# Patient Record
Sex: FEMALE | Race: WHITE | HISPANIC OR LATINO | URBAN - METROPOLITAN AREA
[De-identification: names, ages, dates, MRNs, and addresses within clinical notes are randomized per-mention and may not be internally consistent; named-entity substitution may affect disease eponyms.]

---

## 2023-11-21 ENCOUNTER — INPATIENT (INPATIENT)
Facility: HOSPITAL | Age: 83
LOS: 23 days | Discharge: HOME HEALTH | DRG: 291 | End: 2023-12-15
Attending: STUDENT IN AN ORGANIZED HEALTH CARE EDUCATION/TRAINING PROGRAM | Admitting: STUDENT IN AN ORGANIZED HEALTH CARE EDUCATION/TRAINING PROGRAM
Payer: MEDICARE

## 2023-11-21 VITALS
TEMPERATURE: 98 F | SYSTOLIC BLOOD PRESSURE: 185 MMHG | DIASTOLIC BLOOD PRESSURE: 68 MMHG | OXYGEN SATURATION: 95 % | WEIGHT: 149.91 LBS | HEART RATE: 87 BPM | HEIGHT: 63 IN | RESPIRATION RATE: 28 BRPM

## 2023-11-21 LAB
ALBUMIN SERPL ELPH-MCNC: 3.8 G/DL — SIGNIFICANT CHANGE UP (ref 3.3–5)
ALBUMIN SERPL ELPH-MCNC: 3.8 G/DL — SIGNIFICANT CHANGE UP (ref 3.3–5)
ALP SERPL-CCNC: 69 U/L — SIGNIFICANT CHANGE UP (ref 40–120)
ALP SERPL-CCNC: 69 U/L — SIGNIFICANT CHANGE UP (ref 40–120)
ALT FLD-CCNC: 10 U/L — SIGNIFICANT CHANGE UP (ref 10–45)
ALT FLD-CCNC: 10 U/L — SIGNIFICANT CHANGE UP (ref 10–45)
ANION GAP SERPL CALC-SCNC: 14 MMOL/L — SIGNIFICANT CHANGE UP (ref 5–17)
ANION GAP SERPL CALC-SCNC: 14 MMOL/L — SIGNIFICANT CHANGE UP (ref 5–17)
APTT BLD: 38.8 SEC — HIGH (ref 24.5–35.6)
APTT BLD: 38.8 SEC — HIGH (ref 24.5–35.6)
AST SERPL-CCNC: 18 U/L — SIGNIFICANT CHANGE UP (ref 10–40)
AST SERPL-CCNC: 18 U/L — SIGNIFICANT CHANGE UP (ref 10–40)
BASE EXCESS BLDV CALC-SCNC: -7.3 MMOL/L — LOW (ref -2–3)
BASE EXCESS BLDV CALC-SCNC: -7.3 MMOL/L — LOW (ref -2–3)
BASOPHILS # BLD AUTO: 0.07 K/UL — SIGNIFICANT CHANGE UP (ref 0–0.2)
BASOPHILS # BLD AUTO: 0.07 K/UL — SIGNIFICANT CHANGE UP (ref 0–0.2)
BASOPHILS NFR BLD AUTO: 1 % — SIGNIFICANT CHANGE UP (ref 0–2)
BASOPHILS NFR BLD AUTO: 1 % — SIGNIFICANT CHANGE UP (ref 0–2)
BILIRUB SERPL-MCNC: 0.3 MG/DL — SIGNIFICANT CHANGE UP (ref 0.2–1.2)
BILIRUB SERPL-MCNC: 0.3 MG/DL — SIGNIFICANT CHANGE UP (ref 0.2–1.2)
BUN SERPL-MCNC: 55 MG/DL — HIGH (ref 7–23)
BUN SERPL-MCNC: 55 MG/DL — HIGH (ref 7–23)
CA-I SERPL-SCNC: 1.2 MMOL/L — SIGNIFICANT CHANGE UP (ref 1.15–1.33)
CA-I SERPL-SCNC: 1.2 MMOL/L — SIGNIFICANT CHANGE UP (ref 1.15–1.33)
CALCIUM SERPL-MCNC: 9 MG/DL — SIGNIFICANT CHANGE UP (ref 8.4–10.5)
CALCIUM SERPL-MCNC: 9 MG/DL — SIGNIFICANT CHANGE UP (ref 8.4–10.5)
CHLORIDE BLDV-SCNC: 109 MMOL/L — HIGH (ref 96–108)
CHLORIDE BLDV-SCNC: 109 MMOL/L — HIGH (ref 96–108)
CHLORIDE SERPL-SCNC: 108 MMOL/L — SIGNIFICANT CHANGE UP (ref 96–108)
CHLORIDE SERPL-SCNC: 108 MMOL/L — SIGNIFICANT CHANGE UP (ref 96–108)
CO2 BLDV-SCNC: 21 MMOL/L — LOW (ref 22–26)
CO2 BLDV-SCNC: 21 MMOL/L — LOW (ref 22–26)
CO2 SERPL-SCNC: 17 MMOL/L — LOW (ref 22–31)
CO2 SERPL-SCNC: 17 MMOL/L — LOW (ref 22–31)
CREAT SERPL-MCNC: 4.54 MG/DL — HIGH (ref 0.5–1.3)
CREAT SERPL-MCNC: 4.54 MG/DL — HIGH (ref 0.5–1.3)
EGFR: 9 ML/MIN/1.73M2 — LOW
EGFR: 9 ML/MIN/1.73M2 — LOW
EOSINOPHIL # BLD AUTO: 0.24 K/UL — SIGNIFICANT CHANGE UP (ref 0–0.5)
EOSINOPHIL # BLD AUTO: 0.24 K/UL — SIGNIFICANT CHANGE UP (ref 0–0.5)
EOSINOPHIL NFR BLD AUTO: 3.6 % — SIGNIFICANT CHANGE UP (ref 0–6)
EOSINOPHIL NFR BLD AUTO: 3.6 % — SIGNIFICANT CHANGE UP (ref 0–6)
GAS PNL BLDV: 137 MMOL/L — SIGNIFICANT CHANGE UP (ref 136–145)
GAS PNL BLDV: 137 MMOL/L — SIGNIFICANT CHANGE UP (ref 136–145)
GAS PNL BLDV: SIGNIFICANT CHANGE UP
GLUCOSE BLDV-MCNC: 78 MG/DL — SIGNIFICANT CHANGE UP (ref 70–99)
GLUCOSE BLDV-MCNC: 78 MG/DL — SIGNIFICANT CHANGE UP (ref 70–99)
GLUCOSE SERPL-MCNC: 87 MG/DL — SIGNIFICANT CHANGE UP (ref 70–99)
GLUCOSE SERPL-MCNC: 87 MG/DL — SIGNIFICANT CHANGE UP (ref 70–99)
HCO3 BLDV-SCNC: 19 MMOL/L — LOW (ref 22–29)
HCO3 BLDV-SCNC: 19 MMOL/L — LOW (ref 22–29)
HCT VFR BLD CALC: 27.7 % — LOW (ref 34.5–45)
HCT VFR BLD CALC: 27.7 % — LOW (ref 34.5–45)
HCT VFR BLDA CALC: 28 % — LOW (ref 34.5–46.5)
HCT VFR BLDA CALC: 28 % — LOW (ref 34.5–46.5)
HGB BLD CALC-MCNC: 9.3 G/DL — LOW (ref 11.7–16.1)
HGB BLD CALC-MCNC: 9.3 G/DL — LOW (ref 11.7–16.1)
HGB BLD-MCNC: 8.7 G/DL — LOW (ref 11.5–15.5)
HGB BLD-MCNC: 8.7 G/DL — LOW (ref 11.5–15.5)
IMM GRANULOCYTES NFR BLD AUTO: 0.4 % — SIGNIFICANT CHANGE UP (ref 0–0.9)
IMM GRANULOCYTES NFR BLD AUTO: 0.4 % — SIGNIFICANT CHANGE UP (ref 0–0.9)
INR BLD: 0.99 RATIO — SIGNIFICANT CHANGE UP (ref 0.85–1.18)
INR BLD: 0.99 RATIO — SIGNIFICANT CHANGE UP (ref 0.85–1.18)
LACTATE BLDV-MCNC: 0.8 MMOL/L — SIGNIFICANT CHANGE UP (ref 0.5–2)
LACTATE BLDV-MCNC: 0.8 MMOL/L — SIGNIFICANT CHANGE UP (ref 0.5–2)
LYMPHOCYTES # BLD AUTO: 1.3 K/UL — SIGNIFICANT CHANGE UP (ref 1–3.3)
LYMPHOCYTES # BLD AUTO: 1.3 K/UL — SIGNIFICANT CHANGE UP (ref 1–3.3)
LYMPHOCYTES # BLD AUTO: 19.5 % — SIGNIFICANT CHANGE UP (ref 13–44)
LYMPHOCYTES # BLD AUTO: 19.5 % — SIGNIFICANT CHANGE UP (ref 13–44)
MCHC RBC-ENTMCNC: 29.2 PG — SIGNIFICANT CHANGE UP (ref 27–34)
MCHC RBC-ENTMCNC: 29.2 PG — SIGNIFICANT CHANGE UP (ref 27–34)
MCHC RBC-ENTMCNC: 31.4 GM/DL — LOW (ref 32–36)
MCHC RBC-ENTMCNC: 31.4 GM/DL — LOW (ref 32–36)
MCV RBC AUTO: 93 FL — SIGNIFICANT CHANGE UP (ref 80–100)
MCV RBC AUTO: 93 FL — SIGNIFICANT CHANGE UP (ref 80–100)
MONOCYTES # BLD AUTO: 0.79 K/UL — SIGNIFICANT CHANGE UP (ref 0–0.9)
MONOCYTES # BLD AUTO: 0.79 K/UL — SIGNIFICANT CHANGE UP (ref 0–0.9)
MONOCYTES NFR BLD AUTO: 11.8 % — SIGNIFICANT CHANGE UP (ref 2–14)
MONOCYTES NFR BLD AUTO: 11.8 % — SIGNIFICANT CHANGE UP (ref 2–14)
NEUTROPHILS # BLD AUTO: 4.24 K/UL — SIGNIFICANT CHANGE UP (ref 1.8–7.4)
NEUTROPHILS # BLD AUTO: 4.24 K/UL — SIGNIFICANT CHANGE UP (ref 1.8–7.4)
NEUTROPHILS NFR BLD AUTO: 63.7 % — SIGNIFICANT CHANGE UP (ref 43–77)
NEUTROPHILS NFR BLD AUTO: 63.7 % — SIGNIFICANT CHANGE UP (ref 43–77)
NRBC # BLD: 0 /100 WBCS — SIGNIFICANT CHANGE UP (ref 0–0)
NRBC # BLD: 0 /100 WBCS — SIGNIFICANT CHANGE UP (ref 0–0)
PCO2 BLDV: 43 MMHG — HIGH (ref 39–42)
PCO2 BLDV: 43 MMHG — HIGH (ref 39–42)
PH BLDV: 7.26 — LOW (ref 7.32–7.43)
PH BLDV: 7.26 — LOW (ref 7.32–7.43)
PLATELET # BLD AUTO: 241 K/UL — SIGNIFICANT CHANGE UP (ref 150–400)
PLATELET # BLD AUTO: 241 K/UL — SIGNIFICANT CHANGE UP (ref 150–400)
PO2 BLDV: 43 MMHG — SIGNIFICANT CHANGE UP (ref 25–45)
PO2 BLDV: 43 MMHG — SIGNIFICANT CHANGE UP (ref 25–45)
POTASSIUM BLDV-SCNC: 5 MMOL/L — SIGNIFICANT CHANGE UP (ref 3.5–5.1)
POTASSIUM BLDV-SCNC: 5 MMOL/L — SIGNIFICANT CHANGE UP (ref 3.5–5.1)
POTASSIUM SERPL-MCNC: 4.9 MMOL/L — SIGNIFICANT CHANGE UP (ref 3.5–5.3)
POTASSIUM SERPL-MCNC: 4.9 MMOL/L — SIGNIFICANT CHANGE UP (ref 3.5–5.3)
POTASSIUM SERPL-SCNC: 4.9 MMOL/L — SIGNIFICANT CHANGE UP (ref 3.5–5.3)
POTASSIUM SERPL-SCNC: 4.9 MMOL/L — SIGNIFICANT CHANGE UP (ref 3.5–5.3)
PROT SERPL-MCNC: 7.7 G/DL — SIGNIFICANT CHANGE UP (ref 6–8.3)
PROT SERPL-MCNC: 7.7 G/DL — SIGNIFICANT CHANGE UP (ref 6–8.3)
PROTHROM AB SERPL-ACNC: 10.9 SEC — SIGNIFICANT CHANGE UP (ref 9.5–13)
PROTHROM AB SERPL-ACNC: 10.9 SEC — SIGNIFICANT CHANGE UP (ref 9.5–13)
RBC # BLD: 2.98 M/UL — LOW (ref 3.8–5.2)
RBC # BLD: 2.98 M/UL — LOW (ref 3.8–5.2)
RBC # FLD: 15.4 % — HIGH (ref 10.3–14.5)
RBC # FLD: 15.4 % — HIGH (ref 10.3–14.5)
SAO2 % BLDV: 61 % — LOW (ref 67–88)
SAO2 % BLDV: 61 % — LOW (ref 67–88)
SODIUM SERPL-SCNC: 139 MMOL/L — SIGNIFICANT CHANGE UP (ref 135–145)
SODIUM SERPL-SCNC: 139 MMOL/L — SIGNIFICANT CHANGE UP (ref 135–145)
TROPONIN T, HIGH SENSITIVITY RESULT: 74 NG/L — HIGH (ref 0–51)
TROPONIN T, HIGH SENSITIVITY RESULT: 74 NG/L — HIGH (ref 0–51)
WBC # BLD: 6.67 K/UL — SIGNIFICANT CHANGE UP (ref 3.8–10.5)
WBC # BLD: 6.67 K/UL — SIGNIFICANT CHANGE UP (ref 3.8–10.5)
WBC # FLD AUTO: 6.67 K/UL — SIGNIFICANT CHANGE UP (ref 3.8–10.5)
WBC # FLD AUTO: 6.67 K/UL — SIGNIFICANT CHANGE UP (ref 3.8–10.5)

## 2023-11-21 PROCEDURE — 99285 EMERGENCY DEPT VISIT HI MDM: CPT | Mod: GC

## 2023-11-21 PROCEDURE — 93970 EXTREMITY STUDY: CPT | Mod: 26

## 2023-11-21 PROCEDURE — 71275 CT ANGIOGRAPHY CHEST: CPT | Mod: 26,MA

## 2023-11-21 NOTE — ED ADULT NURSE NOTE - NSFALLUNIVINTERV_ED_ALL_ED
Bed/Stretcher in lowest position, wheels locked, appropriate side rails in place/Call bell, personal items and telephone in reach/Instruct patient to call for assistance before getting out of bed/chair/stretcher/Non-slip footwear applied when patient is off stretcher/[place] to call system/Physically safe environment - no spills, clutter or unnecessary equipment/Purposeful proactive rounding/Room/bathroom lighting operational, light cord in reach

## 2023-11-21 NOTE — ED ADULT NURSE NOTE - OBJECTIVE STATEMENT
Pt is 83Y F, pmhx HTN, HLD, DM2, COPD uses O2 2LNC for comfort, c/o SOB, cough, WALKER, increased bilateral LLE for 1 week, pt nonproductive cough, no fever or chills, pt uses 2LNC for comfort at home, pt RA sat at ED less than 90%, pt placed on 2LNC, increased O2 sat to 95%, pt endorses increaed bilateral leg pain with swelling, +2 pitting edema noted, pt ambulates at baseline, pt 5/10 pain in legs, no DVT hx, pt denies any other symptoms, updated on plan of care

## 2023-11-22 DIAGNOSIS — J96.11 CHRONIC RESPIRATORY FAILURE WITH HYPOXIA: ICD-10-CM

## 2023-11-22 DIAGNOSIS — Z29.9 ENCOUNTER FOR PROPHYLACTIC MEASURES, UNSPECIFIED: ICD-10-CM

## 2023-11-22 DIAGNOSIS — R06.02 SHORTNESS OF BREATH: ICD-10-CM

## 2023-11-22 DIAGNOSIS — I10 ESSENTIAL (PRIMARY) HYPERTENSION: ICD-10-CM

## 2023-11-22 DIAGNOSIS — E78.5 HYPERLIPIDEMIA, UNSPECIFIED: ICD-10-CM

## 2023-11-22 DIAGNOSIS — I50.9 HEART FAILURE, UNSPECIFIED: ICD-10-CM

## 2023-11-22 DIAGNOSIS — N17.9 ACUTE KIDNEY FAILURE, UNSPECIFIED: ICD-10-CM

## 2023-11-22 DIAGNOSIS — E11.9 TYPE 2 DIABETES MELLITUS WITHOUT COMPLICATIONS: ICD-10-CM

## 2023-11-22 LAB
A1C WITH ESTIMATED AVERAGE GLUCOSE RESULT: 6.1 % — HIGH (ref 4–5.6)
A1C WITH ESTIMATED AVERAGE GLUCOSE RESULT: 6.1 % — HIGH (ref 4–5.6)
ALBUMIN SERPL ELPH-MCNC: 3.4 G/DL — SIGNIFICANT CHANGE UP (ref 3.3–5)
ALBUMIN SERPL ELPH-MCNC: 3.4 G/DL — SIGNIFICANT CHANGE UP (ref 3.3–5)
ALP SERPL-CCNC: 67 U/L — SIGNIFICANT CHANGE UP (ref 40–120)
ALP SERPL-CCNC: 67 U/L — SIGNIFICANT CHANGE UP (ref 40–120)
ALT FLD-CCNC: 9 U/L — LOW (ref 10–45)
ALT FLD-CCNC: 9 U/L — LOW (ref 10–45)
ANION GAP SERPL CALC-SCNC: 16 MMOL/L — SIGNIFICANT CHANGE UP (ref 5–17)
ANION GAP SERPL CALC-SCNC: 16 MMOL/L — SIGNIFICANT CHANGE UP (ref 5–17)
APPEARANCE UR: CLEAR — SIGNIFICANT CHANGE UP
APPEARANCE UR: CLEAR — SIGNIFICANT CHANGE UP
AST SERPL-CCNC: 19 U/L — SIGNIFICANT CHANGE UP (ref 10–40)
AST SERPL-CCNC: 19 U/L — SIGNIFICANT CHANGE UP (ref 10–40)
BACTERIA # UR AUTO: NEGATIVE /HPF — SIGNIFICANT CHANGE UP
BACTERIA # UR AUTO: NEGATIVE /HPF — SIGNIFICANT CHANGE UP
BILIRUB SERPL-MCNC: 0.4 MG/DL — SIGNIFICANT CHANGE UP (ref 0.2–1.2)
BILIRUB SERPL-MCNC: 0.4 MG/DL — SIGNIFICANT CHANGE UP (ref 0.2–1.2)
BILIRUB UR-MCNC: NEGATIVE — SIGNIFICANT CHANGE UP
BILIRUB UR-MCNC: NEGATIVE — SIGNIFICANT CHANGE UP
BUN SERPL-MCNC: 55 MG/DL — HIGH (ref 7–23)
BUN SERPL-MCNC: 55 MG/DL — HIGH (ref 7–23)
CALCIUM SERPL-MCNC: 9.3 MG/DL — SIGNIFICANT CHANGE UP (ref 8.4–10.5)
CALCIUM SERPL-MCNC: 9.3 MG/DL — SIGNIFICANT CHANGE UP (ref 8.4–10.5)
CAST: 0 /LPF — SIGNIFICANT CHANGE UP (ref 0–4)
CAST: 0 /LPF — SIGNIFICANT CHANGE UP (ref 0–4)
CHLORIDE SERPL-SCNC: 109 MMOL/L — HIGH (ref 96–108)
CHLORIDE SERPL-SCNC: 109 MMOL/L — HIGH (ref 96–108)
CO2 SERPL-SCNC: 15 MMOL/L — LOW (ref 22–31)
CO2 SERPL-SCNC: 15 MMOL/L — LOW (ref 22–31)
COLOR SPEC: YELLOW — SIGNIFICANT CHANGE UP
COLOR SPEC: YELLOW — SIGNIFICANT CHANGE UP
CREAT ?TM UR-MCNC: 36 MG/DL — SIGNIFICANT CHANGE UP
CREAT ?TM UR-MCNC: 36 MG/DL — SIGNIFICANT CHANGE UP
CREAT SERPL-MCNC: 4.32 MG/DL — HIGH (ref 0.5–1.3)
CREAT SERPL-MCNC: 4.32 MG/DL — HIGH (ref 0.5–1.3)
DIFF PNL FLD: ABNORMAL
DIFF PNL FLD: ABNORMAL
EGFR: 10 ML/MIN/1.73M2 — LOW
EGFR: 10 ML/MIN/1.73M2 — LOW
ESTIMATED AVERAGE GLUCOSE: 128 MG/DL — HIGH (ref 68–114)
ESTIMATED AVERAGE GLUCOSE: 128 MG/DL — HIGH (ref 68–114)
GLUCOSE SERPL-MCNC: 98 MG/DL — SIGNIFICANT CHANGE UP (ref 70–99)
GLUCOSE SERPL-MCNC: 98 MG/DL — SIGNIFICANT CHANGE UP (ref 70–99)
GLUCOSE UR QL: 250 MG/DL
GLUCOSE UR QL: 250 MG/DL
HCT VFR BLD CALC: 28.2 % — LOW (ref 34.5–45)
HCT VFR BLD CALC: 28.2 % — LOW (ref 34.5–45)
HGB BLD-MCNC: 8.9 G/DL — LOW (ref 11.5–15.5)
HGB BLD-MCNC: 8.9 G/DL — LOW (ref 11.5–15.5)
KETONES UR-MCNC: NEGATIVE MG/DL — SIGNIFICANT CHANGE UP
KETONES UR-MCNC: NEGATIVE MG/DL — SIGNIFICANT CHANGE UP
LEUKOCYTE ESTERASE UR-ACNC: NEGATIVE — SIGNIFICANT CHANGE UP
LEUKOCYTE ESTERASE UR-ACNC: NEGATIVE — SIGNIFICANT CHANGE UP
MAGNESIUM SERPL-MCNC: 2.4 MG/DL — SIGNIFICANT CHANGE UP (ref 1.6–2.6)
MAGNESIUM SERPL-MCNC: 2.4 MG/DL — SIGNIFICANT CHANGE UP (ref 1.6–2.6)
MCHC RBC-ENTMCNC: 29.7 PG — SIGNIFICANT CHANGE UP (ref 27–34)
MCHC RBC-ENTMCNC: 29.7 PG — SIGNIFICANT CHANGE UP (ref 27–34)
MCHC RBC-ENTMCNC: 31.6 GM/DL — LOW (ref 32–36)
MCHC RBC-ENTMCNC: 31.6 GM/DL — LOW (ref 32–36)
MCV RBC AUTO: 94 FL — SIGNIFICANT CHANGE UP (ref 80–100)
MCV RBC AUTO: 94 FL — SIGNIFICANT CHANGE UP (ref 80–100)
NITRITE UR-MCNC: NEGATIVE — SIGNIFICANT CHANGE UP
NITRITE UR-MCNC: NEGATIVE — SIGNIFICANT CHANGE UP
NRBC # BLD: 0 /100 WBCS — SIGNIFICANT CHANGE UP (ref 0–0)
NRBC # BLD: 0 /100 WBCS — SIGNIFICANT CHANGE UP (ref 0–0)
OSMOLALITY UR: 364 MOS/KG — SIGNIFICANT CHANGE UP (ref 300–900)
OSMOLALITY UR: 364 MOS/KG — SIGNIFICANT CHANGE UP (ref 300–900)
PH UR: 5.5 — SIGNIFICANT CHANGE UP (ref 5–8)
PH UR: 5.5 — SIGNIFICANT CHANGE UP (ref 5–8)
PHOSPHATE SERPL-MCNC: 5.2 MG/DL — HIGH (ref 2.5–4.5)
PHOSPHATE SERPL-MCNC: 5.2 MG/DL — HIGH (ref 2.5–4.5)
PLATELET # BLD AUTO: 225 K/UL — SIGNIFICANT CHANGE UP (ref 150–400)
PLATELET # BLD AUTO: 225 K/UL — SIGNIFICANT CHANGE UP (ref 150–400)
POTASSIUM SERPL-MCNC: 5.1 MMOL/L — SIGNIFICANT CHANGE UP (ref 3.5–5.3)
POTASSIUM SERPL-MCNC: 5.1 MMOL/L — SIGNIFICANT CHANGE UP (ref 3.5–5.3)
POTASSIUM SERPL-SCNC: 5.1 MMOL/L — SIGNIFICANT CHANGE UP (ref 3.5–5.3)
POTASSIUM SERPL-SCNC: 5.1 MMOL/L — SIGNIFICANT CHANGE UP (ref 3.5–5.3)
POTASSIUM UR-SCNC: 15 MMOL/L — SIGNIFICANT CHANGE UP
POTASSIUM UR-SCNC: 15 MMOL/L — SIGNIFICANT CHANGE UP
PROT ?TM UR-MCNC: 203 MG/DL — HIGH (ref 0–12)
PROT ?TM UR-MCNC: 203 MG/DL — HIGH (ref 0–12)
PROT SERPL-MCNC: 7.3 G/DL — SIGNIFICANT CHANGE UP (ref 6–8.3)
PROT SERPL-MCNC: 7.3 G/DL — SIGNIFICANT CHANGE UP (ref 6–8.3)
PROT UR-MCNC: 300 MG/DL
PROT UR-MCNC: 300 MG/DL
PROT/CREAT UR-RTO: 5.6 RATIO — HIGH (ref 0–0.2)
PROT/CREAT UR-RTO: 5.6 RATIO — HIGH (ref 0–0.2)
RAPID RVP RESULT: SIGNIFICANT CHANGE UP
RAPID RVP RESULT: SIGNIFICANT CHANGE UP
RBC # BLD: 3 M/UL — LOW (ref 3.8–5.2)
RBC # BLD: 3 M/UL — LOW (ref 3.8–5.2)
RBC # FLD: 15.4 % — HIGH (ref 10.3–14.5)
RBC # FLD: 15.4 % — HIGH (ref 10.3–14.5)
RBC CASTS # UR COMP ASSIST: 0 /HPF — SIGNIFICANT CHANGE UP (ref 0–4)
RBC CASTS # UR COMP ASSIST: 0 /HPF — SIGNIFICANT CHANGE UP (ref 0–4)
SARS-COV-2 RNA SPEC QL NAA+PROBE: SIGNIFICANT CHANGE UP
SARS-COV-2 RNA SPEC QL NAA+PROBE: SIGNIFICANT CHANGE UP
SODIUM SERPL-SCNC: 140 MMOL/L — SIGNIFICANT CHANGE UP (ref 135–145)
SODIUM SERPL-SCNC: 140 MMOL/L — SIGNIFICANT CHANGE UP (ref 135–145)
SODIUM UR-SCNC: 113 MMOL/L — SIGNIFICANT CHANGE UP
SODIUM UR-SCNC: 113 MMOL/L — SIGNIFICANT CHANGE UP
SP GR SPEC: 1.01 — SIGNIFICANT CHANGE UP (ref 1–1.03)
SP GR SPEC: 1.01 — SIGNIFICANT CHANGE UP (ref 1–1.03)
SQUAMOUS # UR AUTO: 1 /HPF — SIGNIFICANT CHANGE UP (ref 0–5)
SQUAMOUS # UR AUTO: 1 /HPF — SIGNIFICANT CHANGE UP (ref 0–5)
TROPONIN T, HIGH SENSITIVITY RESULT: 74 NG/L — HIGH (ref 0–51)
TROPONIN T, HIGH SENSITIVITY RESULT: 74 NG/L — HIGH (ref 0–51)
UROBILINOGEN FLD QL: 0.2 MG/DL — SIGNIFICANT CHANGE UP (ref 0.2–1)
UROBILINOGEN FLD QL: 0.2 MG/DL — SIGNIFICANT CHANGE UP (ref 0.2–1)
UUN UR-MCNC: 150 MG/DL — SIGNIFICANT CHANGE UP
UUN UR-MCNC: 150 MG/DL — SIGNIFICANT CHANGE UP
WBC # BLD: 6.56 K/UL — SIGNIFICANT CHANGE UP (ref 3.8–10.5)
WBC # BLD: 6.56 K/UL — SIGNIFICANT CHANGE UP (ref 3.8–10.5)
WBC # FLD AUTO: 6.56 K/UL — SIGNIFICANT CHANGE UP (ref 3.8–10.5)
WBC # FLD AUTO: 6.56 K/UL — SIGNIFICANT CHANGE UP (ref 3.8–10.5)
WBC UR QL: 3 /HPF — SIGNIFICANT CHANGE UP (ref 0–5)
WBC UR QL: 3 /HPF — SIGNIFICANT CHANGE UP (ref 0–5)

## 2023-11-22 PROCEDURE — 99223 1ST HOSP IP/OBS HIGH 75: CPT | Mod: GC

## 2023-11-22 PROCEDURE — 93306 TTE W/DOPPLER COMPLETE: CPT | Mod: 26

## 2023-11-22 PROCEDURE — 71045 X-RAY EXAM CHEST 1 VIEW: CPT | Mod: 26

## 2023-11-22 PROCEDURE — 99232 SBSQ HOSP IP/OBS MODERATE 35: CPT | Mod: GC

## 2023-11-22 PROCEDURE — 76770 US EXAM ABDO BACK WALL COMP: CPT | Mod: 26,59

## 2023-11-22 PROCEDURE — 93975 VASCULAR STUDY: CPT | Mod: 26

## 2023-11-22 RX ORDER — ACETAMINOPHEN 500 MG
650 TABLET ORAL EVERY 6 HOURS
Refills: 0 | Status: DISCONTINUED | OUTPATIENT
Start: 2023-11-22 | End: 2023-12-15

## 2023-11-22 RX ORDER — HYDRALAZINE HCL 50 MG
10 TABLET ORAL THREE TIMES A DAY
Refills: 0 | Status: DISCONTINUED | OUTPATIENT
Start: 2023-11-22 | End: 2023-11-22

## 2023-11-22 RX ORDER — INFLUENZA VIRUS VACCINE 15; 15; 15; 15 UG/.5ML; UG/.5ML; UG/.5ML; UG/.5ML
0.7 SUSPENSION INTRAMUSCULAR ONCE
Refills: 0 | Status: DISCONTINUED | OUTPATIENT
Start: 2023-11-22 | End: 2023-12-15

## 2023-11-22 RX ORDER — HYDRALAZINE HCL 50 MG
25 TABLET ORAL THREE TIMES A DAY
Refills: 0 | Status: DISCONTINUED | OUTPATIENT
Start: 2023-11-22 | End: 2023-12-15

## 2023-11-22 RX ORDER — AMLODIPINE BESYLATE 2.5 MG/1
10 TABLET ORAL DAILY
Refills: 0 | Status: DISCONTINUED | OUTPATIENT
Start: 2023-11-22 | End: 2023-11-22

## 2023-11-22 RX ORDER — ATORVASTATIN CALCIUM 80 MG/1
80 TABLET, FILM COATED ORAL AT BEDTIME
Refills: 0 | Status: DISCONTINUED | OUTPATIENT
Start: 2023-11-22 | End: 2023-12-15

## 2023-11-22 RX ORDER — HEPARIN SODIUM 5000 [USP'U]/ML
5000 INJECTION INTRAVENOUS; SUBCUTANEOUS EVERY 8 HOURS
Refills: 0 | Status: DISCONTINUED | OUTPATIENT
Start: 2023-11-22 | End: 2023-12-04

## 2023-11-22 RX ORDER — AMLODIPINE BESYLATE 2.5 MG/1
10 TABLET ORAL DAILY
Refills: 0 | Status: DISCONTINUED | OUTPATIENT
Start: 2023-11-22 | End: 2023-12-15

## 2023-11-22 RX ORDER — MONTELUKAST 4 MG/1
10 TABLET, CHEWABLE ORAL DAILY
Refills: 0 | Status: DISCONTINUED | OUTPATIENT
Start: 2023-11-22 | End: 2023-12-15

## 2023-11-22 RX ORDER — IPRATROPIUM/ALBUTEROL SULFATE 18-103MCG
3 AEROSOL WITH ADAPTER (GRAM) INHALATION EVERY 6 HOURS
Refills: 0 | Status: DISCONTINUED | OUTPATIENT
Start: 2023-11-22 | End: 2023-12-15

## 2023-11-22 RX ORDER — FUROSEMIDE 40 MG
40 TABLET ORAL
Refills: 0 | Status: DISCONTINUED | OUTPATIENT
Start: 2023-11-22 | End: 2023-11-24

## 2023-11-22 RX ORDER — ALBUTEROL 90 UG/1
2 AEROSOL, METERED ORAL EVERY 6 HOURS
Refills: 0 | Status: DISCONTINUED | OUTPATIENT
Start: 2023-11-22 | End: 2023-12-15

## 2023-11-22 RX ORDER — BUDESONIDE AND FORMOTEROL FUMARATE DIHYDRATE 160; 4.5 UG/1; UG/1
2 AEROSOL RESPIRATORY (INHALATION) EVERY 12 HOURS
Refills: 0 | Status: DISCONTINUED | OUTPATIENT
Start: 2023-11-22 | End: 2023-11-29

## 2023-11-22 RX ADMIN — Medication 3 MILLILITER(S): at 17:49

## 2023-11-22 RX ADMIN — Medication 40 MILLIGRAM(S): at 13:33

## 2023-11-22 RX ADMIN — Medication 25 MILLIGRAM(S): at 13:26

## 2023-11-22 RX ADMIN — MONTELUKAST 10 MILLIGRAM(S): 4 TABLET, CHEWABLE ORAL at 12:49

## 2023-11-22 RX ADMIN — ATORVASTATIN CALCIUM 80 MILLIGRAM(S): 80 TABLET, FILM COATED ORAL at 20:48

## 2023-11-22 RX ADMIN — BUDESONIDE AND FORMOTEROL FUMARATE DIHYDRATE 2 PUFF(S): 160; 4.5 AEROSOL RESPIRATORY (INHALATION) at 17:49

## 2023-11-22 RX ADMIN — HEPARIN SODIUM 5000 UNIT(S): 5000 INJECTION INTRAVENOUS; SUBCUTANEOUS at 20:48

## 2023-11-22 RX ADMIN — AMLODIPINE BESYLATE 10 MILLIGRAM(S): 2.5 TABLET ORAL at 07:26

## 2023-11-22 RX ADMIN — HEPARIN SODIUM 5000 UNIT(S): 5000 INJECTION INTRAVENOUS; SUBCUTANEOUS at 13:26

## 2023-11-22 RX ADMIN — Medication 25 MILLIGRAM(S): at 20:48

## 2023-11-22 RX ADMIN — Medication 3 MILLILITER(S): at 12:49

## 2023-11-22 RX ADMIN — Medication 40 MILLIGRAM(S): at 06:27

## 2023-11-22 NOTE — PHYSICAL THERAPY INITIAL EVALUATION ADULT - ADDITIONAL COMMENTS
Pt lives with her  in a 4th floor apartment with elevator access. 3 steps to enter building +HR. PTA pt was independent with all functional mobility and ADLs without AD.

## 2023-11-22 NOTE — PHYSICAL THERAPY INITIAL EVALUATION ADULT - NSPTDMEREC_GEN_A_CORE
Patient will require a rolling walker at home due to their dx of acute decompensated heart failure  to help complete MRADL's./rolling walker

## 2023-11-22 NOTE — H&P ADULT - PROBLEM SELECTOR PLAN 3
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

## 2023-11-22 NOTE — H&P ADULT - NSHPPHYSICALEXAM_GEN_ALL_CORE
VITALS:   T(C): 36.7 (11-22-23 @ 05:14), Max: 36.8 (11-21-23 @ 23:14)  HR: 98 (11-22-23 @ 05:14) (87 - 98)  BP: 177/75 (11-22-23 @ 05:14) (177/75 - 188/72)  RR: 22 (11-22-23 @ 05:14) (22 - 28)  SpO2: 95% (11-22-23 @ 05:14) (95% - 95%)    GENERAL: dyspneic, not speaking in full sentences  HEAD: Atraumatic, normocephalic  EYES: EOMI, PERRLA, conjunctiva and sclera clear  ENT: Moist mucous membranes  NECK: Unable to accurately assess JVD given body habitus  HEART: +S3; regular rate and rhythm, no murmurs, rubs, or gallops  LUNGS: Bibasilar crackles, increased respiratory effort, worsened with movement in the stretcher  ABDOMEN: Soft, nontender, nondistended, +BS  EXTREMITIES: 2+ peripheral pulses bilaterally. No clubbing, cyanosis, or edema  NERVOUS SYSTEM:  A&Ox3, no focal deficits   SKIN: No rashes or lesions VITALS:   T(C): 36.7 (11-22-23 @ 05:14), Max: 36.8 (11-21-23 @ 23:14)  HR: 98 (11-22-23 @ 05:14) (87 - 98)  BP: 177/75 (11-22-23 @ 05:14) (177/75 - 188/72)  RR: 22 (11-22-23 @ 05:14) (22 - 28)  SpO2: 95% (11-22-23 @ 05:14) (95% - 95%)    GENERAL: dyspneic, struggling to speak in full sentences  HEAD: Atraumatic, normocephalic  EYES: EOMI, PERRLA, conjunctiva and sclera clear  ENT: Moist mucous membranes  NECK: Unable to accurately assess JVD given body habitus  HEART: +S3; regular rate and rhythm, no murmurs, rubs, or gallops  LUNGS: Bibasilar crackles, increased respiratory effort, worsened with movement in the stretcher  ABDOMEN: Soft, nontender, nondistended, +BS  EXTREMITIES: 3+ pitting edema with tenderness, ertyhematous but not warm  NERVOUS SYSTEM:  A&Ox3, no focal deficits   SKIN: chronic LE skin changes

## 2023-11-22 NOTE — H&P ADULT - NSHPSOCIALHISTORY_GEN_ALL_CORE
Lives at home with her  and one of their children who visits intermittently. Lives at home with her  and one of their children who visits intermittently. Former smoker, quit over 40 years ago. Social EtOH use.

## 2023-11-22 NOTE — ED PROVIDER NOTE - NSICDXPASTMEDICALHX_GEN_ALL_CORE_FT
PAST MEDICAL HISTORY:  Asthma     CA - Breast Cancer 1996 s/p lumpectomy, chemo, and radiation    CAD (Coronary Artery Disease)     Diabetes     Dyslipidemia     H/O: Obesity     H/O: Osteoarthritis     HTN (Hypertension)

## 2023-11-22 NOTE — H&P ADULT - ATTENDING COMMENTS
83F w/ hx of ?COPD/?ILD (on home O2 2LNC PRN), ?CHF, HTN, HLD, CAD, DM2, CKD, remote hx of breast ca s/p mastectomy, former smoker, presenting with worsening b/l LE swelling and SOB/WALKER over the past few weeks. Also noted non-productive cough; denied fevers, chills, or sick contacts.  at bedside providing collateral information. Noted that he puts the supplemental home O2 on her when pt's O2 sat drops to ~90%.; has been on it for about 2 yrs now and started after PFTs had showed some "scarring" in lungs. Of note, home meds suggestive of possible CHF hx.    In ED, VS afebrile, sating well on 3L O2NC, RR 20s, SBP to 180s. Exam with bibasilar crackles and 3+ pitting edema in b/l LEs. Labs notable for hsTrop 74->74, proBNP >1.3k, SCr 4.54 (prior in system was ~1.3-1.5 in 2021 via [employer] HIE). UA not suggestive of infection. EKG appeared sinus and nonischemic on independent read. CTA chest neg for PE, but noted cardiomegaly and b/l nonspecific GGOs. Concern for CHF exacerbation/new CHF c/b CAL on CKD.    #CHF exacerbation vs new CHF  #CAL on CKD  #Acute on chronic respiratory failure  #HTN, HLD, DM2    - c/w Lasix 40mg IV BID for now for diuresis  - hold home spironolactone, telmisartan, and farxiga given CAL  - start hydralazine 25mg TID for afterload reduction for now  - consider adding nitrate if no RV failure on TTE  - strict I/Os, daily weights  - f/u TTE  - f/u US kidney/bladder  - obtain collateral information from outpatient Pulmonologist regarding underlying diagnosis for home O2 use   - Rest of care as per plan above

## 2023-11-22 NOTE — ED PROVIDER NOTE - CLINICAL SUMMARY MEDICAL DECISION MAKING FREE TEXT BOX
Patient presents emergency department complaining of shortness of breath and bilateral lower extremity swelling.  Patient is hemodynamically stable afebrile on presentation.  Patient physical lab significant for significant swelling of the bilateral lower extremities with 3+ pitting edema and erythema on both.  Right worse than left.  Mild crackles noted on lung auscultation bilaterally.  Given patient presentation and history we will obtain CBC, CMP, troponin, proBNP, EKG, chest x-ray to evaluate for any acute pathologies.  Also obtain CTA PE to evaluate for any acute blood clots.  Bilateral lower extremity venous Dopplers were also be obtained to rule out any DVTs.  Patient related to admitted to the hospital for further management.  Pending labs and imaging for further disposition.

## 2023-11-22 NOTE — PROGRESS NOTE ADULT - PROBLEM SELECTOR PLAN 3
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

## 2023-11-22 NOTE — PHYSICAL THERAPY INITIAL EVALUATION ADULT - PLANNED THERAPY INTERVENTIONS, PT EVAL
GOAL: Pt will  negotiate 3 steps +HR independently in order to safely enter home in 2 weeks/balance training/bed mobility training/gait training/strengthening/transfer training

## 2023-11-22 NOTE — PROGRESS NOTE ADULT - ASSESSMENT
Patient is a 83 year old F with chronic hypoxemic respiratory failure, CKD, T2DM, CAD, HTN, and HLD who presents for worsening dyspnea and lower extremity edema for 2 days, admitted for suspected acute decompensated heart failure

## 2023-11-22 NOTE — ED PROVIDER NOTE - OBJECTIVE STATEMENT
Patient is an 83-year-old female with a past medical history of hypertension, hyperlipidemia who presents emergency department complaining of bilateral lower extremity swelling and shortness of breath.  Per patient's family, however, the patient has increased lower extremity swelling over the last few months and they have been trying to get her to the hospital.  However patient has been refusing.  Patient decided to the to come to the hospital for shortness of breath and lower extremity swelling.  Patient states that she is having difficulty ambulating now.  Patient also states that she is on home oxygen however she does not know how much she is usually on.  Denies any chest pain, fever, chills, recent illnesses.  Patient had PCP however PCP has retired and she has not follow-up with PCP in a few months.  Denies any history of heart failure or history of blood clots.

## 2023-11-22 NOTE — CONSULT NOTE ADULT - PROBLEM SELECTOR RECOMMENDATION 9
Pt has no hx of CKD. But she did not go to any physician in the recent past. Given her history its likely to be CAL on CKD vs CAL. She has pedal edema. US Kidney - showed renal parenchymal disease. UA negative for UTI but it has RBCs and UPCR is 4.6.     PLAN:  send work up for CKD - Send serology work up - PLA2R Ab, C3, C4, Anti GBM antibody, Anti Ds DNA, JAYLAN, ANCA, Serum free light chains, immunofixation, A1C, UA,  USG- retroperitoneum with duplex renal vessels, HIV, Hep B, Hep C  lasix 40 IV BID.   Monitor UOP.   Once CAL improves, we can place her on medications for proteinuria.   Rest of the management as per the primary team.    Note is not final till attending signs it.   If you have any questions, please feel free to contact me  Phoenix Mathew  Nephrology Fellow  [phone]; Prefer Teams.  (After 5pm or on weekends please page the on-call fellow).

## 2023-11-22 NOTE — H&P ADULT - NSHPREVIEWOFSYSTEMS_GEN_ALL_CORE
REVIEW OF SYSTEMS:    CONSTITUTIONAL: +weakness, fevers or chills  EYES/ENT: No visual changes;  No vertigo or throat pain   NECK: No pain or stiffness  RESPIRATORY: + dry chronic cough; +shortness of breath  CARDIOVASCULAR: No chest pain or palpitations  GASTROINTESTINAL: No abdominal or epigastric pain. No nausea, vomiting, or hematemesis; No diarrhea or constipation. No melena or hematochezia.  GENITOURINARY: No dysuria, frequency or hematuria  NEUROLOGICAL: No numbness or weakness  SKIN: No itching, rashes

## 2023-11-22 NOTE — H&P ADULT - PROBLEM SELECTOR PLAN 7
DVT PPx: heparin subq  Diet: DASH  Code: FULL  Dispo: medically active  Communication: discussed with  at bedside

## 2023-11-22 NOTE — H&P ADULT - PROBLEM SELECTOR PLAN 2
Patient with several year history of chronic hypoxemic respiratory failure  - requiring home O2, stable on 2L at baseline  - has not had escalating O2 requirements recently, despite feeling subjectively more dyspneic  - unclear etiology, though patient has had PFTs at her pulmologist's office last year    Plan:  > follow up TTE   > obtain records from pt's pulmonologists office: Dr. Paulino Galye ([facility], [phone])  > continue with home montelukast  > wean O2 as tolerated  > if persistently hypoxemic and despite diuresis, consider alternative etiologies and potentially high resolution CT chest

## 2023-11-22 NOTE — CONSULT NOTE ADULT - SUBJECTIVE AND OBJECTIVE BOX
[facility] DIVISION OF KIDNEY DISEASES AND HYPERTENSION -- [phone]  -- INITIAL CONSULT NOTE  --------------------------------------------------------------------------------  HPI:    83 year old female with HTN, HLD, CAD, T2DM, CKD, remote hx of breast CA s/p mastectomy, and chronic hypoxemic respiratory failure of unknown etiology on home O2 who presents for lower extremity swelling and shortness of breath. History obtained from the patient and her  at bedside who report that over the past 1-2 days the patient has had increasing breathlessness and bilateral lower extremity swelling. The patient notes that over the past 2-3 weeks, she has become progressively more fatigued and intermittently more dyspneic. She reports that her dyspnea worsened significantly over the past 2 days, though her oxygen requirements have remained consistent. The patient also endorses paroxysmal nocturnal dyspnea and orthopnea for several years. She does endorse chronic lower extremity edema, but reports acute worsening over the past few days.    Notably, the patient has had chronic hypoxemic respiratory failure for over 1 year, and had PFTs at her pulmonologist's office, but does not know if she was ever formally diagnosed with COPD or asthma. The patient has been on home oxygen via nasal canula (baseline 2L) for over a year, and has had a persistent dry cough for several years. She reports that she was in the process of seeing several specialists to figure out her diagnoses but had to stop due to COVID 19. She presently denies any chest pain, shortness of breath, fevers, chills, or urinary symptoms.       PAST HISTORY  --------------------------------------------------------------------------------  PAST MEDICAL & SURGICAL HISTORY:  CA - Breast Cancer  1996 s/p lumpectomy, chemo, and radiation      CAD (Coronary Artery Disease)      Diabetes      Dyslipidemia      HTN (Hypertension)      Asthma      H/O: Obesity      H/O: Osteoarthritis      S/P Breast Lumpectomy        FAMILY HISTORY:    PAST SOCIAL HISTORY:    ALLERGIES & MEDICATIONS  --------------------------------------------------------------------------------  Allergies    No Known Allergies    Intolerances      Standing Inpatient Medications  albuterol    90 MICROgram(s) HFA Inhaler 2 Puff(s) Inhalation every 6 hours  albuterol/ipratropium for Nebulization 3 milliLiter(s) Nebulizer every 6 hours  amLODIPine   Tablet 10 milliGRAM(s) Oral daily  atorvastatin 80 milliGRAM(s) Oral at bedtime  budesonide  80 MICROgram(s)/formoterol 4.5 MICROgram(s) Inhaler 2 Puff(s) Inhalation every 12 hours  furosemide   Injectable 40 milliGRAM(s) IV Push two times a day  heparin   Injectable 5000 Unit(s) SubCutaneous every 8 hours  hydrALAZINE 25 milliGRAM(s) Oral three times a day  montelukast 10 milliGRAM(s) Oral daily    PRN Inpatient Medications  acetaminophen     Tablet .. 650 milliGRAM(s) Oral every 6 hours PRN      REVIEW OF SYSTEMS  --------------------------------------------------------------------------------  Gen: No fevers/chills  Skin: skin on the legs with excoriations.   Head/Eyes/Ears: Normal hearing,   Respiratory: Dyspnea +  CV: No chest pain  GI: No abdominal pain, diarrhea  : No dysuria, hematuria  MSK: No edema  Heme: No easy bruising or bleeding  Psych: No significant depression    All other systems were reviewed and are negative, except as noted.    VITALS/PHYSICAL EXAM  --------------------------------------------------------------------------------  T(C): 36.8 (11-22-23 @ 13:30), Max: 36.9 (11-22-23 @ 07:15)  HR: 96 (11-22-23 @ 13:30) (81 - 98)  BP: 151/74 (11-22-23 @ 13:30) (151/74 - 188/72)  RR: 16 (11-22-23 @ 13:30) (16 - 28)  SpO2: 96% (11-22-23 @ 13:30) (95% - 99%)  Wt(kg): --  Height (cm): 160 (11-22-23 @ 09:17)  Weight (kg): 68 (11-22-23 @ 09:17)  BMI (kg/m2): 26.6 (11-22-23 @ 09:17)  BSA (m2): 1.71 (11-22-23 @ 09:17)      Physical Exam:  Gen: Dyspneic on supplemental Oxygen.   HEENT: MMM  Pulm: lower zone crackles  Rt > Lt  CV: S1S2  Abd: Soft, +BS   Ext:  LE edema B/L +   Neuro: Awake  Skin: Warm and dry  Vascular access: N/A    LABS/STUDIES  --------------------------------------------------------------------------------              8.9    6.56  >-----------<  225      [11-22-23 @ 07:44]              28.2     140  |  109  |  55  ----------------------------<  98      [11-22-23 @ 07:44]  5.1   |  15  |  4.32        Ca     9.3     [11-22-23 @ 07:44]      Mg     2.4     [11-22-23 @ 07:44]      Phos  5.2     [11-22-23 @ 07:44]    TPro  7.3  /  Alb  3.4  /  TBili  0.4  /  DBili  x   /  AST  19  /  ALT  9   /  AlkPhos  67  [11-22-23 @ 07:44]    PT/INR: PT 10.9 , INR 0.99       [11-21-23 @ 23:06]  PTT: 38.8       [11-21-23 @ 23:06]      Creatinine Trend:  SCr 4.32 [11-22 @ 07:44]  SCr 4.54 [11-21 @ 23:06]    Urinalysis - [11-22-23 @ 07:44]      Color Yellow / Appearance Clear / SG 1.014 / pH 5.5      Gluc 250 / Ketone Negative  / Bili Negative / Urobili 0.2       Blood Trace / Protein 300 / Leuk Est Negative / Nitrite Negative      RBC 0 / WBC 3 / Hyaline  / Gran  / Sq Epi  / Non Sq Epi 1 / Bacteria Negative    Urine Creatinine 36      [11-22-23 @ 07:44]  Urine Protein 203      [11-22-23 @ 07:44]  Urine Sodium 113      [11-22-23 @ 07:44]  Urine Urea Nitrogen 150      [11-22-23 @ 07:51]  Urine Potassium 15      [11-22-23 @ 07:44]  Urine Osmolality 364      [11-22-23 @ 07:44]

## 2023-11-22 NOTE — H&P ADULT - HISTORY OF PRESENT ILLNESS
Patient is an 83 year old female with HTN, HLD, CAD, T2DM, CKD, breast CA, and asthma  who presents for lower extremity swelling and shortness of breath.      ED Course:  VS: HR 80-90s, BP 180s/70s, afebrile, SpO2 95% on 3L NC. Labs remarkable for hgb 8.7, HCO3 17, BUN 55, Cr 4.54, pro-BNP 1300, pH 7.26, hsTropT 74 --> 74. CTA chest with cardiomegaly and bilateral groundglass opacities.  Patient is an 83 year old female with HTN, HLD, CAD, T2DM, CKD, remote hx of breast CA s/p mastectomy, and chronic hypoxemic respiratory failure of unknown etiology on home O2 who presents for lower extremity swelling and shortness of breath. History obtained from the patient and her  at bedside who report that over the past 1-2 days the patient has had increasing breathlessness and bilateral lower extremity swelling. The patient notes that over the past 2-3 weeks, she has become progressively more fatigued and intermittently more dyspneic. She reports that her dyspnea worsened significantly over the past 2 days, though her oxygen requirements have remained consistent. The patient also endorses paroxysmal nocturnal dyspnea and orthopnea for several years, in addition to relatively new onset bendopnea. She does endorse chronic lower extremity edema, but reports acute worsening over the past few days.    Notably, the patient has had chronic hypoxemic respiratory failure for over 1 year, and had PFTs at her pulmonologist's office, but does not know if she was ever formally diagnosed with COPD or asthma. The patient has been on home oxygen via nasal canula (baseline 2L) for over a year, and has had a persistent dry cough for several years. She reports that she was in the process of seeing several specialists to figure out her diagnoses but had to stop due to COVID 19. She presently denies any chest pain, shortness of breath, fevers, chills, or urinary symptoms.     ED Course:  VS: HR 80-90s, BP 180s/70s, afebrile, SpO2 95% on 3L NC. Labs remarkable for hgb 8.7, HCO3 17, BUN 55, Cr 4.54, pro-BNP 1300, pH 7.26, hsTropT 74 --> 74. CTA chest with cardiomegaly and bilateral groundglass opacities.

## 2023-11-22 NOTE — PHYSICAL THERAPY INITIAL EVALUATION ADULT - PERTINENT HX OF CURRENT PROBLEM, REHAB EVAL
Patient is a 83 year old F with chronic hypoxemic respiratory failure, CKD, T2DM, CAD, HTN, and HLD who presents for worsening dyspnea and lower extremity edema for 2 days, admitted for suspected acute decompensated heart failure  LE Duplex (11/21): No evidence of deep venous thrombosis in either lower extremity. CT Angio Chest (11/21): No central pulmonary embolus. Lower lobe subsegmental branches are poorly evaluated due to respiratory motion artifact. Nonspecific bilateral patchy groundglass opacities. Cardiomegaly. XRay Chest (11/21): No focal consolidation.

## 2023-11-22 NOTE — H&P ADULT - TIME BILLING
reviewing prior documentation, reviewing available recent outpatient records, independently obtaining a history and interpreting results of tests, performing a physical examination, reviewing tests/imaging, discussing the plan with the patient and  at bedside, ordering medications/tests, documenting clinical information in the electronic health record, and coordinating care with staff.

## 2023-11-22 NOTE — PROGRESS NOTE ADULT - PROBLEM SELECTOR PLAN 1
Patient presenting for worsening LE edema and breathlessness  - also with several symptoms to suggest likely ADHF  - on exam: extremities warm and well perfused, but volume overloaded, S3 on exam  - proBNP elevated to 1300, but unknown baseline and with likely CAL on CKD  - hsTropT flat at 74, ECG without ST changes, no chest pain  - pt does not carry a diagnosis of HF, however on meds that may suggest she may have HFpEF (MRA, SGLT2i)    Plan:  > diuresis with IV lasix 40 mg bid, assess response and titrate dose  > obtain TTE  > hold home spironolactone and dapagliflozin given CAL  > strict I/Os, daily standing weights

## 2023-11-22 NOTE — PROGRESS NOTE ADULT - SUBJECTIVE AND OBJECTIVE BOX
PROGRESS NOTE:   Authored by Dr. Luis Perla MD (PGY-1).     Patient is a 83y old  Female who presents with a chief complaint of LE swelling, dyspnea (2023 04:30)      SUBJECTIVE / OVERNIGHT EVENTS:  No acute events overnight. She has no acute complaints this morning.     MEDICATIONS  (STANDING):  albuterol    90 MICROgram(s) HFA Inhaler 2 Puff(s) Inhalation every 6 hours  albuterol/ipratropium for Nebulization 3 milliLiter(s) Nebulizer every 6 hours  amLODIPine   Tablet 10 milliGRAM(s) Oral daily  atorvastatin 80 milliGRAM(s) Oral at bedtime  furosemide   Injectable 40 milliGRAM(s) IV Push two times a day  heparin   Injectable 5000 Unit(s) SubCutaneous every 8 hours  hydrALAZINE 25 milliGRAM(s) Oral three times a day  montelukast 10 milliGRAM(s) Oral daily    MEDICATIONS  (PRN):  acetaminophen     Tablet .. 650 milliGRAM(s) Oral every 6 hours PRN Temp greater or equal to 38C (100.4F), Mild Pain (1 - 3)      CAPILLARY BLOOD GLUCOSE        I&O's Summary      PHYSICAL EXAM:  Vital Signs Last 24 Hrs  T(C): 36.9 (2023 07:15), Max: 36.9 (2023 07:15)  T(F): 98.4 (2023 07:15), Max: 98.4 (2023 07:15)  HR: 81 (2023 07:15) (81 - 98)  BP: 180/80 (2023 07:15) (177/75 - 188/72)  BP(mean): 113 (2023 07:15) (113 - 113)  RR: 20 (2023 07:15) (20 - 28)  SpO2: 99% (2023 07:15) (95% - 99%)    Parameters below as of 2023 07:15  Patient On (Oxygen Delivery Method): nasal cannula  O2 Flow (L/min): 3      CONSTITUTIONAL: NAD, well-developed  HEET: MMM, EOMI, PERRLA  NECK: supple  RESPIRATORY: Normal respiratory effort; lungs are clear to auscultation bilaterally  CARDIOVASCULAR: Regular rate and rhythm, normal S1 and S2, no murmur/rub/gallop Peripheral pulses are 2+ bilaterally  ABDOMEN: Nontender to palpation, normoactive bowel sounds, no rebound/guarding; No hepatosplenomegaly  MUSCULOSKELETAL: no clubbing or cyanosis of digits; no joint swelling or tenderness to palpation; 2+ pitting edema noted in lower extremities with scaling   PSYCH: A+O to person, place, and time; affect appropriate  SKIN: No rash    LABS:                        8.9    6.56  )-----------( 225      ( 2023 07:44 )             28.2     11-    140  |  109<H>  |  55<H>  ----------------------------<  98  5.1   |  15<L>  |  4.32<H>    Ca    9.3      2023 07:44  Phos  5.2       Mg     2.4         TPro  7.3  /  Alb  3.4  /  TBili  0.4  /  DBili  x   /  AST  19  /  ALT  9<L>  /  AlkPhos  67  11-22    PT/INR - ( 2023 23:06 )   PT: 10.9 sec;   INR: 0.99 ratio         PTT - ( 2023 23:06 )  PTT:38.8 sec      Urinalysis Basic - ( 2023 07:44 )    Color: Yellow / Appearance: Clear / S.014 / pH: x  Gluc: 98 mg/dL / Ketone: Negative mg/dL  / Bili: Negative / Urobili: 0.2 mg/dL   Blood: x / Protein: 300 mg/dL / Nitrite: Negative   Leuk Esterase: Negative / RBC: 0 /HPF / WBC 3 /HPF   Sq Epi: x / Non Sq Epi: 1 /HPF / Bacteria: Negative /HPF          Tele Reviewed:    RADIOLOGY & ADDITIONAL TESTS:  Results Reviewed:   Imaging Personally Reviewed:  Electrocardiogram Personally Reviewed:     PROGRESS NOTE:   Authored by Dr. Luis Perla MD (PGY-1).     Patient is a 83y old  Female who presents with a chief complaint of LE swelling, dyspnea (2023 04:30)      SUBJECTIVE / OVERNIGHT EVENTS:  No acute events overnight. She has no acute complaints this morning.     MEDICATIONS  (STANDING):  albuterol    90 MICROgram(s) HFA Inhaler 2 Puff(s) Inhalation every 6 hours  albuterol/ipratropium for Nebulization 3 milliLiter(s) Nebulizer every 6 hours  amLODIPine   Tablet 10 milliGRAM(s) Oral daily  atorvastatin 80 milliGRAM(s) Oral at bedtime  furosemide   Injectable 40 milliGRAM(s) IV Push two times a day  heparin   Injectable 5000 Unit(s) SubCutaneous every 8 hours  hydrALAZINE 25 milliGRAM(s) Oral three times a day  montelukast 10 milliGRAM(s) Oral daily    MEDICATIONS  (PRN):  acetaminophen     Tablet .. 650 milliGRAM(s) Oral every 6 hours PRN Temp greater or equal to 38C (100.4F), Mild Pain (1 - 3)      CAPILLARY BLOOD GLUCOSE        I&O's Summary      PHYSICAL EXAM:  Vital Signs Last 24 Hrs  T(C): 36.9 (2023 07:15), Max: 36.9 (2023 07:15)  T(F): 98.4 (2023 07:15), Max: 98.4 (2023 07:15)  HR: 81 (2023 07:15) (81 - 98)  BP: 180/80 (2023 07:15) (177/75 - 188/72)  BP(mean): 113 (2023 07:15) (113 - 113)  RR: 20 (2023 07:15) (20 - 28)  SpO2: 99% (2023 07:15) (95% - 99%)    Parameters below as of 2023 07:15  Patient On (Oxygen Delivery Method): nasal cannula  O2 Flow (L/min): 3      CONSTITUTIONAL: NAD, well-developed  HEET: MMM, EOMI, PERRLA  NECK: supple  RESPIRATORY: crackles bilaterally  CARDIOVASCULAR: Regular rate and rhythm, normal S1 and S2, no murmur/rub/gallop Peripheral pulses are 2+ bilaterally  ABDOMEN: Nontender to palpation, normoactive bowel sounds, no rebound/guarding; No hepatosplenomegaly  MUSCULOSKELETAL: no clubbing or cyanosis of digits; no joint swelling or tenderness to palpation; 2+ pitting edema noted in lower extremities with scaling   PSYCH: A+O to person, place, and time; affect appropriate  SKIN: No rash    LABS:                        8.9    6.56  )-----------( 225      ( 2023 07:44 )             28.2         140  |  109<H>  |  55<H>  ----------------------------<  98  5.1   |  15<L>  |  4.32<H>    Ca    9.3      2023 07:44  Phos  5.2       Mg     2.4         TPro  7.3  /  Alb  3.4  /  TBili  0.4  /  DBili  x   /  AST  19  /  ALT  9<L>  /  AlkPhos  67      PT/INR - ( 2023 23:06 )   PT: 10.9 sec;   INR: 0.99 ratio         PTT - ( 2023 23:06 )  PTT:38.8 sec      Urinalysis Basic - ( 2023 07:44 )    Color: Yellow / Appearance: Clear / S.014 / pH: x  Gluc: 98 mg/dL / Ketone: Negative mg/dL  / Bili: Negative / Urobili: 0.2 mg/dL   Blood: x / Protein: 300 mg/dL / Nitrite: Negative   Leuk Esterase: Negative / RBC: 0 /HPF / WBC 3 /HPF   Sq Epi: x / Non Sq Epi: 1 /HPF / Bacteria: Negative /HPF          Tele Reviewed:    RADIOLOGY & ADDITIONAL TESTS:  Results Reviewed:   Imaging Personally Reviewed:  Electrocardiogram Personally Reviewed:

## 2023-11-22 NOTE — ED PROVIDER NOTE - CADM POA PRESS ULCER
No
obtunded, responsive to painful stimuli by opening eyes, spont purposeful movement of RUE 2-3/5, RLE 2/5, Left sided extremities flaccid

## 2023-11-22 NOTE — PROGRESS NOTE ADULT - PROBLEM SELECTOR PLAN 4
Patient with hx of HTN, on home amlodipine and telmisertan  - likely worsened in the setting of CAL and ADHF    Plan:  > continue with amlodipine  > can add hydralazine given c/f ADHF Patient with hx of HTN, on home amlodipine and telmisertan  - likely worsened in the setting of CAL and ADHF    Plan:  > continue with amlodipine  > also start on hydralazine

## 2023-11-22 NOTE — ED PROVIDER NOTE - PHYSICAL EXAMINATION
Physical Exam:  Gen: NAD, AOx3, non-toxic appearing  Head: NCAT  HEENT: EOMI, PEERLA, normal conjunctiva, tongue midline, oral mucosa moist  Lung: mild crackles bilaterally.   CV: RRR  Abd: soft, NT, ND, no guarding, no rigidity, no rebound tenderness, no CVA tenderness   MSK: no visible deformities, ROM normal in UE/LE, no back pain  Neuro: No focal sensory or motor deficits  Skin: Warm, well perfused, bilateral LE erythema (not warm), 3+ pitting edema bilaterally.   Psych: normal affect, calm

## 2023-11-22 NOTE — CONSULT NOTE ADULT - ASSESSMENT
83 year old female with HTN, HLD, CAD, T2DM, CKD, remote hx of breast CA s/p mastectomy, and chronic hypoxemic respiratory failure of unknown etiology on home O2 who presents for lower extremity swelling and shortness of breath.

## 2023-11-22 NOTE — PATIENT PROFILE ADULT - FALL HARM RISK - HARM RISK INTERVENTIONS

## 2023-11-22 NOTE — H&P ADULT - PROBLEM SELECTOR PLAN 5
Patient on several oral medications at home including dapaglifozin, saxagliptin  - unclear baseline glycemic control    Plan:  > ISS for now, add basal/bolus as needed  > f/u A1c

## 2023-11-22 NOTE — ED ADULT NURSE REASSESSMENT NOTE - NS ED NURSE REASSESS COMMENT FT1
Received report from Marky Charles RN. Pt is awake, alert, and speaking in full coherent sentences. Vital signs stable. NAD noted. Pt is resting comfortably in stretcher, side rails up and bed in lowest position. Pt's  is at bedside. Pt is admitted and awaiting bed.

## 2023-11-22 NOTE — H&P ADULT - PROBLEM SELECTOR PLAN 4
Patient with hx of HTN, on home amlodipine and telmisertan  - likely worsened in the setting of CAL and ADHF    Plan:  > continue with amlodipine  > can add hydralazine given c/f ADHF

## 2023-11-22 NOTE — PROGRESS NOTE ADULT - ATTENDING COMMENTS
83F with chronic hypoxemic respiratory failure, CKD, T2DM, CAD, HTN, and HLD who presents for worsening dyspnea and lower extremity edema, admitted for suspected acute decompensated heart failure with CAL on likely CKD with c/f possible nephrotic syndrome. On lasix 40 IV BID for diuresis, pending TTE. Unclear baseline Cr, but significantly elevated currently with nephrotic range proteinuria c/f CAL on CKD, renal consult pending.      #CHF exacerbation vs new CHF  #CAL on CKD, #nephrotic range proteinuria   #Acute on chronic respiratory failure  #HTN, HLD, DM2    - c/w Lasix 40mg IV BID for now for diuresis  - hold home spironolactone, telmisartan, and farxiga given CAL  - start hydralazine 25mg TID for afterload reduction for now - uptitrate as needed as BP remains uncontrolled   - TTE pending, will f/u results   - Cr 4.32 currently - trying to obtain collateral for outpt physicians regarding baseline; with nephrotic range proteinuria - renal consult pending   - strict I/Os, daily weights   - renal duplex showing parenchymal disease, no hydro  - pt with chronic hypoxemic RF of unclear etiology, ?COPD given long standing smoking history - will try to obtain collateral from outpt providers, c/w home O2, add on nebs q6h and symbicort

## 2023-11-22 NOTE — H&P ADULT - NSHPLABSRESULTS_GEN_ALL_CORE
LABS:                          8.7    6.67  )-----------( 241      ( 21 Nov 2023 23:06 )             27.7     11-21    139  |  108  |  55<H>  ----------------------------<  87  4.9   |  17<L>  |  4.54<H>    Ca    9.0      21 Nov 2023 23:06    TPro  7.7  /  Alb  3.8  /  TBili  0.3  /  DBili  x   /  AST  18  /  ALT  10  /  AlkPhos  69  11-21    PT/INR - ( 21 Nov 2023 23:06 )   PT: 10.9 sec;   INR: 0.99 ratio         PTT - ( 21 Nov 2023 23:06 )  PTT:38.8 sec      ECG:      CXR: LABS:                          8.7    6.67  )-----------( 241      ( 21 Nov 2023 23:06 )             27.7     11-21    139  |  108  |  55<H>  ----------------------------<  87  4.9   |  17<L>  |  4.54<H>    Ca    9.0      21 Nov 2023 23:06    TPro  7.7  /  Alb  3.8  /  TBili  0.3  /  DBili  x   /  AST  18  /  ALT  10  /  AlkPhos  69  11-21    PT/INR - ( 21 Nov 2023 23:06 )   PT: 10.9 sec;   INR: 0.99 ratio         PTT - ( 21 Nov 2023 23:06 )  PTT:38.8 sec      ECG: sinus rhtyhm, HR 78, poor IE baseline, no ST-T changes      < from: CT Angio Chest PE Protocol w/ IV Cont (11.21.23 @ 23:53) >      IMPRESSION:  No central pulmonary embolus. Lower lobe subsegmental branches are poorly   evaluated due to respiratory motion artifact.    Nonspecific bilateral patchy groundglass opacities.    Cardiomegaly.    < end of copied text > LABS:                          8.7    6.67  )-----------( 241      ( 21 Nov 2023 23:06 )             27.7     11-21    139  |  108  |  55<H>  ----------------------------<  87  4.9   |  17<L>  |  4.54<H>    Ca    9.0      21 Nov 2023 23:06    TPro  7.7  /  Alb  3.8  /  TBili  0.3  /  DBili  x   /  AST  18  /  ALT  10  /  AlkPhos  69  11-21    PT/INR - ( 21 Nov 2023 23:06 )   PT: 10.9 sec;   INR: 0.99 ratio         PTT - ( 21 Nov 2023 23:06 )  PTT:38.8 sec      ECG (11/21/23) personally reviewed: sinus rhtyhm, HR 78, QTc 442, poor IE baseline, no ST-T changes      < from: CT Angio Chest PE Protocol w/ IV Cont (11.21.23 @ 23:53) >  IMPRESSION:  No central pulmonary embolus. Lower lobe subsegmental branches are poorly   evaluated due to respiratory motion artifact.    Nonspecific bilateral patchy groundglass opacities.    Cardiomegaly.  < end of copied text >

## 2023-11-22 NOTE — H&P ADULT - ASSESSMENT
Patient is a 83 year old F with chronic hypoxemic respiratory failure, CKD, T2DM, CAD, HTN, and HLD who presents for worsening dyspnea and lower extremity edema for 2 days, admitted for suspected acute decompensated heart failure
yes

## 2023-11-22 NOTE — H&P ADULT - PROBLEM SELECTOR PLAN 1
Patient presenting for worsening LE edema and breathlessness  - also with several symptoms to suggest likely ADHF  - on exam: extremities warm and well perfused, but volume overloaded, S3 on exam  - proBNP elevated to 1300, but unknown baseline and with likely CAL on CKD  - hsTropT flat at 74, ECG without ST changes, no chest pain  - pt does not carry a diagnosis of HF, however on meds that may suggest she may have HFpEF (MRA, SGLT2i)    Plan:  > diuresis with IV lasix 40 mg bid, assess response and titrate dose  > obtain TTE  > hold home spironolactone and dapagliflozin given CAL Patient presenting for worsening LE edema and breathlessness  - also with several symptoms to suggest likely ADHF  - on exam: extremities warm and well perfused, but volume overloaded, S3 on exam  - proBNP elevated to 1300, but unknown baseline and with likely CAL on CKD  - hsTropT flat at 74, ECG without ST changes, no chest pain  - pt does not carry a diagnosis of HF, however on meds that may suggest she may have HFpEF (MRA, SGLT2i)    Plan:  > diuresis with IV lasix 40 mg bid, assess response and titrate dose  > obtain TTE  > hold home spironolactone and dapagliflozin given CAL  > strict I/Os, daily standing weights

## 2023-11-22 NOTE — ED PROVIDER NOTE - ATTENDING CONTRIBUTION TO CARE
I was the supervising attending. I have independently seen face-to-face and examined the patient. I have reviewed the history and physical and discussed the MDM with the resident, fellow, YASMANI and/or student. I agree with the assessment and plan as presented unless otherwise documented as follows:    83F hx HTN, HLD, CAD, asthma, presenting with SOB and leg swelling. Symptoms progressive over multiple months, worsened over last few days. Has been out of care due to her PCP retiring. SOB described as exertional, leg swelling progressive to the degree of pain/difficulty with ambulation. Denies CP, cough, fevers/chills. Appears tachypneic/dyspneic, chronic venous stasis w/ significant 3+ pitting edema, B/L calf tenderness. DDx acute decompensated heart failure/fluid overload vs. DVT/PE vs. ACS. Bedside POCUS with relatively preserved systolic function, IVC with respiratory variation, no pleural effusions. Will obtain labs, CXR, DVT US, CTA PE. -Joanie Pritchard MD (Attending)

## 2023-11-22 NOTE — PROGRESS NOTE ADULT - PROBLEM SELECTOR PLAN 2
Patient with several year history of chronic hypoxemic respiratory failure  - requiring home O2, stable on 2L at baseline  - has not had escalating O2 requirements recently, despite feeling subjectively more dyspneic  - unclear etiology, though patient has had PFTs at her pulmologist's office last year    Plan:  > follow up TTE   > obtain records from pt's pulmonologists office: Dr. Paulino Gayle ([facility], [phone])  > continue with home montelukast  > wean O2 as tolerated  > if persistently hypoxemic and despite diuresis, consider alternative etiologies and potentially high resolution CT chest Patient with several year history of chronic hypoxemic respiratory failure  - requiring home O2, stable on 2L at baseline  - has not had escalating O2 requirements recently, despite feeling subjectively more dyspneic  - unclear etiology, though patient has had PFTs at her pulmologist's office last year    Plan:  > follow up TTE   > obtain records from pt's pulmonologists office: Dr. Paulino Gayle ([facility], [phone])  > continue with home montelukast and add on duonebs and symbicort   > wean O2 as tolerated  > if persistently hypoxemic and despite diuresis, consider alternative etiologies and potentially high resolution CT chest

## 2023-11-23 LAB
ANION GAP SERPL CALC-SCNC: 14 MMOL/L — SIGNIFICANT CHANGE UP (ref 5–17)
ANION GAP SERPL CALC-SCNC: 14 MMOL/L — SIGNIFICANT CHANGE UP (ref 5–17)
BUN SERPL-MCNC: 58 MG/DL — HIGH (ref 7–23)
BUN SERPL-MCNC: 58 MG/DL — HIGH (ref 7–23)
CALCIUM SERPL-MCNC: 8.7 MG/DL — SIGNIFICANT CHANGE UP (ref 8.4–10.5)
CALCIUM SERPL-MCNC: 8.7 MG/DL — SIGNIFICANT CHANGE UP (ref 8.4–10.5)
CHLORIDE SERPL-SCNC: 108 MMOL/L — SIGNIFICANT CHANGE UP (ref 96–108)
CHLORIDE SERPL-SCNC: 108 MMOL/L — SIGNIFICANT CHANGE UP (ref 96–108)
CHOLEST SERPL-MCNC: 116 MG/DL — SIGNIFICANT CHANGE UP
CHOLEST SERPL-MCNC: 116 MG/DL — SIGNIFICANT CHANGE UP
CO2 SERPL-SCNC: 17 MMOL/L — LOW (ref 22–31)
CO2 SERPL-SCNC: 17 MMOL/L — LOW (ref 22–31)
CREAT SERPL-MCNC: 4.65 MG/DL — HIGH (ref 0.5–1.3)
CREAT SERPL-MCNC: 4.65 MG/DL — HIGH (ref 0.5–1.3)
EGFR: 9 ML/MIN/1.73M2 — LOW
EGFR: 9 ML/MIN/1.73M2 — LOW
GLUCOSE BLDC GLUCOMTR-MCNC: 112 MG/DL — HIGH (ref 70–99)
GLUCOSE BLDC GLUCOMTR-MCNC: 112 MG/DL — HIGH (ref 70–99)
GLUCOSE BLDC GLUCOMTR-MCNC: 137 MG/DL — HIGH (ref 70–99)
GLUCOSE BLDC GLUCOMTR-MCNC: 137 MG/DL — HIGH (ref 70–99)
GLUCOSE BLDC GLUCOMTR-MCNC: 140 MG/DL — HIGH (ref 70–99)
GLUCOSE BLDC GLUCOMTR-MCNC: 140 MG/DL — HIGH (ref 70–99)
GLUCOSE BLDC GLUCOMTR-MCNC: 167 MG/DL — HIGH (ref 70–99)
GLUCOSE BLDC GLUCOMTR-MCNC: 167 MG/DL — HIGH (ref 70–99)
GLUCOSE BLDC GLUCOMTR-MCNC: 196 MG/DL — HIGH (ref 70–99)
GLUCOSE BLDC GLUCOMTR-MCNC: 196 MG/DL — HIGH (ref 70–99)
GLUCOSE SERPL-MCNC: 98 MG/DL — SIGNIFICANT CHANGE UP (ref 70–99)
GLUCOSE SERPL-MCNC: 98 MG/DL — SIGNIFICANT CHANGE UP (ref 70–99)
HAV IGM SER-ACNC: SIGNIFICANT CHANGE UP
HAV IGM SER-ACNC: SIGNIFICANT CHANGE UP
HBV CORE IGM SER-ACNC: SIGNIFICANT CHANGE UP
HBV CORE IGM SER-ACNC: SIGNIFICANT CHANGE UP
HBV SURFACE AB SER-ACNC: <3 MIU/ML — LOW
HBV SURFACE AB SER-ACNC: <3 MIU/ML — LOW
HBV SURFACE AG SER-ACNC: SIGNIFICANT CHANGE UP
HBV SURFACE AG SER-ACNC: SIGNIFICANT CHANGE UP
HCT VFR BLD CALC: 26.2 % — LOW (ref 34.5–45)
HCT VFR BLD CALC: 26.2 % — LOW (ref 34.5–45)
HCV AB S/CO SERPL IA: 0.08 S/CO — SIGNIFICANT CHANGE UP (ref 0–0.99)
HCV AB S/CO SERPL IA: 0.08 S/CO — SIGNIFICANT CHANGE UP (ref 0–0.99)
HCV AB SERPL-IMP: SIGNIFICANT CHANGE UP
HCV AB SERPL-IMP: SIGNIFICANT CHANGE UP
HDLC SERPL-MCNC: 39 MG/DL — LOW
HDLC SERPL-MCNC: 39 MG/DL — LOW
HGB BLD-MCNC: 7.8 G/DL — LOW (ref 11.5–15.5)
HGB BLD-MCNC: 7.8 G/DL — LOW (ref 11.5–15.5)
HIV 1+2 AB+HIV1 P24 AG SERPL QL IA: SIGNIFICANT CHANGE UP
HIV 1+2 AB+HIV1 P24 AG SERPL QL IA: SIGNIFICANT CHANGE UP
LIPID PNL WITH DIRECT LDL SERPL: 61 MG/DL — SIGNIFICANT CHANGE UP
LIPID PNL WITH DIRECT LDL SERPL: 61 MG/DL — SIGNIFICANT CHANGE UP
MAGNESIUM SERPL-MCNC: 2.3 MG/DL — SIGNIFICANT CHANGE UP (ref 1.6–2.6)
MAGNESIUM SERPL-MCNC: 2.3 MG/DL — SIGNIFICANT CHANGE UP (ref 1.6–2.6)
MCHC RBC-ENTMCNC: 28.4 PG — SIGNIFICANT CHANGE UP (ref 27–34)
MCHC RBC-ENTMCNC: 28.4 PG — SIGNIFICANT CHANGE UP (ref 27–34)
MCHC RBC-ENTMCNC: 29.8 GM/DL — LOW (ref 32–36)
MCHC RBC-ENTMCNC: 29.8 GM/DL — LOW (ref 32–36)
MCV RBC AUTO: 95.3 FL — SIGNIFICANT CHANGE UP (ref 80–100)
MCV RBC AUTO: 95.3 FL — SIGNIFICANT CHANGE UP (ref 80–100)
MRSA PCR RESULT.: SIGNIFICANT CHANGE UP
MRSA PCR RESULT.: SIGNIFICANT CHANGE UP
NON HDL CHOLESTEROL: 77 MG/DL — SIGNIFICANT CHANGE UP
NON HDL CHOLESTEROL: 77 MG/DL — SIGNIFICANT CHANGE UP
NRBC # BLD: 0 /100 WBCS — SIGNIFICANT CHANGE UP (ref 0–0)
NRBC # BLD: 0 /100 WBCS — SIGNIFICANT CHANGE UP (ref 0–0)
PHOSPHATE SERPL-MCNC: 5.8 MG/DL — HIGH (ref 2.5–4.5)
PHOSPHATE SERPL-MCNC: 5.8 MG/DL — HIGH (ref 2.5–4.5)
PLATELET # BLD AUTO: 222 K/UL — SIGNIFICANT CHANGE UP (ref 150–400)
PLATELET # BLD AUTO: 222 K/UL — SIGNIFICANT CHANGE UP (ref 150–400)
POTASSIUM SERPL-MCNC: 4.7 MMOL/L — SIGNIFICANT CHANGE UP (ref 3.5–5.3)
POTASSIUM SERPL-MCNC: 4.7 MMOL/L — SIGNIFICANT CHANGE UP (ref 3.5–5.3)
POTASSIUM SERPL-SCNC: 4.7 MMOL/L — SIGNIFICANT CHANGE UP (ref 3.5–5.3)
POTASSIUM SERPL-SCNC: 4.7 MMOL/L — SIGNIFICANT CHANGE UP (ref 3.5–5.3)
RBC # BLD: 2.75 M/UL — LOW (ref 3.8–5.2)
RBC # BLD: 2.75 M/UL — LOW (ref 3.8–5.2)
RBC # FLD: 15.3 % — HIGH (ref 10.3–14.5)
RBC # FLD: 15.3 % — HIGH (ref 10.3–14.5)
S AUREUS DNA NOSE QL NAA+PROBE: DETECTED
S AUREUS DNA NOSE QL NAA+PROBE: DETECTED
SODIUM SERPL-SCNC: 139 MMOL/L — SIGNIFICANT CHANGE UP (ref 135–145)
SODIUM SERPL-SCNC: 139 MMOL/L — SIGNIFICANT CHANGE UP (ref 135–145)
TRIGL SERPL-MCNC: 79 MG/DL — SIGNIFICANT CHANGE UP
TRIGL SERPL-MCNC: 79 MG/DL — SIGNIFICANT CHANGE UP
WBC # BLD: 5.77 K/UL — SIGNIFICANT CHANGE UP (ref 3.8–10.5)
WBC # BLD: 5.77 K/UL — SIGNIFICANT CHANGE UP (ref 3.8–10.5)
WBC # FLD AUTO: 5.77 K/UL — SIGNIFICANT CHANGE UP (ref 3.8–10.5)
WBC # FLD AUTO: 5.77 K/UL — SIGNIFICANT CHANGE UP (ref 3.8–10.5)

## 2023-11-23 PROCEDURE — 99233 SBSQ HOSP IP/OBS HIGH 50: CPT | Mod: GC

## 2023-11-23 RX ORDER — DEXTROSE 50 % IN WATER 50 %
12.5 SYRINGE (ML) INTRAVENOUS ONCE
Refills: 0 | Status: DISCONTINUED | OUTPATIENT
Start: 2023-11-23 | End: 2023-11-29

## 2023-11-23 RX ORDER — INSULIN LISPRO 100/ML
VIAL (ML) SUBCUTANEOUS AT BEDTIME
Refills: 0 | Status: DISCONTINUED | OUTPATIENT
Start: 2023-11-23 | End: 2023-11-29

## 2023-11-23 RX ORDER — SODIUM CHLORIDE 9 MG/ML
1000 INJECTION, SOLUTION INTRAVENOUS
Refills: 0 | Status: DISCONTINUED | OUTPATIENT
Start: 2023-11-23 | End: 2023-11-29

## 2023-11-23 RX ORDER — CHLORHEXIDINE GLUCONATE 213 G/1000ML
1 SOLUTION TOPICAL DAILY
Refills: 0 | Status: DISCONTINUED | OUTPATIENT
Start: 2023-11-23 | End: 2023-12-15

## 2023-11-23 RX ORDER — DEXTROSE 50 % IN WATER 50 %
25 SYRINGE (ML) INTRAVENOUS ONCE
Refills: 0 | Status: DISCONTINUED | OUTPATIENT
Start: 2023-11-23 | End: 2023-11-29

## 2023-11-23 RX ORDER — DEXTROSE 50 % IN WATER 50 %
15 SYRINGE (ML) INTRAVENOUS ONCE
Refills: 0 | Status: DISCONTINUED | OUTPATIENT
Start: 2023-11-23 | End: 2023-11-29

## 2023-11-23 RX ORDER — SEVELAMER CARBONATE 2400 MG/1
800 POWDER, FOR SUSPENSION ORAL EVERY 8 HOURS
Refills: 0 | Status: DISCONTINUED | OUTPATIENT
Start: 2023-11-23 | End: 2023-12-06

## 2023-11-23 RX ORDER — INSULIN LISPRO 100/ML
VIAL (ML) SUBCUTANEOUS
Refills: 0 | Status: DISCONTINUED | OUTPATIENT
Start: 2023-11-23 | End: 2023-11-29

## 2023-11-23 RX ORDER — GLUCAGON INJECTION, SOLUTION 0.5 MG/.1ML
1 INJECTION, SOLUTION SUBCUTANEOUS ONCE
Refills: 0 | Status: DISCONTINUED | OUTPATIENT
Start: 2023-11-23 | End: 2023-11-29

## 2023-11-23 RX ADMIN — BUDESONIDE AND FORMOTEROL FUMARATE DIHYDRATE 2 PUFF(S): 160; 4.5 AEROSOL RESPIRATORY (INHALATION) at 05:04

## 2023-11-23 RX ADMIN — Medication 3 MILLILITER(S): at 00:28

## 2023-11-23 RX ADMIN — Medication 3 MILLILITER(S): at 18:10

## 2023-11-23 RX ADMIN — AMLODIPINE BESYLATE 10 MILLIGRAM(S): 2.5 TABLET ORAL at 05:05

## 2023-11-23 RX ADMIN — Medication 25 MILLIGRAM(S): at 22:44

## 2023-11-23 RX ADMIN — BUDESONIDE AND FORMOTEROL FUMARATE DIHYDRATE 2 PUFF(S): 160; 4.5 AEROSOL RESPIRATORY (INHALATION) at 18:09

## 2023-11-23 RX ADMIN — HEPARIN SODIUM 5000 UNIT(S): 5000 INJECTION INTRAVENOUS; SUBCUTANEOUS at 22:43

## 2023-11-23 RX ADMIN — Medication 40 MILLIGRAM(S): at 05:04

## 2023-11-23 RX ADMIN — SEVELAMER CARBONATE 800 MILLIGRAM(S): 2400 POWDER, FOR SUSPENSION ORAL at 22:44

## 2023-11-23 RX ADMIN — Medication 1: at 12:24

## 2023-11-23 RX ADMIN — CHLORHEXIDINE GLUCONATE 1 APPLICATION(S): 213 SOLUTION TOPICAL at 11:29

## 2023-11-23 RX ADMIN — Medication 3 MILLILITER(S): at 05:05

## 2023-11-23 RX ADMIN — ATORVASTATIN CALCIUM 80 MILLIGRAM(S): 80 TABLET, FILM COATED ORAL at 22:43

## 2023-11-23 RX ADMIN — HEPARIN SODIUM 5000 UNIT(S): 5000 INJECTION INTRAVENOUS; SUBCUTANEOUS at 13:55

## 2023-11-23 RX ADMIN — Medication 3 MILLILITER(S): at 22:44

## 2023-11-23 RX ADMIN — Medication 25 MILLIGRAM(S): at 05:04

## 2023-11-23 RX ADMIN — Medication 40 MILLIGRAM(S): at 13:55

## 2023-11-23 RX ADMIN — MONTELUKAST 10 MILLIGRAM(S): 4 TABLET, CHEWABLE ORAL at 11:24

## 2023-11-23 RX ADMIN — HEPARIN SODIUM 5000 UNIT(S): 5000 INJECTION INTRAVENOUS; SUBCUTANEOUS at 05:07

## 2023-11-23 RX ADMIN — SEVELAMER CARBONATE 800 MILLIGRAM(S): 2400 POWDER, FOR SUSPENSION ORAL at 13:55

## 2023-11-23 RX ADMIN — Medication 25 MILLIGRAM(S): at 13:56

## 2023-11-23 RX ADMIN — Medication 3 MILLILITER(S): at 11:25

## 2023-11-23 NOTE — PROGRESS NOTE ADULT - PROBLEM SELECTOR PLAN 4
Patient with hx of HTN, on home amlodipine and telmisertan  - likely worsened in the setting of CAL and ADHF    Plan:  > continue with amlodipine  > also on hydralazine

## 2023-11-23 NOTE — PROGRESS NOTE ADULT - PROBLEM SELECTOR PLAN 1
Patient presenting for worsening LE edema and breathlessness  - also with several symptoms to suggest likely ADHF  - on exam: extremities warm and well perfused, but volume overloaded, S3 on exam  - proBNP elevated to 1300, but unknown baseline and with likely CAL on CKD  - hsTropT flat at 74, ECG without ST changes, no chest pain  - pt does not carry a diagnosis of HF, however on meds that may suggest she may have HFpEF (MRA, SGLT2i)    Plan:  > diuresis with IV lasix 40 mg bid, assess response and titrate dose  > obtain TTE--> showing EF 71%, bi-atrial enlargement, LV diastolic dysfunction, and RV enlargement  > hold home spironolactone and dapagliflozin given CAL  > strict I/Os, daily standing weights

## 2023-11-23 NOTE — PROGRESS NOTE ADULT - PROBLEM SELECTOR PLAN 3
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

## 2023-11-23 NOTE — PROGRESS NOTE ADULT - SUBJECTIVE AND OBJECTIVE BOX
PROGRESS NOTE:   Authored by Dr. Luis Perla MD (PGY-1).     Patient is a 83y old  Female who presents with a chief complaint of LE swelling, dyspnea (22 Nov 2023 15:15)      SUBJECTIVE / OVERNIGHT EVENTS:  No acute events overnight. She has no acute complaints this morning.     MEDICATIONS  (STANDING):  albuterol    90 MICROgram(s) HFA Inhaler 2 Puff(s) Inhalation every 6 hours  albuterol/ipratropium for Nebulization 3 milliLiter(s) Nebulizer every 6 hours  amLODIPine   Tablet 10 milliGRAM(s) Oral daily  atorvastatin 80 milliGRAM(s) Oral at bedtime  budesonide  80 MICROgram(s)/formoterol 4.5 MICROgram(s) Inhaler 2 Puff(s) Inhalation every 12 hours  dextrose 5%. 1000 milliLiter(s) (100 mL/Hr) IV Continuous <Continuous>  dextrose 5%. 1000 milliLiter(s) (50 mL/Hr) IV Continuous <Continuous>  dextrose 50% Injectable 12.5 Gram(s) IV Push once  dextrose 50% Injectable 25 Gram(s) IV Push once  dextrose 50% Injectable 25 Gram(s) IV Push once  furosemide   Injectable 40 milliGRAM(s) IV Push two times a day  glucagon  Injectable 1 milliGRAM(s) IntraMuscular once  heparin   Injectable 5000 Unit(s) SubCutaneous every 8 hours  hydrALAZINE 25 milliGRAM(s) Oral three times a day  influenza  Vaccine (HIGH DOSE) 0.7 milliLiter(s) IntraMuscular once  insulin lispro (ADMELOG) corrective regimen sliding scale   SubCutaneous at bedtime  insulin lispro (ADMELOG) corrective regimen sliding scale   SubCutaneous three times a day before meals  montelukast 10 milliGRAM(s) Oral daily    MEDICATIONS  (PRN):  acetaminophen     Tablet .. 650 milliGRAM(s) Oral every 6 hours PRN Temp greater or equal to 38C (100.4F), Mild Pain (1 - 3)  dextrose Oral Gel 15 Gram(s) Oral once PRN Blood Glucose LESS THAN 70 milliGRAM(s)/deciliter      CAPILLARY BLOOD GLUCOSE      POCT Blood Glucose.: 137 mg/dL (23 Nov 2023 08:23)  POCT Blood Glucose.: 112 mg/dL (23 Nov 2023 04:11)    I&O's Summary    22 Nov 2023 07:01  -  23 Nov 2023 07:00  --------------------------------------------------------  IN: 360 mL / OUT: 1000 mL / NET: -640 mL        PHYSICAL EXAM:  Vital Signs Last 24 Hrs  T(C): 37 (23 Nov 2023 05:08), Max: 37 (22 Nov 2023 16:09)  T(F): 98.6 (23 Nov 2023 05:08), Max: 98.6 (22 Nov 2023 16:09)  HR: 87 (23 Nov 2023 05:08) (83 - 96)  BP: 147/58 (23 Nov 2023 05:08) (137/71 - 179/75)  BP(mean): 100 (22 Nov 2023 13:30) (100 - 100)  RR: 18 (23 Nov 2023 05:08) (16 - 18)  SpO2: 97% (23 Nov 2023 05:08) (95% - 99%)    Parameters below as of 23 Nov 2023 05:08  Patient On (Oxygen Delivery Method): nasal cannula        CONSTITUTIONAL: NAD, well-developed  HEET: MMM, EOMI, PERRLA  NECK: supple  RESPIRATORY: slight crackles appreciated bilaterally   CARDIOVASCULAR: Regular rate and rhythm, normal S1 and S2, no murmur/rub/gallop; 1+ lower extremity edema; Peripheral pulses are 2+ bilaterally  ABDOMEN: Nontender to palpation, normoactive bowel sounds, no rebound/guarding; No hepatosplenomegaly  MUSCULOSKELETAL: no clubbing or cyanosis of digits; no joint swelling or tenderness to palpation  PSYCH: A+O to person, place, and time; affect appropriate  SKIN: No rash    LABS:                        7.8    5.77  )-----------( 222      ( 23 Nov 2023 06:24 )             26.2     11-23    139  |  108  |  58<H>  ----------------------------<  98  4.7   |  17<L>  |  4.65<H>    Ca    8.7      23 Nov 2023 06:25  Phos  5.8     11-23  Mg     2.3     11-23    TPro  7.3  /  Alb  3.4  /  TBili  0.4  /  DBili  x   /  AST  19  /  ALT  9<L>  /  AlkPhos  67  11-22    PT/INR - ( 21 Nov 2023 23:06 )   PT: 10.9 sec;   INR: 0.99 ratio         PTT - ( 21 Nov 2023 23:06 )  PTT:38.8 sec      Urinalysis Basic - ( 23 Nov 2023 06:25 )    Color: x / Appearance: x / SG: x / pH: x  Gluc: 98 mg/dL / Ketone: x  / Bili: x / Urobili: x   Blood: x / Protein: x / Nitrite: x   Leuk Esterase: x / RBC: x / WBC x   Sq Epi: x / Non Sq Epi: x / Bacteria: x          Tele Reviewed:    RADIOLOGY & ADDITIONAL TESTS:  Results Reviewed:   Imaging Personally Reviewed:  Electrocardiogram Personally Reviewed:

## 2023-11-23 NOTE — PROGRESS NOTE ADULT - PROBLEM SELECTOR PLAN 1
CAL on CKD. But she did not go to any physician in the recent past. Given her history its likely to be CAL on CKD vs CKD progression. She has pedal edema. US Kidney - showed renal parenchymal disease. UA negative for UTI but it has RBCs and UPCR is 4.6.     -SCr slightly increased to 4.65 today.   -pending - serology work up - PLA2R Ab, C3, C4, Anti GBM antibody, Anti Ds DNA, JAYLAN, ANCA, Serum free light chains, immunofixation, A1C, UA,  USG- retroperitoneum with duplex renal vessels, HIV, Hep B, Hep C  -C/w lasix 40 IV BID for today  -Monitor UOP.   -Can optimize from proteinuria standpoint with ACEi/ARB, SGLT2I once renal function stabilizes    Can start phos binder, 1 Tab with meals. Order PTH, Vitamin D levels. Monitor phos    If you have any questions, please feel free to contact me  Keaton Dye  Nephrology Fellow  [phone]; Prefer [employer] TEAMS  (After 5pm or on weekends please page the on-call fellow).

## 2023-11-23 NOTE — PROGRESS NOTE ADULT - ATTENDING COMMENTS
Patient seen and examined at 10am    # CAL on CKD vs CKD. Serum creatinine continues to rise to 4.6mg/dL. Workup pending.   No metabolic derangement, no uremic symptoms. No indication for dialysis. Will continue to monitor.     # Volume overload - continue IV lasix.    # Metabolic acidosis - secondary to CAL. Since she has volume overload - we will not add sodium bicarb for now.     # Anemia - check iron studies and ferritin.     # Medication monitoring encounter: medication dose reviewed. Please dose medications to CrCl<15    The rest of the recommendations as per fellow's note.    Kerri Leyva MD  Attending Nephrologist  [phone] or via [employer]

## 2023-11-23 NOTE — PROGRESS NOTE ADULT - PROBLEM SELECTOR PLAN 5
Patient on several oral medications at home including dapaglifozin, saxagliptin  - unclear baseline glycemic control    Plan:  > ISS for now, add basal/bolus as needed  > f/u A1c: 6.1

## 2023-11-23 NOTE — PROGRESS NOTE ADULT - SUBJECTIVE AND OBJECTIVE BOX
[facility] Division of Kidney Diseases & Hypertension  FOLLOW UP NOTE  [phone]--------------------------------------------------------------------------------    Chief Complaint: CAL vs CKD    24 hour events/subjective:  Seen this am. Reports SOB has improved. Denies any chest pain, fevers, chills, or urinary symptoms. LE swelling improved    PAST HISTORY  --------------------------------------------------------------------------------  No significant changes to PMH, PSH, FHx, SHx, unless otherwise noted    ALLERGIES & MEDICATIONS  --------------------------------------------------------------------------------  Allergies    No Known Allergies    Intolerances      Standing Inpatient Medications  albuterol    90 MICROgram(s) HFA Inhaler 2 Puff(s) Inhalation every 6 hours  albuterol/ipratropium for Nebulization 3 milliLiter(s) Nebulizer every 6 hours  amLODIPine   Tablet 10 milliGRAM(s) Oral daily  atorvastatin 80 milliGRAM(s) Oral at bedtime  budesonide  80 MICROgram(s)/formoterol 4.5 MICROgram(s) Inhaler 2 Puff(s) Inhalation every 12 hours  chlorhexidine 2% Cloths 1 Application(s) Topical daily  dextrose 5%. 1000 milliLiter(s) IV Continuous <Continuous>  dextrose 5%. 1000 milliLiter(s) IV Continuous <Continuous>  dextrose 50% Injectable 25 Gram(s) IV Push once  dextrose 50% Injectable 12.5 Gram(s) IV Push once  dextrose 50% Injectable 25 Gram(s) IV Push once  furosemide   Injectable 40 milliGRAM(s) IV Push two times a day  glucagon  Injectable 1 milliGRAM(s) IntraMuscular once  heparin   Injectable 5000 Unit(s) SubCutaneous every 8 hours  hydrALAZINE 25 milliGRAM(s) Oral three times a day  influenza  Vaccine (HIGH DOSE) 0.7 milliLiter(s) IntraMuscular once  insulin lispro (ADMELOG) corrective regimen sliding scale   SubCutaneous at bedtime  insulin lispro (ADMELOG) corrective regimen sliding scale   SubCutaneous three times a day before meals  montelukast 10 milliGRAM(s) Oral daily  sevelamer carbonate 800 milliGRAM(s) Oral every 8 hours    PRN Inpatient Medications  acetaminophen     Tablet .. 650 milliGRAM(s) Oral every 6 hours PRN  dextrose Oral Gel 15 Gram(s) Oral once PRN      REVIEW OF SYSTEMS  --------------------------------------------------------------------------------  per above    VITALS/PHYSICAL EXAM  --------------------------------------------------------------------------------  T(C): 36.5 (11-23-23 @ 08:50), Max: 37 (11-22-23 @ 16:09)  HR: 90 (11-23-23 @ 08:50) (83 - 96)  BP: 114/61 (11-23-23 @ 08:50) (114/61 - 175/80)  RR: 18 (11-23-23 @ 08:50) (16 - 18)  SpO2: 95% (11-23-23 @ 08:50) (95% - 99%)  Wt(kg): --  Height (cm): 160 (11-23-23 @ 08:45)  Weight (kg): 68 (11-23-23 @ 08:45)  BMI (kg/m2): 26.6 (11-23-23 @ 08:45)  BSA (m2): 1.71 (11-23-23 @ 08:45)      11-22-23 @ 07:01  -  11-23-23 @ 07:00  --------------------------------------------------------  IN: 360 mL / OUT: 1000 mL / NET: -640 mL      Physical Exam:  Gen: On supplemental Oxygen.   HEENT: MMM  Pulm:  difficult to auscultate expiratory phase given patient's cough  CV: S1S2  Abd: Soft, +BS   Ext:  LE edema B/L +   Neuro: Awake  Skin: Warm and dry  Vascular access:     LABS/STUDIES  --------------------------------------------------------------------------------              7.8    5.77  >-----------<  222      [11-23-23 @ 06:24]              26.2     139  |  108  |  58  ----------------------------<  98      [11-23-23 @ 06:25]  4.7   |  17  |  4.65        Ca     8.7     [11-23-23 @ 06:25]      Mg     2.3     [11-23-23 @ 06:25]      Phos  5.8     [11-23-23 @ 06:25]    TPro  7.3  /  Alb  3.4  /  TBili  0.4  /  DBili  x   /  AST  19  /  ALT  9   /  AlkPhos  67  [11-22-23 @ 07:44]    PT/INR: PT 10.9 , INR 0.99       [11-21-23 @ 23:06]  PTT: 38.8       [11-21-23 @ 23:06]      Creatinine Trend:  SCr 4.65 [11-23 @ 06:25]  SCr 4.32 [11-22 @ 07:44]  SCr 4.54 [11-21 @ 23:06]    Urinalysis - [11-23-23 @ 06:25]      Color  / Appearance  / SG  / pH       Gluc 98 / Ketone   / Bili  / Urobili        Blood  / Protein  / Leuk Est  / Nitrite       RBC  / WBC  / Hyaline  / Gran  / Sq Epi  / Non Sq Epi  / Bacteria     Urine Creatinine 36      [11-22-23 @ 07:44]  Urine Protein 203      [11-22-23 @ 07:44]  Urine Sodium 113      [11-22-23 @ 07:44]  Urine Urea Nitrogen 150      [11-22-23 @ 07:51]  Urine Potassium 15      [11-22-23 @ 07:44]  Urine Osmolality 364      [11-22-23 @ 07:44]    Lipid: chol 116, TG 79, HDL 39, LDL --      [11-23-23 @ 06:25]       [facility] Division of Kidney Diseases & Hypertension  FOLLOW UP NOTE  [phone]--------------------------------------------------------------------------------    Chief Complaint: CAL vs CKD    24 hour events/subjective:  Seen this am. Reports SOB has improved. Denies any chest pain, fevers, chills, or urinary symptoms. LE swelling improved    PAST HISTORY  --------------------------------------------------------------------------------  No significant changes to PMH, PSH, FHx, SHx, unless otherwise noted    ALLERGIES & MEDICATIONS  --------------------------------------------------------------------------------  Allergies    No Known Allergies    Intolerances      Standing Inpatient Medications  albuterol    90 MICROgram(s) HFA Inhaler 2 Puff(s) Inhalation every 6 hours  albuterol/ipratropium for Nebulization 3 milliLiter(s) Nebulizer every 6 hours  amLODIPine   Tablet 10 milliGRAM(s) Oral daily  atorvastatin 80 milliGRAM(s) Oral at bedtime  budesonide  80 MICROgram(s)/formoterol 4.5 MICROgram(s) Inhaler 2 Puff(s) Inhalation every 12 hours  chlorhexidine 2% Cloths 1 Application(s) Topical daily  dextrose 5%. 1000 milliLiter(s) IV Continuous <Continuous>  dextrose 5%. 1000 milliLiter(s) IV Continuous <Continuous>  dextrose 50% Injectable 25 Gram(s) IV Push once  dextrose 50% Injectable 12.5 Gram(s) IV Push once  dextrose 50% Injectable 25 Gram(s) IV Push once  furosemide   Injectable 40 milliGRAM(s) IV Push two times a day  glucagon  Injectable 1 milliGRAM(s) IntraMuscular once  heparin   Injectable 5000 Unit(s) SubCutaneous every 8 hours  hydrALAZINE 25 milliGRAM(s) Oral three times a day  influenza  Vaccine (HIGH DOSE) 0.7 milliLiter(s) IntraMuscular once  insulin lispro (ADMELOG) corrective regimen sliding scale   SubCutaneous at bedtime  insulin lispro (ADMELOG) corrective regimen sliding scale   SubCutaneous three times a day before meals  montelukast 10 milliGRAM(s) Oral daily  sevelamer carbonate 800 milliGRAM(s) Oral every 8 hours    PRN Inpatient Medications  acetaminophen     Tablet .. 650 milliGRAM(s) Oral every 6 hours PRN  dextrose Oral Gel 15 Gram(s) Oral once PRN      REVIEW OF SYSTEMS  --------------------------------------------------------------------------------  per above    VITALS/PHYSICAL EXAM  --------------------------------------------------------------------------------  T(C): 36.5 (11-23-23 @ 08:50), Max: 37 (11-22-23 @ 16:09)  HR: 90 (11-23-23 @ 08:50) (83 - 96)  BP: 114/61 (11-23-23 @ 08:50) (114/61 - 175/80)  RR: 18 (11-23-23 @ 08:50) (16 - 18)  SpO2: 95% (11-23-23 @ 08:50) (95% - 99%)  Wt(kg): --  Height (cm): 160 (11-23-23 @ 08:45)  Weight (kg): 68 (11-23-23 @ 08:45)  BMI (kg/m2): 26.6 (11-23-23 @ 08:45)  BSA (m2): 1.71 (11-23-23 @ 08:45)      11-22-23 @ 07:01  -  11-23-23 @ 07:00  --------------------------------------------------------  IN: 360 mL / OUT: 1000 mL / NET: -640 mL      Physical Exam:  Gen: On supplemental Oxygen.   HEENT: MMM, no neck swelling  Pulm:  difficult to auscultate expiratory phase given patient's cough  CV: S1S2, no m/g/r  Abd: Soft, +BS   Ext:  LE edema B/L +    - no suprapubic tenderness. no musa  Neuro: Awake, no focal deficit  Skin: Warm and dry  Vascular access: peripheral IV  Psych: normal mood and affect    LABS/STUDIES  --------------------------------------------------------------------------------              7.8    5.77  >-----------<  222      [11-23-23 @ 06:24]              26.2     139  |  108  |  58  ----------------------------<  98      [11-23-23 @ 06:25]  4.7   |  17  |  4.65        Ca     8.7     [11-23-23 @ 06:25]      Mg     2.3     [11-23-23 @ 06:25]      Phos  5.8     [11-23-23 @ 06:25]    TPro  7.3  /  Alb  3.4  /  TBili  0.4  /  DBili  x   /  AST  19  /  ALT  9   /  AlkPhos  67  [11-22-23 @ 07:44]    PT/INR: PT 10.9 , INR 0.99       [11-21-23 @ 23:06]  PTT: 38.8       [11-21-23 @ 23:06]      Creatinine Trend:  SCr 4.65 [11-23 @ 06:25]  SCr 4.32 [11-22 @ 07:44]  SCr 4.54 [11-21 @ 23:06]    Urinalysis - [11-23-23 @ 06:25]      Color  / Appearance  / SG  / pH       Gluc 98 / Ketone   / Bili  / Urobili        Blood  / Protein  / Leuk Est  / Nitrite       RBC  / WBC  / Hyaline  / Gran  / Sq Epi  / Non Sq Epi  / Bacteria     Urine Creatinine 36      [11-22-23 @ 07:44]  Urine Protein 203      [11-22-23 @ 07:44]  Urine Sodium 113      [11-22-23 @ 07:44]  Urine Urea Nitrogen 150      [11-22-23 @ 07:51]  Urine Potassium 15      [11-22-23 @ 07:44]  Urine Osmolality 364      [11-22-23 @ 07:44]    Lipid: chol 116, TG 79, HDL 39, LDL --      [11-23-23 @ 06:25]

## 2023-11-23 NOTE — PROGRESS NOTE ADULT - ATTENDING COMMENTS
83F with chronic hypoxemic respiratory failure, CKD, T2DM, CAD, HTN, and HLD who presents for worsening dyspnea and lower extremity edema, admitted for suspected acute decompensated heart failure with CAL on likely CKD with c/f possible nephrotic syndrome. On lasix 40 IV BID for diuresis. Unclear baseline Cr, but significantly elevated currently with nephrotic range proteinuria c/f CAL on CKD.    #CHF exacerbation vs new CHF  #CAL on CKD, #nephrotic range proteinuria   #Acute on chronic respiratory failure  #HTN, HLD, DM2    - c/w Lasix 40mg IV BID for now for diuresis  - continue to hold home spironolactone, telmisartan, and farxiga given CAL  - c/w hydralazine 25mg TID for afterload reduction ;  uptitrate as needed   - TTE w/moderate (grade 2) left ventricular diastolic dysfunction, with elevated filling pressure.  - SCr slightly increased to 4.65 today.   - Nephrology reccs appreciated ; f/up serology work up - PLA2R Ab, C3, C4, Anti GBM antibody, Anti Ds DNA, JAYLAN, ANCA, Serum free light chains, immunofixation, A1C, UA,  USG- retroperitoneum with duplex renal vessels, HIV, Hep B, Hep C  -C/w lasix 40 IV BID for today  - strict I/Os, daily weights   - renal duplex showing parenchymal disease, no hydro  - pt with chronic hypoxemic RF of unclear etiology, ?COPD given long standing smoking history -  c/w home O2, add on nebs q6h and symbicort

## 2023-11-23 NOTE — PROGRESS NOTE ADULT - PROBLEM SELECTOR PLAN 2
Patient with several year history of chronic hypoxemic respiratory failure  - requiring home O2, stable on 2L at baseline  - has not had escalating O2 requirements recently, despite feeling subjectively more dyspneic  - unclear etiology, though patient has had PFTs at her pulmologist's office last year    Plan:  > follow up TTE--> showing EF 71%, bi-atrial enlargement, LV diastolic dysfunction, and RV enlargement  > obtain records from pt's pulmonologists office: Dr. Paulino Gayle ([facility], [phone])  > continue with home montelukast and add on duonebs and symbicort   > wean O2 as tolerated  > if persistently hypoxemic and despite diuresis, consider alternative etiologies and potentially high resolution CT chest

## 2023-11-24 DIAGNOSIS — D64.9 ANEMIA, UNSPECIFIED: ICD-10-CM

## 2023-11-24 LAB
ANION GAP SERPL CALC-SCNC: 15 MMOL/L — SIGNIFICANT CHANGE UP (ref 5–17)
ANION GAP SERPL CALC-SCNC: 15 MMOL/L — SIGNIFICANT CHANGE UP (ref 5–17)
AUTO DIFF PNL BLD: NEGATIVE — SIGNIFICANT CHANGE UP
AUTO DIFF PNL BLD: NEGATIVE — SIGNIFICANT CHANGE UP
BUN SERPL-MCNC: 64 MG/DL — HIGH (ref 7–23)
BUN SERPL-MCNC: 64 MG/DL — HIGH (ref 7–23)
C-ANCA SER-ACNC: NEGATIVE — SIGNIFICANT CHANGE UP
C-ANCA SER-ACNC: NEGATIVE — SIGNIFICANT CHANGE UP
C3 SERPL-MCNC: 126 MG/DL — SIGNIFICANT CHANGE UP (ref 81–157)
C3 SERPL-MCNC: 126 MG/DL — SIGNIFICANT CHANGE UP (ref 81–157)
C4 SERPL-MCNC: 26 MG/DL — SIGNIFICANT CHANGE UP (ref 13–39)
C4 SERPL-MCNC: 26 MG/DL — SIGNIFICANT CHANGE UP (ref 13–39)
CALCIUM SERPL-MCNC: 8.7 MG/DL — SIGNIFICANT CHANGE UP (ref 8.4–10.5)
CALCIUM SERPL-MCNC: 8.7 MG/DL — SIGNIFICANT CHANGE UP (ref 8.4–10.5)
CHLORIDE SERPL-SCNC: 106 MMOL/L — SIGNIFICANT CHANGE UP (ref 96–108)
CHLORIDE SERPL-SCNC: 106 MMOL/L — SIGNIFICANT CHANGE UP (ref 96–108)
CO2 SERPL-SCNC: 18 MMOL/L — LOW (ref 22–31)
CO2 SERPL-SCNC: 18 MMOL/L — LOW (ref 22–31)
CREAT SERPL-MCNC: 5.48 MG/DL — HIGH (ref 0.5–1.3)
CREAT SERPL-MCNC: 5.48 MG/DL — HIGH (ref 0.5–1.3)
DSDNA AB SER-ACNC: <12 IU/ML — SIGNIFICANT CHANGE UP
DSDNA AB SER-ACNC: <12 IU/ML — SIGNIFICANT CHANGE UP
EGFR: 7 ML/MIN/1.73M2 — LOW
EGFR: 7 ML/MIN/1.73M2 — LOW
GLUCOSE BLDC GLUCOMTR-MCNC: 121 MG/DL — HIGH (ref 70–99)
GLUCOSE BLDC GLUCOMTR-MCNC: 121 MG/DL — HIGH (ref 70–99)
GLUCOSE BLDC GLUCOMTR-MCNC: 127 MG/DL — HIGH (ref 70–99)
GLUCOSE BLDC GLUCOMTR-MCNC: 127 MG/DL — HIGH (ref 70–99)
GLUCOSE BLDC GLUCOMTR-MCNC: 132 MG/DL — HIGH (ref 70–99)
GLUCOSE BLDC GLUCOMTR-MCNC: 132 MG/DL — HIGH (ref 70–99)
GLUCOSE BLDC GLUCOMTR-MCNC: 134 MG/DL — HIGH (ref 70–99)
GLUCOSE BLDC GLUCOMTR-MCNC: 134 MG/DL — HIGH (ref 70–99)
GLUCOSE SERPL-MCNC: 91 MG/DL — SIGNIFICANT CHANGE UP (ref 70–99)
GLUCOSE SERPL-MCNC: 91 MG/DL — SIGNIFICANT CHANGE UP (ref 70–99)
HCT VFR BLD CALC: 23.8 % — LOW (ref 34.5–45)
HCT VFR BLD CALC: 23.8 % — LOW (ref 34.5–45)
HGB BLD-MCNC: 7.6 G/DL — LOW (ref 11.5–15.5)
HGB BLD-MCNC: 7.6 G/DL — LOW (ref 11.5–15.5)
KAPPA LC SER QL IFE: 13.06 MG/DL — HIGH (ref 0.33–1.94)
KAPPA LC SER QL IFE: 13.06 MG/DL — HIGH (ref 0.33–1.94)
KAPPA/LAMBDA FREE LIGHT CHAIN RATIO, SERUM: 1.84 RATIO — HIGH (ref 0.26–1.65)
KAPPA/LAMBDA FREE LIGHT CHAIN RATIO, SERUM: 1.84 RATIO — HIGH (ref 0.26–1.65)
LAMBDA LC SER QL IFE: 7.08 MG/DL — HIGH (ref 0.57–2.63)
LAMBDA LC SER QL IFE: 7.08 MG/DL — HIGH (ref 0.57–2.63)
MAGNESIUM SERPL-MCNC: 2.3 MG/DL — SIGNIFICANT CHANGE UP (ref 1.6–2.6)
MAGNESIUM SERPL-MCNC: 2.3 MG/DL — SIGNIFICANT CHANGE UP (ref 1.6–2.6)
MCHC RBC-ENTMCNC: 29.7 PG — SIGNIFICANT CHANGE UP (ref 27–34)
MCHC RBC-ENTMCNC: 29.7 PG — SIGNIFICANT CHANGE UP (ref 27–34)
MCHC RBC-ENTMCNC: 31.9 GM/DL — LOW (ref 32–36)
MCHC RBC-ENTMCNC: 31.9 GM/DL — LOW (ref 32–36)
MCV RBC AUTO: 93 FL — SIGNIFICANT CHANGE UP (ref 80–100)
MCV RBC AUTO: 93 FL — SIGNIFICANT CHANGE UP (ref 80–100)
MPO AB + PR3 PNL SER: SIGNIFICANT CHANGE UP
MPO AB + PR3 PNL SER: SIGNIFICANT CHANGE UP
NRBC # BLD: 0 /100 WBCS — SIGNIFICANT CHANGE UP (ref 0–0)
NRBC # BLD: 0 /100 WBCS — SIGNIFICANT CHANGE UP (ref 0–0)
P-ANCA SER-ACNC: NEGATIVE — SIGNIFICANT CHANGE UP
P-ANCA SER-ACNC: NEGATIVE — SIGNIFICANT CHANGE UP
PHOSPHATE SERPL-MCNC: 6.4 MG/DL — HIGH (ref 2.5–4.5)
PHOSPHATE SERPL-MCNC: 6.4 MG/DL — HIGH (ref 2.5–4.5)
PLATELET # BLD AUTO: 216 K/UL — SIGNIFICANT CHANGE UP (ref 150–400)
PLATELET # BLD AUTO: 216 K/UL — SIGNIFICANT CHANGE UP (ref 150–400)
POTASSIUM SERPL-MCNC: 4.4 MMOL/L — SIGNIFICANT CHANGE UP (ref 3.5–5.3)
POTASSIUM SERPL-MCNC: 4.4 MMOL/L — SIGNIFICANT CHANGE UP (ref 3.5–5.3)
POTASSIUM SERPL-SCNC: 4.4 MMOL/L — SIGNIFICANT CHANGE UP (ref 3.5–5.3)
POTASSIUM SERPL-SCNC: 4.4 MMOL/L — SIGNIFICANT CHANGE UP (ref 3.5–5.3)
RBC # BLD: 2.56 M/UL — LOW (ref 3.8–5.2)
RBC # BLD: 2.56 M/UL — LOW (ref 3.8–5.2)
RBC # FLD: 15.3 % — HIGH (ref 10.3–14.5)
RBC # FLD: 15.3 % — HIGH (ref 10.3–14.5)
SODIUM SERPL-SCNC: 139 MMOL/L — SIGNIFICANT CHANGE UP (ref 135–145)
SODIUM SERPL-SCNC: 139 MMOL/L — SIGNIFICANT CHANGE UP (ref 135–145)
WBC # BLD: 5.99 K/UL — SIGNIFICANT CHANGE UP (ref 3.8–10.5)
WBC # BLD: 5.99 K/UL — SIGNIFICANT CHANGE UP (ref 3.8–10.5)
WBC # FLD AUTO: 5.99 K/UL — SIGNIFICANT CHANGE UP (ref 3.8–10.5)
WBC # FLD AUTO: 5.99 K/UL — SIGNIFICANT CHANGE UP (ref 3.8–10.5)

## 2023-11-24 PROCEDURE — 99232 SBSQ HOSP IP/OBS MODERATE 35: CPT | Mod: GC

## 2023-11-24 RX ORDER — FUROSEMIDE 40 MG
40 TABLET ORAL
Refills: 0 | Status: DISCONTINUED | OUTPATIENT
Start: 2023-11-24 | End: 2023-11-25

## 2023-11-24 RX ORDER — MUPIROCIN 20 MG/G
1 OINTMENT TOPICAL EVERY 12 HOURS
Refills: 0 | Status: DISCONTINUED | OUTPATIENT
Start: 2023-11-24 | End: 2023-12-15

## 2023-11-24 RX ADMIN — BUDESONIDE AND FORMOTEROL FUMARATE DIHYDRATE 2 PUFF(S): 160; 4.5 AEROSOL RESPIRATORY (INHALATION) at 17:35

## 2023-11-24 RX ADMIN — Medication 40 MILLIGRAM(S): at 15:29

## 2023-11-24 RX ADMIN — MONTELUKAST 10 MILLIGRAM(S): 4 TABLET, CHEWABLE ORAL at 13:37

## 2023-11-24 RX ADMIN — Medication 25 MILLIGRAM(S): at 06:46

## 2023-11-24 RX ADMIN — SEVELAMER CARBONATE 800 MILLIGRAM(S): 2400 POWDER, FOR SUSPENSION ORAL at 21:43

## 2023-11-24 RX ADMIN — Medication 3 MILLILITER(S): at 13:37

## 2023-11-24 RX ADMIN — SEVELAMER CARBONATE 800 MILLIGRAM(S): 2400 POWDER, FOR SUSPENSION ORAL at 13:37

## 2023-11-24 RX ADMIN — Medication 25 MILLIGRAM(S): at 21:43

## 2023-11-24 RX ADMIN — HEPARIN SODIUM 5000 UNIT(S): 5000 INJECTION INTRAVENOUS; SUBCUTANEOUS at 06:45

## 2023-11-24 RX ADMIN — Medication 3 MILLILITER(S): at 06:45

## 2023-11-24 RX ADMIN — SEVELAMER CARBONATE 800 MILLIGRAM(S): 2400 POWDER, FOR SUSPENSION ORAL at 06:46

## 2023-11-24 RX ADMIN — HEPARIN SODIUM 5000 UNIT(S): 5000 INJECTION INTRAVENOUS; SUBCUTANEOUS at 21:43

## 2023-11-24 RX ADMIN — Medication 3 MILLILITER(S): at 17:35

## 2023-11-24 RX ADMIN — Medication 40 MILLIGRAM(S): at 06:45

## 2023-11-24 RX ADMIN — MUPIROCIN 1 APPLICATION(S): 20 OINTMENT TOPICAL at 17:35

## 2023-11-24 RX ADMIN — CHLORHEXIDINE GLUCONATE 1 APPLICATION(S): 213 SOLUTION TOPICAL at 12:05

## 2023-11-24 RX ADMIN — BUDESONIDE AND FORMOTEROL FUMARATE DIHYDRATE 2 PUFF(S): 160; 4.5 AEROSOL RESPIRATORY (INHALATION) at 06:47

## 2023-11-24 RX ADMIN — Medication 25 MILLIGRAM(S): at 13:37

## 2023-11-24 RX ADMIN — AMLODIPINE BESYLATE 10 MILLIGRAM(S): 2.5 TABLET ORAL at 06:46

## 2023-11-24 RX ADMIN — ATORVASTATIN CALCIUM 80 MILLIGRAM(S): 80 TABLET, FILM COATED ORAL at 21:43

## 2023-11-24 RX ADMIN — HEPARIN SODIUM 5000 UNIT(S): 5000 INJECTION INTRAVENOUS; SUBCUTANEOUS at 13:37

## 2023-11-24 NOTE — CHART NOTE - NSCHARTNOTEFT_GEN_A_CORE
Patient's labs were obtained from her PMD appointment from 5/23    Hemoglobin: 10  WBC: 6.4  Platelet: 270    Potassium: 5  BUN/Cr: 54/2.48  GFR: 19  A1c: 5.9

## 2023-11-24 NOTE — DIETITIAN INITIAL EVALUATION ADULT - PROBLEM SELECTOR PLAN 2
Patient with several year history of chronic hypoxemic respiratory failure  - requiring home O2, stable on 2L at baseline  - has not had escalating O2 requirements recently, despite feeling subjectively more dyspneic  - unclear etiology, though patient has had PFTs at her pulmologist's office last year    Plan:  > follow up TTE   > obtain records from pt's pulmonologists office: Dr. Paulino Gayle ([facility], [phone])  > continue with home montelukast  > wean O2 as tolerated  > if persistently hypoxemic and despite diuresis, consider alternative etiologies and potentially high resolution CT chest

## 2023-11-24 NOTE — DIETITIAN INITIAL EVALUATION ADULT - OTHER CALCULATIONS
Dosing wt ~150 pounds used for calorie and protein needs calculations. Defer fluids for team , noted on fluid restriction

## 2023-11-24 NOTE — PROGRESS NOTE ADULT - PROBLEM SELECTOR PLAN 1
CAL on CKD. But she did not go to any physician in the recent past. Given her history its likely to be CAL on CKD vs CKD progression. She has pedal edema. US Kidney - showed renal parenchymal disease. UA negative for UTI but it has RBCs and UPCR is 4.6.     -SCr increased to 4.65--->5.48 today.   -pending - serology work up - PLA2R Ab, C3, C4, Anti GBM antibody, Anti Ds DNA, JAYLAN, ANCA, Serum free light chains, immunofixation, A1C, UA,  USG- retroperitoneum with duplex renal vessels, HIV, Hep B, Hep C  -C/w lasix 40 IV BID for today  Follow up with Iron and ferritin.  -Monitor UOP.   -Can optimize from proteinuria standpoint with ACEi/ARB, SGLT2I once renal function stabilizes  - Sevelamer 800 TID for now. Order PTH, Vitamin D levels. Monitor phos      If you have any questions, please feel free to contact me  Phoenix Mathew  Nephrology Fellow  [phone]; Prefer Teams.  (After 5pm or on weekends please page the on-call fellow).

## 2023-11-24 NOTE — DIETITIAN INITIAL EVALUATION ADULT - NS FNS DIET ORDER
Diet, DASH/TLC:   Sodium & Cholesterol Restricted  Consistent Carbohydrate {No Snacks} (CSTCHO)  1000mL Fluid Restriction ([id number]) (11-22-23)

## 2023-11-24 NOTE — PROGRESS NOTE ADULT - PROBLEM SELECTOR PLAN 2
- Hb downtrending during admission  - unclear baseline Hb  - likely due to worsening renal function  - f/u ferritin and iron studies

## 2023-11-24 NOTE — DIETITIAN INITIAL EVALUATION ADULT - OTHER INFO
Home Medications:  amLODIPine 10 mg oral tablet: 1 tab(s) orally once a day (22 Nov 2023 04:36)  Farxiga 5 mg oral tablet: 1 tab(s) orally once a day (22 Nov 2023 04:36)  montelukast 10 mg oral tablet: 1 tab(s) orally once a day (22 Nov 2023 04:36)  Onglyza 5 mg oral tablet: 1 tab(s) orally once a day (22 Nov 2023 04:36)  pioglitazone 30 mg oral tablet: 1 tab(s) orally once a day (22 Nov 2023 04:36)  rosuvastatin 20 mg oral tablet: 1 tab(s) orally once a day (at bedtime) (22 Nov 2023 04:37)  spironolactone 25 mg oral tablet: 1 tab(s) orally once a day (22 Nov 2023 04:37)  telmisartan 80 mg oral tablet: 1 tab(s) orally once a day (22 Nov 2023 04:37)

## 2023-11-24 NOTE — PROGRESS NOTE ADULT - PROBLEM SELECTOR PLAN 1
Patient presenting for worsening LE edema and breathlessness  - also with several symptoms to suggest likely ADHF  - on exam: extremities warm and well perfused, but volume overloaded, S3 on exam  - proBNP elevated to 1300, but unknown baseline and with likely CAL on CKD  - hsTropT flat at 74, ECG without ST changes, no chest pain  - pt does not carry a diagnosis of HF, however on meds that may suggest she may have HFpEF (MRA, SGLT2i)    Plan:  > diuresis with IV lasix 40 mg bid, assess response and titrate dose  > obtain TTE--> showing EF 71%, bi-atrial enlargement, LV diastolic dysfunction, and RV enlargement  > hold home spironolactone and dapagliflozin given CAL  > strict I/Os, daily standing weights  > appreciate cards recs Patient presenting for worsening LE edema and breathlessness  - also with several symptoms to suggest likely ADHF  - on exam: extremities warm and well perfused, but volume overloaded, S3 on exam  - proBNP elevated to 1300, but unknown baseline and with likely CAL on CKD  - hsTropT flat at 74, ECG without ST changes, no chest pain  - pt does not carry a diagnosis of HF, however on meds that may suggest she may have HFpEF (MRA, SGLT2i)    Plan:  > diuresis with IV lasix 40 mg bid, assess response and titrate dose   > obtain TTE--> showing EF 71%, bi-atrial enlargement, LV diastolic dysfunction, and RV enlargement  > hold home spironolactone and dapagliflozin given CAL  > strict I/Os, daily standing weights  > appreciate cards recs

## 2023-11-24 NOTE — PROGRESS NOTE ADULT - PROBLEM SELECTOR PLAN 3
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  - reportedly has continued to have good urine output  - metabolic    Plan:  > follow up urine studies--> FeNa >9%; postobstructive picture   > f/u US Kidney/bladder--> showing parenchymal disease   > hold home ARB and MRA, avoid nephrotoxic meds  - appreciate cardio-renal recs: pending workup  - also started on phos binder 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

## 2023-11-24 NOTE — DIETITIAN INITIAL EVALUATION ADULT - PROBLEM SELECTOR PLAN 3
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

## 2023-11-24 NOTE — PROGRESS NOTE ADULT - PROBLEM SELECTOR PLAN 4
Patient with several year history of chronic hypoxemic respiratory failure  - requiring home O2, stable on 2L at baseline  - has not had escalating O2 requirements recently, despite feeling subjectively more dyspneic  - unclear etiology, though patient has had PFTs at her pulmologist's office last year    Plan:  > follow up TTE--> showing EF 71%, bi-atrial enlargement, LV diastolic dysfunction, and RV enlargement  > obtain records from pt's pulmonologists office: Dr. Paulino Gayle ([facility], [phone])  > continue with home montelukast and add on duonebs and symbicort   > wean O2 as tolerated  > if persistently hypoxemic and despite diuresis, consider alternative etiologies and potentially high resolution CT chest

## 2023-11-24 NOTE — PROGRESS NOTE ADULT - ATTENDING COMMENTS
# CAL on CKD vs CKD. Serum creatinine continues to rise to 5.5mg/dL. Workup pending.   No metabolic derangement, no uremic symptoms. No indication for dialysis. Will continue to monitor. However, she may need to start dialysis during this hospital visit.     # Volume overload - continue IV lasix. If serum creatinine continues to increase, we will consider holding lasix.     # Metabolic acidosis - secondary to CAL. Since she has volume overload - we will not add sodium bicarb for now.     # Anemia - Follow iron studies and ferritin levels.    # Medication monitoring encounter: medication dose reviewed. Please dose medications to CrCl<15

## 2023-11-24 NOTE — DIETITIAN INITIAL EVALUATION ADULT - LITERATURE/VIDEOS GIVEN
Heart Healthy Consistent Carbohydrate Nutrition Therapy, Diabetes Label Reading Nutrition Tips, Heart Healthy Label Reading Nutrition Tips   Heart Failure Nutrition Therapy

## 2023-11-24 NOTE — DIETITIAN INITIAL EVALUATION ADULT - NSFNSGIIOFT_GEN_A_CORE
- Pt denies nausea, vomiting, diarrhea, or constipation at this time   - Last BM:11/23 as per flowsheets; not currently ordered for bowel regimen

## 2023-11-24 NOTE — PROGRESS NOTE ADULT - ATTENDING COMMENTS
83F with chronic hypoxemic respiratory failure, CKD, T2DM, CAD, HTN, and HLD who presents for worsening dyspnea and lower extremity edema, admitted for suspected acute decompensated heart failure with CAL on likely CKD. On lasix 40 IV BID for diuresis. Unclear baseline Cr, but significantly elevated currently with nephrotic range proteinuria. TTE showing diastolic HF, diuresis decreased today 2/2 rising Cr.     #acute on chronic diastolic HF   #CAL on CKD, #nephrotic range proteinuria   #Acute on chronic respiratory failure  #HTN, HLD, DM2    - was on lasix 40 IV BID, improved LE edema and lung crackles - further diuresis held today 2/2 rising Cr  - Cr further elevated to 5.5 (from 4.65, with unclear baseline), possibly 2/2 diuretics - hold further lasix today, re-check Cr in AM and redose lasix as needed   - continue to hold home spironolactone, telmisartan, and farxiga given CAL  - c/w hydralazine 25mg TID for afterload reduction - uptitrate as needed   - TTE w/moderate (grade 2) left ventricular diastolic dysfunction, with elevated filling pressure.  - Nephrology reccs appreciated - f/up serology work up, US renal showing parenchymal disease  - strict I/Os, daily weights   - pt with chronic hypoxemic RF of unclear etiology, ?COPD given long standing smoking history -  c/w home O2, add on nebs q6h and symbicort

## 2023-11-24 NOTE — DIETITIAN INITIAL EVALUATION ADULT - ADD RECOMMEND
- Nutrition education provided to pt with written materials; pt aware RD remains available to review education/ diet as needed.  - Will continue to monitor PO intake, weight, labs, skin, GI status, diet.   - Nutrition care plan to remain consistent with pt goals of care  - RD remains available to review diet education and adjust diet recommendations as needed.

## 2023-11-24 NOTE — DIETITIAN INITIAL EVALUATION ADULT - PERTINENT LABORATORY DATA
11-24    139  |  106  |  64<H>  ----------------------------<  91  4.4   |  18<L>  |  5.48<H>    Ca    8.7      24 Nov 2023 06:16  Phos  6.4     11-24  Mg     2.3     11-24    POCT Blood Glucose.: 132 mg/dL (11-24-23 @ 11:52)  A1C with Estimated Average Glucose Result: 6.1 % (11-22-23 @ 07:44)   11-24    139  |  106  |  64<H>  ----------------------------<  91  4.4   |  18<L>  |  5.48<H>    Ca    8.7      24 Nov 2023 06:16  Phos  6.4     11-24  Mg     2.3     11-24 11-24 @ 06:16: Na 139, BUN 64<H>, Cr 5.48<H>, BG 91, K+ 4.4, Phos 6.4<H>, Mg 2.3, Alk Phos --, ALT/SGPT --, AST/SGOT --, HbA1c --    POCT Blood Glucose.: 132 mg/dL (11-24-23 @ 11:52)  POCT Blood Glucose.: 121 mg/dL (11-24-23 @ 07:57)  POCT Blood Glucose.: 196 mg/dL (11-23-23 @ 21:49)  POCT Blood Glucose.: 140 mg/dL (11-23-23 @ 16:29)  POCT Blood Glucose.: 132 mg/dL (11-24-23 @ 11:52)    A1C with Estimated Average Glucose Result: 6.1 % (11-22-23 @ 07:44)

## 2023-11-24 NOTE — DIETITIAN INITIAL EVALUATION ADULT - ORAL INTAKE PTA/DIET HISTORY
family reports pt with good PO PTA  confirmed history DM; noted A1C with Estimated Average Glucose Result: 6.1 % (11-22-23 @ 07:44)  report pt was not checking glucose levels PTA  confirmed no known food allergies/ intolerances at this time

## 2023-11-24 NOTE — DIETITIAN INITIAL EVALUATION ADULT - REASON INDICATOR FOR ASSESSMENT
Consult received for HF  Information obtained from pt, electronic medical record  Chart reviewed, events noted  Consult received for HF  Information obtained from pt, pt's family at bedside, electronic medical record  Chart reviewed, events noted

## 2023-11-24 NOTE — DIETITIAN INITIAL EVALUATION ADULT - ENTER TO (CAL/KG)
- Hold oral hypoglycemics while inpatient  -Monitor blood glucose per routine  - Start on Insulin Sliding Scale  - Monitor carbohydrate intake  -HbA1c 6.8   -  on proper Diet and Exercise 30

## 2023-11-24 NOTE — DIETITIAN INITIAL EVALUATION ADULT - PERTINENT MEDS FT
MEDICATIONS  (STANDING):  albuterol    90 MICROgram(s) HFA Inhaler 2 Puff(s) Inhalation every 6 hours  albuterol/ipratropium for Nebulization 3 milliLiter(s) Nebulizer every 6 hours  amLODIPine   Tablet 10 milliGRAM(s) Oral daily  atorvastatin 80 milliGRAM(s) Oral at bedtime  budesonide  80 MICROgram(s)/formoterol 4.5 MICROgram(s) Inhaler 2 Puff(s) Inhalation every 12 hours  chlorhexidine 2% Cloths 1 Application(s) Topical daily  dextrose 5%. 1000 milliLiter(s) (50 mL/Hr) IV Continuous <Continuous>  dextrose 5%. 1000 milliLiter(s) (100 mL/Hr) IV Continuous <Continuous>  dextrose 50% Injectable 25 Gram(s) IV Push once  dextrose 50% Injectable 12.5 Gram(s) IV Push once  dextrose 50% Injectable 25 Gram(s) IV Push once  glucagon  Injectable 1 milliGRAM(s) IntraMuscular once  heparin   Injectable 5000 Unit(s) SubCutaneous every 8 hours  hydrALAZINE 25 milliGRAM(s) Oral three times a day  influenza  Vaccine (HIGH DOSE) 0.7 milliLiter(s) IntraMuscular once  insulin lispro (ADMELOG) corrective regimen sliding scale   SubCutaneous at bedtime  insulin lispro (ADMELOG) corrective regimen sliding scale   SubCutaneous three times a day before meals  montelukast 10 milliGRAM(s) Oral daily  mupirocin 2% Nasal 1 Application(s) Both Nostrils every 12 hours  sevelamer carbonate 800 milliGRAM(s) Oral every 8 hours    MEDICATIONS  (PRN):  acetaminophen     Tablet .. 650 milliGRAM(s) Oral every 6 hours PRN Temp greater or equal to 38C (100.4F), Mild Pain (1 - 3)  dextrose Oral Gel 15 Gram(s) Oral once PRN Blood Glucose LESS THAN 70 milliGRAM(s)/deciliter

## 2023-11-24 NOTE — DIETITIAN INITIAL EVALUATION ADULT - PERSON TAUGHT/METHOD
family/Heart failure and consistent CHO diet discussed; fluid restriction as per team; wt monitoring and salt restriction explained to pt and family  menu provided/verbal instruction/written material/teach back - (Patient repeats in own words)/patient instructed

## 2023-11-24 NOTE — CONSULT NOTE ADULT - SUBJECTIVE AND OBJECTIVE BOX
Cardiovascular Disease Initial Evaluation  Date of service: 11-24-23 @ 11:18    CHIEF COMPLAINT:  CHF    HISTORY OF PRESENT ILLNESS:    83 year old female with HTN, HLD, CAD, T2DM, CKD, remote hx of breast CA s/p mastectomy, and chronic hypoxemic respiratory failure of unknown etiology on home O2 who presents for lower extremity swelling and shortness of breath. History obtained from the patient who states that over the past 1-2 days the patient has had increasing breathlessness and bilateral lower extremity swelling. The patient notes that over the past 2-3 weeks, she has become progressively more fatigued and intermittently more dyspneic. She reports that her dyspnea worsened significantly over the past 2 days, though her oxygen requirements have remained consistent. The patient also endorses paroxysmal nocturnal dyspnea and orthopnea for several years. She does endorse chronic lower extremity edema, but reports acute worsening over the past few days.    Notably, the patient has had chronic hypoxemic respiratory failure for over 1 year, and had PFTs at her pulmonologist's office, but does not know if she was ever formally diagnosed with COPD or asthma. The patient has been on home oxygen via nasal canula (baseline 2L) for over a year, and has had a persistent dry cough for several years. She reports that she was in the process of seeing several specialists to figure out her diagnoses but had to stop due to COVID 19. She presently denies any chest pain, shortness of breath, fevers, chills, or urinary symptoms.       Allergies    No Known Allergies    Intolerances    	    MEDICATIONS:  amLODIPine   Tablet 10 milliGRAM(s) Oral daily  heparin   Injectable 5000 Unit(s) SubCutaneous every 8 hours  hydrALAZINE 25 milliGRAM(s) Oral three times a day      albuterol    90 MICROgram(s) HFA Inhaler 2 Puff(s) Inhalation every 6 hours  albuterol/ipratropium for Nebulization 3 milliLiter(s) Nebulizer every 6 hours  budesonide  80 MICROgram(s)/formoterol 4.5 MICROgram(s) Inhaler 2 Puff(s) Inhalation every 12 hours  montelukast 10 milliGRAM(s) Oral daily    acetaminophen     Tablet .. 650 milliGRAM(s) Oral every 6 hours PRN      atorvastatin 80 milliGRAM(s) Oral at bedtime  dextrose 50% Injectable 12.5 Gram(s) IV Push once  dextrose 50% Injectable 25 Gram(s) IV Push once  dextrose 50% Injectable 25 Gram(s) IV Push once  dextrose Oral Gel 15 Gram(s) Oral once PRN  glucagon  Injectable 1 milliGRAM(s) IntraMuscular once  insulin lispro (ADMELOG) corrective regimen sliding scale   SubCutaneous at bedtime  insulin lispro (ADMELOG) corrective regimen sliding scale   SubCutaneous three times a day before meals    chlorhexidine 2% Cloths 1 Application(s) Topical daily  dextrose 5%. 1000 milliLiter(s) IV Continuous <Continuous>  dextrose 5%. 1000 milliLiter(s) IV Continuous <Continuous>  influenza  Vaccine (HIGH DOSE) 0.7 milliLiter(s) IntraMuscular once  mupirocin 2% Nasal 1 Application(s) Both Nostrils every 12 hours      PAST MEDICAL & SURGICAL HISTORY:  CA - Breast Cancer  1996 s/p lumpectomy, chemo, and radiation      CAD (Coronary Artery Disease)      Diabetes      Dyslipidemia      HTN (Hypertension)      Asthma      H/O: Obesity      H/O: Osteoarthritis      S/P Breast Lumpectomy          FAMILY HISTORY:      SOCIAL HISTORY:    The patient is a nonsmoker       REVIEW OF SYSTEMS:  See HPI, otherwise complete 14 point review of systems negative    [X ] All others negative	  [ ] Unable to obtain    PHYSICAL EXAM:  T(C): 36.9 (11-24-23 @ 06:53), Max: 37.1 (11-23-23 @ 13:52)  HR: 91 (11-24-23 @ 06:53) (78 - 92)  BP: 135/52 (11-24-23 @ 06:53) (109/54 - 135/52)  RR: 18 (11-24-23 @ 06:53) (18 - 22)  SpO2: 92% (11-24-23 @ 06:53) (92% - 99%)  Wt(kg): --  I&O's Summary    23 Nov 2023 07:01  -  24 Nov 2023 07:00  --------------------------------------------------------  IN: 950 mL / OUT: 450 mL / NET: 500 mL        Appearance: No Acute Distress; resting comfortably  HEENT:  Normal oral mucosa, PERRL, EOMI	  Cardiovascular: Normal S1 S2, No JVD, No murmurs/rubs/gallops  Respiratory: Diffuse rales and crackles.   Gastrointestinal:  Soft, Non-tender, + BS	  Skin: No rashes, No ecchymoses, No cyanosis	  Neurologic: Non-focal; no weakness  Extremities: 1+ LE edema in LE bilaterally  Vascular: Peripheral pulses palpable 2+ bilaterally  Psychiatry: A & O x 3, Mood & affect appropriate    Laboratory Data:	 	    CBC Full  -  ( 24 Nov 2023 06:14 )  WBC Count : 5.99 K/uL  Hemoglobin : 7.6 g/dL  Hematocrit : 23.8 %  Platelet Count - Automated : 216 K/uL  Mean Cell Volume : 93.0 fl  Mean Cell Hemoglobin : 29.7 pg  Mean Cell Hemoglobin Concentration : 31.9 gm/dL  Auto Neutrophil # : x  Auto Lymphocyte # : x  Auto Monocyte # : x  Auto Eosinophil # : x  Auto Basophil # : x  Auto Neutrophil % : x  Auto Lymphocyte % : x  Auto Monocyte % : x  Auto Eosinophil % : x  Auto Basophil % : x    11-24    139  |  106  |  64<H>  ----------------------------<  91  4.4   |  18<L>  |  5.48<H>  11-23    139  |  108  |  58<H>  ----------------------------<  98  4.7   |  17<L>  |  4.65<H>    Ca    8.7      24 Nov 2023 06:16  Ca    8.7      23 Nov 2023 06:25  Phos  6.4     11-24  Phos  5.8     11-23  Mg     2.3     11-24  Mg     2.3     11-23        proBNP:   Lipid Profile:   HgA1c:   TSH:       CARDIAC MARKERS:            Interpretation of Telemetry: Sinus   ECG:  Sinus   RADIOLOGY:  OTHER: 	    PREVIOUS DIAGNOSTIC TESTING:    [x ] Echocardiogram:        1. Left ventricular cavity is normal. Left ventricular wall thickness is normal. Left ventricular systolic function is normal with an ejection fraction of 71 % by Gonsales's method of disks.   2. There is moderate (grade 2) left ventricular diastolic dysfunction, with elevated filling pressure.   3. Moderately enlarged right ventricular cavity size, wall thickness, and systolic function. Tricuspid annular plane systolic excursion (TAPSE) is 3.0 cm (normal >=1.7 cm).   4. The left atrium is severely dilated.   5. The right atrium is severely dilated in size.   6. No pericardial effusion seen.   7. Left ventricular global longitudinal strain is -18.3 % which is normal (< -18%). Images were acquired on a [employer] ultrasound system and processed using Stylyt strain analysis software with a heart rate of 93 bpm and a blood pressure of 177/75 mmHg.   8. There is increased LV mass and concentric hypertrophy.    [ ] Catheterization:  [ ] Stress Test:  	    Assessment: 83 year old female with HTN, HLD, CAD, T2DM, CKD, remote hx of breast CA s/p mastectomy, and chronic hypoxemic respiratory failure of unknown etiology on home O2 who presents for lower extremity swelling and shortness of breath.    Plan of Care:      #Acute diastolic heart failure  - Pt with LE edema and pulmonary congestion  - Agree with IV diuresis  - Echo shows normal LV systolic function with Bi-atrial enlargement, G2DD and LVH  - EKG shows sinus rhythm (not atrial flutter)    #CKD  - Renal following    #HLD  - Statin therapy    #HTN  - BP acceptable    #ACP (advance care planning)-  Advanced care planning was discussed with the patient.  Risks, benefits and alternatives of medical treatment and procedures were discussed in detail and all questions were answered. 30 additional minutes spent addressing advance care plans.      72 minutes spent on total encounter; more than 50% of the visit was spent counseling and/or coordinating care by the attending physician.   	  Ramiro Guerra DO [facility]  Cardiovascular Diseases  [phone]

## 2023-11-24 NOTE — PROGRESS NOTE ADULT - SUBJECTIVE AND OBJECTIVE BOX
PROGRESS NOTE:   Authored by Dr. Luis Perla MD (PGY-[id number]).     Patient is a 83y old  Female who presents with a chief complaint of LE swelling, dyspnea (23 Nov 2023 13:17)      SUBJECTIVE / OVERNIGHT EVENTS:  No acute events overnight. She has no acute complaints this morning.     MEDICATIONS  (STANDING):  albuterol    90 MICROgram(s) HFA Inhaler 2 Puff(s) Inhalation every 6 hours  albuterol/ipratropium for Nebulization 3 milliLiter(s) Nebulizer every 6 hours  amLODIPine   Tablet 10 milliGRAM(s) Oral daily  atorvastatin 80 milliGRAM(s) Oral at bedtime  budesonide  80 MICROgram(s)/formoterol 4.5 MICROgram(s) Inhaler 2 Puff(s) Inhalation every 12 hours  chlorhexidine 2% Cloths 1 Application(s) Topical daily  dextrose 5%. 1000 milliLiter(s) (50 mL/Hr) IV Continuous <Continuous>  dextrose 5%. 1000 milliLiter(s) (100 mL/Hr) IV Continuous <Continuous>  dextrose 50% Injectable 12.5 Gram(s) IV Push once  dextrose 50% Injectable 25 Gram(s) IV Push once  dextrose 50% Injectable 25 Gram(s) IV Push once  furosemide   Injectable 40 milliGRAM(s) IV Push two times a day  glucagon  Injectable 1 milliGRAM(s) IntraMuscular once  heparin   Injectable 5000 Unit(s) SubCutaneous every 8 hours  hydrALAZINE 25 milliGRAM(s) Oral three times a day  influenza  Vaccine (HIGH DOSE) 0.7 milliLiter(s) IntraMuscular once  insulin lispro (ADMELOG) corrective regimen sliding scale   SubCutaneous at bedtime  insulin lispro (ADMELOG) corrective regimen sliding scale   SubCutaneous three times a day before meals  montelukast 10 milliGRAM(s) Oral daily  mupirocin 2% Nasal 1 Application(s) Both Nostrils every 12 hours  sevelamer carbonate 800 milliGRAM(s) Oral every 8 hours    MEDICATIONS  (PRN):  acetaminophen     Tablet .. 650 milliGRAM(s) Oral every 6 hours PRN Temp greater or equal to 38C (100.4F), Mild Pain (1 - 3)  dextrose Oral Gel 15 Gram(s) Oral once PRN Blood Glucose LESS THAN 70 milliGRAM(s)/deciliter      CAPILLARY BLOOD GLUCOSE      POCT Blood Glucose.: 121 mg/dL (24 Nov 2023 07:57)  POCT Blood Glucose.: 196 mg/dL (23 Nov 2023 21:49)  POCT Blood Glucose.: 140 mg/dL (23 Nov 2023 16:29)  POCT Blood Glucose.: 167 mg/dL (23 Nov 2023 12:02)    I&O's Summary    23 Nov 2023 07:01  -  24 Nov 2023 07:00  --------------------------------------------------------  IN: 950 mL / OUT: 450 mL / NET: 500 mL        PHYSICAL EXAM:  Vital Signs Last 24 Hrs  T(C): 36.9 (24 Nov 2023 06:53), Max: 37.1 (23 Nov 2023 13:52)  T(F): 98.5 (24 Nov 2023 06:53), Max: 98.7 (23 Nov 2023 13:52)  HR: 91 (24 Nov 2023 06:53) (78 - 92)  BP: 135/52 (24 Nov 2023 06:53) (109/54 - 135/52)  BP(mean): --  RR: 18 (24 Nov 2023 06:53) (18 - 22)  SpO2: 92% (24 Nov 2023 06:53) (92% - 99%)    Parameters below as of 24 Nov 2023 06:53  Patient On (Oxygen Delivery Method): nasal cannula  O2 Flow (L/min): 3      CONSTITUTIONAL: NAD, well-developed  HEET: MMM, EOMI, PERRLA  NECK: supple  RESPIRATORY: crackles bilateally   CARDIOVASCULAR: Regular rate and rhythm, normal S1 and S2, no murmur/rub/gallop; No lower extremity edema; Peripheral pulses are 2+ bilaterally  ABDOMEN: Nontender to palpation, normoactive bowel sounds, no rebound/guarding; No hepatosplenomegaly  MUSCULOSKELETAL: no clubbing or cyanosis of digits; no joint swelling or tenderness to palpation  PSYCH: A+O to person, place, and time; affect appropriate  SKIN: No rash    LABS:                        7.6    5.99  )-----------( 216      ( 24 Nov 2023 06:14 )             23.8     11-24    139  |  106  |  64<H>  ----------------------------<  91  4.4   |  18<L>  |  5.48<H>    Ca    8.7      24 Nov 2023 06:16  Phos  6.4     11-24  Mg     2.3     11-24            Urinalysis Basic - ( 24 Nov 2023 06:16 )    Color: x / Appearance: x / SG: x / pH: x  Gluc: 91 mg/dL / Ketone: x  / Bili: x / Urobili: x   Blood: x / Protein: x / Nitrite: x   Leuk Esterase: x / RBC: x / WBC x   Sq Epi: x / Non Sq Epi: x / Bacteria: x          Tele Reviewed:    RADIOLOGY & ADDITIONAL TESTS:  Results Reviewed:   Imaging Personally Reviewed:  Electrocardiogram Personally Reviewed:

## 2023-11-24 NOTE — PROGRESS NOTE ADULT - SUBJECTIVE AND OBJECTIVE BOX
[facility] Division of Kidney Diseases & Hypertension  FOLLOW UP NOTE  [phone]--------------------------------------------------------------------------------  Chief Complaint:Shortness of breath    24 hour events/subjective:  No acute overnight events. no new complaints.   Pt was seen at the bedside with the pt's  at bedside. He showed the Pt's lab results from the May12, 2023.         PAST HISTORY  --------------------------------------------------------------------------------  No significant changes to PMH, PSH, FHx, SHx, unless otherwise noted    ALLERGIES & MEDICATIONS  --------------------------------------------------------------------------------  Allergies    No Known Allergies    Intolerances      Standing Inpatient Medications  albuterol    90 MICROgram(s) HFA Inhaler 2 Puff(s) Inhalation every 6 hours  albuterol/ipratropium for Nebulization 3 milliLiter(s) Nebulizer every 6 hours  amLODIPine   Tablet 10 milliGRAM(s) Oral daily  atorvastatin 80 milliGRAM(s) Oral at bedtime  budesonide  80 MICROgram(s)/formoterol 4.5 MICROgram(s) Inhaler 2 Puff(s) Inhalation every 12 hours  chlorhexidine 2% Cloths 1 Application(s) Topical daily  dextrose 5%. 1000 milliLiter(s) IV Continuous <Continuous>  dextrose 5%. 1000 milliLiter(s) IV Continuous <Continuous>  dextrose 50% Injectable 25 Gram(s) IV Push once  dextrose 50% Injectable 12.5 Gram(s) IV Push once  dextrose 50% Injectable 25 Gram(s) IV Push once  glucagon  Injectable 1 milliGRAM(s) IntraMuscular once  heparin   Injectable 5000 Unit(s) SubCutaneous every 8 hours  hydrALAZINE 25 milliGRAM(s) Oral three times a day  influenza  Vaccine (HIGH DOSE) 0.7 milliLiter(s) IntraMuscular once  insulin lispro (ADMELOG) corrective regimen sliding scale   SubCutaneous at bedtime  insulin lispro (ADMELOG) corrective regimen sliding scale   SubCutaneous three times a day before meals  montelukast 10 milliGRAM(s) Oral daily  mupirocin 2% Nasal 1 Application(s) Both Nostrils every 12 hours  sevelamer carbonate 800 milliGRAM(s) Oral every 8 hours    PRN Inpatient Medications  acetaminophen     Tablet .. 650 milliGRAM(s) Oral every 6 hours PRN  dextrose Oral Gel 15 Gram(s) Oral once PRN      REVIEW OF SYSTEMS  --------------------------------------------------------------------------------  Gen: No  fevers/chills  Skin: No rashes  Head/Eyes/Ears/Mouth: No headache; Normal hearing; Normal vision w/o blurriness  Respiratory: No dyspnea, cough, wheezing, hemoptysis - She is on Supplemental oxygen ( Home O2)  CV: No chest pain, PND, orthopnea  GI: No abdominal pain, diarrhea, constipation, nausea, vomiting  : No increased frequency, dysuria, hematuria, nocturia  MSK: No joint pain/swelling; no back pain; no edema  Neuro: No dizziness/lightheadedness, weakness, seizures, numbness, tingling        VITALS/PHYSICAL EXAM  --------------------------------------------------------------------------------  T(C): 36.3 (11-24-23 @ 11:55), Max: 36.9 (11-23-23 @ 20:49)  HR: 84 (11-24-23 @ 11:55) (78 - 92)  BP: 120/62 (11-24-23 @ 11:55) (119/57 - 135/52)  RR: 18 (11-24-23 @ 11:55) (18 - 22)  SpO2: 95% (11-24-23 @ 11:55) (92% - 97%)  Wt(kg): --  Height (cm): 160 (11-24-23 @ 08:33)  Weight (kg): 68 (11-24-23 @ 08:33)  BMI (kg/m2): 26.6 (11-24-23 @ 08:33)  BSA (m2): 1.71 (11-24-23 @ 08:33)      11-23-23 @ 07:01  -  11-24-23 @ 07:00  --------------------------------------------------------  IN: 950 mL / OUT: 450 mL / NET: 500 mL    11-24-23 @ 07:01  -  11-24-23 @ 14:10  --------------------------------------------------------  IN: 720 mL / OUT: 200 mL / NET: 520 mL      Physical Exam:  Gen: NAD, well-appearing  HEENT: PERRL, supple neck,   Pulm: Lower zone ronchi + Rt > Lt.   CV: S1S2;  Back: No spinal or CVA tenderness  Abd: +BS, soft, nontender/nondistended  : No suprapubic tenderness  Extremities: mild bilateral LE edema noted.   Neuro: No focal deficits, intact gait  Skin: Warm, without rashes  Vascular access: NA    LABS/STUDIES  --------------------------------------------------------------------------------              7.6    5.99  >-----------<  216      [11-24-23 @ 06:14]              23.8     139  |  106  |  64  ----------------------------<  91      [11-24-23 @ 06:16]  4.4   |  18  |  5.48        Ca     8.7     [11-24-23 @ 06:16]      Mg     2.3     [11-24-23 @ 06:16]      Phos  6.4     [11-24-23 @ 06:16]            Creatinine Trend:  SCr 5.48 [11-24 @ 06:16]  SCr 4.65 [11-23 @ 06:25]  SCr 4.32 [11-22 @ 07:44]  SCr 4.54 [11-21 @ 23:06]    Urinalysis - [11-24-23 @ 06:16]      Color  / Appearance  / SG  / pH       Gluc 91 / Ketone   / Bili  / Urobili        Blood  / Protein  / Leuk Est  / Nitrite       RBC  / WBC  / Hyaline  / Gran  / Sq Epi  / Non Sq Epi  / Bacteria     Urine Creatinine 36      [11-22-23 @ 07:44]  Urine Protein 203      [11-22-23 @ 07:44]  Urine Sodium 113      [11-22-23 @ 07:44]  Urine Urea Nitrogen 150      [11-22-23 @ 07:51]  Urine Potassium 15      [11-22-23 @ 07:44]  Urine Osmolality 364      [11-22-23 @ 07:44]    Lipid: chol 116, TG 79, HDL 39, LDL --      [11-23-23 @ 06:25]    HBsAb <3.0      [11-23-23 @ 06:25]  HBsAg Nonreact      [11-23-23 @ 06:25]  HCV 0.08, Nonreact      [11-23-23 @ 06:25]  HIV Nonreact      [11-23-23 @ 06:25]    C3 Complement 126      [11-23-23 @ 06:25]  C4 Complement 26      [11-23-23 @ 06:25]  Free Light Chains: kappa 13.06, lambda 7.08, ratio = 1.84      [11-23 @ 06:25]

## 2023-11-25 LAB
24R-OH-CALCIDIOL SERPL-MCNC: 22.5 NG/ML — LOW (ref 30–80)
24R-OH-CALCIDIOL SERPL-MCNC: 22.5 NG/ML — LOW (ref 30–80)
ANION GAP SERPL CALC-SCNC: 15 MMOL/L — SIGNIFICANT CHANGE UP (ref 5–17)
ANION GAP SERPL CALC-SCNC: 15 MMOL/L — SIGNIFICANT CHANGE UP (ref 5–17)
BUN SERPL-MCNC: 69 MG/DL — HIGH (ref 7–23)
BUN SERPL-MCNC: 69 MG/DL — HIGH (ref 7–23)
CALCIUM SERPL-MCNC: 8.6 MG/DL — SIGNIFICANT CHANGE UP (ref 8.4–10.5)
CALCIUM SERPL-MCNC: 8.6 MG/DL — SIGNIFICANT CHANGE UP (ref 8.4–10.5)
CALCIUM SERPL-MCNC: 8.8 MG/DL — SIGNIFICANT CHANGE UP (ref 8.4–10.5)
CALCIUM SERPL-MCNC: 8.8 MG/DL — SIGNIFICANT CHANGE UP (ref 8.4–10.5)
CHLORIDE SERPL-SCNC: 105 MMOL/L — SIGNIFICANT CHANGE UP (ref 96–108)
CHLORIDE SERPL-SCNC: 105 MMOL/L — SIGNIFICANT CHANGE UP (ref 96–108)
CO2 SERPL-SCNC: 18 MMOL/L — LOW (ref 22–31)
CO2 SERPL-SCNC: 18 MMOL/L — LOW (ref 22–31)
CREAT SERPL-MCNC: 5.96 MG/DL — HIGH (ref 0.5–1.3)
CREAT SERPL-MCNC: 5.96 MG/DL — HIGH (ref 0.5–1.3)
EGFR: 7 ML/MIN/1.73M2 — LOW
EGFR: 7 ML/MIN/1.73M2 — LOW
FERRITIN SERPL-MCNC: 81 NG/ML — SIGNIFICANT CHANGE UP (ref 13–330)
FERRITIN SERPL-MCNC: 81 NG/ML — SIGNIFICANT CHANGE UP (ref 13–330)
GBM IGG SER-ACNC: <0.2 — SIGNIFICANT CHANGE UP (ref 0–0.9)
GBM IGG SER-ACNC: <0.2 — SIGNIFICANT CHANGE UP (ref 0–0.9)
GLUCOSE BLDC GLUCOMTR-MCNC: 102 MG/DL — HIGH (ref 70–99)
GLUCOSE BLDC GLUCOMTR-MCNC: 102 MG/DL — HIGH (ref 70–99)
GLUCOSE BLDC GLUCOMTR-MCNC: 122 MG/DL — HIGH (ref 70–99)
GLUCOSE BLDC GLUCOMTR-MCNC: 122 MG/DL — HIGH (ref 70–99)
GLUCOSE BLDC GLUCOMTR-MCNC: 167 MG/DL — HIGH (ref 70–99)
GLUCOSE BLDC GLUCOMTR-MCNC: 167 MG/DL — HIGH (ref 70–99)
GLUCOSE BLDC GLUCOMTR-MCNC: 249 MG/DL — HIGH (ref 70–99)
GLUCOSE BLDC GLUCOMTR-MCNC: 249 MG/DL — HIGH (ref 70–99)
GLUCOSE SERPL-MCNC: 100 MG/DL — HIGH (ref 70–99)
GLUCOSE SERPL-MCNC: 100 MG/DL — HIGH (ref 70–99)
HCT VFR BLD CALC: 23.9 % — LOW (ref 34.5–45)
HCT VFR BLD CALC: 23.9 % — LOW (ref 34.5–45)
HGB BLD-MCNC: 7.7 G/DL — LOW (ref 11.5–15.5)
HGB BLD-MCNC: 7.7 G/DL — LOW (ref 11.5–15.5)
IRON SATN MFR SERPL: 26 % — SIGNIFICANT CHANGE UP (ref 14–50)
IRON SATN MFR SERPL: 26 % — SIGNIFICANT CHANGE UP (ref 14–50)
IRON SATN MFR SERPL: 68 UG/DL — SIGNIFICANT CHANGE UP (ref 30–160)
IRON SATN MFR SERPL: 68 UG/DL — SIGNIFICANT CHANGE UP (ref 30–160)
MAGNESIUM SERPL-MCNC: 2.3 MG/DL — SIGNIFICANT CHANGE UP (ref 1.6–2.6)
MAGNESIUM SERPL-MCNC: 2.3 MG/DL — SIGNIFICANT CHANGE UP (ref 1.6–2.6)
MCHC RBC-ENTMCNC: 29.7 PG — SIGNIFICANT CHANGE UP (ref 27–34)
MCHC RBC-ENTMCNC: 29.7 PG — SIGNIFICANT CHANGE UP (ref 27–34)
MCHC RBC-ENTMCNC: 32.2 GM/DL — SIGNIFICANT CHANGE UP (ref 32–36)
MCHC RBC-ENTMCNC: 32.2 GM/DL — SIGNIFICANT CHANGE UP (ref 32–36)
MCV RBC AUTO: 92.3 FL — SIGNIFICANT CHANGE UP (ref 80–100)
MCV RBC AUTO: 92.3 FL — SIGNIFICANT CHANGE UP (ref 80–100)
NRBC # BLD: 0 /100 WBCS — SIGNIFICANT CHANGE UP (ref 0–0)
NRBC # BLD: 0 /100 WBCS — SIGNIFICANT CHANGE UP (ref 0–0)
PHOSPHATE SERPL-MCNC: 6.2 MG/DL — HIGH (ref 2.5–4.5)
PHOSPHATE SERPL-MCNC: 6.2 MG/DL — HIGH (ref 2.5–4.5)
PLATELET # BLD AUTO: 216 K/UL — SIGNIFICANT CHANGE UP (ref 150–400)
PLATELET # BLD AUTO: 216 K/UL — SIGNIFICANT CHANGE UP (ref 150–400)
POTASSIUM SERPL-MCNC: 4.6 MMOL/L — SIGNIFICANT CHANGE UP (ref 3.5–5.3)
POTASSIUM SERPL-MCNC: 4.6 MMOL/L — SIGNIFICANT CHANGE UP (ref 3.5–5.3)
POTASSIUM SERPL-SCNC: 4.6 MMOL/L — SIGNIFICANT CHANGE UP (ref 3.5–5.3)
POTASSIUM SERPL-SCNC: 4.6 MMOL/L — SIGNIFICANT CHANGE UP (ref 3.5–5.3)
PTH-INTACT FLD-MCNC: 73 PG/ML — HIGH (ref 15–65)
PTH-INTACT FLD-MCNC: 73 PG/ML — HIGH (ref 15–65)
RBC # BLD: 2.59 M/UL — LOW (ref 3.8–5.2)
RBC # BLD: 2.59 M/UL — LOW (ref 3.8–5.2)
RBC # FLD: 15.4 % — HIGH (ref 10.3–14.5)
RBC # FLD: 15.4 % — HIGH (ref 10.3–14.5)
SODIUM SERPL-SCNC: 138 MMOL/L — SIGNIFICANT CHANGE UP (ref 135–145)
SODIUM SERPL-SCNC: 138 MMOL/L — SIGNIFICANT CHANGE UP (ref 135–145)
TIBC SERPL-MCNC: 261 UG/DL — SIGNIFICANT CHANGE UP (ref 220–430)
TIBC SERPL-MCNC: 261 UG/DL — SIGNIFICANT CHANGE UP (ref 220–430)
UIBC SERPL-MCNC: 193 UG/DL — SIGNIFICANT CHANGE UP (ref 110–370)
UIBC SERPL-MCNC: 193 UG/DL — SIGNIFICANT CHANGE UP (ref 110–370)
VIT D25+D1,25 OH+D1,25 PNL SERPL-MCNC: 10 PG/ML — LOW (ref 19.9–79.3)
VIT D25+D1,25 OH+D1,25 PNL SERPL-MCNC: 10 PG/ML — LOW (ref 19.9–79.3)
WBC # BLD: 6.41 K/UL — SIGNIFICANT CHANGE UP (ref 3.8–10.5)
WBC # BLD: 6.41 K/UL — SIGNIFICANT CHANGE UP (ref 3.8–10.5)
WBC # FLD AUTO: 6.41 K/UL — SIGNIFICANT CHANGE UP (ref 3.8–10.5)
WBC # FLD AUTO: 6.41 K/UL — SIGNIFICANT CHANGE UP (ref 3.8–10.5)

## 2023-11-25 PROCEDURE — 99233 SBSQ HOSP IP/OBS HIGH 50: CPT | Mod: GC

## 2023-11-25 RX ORDER — FUROSEMIDE 40 MG
40 TABLET ORAL DAILY
Refills: 0 | Status: DISCONTINUED | OUTPATIENT
Start: 2023-11-25 | End: 2023-11-26

## 2023-11-25 RX ADMIN — MUPIROCIN 1 APPLICATION(S): 20 OINTMENT TOPICAL at 17:11

## 2023-11-25 RX ADMIN — BUDESONIDE AND FORMOTEROL FUMARATE DIHYDRATE 2 PUFF(S): 160; 4.5 AEROSOL RESPIRATORY (INHALATION) at 17:11

## 2023-11-25 RX ADMIN — Medication 40 MILLIGRAM(S): at 13:12

## 2023-11-25 RX ADMIN — Medication 3 MILLILITER(S): at 11:52

## 2023-11-25 RX ADMIN — Medication 3 MILLILITER(S): at 17:10

## 2023-11-25 RX ADMIN — HEPARIN SODIUM 5000 UNIT(S): 5000 INJECTION INTRAVENOUS; SUBCUTANEOUS at 05:33

## 2023-11-25 RX ADMIN — Medication 1: at 16:55

## 2023-11-25 RX ADMIN — HEPARIN SODIUM 5000 UNIT(S): 5000 INJECTION INTRAVENOUS; SUBCUTANEOUS at 22:11

## 2023-11-25 RX ADMIN — SEVELAMER CARBONATE 800 MILLIGRAM(S): 2400 POWDER, FOR SUSPENSION ORAL at 05:34

## 2023-11-25 RX ADMIN — MUPIROCIN 1 APPLICATION(S): 20 OINTMENT TOPICAL at 05:34

## 2023-11-25 RX ADMIN — AMLODIPINE BESYLATE 10 MILLIGRAM(S): 2.5 TABLET ORAL at 05:34

## 2023-11-25 RX ADMIN — MONTELUKAST 10 MILLIGRAM(S): 4 TABLET, CHEWABLE ORAL at 11:52

## 2023-11-25 RX ADMIN — BUDESONIDE AND FORMOTEROL FUMARATE DIHYDRATE 2 PUFF(S): 160; 4.5 AEROSOL RESPIRATORY (INHALATION) at 05:34

## 2023-11-25 RX ADMIN — Medication 3 MILLILITER(S): at 06:46

## 2023-11-25 RX ADMIN — Medication 25 MILLIGRAM(S): at 22:11

## 2023-11-25 RX ADMIN — CHLORHEXIDINE GLUCONATE 1 APPLICATION(S): 213 SOLUTION TOPICAL at 11:53

## 2023-11-25 RX ADMIN — SEVELAMER CARBONATE 800 MILLIGRAM(S): 2400 POWDER, FOR SUSPENSION ORAL at 22:11

## 2023-11-25 RX ADMIN — HEPARIN SODIUM 5000 UNIT(S): 5000 INJECTION INTRAVENOUS; SUBCUTANEOUS at 13:13

## 2023-11-25 RX ADMIN — SEVELAMER CARBONATE 800 MILLIGRAM(S): 2400 POWDER, FOR SUSPENSION ORAL at 13:12

## 2023-11-25 RX ADMIN — ATORVASTATIN CALCIUM 80 MILLIGRAM(S): 80 TABLET, FILM COATED ORAL at 22:11

## 2023-11-25 RX ADMIN — Medication 40 MILLIGRAM(S): at 05:33

## 2023-11-25 RX ADMIN — Medication 25 MILLIGRAM(S): at 13:12

## 2023-11-25 RX ADMIN — Medication 25 MILLIGRAM(S): at 05:35

## 2023-11-25 NOTE — PROGRESS NOTE ADULT - SUBJECTIVE AND OBJECTIVE BOX
Patient is a 83y old  Female who presents with a chief complaint of LE swelling, dyspnea (24 Nov 2023 14:10)      SUBJECTIVE / OVERNIGHT EVENTS: Patient seen and examined at bedside. Patient without acute complaints this AM. Denies any chest pain, shortness of breath, or abdominal pain.    MEDICATIONS  (STANDING):  albuterol    90 MICROgram(s) HFA Inhaler 2 Puff(s) Inhalation every 6 hours  albuterol/ipratropium for Nebulization 3 milliLiter(s) Nebulizer every 6 hours  amLODIPine   Tablet 10 milliGRAM(s) Oral daily  atorvastatin 80 milliGRAM(s) Oral at bedtime  budesonide  80 MICROgram(s)/formoterol 4.5 MICROgram(s) Inhaler 2 Puff(s) Inhalation every 12 hours  chlorhexidine 2% Cloths 1 Application(s) Topical daily  dextrose 5%. 1000 milliLiter(s) (100 mL/Hr) IV Continuous <Continuous>  dextrose 5%. 1000 milliLiter(s) (50 mL/Hr) IV Continuous <Continuous>  dextrose 50% Injectable 25 Gram(s) IV Push once  dextrose 50% Injectable 12.5 Gram(s) IV Push once  dextrose 50% Injectable 25 Gram(s) IV Push once  furosemide   Injectable 40 milliGRAM(s) IV Push two times a day  glucagon  Injectable 1 milliGRAM(s) IntraMuscular once  heparin   Injectable 5000 Unit(s) SubCutaneous every 8 hours  hydrALAZINE 25 milliGRAM(s) Oral three times a day  influenza  Vaccine (HIGH DOSE) 0.7 milliLiter(s) IntraMuscular once  insulin lispro (ADMELOG) corrective regimen sliding scale   SubCutaneous at bedtime  insulin lispro (ADMELOG) corrective regimen sliding scale   SubCutaneous three times a day before meals  montelukast 10 milliGRAM(s) Oral daily  mupirocin 2% Nasal 1 Application(s) Both Nostrils every 12 hours  sevelamer carbonate 800 milliGRAM(s) Oral every 8 hours    MEDICATIONS  (PRN):  acetaminophen     Tablet .. 650 milliGRAM(s) Oral every 6 hours PRN Temp greater or equal to 38C (100.4F), Mild Pain (1 - 3)  dextrose Oral Gel 15 Gram(s) Oral once PRN Blood Glucose LESS THAN 70 milliGRAM(s)/deciliter      Vital Signs Last 24 Hrs  T(C): 36.8 (25 Nov 2023 04:38), Max: 36.8 (25 Nov 2023 04:38)  T(F): 98.3 (25 Nov 2023 04:38), Max: 98.3 (25 Nov 2023 04:38)  HR: 95 (25 Nov 2023 04:38) (84 - 95)  BP: 132/57 (25 Nov 2023 04:38) (119/61 - 132/63)  BP(mean): --  RR: 18 (25 Nov 2023 04:38) (18 - 18)  SpO2: 97% (25 Nov 2023 04:38) (95% - 98%)    Parameters below as of 25 Nov 2023 04:38  Patient On (Oxygen Delivery Method): nasal cannula      CAPILLARY BLOOD GLUCOSE      POCT Blood Glucose.: 127 mg/dL (24 Nov 2023 21:23)  POCT Blood Glucose.: 134 mg/dL (24 Nov 2023 16:14)  POCT Blood Glucose.: 132 mg/dL (24 Nov 2023 11:52)  POCT Blood Glucose.: 121 mg/dL (24 Nov 2023 07:57)    I&O's Summary    24 Nov 2023 07:01  -  25 Nov 2023 07:00  --------------------------------------------------------  IN: 720 mL / OUT: 550 mL / NET: 170 mL        PHYSICAL EXAM:  GENERAL APPEARANCE: Well developed, NAD  HEENT:  PERRL, EOMI. hearing grossly intact.  CARDIAC: Normal S1 and S2. no mrg. RRR  LUNGS: _____________  ABDOMEN: Soft , NTND, bowel sounds normal. No guarding or rebound.   MUSCULOSKELETAL: ROM intact.    EXTREMITIES: No edema. Peripheral pulses intact.   NEUROLOGICAL: Non focal. Strength and sensation symmetric and intact throughout.   SKIN: Warm and dry , Well perfused  PSYCHIATRIC: AOx      LABS:                        7.7    6.41  )-----------( 216      ( 25 Nov 2023 06:14 )             23.9     11-25    138  |  105  |  69<H>  ----------------------------<  100<H>  4.6   |  18<L>  |  5.96<H>    Ca    8.8      25 Nov 2023 06:14  Phos  6.2     11-25  Mg     2.3     11-25            Urinalysis Basic - ( 25 Nov 2023 06:14 )    Color: x / Appearance: x / SG: x / pH: x  Gluc: 100 mg/dL / Ketone: x  / Bili: x / Urobili: x   Blood: x / Protein: x / Nitrite: x   Leuk Esterase: x / RBC: x / WBC x   Sq Epi: x / Non Sq Epi: x / Bacteria: x        RADIOLOGY & ADDITIONAL TESTS:    Imaging Personally Reviewed:    Consultant(s) Notes Reviewed:      Care Discussed with Consultants/Other Providers:    Nafsia Arteaga, PGY-3; [facility] Pager: [phone]; [facility] Pager: [phone]   Patient is a 83y old  Female who presents with a chief complaint of LE swelling, dyspnea (24 Nov 2023 14:10)      SUBJECTIVE / OVERNIGHT EVENTS: Patient seen and examined at bedside. Patient with  at bedside. Patient endorses shortness of breath, she was able to participate with PT the day before. She denies chest pain, abdominal pain, nausea or vomitting.     MEDICATIONS  (STANDING):  albuterol    90 MICROgram(s) HFA Inhaler 2 Puff(s) Inhalation every 6 hours  albuterol/ipratropium for Nebulization 3 milliLiter(s) Nebulizer every 6 hours  amLODIPine   Tablet 10 milliGRAM(s) Oral daily  atorvastatin 80 milliGRAM(s) Oral at bedtime  budesonide  80 MICROgram(s)/formoterol 4.5 MICROgram(s) Inhaler 2 Puff(s) Inhalation every 12 hours  chlorhexidine 2% Cloths 1 Application(s) Topical daily  dextrose 5%. 1000 milliLiter(s) (100 mL/Hr) IV Continuous <Continuous>  dextrose 5%. 1000 milliLiter(s) (50 mL/Hr) IV Continuous <Continuous>  dextrose 50% Injectable 25 Gram(s) IV Push once  dextrose 50% Injectable 12.5 Gram(s) IV Push once  dextrose 50% Injectable 25 Gram(s) IV Push once  furosemide   Injectable 40 milliGRAM(s) IV Push two times a day  glucagon  Injectable 1 milliGRAM(s) IntraMuscular once  heparin   Injectable 5000 Unit(s) SubCutaneous every 8 hours  hydrALAZINE 25 milliGRAM(s) Oral three times a day  influenza  Vaccine (HIGH DOSE) 0.7 milliLiter(s) IntraMuscular once  insulin lispro (ADMELOG) corrective regimen sliding scale   SubCutaneous at bedtime  insulin lispro (ADMELOG) corrective regimen sliding scale   SubCutaneous three times a day before meals  montelukast 10 milliGRAM(s) Oral daily  mupirocin 2% Nasal 1 Application(s) Both Nostrils every 12 hours  sevelamer carbonate 800 milliGRAM(s) Oral every 8 hours    MEDICATIONS  (PRN):  acetaminophen     Tablet .. 650 milliGRAM(s) Oral every 6 hours PRN Temp greater or equal to 38C (100.4F), Mild Pain (1 - 3)  dextrose Oral Gel 15 Gram(s) Oral once PRN Blood Glucose LESS THAN 70 milliGRAM(s)/deciliter      Vital Signs Last 24 Hrs  T(C): 36.8 (25 Nov 2023 04:38), Max: 36.8 (25 Nov 2023 04:38)  T(F): 98.3 (25 Nov 2023 04:38), Max: 98.3 (25 Nov 2023 04:38)  HR: 95 (25 Nov 2023 04:38) (84 - 95)  BP: 132/57 (25 Nov 2023 04:38) (119/61 - 132/63)  BP(mean): --  RR: 18 (25 Nov 2023 04:38) (18 - 18)  SpO2: 97% (25 Nov 2023 04:38) (95% - 98%)    Parameters below as of 25 Nov 2023 04:38  Patient On (Oxygen Delivery Method): nasal cannula      CAPILLARY BLOOD GLUCOSE      POCT Blood Glucose.: 127 mg/dL (24 Nov 2023 21:23)  POCT Blood Glucose.: 134 mg/dL (24 Nov 2023 16:14)  POCT Blood Glucose.: 132 mg/dL (24 Nov 2023 11:52)  POCT Blood Glucose.: 121 mg/dL (24 Nov 2023 07:57)    I&O's Summary    24 Nov 2023 07:01  -  25 Nov 2023 07:00  --------------------------------------------------------  IN: 720 mL / OUT: 550 mL / NET: 170 mL        PHYSICAL EXAM:  GENERAL APPEARANCE: Well developed, NAD  HEENT:  PERRL, EOMI. hearing grossly intact.  CARDIAC: Normal S1 and S2. no mrg. RRR  LUNGS: Fine crackles at bases  ABDOMEN: Soft , NTND, bowel sounds normal. No guarding or rebound.   MUSCULOSKELETAL: ROM intact.    EXTREMITIES: trace edema, tree bark appearance of b/l LE   NEUROLOGICAL: Non focal. Strength and sensation symmetric and intact throughout.   SKIN: Warm and dry , Well perfused  PSYCHIATRIC: AOx3      LABS:                        7.7    6.41  )-----------( 216      ( 25 Nov 2023 06:14 )             23.9     11-25    138  |  105  |  69<H>  ----------------------------<  100<H>  4.6   |  18<L>  |  5.96<H>    Ca    8.8      25 Nov 2023 06:14  Phos  6.2     11-25  Mg     2.3     11-25            Urinalysis Basic - ( 25 Nov 2023 06:14 )    Color: x / Appearance: x / SG: x / pH: x  Gluc: 100 mg/dL / Ketone: x  / Bili: x / Urobili: x   Blood: x / Protein: x / Nitrite: x   Leuk Esterase: x / RBC: x / WBC x   Sq Epi: x / Non Sq Epi: x / Bacteria: x        RADIOLOGY & ADDITIONAL TESTS:    Imaging Personally Reviewed:    Consultant(s) Notes Reviewed:      Care Discussed with Consultants/Other Providers:    Nafisa Arteaga, PGY-3; [facility] Pager: [phone]; [facility] Pager: [phone]

## 2023-11-25 NOTE — PROGRESS NOTE ADULT - SUBJECTIVE AND OBJECTIVE BOX
Cardiovascular Disease Progress Note  Date of service: 23 @ 09:00    Overnight events: No acute events overnight.  Patient is in no distress.   Otherwise review of systems negative    Objective Findings:  T(C): 36.8 (23 @ 04:38), Max: 36.8 (23 @ 04:38)  HR: 95 (23 @ 04:38) (84 - 95)  BP: 132/57 (23 @ 04:38) (119/61 - 132/63)  RR: 18 (23 @ 04:38) (18 - 18)  SpO2: 97% (23 @ 04:38) (95% - 98%)  Wt(kg): --  Daily     Daily Weight in k.5 (2023 09:05)      Physical Exam:  Gen: NAD; Patient resting comfortably  HEENT: EOMI, Normocephalic/ atraumatic  CV: RRR, normal S1 + S2, no m/r/g  Lungs:  Normal respiratory effort; clear to auscultation bilaterally  Abd: soft, non-tender; bowel sounds present  Ext: No edema; warm and well perfused    Telemetry: Sinus     Laboratory Data:                        7.7    6.41  )-----------( 216      ( 2023 06:14 )             23.9         138  |  105  |  69<H>  ----------------------------<  100<H>  4.6   |  18<L>  |  5.96<H>    Ca    8.8      2023 06:14  Phos  6.2       Mg     2.3                     Inpatient Medications:  MEDICATIONS  (STANDING):  albuterol    90 MICROgram(s) HFA Inhaler 2 Puff(s) Inhalation every 6 hours  albuterol/ipratropium for Nebulization 3 milliLiter(s) Nebulizer every 6 hours  amLODIPine   Tablet 10 milliGRAM(s) Oral daily  atorvastatin 80 milliGRAM(s) Oral at bedtime  budesonide  80 MICROgram(s)/formoterol 4.5 MICROgram(s) Inhaler 2 Puff(s) Inhalation every 12 hours  chlorhexidine 2% Cloths 1 Application(s) Topical daily  dextrose 5%. 1000 milliLiter(s) (50 mL/Hr) IV Continuous <Continuous>  dextrose 5%. 1000 milliLiter(s) (100 mL/Hr) IV Continuous <Continuous>  dextrose 50% Injectable 12.5 Gram(s) IV Push once  dextrose 50% Injectable 25 Gram(s) IV Push once  dextrose 50% Injectable 25 Gram(s) IV Push once  furosemide   Injectable 40 milliGRAM(s) IV Push two times a day  glucagon  Injectable 1 milliGRAM(s) IntraMuscular once  heparin   Injectable 5000 Unit(s) SubCutaneous every 8 hours  hydrALAZINE 25 milliGRAM(s) Oral three times a day  influenza  Vaccine (HIGH DOSE) 0.7 milliLiter(s) IntraMuscular once  insulin lispro (ADMELOG) corrective regimen sliding scale   SubCutaneous at bedtime  insulin lispro (ADMELOG) corrective regimen sliding scale   SubCutaneous three times a day before meals  montelukast 10 milliGRAM(s) Oral daily  mupirocin 2% Nasal 1 Application(s) Both Nostrils every 12 hours  sevelamer carbonate 800 milliGRAM(s) Oral every 8 hours      Assessment:  83 year old female with HTN, HLD, CAD, T2DM, CKD, remote hx of breast CA s/p mastectomy, and chronic hypoxemic respiratory failure of unknown etiology on home O2 who presents for lower extremity swelling and shortness of breath.    Plan of Care:      #Acute diastolic heart failure  - Pt with LE edema and pulmonary congestion  - Agree with IV diuresis once no renal objection  - Monitor renal function   - Echo shows normal LV systolic function with Bi-atrial enlargement, G2DD and LVH  - EKG shows sinus rhythm (not atrial flutter)    #CKD  - Renal following    #HLD  - Statin therapy    #HTN  - BP acceptable    #ACP (advance care planning)-  Advanced care planning was discussed with the patient.  Risks, benefits and alternatives of medical treatment and procedures were discussed in detail and all questions were answered. 30 additional minutes spent addressing advance care plans.    Over 25 minutes spent on total encounter; more than 50% of the visit was spent counseling and/or coordinating care by the attending physician.      Ramiro Guerra DO [facility]  Cardiovascular Disease  [phone]

## 2023-11-25 NOTE — PROGRESS NOTE ADULT - PROBLEM SELECTOR PLAN 3
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

## 2023-11-25 NOTE — PROGRESS NOTE ADULT - ATTENDING COMMENTS
83F with chronic hypoxemic respiratory failure, CKD, T2DM, CAD, HTN, and HLD who presents for worsening dyspnea and lower extremity edema, admitted for suspected acute decompensated heart failure with CAL on likely CKD. On lasix 40 IV BID for diuresis. Unclear baseline Cr, but significantly elevated currently with nephrotic range proteinuria. TTE showing diastolic HF, diuresis decreased today 2/2 rising Cr.     #acute on chronic diastolic HF   #CAL on CKD, #nephrotic range proteinuria   #Acute on chronic respiratory failure  #HTN, HLD, DM2    - was on lasix 40 IV BID, improved LE edema and lung crackles ; given uptrending Cr ( 5.9 ) will decrease Lasix to 40 IV daily per Nephrology reccs  - continue to hold home spironolactone, telmisartan, and farxiga given CAL  - c/w hydralazine 25mg TID for afterload reduction - uptitrate as needed   - TTE w/moderate (grade 2) left ventricular diastolic dysfunction, with elevated filling pressure.  - Nephrology reccs appreciated - f/up serology work up, US renal showing parenchymal disease  - strict I/Os, daily weights   - pt with chronic hypoxemic RF of unclear etiology, ?COPD given long standing smoking history -  c/w home O2, c/w nebs q6h and symbicort; will trial a course of Prednisone 40mg po qd x 5 days

## 2023-11-25 NOTE — PROGRESS NOTE ADULT - ASSESSMENT
83 year old female with HTN, HLD, CAD, T2DM, CKD, remote hx of breast CA s/p mastectomy, and chronic hypoxemic respiratory failure of unknown etiology on home O2 who presents for lower extremity swelling and shortness of breath.     CAL on CKD.   - Has not seen a physician in the recent past. Given her history its likely to be CAL on CKD 2/2 SAUMYA vs CKD progression. She has pedal edema. US duplex Kidney - showed renal parenchymal disease. UPCR 5.6g, no RBCs, no bacteria. FeUrea borderline but more suggestive of prerenal etiology.  SCr 4.5 on admission, received IV contrast for CTA on evening of 11/21, and Cr had been stable but increased about 2 days after IV contrast exposure suggesting SAUMYA.  Serology work up ongoing- so far negative HIV, Hep B, Hep C, C3, C4, Anti GBM antibody, Anti Ds DNA, JAYLAN, ANCA, Serum free light chains, immunofixation. A1c 6.1%.   Pending PLA2R Ab.  - Now on 8L O2.. CTA chest showed b/l groundglass opacities. MRSA swab PCR pos staph aureus and pt does have a cough and wheezing but no leukocytosis or fever. Would consider treating other possible etiologies for resp failure like COPD or PNA with Duonebs +/- Prednisone +/- abx (consider checking procal)  - BNP 1300. TTE with G2DD with elevated filling pressures and severe LA dilation. Was on Lasix 40mg IV BID, but iso rising Cr, will decrease Lasix to 40mg IV daily. Please record STRICT Is+Os.   - Avoid nephrotoxic agents when possible and dose meds per eGFR    Anemia: Hgb 7.7 - 8.9 this admission. Based on iron studies, pt is not deficient. 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 Both 25-OH and 1-25-OH vitamin D levels low. Sevelamer 800 TID for now 83 year old female with HTN, HLD, CAD, T2DM, CKD, remote hx of breast CA s/p mastectomy, and chronic hypoxemic respiratory failure of unknown etiology on home O2 who presents for lower extremity swelling and shortness of breath.     CAL on CKD  - Has not seen a physician in the recent past. Given her history its likely to be CAL on CKD 2/2 SAUMYA vs CKD progression. She has pedal edema. US duplex Kidney - showed renal parenchymal disease. UPCR 5.6g, no RBCs, no bacteria. FeUrea borderline but more suggestive of prerenal etiology.  SCr 4.5 on admission, received IV contrast for CTA on evening of 11/21, and Cr had been stable but increased about 2 days after IV contrast exposure suggesting SAUMYA.  Serology work up ongoing- so far negative HIV, Hep B, Hep C, C3, C4, Anti GBM antibody, Anti Ds DNA, JAYLAN, ANCA, Serum free light chains, immunofixation. A1c 6.1%.   Pending PLA2R Ab.  - Now on 8L O2.. CTA chest showed b/l groundglass opacities. MRSA swab PCR pos staph aureus and pt does have a cough and wheezing but no leukocytosis or fever. Would consider treating other possible etiologies for resp failure like COPD or PNA with Duonebs +/- Prednisone +/- abx (consider checking procal)  - BNP 1300. TTE with G2DD with elevated filling pressures and severe LA dilation. Was on Lasix 40mg IV BID, but iso rising Cr, will decrease Lasix to 40mg IV daily. Please record STRICT Is+Os.   - Avoid nephrotoxic agents when possible and dose meds per eGFR    Anemia: Hgb 7.7 - 8.9 this admission. Based on iron studies, pt is not deficient. 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 Both 25-OH and 1-25-OH vitamin D levels low. Sevelamer 800 TID for now      Savi Deutsch, DO  Nephrology Fellow  Feel free to contact me directly on TEAMS with any questions.  (After 5pm or on weekends, please call the on-call fellow).  83 year old female with HTN, HLD, CAD, T2DM, CKD, remote hx of breast CA s/p mastectomy, and chronic hypoxemic respiratory failure of unknown etiology on home O2 who presents for lower extremity swelling and shortness of breath.     CAL on CKD  - Has not seen a physician in the recent past. Given her history its likely to be CAL on CKD 2/2 SAUMYA vs CKD progression. She has pedal edema. US duplex Kidney - showed renal parenchymal disease. UPCR 5.6g, no RBCs, no bacteria. FeUrea borderline but more suggestive of prerenal etiology.  SCr 4.5 on admission, received IV contrast for CTA on evening of 11/21, and Cr had been stable but increased about 2 days after IV contrast exposure suggesting SAUMYA.  Serology work up ongoing- so far negative HIV, Hep B, Hep C, C3, C4, Anti GBM antibody, Anti Ds DNA, JAYLAN, ANCA, Serum free light chains, immunofixation. A1c 6.1%.   Pending PLA2R Ab.  - Now on 8L O2.. CTA chest showed b/l groundglass opacities. MRSA swab PCR pos staph aureus and pt does have a cough and wheezing but no leukocytosis or fever. Would consider treating other possible etiologies for resp failure like COPD or PNA with Duonebs +/- Prednisone +/- abx (consider checking procal)  - BNP 1300. TTE with G2DD with elevated filling pressures and severe LA dilation. Was on Lasix 40mg IV BID, but iso rising Cr, will decrease Lasix to 40mg IV daily. Please record STRICT Is+Os.   - Avoid nephrotoxic agents when possible and dose meds per eGFR    Anemia: Hgb 7.7 - 8.9 this admission. Based on iron studies, would benefit from iron supplementation, can start with daily supplementation, Likely also has a component of anemia of renal disease, which can be followed up outpatient and may require ESAs.    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 Both 25-OH and 1-25-OH vitamin D levels low. Sevelamer 800 TID for now. Would also start calcitriol 25mcg daily.      Savi Deutsch, DO  Nephrology Fellow  Feel free to contact me directly on TEAMS with any questions.  (After 5pm or on weekends, please call the on-call fellow).

## 2023-11-25 NOTE — PROGRESS NOTE ADULT - SUBJECTIVE AND OBJECTIVE BOX
[facility] DIVISION OF KIDNEY DISEASES AND HYPERTENSION   FOLLOW UP NOTE    --------------------------------------------------------------------------------    SUBJECTIVE / ROS / INTERVAL EVENTS:  - Patient seen and examined at bedside  - Now on 8L O2 via NC, appears dyspneic, wheezing on exam  - UOP 550cc/24h on Lasix 40mg IV BID, although not clear whether some output had not been recorded per pt  - BUN/Cr continues to rise 69/5.96, no uremic s/s      PAST HISTORY  --------------------------------------------------------------------------------  No significant changes to PMH, PSH, FHx, SHx, unless otherwise noted    ALLERGIES & MEDICATIONS  --------------------------------------------------------------------------------  Allergies    No Known Allergies    Intolerances      Standing Inpatient Medications  albuterol    90 MICROgram(s) HFA Inhaler 2 Puff(s) Inhalation every 6 hours  albuterol/ipratropium for Nebulization 3 milliLiter(s) Nebulizer every 6 hours  amLODIPine   Tablet 10 milliGRAM(s) Oral daily  atorvastatin 80 milliGRAM(s) Oral at bedtime  budesonide  80 MICROgram(s)/formoterol 4.5 MICROgram(s) Inhaler 2 Puff(s) Inhalation every 12 hours  chlorhexidine 2% Cloths 1 Application(s) Topical daily  dextrose 5%. 1000 milliLiter(s) IV Continuous <Continuous>  dextrose 5%. 1000 milliLiter(s) IV Continuous <Continuous>  dextrose 50% Injectable 12.5 Gram(s) IV Push once  dextrose 50% Injectable 25 Gram(s) IV Push once  dextrose 50% Injectable 25 Gram(s) IV Push once  furosemide   Injectable 40 milliGRAM(s) IV Push daily  glucagon  Injectable 1 milliGRAM(s) IntraMuscular once  heparin   Injectable 5000 Unit(s) SubCutaneous every 8 hours  hydrALAZINE 25 milliGRAM(s) Oral three times a day  influenza  Vaccine (HIGH DOSE) 0.7 milliLiter(s) IntraMuscular once  insulin lispro (ADMELOG) corrective regimen sliding scale   SubCutaneous at bedtime  insulin lispro (ADMELOG) corrective regimen sliding scale   SubCutaneous three times a day before meals  montelukast 10 milliGRAM(s) Oral daily  mupirocin 2% Nasal 1 Application(s) Both Nostrils every 12 hours  predniSONE   Tablet 40 milliGRAM(s) Oral daily  sevelamer carbonate 800 milliGRAM(s) Oral every 8 hours    PRN Inpatient Medications  acetaminophen     Tablet .. 650 milliGRAM(s) Oral every 6 hours PRN  dextrose Oral Gel 15 Gram(s) Oral once PRN      VITALS  --------------------------------------------------------------------------------  T(C): 36.7 (11-25-23 @ 11:30), Max: 36.8 (11-25-23 @ 04:38)  HR: 84 (11-25-23 @ 11:30) (84 - 95)  BP: 123/58 (11-25-23 @ 11:30) (119/61 - 132/63)  RR: 18 (11-25-23 @ 11:30) (18 - 18)  SpO2: 94% (11-25-23 @ 11:30) (94% - 98%)  Wt(kg): --  Height (cm): 160 (11-24-23 @ 08:33)  Weight (kg): 68 (11-24-23 @ 08:33)  BMI (kg/m2): 26.6 (11-24-23 @ 08:33)  BSA (m2): 1.71 (11-24-23 @ 08:33)    11-24-23 @ 07:01  -  11-25-23 @ 07:00  --------------------------------------------------------  IN: 720 mL / OUT: 550 mL / NET: 170 mL    11-25-23 @ 07:01  -  11-25-23 @ 13:41  --------------------------------------------------------  IN: 240 mL / OUT: 0 mL / NET: 240 mL      PHYSICAL EXAM:  General: no acute distress  Neuro: no focal deficits, no asterixis  HEENT: NC/AT, anicteric  Pulmonary: diffuse expiratory wheezing bilaterally  Cardiovascular/Chest: +S1S2, RRR  GI/Abdomen: soft, NT/ND, +bowel sounds  Extremities: 1+ LE edema  Skin: Warm and dry    LABS/STUDIES  --------------------------------------------------------------------------------              7.7    6.41  >-----------<  216      [11-25-23 @ 06:14]              23.9     138  |  105  |  69  ----------------------------<  100      [11-25-23 @ 06:14]  4.6   |  18  |  5.96        Ca     8.8     [11-25-23 @ 06:14]      Mg     2.3     [11-25-23 @ 06:14]      Phos  6.2     [11-25-23 @ 06:14]            Creatinine Trend:  SCr 5.96 [11-25 @ 06:14]  SCr 5.48 [11-24 @ 06:16]  SCr 4.65 [11-23 @ 06:25]  SCr 4.32 [11-22 @ 07:44]  SCr 4.54 [11-21 @ 23:06]    Urinalysis - [11-25-23 @ 06:14]      Color  / Appearance  / SG  / pH       Gluc 100 / Ketone   / Bili  / Urobili        Blood  / Protein  / Leuk Est  / Nitrite       RBC  / WBC  / Hyaline  / Gran  / Sq Epi  / Non Sq Epi  / Bacteria     Urine Creatinine 36      [11-22-23 @ 07:44]  Urine Protein 203      [11-22-23 @ 07:44]  Urine Sodium 113      [11-22-23 @ 07:44]  Urine Urea Nitrogen 150      [11-22-23 @ 07:51]  Urine Potassium 15      [11-22-23 @ 07:44]  Urine Osmolality 364      [11-22-23 @ 07:44]    HBsAb <3.0      [11-23-23 @ 06:25]  HBsAg Nonreact      [11-23-23 @ 06:25]  HCV 0.08, Nonreact      [11-23-23 @ 06:25]  HIV Nonreact      [11-23-23 @ 06:25]    dsDNA <12      [11-23-23 @ 06:25]  C3 Complement 126      [11-23-23 @ 06:25]  C4 Complement 26      [11-23-23 @ 06:25]  ANCA: cANCA Negative, pANCA Negative, atypical ANCA Negative      [11-23-23 @ 06:25]  anti-GBM <0.2      [11-23-23 @ 06:25]  Free Light Chains: kappa 13.06, lambda 7.08, ratio = 1.84      [11-23 @ 06:25] [facility] DIVISION OF KIDNEY DISEASES AND HYPERTENSION   FOLLOW UP NOTE    --------------------------------------------------------------------------------    SUBJECTIVE / ROS / INTERVAL EVENTS:  - Patient seen and examined at bedside  - Now on 8L O2 via NC, appears dyspneic, wheezing on exam  - UOP 550cc/24h on Lasix 40mg IV BID, although not clear whether some output had not been recorded per pt  - BUN/Cr continues to rise 69/5.96, no uremic s/s      PAST HISTORY  --------------------------------------------------------------------------------  No significant changes to PMH, PSH, FHx, SHx, unless otherwise noted    ALLERGIES & MEDICATIONS  --------------------------------------------------------------------------------  Allergies    No Known Allergies    Intolerances      Standing Inpatient Medications  albuterol    90 MICROgram(s) HFA Inhaler 2 Puff(s) Inhalation every 6 hours  albuterol/ipratropium for Nebulization 3 milliLiter(s) Nebulizer every 6 hours  amLODIPine   Tablet 10 milliGRAM(s) Oral daily  atorvastatin 80 milliGRAM(s) Oral at bedtime  budesonide  80 MICROgram(s)/formoterol 4.5 MICROgram(s) Inhaler 2 Puff(s) Inhalation every 12 hours  chlorhexidine 2% Cloths 1 Application(s) Topical daily  dextrose 5%. 1000 milliLiter(s) IV Continuous <Continuous>  dextrose 5%. 1000 milliLiter(s) IV Continuous <Continuous>  dextrose 50% Injectable 12.5 Gram(s) IV Push once  dextrose 50% Injectable 25 Gram(s) IV Push once  dextrose 50% Injectable 25 Gram(s) IV Push once  furosemide   Injectable 40 milliGRAM(s) IV Push daily  glucagon  Injectable 1 milliGRAM(s) IntraMuscular once  heparin   Injectable 5000 Unit(s) SubCutaneous every 8 hours  hydrALAZINE 25 milliGRAM(s) Oral three times a day  influenza  Vaccine (HIGH DOSE) 0.7 milliLiter(s) IntraMuscular once  insulin lispro (ADMELOG) corrective regimen sliding scale   SubCutaneous at bedtime  insulin lispro (ADMELOG) corrective regimen sliding scale   SubCutaneous three times a day before meals  montelukast 10 milliGRAM(s) Oral daily  mupirocin 2% Nasal 1 Application(s) Both Nostrils every 12 hours  predniSONE   Tablet 40 milliGRAM(s) Oral daily  sevelamer carbonate 800 milliGRAM(s) Oral every 8 hours    PRN Inpatient Medications  acetaminophen     Tablet .. 650 milliGRAM(s) Oral every 6 hours PRN  dextrose Oral Gel 15 Gram(s) Oral once PRN      VITALS  --------------------------------------------------------------------------------  T(C): 36.7 (11-25-23 @ 11:30), Max: 36.8 (11-25-23 @ 04:38)  HR: 84 (11-25-23 @ 11:30) (84 - 95)  BP: 123/58 (11-25-23 @ 11:30) (119/61 - 132/63)  RR: 18 (11-25-23 @ 11:30) (18 - 18)  SpO2: 94% (11-25-23 @ 11:30) (94% - 98%)  Wt(kg): --  Height (cm): 160 (11-24-23 @ 08:33)  Weight (kg): 68 (11-24-23 @ 08:33)  BMI (kg/m2): 26.6 (11-24-23 @ 08:33)  BSA (m2): 1.71 (11-24-23 @ 08:33)    11-24-23 @ 07:01  -  11-25-23 @ 07:00  --------------------------------------------------------  IN: 720 mL / OUT: 550 mL / NET: 170 mL    11-25-23 @ 07:01  -  11-25-23 @ 13:41  --------------------------------------------------------  IN: 240 mL / OUT: 0 mL / NET: 240 mL      PHYSICAL EXAM:    	Gen: NAD, Atraumatic  	HEENT: Anicteric, no pallor  	Pulm: diffuse expiratory wheezing bilaterally  	CV: S1S2+, no g/r  	Abd: soft, NT/ND, +bowel sounds  	Ext: 1+ LE edema B/L (wrinkling of skin) no varicose veins  	Neuro: Awake, moving all 4 limbs. No asterexis.   	Skin: Warm and dry              Psych: normal mood and affect                  Gu: no suprapubic tenderness, no musa catheter.                  Psych: normal mood and affect                Vascular: no varicose veins, dialysis access- none.     LABS/STUDIES  --------------------------------------------------------------------------------              7.7    6.41  >-----------<  216      [11-25-23 @ 06:14]              23.9     138  |  105  |  69  ----------------------------<  100      [11-25-23 @ 06:14]  4.6   |  18  |  5.96        Ca     8.8     [11-25-23 @ 06:14]      Mg     2.3     [11-25-23 @ 06:14]      Phos  6.2     [11-25-23 @ 06:14]            Creatinine Trend:  SCr 5.96 [11-25 @ 06:14]  SCr 5.48 [11-24 @ 06:16]  SCr 4.65 [11-23 @ 06:25]  SCr 4.32 [11-22 @ 07:44]  SCr 4.54 [11-21 @ 23:06]    Urinalysis - [11-25-23 @ 06:14]      Color  / Appearance  / SG  / pH       Gluc 100 / Ketone   / Bili  / Urobili        Blood  / Protein  / Leuk Est  / Nitrite       RBC  / WBC  / Hyaline  / Gran  / Sq Epi  / Non Sq Epi  / Bacteria     Urine Creatinine 36      [11-22-23 @ 07:44]  Urine Protein 203      [11-22-23 @ 07:44]  Urine Sodium 113      [11-22-23 @ 07:44]  Urine Urea Nitrogen 150      [11-22-23 @ 07:51]  Urine Potassium 15      [11-22-23 @ 07:44]  Urine Osmolality 364      [11-22-23 @ 07:44]    HBsAb <3.0      [11-23-23 @ 06:25]  HBsAg Nonreact      [11-23-23 @ 06:25]  HCV 0.08, Nonreact      [11-23-23 @ 06:25]  HIV Nonreact      [11-23-23 @ 06:25]    dsDNA <12      [11-23-23 @ 06:25]  C3 Complement 126      [11-23-23 @ 06:25]  C4 Complement 26      [11-23-23 @ 06:25]  ANCA: cANCA Negative, pANCA Negative, atypical ANCA Negative      [11-23-23 @ 06:25]  anti-GBM <0.2      [11-23-23 @ 06:25]  Free Light Chains: kappa 13.06, lambda 7.08, ratio = 1.84      [11-23 @ 06:25]

## 2023-11-25 NOTE — PROGRESS NOTE ADULT - ATTENDING COMMENTS
# CAL on CKD vs CKD. Serum creatinine continues to rise to 5.9mg/dL. She received IV contrast (CTA chest)on 11/21/2023. 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   Given that serum creatinine has gone up to 5.9 today, we will reduce IV lasix 40mg BID to daily.   No uremic symptoms- no emergent need for dialysis. We will continue to monitor closely for dialysis needs.      # Volume overload - Leg edema improved. Since serum creatinine increased, we will reduce the dose of diuretics for a day to see if there is any improvement.     # Metabolic acidosis - secondary to CAL. We will continue to monitor for now.     # Anemia - Ferritin is < 200. Low iron stores. For po ferrous sulfate 325mg daily.     # Medication monitoring encounter: medication dose reviewed. Please dose medications to CrCl<15 .

## 2023-11-25 NOTE — PROGRESS NOTE ADULT - PROBLEM SELECTOR PLAN 1
Patient presenting for worsening LE edema and breathlessness  - also with several symptoms to suggest likely ADHF  - on exam: extremities warm and well perfused, but volume overloaded, S3 on exam  - proBNP elevated to 1300, but unknown baseline and with likely CAL on CKD  - hsTropT flat at 74, ECG without ST changes, no chest pain  - pt does not carry a diagnosis of HF, however on meds that may suggest she may have HFpEF (MRA, SGLT2i)    Plan:  > diuresis with IV lasix 40 mg bid, assess response and titrate dose   > obtain TTE--> showing EF 71%, bi-atrial enlargement, LV diastolic dysfunction, and RV enlargement  > hold home spironolactone and dapagliflozin given CAL  > strict I/Os, daily standing weights  > appreciate cards recs Patient presenting for worsening LE edema and breathlessness  - also with several symptoms to suggest likely ADHF  - on exam: extremities warm and well perfused, but volume overloaded, S3 on exam  - proBNP elevated to 1300, but unknown baseline and with likely CAL on CKD  - hsTropT flat at 74, ECG without ST changes, no chest pain  - pt does not carry a diagnosis of HF, however on meds that may suggest she may have HFpEF (MRA, SGLT2i)    Plan:  > diuresis with IV lasix 40 mg bid, assess response and titrate dose   > obtain TTE--> showing EF 71%, bi-atrial enlargement, LV diastolic dysfunction, and RV enlargement  > hold home spironolactone and dapagliflozin given CAL  > Decrease lasix to 40mg daily on 11/25 due to rising creatinine   > strict I/Os, daily standing weights  > appreciate cards recs

## 2023-11-25 NOTE — PROGRESS NOTE ADULT - PROBLEM SELECTOR PLAN 4
Patient with several year history of chronic hypoxemic respiratory failure  - requiring home O2, stable on 2L at baseline  - has not had escalating O2 requirements recently, despite feeling subjectively more dyspneic  - unclear etiology, though patient has had PFTs at her pulmologist's office last year    Plan:  > follow up TTE--> showing EF 71%, bi-atrial enlargement, LV diastolic dysfunction, and RV enlargement  > obtain records from pt's pulmonologists office: Dr. Paulino Gayle ([facility], [phone])  > continue with home montelukast and add on duonebs and symbicort   > wean O2 as tolerated  > if persistently hypoxemic and despite diuresis, consider alternative etiologies and potentially high resolution CT chest Patient with several year history of chronic hypoxemic respiratory failure  - requiring home O2, stable on 2L at baseline  - has not had escalating O2 requirements recently, despite feeling subjectively more dyspneic  - unclear etiology, though patient has had PFTs at her pulmologist's office last year    Plan:  > follow up TTE--> showing EF 71%, bi-atrial enlargement, LV diastolic dysfunction, and RV enlargement  > obtain records from pt's pulmonologists office: Dr. Paulino Gayle ([facility], [phone])  > continue with home montelukast and add on duonebs and symbicort   > wean O2 as tolerated  > 11/25 Initiated 40 mg prednisone for potential COPD exacerbation   > if persistently hypoxemic and despite diuresis, consider alternative etiologies and potentially high resolution CT chest

## 2023-11-26 ENCOUNTER — TRANSCRIPTION ENCOUNTER (OUTPATIENT)
Age: 83
End: 2023-11-26

## 2023-11-26 LAB
ANION GAP SERPL CALC-SCNC: 17 MMOL/L — SIGNIFICANT CHANGE UP (ref 5–17)
ANION GAP SERPL CALC-SCNC: 17 MMOL/L — SIGNIFICANT CHANGE UP (ref 5–17)
BUN SERPL-MCNC: 72 MG/DL — HIGH (ref 7–23)
BUN SERPL-MCNC: 72 MG/DL — HIGH (ref 7–23)
CALCIUM SERPL-MCNC: 8.8 MG/DL — SIGNIFICANT CHANGE UP (ref 8.4–10.5)
CALCIUM SERPL-MCNC: 8.8 MG/DL — SIGNIFICANT CHANGE UP (ref 8.4–10.5)
CHLORIDE SERPL-SCNC: 105 MMOL/L — SIGNIFICANT CHANGE UP (ref 96–108)
CHLORIDE SERPL-SCNC: 105 MMOL/L — SIGNIFICANT CHANGE UP (ref 96–108)
CO2 SERPL-SCNC: 17 MMOL/L — LOW (ref 22–31)
CO2 SERPL-SCNC: 17 MMOL/L — LOW (ref 22–31)
CREAT SERPL-MCNC: 6.18 MG/DL — HIGH (ref 0.5–1.3)
CREAT SERPL-MCNC: 6.18 MG/DL — HIGH (ref 0.5–1.3)
EGFR: 6 ML/MIN/1.73M2 — LOW
EGFR: 6 ML/MIN/1.73M2 — LOW
GLUCOSE BLDC GLUCOMTR-MCNC: 159 MG/DL — HIGH (ref 70–99)
GLUCOSE BLDC GLUCOMTR-MCNC: 159 MG/DL — HIGH (ref 70–99)
GLUCOSE BLDC GLUCOMTR-MCNC: 253 MG/DL — HIGH (ref 70–99)
GLUCOSE BLDC GLUCOMTR-MCNC: 253 MG/DL — HIGH (ref 70–99)
GLUCOSE BLDC GLUCOMTR-MCNC: 255 MG/DL — HIGH (ref 70–99)
GLUCOSE BLDC GLUCOMTR-MCNC: 255 MG/DL — HIGH (ref 70–99)
GLUCOSE BLDC GLUCOMTR-MCNC: 293 MG/DL — HIGH (ref 70–99)
GLUCOSE BLDC GLUCOMTR-MCNC: 293 MG/DL — HIGH (ref 70–99)
GLUCOSE SERPL-MCNC: 158 MG/DL — HIGH (ref 70–99)
GLUCOSE SERPL-MCNC: 158 MG/DL — HIGH (ref 70–99)
HCT VFR BLD CALC: 26.4 % — LOW (ref 34.5–45)
HCT VFR BLD CALC: 26.4 % — LOW (ref 34.5–45)
HGB BLD-MCNC: 8.3 G/DL — LOW (ref 11.5–15.5)
HGB BLD-MCNC: 8.3 G/DL — LOW (ref 11.5–15.5)
MAGNESIUM SERPL-MCNC: 2.4 MG/DL — SIGNIFICANT CHANGE UP (ref 1.6–2.6)
MAGNESIUM SERPL-MCNC: 2.4 MG/DL — SIGNIFICANT CHANGE UP (ref 1.6–2.6)
MCHC RBC-ENTMCNC: 29.2 PG — SIGNIFICANT CHANGE UP (ref 27–34)
MCHC RBC-ENTMCNC: 29.2 PG — SIGNIFICANT CHANGE UP (ref 27–34)
MCHC RBC-ENTMCNC: 31.4 GM/DL — LOW (ref 32–36)
MCHC RBC-ENTMCNC: 31.4 GM/DL — LOW (ref 32–36)
MCV RBC AUTO: 93 FL — SIGNIFICANT CHANGE UP (ref 80–100)
MCV RBC AUTO: 93 FL — SIGNIFICANT CHANGE UP (ref 80–100)
NRBC # BLD: 0 /100 WBCS — SIGNIFICANT CHANGE UP (ref 0–0)
NRBC # BLD: 0 /100 WBCS — SIGNIFICANT CHANGE UP (ref 0–0)
NT-PROBNP SERPL-SCNC: 3090 PG/ML — HIGH (ref 0–300)
NT-PROBNP SERPL-SCNC: 3090 PG/ML — HIGH (ref 0–300)
PHOSPHATE SERPL-MCNC: 5.3 MG/DL — HIGH (ref 2.5–4.5)
PHOSPHATE SERPL-MCNC: 5.3 MG/DL — HIGH (ref 2.5–4.5)
PLATELET # BLD AUTO: 237 K/UL — SIGNIFICANT CHANGE UP (ref 150–400)
PLATELET # BLD AUTO: 237 K/UL — SIGNIFICANT CHANGE UP (ref 150–400)
POTASSIUM SERPL-MCNC: 4.9 MMOL/L — SIGNIFICANT CHANGE UP (ref 3.5–5.3)
POTASSIUM SERPL-MCNC: 4.9 MMOL/L — SIGNIFICANT CHANGE UP (ref 3.5–5.3)
POTASSIUM SERPL-SCNC: 4.9 MMOL/L — SIGNIFICANT CHANGE UP (ref 3.5–5.3)
POTASSIUM SERPL-SCNC: 4.9 MMOL/L — SIGNIFICANT CHANGE UP (ref 3.5–5.3)
RAPID RVP RESULT: SIGNIFICANT CHANGE UP
RAPID RVP RESULT: SIGNIFICANT CHANGE UP
RBC # BLD: 2.84 M/UL — LOW (ref 3.8–5.2)
RBC # BLD: 2.84 M/UL — LOW (ref 3.8–5.2)
RBC # FLD: 15.1 % — HIGH (ref 10.3–14.5)
RBC # FLD: 15.1 % — HIGH (ref 10.3–14.5)
SARS-COV-2 RNA SPEC QL NAA+PROBE: SIGNIFICANT CHANGE UP
SARS-COV-2 RNA SPEC QL NAA+PROBE: SIGNIFICANT CHANGE UP
SODIUM SERPL-SCNC: 139 MMOL/L — SIGNIFICANT CHANGE UP (ref 135–145)
SODIUM SERPL-SCNC: 139 MMOL/L — SIGNIFICANT CHANGE UP (ref 135–145)
WBC # BLD: 6.51 K/UL — SIGNIFICANT CHANGE UP (ref 3.8–10.5)
WBC # BLD: 6.51 K/UL — SIGNIFICANT CHANGE UP (ref 3.8–10.5)
WBC # FLD AUTO: 6.51 K/UL — SIGNIFICANT CHANGE UP (ref 3.8–10.5)
WBC # FLD AUTO: 6.51 K/UL — SIGNIFICANT CHANGE UP (ref 3.8–10.5)

## 2023-11-26 PROCEDURE — 99233 SBSQ HOSP IP/OBS HIGH 50: CPT | Mod: GC

## 2023-11-26 PROCEDURE — 71045 X-RAY EXAM CHEST 1 VIEW: CPT | Mod: 26

## 2023-11-26 RX ORDER — FUROSEMIDE 40 MG
80 TABLET ORAL ONCE
Refills: 0 | Status: COMPLETED | OUTPATIENT
Start: 2023-11-26 | End: 2023-11-26

## 2023-11-26 RX ORDER — CALCITRIOL 0.5 UG/1
0.25 CAPSULE ORAL DAILY
Refills: 0 | Status: DISCONTINUED | OUTPATIENT
Start: 2023-11-26 | End: 2023-12-06

## 2023-11-26 RX ORDER — FERROUS SULFATE 325(65) MG
325 TABLET ORAL DAILY
Refills: 0 | Status: DISCONTINUED | OUTPATIENT
Start: 2023-11-26 | End: 2023-12-15

## 2023-11-26 RX ADMIN — ATORVASTATIN CALCIUM 80 MILLIGRAM(S): 80 TABLET, FILM COATED ORAL at 21:57

## 2023-11-26 RX ADMIN — Medication 1: at 21:58

## 2023-11-26 RX ADMIN — Medication 3 MILLILITER(S): at 11:34

## 2023-11-26 RX ADMIN — MUPIROCIN 1 APPLICATION(S): 20 OINTMENT TOPICAL at 06:01

## 2023-11-26 RX ADMIN — Medication 25 MILLIGRAM(S): at 13:53

## 2023-11-26 RX ADMIN — CHLORHEXIDINE GLUCONATE 1 APPLICATION(S): 213 SOLUTION TOPICAL at 11:42

## 2023-11-26 RX ADMIN — Medication 325 MILLIGRAM(S): at 11:34

## 2023-11-26 RX ADMIN — Medication 25 MILLIGRAM(S): at 06:00

## 2023-11-26 RX ADMIN — Medication 40 MILLIGRAM(S): at 06:03

## 2023-11-26 RX ADMIN — Medication 3: at 16:52

## 2023-11-26 RX ADMIN — Medication 3 MILLILITER(S): at 17:25

## 2023-11-26 RX ADMIN — Medication 80 MILLIGRAM(S): at 16:12

## 2023-11-26 RX ADMIN — Medication 1: at 08:13

## 2023-11-26 RX ADMIN — SEVELAMER CARBONATE 800 MILLIGRAM(S): 2400 POWDER, FOR SUSPENSION ORAL at 13:53

## 2023-11-26 RX ADMIN — Medication 25 MILLIGRAM(S): at 21:57

## 2023-11-26 RX ADMIN — MONTELUKAST 10 MILLIGRAM(S): 4 TABLET, CHEWABLE ORAL at 11:34

## 2023-11-26 RX ADMIN — Medication 3 MILLILITER(S): at 23:18

## 2023-11-26 RX ADMIN — Medication 40 MILLIGRAM(S): at 06:01

## 2023-11-26 RX ADMIN — HEPARIN SODIUM 5000 UNIT(S): 5000 INJECTION INTRAVENOUS; SUBCUTANEOUS at 06:00

## 2023-11-26 RX ADMIN — MUPIROCIN 1 APPLICATION(S): 20 OINTMENT TOPICAL at 17:25

## 2023-11-26 RX ADMIN — SEVELAMER CARBONATE 800 MILLIGRAM(S): 2400 POWDER, FOR SUSPENSION ORAL at 06:01

## 2023-11-26 RX ADMIN — SEVELAMER CARBONATE 800 MILLIGRAM(S): 2400 POWDER, FOR SUSPENSION ORAL at 21:57

## 2023-11-26 RX ADMIN — AMLODIPINE BESYLATE 10 MILLIGRAM(S): 2.5 TABLET ORAL at 06:01

## 2023-11-26 RX ADMIN — Medication 3: at 12:15

## 2023-11-26 RX ADMIN — HEPARIN SODIUM 5000 UNIT(S): 5000 INJECTION INTRAVENOUS; SUBCUTANEOUS at 13:53

## 2023-11-26 RX ADMIN — BUDESONIDE AND FORMOTEROL FUMARATE DIHYDRATE 2 PUFF(S): 160; 4.5 AEROSOL RESPIRATORY (INHALATION) at 17:25

## 2023-11-26 RX ADMIN — HEPARIN SODIUM 5000 UNIT(S): 5000 INJECTION INTRAVENOUS; SUBCUTANEOUS at 21:58

## 2023-11-26 RX ADMIN — Medication 3 MILLILITER(S): at 00:51

## 2023-11-26 RX ADMIN — Medication 3 MILLILITER(S): at 06:00

## 2023-11-26 RX ADMIN — CALCITRIOL 0.25 MICROGRAM(S): 0.5 CAPSULE ORAL at 13:53

## 2023-11-26 RX ADMIN — BUDESONIDE AND FORMOTEROL FUMARATE DIHYDRATE 2 PUFF(S): 160; 4.5 AEROSOL RESPIRATORY (INHALATION) at 06:00

## 2023-11-26 NOTE — DISCHARGE NOTE PROVIDER - CARE PROVIDER_API CALL
Internal, Medicine  [address], [address]  [place] [address]  Phone: [phone]  Fax: (   )    -  Follow Up Time:    Internal, Medicine  [address], [address]  [place] [address]  Phone: [phone]  Fax: (   )    -  Follow Up Time:    Internal, Medicine  [address], [address]  [place] [address]  Phone: [phone]  Fax: (   )    -  Follow Up Time:    Internal, Medicine  [address], [address]  [place] [address]  Phone: [phone]  Fax: (   )    -  Follow Up Time: 1 week   Internal, Medicine  [address] [facility] [address] [facility], [address]  [place] [address]  Phone: [phone]  Fax: (   )    -  Follow Up Time: 1 week   Internal, Medicine  [address] [facility] [address] [facility], [address]  [place] [address]  Phone: [phone]  Fax: (   )    -  Follow Up Time: 1 week

## 2023-11-26 NOTE — DISCHARGE NOTE PROVIDER - NSDCFUADDINST_GEN_ALL_CORE_FT
Prior to AV fistula placement on 12/19/23 - please ensure you go to hemodialysis on Monday 12/18. On the day of surgery, present to [facility] outpatient surgery on Tuesday 12/19. Check in at the . Prior to AV fistula placement on 12/19/23 - please ensure you go to hemodialysis on Monday 12/18. On the day of surgery, present to [facility] outpatient surgery on Tuesday 12/19. Check in at the . Prior to AV fistula placement on 12/19/23 - please ensure you go to hemodialysis on Monday 12/18. On the day of surgery, present to [facility] outpatient surgery on Tuesday 12/19. Check in at the .

## 2023-11-26 NOTE — PROGRESS NOTE ADULT - PROBLEM SELECTOR PLAN 3
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

## 2023-11-26 NOTE — DISCHARGE NOTE PROVIDER - NSDCMRMEDTOKEN_GEN_ALL_CORE_FT
amLODIPine 10 mg oral tablet: 1 tab(s) orally once a day  Farxiga 5 mg oral tablet: 1 tab(s) orally once a day  montelukast 10 mg oral tablet: 1 tab(s) orally once a day  Onglyza 5 mg oral tablet: 1 tab(s) orally once a day  pioglitazone 30 mg oral tablet: 1 tab(s) orally once a day  rosuvastatin 20 mg oral tablet: 1 tab(s) orally once a day (at bedtime)  spironolactone 25 mg oral tablet: 1 tab(s) orally once a day  telmisartan 80 mg oral tablet: 1 tab(s) orally once a day   amLODIPine 10 mg oral tablet: 1 tab(s) orally once a day  Farxiga 5 mg oral tablet: 1 tab(s) orally once a day  montelukast 10 mg oral tablet: 1 tab(s) orally once a day  Onglyza 5 mg oral tablet: 1 tab(s) orally once a day  pioglitazone 30 mg oral tablet: 1 tab(s) orally once a day  Rolling Walker: 1 Rolling walker  rosuvastatin 20 mg oral tablet: 1 tab(s) orally once a day (at bedtime)  spironolactone 25 mg oral tablet: 1 tab(s) orally once a day  telmisartan 80 mg oral tablet: 1 tab(s) orally once a day  Wheelchair: 1 Wheelchair   amLODIPine 10 mg oral tablet: 1 tab(s) orally once a day  Farxiga 5 mg oral tablet: 1 tab(s) orally once a day  ferrous sulfate 325 mg (65 mg elemental iron) oral tablet: 1 tab(s) orally once a day  montelukast 10 mg oral tablet: 1 tab(s) orally once a day  Onglyza 5 mg oral tablet: 1 tab(s) orally once a day  pioglitazone 30 mg oral tablet: 1 tab(s) orally once a day  Rolling Walker: 1 Rolling walker  rosuvastatin 20 mg oral tablet: 1 tab(s) orally once a day (at bedtime)  spironolactone 25 mg oral tablet: 1 tab(s) orally once a day  telmisartan 80 mg oral tablet: 1 tab(s) orally once a day  Wheelchair: 1 Wheelchair   amLODIPine 10 mg oral tablet: 1 tab(s) orally once a day  ferrous sulfate 325 mg (65 mg elemental iron) oral tablet: 1 tab(s) orally once a day  montelukast 10 mg oral tablet: 1 tab(s) orally once a day  Onglyza 5 mg oral tablet: 1 tab(s) orally once a day  pioglitazone 30 mg oral tablet: 1 tab(s) orally once a day  Rolling Walker: 1 Rolling walker  rosuvastatin 20 mg oral tablet: 1 tab(s) orally once a day (at bedtime)  Wheelchair: 1 Wheelchair   albuterol 90 mcg/inh inhalation aerosol: 2 puff(s) inhaled every 6 hours  amLODIPine 10 mg oral tablet: 1 tab(s) orally once a day  ferrous sulfate 325 mg (65 mg elemental iron) oral tablet: 1 tab(s) orally once a day  fluticasone 50 mcg/inh nasal spray: 1 spray(s) nasal 2 times a day  hydrALAZINE 25 mg oral tablet: 1 tab(s) orally 3 times a day  montelukast 10 mg oral tablet: 1 tab(s) orally once a day  Onglyza 5 mg oral tablet: 1 tab(s) orally once a day  pioglitazone 30 mg oral tablet: 1 tab(s) orally once a day  Rolling Walker: 1 Rolling walker  rosuvastatin 20 mg oral tablet: 1 tab(s) orally once a day (at bedtime)  Wheelchair: 1 Wheelchair   albuterol 90 mcg/inh inhalation aerosol: 2 puff(s) inhaled every 6 hours  amLODIPine 10 mg oral tablet: 1 tab(s) orally once a day  bacitracin-polymyxin B 500 units-10,000 units/g topical ointment: 1 Apply topically to affected area once a day  budesonide-formoterol 160 mcg-4.5 mcg/inh inhalation aerosol: 1 spray(s) inhaled 2 times a day  epoetin sheryl: 1 injectable 3 times a week give at dialysis, MWF  ferrous sulfate 325 mg (65 mg elemental iron) oral tablet: 1 tab(s) orally once a day  fluticasone 50 mcg/inh nasal spray: 1 spray(s) nasal 2 times a day  hydrALAZINE 25 mg oral tablet: 1 tab(s) orally 3 times a day  montelukast 10 mg oral tablet: 1 tab(s) orally once a day  Onglyza 2.5 mg oral tablet: 1 tab(s) orally once a day On HD (hemodialysis) days, give after HD (hemodialysis)  pioglitazone 30 mg oral tablet: 1 tab(s) orally once a day  Rolling Walker: 1 Rolling walker  rosuvastatin 20 mg oral tablet: 1 tab(s) orally once a day (at bedtime)  Wheelchair: 1 Wheelchair   albuterol 90 mcg/inh inhalation aerosol: 2 puff(s) inhaled every 6 hours  amLODIPine 10 mg oral tablet: 1 tab(s) orally once a day  bacitracin-polymyxin B 500 units-10,000 units/g topical ointment: 1 Apply topically to affected area once a day  budesonide-formoterol 160 mcg-4.5 mcg/inh inhalation aerosol: 1 spray(s) inhaled 2 times a day  bumetanide 2 mg oral tablet: 1 tab(s) orally Tuesday, Thursday, Saturday, Sunday Take once a day on non-dialysis days  epoetin sheryl: 1 injectable 3 times a week give at dialysis, MWF  ferrous sulfate 325 mg (65 mg elemental iron) oral tablet: 1 tab(s) orally once a day  fluticasone 50 mcg/inh nasal spray: 1 spray(s) nasal 2 times a day  hydrALAZINE 25 mg oral tablet: 1 tab(s) orally 3 times a day  montelukast 10 mg oral tablet: 1 tab(s) orally once a day  Onglyza 2.5 mg oral tablet: 1 tab(s) orally once a day On HD (hemodialysis) days, give after HD (hemodialysis)  pioglitazone 30 mg oral tablet: 1 tab(s) orally once a day  Rolling Walker: 1 Rolling walker  rosuvastatin 20 mg oral tablet: 1 tab(s) orally once a day (at bedtime)  Wheelchair: 1 Wheelchair   albuterol 90 mcg/inh inhalation aerosol: 2 puff(s) inhaled every 6 hours  amLODIPine 10 mg oral tablet: 1 tab(s) orally once a day  bacitracin-polymyxin B 500 units-10,000 units/g topical ointment: 1 Apply topically to affected area once a day  budesonide-formoterol 160 mcg-4.5 mcg/inh inhalation aerosol: 1 spray(s) inhaled 2 times a day  bumetanide 2 mg oral tablet: 1 tab(s) orally Tuesday, Thursday, Saturday, Sunday Take once a day on non-dialysis days  epoetin sheryl: 1 injectable 3 times a week give at dialysis, MWF  ferrous sulfate 325 mg (65 mg elemental iron) oral tablet: 1 tab(s) orally once a day  fluticasone 50 mcg/inh nasal spray: 1 spray(s) nasal 2 times a day  hydrALAZINE 25 mg oral tablet: 1 tab(s) orally 3 times a day  Januvia 25 mg oral tablet: 1 tab(s) orally once a day  montelukast 10 mg oral tablet: 1 tab(s) orally once a day  Onglyza 2.5 mg oral tablet: 1 tab(s) orally once a day On HD (hemodialysis) days, give after HD (hemodialysis)  pioglitazone 30 mg oral tablet: 1 tab(s) orally once a day  Rolling Walker: 1 Rolling walker  rosuvastatin 20 mg oral tablet: 1 tab(s) orally once a day (at bedtime)  Wheelchair: 1 Wheelchair   albuterol 90 mcg/inh inhalation aerosol: 2 puff(s) inhaled every 6 hours  amLODIPine 10 mg oral tablet: 1 tab(s) orally once a day  bacitracin-polymyxin B 500 units-10,000 units/g topical ointment: 1 Apply topically to affected area once a day  budesonide-formoterol 160 mcg-4.5 mcg/inh inhalation aerosol: 1 spray(s) inhaled 2 times a day  bumetanide 2 mg oral tablet: 1 tab(s) orally Tuesday, Thursday, Saturday, Sunday Take once a day on non-dialysis days  epoetin sheryl: 1 injectable 3 times a week give at dialysis, MWF  ferrous sulfate 325 mg (65 mg elemental iron) oral tablet: 1 tab(s) orally once a day  fluticasone 50 mcg/inh nasal spray: 1 spray(s) nasal 2 times a day  hydrALAZINE 25 mg oral tablet: 1 tab(s) orally 3 times a day  montelukast 10 mg oral tablet: 1 tab(s) orally once a day  Onglyza 2.5 mg oral tablet: 1 tab(s) orally once a day On HD (hemodialysis) days, give after HD (hemodialysis)  pioglitazone 30 mg oral tablet: 1 tab(s) orally once a day  Rolling Walker: 1 Rolling walker  rosuvastatin 20 mg oral tablet: 1 tab(s) orally once a day (at bedtime)  Tradjenta 5 mg oral tablet: 1 tab(s) orally once a day  Wheelchair: 1 Wheelchair

## 2023-11-26 NOTE — DISCHARGE NOTE PROVIDER - NSFOLLOWUPCLINICS_GEN_ALL_ED_FT
[facility]  Nephrology  [address]nd [place] [address]  Phone: [phone]  Fax:     Cardiology at [facility]  Cardiology  [address] [place] [address]  Phone: [phone]  Fax:      [facility]  Nephrology  [address]nd [place] [address]  Phone: [phone]  Fax:     Cardiology at [facility]  Cardiology  [address] [place] [address]  Phone: [phone]  Fax:      [facility]  Nephrology  [address]nd [place] [address]  Phone: [phone]  Fax:     Cardiology at [facility]  Cardiology  [address] [place] [address]  Phone: [phone]  Fax:      Cardiology at [facility]  Cardiology  [address] [place] [address]  Phone: [phone]  Fax:   Follow Up Time: 1 week    [facility]  Nephrology  [address] [place] [address]  Phone: [phone]  Fax:   Follow Up Time: 1 week     Cardiology at [facility]  Cardiology  [address] [place] [address]  Phone: [phone]  Fax:   Follow Up Time: 1 week    [facility]  Nephrology  [address] [place] [address]  Phone: [phone]  Fax:   Follow Up Time: 1 week     Cardiology at [facility]  Cardiology  [address] [place] [address]  Phone: [phone]  Fax:   Follow Up Time: 1 week    [facility]  Nephrology  [address], [address]nd [place] [address]  Phone: [phone]  Fax:   Follow Up Time: 1 week

## 2023-11-26 NOTE — PROGRESS NOTE ADULT - SUBJECTIVE AND OBJECTIVE BOX
[facility] DIVISION OF KIDNEY DISEASES AND HYPERTENSION   FOLLOW UP NOTE    --------------------------------------------------------------------------------    SUBJECTIVE / ROS / INTERVAL EVENTS:  - Patient seen and examined at bedside  - Pt not reliable in providing ROS as she states she forgets, but her O2 requirement decreased from 8L to 4L  - UOP not being recorded but Cr continues to rise      PAST HISTORY  --------------------------------------------------------------------------------  No significant changes to PMH, PSH, FHx, SHx, unless otherwise noted    ALLERGIES & MEDICATIONS  --------------------------------------------------------------------------------  Allergies    No Known Allergies        Standing Inpatient Medications  albuterol    90 MICROgram(s) HFA Inhaler 2 Puff(s) Inhalation every 6 hours  albuterol/ipratropium for Nebulization 3 milliLiter(s) Nebulizer every 6 hours  amLODIPine   Tablet 10 milliGRAM(s) Oral daily  atorvastatin 80 milliGRAM(s) Oral at bedtime  budesonide  80 MICROgram(s)/formoterol 4.5 MICROgram(s) Inhaler 2 Puff(s) Inhalation every 12 hours  calcitriol   Capsule 0.25 MICROGram(s) Oral daily  chlorhexidine 2% Cloths 1 Application(s) Topical daily  dextrose 5%. 1000 milliLiter(s) IV Continuous <Continuous>  dextrose 5%. 1000 milliLiter(s) IV Continuous <Continuous>  dextrose 50% Injectable 12.5 Gram(s) IV Push once  dextrose 50% Injectable 25 Gram(s) IV Push once  dextrose 50% Injectable 25 Gram(s) IV Push once  ferrous    sulfate 325 milliGRAM(s) Oral daily  furosemide   Injectable 40 milliGRAM(s) IV Push daily  glucagon  Injectable 1 milliGRAM(s) IntraMuscular once  heparin   Injectable 5000 Unit(s) SubCutaneous every 8 hours  hydrALAZINE 25 milliGRAM(s) Oral three times a day  influenza  Vaccine (HIGH DOSE) 0.7 milliLiter(s) IntraMuscular once  insulin lispro (ADMELOG) corrective regimen sliding scale   SubCutaneous at bedtime  insulin lispro (ADMELOG) corrective regimen sliding scale   SubCutaneous three times a day before meals  montelukast 10 milliGRAM(s) Oral daily  mupirocin 2% Nasal 1 Application(s) Both Nostrils every 12 hours  predniSONE   Tablet 40 milliGRAM(s) Oral daily  sevelamer carbonate 800 milliGRAM(s) Oral every 8 hours    PRN Inpatient Medications  acetaminophen     Tablet .. 650 milliGRAM(s) Oral every 6 hours PRN  dextrose Oral Gel 15 Gram(s) Oral once PRN      VITALS  --------------------------------------------------------------------------------  T(C): 36.5 (11-26-23 @ 11:22), Max: 36.8 (11-25-23 @ 20:29)  HR: 62 (11-26-23 @ 11:22) (62 - 98)  BP: 124/60 (11-26-23 @ 11:22) (118/58 - 144/56)  RR: 18 (11-26-23 @ 11:22) (18 - 20)  SpO2: 94% (11-26-23 @ 11:22) (94% - 97%)  Wt(kg): --  Height (cm): 160 (11-26-23 @ 10:12)  Weight (kg): 68 (11-26-23 @ 10:12)  BMI (kg/m2): 26.6 (11-26-23 @ 10:12)  BSA (m2): 1.71 (11-26-23 @ 10:12)    11-25-23 @ 07:01  -  11-26-23 @ 07:00  --------------------------------------------------------  IN: 300 mL / OUT: 0 mL / NET: 300 mL      PHYSICAL EXAM:  General: no acute distress  Neuro: no focal deficits  HEENT: NC/AT, anicteric, no JVD  Pulmonary: breath sounds diminished, on 4L O2  Cardiovascular/Chest: +S1S2, RRR  GI/Abdomen: soft, NT/ND, +bowel sounds  Extremities: No edema  Skin: Warm and dry    LABS/STUDIES  --------------------------------------------------------------------------------              8.3    6.51  >-----------<  237      [11-26-23 @ 06:47]              26.4     139  |  105  |  72  ----------------------------<  158      [11-26-23 @ 06:47]  4.9   |  17  |  6.18        Ca     8.8     [11-26-23 @ 06:47]      Mg     2.4     [11-26-23 @ 06:47]      Phos  5.3     [11-26-23 @ 06:47]        Creatinine Trend:  SCr 6.18 [11-26 @ 06:47]  SCr 5.96 [11-25 @ 06:14]  SCr 5.48 [11-24 @ 06:16]  SCr 4.65 [11-23 @ 06:25]  SCr 4.32 [11-22 @ 07:44]    Urinalysis - [11-26-23 @ 06:47]      Color  / Appearance  / SG  / pH       Gluc 158 / Ketone   / Bili  / Urobili        Blood  / Protein  / Leuk Est  / Nitrite       RBC  / WBC  / Hyaline  / Gran  / Sq Epi  / Non Sq Epi  / Bacteria     Urine Creatinine 36      [11-22-23 @ 07:44]  Urine Protein 203      [11-22-23 @ 07:44]  Urine Sodium 113      [11-22-23 @ 07:44]  Urine Urea Nitrogen 150      [11-22-23 @ 07:51]  Urine Potassium 15      [11-22-23 @ 07:44]  Urine Osmolality 364      [11-22-23 @ 07:44]    HBsAb <3.0      [11-23-23 @ 06:25]  HBsAg Nonreact      [11-23-23 @ 06:25]  HCV 0.08, Nonreact      [11-23-23 @ 06:25]  HIV Nonreact      [11-23-23 @ 06:25]    dsDNA <12      [11-23-23 @ 06:25]  C3 Complement 126      [11-23-23 @ 06:25]  C4 Complement 26      [11-23-23 @ 06:25]  ANCA: cANCA Negative, pANCA Negative, atypical ANCA Negative      [11-23-23 @ 06:25]  anti-GBM <0.2      [11-23-23 @ 06:25]  Free Light Chains: kappa 13.06, lambda 7.08, ratio = 1.84      [11-23 @ 06:25]

## 2023-11-26 NOTE — PROGRESS NOTE ADULT - ASSESSMENT
83 year old female with HTN, HLD, CAD, T2DM, CKD, remote hx of breast CA s/p mastectomy, and chronic hypoxemic respiratory failure of unknown etiology on home O2 who presents for lower extremity swelling and shortness of breath.     CAL on CKD  - Has not seen a physician in the recent past. Given her history its likely to be CAL on CKD 2/2 SAUMYA vs CKD progression. She has pedal edema. US duplex Kidney - showed renal parenchymal disease. UPCR 5.6g, no RBCs, no bacteria. FeUrea borderline but more suggestive of prerenal etiology.  SCr 4.5 on admission, received IV contrast for CTA on evening of 11/21, and Cr had been stable but increased about 2 days after IV contrast exposure suggesting SAUMYA.  Serology work up ongoing- so far negative HIV, Hep B, Hep C, C3, C4, Anti GBM antibody, Anti Ds DNA, JAYLAN, ANCA, Serum free light chains, immunofixation. A1c 6.1%.   Pending PLA2R Ab.  - O2 requirement: 8L->4L. CTA chest showed b/l ground-glass opacities. staph aureus swab PCR pos (not MRSA) and pt does have a cough and intermittent wheezing but no leukocytosis or fever. Would consider treating other possible etiologies for resp failure like COPD or PNA with Duonebs +/- Prednisone +/- abx (consider checking procal)  - BNP 1300 initially. TTE with G2DD with elevated filling pressures and severe LA dilation. Was on Lasix 40mg IV BID, but iso rising Cr, decreased Lasix to 40mg IV daily for today. However, repeat CXR looks unchanged with pulm vasc congestion and BNP increased to 3000. Given worsening renal function, Lasix 40mg IV was likely not a high enough dose to achieve wanted response so please discontinue and give Lasix 80mg IV x1 today. Please record STRICT Is+Os (discussed with RN). Will reassess diuretic management tomorrow.  - Avoid nephrotoxic agents when possible and dose meds per eGFR    Anemia: Hgb 7.7 - 8.9 this admission. Based on iron studies, would benefit from iron supplementation, can start with daily supplementation, Likely also has a component of anemia of renal disease, which can be followed up outpatient and may require ESAs.    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 Both 25-OH and 1-25-OH vitamin D levels low. Sevelamer 800 TID for now. Started calcitriol 25mcg daily on 11/25.      Savi Deutsch,   Nephrology Fellow  Feel free to contact me directly on TEAMS with any questions.  (After 5pm or on weekends, please call the on-call fellow).  83 year old female with HTN, HLD, CAD, T2DM, CKD, remote hx of breast CA s/p mastectomy, and chronic hypoxemic respiratory failure of unknown etiology on home O2 who presents for lower extremity swelling and shortness of breath.     CAL on CKD  - Has not seen a physician in the recent past. Given her history its likely to be CAL on CKD 2/2 SAUMYA vs CKD progression. She has pedal edema. US duplex Kidney - showed renal parenchymal disease. UPCR 5.6g, no RBCs, no bacteria. FeUrea borderline but more suggestive of prerenal etiology.  SCr 4.5 on admission, received IV contrast for CTA on evening of 11/21, and Cr had been stable but increased about 2 days after IV contrast exposure suggesting SAUMYA.  Serology work up ongoing- so far negative HIV, Hep B, Hep C, C3, C4, Anti GBM antibody, Anti Ds DNA, JAYLAN, ANCA, Serum free light chains, immunofixation. A1c 6.1%.   Pending PLA2R Ab.  - O2 requirement: 8L->4L. CTA chest showed b/l ground-glass opacities. staph aureus swab PCR pos (not MRSA) and pt does have a cough and intermittent wheezing but no leukocytosis or fever. Would consider treating other possible etiologies for resp failure like COPD or PNA with Duonebs +/- Prednisone +/- abx (consider checking procal)  - BNP 1300 initially. TTE with G2DD with elevated filling pressures and severe LA dilation. Was on Lasix 40mg IV BID, but iso rising Cr, decreased Lasix to 40mg IV daily for today. However, repeat CXR looks unchanged with pulm vasc congestion and BNP increased to 3000. Given worsening renal function (Cr 5.9-> 6.18), Lasix 40mg IV was likely not a high enough dose to achieve wanted response so please discontinue and give Lasix 80mg IV x1 today. Please record STRICT Is+Os (discussed with RN). Will reassess diuretic management tomorrow.  - Currently has no uremic s/s and no indication for RRT.  - Avoid nephrotoxic agents when possible and dose meds per eGFR    Anemia: Hgb 7.7 - 8.9 this admission. Based on iron studies, would benefit from iron supplementation, can start with daily supplementation, Likely also has a component of anemia of renal disease, which can be followed up outpatient and may require ESAs.    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 Both 25-OH and 1-25-OH vitamin D levels low. Sevelamer 800 TID for now. Started calcitriol 25mcg daily on 11/25.      Savi Deutsch DO  Nephrology Fellow  Feel free to contact me directly on TEAMS with any questions.  (After 5pm or on weekends, please call the on-call fellow).  83 year old female with HTN, HLD, CAD, T2DM, CKD, remote hx of breast CA s/p mastectomy, and chronic hypoxemic respiratory failure of unknown etiology on home O2 who presents for lower extremity swelling and shortness of breath.     CAL on CKD  - Has not seen a physician in the recent past. Given her history its likely to be CAL on CKD 2/2 SAUMYA vs CKD progression. She has pedal edema. US duplex Kidney - showed renal parenchymal disease. UPCR 5.6g, no RBCs, no bacteria. FeUrea borderline but more suggestive of prerenal etiology.  SCr 4.5 on admission, received IV contrast for CTA on evening of 11/21, and Cr had been stable but increased about 2 days after IV contrast exposure suggesting SAUMYA.  Serology work up ongoing- so far negative HIV, Hep B, Hep C, C3, C4, Anti GBM antibody, Anti Ds DNA, JAYLAN, ANCA, Serum free light chains, immunofixation. A1c 6.1%.   Pending PLA2R Ab.  - O2 requirement: 8L->4L. CTA chest showed b/l ground-glass opacities. staph aureus swab PCR pos (not MRSA) and pt does have a cough and intermittent wheezing but no leukocytosis or fever. Would consider treating other possible etiologies for resp failure like COPD or PNA with Duonebs +/- Prednisone +/- abx (consider checking procal)  - BNP 1300 initially. TTE with G2DD with elevated filling pressures and severe LA dilation. Was on Lasix 40mg IV BID, but iso rising Cr, decreased Lasix to 40mg IV daily on 11/25/23. However, repeat CXR looks unchanged with pulm vasc congestion and BNP increased to 3000. Given worsening renal function (Cr 5.9-> 6.18), Lasix 40mg IV was likely not a high enough dose to achieve wanted response so please discontinue and give Lasix 80mg IV x1 today. Please record STRICT Is+Os (discussed with RN). Will reassess diuretic management tomorrow.  - Currently has no uremic s/s and no indication for RRT.  - Avoid nephrotoxic agents when possible and dose meds per eGFR    Anemia: Hgb 7.7 - 8.9 this admission. Based on iron studies, would benefit from iron supplementation, can start with daily supplementation, Likely also has a component of anemia of renal disease, which can be followed up outpatient and may require ESAs.    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 Both 25-OH and 1-25-OH vitamin D levels low. Sevelamer 800 TID for now. Started calcitriol 25mcg daily on 11/25.      Savi Deutsch,   Nephrology Fellow  Feel free to contact me directly on TEAMS with any questions.  (After 5pm or on weekends, please call the on-call fellow).

## 2023-11-26 NOTE — DISCHARGE NOTE PROVIDER - NSDCFUADDAPPT_GEN_ALL_CORE_FT
APPTS ARE READY TO BE MADE: [ ] YES    Best Family or Patient Contact (if needed):    Additional Information about above appointments (if needed):    1: Primary Care Doctor  2: Cardiology  3: Nephrology      Other comments or requests:    APPTS ARE READY TO BE MADE: [X ] YES    Best Family or Patient Contact (if needed):    Additional Information about above appointments (if needed):    1: Primary Care Doctor  2: Cardiology  3: Nephrology      Other comments or requests:    APPTS ARE READY TO BE MADE: [X ] YES    Best Family or Patient Contact (if needed):    Additional Information about above appointments (if needed):    1: Primary Care Doctor; adjustment of diabetes medications  2: Cardiology  3: Nephrology      Other comments or requests:    APPTS ARE READY TO BE MADE: [X ] YES    Best Family or Patient Contact (if needed):    Additional Information about above appointments (if needed):    1: Primary Care Doctor; adjustment of diabetes medications  2: Cardiology  3: Nephrology      Other comments or requests:   Pt is nestor with Dr Nik Montero Heart Failure on 12/27 at 3:20pm, [address] location.  Pt is nestor with Dr. Luis Cardio on 1/16 at 10:30am, [address] location.  Pt awaiting callback from [facility] fo.r scheduling  pt is nestor with El[facility] Int Med on 1/11 at 1:30pm, [address] [facility] [address] location. APPTS ARE READY TO BE MADE: [X ] YES    Best Family or Patient Contact (if needed):    Additional Information about above appointments (if needed):    1: Primary Care Doctor; adjustment of diabetes medications  2: Cardiology  3: Nephrology      Other comments or requests:   Pt is nestor with Dr Nik Montero Heart Failure on 12/27 at 3:20pm, [address] location.  Pt is nestor with Dr. Luis Cardio on 1/16 at 10:30am, [address] location.  Pt awaiting callback from [facility] fo.r scheduling  pt is nestor with El[facility] Miller Int Med on 1/11 at 1:30pm, [address] [facility] [address] location. APPTS ARE READY TO BE MADE: [X ] YES    Best Family or Patient Contact (if needed):    Additional Information about above appointments (if needed):    1: Primary Care Doctor; adjustment of diabetes medications  2: Cardiology  3: Nephrology      Other comments or requests:   Pt is nestor with Dr Nik Montero Heart Failure on 12/27 at 3:20pm, [address] location.  Pt is nestor with Dr. Luis Cardio on 1/16 at 10:30am, [address] location.  Pt awaiting callback from [facility] fo.r scheduling  pt is nestor with El[facility] Miller Int Med on 1/11 at 1:30pm, [address] [facility] [address] location. APPTS ARE READY TO BE MADE: [X ] YES    Best Family or Patient Contact (if needed):    Additional Information about above appointments (if needed):    1: Primary Care Doctor; adjustment of diabetes medications  2: Cardiology  3: Nephrology      Other comments or requests:   Pt is nestor with Dr Nik Montero Heart Failure on 12/27 at 3:20pm, [address] location.  Pt is re-scheduled with Dr. Carol Montero Heart Failure on 12/28 at 11:40am, [phone] [place] [address] Oncology Building location.  Pt is nestor with Dr. Luis Cardio on 1/16 at 10:30am, [address] location.  Pt awaiting callback from [facility] fo.r scheduling  pt is nestor with El[facility] Miller Int Med on 1/11 at 1:30pm, [address] [facility] [place] [facility] [address] location. APPTS ARE READY TO BE MADE: [X ] YES    Best Family or Patient Contact (if needed):    Additional Information about above appointments (if needed):    1: Primary Care Doctor; adjustment of diabetes medications  2: Cardiology  3: Nephrology      Other comments or requests:   Pt is nestor with Dr Nik Montero Heart Failure on 12/27 at 3:20pm, [address] location.  Pt is re-scheduled with Dr. Carol Montero Heart Failure on 12/28 at 11:40am, [phone] [place] [address] Oncology Building location.  Pt is nestor with Dr. Luis Cardio on 1/16 at 10:30am, [address] location.  Pt awaiting callback from [facility] fo.r scheduling  pt is nestor with Elvin Miller Int Med on 1/11 at 1:30pm, [address] [facility] [address] location. APPTS ARE READY TO BE MADE: [X ] YES    Best Family or Patient Contact (if needed):    Additional Information about above appointments (if needed):    1: Primary Care Doctor; adjustment of diabetes medications  2: Cardiology  3: Nephrology      Other comments or requests:   Pt is nestor with Dr Nik Montero Heart Failure on 12/27 at 3:20pm, [address] location.  Pt is re-scheduled with Dr. Carol Montero Heart Failure on 12/28 at 11:40am, [phone] [place] [address] Oncology Building location.  Pt is nestor with Dr. Luis Cardio on 1/16 at 10:30am, [address] location.  Pt awaiting callback from [facility] fo.r scheduling  pt is nestor with El[facility] Miller Int Med on 1/11 at 1:30pm, [address] [facility] [address] location.

## 2023-11-26 NOTE — PROGRESS NOTE ADULT - SUBJECTIVE AND OBJECTIVE BOX
PROGRESS NOTE:   Authored by Dr. Luis Perla MD (PGY-1).     Patient is a 83y old  Female who presents with a chief complaint of LE swelling, dyspnea (25 Nov 2023 13:40)      SUBJECTIVE / OVERNIGHT EVENTS:  No acute events overnight. She has no acute complaints this morning.     MEDICATIONS  (STANDING):  albuterol    90 MICROgram(s) HFA Inhaler 2 Puff(s) Inhalation every 6 hours  albuterol/ipratropium for Nebulization 3 milliLiter(s) Nebulizer every 6 hours  amLODIPine   Tablet 10 milliGRAM(s) Oral daily  atorvastatin 80 milliGRAM(s) Oral at bedtime  budesonide  80 MICROgram(s)/formoterol 4.5 MICROgram(s) Inhaler 2 Puff(s) Inhalation every 12 hours  calcitriol   Capsule 0.25 MICROGram(s) Oral daily  chlorhexidine 2% Cloths 1 Application(s) Topical daily  dextrose 5%. 1000 milliLiter(s) (100 mL/Hr) IV Continuous <Continuous>  dextrose 5%. 1000 milliLiter(s) (50 mL/Hr) IV Continuous <Continuous>  dextrose 50% Injectable 12.5 Gram(s) IV Push once  dextrose 50% Injectable 25 Gram(s) IV Push once  dextrose 50% Injectable 25 Gram(s) IV Push once  ferrous    sulfate 325 milliGRAM(s) Oral daily  furosemide   Injectable 40 milliGRAM(s) IV Push daily  glucagon  Injectable 1 milliGRAM(s) IntraMuscular once  heparin   Injectable 5000 Unit(s) SubCutaneous every 8 hours  hydrALAZINE 25 milliGRAM(s) Oral three times a day  influenza  Vaccine (HIGH DOSE) 0.7 milliLiter(s) IntraMuscular once  insulin lispro (ADMELOG) corrective regimen sliding scale   SubCutaneous at bedtime  insulin lispro (ADMELOG) corrective regimen sliding scale   SubCutaneous three times a day before meals  montelukast 10 milliGRAM(s) Oral daily  mupirocin 2% Nasal 1 Application(s) Both Nostrils every 12 hours  predniSONE   Tablet 40 milliGRAM(s) Oral daily  sevelamer carbonate 800 milliGRAM(s) Oral every 8 hours    MEDICATIONS  (PRN):  acetaminophen     Tablet .. 650 milliGRAM(s) Oral every 6 hours PRN Temp greater or equal to 38C (100.4F), Mild Pain (1 - 3)  dextrose Oral Gel 15 Gram(s) Oral once PRN Blood Glucose LESS THAN 70 milliGRAM(s)/deciliter      CAPILLARY BLOOD GLUCOSE      POCT Blood Glucose.: 159 mg/dL (26 Nov 2023 08:02)  POCT Blood Glucose.: 249 mg/dL (25 Nov 2023 20:32)  POCT Blood Glucose.: 167 mg/dL (25 Nov 2023 16:32)  POCT Blood Glucose.: 102 mg/dL (25 Nov 2023 11:43)    I&O's Summary    25 Nov 2023 07:01  -  26 Nov 2023 07:00  --------------------------------------------------------  IN: 300 mL / OUT: 0 mL / NET: 300 mL        PHYSICAL EXAM:  Vital Signs Last 24 Hrs  T(C): 36.8 (26 Nov 2023 05:31), Max: 36.8 (25 Nov 2023 20:29)  T(F): 98.3 (26 Nov 2023 05:31), Max: 98.3 (26 Nov 2023 05:31)  HR: 98 (26 Nov 2023 05:31) (84 - 98)  BP: 144/56 (26 Nov 2023 05:31) (118/58 - 144/56)  BP(mean): --  RR: 20 (26 Nov 2023 05:31) (18 - 20)  SpO2: 96% (26 Nov 2023 05:31) (94% - 97%)    Parameters below as of 26 Nov 2023 05:31  Patient On (Oxygen Delivery Method): nasal cannula        CONSTITUTIONAL: NAD, well-developed  HEET: MMM, EOMI, PERRLA  NECK: supple  RESPIRATORY: crackles appreciated   CARDIOVASCULAR: Regular rate and rhythm, normal S1 and S2, no murmur/rub/gallop; No lower extremity edema; Peripheral pulses are 2+ bilaterally  ABDOMEN: Nontender to palpation, normoactive bowel sounds, no rebound/guarding; No hepatosplenomegaly  MUSCULOSKELETAL: no clubbing or cyanosis of digits; no joint swelling or tenderness to palpation  NEURO: Moving all four extremities, sensation grossly intact  PSYCH: A+O to person, place, and time; affect appropriate  SKIN: No rash    LABS:                        8.3    6.51  )-----------( 237      ( 26 Nov 2023 06:47 )             26.4     11-26    139  |  105  |  72<H>  ----------------------------<  158<H>  4.9   |  17<L>  |  6.18<H>    Ca    8.8      26 Nov 2023 06:47  Phos  5.3     11-26  Mg     2.4     11-26            Urinalysis Basic - ( 26 Nov 2023 06:47 )    Color: x / Appearance: x / SG: x / pH: x  Gluc: 158 mg/dL / Ketone: x  / Bili: x / Urobili: x   Blood: x / Protein: x / Nitrite: x   Leuk Esterase: x / RBC: x / WBC x   Sq Epi: x / Non Sq Epi: x / Bacteria: x          Tele Reviewed:    RADIOLOGY & ADDITIONAL TESTS:  Results Reviewed:   Imaging Personally Reviewed:  Electrocardiogram Personally Reviewed:

## 2023-11-26 NOTE — DISCHARGE NOTE PROVIDER - NSDCCPCAREPLAN_GEN_ALL_CORE_FT
PRINCIPAL DISCHARGE DIAGNOSIS  Diagnosis: Acute decompensated heart failure  Assessment and Plan of Treatment: You came into the hospital after experiencing worsening shortness of breath and swelling in your legs. This was likely due to an exacerbation of your heart failure. You were treated with IV water pills (lasix and bumex) to decrease the amount of fluid that had accumulated in your body. Going forward, be sure continue taking your medications and adopt a low salt diet. You should also limit the fluid you intake. Be sure to follow with your Cardiologists and Nephrologists for further management.      SECONDARY DISCHARGE DIAGNOSES  Diagnosis: Chronic kidney disease  Assessment and Plan of Treatment: You have a history of chronic kidney disease. Your creatinine was monitored daily and it is also affected by your heart failure. Be sure to follow with your Nephrologist moving forward.     PRINCIPAL DISCHARGE DIAGNOSIS  Diagnosis: Acute decompensated heart failure  Assessment and Plan of Treatment: You came into the hospital after experiencing worsening shortness of breath and swelling in your legs. This was likely due to an exacerbation of your heart failure. You were treated with IV water pills (lasix and bumex) to decrease the amount of fluid that had accumulated in your body. Going forward, be sure continue taking your medications and adopt a low salt diet. You should also limit the fluid you intake. Be sure to follow with your Cardiologists and Nephrologists for further management. You will continue to receive dialysis three times weekly.      SECONDARY DISCHARGE DIAGNOSES  Diagnosis: End stage renal disease  Assessment and Plan of Treatment: You have a new diagnosis of end stage renal disease and now will be receiving dialysis on Mondays, Wednesdays and Fridays. Please continue to follow up with all of your dialysis appointments. Additionally, please return to the hospital this Tuesday 12/19 so you can have a fistula placed for long term dialysis.     PRINCIPAL DISCHARGE DIAGNOSIS  Diagnosis: Acute decompensated heart failure  Assessment and Plan of Treatment: You came into the hospital after experiencing worsening shortness of breath and swelling in your legs. This was likely due to an exacerbation of your heart failure. You were treated with IV water pills (lasix and bumex) to decrease the amount of fluid that had accumulated in your body. Going forward, be sure continue taking your medications and adopt a low salt diet. You should also limit the fluid you intake. You had some changes to your medications: please stop taking your spironolactone and telmisartan. Please start taking Bumex 2mg only on the days that you do not have dialysis to help keep fluid off of you. Please take hydralazine 25mg three times a day. Be sure to follow with your Cardiologists and Nephrologists for further management. You will continue to receive dialysis three times weekly.      SECONDARY DISCHARGE DIAGNOSES  Diagnosis: End stage renal disease  Assessment and Plan of Treatment: You have a new diagnosis of end stage renal disease and now will be receiving dialysis on Mondays, Wednesdays and Fridays. Please continue to follow up with all of your dialysis appointments. Additionally, please return to the hospital this Tuesday 12/19 so you can have a fistula placed for long term dialysis.    Diagnosis: Type 2 diabetes mellitus  Assessment and Plan of Treatment: Some of your diabetes medications had to be changed since they cannot be used safely with your kidney function. You will no longer take Farxiga and you will now take 1/2 a tablet per day of your onglyza 5mg to make it onglyza 2.5mg per day. Please take onglyza 2.5mg every day and take it after dialysis on your dialysis days.     PRINCIPAL DISCHARGE DIAGNOSIS  Diagnosis: Acute decompensated heart failure  Assessment and Plan of Treatment: You came into the hospital after experiencing worsening shortness of breath and swelling in your legs. This was likely due to an exacerbation of your heart failure. You were treated with IV water pills (lasix and bumex) to decrease the amount of fluid that had accumulated in your body. Going forward, be sure continue taking your medications and adopt a low salt diet. You should also limit the fluid you intake. You had some changes to your medications: please stop taking your spironolactone and telmisartan. Please start taking Bumex 2mg only on the days that you do not have dialysis to help keep fluid off of you. Please take hydralazine 25mg three times a day. Be sure to follow with your Cardiologists and Nephrologists for further management. You will continue to receive dialysis three times weekly.  Please come back to the hospital if you have worsening shortness of breath, worsening swelling in your legs, chest pain, persistent fever/chills, persistent nausea/vomiting.      SECONDARY DISCHARGE DIAGNOSES  Diagnosis: End stage renal disease  Assessment and Plan of Treatment: You have a new diagnosis of end stage renal disease and now will be receiving dialysis on Mondays, Wednesdays and Fridays. Please continue to follow up with all of your dialysis appointments. Additionally, please return to the hospital this Tuesday 12/19 so you can have a fistula placed for long term dialysis.    Diagnosis: Type 2 diabetes mellitus  Assessment and Plan of Treatment: Some of your diabetes medications had to be changed since they cannot be used safely with your kidney function. You will no longer take Farxiga and you will now take 1/2 a tablet per day of your onglyza 5mg to make it onglyza 2.5mg per day. Please take onglyza 2.5mg every day and take it after dialysis on your dialysis days. Please continue to check your blood glucose. Please go to your primary care appointment on 12/20 at 1pm at [facility] [place] at [address] [place] [address].  Please call your provider if you have high or low blood glucose; you have symptoms of low blood sugar, such as: sweating, feeling nervous, shaky, and weak. extreme hunger and slight nausea, dizziness and headache, blurred vision, confusion.       PRINCIPAL DISCHARGE DIAGNOSIS  Diagnosis: Acute decompensated heart failure  Assessment and Plan of Treatment: You came into the hospital after experiencing worsening shortness of breath and swelling in your legs. This was likely due to an exacerbation of your heart failure. You were treated with IV water pills (lasix and bumex) to decrease the amount of fluid that had accumulated in your body. Going forward, be sure continue taking your medications and adopt a low salt diet. You should also limit the fluid you intake. You had some changes to your medications: please stop taking your spironolactone and telmisartan. Please start taking Bumex 2mg only on the days that you do not have dialysis to help keep fluid off of you. Please take hydralazine 25mg three times a day. Be sure to follow with your Cardiologists and Nephrologists for further management. You will continue to receive dialysis three times weekly.  Please come back to the hospital if you have worsening shortness of breath, worsening swelling in your legs, chest pain, persistent fever/chills, persistent nausea/vomiting.      SECONDARY DISCHARGE DIAGNOSES  Diagnosis: End stage renal disease  Assessment and Plan of Treatment: You have a new diagnosis of end stage renal disease and now will be receiving dialysis on Mondays, Wednesdays and Fridays. Please continue to follow up with all of your dialysis appointments. Additionally, please return to the hospital this Tuesday 12/19 so you can have a fistula placed for long term dialysis.    Diagnosis: Type 2 diabetes mellitus  Assessment and Plan of Treatment: Some of your diabetes medications had to be changed since they cannot be used safely with your kidney function. You will no longer take Farxiga and you will now take 1/2 a tablet per day of your onglyza 5mg to make it onglyza 2.5mg per day. Please take onglyza 2.5mg every day and take it after dialysis on your dialysis days. Please continue to check your blood glucose. Please go to your primary care appointment on 12/20 at 1pm at [facility] at [place] at [address] [place] [address].  Please call your provider if you have high or low blood glucose; you have symptoms of low blood sugar, such as: sweating, feeling nervous, shaky, and weak. extreme hunger and slight nausea, dizziness and headache, blurred vision, confusion.       PRINCIPAL DISCHARGE DIAGNOSIS  Diagnosis: Acute decompensated heart failure  Assessment and Plan of Treatment: You came into the hospital after experiencing worsening shortness of breath and swelling in your legs. This was likely due to an exacerbation of your heart failure. You were treated with IV water pills (lasix and bumex) to decrease the amount of fluid that had accumulated in your body. Going forward, be sure continue taking your medications and adopt a low salt diet. You should also limit the fluid you intake. You had some changes to your medications: please stop taking your spironolactone and telmisartan. Please start taking Bumex 2mg only on the days that you do not have dialysis to help keep fluid off of you. Please take hydralazine 25mg three times a day. Be sure to follow with your Cardiologists and Nephrologists for further management. You will continue to receive dialysis three times weekly.  Please come back to the hospital if you have worsening shortness of breath, worsening swelling in your legs, chest pain, persistent fever/chills, persistent nausea/vomiting.      SECONDARY DISCHARGE DIAGNOSES  Diagnosis: End stage renal disease  Assessment and Plan of Treatment: You have a new diagnosis of end stage renal disease and now will be receiving dialysis on Mondays, Wednesdays and Fridays. Please continue to follow up with all of your dialysis appointments. Additionally, please return to the hospital this Tuesday 12/19 so you can have a fistula placed for long term dialysis.    Diagnosis: Type 2 diabetes mellitus  Assessment and Plan of Treatment: Some of your diabetes medications had to be changed since they cannot be used safely with your kidney function. You will no longer take Farxiga and you will now take 1/2 a tablet per day of your onglyza 5mg to make it onglyza 2.5mg per day. Please take onglyza 2.5mg every day and take it after dialysis on your dialysis days. Please continue to check your blood glucose. Please go to your primary care appointment on 12/20 at 1pm at [facility] at [place] at [address] [place] [address].  Please call your provider if you have high or low blood glucose; you have symptoms of low blood sugar, such as: sweating, feeling nervous, shaky, and weak. extreme hunger and slight nausea, dizziness and headache, blurred vision, confusion.       PRINCIPAL DISCHARGE DIAGNOSIS  Diagnosis: Acute decompensated heart failure  Assessment and Plan of Treatment: You came into the hospital after experiencing worsening shortness of breath and swelling in your legs. This was likely due to an exacerbation of your heart failure. You were treated with IV water pills (lasix and bumex) to decrease the amount of fluid that had accumulated in your body. Going forward, be sure continue taking your medications and adopt a low salt diet. You should also limit the fluid you intake.   .  You had changes to your medications:   - please STOP taking your spironolactone  - please STOP taking your telmisartan  - Please START taking Bumex 2mg only on the days that you do not have dialysis to help keep fluid off of you.   - Please START taking hydralazine 25mg three times a day.   .  Be sure to follow with your Cardiologists and Nephrologists for further management. You will continue to receive dialysis three times weekly.  .  Please come back to the hospital if you have worsening shortness of breath, worsening swelling in your legs, chest pain, persistent fever/chills, persistent nausea/vomiting.      SECONDARY DISCHARGE DIAGNOSES  Diagnosis: End stage renal disease  Assessment and Plan of Treatment: You have a new diagnosis of end stage renal disease and now will be receiving dialysis on Mondays, Wednesdays and Fridays. Please continue to follow up with all of your dialysis appointments. Additionally, please return to the hospital this Tuesday 12/19 so you can have a fistula placed for long term dialysis.    Diagnosis: Type 2 diabetes mellitus  Assessment and Plan of Treatment: Some of your diabetes medications had to be changed since they cannot be used safely with your kidney function.   - Please STOP taking Farxiga  - Please STOP taking Onglyza  - Please START taking Tradjenta and take it after dialysis on your dialysis days.   .  Please continue to check your blood glucose. Please go to your primary care appointment on 12/20 at 1pm at [facility] at [place] at [address] [place] [address].  .  Please call your provider or come back to the hospital if you have too high or low blood glucose; you have symptoms of low blood sugar, such as: sweating, feeling nervous, shaky, and weak. extreme hunger and slight nausea, dizziness and headache, blurred vision, confusion.       PRINCIPAL DISCHARGE DIAGNOSIS  Diagnosis: Acute decompensated heart failure  Assessment and Plan of Treatment: You came into the hospital after experiencing worsening shortness of breath and swelling in your legs. This was likely due to an exacerbation of your heart failure. You were treated with IV water pills (lasix and bumex) to decrease the amount of fluid that had accumulated in your body. Going forward, be sure continue taking your medications and adopt a low salt diet. You should also limit the fluid you intake.   .  You had changes to your medications:   - please STOP taking your spironolactone  - please STOP taking your telmisartan  - Please START taking Bumex 2mg only on the days that you do not have dialysis to help keep fluid off of you.   - Please START taking hydralazine 25mg three times a day.   .  Be sure to follow with your Cardiologists and Nephrologists for further management. You will continue to receive dialysis three times weekly.  .  Please come back to the hospital if you have worsening shortness of breath, worsening swelling in your legs, chest pain, persistent fever/chills, persistent nausea/vomiting.      SECONDARY DISCHARGE DIAGNOSES  Diagnosis: End stage renal disease  Assessment and Plan of Treatment: You have a new diagnosis of end stage renal disease and now will be receiving dialysis on Mondays, Wednesdays and Fridays. Please continue to follow up with all of your dialysis appointments. Additionally, please return to the hospital this Tuesday 12/19 so you can have a fistula placed for long term dialysis.    Diagnosis: Type 2 diabetes mellitus  Assessment and Plan of Treatment: Some of your diabetes medications had to be changed since they cannot be used safely with your kidney function.   - Please STOP taking Farxiga  - Please STOP taking Onglyza  - Please START taking Tradjenta and take it after dialysis on your dialysis days.   .  Please continue to check your blood glucose. Please go to your primary care appointment on 12/20 at 1pm at [facility] at [place] at [address] [place] [address].  .  Please call your provider or come back to the hospital if you have too high or low blood glucose; you have symptoms of low blood sugar, such as: sweating, feeling nervous, shaky, and weak. extreme hunger and slight nausea, dizziness and headache, blurred vision, confusion.       PRINCIPAL DISCHARGE DIAGNOSIS  Diagnosis: Acute decompensated heart failure  Assessment and Plan of Treatment: You came into the hospital after experiencing worsening shortness of breath and swelling in your legs. This was likely due to an exacerbation of your heart failure. You were treated with IV water pills (lasix and bumex) to decrease the amount of fluid that had accumulated in your body. Going forward, be sure continue taking your medications and adopt a low salt diet. You should also limit the fluid you intake.   .  You had changes to your medications:   - please STOP taking your spironolactone  - please STOP taking your telmisartan  - Please START taking Bumex 2mg only on the days that you do not have dialysis to help keep fluid off of you.   - Please START taking hydralazine 25mg three times a day.   .  Be sure to follow with your Cardiologists and Nephrologists for further management. You will continue to receive dialysis three times weekly.  .  Please come back to the hospital if you have worsening shortness of breath, worsening swelling in your legs, chest pain, persistent fever/chills, persistent nausea/vomiting.      SECONDARY DISCHARGE DIAGNOSES  Diagnosis: End stage renal disease  Assessment and Plan of Treatment: You have a new diagnosis of end stage renal disease and now will be receiving dialysis on Mondays, Wednesdays and Fridays. Please continue to follow up with all of your dialysis appointments. Additionally, please return to the hospital this Tuesday 12/19 so you can have a fistula placed for long term dialysis.    Diagnosis: Type 2 diabetes mellitus  Assessment and Plan of Treatment: Some of your diabetes medications had to be changed since they cannot be used safely with your kidney function.   - Please STOP taking Farxiga  - Please STOP taking Onglyza  - Please START taking Tradjenta and take it after dialysis on your dialysis days.   .  Please continue to check your blood glucose. Please go to your primary care appointment on 12/20 at 1pm at [facility] at [place] at [address] [place] [address].  .  Please call your provider or come back to the hospital if you have too high or low blood glucose; you have symptoms of low blood sugar, such as: sweating, feeling nervous, shaky, and weak. extreme hunger and slight nausea, dizziness and headache, blurred vision, confusion.       PRINCIPAL DISCHARGE DIAGNOSIS  Diagnosis: Acute decompensated heart failure  Assessment and Plan of Treatment: You came into the hospital after experiencing worsening shortness of breath and swelling in your legs. This was likely due to an exacerbation of your heart failure. You were treated with IV water pills (lasix and bumex) to decrease the amount of fluid that had accumulated in your body. Going forward, be sure continue taking your medications and adopt a low salt diet. You should also limit the fluid you intake.   .  You had changes to your medications:   - please STOP taking your spironolactone  - please STOP taking your telmisartan  - Please START taking Bumex 2mg only on the days that you do not have dialysis to help keep fluid off of you.   - Please START taking hydralazine 25mg three times a day.   .  Be sure to follow with your Cardiologists and Nephrologists for further management. You will continue to receive dialysis three times weekly.  .  Please come back to the hospital if you have worsening shortness of breath, worsening swelling in your legs, chest pain, persistent fever/chills, persistent nausea/vomiting.      SECONDARY DISCHARGE DIAGNOSES  Diagnosis: End stage renal disease  Assessment and Plan of Treatment: You have a new diagnosis of end stage renal disease and now will be receiving dialysis on Mondays, Wednesdays and Fridays. Please continue to follow up with all of your dialysis appointments. Additionally, please return to the hospital this Tuesday 12/19 so you can have a fistula placed for long term dialysis.    Diagnosis: Type 2 diabetes mellitus  Assessment and Plan of Treatment: Some of your diabetes medications had to be changed since they cannot be used safely with your kidney function.   - Please STOP taking Farxiga  - Please STOP taking Onglyza 5mg and START taking Onglyza 2.5mg every day (and on your dialysis days, and take it after dialysis).  .  Please continue to check your blood glucose. Please go to your primary care appointment on 12/22.  Please call your provider or come back to the hospital if you have too high or low blood glucose; you have symptoms of low blood sugar, such as: sweating, feeling nervous, shaky, and weak. extreme hunger and slight nausea, dizziness and headache, blurred vision, confusion.

## 2023-11-26 NOTE — PROGRESS NOTE ADULT - ATTENDING COMMENTS
# CAL on CKD vs CKD. Serum creatinine continues to rise to 5.9mg/dL. She received IV contrast (CTA chest)on 11/21/2023. 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   Given that serum creatinine has gone up to 6.2 today.   No uremic symptoms, no asterixis, no pleural rub. No emergent need for dialysis. We will continue to monitor closely for dialysis needs.      # Volume overload - Leg edema improved. Having worsening shortness of breath intermittently. CXR - appears similar (not improved). BNP rising.  To increase IV furosemide to 80mg BID.       # Metabolic acidosis - secondary to CAL. We will continue to monitor for now.     # Anemia - Ferritin is < 200. Low iron stores. For po ferrous sulfate 325mg daily.     # Medication monitoring encounter: medication dose reviewed. Please dose medications to CrCl<15 .

## 2023-11-26 NOTE — DISCHARGE NOTE PROVIDER - HOSPITAL COURSE
Patient is an 83 year old female with HTN, HLD, CAD, T2DM, CKD, remote hx of breast CA s/p mastectomy, and chronic hypoxemic respiratory failure of unknown etiology on home O2 who presents for lower extremity swelling and shortness of breath. History obtained from the patient and her  at bedside who report that over the past 1-2 days the patient has had increasing breathlessness and bilateral lower extremity swelling. The patient notes that over the past 2-3 weeks, she has become progressively more fatigued and intermittently more dyspneic. She reports that her dyspnea worsened significantly over the past 2 days, though her oxygen requirements have remained consistent. The patient also endorses paroxysmal nocturnal dyspnea and orthopnea for several years, in addition to relatively new onset bendopnea. She does endorse chronic lower extremity edema, but reports acute worsening over the past few days.    Notably, the patient has had chronic hypoxemic respiratory failure for over 1 year, and had PFTs at her pulmonologist's office, but does not know if she was ever formally diagnosed with COPD or asthma. The patient has been on home oxygen via nasal canula (baseline 2L) for over a year, and has had a persistent dry cough for several years.     In the hospital, the patient was treated wit GDMT as was recommended by Cardiology and Nephrology. She experienced improvement in her lower extremity edema and returned to her baseline respiratory status. Patient is an 83 year old female with HTN, HLD, CAD, T2DM, CKD, remote hx of breast CA s/p mastectomy, and chronic hypoxemic respiratory failure of unknown etiology on home O2 who presents for lower extremity swelling and shortness of breath. History obtained from the patient and her  at bedside who report that over the past 1-2 days the patient has had increasing breathlessness and bilateral lower extremity swelling. The patient notes that over the past 2-3 weeks, she has become progressively more fatigued and intermittently more dyspneic. She reports that her dyspnea worsened significantly over the past 2 days, though her oxygen requirements have remained consistent. The patient also endorses paroxysmal nocturnal dyspnea and orthopnea for several years, in addition to relatively new onset bendopnea. She does endorse chronic lower extremity edema, but reports acute worsening over the past few days.    Notably, the patient has had chronic hypoxemic respiratory failure for over 1 year, and had PFTs at her pulmonologist's office, but does not know if she was ever formally diagnosed with COPD or asthma. The patient has been on home oxygen via nasal canula (baseline 2L) for over a year, and has had a persistent dry cough for several years.     In the hospital, the patient was treated wit GDMT as was recommended by Cardiology and Nephrology. She experienced improvement in her lower extremity edema and returned to her baseline respiratory status.     At the time of discharge, the patient was stable and deemed appropriate for discharge.      Patient is an 83 year old female with HTN, HLD, CAD, T2DM, CKD, remote hx of breast CA s/p mastectomy, and chronic hypoxemic respiratory failure of unknown etiology on home O2 who presents for lower extremity swelling and shortness of breath. Notably, the patient has had chronic hypoxemic respiratory failure for over 1 year, and had PFTs at her pulmonologist's office, but does not know if she was ever formally diagnosed with COPD or asthma. The patient has been on home oxygen via nasal canula (baseline 2L) for over a year, and has had a persistent dry cough for several years. In the hospital, the patient was treated wit GDMT as was recommended by Cardiology and Nephrology. She was started on Lasix for diuresis with minimal improvement in lower extremity edema and respiratory status. Per cardiology/nephrology recs, pt was started on dialysis after Shiley placement by IR. She experienced improvement in her lower extremity edema and returned to her baseline respiratory status. Vascular surgery was consulted for AVF placement which is scheduled for 12/19.    At the time of discharge, the patient was stable and deemed appropriate for discharge.      Patient is an 83 year old female with HTN, HLD, CAD, T2DM, CKD, remote hx of breast CA s/p mastectomy, and chronic hypoxemic respiratory failure of unknown etiology on home O2 who presents for lower extremity swelling and shortness of breath. Notably, the patient has had chronic hypoxemic respiratory failure for over 1 year, and had PFTs at her pulmonologist's office, but does not know if she was ever formally diagnosed with COPD or asthma. The patient has been on home oxygen via nasal canula (baseline 2L) for over a year, and has had a persistent dry cough for several years. In the hospital, the patient was treated wit GDMT as was recommended by Cardiology and Nephrology. She was started on Lasix for diuresis with minimal improvement in lower extremity edema and respiratory status. Per cardiology/nephrology recs, pt was started on dialysis after Shiley placement by IR. She experienced improvement in her lower extremity edema and returned to her baseline respiratory status. Vascular surgery was consulted for AVF placement which is scheduled for 12/19 as OP. Patient to receive HD prior to d/c and again as outpatient prior to OP procedure.    At the time of discharge, the patient was stable and deemed appropriate for discharge. Patient is an 83 year old female with HTN, HLD, CAD, T2DM, CKD, remote hx of breast CA s/p mastectomy, and chronic hypoxemic respiratory failure of unknown etiology on home O2 who presents for lower extremity swelling and shortness of breath. Notably, the patient has had chronic hypoxemic respiratory failure for over 1 year, and had PFTs at her pulmonologist's office, but does not know if she was ever formally diagnosed with COPD or asthma. The patient has been on home oxygen via nasal canula (baseline 2L) for over a year, and has had a persistent dry cough for several years. Pt was found to have CAL on CKD and so pt's home medications of spironolactone and telmisartan were held; pt's home amlodipine was continued and pt was started on hydralazine. In the hospital, the patient was treated wit GDMT as was recommended by Cardiology and Nephrology. She was started on Lasix for diuresis and symbicort with minimal improvement in lower extremity edema and respiratory status. Per cardiology/nephrology recs, pt was started on dialysis after Shiley placement by IR. She experienced improvement in her lower extremity edema and returned to her baseline respiratory status. During hospital course, pt received 1U pRBCs for a hemoglobin <7. Pt was found to have iron deficiency anemia and was started on iron 325mg and epogen with dialysis. Vascular surgery was consulted for AVF placement which is scheduled for 12/19 as OP. Patient to receive HD prior to d/c and again as outpatient prior to OP procedure. Pt was seen by PT and was recommended discharge with home PT.    At the time of discharge, the patient was stable and deemed appropriate for discharge.     Discharge Dx:  Acute Decompensated Heart Failure  CKD  Iron deficiency anemia    New Medications:  ferrous sulfate  Symbicort  Hydralazine Patient is an 83 year old female with HTN, HLD, CAD, T2DM, CKD, remote hx of breast CA s/p mastectomy, and chronic hypoxemic respiratory failure of unknown etiology on home O2 who presents for lower extremity swelling and shortness of breath. Notably, the patient has had chronic hypoxemic respiratory failure for over 1 year, and had PFTs at her pulmonologist's office, but does not know if she was ever formally diagnosed with COPD or asthma. The patient has been on home oxygen via nasal canula (baseline 2L) for over a year and has been prescribed montelukast, and has had a persistent dry cough for several years. In the hospital, the patient was treated wit GDMT as was recommended by Cardiology and Nephrology.  Pt was found to have CAL on CKD and so pt's home medications of spironolactone and telmisartan were held; pt's home amlodipine was continued and pt was started on hydralazine. She was started on Lasix and given bumex for diuresis with minimal improvement in lower extremity edema and respiratory status. Pt was also started on symbicort and duoneb to help with her respiratory status. Per cardiology/nephrology recs, pt was started on dialysis after Shiley placement by IR. She experienced improvement in her lower extremity edema and returned to her baseline respiratory status. During hospital course, pt received 1U pRBCs for a hemoglobin <7. Pt was found to have iron deficiency anemia and was started on iron 325mg and epogen with dialysis. Vascular surgery was consulted for AVF placement which is scheduled for 12/19 as OP. Patient to receive HD prior to d/c and again as outpatient prior to OP procedure. Pt was seen by PT and was recommended discharge with home PT.    At the time of discharge, the patient was stable and deemed appropriate for discharge.     Discharge Dx:  Acute Decompensated Heart Failure  CKD  Iron deficiency anemia    New Medications:  Ferrous sulfate  Symbicort  Hydralazine    Changes to Medications:  Hold spironolactone, telmisartan, farxiga Patient is an 83 year old female with HTN, HLD, CAD, T2DM, CKD, remote hx of breast CA s/p mastectomy, and chronic hypoxemic respiratory failure of unknown etiology on home O2 who presents for lower extremity swelling and shortness of breath. Notably, the patient has had chronic hypoxemic respiratory failure for over 1 year, and had PFTs at her pulmonologist's office, but does not know if she was ever formally diagnosed with COPD or asthma. The patient has been on home oxygen via nasal canula (baseline 2L) for over a year and has been prescribed montelukast, and has had a persistent dry cough for several years. In the hospital, the patient was treated wit GDMT as was recommended by Cardiology and Nephrology.  Pt was found to have CAL on CKD and so pt's home medications of spironolactone and telmisartan were held; pt's home amlodipine was continued and pt was started on hydralazine. She was started on Lasix and given bumex for diuresis with minimal improvement in lower extremity edema and respiratory status. Pt was also started on symbicort, duoneb, and 5-day course of prednisone to help with her respiratory status. Per cardiology/nephrology recs, pt was started on dialysis after Shiley placement by IR. She experienced improvement in her lower extremity edema and returned to her baseline respiratory status. During hospital course, pt received 1U pRBCs for a hemoglobin <7. Pt was found to have iron deficiency anemia and was started on iron 325mg and epogen with dialysis. Vascular surgery was consulted for AVF placement which is scheduled for 12/19 as OP. Patient to receive HD prior to d/c and again as outpatient prior to OP procedure. Pt was seen by PT and was recommended discharge with home PT.    At the time of discharge, the patient was stable and deemed appropriate for discharge.     Discharge Dx:  Acute Decompensated Heart Failure  CKD  Iron deficiency anemia    New Medications:  Ferrous sulfate  Symbicort  Hydralazine  Bumex 2mg daily on non-HD days    Changes to Medications:  D/C spironolactone, telmisartan, farxiga  Dose of onglyza 5mg decreased to 2.5mg daily; on HD days will give post HD Patient is an 83 year old female with HTN, HLD, CAD, T2DM, CKD, remote hx of breast CA s/p mastectomy, and chronic hypoxemic respiratory failure of unknown etiology on home O2 who presents for lower extremity swelling and shortness of breath. Notably, the patient has had chronic hypoxemic respiratory failure for over 1 year, and had PFTs at her pulmonologist's office, but does not know if she was ever formally diagnosed with COPD or asthma. The patient has been on home oxygen via nasal canula (baseline 2L) for over a year and has been prescribed montelukast, and has had a persistent dry cough for several years. In the hospital, the patient was treated wit GDMT as was recommended by Cardiology and Nephrology.  Pt was found to have CAL on CKD and so pt's home medications of spironolactone and telmisartan were held; pt's home amlodipine was continued and pt was started on hydralazine. She was started on Lasix and given bumex for diuresis with minimal improvement in lower extremity edema and respiratory status. Pt was also started on symbicort, duoneb, and 5-day course of prednisone to help with her respiratory status. Per cardiology/nephrology recs, pt was started on dialysis after Shiley placement by IR. She experienced improvement in her lower extremity edema and returned to her baseline respiratory status. During hospital course, pt received 1U pRBCs for a hemoglobin <7. Pt was found to have iron deficiency anemia and was started on iron 325mg and epogen with dialysis. Vascular surgery was consulted for AVF placement on the RUE which is scheduled for 12/19 as OP. During hospital course, pt was noted by nephrology to have swelling around dialysis catheter site. IR evaluated pt at bedside and did not appreciate redness, swelling, irritation or purulent drainage from tract. Per IR no intervention at this time. Pt was started on antibiotic ointment for the site per nephrology recs and will need to continue this for a total of seven days at her HD center with close monitoring. Patient to receive HD prior to d/c and again as outpatient prior to OP procedure. Pt was seen by PT and was recommended discharge with home PT.    At the time of discharge, the patient was stable and deemed appropriate for discharge.     Discharge Dx:  Acute Decompensated Heart Failure  CKD  Iron deficiency anemia    New Medications:  Ferrous sulfate  Symbicort  Hydralazine  Bumex 2mg daily on non-HD days    Changes to Medications:  D/C spironolactone, telmisartan, farxiga  Dose of onglyza 5mg decreased to 2.5mg daily; on HD days will give post HD Patient is an 83 year old female with HTN, HLD, CAD, T2DM, CKD, remote hx of breast CA s/p mastectomy, and chronic hypoxemic respiratory failure of unknown etiology on home O2 who presents for lower extremity swelling and shortness of breath. Notably, the patient has had chronic hypoxemic respiratory failure for over 1 year, and had PFTs at her pulmonologist's office, but does not know if she was ever formally diagnosed with COPD or asthma. The patient has been on home oxygen via nasal canula (baseline 2L) for over a year and has been prescribed montelukast, and has had a persistent dry cough for several years. In the hospital, the patient was treated wit GDMT as was recommended by Cardiology and Nephrology.  Pt was found to have CAL on CKD and so pt's home medications of spironolactone and telmisartan were held; pt's home amlodipine was continued and pt was started on hydralazine. She was started on Lasix and given bumex for diuresis with minimal improvement in lower extremity edema and respiratory status. Pt was also started on symbicort, duoneb, and 5-day course of prednisone to help with her respiratory status. Per cardiology/nephrology recs, pt was started on dialysis after Shiley placement by IR. She experienced improvement in her lower extremity edema and returned to her baseline respiratory status. During hospital course, pt received 1U pRBCs for a hemoglobin <7. Pt was found to have iron deficiency anemia and was started on iron 325mg and epogen with dialysis. Vascular surgery was consulted for AVF placement on the RUE which is scheduled for 12/19 as OP. During hospital course, pt was noted by nephrology to have swelling around dialysis catheter site. IR evaluated pt at bedside and did not appreciate redness, swelling, irritation or purulent drainage from tract. Per IR no intervention at this time. Pt was started on antibiotic ointment for the site per nephrology recs and will need to continue this for a total of seven days at her HD center with close monitoring. Patient to receive HD prior to d/c and again as outpatient prior to OP procedure. Pt was seen by PT and was recommended discharge with home PT.    At the time of discharge, the patient was stable and deemed appropriate for discharge.     Discharge Dx:  #Acute Decompensated Heart Failure  #End stage renal disease  #Iron deficiency anemia  #Hypertension  #Hyperlipidemia  #Coronary artery disease  #DM2    New Medications:  Ferrous sulfate  Symbicort  Hydralazine  Bumex 2mg daily on non-HD days    Changes to Medications:  D/C spironolactone, telmisartan, farxiga  Dose of onglyza 5mg decreased to 2.5mg daily; on HD days will give post HD Patient is an 83 year old female with HTN, HLD, CAD, T2DM, CKD, remote hx of breast CA s/p mastectomy, and chronic hypoxemic respiratory failure of unknown etiology on home O2 who presents for lower extremity swelling and shortness of breath. Notably, the patient has had chronic hypoxemic respiratory failure for over 1 year, and had PFTs at her pulmonologist's office, but does not know if she was ever formally diagnosed with COPD or asthma. The patient has been on home oxygen via nasal canula (baseline 2L) for over a year and has been prescribed montelukast, and has had a persistent dry cough for several years. In the hospital, the patient was treated with GDMT as was recommended by Cardiology and Nephrology.  Pt was found to have CAL on CKD and so pt's home medications of spironolactone and telmisartan were held; pt's home amlodipine was continued and pt was started on hydralazine. She was started on Lasix and given bumex for diuresis with minimal improvement in lower extremity edema and respiratory status. Pt was also started on symbicort, duoneb, and 5-day course of prednisone to help with her respiratory status. Per cardiology/nephrology recs, pt was started on dialysis after Shiley placement by IR. She experienced improvement in her lower extremity edema and returned to her baseline respiratory status. During hospital course, pt received 1U pRBCs for a hemoglobin <7. Pt was found to have iron deficiency anemia and was started on iron 325mg and epogen with dialysis. Vascular surgery was consulted for AVF placement on the RU which is scheduled for 12/19 as OP. During hospital course, pt was noted by nephrology to have swelling around dialysis catheter site. IR evaluated pt at bedside and did not appreciate redness, swelling, irritation or purulent drainage from tract. Per IR no intervention at this time. Pt was started on antibiotic ointment for the site per nephrology recs and will need to continue this for a total of seven days at her HD center with close monitoring. Patient to receive HD prior to d/c and again as outpatient prior to OP procedure. Pt was seen by PT and was recommended discharge with home PT.    At the time of discharge, the patient was stable and deemed appropriate for discharge. Pt will need to f/u with primary care provider for management of diabetes and hypertension as pt can no longer take Farxiga, spironolactone, and telmisartan. Pt will need to f/u with nephrology and cardiology within 1-2 weeks for in-pt f/u.    Discharge Dx:  #Acute Decompensated Heart Failure  #End stage renal disease  #Iron deficiency anemia  #Hypertension  #Hyperlipidemia  #Coronary artery disease  #DM2    New Medications:  Ferrous sulfate  Symbicort  Hydralazine  Bumex on non-HD days    Changes to Medications:  D/C spironolactone, telmisartan, farxiga  Dose of onglyza 5mg decreased to 2.5mg daily; on HD days will give post HD Patient is an 83 year old female with HTN, HLD, CAD, T2DM, CKD, remote hx of breast CA s/p mastectomy, and chronic hypoxemic respiratory failure of unknown etiology on home O2 who presents for lower extremity swelling and shortness of breath. Notably, the patient has had chronic hypoxemic respiratory failure for over 1 year, and had PFTs at her pulmonologist's office, but does not know if she was ever formally diagnosed with COPD or asthma. The patient has been on home oxygen via nasal canula (baseline 2L) for over a year and has been prescribed montelukast, and has had a persistent dry cough for several years. In the hospital, the patient was treated with GDMT as was recommended by Cardiology and Nephrology.  Pt was found to have CAL on CKD and so pt's home medications of spironolactone and telmisartan were held; pt's home amlodipine was continued and pt was started on hydralazine. She was started on Lasix and given bumex for diuresis with minimal improvement in lower extremity edema and respiratory status. Pt was also started on symbicort, duoneb, and 5-day course of prednisone to help with her respiratory status. Per cardiology/nephrology recs, pt was started on dialysis after Shiley placement by IR. She experienced improvement in her lower extremity edema and returned to her baseline respiratory status. During hospital course, pt received 1U pRBCs for a hemoglobin <7. Pt was found to have iron deficiency anemia and was started on iron 325mg and epogen with dialysis. Vascular surgery was consulted for AVF placement on the RU which is scheduled for 12/19 as OP. During hospital course, pt was noted by nephrology to have swelling around dialysis catheter site. IR evaluated pt at bedside and did not appreciate redness, swelling, irritation or purulent drainage from tract. Per IR no intervention at this time. Pt was started on antibiotic ointment for the site per nephrology recs and will need to continue this for a total of seven days at her HD center with close monitoring. Per nephrology recs, pt will be discharged with bumex 2mg on non-HD days. Patient to receive HD prior to d/c and again as outpatient prior to OP procedure. Pt was seen by PT and was recommended discharge with home PT.    At the time of discharge, the patient was stable and deemed appropriate for discharge. Pt will need to f/u with primary care provider for management of diabetes and hypertension as pt can no longer take Farxiga, spironolactone, and telmisartan. Pt will need to f/u with nephrology and cardiology within 1-2 weeks for in-pt f/u.    Discharge Dx:  #Acute Decompensated Heart Failure  #End stage renal disease  #Iron deficiency anemia  #Hypertension  #Hyperlipidemia  #Coronary artery disease  #DM2    New Medications:  Ferrous sulfate  Symbicort  Hydralazine  Bumex on non-HD days    Changes to Medications:  D/C spironolactone, telmisartan, farxiga  Dose of onglyza 5mg decreased to 2.5mg daily; on HD days will give post HD Patient is an 83 year old female with HTN, HLD, CAD, T2DM, CKD, remote hx of breast CA s/p mastectomy, and chronic hypoxemic respiratory failure of unknown etiology on home O2 who presents for lower extremity swelling and shortness of breath. Notably, the patient has had chronic hypoxemic respiratory failure for over 1 year, and had PFTs at her pulmonologist's office, but does not know if she was ever formally diagnosed with COPD or asthma. The patient has been on home oxygen via nasal canula (baseline 2L) for over a year and has been prescribed montelukast, and has had a persistent dry cough for several years.     In the hospital, the patient was treated with GDMT as was recommended by Cardiology and Nephrology.  Pt was found to have CAL on CKD and so pt's home medications of spironolactone and telmisartan were held; pt's home amlodipine was continued and pt was started on hydralazine. She was started on Lasix and given bumex for diuresis with minimal improvement in lower extremity edema and respiratory status. Per cardiology/nephrology recs, pt was started on dialysis after Shiley placement by IR. She experienced improvement in her lower extremity edema and returned to her baseline respiratory status. Vascular surgery was consulted for AVF placement on the RUE which is scheduled for 12/19 as OP. During hospital course, pt was noted by nephrology to have swelling around dialysis catheter site. IR evaluated pt at bedside and did not appreciate redness, swelling, irritation or purulent drainage from tract. Per IR no intervention at this time. Pt was started on antibiotic ointment for the site per nephrology recs and will need to continue this for a total of seven days at her HD center with close monitoring. Per nephrology recs, pt will be discharged with bumex 2mg on non-HD days. Patient to receive HD prior to d/c and again as outpatient prior to OP procedure.     For her suspected COPD, pt was also started on symbicort, duoneb, and 5-day course of prednisone to help with her respiratory status.     During hospital course, pt received 1U pRBCs for a hemoglobin <7. Pt was found to have iron deficiency anemia and was started on iron 325mg and epogen with dialysis.     At the time of discharge, the patient was stable and deemed appropriate for discharge. Pt was seen by PT and was recommended discharge with home PT. Pt will need to f/u with primary care provider for management of diabetes and hypertension as pt can no longer take Farxiga, spironolactone, and telmisartan. Pt will need to f/u with nephrology and cardiology within 1-2 weeks for in-pt f/u.    Discharge Dx:  #Acute Decompensated Heart Failure  #End stage renal disease  #Iron deficiency anemia  #Hypertension  #Hyperlipidemia  #Coronary artery disease  #DM2    New Medications:  Ferrous sulfate  Symbicort  Hydralazine  Bumex on non-HD days    Changes to Medications:  D/C spironolactone, telmisartan, farxiga  Dose of onglyza 5mg decreased to 2.5mg daily; on HD days will give post HD

## 2023-11-26 NOTE — DISCHARGE NOTE PROVIDER - PROVIDER TOKENS
FREE:[LAST:[Internal],FIRST:[Medicine],PHONE:[phone]],FAX:[(   )    -],ADDRESS:[[address] [place] [address]]] FREE:[LAST:[Internal],FIRST:[Medicine],PHONE:[phone]],FAX:[(   )    -],ADDRESS:[[address] [place] [address]]] FREE:[LAST:[Internal],FIRST:[Medicine],PHONE:[phone]],FAX:[(   )    -],ADDRESS:[[address] [place] [address]]] FREE:[LAST:[Internal],FIRST:[Medicine],PHONE:[phone]],FAX:[(   )    -],ADDRESS:[[address] [place] [address]],FOLLOWUP:[1 week]] FREE:[LAST:[Internal],FIRST:[Medicine],PHONE:[phone]],FAX:[(   )    -],ADDRESS:[[address] [place] [address]],FOLLOWUP:[1 week]] FREE:[LAST:[Internal],FIRST:[Medicine],PHONE:[phone]],FAX:[(   )    -],ADDRESS:[[address] [place] [address]],FOLLOWUP:[1 week]]

## 2023-11-26 NOTE — PROGRESS NOTE ADULT - PROBLEM SELECTOR PLAN 1
Patient presenting for worsening LE edema and breathlessness  - also with several symptoms to suggest likely ADHF  - on exam: extremities warm and well perfused, but volume overloaded, S3 on exam  - proBNP elevated to 1300, but unknown baseline and with likely CAL on CKD  - hsTropT flat at 74, ECG without ST changes, no chest pain  - pt does not carry a diagnosis of HF, however on meds that may suggest she may have HFpEF (MRA, SGLT2i)    Plan:  > diuresis with IV lasix 40 mg bid, assess response and titrate dose   > obtain TTE--> showing EF 71%, bi-atrial enlargement, LV diastolic dysfunction, and RV enlargement  > hold home spironolactone and dapagliflozin given CAL  > Decrease lasix to 40mg daily on 11/25 due to rising creatinine   > strict I/Os, daily standing weights  > appreciate cards recs

## 2023-11-26 NOTE — PROGRESS NOTE ADULT - PROBLEM SELECTOR PLAN 4
Patient with several year history of chronic hypoxemic respiratory failure  - requiring home O2, stable on 2L at baseline  - has not had escalating O2 requirements recently, despite feeling subjectively more dyspneic  - unclear etiology, though patient has had PFTs at her pulmologist's office last year    Plan:  > follow up TTE--> showing EF 71%, bi-atrial enlargement, LV diastolic dysfunction, and RV enlargement  > obtain records from pt's pulmonologists office: Dr. Paulino Gayle ([facility], [phone])  > continue with home montelukast and add on duonebs and symbicort   > wean O2 as tolerated  > 11/25 Initiated 40 mg prednisone for potential COPD exacerbation   > if persistently hypoxemic and despite diuresis, consider alternative etiologies and potentially high resolution CT chest  - f/u repeat CXR and BNP

## 2023-11-26 NOTE — PROGRESS NOTE ADULT - ATTENDING COMMENTS
83F with chronic hypoxemic respiratory failure, CKD, T2DM, CAD, HTN, and HLD who presents for worsening dyspnea and lower extremity edema, admitted for suspected acute decompensated heart failure with CAL on likely CKD. Unclear baseline Cr, but significantly elevated currently with nephrotic range proteinuria. TTE showing diastolic HF, remains volume overloaded on exam.  ProBNP elevated to 3000s.    #acute on chronic diastolic HF   #CAL on CKD, #nephrotic range proteinuria   #Acute on chronic respiratory failure  #HTN, HLD, DM2    -  given uptrending Cr ( 6.1 ) ; increase IV Lasix to 80 bid  per Nephrology reccs  - strict I/Os, daily weights   - continue to hold home spironolactone, telmisartan, and farxiga given CAL  - c/w hydralazine 25mg TID for afterload reduction - uptitrate as needed   - TTE w/moderate (grade 2) left ventricular diastolic dysfunction, with elevated filling pressure.  - pt with chronic hypoxemic RF of unclear etiology, ?COPD given long standing smoking history -  c/w home O2, c/w nebs q6h and symbicort; trial a course of Prednisone 40mg po qd x 5 days ( 11/25--> 83F with chronic hypoxemic respiratory failure, CKD, T2DM, CAD, HTN, and HLD who presents for worsening dyspnea and lower extremity edema, admitted for suspected acute decompensated heart failure with CAL on likely CKD. Unclear baseline Cr, but significantly elevated currently with nephrotic range proteinuria. TTE showing diastolic HF, remains volume overloaded on exam.  ProBNP elevated to 3000s. On Lasix 40mg IV    #acute on chronic diastolic HF   #CAL on CKD, #nephrotic range proteinuria   #Acute on chronic respiratory failure  #HTN, HLD, DM2    -  uptrending Cr ( 6.1 ) however pt remains volume overloaded ; Nephrology reccs higher dose of IV Lasix ; got additional 80mg IVP today ; reassess volume status in AM  - strict I/Os, daily weights   - continue to hold home spironolactone, telmisartan, and farxiga given CAL  - c/w hydralazine 25mg TID for afterload reduction - uptitrate as needed   - TTE w/moderate (grade 2) left ventricular diastolic dysfunction, with elevated filling pressure.  - pt with chronic hypoxemic RF of unclear etiology, ?COPD given long standing smoking history -  c/w home O2, c/w nebs q6h and symbicort; trial a course of Prednisone 40mg po qd x 5 days ( 11/25-->

## 2023-11-26 NOTE — DISCHARGE NOTE PROVIDER - NSFOLLOWUPCLINICSTOKEN_GEN_ALL_ED_FT
[id number]: || ||00\01||False;[id number]: || ||00\01||False; [id number]: || ||00\01||False;127481: || ||00\01||False; 745608: || ||00\01||False;771261: || ||00\01||False; 968778:1 week|| ||00\01||False;828008:1 week|| ||00\01||False; 371876:1 week|| ||00\01||False;175198:1 week|| ||00\01||False; 579389:1 week|| ||00\01||False;523956:1 week|| ||00\01||False;

## 2023-11-26 NOTE — DISCHARGE NOTE PROVIDER - NSDCCAREPROVSEEN_GEN_ALL_CORE_FT
[facility] Team 4 [facility] Team 4 [facility] Team 4

## 2023-11-26 NOTE — DISCHARGE NOTE PROVIDER - NSDCFUSCHEDAPPT_GEN_ALL_CORE_FT
[facility]  INTMED OP [id number]  Scheduled Appointment: 12/20/2023     [facility]  INTMED OP [id number]  Scheduled Appointment: 12/20/2023     [facility]  INTMED OP [id number]  Scheduled Appointment: 12/20/2023     Kiet Davis  [facility] Physician [facility]  INTMED OP [id number]  Scheduled Appointment: 12/20/2023    Pema Zaragoza  [facility] Physician [facility]  HEARTFAIL [address]  Scheduled Appointment: 12/27/2023    [facility]ician [facility]  INTMED  [facility]   Scheduled Appointment: 01/11/2024    Leida Luis  [facility]  CARDIOLOGY [address]  Scheduled Appointment: 01/16/2024     Kiet Davis  [facility] Physician [facility]  INTMED OP [id number]  Scheduled Appointment: 12/20/2023    Pema Zaragoza  [facility] Physician [facility]  HEARTFAIL [address]  Scheduled Appointment: 12/27/2023    [facility] Physician [facility]  INTMED  [facility]   Scheduled Appointment: 01/11/2024    Leida Luis  [facility]  CARDIOLOGY [address]  Scheduled Appointment: 01/16/2024     Kiet Davis  [facility] Physician [facility]  INTMED OP [id number]  Scheduled Appointment: 12/20/2023    Pema Zaragoza  [facility] Physician [facility]  HEARTFAIL [address]  Scheduled Appointment: 12/27/2023    [facility] Physician [facility]  INTMED  [facility]   Scheduled Appointment: 01/11/2024    Leida Luis  [facility]  CARDIOLOGY [address]  Scheduled Appointment: 01/16/2024

## 2023-11-26 NOTE — DISCHARGE NOTE PROVIDER - NSDCCPTREATMENT_GEN_ALL_CORE_FT
PRINCIPAL PROCEDURE  Procedure: Transthoracic echocardiography (TTE)  Findings and Treatment: CONCLUSIONS:      1. Left ventricular cavity is normal. Left ventricular wall thickness is normal. Left ventricular systolic function is normal with an ejection fraction of 71 % by Gonsales's method of disks.   2. There is moderate (grade 2) left ventricular diastolic dysfunction, with elevated filling pressure.   3. Moderately enlarged right ventricular cavity size, wall thickness, and systolic function. Tricuspid annular plane systolic excursion (TAPSE) is 3.0 cm (normal >=1.7 cm).   4. The left atrium is severely dilated.   5. The right atrium is severely dilated in size.   6. No pericardial effusion seen.   7. Left ventricular global longitudinal strain is -18.3 % which is normal (< -18%). Images were acquired on a [facility] ultrasound system and processed using [employer] strain analysis software with a heart rate of 93 bpm and a blood pressure of 177/75 mmHg.   8. There is increased LV mass and concentric hypertrophy.  ________________________________________________________________________________________  FINDINGS:     Left Ventricle:  The left ventricular cavity is normal. Left ventricular wall thickness is normal. Left ventricular systolic function is normal with a calculated ejection fraction of 71 % by the Gonsales's biplane method of disks. There is moderate (grade 2) left ventricular diastolic dysfunction, with elevated filling pressure. Severe left ventricular hypertrophy. There is increased LV mass and concentric hypertrophy. Left ventricular global longitudinal strain is -18.3 % which is normal (< -18%). Images were acquired on a [facility] ultrasound system and processed using [employer] strain analysis software with a heart rate of 93 bpm and a blood pressure of 177/75 mmHg.     RightVentricle:  The right ventricular cavity is moderately enlarged in size, normal wall thickness and normal systolic function. Tricuspid annular plane systolic excursion (TAPSE) is 3.0 cm (normal >=1.7 cm). Tricuspid annular tissue Doppler S' is 22.5 cm/s (normal >10 cm/s).     Left Atrium:  The left     PRINCIPAL PROCEDURE  Procedure: Transthoracic echocardiography (TTE)  Findings and Treatment: CONCLUSIONS:      1. Left ventricular cavity is normal. Left ventricular wall thickness is normal. Left ventricular systolic function is normal with an ejection fraction of 71 % by Gonsales's method of disks.   2. There is moderate (grade 2) left ventricular diastolic dysfunction, with elevated filling pressure.   3. Moderately enlarged right ventricular cavity size, wall thickness, and systolic function. Tricuspid annular plane systolic excursion (TAPSE) is 3.0 cm (normal >=1.7 cm).   4. The left atrium is severely dilated.   5. The right atrium is severely dilated in size.   6. No pericardial effusion seen.   7. Left ventricular global longitudinal strain is -18.3 % which is normal (< -18%). Images were acquired on a [facility] ultrasound system and processed using [employer] strain analysis software with a heart rate of 93 bpm and a blood pressure of 177/75 mmHg.   8. There is increased LV mass and concentric hypertrophy.  ________________________________________________________________________________________  FINDINGS:     Left Ventricle:  The left ventricular cavity is normal. Left ventricular wall thickness is normal. Left ventricular systolic function is normal with a calculated ejection fraction of 71 % by the Gonsales's biplane method of disks. There is moderate (grade 2) left ventricular diastolic dysfunction, with elevated filling pressure. Severe left ventricular hypertrophy. There is increased LV mass and concentric hypertrophy. Left ventricular global longitudinal strain is -18.3 % which is normal (< -18%). Images were acquired on a [facility] ultrasound system and processed using [employer] strain analysis software with a heart rate of 93 bpm and a blood pressure of 177/75 mmHg.     RightVentricle:  The right ventricular cavity is moderately enlarged in size, normal wall thickness and normal systolic function. Tricuspid annular plane systolic excursion (TAPSE) is 3.0 cm (normal >=1.7 cm). Tricuspid annular tissue Doppler S' is 22.5 cm/s (normal >10 cm/s).     Left Atrium:  The left     PRINCIPAL PROCEDURE  Procedure: Transthoracic echocardiography (TTE)  Findings and Treatment: CONCLUSIONS:      1. Left ventricular cavity is normal. Left ventricular wall thickness is normal. Left ventricular systolic function is normal with an ejection fraction of 71 % by Gonsales's method of disks.   2. There is moderate (grade 2) left ventricular diastolic dysfunction, with elevated filling pressure.   3. Moderately enlarged right ventricular cavity size, wall thickness, and systolic function. Tricuspid annular plane systolic excursion (TAPSE) is 3.0 cm (normal >=1.7 cm).   4. The left atrium is severely dilated.   5. The right atrium is severely dilated in size.   6. No pericardial effusion seen.   7. Left ventricular global longitudinal strain is -18.3 % which is normal (< -18%). Images were acquired on a [facility] ultrasound system and processed using [employer] strain analysis software with a heart rate of 93 bpm and a blood pressure of 177/75 mmHg.   8. There is increased LV mass and concentric hypertrophy.  ________________________________________________________________________________________  FINDINGS:     Left Ventricle:  The left ventricular cavity is normal. Left ventricular wall thickness is normal. Left ventricular systolic function is normal with a calculated ejection fraction of 71 % by the Gonsales's biplane method of disks. There is moderate (grade 2) left ventricular diastolic dysfunction, with elevated filling pressure. Severe left ventricular hypertrophy. There is increased LV mass and concentric hypertrophy. Left ventricular global longitudinal strain is -18.3 % which is normal (< -18%). Images were acquired on a [facility] ultrasound system and processed using [employer] strain analysis software with a heart rate of 93 bpm and a blood pressure of 177/75 mmHg.     RightVentricle:  The right ventricular cavity is moderately enlarged in size, normal wall thickness and normal systolic function. Tricuspid annular plane systolic excursion (TAPSE) is 3.0 cm (normal >=1.7 cm). Tricuspid annular tissue Doppler S' is 22.5 cm/s (normal >10 cm/s).     Left Atrium:  The left     PRINCIPAL PROCEDURE  Procedure: Transthoracic echocardiography (TTE)  Findings and Treatment: CONCLUSIONS:      1. Left ventricular cavity is normal. Left ventricular wall thickness is normal. Left ventricular systolic function is normal with an ejection fraction of 71 % by Gonsales's method of disks.   2. There is moderate (grade 2) left ventricular diastolic dysfunction, with elevated filling pressure.   3. Moderately enlarged right ventricular cavity size, wall thickness, and systolic function. Tricuspid annular plane systolic excursion (TAPSE) is 3.0 cm (normal >=1.7 cm).   4. The left atrium is severely dilated.   5. The right atrium is severely dilated in size.   6. No pericardial effusion seen.   7. Left ventricular global longitudinal strain is -18.3 % which is normal (< -18%). Images were acquired on a [facility] ultrasound system and processed using [employer] strain analysis software with a heart rate of 93 bpm and a blood pressure of 177/75 mmHg.   8. There is increased LV mass and concentric hypertrophy.       PRINCIPAL PROCEDURE  Procedure: Transthoracic echocardiography (TTE)  Findings and Treatment: CONCLUSIONS:      1. Left ventricular cavity is normal. Left ventricular wall thickness is normal. Left ventricular systolic function is normal with an ejection fraction of 71 % by Gonsales's method of disks.   2. There is moderate (grade 2) left ventricular diastolic dysfunction, with elevated filling pressure.   3. Moderately enlarged right ventricular cavity size, wall thickness, and systolic function. Tricuspid annular plane systolic excursion (TAPSE) is 3.0 cm (normal >=1.7 cm).   4. The left atrium is severely dilated.   5. The right atrium is severely dilated in size.   6. No pericardial effusion seen.   7. Left ventricular global longitudinal strain is -18.3 % which is normal (< -18%). Images were acquired on a [facility] ultrasound system and processed using [email or web address] strain analysis software with a heart rate of 93 bpm and a blood pressure of 177/75 mmHg.   8. There is increased LV mass and concentric hypertrophy.       PRINCIPAL PROCEDURE  Procedure: Transthoracic echocardiography (TTE)  Findings and Treatment: CONCLUSIONS:      1. Left ventricular cavity is normal. Left ventricular wall thickness is normal. Left ventricular systolic function is normal with an ejection fraction of 71 % by Gonsales's method of disks.   2. There is moderate (grade 2) left ventricular diastolic dysfunction, with elevated filling pressure.   3. Moderately enlarged right ventricular cavity size, wall thickness, and systolic function. Tricuspid annular plane systolic excursion (TAPSE) is 3.0 cm (normal >=1.7 cm).   4. The left atrium is severely dilated.   5. The right atrium is severely dilated in size.   6. No pericardial effusion seen.   7. Left ventricular global longitudinal strain is -18.3 % which is normal (< -18%). Images were acquired on a [facility] ultrasound system and processed using [employer] strain analysis software with a heart rate of 93 bpm and a blood pressure of 177/75 mmHg.   8. There is increased LV mass and concentric hypertrophy.       PRINCIPAL PROCEDURE  Procedure: Transthoracic echocardiography (TTE)  Findings and Treatment: CONCLUSIONS:      1. Left ventricular cavity is normal. Left ventricular wall thickness is normal. Left ventricular systolic function is normal with an ejection fraction of 71 % by Gonsales's method of disks.   2. There is moderate (grade 2) left ventricular diastolic dysfunction, with elevated filling pressure.   3. Moderately enlarged right ventricular cavity size, wall thickness, and systolic function. Tricuspid annular plane systolic excursion (TAPSE) is 3.0 cm (normal >=1.7 cm).   4. The left atrium is severely dilated.   5. The right atrium is severely dilated in size.   6. No pericardial effusion seen.   7. Left ventricular global longitudinal strain is -18.3 % which is normal (< -18%). Images were acquired on a [facility] ultrasound system and processed using [email or web address] strain analysis software with a heart rate of 93 bpm and a blood pressure of 177/75 mmHg.   8. There is increased LV mass and concentric hypertrophy.        SECONDARY PROCEDURE  Procedure: Venous duplex scan of both upper extremities  Findings and Treatment:   < end of copied text >  The right cephalic vein is thrombosed at the level of the antecubital   fossa and distal upper arm.  The diameters of the nonthrombosed right and left basilic and cephalic   veins are entered in the table.< from: [facility] Duplex Upper Ext Vein Scan, Bilat (12.06.23 @ 14:04) >      Procedure: Swallow study  Findings and Treatment:   < end of copied text >  IMPRESSION: No penetration was seen.  No aspiration was seen. A full   report will be issued by the department of speech and hearing.< from: Xray Cinesophagram Swallow Function w/ Contrast (12.01.23 @ 09:00) >      Procedure: XR chest, 1 view  Findings and Treatment:   < end of copied text >  No focal consolidations.< from: Xray Chest 1 View- PORTABLE-Routine (Xray Chest 1 View- PORTABLE-Routine .) (11.26.23 @ 10:55) >      Procedure: US Doppler scan kidney  Findings and Treatment:   < end of copied text >  No evidence of a renal vein thrombosis.  Chronic bilateral medical renal disease again seen without evidence for   postrenal obstruction.< from: US Duplex Kidneys (11.22.23 @ 19:48) >      Procedure: US kidney and bladder  Findings and Treatment:   < end of copied text >  Increased cortical echogenicity bilaterally consistent with renal   parenchymal disease.  No hydronephrosis.< from: US Kidney and Bladder (11.22.23 @ 12:37) >       PRINCIPAL PROCEDURE  Procedure: Transthoracic echocardiography (TTE)  Findings and Treatment: CONCLUSIONS:      1. Left ventricular cavity is normal. Left ventricular wall thickness is normal. Left ventricular systolic function is normal with an ejection fraction of 71 % by Gonsales's method of disks.   2. There is moderate (grade 2) left ventricular diastolic dysfunction, with elevated filling pressure.   3. Moderately enlarged right ventricular cavity size, wall thickness, and systolic function. Tricuspid annular plane systolic excursion (TAPSE) is 3.0 cm (normal >=1.7 cm).   4. The left atrium is severely dilated.   5. The right atrium is severely dilated in size.   6. No pericardial effusion seen.   7. Left ventricular global longitudinal strain is -18.3 % which is normal (< -18%). Images were acquired on a [facility] ultrasound system and processed using [employer] strain analysis software with a heart rate of 93 bpm and a blood pressure of 177/75 mmHg.   8. There is increased LV mass and concentric hypertrophy.        SECONDARY PROCEDURE  Procedure: Venous duplex scan of both upper extremities  Findings and Treatment:   < end of copied text >  The right cephalic vein is thrombosed at the level of the antecubital   fossa and distal upper arm.  The diameters of the nonthrombosed right and left basilic and cephalic   veins are entered in the table.< from: [facility] Duplex Upper Ext Vein Scan, Bilat (12.06.23 @ 14:04) >      Procedure: Swallow study  Findings and Treatment:   < end of copied text >  IMPRESSION: No penetration was seen.  No aspiration was seen. A full   report will be issued by the department of speech and hearing.< from: Xray Cinesophagram Swallow Function w/ Contrast (12.01.23 @ 09:00) >      Procedure: XR chest, 1 view  Findings and Treatment:   < end of copied text >  No focal consolidations.< from: Xray Chest 1 View- PORTABLE-Routine (Xray Chest 1 View- PORTABLE-Routine .) (11.26.23 @ 10:55) >      Procedure: US Doppler scan kidney  Findings and Treatment:   < end of copied text >  No evidence of a renal vein thrombosis.  Chronic bilateral medical renal disease again seen without evidence for   postrenal obstruction.< from: US Duplex Kidneys (11.22.23 @ 19:48) >      Procedure: US kidney and bladder  Findings and Treatment:   < end of copied text >  Increased cortical echogenicity bilaterally consistent with renal   parenchymal disease.  No hydronephrosis.< from: US Kidney and Bladder (11.22.23 @ 12:37) >       PRINCIPAL PROCEDURE  Procedure: Transthoracic echocardiography (TTE)  Findings and Treatment: CONCLUSIONS:      1. Left ventricular cavity is normal. Left ventricular wall thickness is normal. Left ventricular systolic function is normal with an ejection fraction of 71 % by Gonsales's method of disks.   2. There is moderate (grade 2) left ventricular diastolic dysfunction, with elevated filling pressure.   3. Moderately enlarged right ventricular cavity size, wall thickness, and systolic function. Tricuspid annular plane systolic excursion (TAPSE) is 3.0 cm (normal >=1.7 cm).   4. The left atrium is severely dilated.   5. The right atrium is severely dilated in size.   6. No pericardial effusion seen.   7. Left ventricular global longitudinal strain is -18.3 % which is normal (< -18%). Images were acquired on a [facility] ultrasound system and processed using [employer] strain analysis software with a heart rate of 93 bpm and a blood pressure of 177/75 mmHg.   8. There is increased LV mass and concentric hypertrophy.        SECONDARY PROCEDURE  Procedure: Venous duplex scan of both upper extremities  Findings and Treatment:   < end of copied text >  The right cephalic vein is thrombosed at the level of the antecubital   fossa and distal upper arm.  The diameters of the nonthrombosed right and left basilic and cephalic   veins are entered in the table.< from: [facility] Duplex Upper Ext Vein Scan, Bilat (12.06.23 @ 14:04) >      Procedure: Swallow study  Findings and Treatment:   < end of copied text >  IMPRESSION: No penetration was seen.  No aspiration was seen. A full   report will be issued by the department of speech and hearing.< from: Xray Cinesophagram Swallow Function w/ Contrast (12.01.23 @ 09:00) >      Procedure: XR chest, 1 view  Findings and Treatment:   < end of copied text >  No focal consolidations.< from: Xray Chest 1 View- PORTABLE-Routine (Xray Chest 1 View- PORTABLE-Routine .) (11.26.23 @ 10:55) >      Procedure: US Doppler scan kidney  Findings and Treatment:   < end of copied text >  No evidence of a renal vein thrombosis.  Chronic bilateral medical renal disease again seen without evidence for   postrenal obstruction.< from: US Duplex Kidneys (11.22.23 @ 19:48) >      Procedure: US kidney and bladder  Findings and Treatment:   < end of copied text >  Increased cortical echogenicity bilaterally consistent with renal   parenchymal disease.  No hydronephrosis.< from: US Kidney and Bladder (11.22.23 @ 12:37) >

## 2023-11-26 NOTE — PROGRESS NOTE ADULT - SUBJECTIVE AND OBJECTIVE BOX
DATE OF SERVICE: 11-26-23 @ 11:54    Patient is a 83y old  Female who presents with a chief complaint of LE swelling, dyspnea (26 Nov 2023 10:12)      INTERVAL HISTORY: no complaints     REVIEW OF SYSTEMS:  CONSTITUTIONAL: No weakness  EYES/ENT: No visual changes;  No throat pain   NECK: No pain or stiffness  RESPIRATORY: No cough, wheezing; No shortness of breath  CARDIOVASCULAR: No chest pain or palpitations  GASTROINTESTINAL: No abdominal  pain. No nausea, vomiting, or hematemesis  GENITOURINARY: No dysuria, frequency or hematuria  NEUROLOGICAL: No stroke like symptoms  SKIN: No rashes    	  MEDICATIONS:  amLODIPine   Tablet 10 milliGRAM(s) Oral daily  furosemide   Injectable 40 milliGRAM(s) IV Push daily  hydrALAZINE 25 milliGRAM(s) Oral three times a day        PHYSICAL EXAM:  T(C): 36.5 (11-26-23 @ 11:22), Max: 36.8 (11-25-23 @ 20:29)  HR: 62 (11-26-23 @ 11:22) (62 - 98)  BP: 124/60 (11-26-23 @ 11:22) (118/58 - 144/56)  RR: 18 (11-26-23 @ 11:22) (18 - 20)  SpO2: 94% (11-26-23 @ 11:22) (94% - 97%)  Wt(kg): --  I&O's Summary    25 Nov 2023 07:01  -  26 Nov 2023 07:00  --------------------------------------------------------  IN: 300 mL / OUT: 0 mL / NET: 300 mL      Height (cm): 160 (11-26 @ 10:12)  Weight (kg): 68 (11-26 @ 10:12)  BMI (kg/m2): 26.6 (11-26 @ 10:12)  BSA (m2): 1.71 (11-26 @ 10:12)    Appearance: In no distress	  HEENT:    PERRL, EOMI	  Cardiovascular:  S1 S2, No JVD  Respiratory: Lungs clear to auscultation	  Gastrointestinal:  Soft, Non-tender, + BS	  Vascularature:  No edema of LE  Psychiatric: Appropriate affect   Neuro: no acute focal deficits                               8.3    6.51  )-----------( 237      ( 26 Nov 2023 06:47 )             26.4     11-26    139  |  105  |  72<H>  ----------------------------<  158<H>  4.9   |  17<L>  |  6.18<H>    Ca    8.8      26 Nov 2023 06:47  Phos  5.3     11-26  Mg     2.4     11-26          Labs personally reviewed      ASSESSMENT/PLAN: 	    83 year old female with HTN, HLD, CAD, T2DM, CKD, remote hx of breast CA s/p mastectomy, and chronic hypoxemic respiratory failure of unknown etiology on home O2 who presents for lower extremity swelling and shortness of breath.      #Acute diastolic heart failure  - Pt with LE edema and pulmonary congestion  - Agree with IV diuresis once no renal objection  - Monitor renal function   - Echo shows normal LV systolic function with Bi-atrial enlargement, G2DD and LVH  - EKG shows sinus rhythm (not atrial flutter)  - c/w IV Lasix 40mg once daily    #CAL on CKD  - Renal following    #HLD  - Statin therapy    #HTN  - BP acceptable          LUTHER Michaels DO [facility]  Cardiovascular Medicine  [address] [place] [address], [address]  Office: [phone]  Available via call/text on Microsoft Teams  DATE OF SERVICE: 11-26-23 @ 11:54    Patient is a 83y old  Female who presents with a chief complaint of LE swelling, dyspnea (26 Nov 2023 10:12)      INTERVAL HISTORY: no complaints     REVIEW OF SYSTEMS:  CONSTITUTIONAL: No weakness  EYES/ENT: No visual changes;  No throat pain   NECK: No pain or stiffness  RESPIRATORY: No cough, wheezing; No shortness of breath  CARDIOVASCULAR: No chest pain or palpitations  GASTROINTESTINAL: No abdominal  pain. No nausea, vomiting, or hematemesis  GENITOURINARY: No dysuria, frequency or hematuria  NEUROLOGICAL: No stroke like symptoms  SKIN: No rashes    	  MEDICATIONS:  amLODIPine   Tablet 10 milliGRAM(s) Oral daily  furosemide   Injectable 40 milliGRAM(s) IV Push daily  hydrALAZINE 25 milliGRAM(s) Oral three times a day        PHYSICAL EXAM:  T(C): 36.5 (11-26-23 @ 11:22), Max: 36.8 (11-25-23 @ 20:29)  HR: 62 (11-26-23 @ 11:22) (62 - 98)  BP: 124/60 (11-26-23 @ 11:22) (118/58 - 144/56)  RR: 18 (11-26-23 @ 11:22) (18 - 20)  SpO2: 94% (11-26-23 @ 11:22) (94% - 97%)  Wt(kg): --  I&O's Summary    25 Nov 2023 07:01  -  26 Nov 2023 07:00  --------------------------------------------------------  IN: 300 mL / OUT: 0 mL / NET: 300 mL      Height (cm): 160 (11-26 @ 10:12)  Weight (kg): 68 (11-26 @ 10:12)  BMI (kg/m2): 26.6 (11-26 @ 10:12)  BSA (m2): 1.71 (11-26 @ 10:12)    Appearance: In no distress	  HEENT:    PERRL, EOMI	  Cardiovascular:  S1 S2, No JVD  Respiratory: Lungs clear to auscultation	  Gastrointestinal:  Soft, Non-tender, + BS	  Vascularature:  No edema of LE  Psychiatric: Appropriate affect   Neuro: no acute focal deficits                               8.3    6.51  )-----------( 237      ( 26 Nov 2023 06:47 )             26.4     11-26    139  |  105  |  72<H>  ----------------------------<  158<H>  4.9   |  17<L>  |  6.18<H>    Ca    8.8      26 Nov 2023 06:47  Phos  5.3     11-26  Mg     2.4     11-26          Labs personally reviewed      ASSESSMENT/PLAN: 	  83 year old female with HTN, HLD, CAD, T2DM, CKD, remote hx of breast CA s/p mastectomy, and chronic hypoxemic respiratory failure of unknown etiology on home O2 who presents for lower extremity swelling and shortness of breath.      1. Acute diastolic heart failure  - Pt with LE edema and pulmonary congestion  - Agree with IV diuresis once no renal objection  - Monitor renal function   - Echo shows normal LV systolic function with Bi-atrial enlargement, G2DD and LVH  - EKG shows sinus rhythm (not atrial flutter)  - c/w IV Lasix as per renal recommendation, responding well to IV diuresis thus far though with steady rise in Cr    2. CAL on CKD  - Renal following    3. HLD  - Statin therapy    4. HTN  - BP acceptable          LUTHER Michaels DO [facility]  Cardiovascular Medicine  [address] [place] [address], [address]  Office: [phone]  Available via call/text on Microsoft Teams

## 2023-11-27 LAB
ANA TITR SER: NEGATIVE — SIGNIFICANT CHANGE UP
ANA TITR SER: NEGATIVE — SIGNIFICANT CHANGE UP
ANION GAP SERPL CALC-SCNC: 15 MMOL/L — SIGNIFICANT CHANGE UP (ref 5–17)
ANION GAP SERPL CALC-SCNC: 15 MMOL/L — SIGNIFICANT CHANGE UP (ref 5–17)
BUN SERPL-MCNC: 80 MG/DL — HIGH (ref 7–23)
BUN SERPL-MCNC: 80 MG/DL — HIGH (ref 7–23)
CALCIUM SERPL-MCNC: 8.5 MG/DL — SIGNIFICANT CHANGE UP (ref 8.4–10.5)
CALCIUM SERPL-MCNC: 8.5 MG/DL — SIGNIFICANT CHANGE UP (ref 8.4–10.5)
CHLORIDE SERPL-SCNC: 103 MMOL/L — SIGNIFICANT CHANGE UP (ref 96–108)
CHLORIDE SERPL-SCNC: 103 MMOL/L — SIGNIFICANT CHANGE UP (ref 96–108)
CO2 SERPL-SCNC: 19 MMOL/L — LOW (ref 22–31)
CO2 SERPL-SCNC: 19 MMOL/L — LOW (ref 22–31)
CREAT SERPL-MCNC: 6.71 MG/DL — HIGH (ref 0.5–1.3)
CREAT SERPL-MCNC: 6.71 MG/DL — HIGH (ref 0.5–1.3)
EGFR: 6 ML/MIN/1.73M2 — LOW
EGFR: 6 ML/MIN/1.73M2 — LOW
GLUCOSE BLDC GLUCOMTR-MCNC: 147 MG/DL — HIGH (ref 70–99)
GLUCOSE BLDC GLUCOMTR-MCNC: 147 MG/DL — HIGH (ref 70–99)
GLUCOSE BLDC GLUCOMTR-MCNC: 225 MG/DL — HIGH (ref 70–99)
GLUCOSE BLDC GLUCOMTR-MCNC: 225 MG/DL — HIGH (ref 70–99)
GLUCOSE BLDC GLUCOMTR-MCNC: 261 MG/DL — HIGH (ref 70–99)
GLUCOSE BLDC GLUCOMTR-MCNC: 261 MG/DL — HIGH (ref 70–99)
GLUCOSE BLDC GLUCOMTR-MCNC: 355 MG/DL — HIGH (ref 70–99)
GLUCOSE BLDC GLUCOMTR-MCNC: 355 MG/DL — HIGH (ref 70–99)
GLUCOSE SERPL-MCNC: 145 MG/DL — HIGH (ref 70–99)
GLUCOSE SERPL-MCNC: 145 MG/DL — HIGH (ref 70–99)
HCT VFR BLD CALC: 22.9 % — LOW (ref 34.5–45)
HCT VFR BLD CALC: 22.9 % — LOW (ref 34.5–45)
HGB BLD-MCNC: 7.2 G/DL — LOW (ref 11.5–15.5)
HGB BLD-MCNC: 7.2 G/DL — LOW (ref 11.5–15.5)
MAGNESIUM SERPL-MCNC: 2.4 MG/DL — SIGNIFICANT CHANGE UP (ref 1.6–2.6)
MAGNESIUM SERPL-MCNC: 2.4 MG/DL — SIGNIFICANT CHANGE UP (ref 1.6–2.6)
MCHC RBC-ENTMCNC: 29.1 PG — SIGNIFICANT CHANGE UP (ref 27–34)
MCHC RBC-ENTMCNC: 29.1 PG — SIGNIFICANT CHANGE UP (ref 27–34)
MCHC RBC-ENTMCNC: 31.4 GM/DL — LOW (ref 32–36)
MCHC RBC-ENTMCNC: 31.4 GM/DL — LOW (ref 32–36)
MCV RBC AUTO: 92.7 FL — SIGNIFICANT CHANGE UP (ref 80–100)
MCV RBC AUTO: 92.7 FL — SIGNIFICANT CHANGE UP (ref 80–100)
NRBC # BLD: 0 /100 WBCS — SIGNIFICANT CHANGE UP (ref 0–0)
NRBC # BLD: 0 /100 WBCS — SIGNIFICANT CHANGE UP (ref 0–0)
NT-PROBNP SERPL-SCNC: 4129 PG/ML — HIGH (ref 0–300)
NT-PROBNP SERPL-SCNC: 4129 PG/ML — HIGH (ref 0–300)
PHOSPHATE SERPL-MCNC: 5.1 MG/DL — HIGH (ref 2.5–4.5)
PHOSPHATE SERPL-MCNC: 5.1 MG/DL — HIGH (ref 2.5–4.5)
PLATELET # BLD AUTO: 219 K/UL — SIGNIFICANT CHANGE UP (ref 150–400)
PLATELET # BLD AUTO: 219 K/UL — SIGNIFICANT CHANGE UP (ref 150–400)
POTASSIUM SERPL-MCNC: 5.2 MMOL/L — SIGNIFICANT CHANGE UP (ref 3.5–5.3)
POTASSIUM SERPL-MCNC: 5.2 MMOL/L — SIGNIFICANT CHANGE UP (ref 3.5–5.3)
POTASSIUM SERPL-SCNC: 5.2 MMOL/L — SIGNIFICANT CHANGE UP (ref 3.5–5.3)
POTASSIUM SERPL-SCNC: 5.2 MMOL/L — SIGNIFICANT CHANGE UP (ref 3.5–5.3)
RBC # BLD: 2.47 M/UL — LOW (ref 3.8–5.2)
RBC # BLD: 2.47 M/UL — LOW (ref 3.8–5.2)
RBC # FLD: 15.1 % — HIGH (ref 10.3–14.5)
RBC # FLD: 15.1 % — HIGH (ref 10.3–14.5)
SODIUM SERPL-SCNC: 137 MMOL/L — SIGNIFICANT CHANGE UP (ref 135–145)
SODIUM SERPL-SCNC: 137 MMOL/L — SIGNIFICANT CHANGE UP (ref 135–145)
WBC # BLD: 9.07 K/UL — SIGNIFICANT CHANGE UP (ref 3.8–10.5)
WBC # BLD: 9.07 K/UL — SIGNIFICANT CHANGE UP (ref 3.8–10.5)
WBC # FLD AUTO: 9.07 K/UL — SIGNIFICANT CHANGE UP (ref 3.8–10.5)
WBC # FLD AUTO: 9.07 K/UL — SIGNIFICANT CHANGE UP (ref 3.8–10.5)

## 2023-11-27 PROCEDURE — 99232 SBSQ HOSP IP/OBS MODERATE 35: CPT | Mod: GC

## 2023-11-27 PROCEDURE — 99233 SBSQ HOSP IP/OBS HIGH 50: CPT | Mod: GC

## 2023-11-27 RX ORDER — AZITHROMYCIN 500 MG/1
500 TABLET, FILM COATED ORAL DAILY
Refills: 0 | Status: DISCONTINUED | OUTPATIENT
Start: 2023-11-27 | End: 2023-11-27

## 2023-11-27 RX ADMIN — Medication 3 MILLILITER(S): at 23:54

## 2023-11-27 RX ADMIN — Medication 325 MILLIGRAM(S): at 11:49

## 2023-11-27 RX ADMIN — Medication 2: at 17:15

## 2023-11-27 RX ADMIN — SEVELAMER CARBONATE 800 MILLIGRAM(S): 2400 POWDER, FOR SUSPENSION ORAL at 13:28

## 2023-11-27 RX ADMIN — ATORVASTATIN CALCIUM 80 MILLIGRAM(S): 80 TABLET, FILM COATED ORAL at 21:55

## 2023-11-27 RX ADMIN — AMLODIPINE BESYLATE 10 MILLIGRAM(S): 2.5 TABLET ORAL at 05:50

## 2023-11-27 RX ADMIN — Medication 3 MILLILITER(S): at 05:49

## 2023-11-27 RX ADMIN — Medication 25 MILLIGRAM(S): at 21:55

## 2023-11-27 RX ADMIN — CHLORHEXIDINE GLUCONATE 1 APPLICATION(S): 213 SOLUTION TOPICAL at 11:49

## 2023-11-27 RX ADMIN — BUDESONIDE AND FORMOTEROL FUMARATE DIHYDRATE 2 PUFF(S): 160; 4.5 AEROSOL RESPIRATORY (INHALATION) at 17:17

## 2023-11-27 RX ADMIN — HEPARIN SODIUM 5000 UNIT(S): 5000 INJECTION INTRAVENOUS; SUBCUTANEOUS at 21:54

## 2023-11-27 RX ADMIN — MUPIROCIN 1 APPLICATION(S): 20 OINTMENT TOPICAL at 17:17

## 2023-11-27 RX ADMIN — SEVELAMER CARBONATE 800 MILLIGRAM(S): 2400 POWDER, FOR SUSPENSION ORAL at 05:49

## 2023-11-27 RX ADMIN — HEPARIN SODIUM 5000 UNIT(S): 5000 INJECTION INTRAVENOUS; SUBCUTANEOUS at 05:49

## 2023-11-27 RX ADMIN — Medication 1: at 21:54

## 2023-11-27 RX ADMIN — Medication 5: at 11:49

## 2023-11-27 RX ADMIN — MONTELUKAST 10 MILLIGRAM(S): 4 TABLET, CHEWABLE ORAL at 11:49

## 2023-11-27 RX ADMIN — HEPARIN SODIUM 5000 UNIT(S): 5000 INJECTION INTRAVENOUS; SUBCUTANEOUS at 13:28

## 2023-11-27 RX ADMIN — MUPIROCIN 1 APPLICATION(S): 20 OINTMENT TOPICAL at 05:50

## 2023-11-27 RX ADMIN — BUDESONIDE AND FORMOTEROL FUMARATE DIHYDRATE 2 PUFF(S): 160; 4.5 AEROSOL RESPIRATORY (INHALATION) at 05:49

## 2023-11-27 RX ADMIN — Medication 40 MILLIGRAM(S): at 05:50

## 2023-11-27 RX ADMIN — CALCITRIOL 0.25 MICROGRAM(S): 0.5 CAPSULE ORAL at 11:49

## 2023-11-27 RX ADMIN — Medication 3 MILLILITER(S): at 17:17

## 2023-11-27 RX ADMIN — Medication 3 MILLILITER(S): at 11:48

## 2023-11-27 RX ADMIN — Medication 25 MILLIGRAM(S): at 13:28

## 2023-11-27 RX ADMIN — SEVELAMER CARBONATE 800 MILLIGRAM(S): 2400 POWDER, FOR SUSPENSION ORAL at 21:55

## 2023-11-27 RX ADMIN — Medication 25 MILLIGRAM(S): at 05:49

## 2023-11-27 RX ADMIN — AZITHROMYCIN 500 MILLIGRAM(S): 500 TABLET, FILM COATED ORAL at 12:24

## 2023-11-27 NOTE — PROGRESS NOTE ADULT - SUBJECTIVE AND OBJECTIVE BOX
DATE OF SERVICE: 11-27-23 @ 14:44    Patient is a 83y old  Female who presents with a chief complaint of LE swelling, dyspnea (27 Nov 2023 09:41)      INTERVAL HISTORY: Reports increased SOB while ambulating with PT, no acute events    REVIEW OF SYSTEMS:  CONSTITUTIONAL: No weakness  EYES/ENT: No visual changes;  No throat pain   NECK: No pain or stiffness  RESPIRATORY: No cough, wheezing; No shortness of breath  CARDIOVASCULAR: No chest pain or palpitations  GASTROINTESTINAL: No abdominal  pain. No nausea, vomiting, or hematemesis  GENITOURINARY: No dysuria, frequency or hematuria  NEUROLOGICAL: No stroke like symptoms  SKIN: No rashes      	  MEDICATIONS:  amLODIPine   Tablet 10 milliGRAM(s) Oral daily  hydrALAZINE 25 milliGRAM(s) Oral three times a day        PHYSICAL EXAM:  T(C): 36.6 (11-27-23 @ 11:00), Max: 37.1 (11-26-23 @ 21:28)  HR: 89 (11-27-23 @ 11:00) (89 - 91)  BP: 113/58 (11-27-23 @ 11:00) (113/58 - 131/61)  RR: 22 (11-27-23 @ 11:00) (18 - 22)  SpO2: 98% (11-27-23 @ 11:00) (95% - 98%)  Wt(kg): --  I&O's Summary    26 Nov 2023 07:01  -  27 Nov 2023 07:00  --------------------------------------------------------  IN: 1120 mL / OUT: 950 mL / NET: 170 mL    27 Nov 2023 07:01  -  27 Nov 2023 14:44  --------------------------------------------------------  IN: 360 mL / OUT: 200 mL / NET: 160 mL      Height (cm): 160 (11-27 @ 09:41)  Weight (kg): 68 (11-27 @ 09:41)  BMI (kg/m2): 26.6 (11-27 @ 09:41)  BSA (m2): 1.71 (11-27 @ 09:41)    Appearance: In no distress	  HEENT:    PERRL, EOMI	  Cardiovascular:  S1 S2, No JVD  Respiratory: Lungs clear to auscultation	  Gastrointestinal:  Soft, Non-tender, + BS	  Vascularature:  No edema of LE  Psychiatric: Appropriate affect   Neuro: no acute focal deficits                               7.2    9.07  )-----------( 219      ( 27 Nov 2023 05:38 )             22.9     11-27    137  |  103  |  80<H>  ----------------------------<  145<H>  5.2   |  19<L>  |  6.71<H>    Ca    8.5      27 Nov 2023 05:37  Phos  5.1     11-27  Mg     2.4     11-27          Labs personally reviewed      ASSESSMENT/PLAN: 	  83 year old female with HTN, HLD, CAD, T2DM, CKD, remote hx of breast CA s/p mastectomy, and chronic hypoxemic respiratory failure of unknown etiology on home O2 who presents for lower extremity swelling and shortness of breath.      1. Acute diastolic heart failure  - Pt with LE edema and pulmonary congestion  - Agree with IV diuresis once no renal objection  - Monitor renal function   - Echo shows normal LV systolic function with Bi-atrial enlargement, G2DD and LVH  - EKG shows sinus rhythm (not atrial flutter)  - c/w IV Lasix as per renal recommendation, responding well to IV diuresis thus far though with steady rise in Cr  - Lasix increased to 80mg IV     2. CAL on CKD  - Renal following    3. HLD  - Statin therapy    4. HTN  - BP acceptable          RUTH ANN Martinez DO [facility]  Cardiovascular Medicine  [address], [address]  Available via call or text on [employer]  Office: [phone]

## 2023-11-27 NOTE — PROGRESS NOTE ADULT - PROBLEM SELECTOR PLAN 1
CAL on CKD  - Has not seen a physician in the recent past. Given her history its likely to be CAL on CKD 2/2 SAUMYA vs CKD progression. She has pedal edema. US duplex Kidney - showed renal parenchymal disease. UPCR 5.6g, no RBCs, no bacteria. FeUrea borderline but more suggestive of prerenal etiology.  SCr 4.5 on admission, received IV contrast for CTA on evening of 11/21, and Cr had been stable but increased about 2 days after IV contrast exposure suggesting SAUMYA.  Serology work up ongoing- so far negative HIV, Hep B, Hep C, C3, C4, Anti GBM antibody, Anti Ds DNA, JAYLAN, ANCA, Serum free light chains, immunofixation. A1c 6.1%.   Pending PLA2R Ab.  - O2 requirement: 8L->4L. CTA chest showed b/l ground-glass opacities. staph aureus swab PCR pos (not MRSA) and pt does have a cough and intermittent wheezing but no leukocytosis or fever. Would consider treating other possible etiologies for resp failure like COPD or PNA with Duonebs +/- Prednisone +/- abx (consider checking procal)  - BNP 1300 initially. TTE with G2DD with elevated filling pressures and severe LA dilation. Was on Lasix 40mg IV BID, but iso rising Cr, decreased Lasix to 40mg IV daily on 11/25/23. However, repeat CXR looks unchanged with pulm vasc congestion and BNP increased to 3000. Given worsening renal function (Cr 5.9-> 6.18), Lasix 40mg IV was likely not a high enough dose to achieve wanted response so please discontinue and give Lasix 80mg IV x1 today. Please record STRICT Is+Os (discussed with RN). Will reassess diuretic management tomorrow.  - Currently has no uremic s/s and no indication for RRT.  - Avoid nephrotoxic agents when possible and dose meds per eGFR

## 2023-11-27 NOTE — PROGRESS NOTE ADULT - SUBJECTIVE AND OBJECTIVE BOX
[facility] Division of Kidney Diseases & Hypertension  FOLLOW UP NOTE  [phone]--------------------------------------------------------------------------------  Chief Complaint:Shortness of breath    24 hour events/subjective:  No acute overnight events. Pt was seen at the bedside with her  in the same. Her respiratory status is the same on 6-8 L/min.  She denies Chest pain, abdominal pain, nausea, vomiting, diarrhea.      PAST HISTORY  --------------------------------------------------------------------------------  No significant changes to PMH, PSH, FHx, SHx, unless otherwise noted    ALLERGIES & MEDICATIONS  --------------------------------------------------------------------------------  Allergies    No Known Allergies    Intolerances      Standing Inpatient Medications  albuterol    90 MICROgram(s) HFA Inhaler 2 Puff(s) Inhalation every 6 hours  albuterol/ipratropium for Nebulization 3 milliLiter(s) Nebulizer every 6 hours  amLODIPine   Tablet 10 milliGRAM(s) Oral daily  atorvastatin 80 milliGRAM(s) Oral at bedtime  azithromycin   Tablet 500 milliGRAM(s) Oral daily  budesonide  80 MICROgram(s)/formoterol 4.5 MICROgram(s) Inhaler 2 Puff(s) Inhalation every 12 hours  calcitriol   Capsule 0.25 MICROGram(s) Oral daily  chlorhexidine 2% Cloths 1 Application(s) Topical daily  dextrose 5%. 1000 milliLiter(s) IV Continuous <Continuous>  dextrose 5%. 1000 milliLiter(s) IV Continuous <Continuous>  dextrose 50% Injectable 12.5 Gram(s) IV Push once  dextrose 50% Injectable 25 Gram(s) IV Push once  dextrose 50% Injectable 25 Gram(s) IV Push once  ferrous    sulfate 325 milliGRAM(s) Oral daily  glucagon  Injectable 1 milliGRAM(s) IntraMuscular once  heparin   Injectable 5000 Unit(s) SubCutaneous every 8 hours  hydrALAZINE 25 milliGRAM(s) Oral three times a day  influenza  Vaccine (HIGH DOSE) 0.7 milliLiter(s) IntraMuscular once  insulin lispro (ADMELOG) corrective regimen sliding scale   SubCutaneous at bedtime  insulin lispro (ADMELOG) corrective regimen sliding scale   SubCutaneous three times a day before meals  montelukast 10 milliGRAM(s) Oral daily  mupirocin 2% Nasal 1 Application(s) Both Nostrils every 12 hours  predniSONE   Tablet 40 milliGRAM(s) Oral daily  sevelamer carbonate 800 milliGRAM(s) Oral every 8 hours    PRN Inpatient Medications  acetaminophen     Tablet .. 650 milliGRAM(s) Oral every 6 hours PRN  dextrose Oral Gel 15 Gram(s) Oral once PRN      REVIEW OF SYSTEMS  --------------------------------------------------------------------------------  Per Above.     VITALS/PHYSICAL EXAM  --------------------------------------------------------------------------------  T(C): 36.6 (11-27-23 @ 11:00), Max: 37.1 (11-26-23 @ 21:28)  HR: 89 (11-27-23 @ 11:00) (89 - 91)  BP: 113/58 (11-27-23 @ 11:00) (113/58 - 131/61)  RR: 22 (11-27-23 @ 11:00) (18 - 22)  SpO2: 98% (11-27-23 @ 11:00) (95% - 98%)  Wt(kg): --  Height (cm): 160 (11-27-23 @ 09:41)  Weight (kg): 68 (11-27-23 @ 09:41)  BMI (kg/m2): 26.6 (11-27-23 @ 09:41)  BSA (m2): 1.71 (11-27-23 @ 09:41)      11-26-23 @ 07:01  -  11-27-23 @ 07:00  --------------------------------------------------------  IN: 1120 mL / OUT: 950 mL / NET: 170 mL    11-27-23 @ 07:01  -  11-27-23 @ 14:49  --------------------------------------------------------  IN: 360 mL / OUT: 200 mL / NET: 160 mL      Physical Exam:  General: no acute distress  Neuro: no focal deficits  HEENT: NC/AT, anicteric, no JVD  Pulmonary: breath sounds diminished, on 6L O2  Cardiovascular/Chest: +S1S2, RRR  GI/Abdomen: soft, NT/ND, +bowel sounds  Extremities: No edema  Skin: Warm and dry    LABS/STUDIES  --------------------------------------------------------------------------------              7.2    9.07  >-----------<  219      [11-27-23 @ 05:38]              22.9     137  |  103  |  80  ----------------------------<  145      [11-27-23 @ 05:37]  5.2   |  19  |  6.71        Ca     8.5     [11-27-23 @ 05:37]      Mg     2.4     [11-27-23 @ 05:37]      Phos  5.1     [11-27-23 @ 05:37]            Creatinine Trend:  SCr 6.71 [11-27 @ 05:37]  SCr 6.18 [11-26 @ 06:47]  SCr 5.96 [11-25 @ 06:14]  SCr 5.48 [11-24 @ 06:16]  SCr 4.65 [11-23 @ 06:25]    Urinalysis - [11-27-23 @ 05:37]      Color  / Appearance  / SG  / pH       Gluc 145 / Ketone   / Bili  / Urobili        Blood  / Protein  / Leuk Est  / Nitrite       RBC  / WBC  / Hyaline  / Gran  / Sq Epi  / Non Sq Epi  / Bacteria     Urine Creatinine 36      [11-22-23 @ 07:44]  Urine Protein 203      [11-22-23 @ 07:44]  Urine Sodium 113      [11-22-23 @ 07:44]  Urine Urea Nitrogen 150      [11-22-23 @ 07:51]  Urine Potassium 15      [11-22-23 @ 07:44]  Urine Osmolality 364      [11-22-23 @ 07:44]    Iron 68, TIBC 261, %sat 26      [11-25-23 @ 06:14]  Ferritin 81      [11-25-23 @ 06:14]  PTH -- (Ca 8.6)      [11-25-23 @ 06:14]   73  Vitamin D (25OH) 22.5      [11-25-23 @ 06:14]  Lipid: chol 116, TG 79, HDL 39, LDL --      [11-23-23 @ 06:25]    HBsAb <3.0      [11-23-23 @ 06:25]  HBsAg Nonreact      [11-23-23 @ 06:25]  HCV 0.08, Nonreact      [11-23-23 @ 06:25]  HIV Nonreact      [11-23-23 @ 06:25]    JAYLAN: titer Negative, pattern --      [11-23-23 @ 06:25]  dsDNA <12      [11-23-23 @ 06:25]  C3 Complement 126      [11-23-23 @ 06:25]  C4 Complement 26      [11-23-23 @ 06:25]  ANCA: cANCA Negative, pANCA Negative, atypical ANCA Negative      [11-23-23 @ 06:25]  anti-GBM <0.2      [11-23-23 @ 06:25]  Free Light Chains: kappa 13.06, lambda 7.08, ratio = 1.84      [11-23 @ 06:25]

## 2023-11-27 NOTE — PROGRESS NOTE ADULT - SUBJECTIVE AND OBJECTIVE BOX
PROGRESS NOTE:   Authored by Dr. Luis Perla MD (PGY-1).     Patient is a 83y old  Female who presents with a chief complaint of LE swelling, dyspnea (26 Nov 2023 16:58)      SUBJECTIVE / OVERNIGHT EVENTS:  No acute events overnight. This morning the patient is endorsing worsening dyspnea.     MEDICATIONS  (STANDING):  albuterol    90 MICROgram(s) HFA Inhaler 2 Puff(s) Inhalation every 6 hours  albuterol/ipratropium for Nebulization 3 milliLiter(s) Nebulizer every 6 hours  amLODIPine   Tablet 10 milliGRAM(s) Oral daily  atorvastatin 80 milliGRAM(s) Oral at bedtime  azithromycin   Tablet 500 milliGRAM(s) Oral daily  budesonide  80 MICROgram(s)/formoterol 4.5 MICROgram(s) Inhaler 2 Puff(s) Inhalation every 12 hours  calcitriol   Capsule 0.25 MICROGram(s) Oral daily  chlorhexidine 2% Cloths 1 Application(s) Topical daily  dextrose 5%. 1000 milliLiter(s) (50 mL/Hr) IV Continuous <Continuous>  dextrose 5%. 1000 milliLiter(s) (100 mL/Hr) IV Continuous <Continuous>  dextrose 50% Injectable 25 Gram(s) IV Push once  dextrose 50% Injectable 12.5 Gram(s) IV Push once  dextrose 50% Injectable 25 Gram(s) IV Push once  ferrous    sulfate 325 milliGRAM(s) Oral daily  glucagon  Injectable 1 milliGRAM(s) IntraMuscular once  heparin   Injectable 5000 Unit(s) SubCutaneous every 8 hours  hydrALAZINE 25 milliGRAM(s) Oral three times a day  influenza  Vaccine (HIGH DOSE) 0.7 milliLiter(s) IntraMuscular once  insulin lispro (ADMELOG) corrective regimen sliding scale   SubCutaneous at bedtime  insulin lispro (ADMELOG) corrective regimen sliding scale   SubCutaneous three times a day before meals  montelukast 10 milliGRAM(s) Oral daily  mupirocin 2% Nasal 1 Application(s) Both Nostrils every 12 hours  predniSONE   Tablet 40 milliGRAM(s) Oral daily  sevelamer carbonate 800 milliGRAM(s) Oral every 8 hours    MEDICATIONS  (PRN):  acetaminophen     Tablet .. 650 milliGRAM(s) Oral every 6 hours PRN Temp greater or equal to 38C (100.4F), Mild Pain (1 - 3)  dextrose Oral Gel 15 Gram(s) Oral once PRN Blood Glucose LESS THAN 70 milliGRAM(s)/deciliter      CAPILLARY BLOOD GLUCOSE      POCT Blood Glucose.: 147 mg/dL (27 Nov 2023 08:27)  POCT Blood Glucose.: 255 mg/dL (26 Nov 2023 21:40)  POCT Blood Glucose.: 253 mg/dL (26 Nov 2023 16:31)  POCT Blood Glucose.: 293 mg/dL (26 Nov 2023 11:33)    I&O's Summary    26 Nov 2023 07:01  -  27 Nov 2023 07:00  --------------------------------------------------------  IN: 1120 mL / OUT: 950 mL / NET: 170 mL    27 Nov 2023 07:01  -  27 Nov 2023 09:41  --------------------------------------------------------  IN: 120 mL / OUT: 200 mL / NET: -80 mL        PHYSICAL EXAM:  Vital Signs Last 24 Hrs  T(C): 36.9 (27 Nov 2023 05:08), Max: 37.1 (26 Nov 2023 21:28)  T(F): 98.4 (27 Nov 2023 05:08), Max: 98.8 (26 Nov 2023 21:28)  HR: 90 (27 Nov 2023 05:08) (62 - 90)  BP: 131/54 (27 Nov 2023 05:08) (124/60 - 131/54)  BP(mean): --  RR: 18 (27 Nov 2023 05:08) (18 - 18)  SpO2: 95% (27 Nov 2023 05:08) (94% - 96%)    Parameters below as of 27 Nov 2023 05:08  Patient On (Oxygen Delivery Method): nasal cannula        CONSTITUTIONAL: NAD, well-developed  HEET: MMM, EOMI, PERRLA  NECK: supple  RESPIRATORY: crackles bilaterally  CARDIOVASCULAR: Regular rate and rhythm, normal S1 and S2, no murmur/rub/gallop; No lower extremity edema; Peripheral pulses are 2+ bilaterally  ABDOMEN: Nontender to palpation, normoactive bowel sounds, no rebound/guarding; No hepatosplenomegaly  MUSCULOSKELETAL: no clubbing or cyanosis of digits; no joint swelling or tenderness to palpation  NEURO: Moving all four extremities, sensation grossly intact  PSYCH: A+O to person, place, and time; affect appropriate  SKIN: No rash    LABS:                        7.2    9.07  )-----------( 219      ( 27 Nov 2023 05:38 )             22.9     11-27    137  |  103  |  80<H>  ----------------------------<  145<H>  5.2   |  19<L>  |  6.71<H>    Ca    8.5      27 Nov 2023 05:37  Phos  5.1     11-27  Mg     2.4     11-27            Urinalysis Basic - ( 27 Nov 2023 05:37 )    Color: x / Appearance: x / SG: x / pH: x  Gluc: 145 mg/dL / Ketone: x  / Bili: x / Urobili: x   Blood: x / Protein: x / Nitrite: x   Leuk Esterase: x / RBC: x / WBC x   Sq Epi: x / Non Sq Epi: x / Bacteria: x          Tele Reviewed:    RADIOLOGY & ADDITIONAL TESTS:  Results Reviewed:   Imaging Personally Reviewed:  Electrocardiogram Personally Reviewed:

## 2023-11-27 NOTE — PROGRESS NOTE ADULT - ATTENDING COMMENTS
83F with chronic hypoxemic respiratory failure, CKD, T2DM, CAD, HTN, and HLD who presents for worsening dyspnea and lower extremity edema, admitted for suspected acute decompensated heart failure with CAL on likely CKD. Unclear baseline Cr, but significantly elevated currently with nephrotic range proteinuria. TTE showing diastolic HF, remains volume overloaded on exam.  ProBNP elevated to 3000s. On Lasix 40mg IV BID    #acute on chronic diastolic HF   #CAL on CKD, #nephrotic range proteinuria   #Acute on chronic respiratory failure  #HTN, HLD, DM2    - volume status better, but Cr rising significant, today 6.7 - hold further lasic and f/u renal, ?need for HD on this admission   - pt also more tachypneic on exam today, may be 2/2 ?COPD exacerbation - c/w prednisone and Azithro   - strict I/Os, daily weights   - continue to hold home spironolactone, telmisartan, and farxiga given CAL  - c/w hydralazine 25mg TID for afterload reduction - uptitrate as needed   - TTE w/moderate (grade 2) left ventricular diastolic dysfunction, with elevated filling pressure.  - pt with chronic hypoxemic RF of unclear etiology, ?COPD given long standing smoking history -  c/w home O2    pt high risk given worsening renal failure possibly requiring HD and worsening resp status

## 2023-11-27 NOTE — PROGRESS NOTE ADULT - ATTENDING COMMENTS
# CAL on CKD vs CKD. Serum creatinine continues to rise to 6.7 mg/dL. She received IV contrast (CTA chest)on 11/21/2023- probably too long ago to explain this SCr increase.     No uremic symptoms, no asterixis, no pleural rub. We will continue to monitor closely for dialysis needs.      Repeat urine studies- may need RRT is SCr cont to rise # CAL on CKD vs CKD. Serum creatinine continues to rise to 6.7 mg/dL. She received IV contrast (CTA chest)on 11/21/2023- probably too long ago to explain this SCr increase.     No uremic symptoms, no asterixis, no pleural rub. We will continue to monitor closely for dialysis needs.      UA- U Pro 5.6 grams- serum albumin close to normal    Repeat labs

## 2023-11-27 NOTE — PROGRESS NOTE ADULT - PROBLEM SELECTOR PLAN 4
Patient with several year history of chronic hypoxemic respiratory failure  - requiring home O2, stable on 2L at baseline  - has not had escalating O2 requirements recently, despite feeling subjectively more dyspneic  - unclear etiology, though patient has had PFTs at her pulmologist's office last year    Plan:  > follow up TTE--> showing EF 71%, bi-atrial enlargement, LV diastolic dysfunction, and RV enlargement  > obtain records from pt's pulmonologists office: Dr. Paulino Gayle ([facility], [phone])  > continue with home montelukast and add on duonebs and symbicort   > wean O2 as tolerated  > add on prednisone and azithromycin for possible COPD exacerbation   > 11/25 Initiated 40 mg prednisone for potential COPD exacerbation   > if persistently hypoxemic and despite diuresis, consider alternative etiologies and potentially high resolution CT chest  - f/u repeat CXR and BNP Patient with several year history of chronic hypoxemic respiratory failure  - requiring home O2, stable on 2L at baseline  - has not had escalating O2 requirements recently, despite feeling subjectively more dyspneic  - unclear etiology, though patient has had PFTs at her pulmologist's office last year    Plan:  > follow up TTE--> showing EF 71%, bi-atrial enlargement, LV diastolic dysfunction, and RV enlargement  > obtain records from pt's pulmonologists office: Dr. Paulino Gayle ([facility], [phone])  > continue with home montelukast and add on duonebs and symbicort   > wean O2 as tolerated  > add on prednisone and azithromycin for possible COPD exacerbation   > 11/25 Initiated 40 mg prednisone for potential COPD exacerbation   > if persistently hypoxemic and despite diuresis, consider alternative etiologies and potentially high resolution CT chest  - f/u repeat CXR and BNP (stable CXR and elevated BNP)

## 2023-11-27 NOTE — PROGRESS NOTE ADULT - PROBLEM SELECTOR PLAN 2
Anemia: Hgb 7.2 today.  - 8.9 this admission. Based on iron studies, would benefit from iron supplementation, can start with daily supplementation, Likely also has a component of anemia of renal disease, which can be followed up outpatient and may require ESAs.  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 Both 25-OH and 1-25-OH vitamin D levels low. Sevelamer 800 TID for now. Started calcitriol 25mcg daily on 11/25.

## 2023-11-27 NOTE — PROGRESS NOTE ADULT - PROBLEM SELECTOR PLAN 2
- Hb downtrending during admission  - unclear baseline Hb  - likely due to worsening renal function  - f/u ferritin and iron studies (wnl)

## 2023-11-28 PROBLEM — Z00.00 ENCOUNTER FOR PREVENTIVE HEALTH EXAMINATION: Status: ACTIVE | Noted: 2023-11-28

## 2023-11-28 LAB
ANION GAP SERPL CALC-SCNC: 13 MMOL/L — SIGNIFICANT CHANGE UP (ref 5–17)
APPEARANCE UR: CLEAR — SIGNIFICANT CHANGE UP
BACTERIA # UR AUTO: ABNORMAL /HPF
BACTERIA # UR AUTO: ABNORMAL /HPF
BACTERIA # UR AUTO: NEGATIVE /HPF — SIGNIFICANT CHANGE UP
BACTERIA # UR AUTO: NEGATIVE /HPF — SIGNIFICANT CHANGE UP
BILIRUB UR-MCNC: NEGATIVE — SIGNIFICANT CHANGE UP
BLD GP AB SCN SERPL QL: NEGATIVE — SIGNIFICANT CHANGE UP
BLD GP AB SCN SERPL QL: NEGATIVE — SIGNIFICANT CHANGE UP
BUN SERPL-MCNC: 88 MG/DL — HIGH (ref 7–23)
BUN SERPL-MCNC: 88 MG/DL — HIGH (ref 7–23)
BUN SERPL-MCNC: 90 MG/DL — HIGH (ref 7–23)
BUN SERPL-MCNC: 90 MG/DL — HIGH (ref 7–23)
CALCIUM SERPL-MCNC: 8.3 MG/DL — LOW (ref 8.4–10.5)
CALCIUM SERPL-MCNC: 8.3 MG/DL — LOW (ref 8.4–10.5)
CALCIUM SERPL-MCNC: 8.4 MG/DL — SIGNIFICANT CHANGE UP (ref 8.4–10.5)
CALCIUM SERPL-MCNC: 8.4 MG/DL — SIGNIFICANT CHANGE UP (ref 8.4–10.5)
CAST: 12 /LPF — HIGH (ref 0–4)
CAST: 12 /LPF — HIGH (ref 0–4)
CAST: 5 /LPF — HIGH (ref 0–4)
CAST: 5 /LPF — HIGH (ref 0–4)
CHLORIDE SERPL-SCNC: 102 MMOL/L — SIGNIFICANT CHANGE UP (ref 96–108)
CHLORIDE SERPL-SCNC: 102 MMOL/L — SIGNIFICANT CHANGE UP (ref 96–108)
CHLORIDE SERPL-SCNC: 104 MMOL/L — SIGNIFICANT CHANGE UP (ref 96–108)
CHLORIDE SERPL-SCNC: 104 MMOL/L — SIGNIFICANT CHANGE UP (ref 96–108)
CO2 SERPL-SCNC: 18 MMOL/L — LOW (ref 22–31)
CO2 SERPL-SCNC: 18 MMOL/L — LOW (ref 22–31)
CO2 SERPL-SCNC: 21 MMOL/L — LOW (ref 22–31)
CO2 SERPL-SCNC: 21 MMOL/L — LOW (ref 22–31)
COLOR SPEC: YELLOW — SIGNIFICANT CHANGE UP
CREAT ?TM UR-MCNC: 141 MG/DL — SIGNIFICANT CHANGE UP
CREAT SERPL-MCNC: 6.4 MG/DL — HIGH (ref 0.5–1.3)
CREAT SERPL-MCNC: 6.4 MG/DL — HIGH (ref 0.5–1.3)
CREAT SERPL-MCNC: 6.45 MG/DL — HIGH (ref 0.5–1.3)
CREAT SERPL-MCNC: 6.45 MG/DL — HIGH (ref 0.5–1.3)
DIFF PNL FLD: NEGATIVE — SIGNIFICANT CHANGE UP
EGFR: 6 ML/MIN/1.73M2 — LOW
GLUCOSE BLDC GLUCOMTR-MCNC: 147 MG/DL — HIGH (ref 70–99)
GLUCOSE BLDC GLUCOMTR-MCNC: 147 MG/DL — HIGH (ref 70–99)
GLUCOSE BLDC GLUCOMTR-MCNC: 152 MG/DL — HIGH (ref 70–99)
GLUCOSE BLDC GLUCOMTR-MCNC: 152 MG/DL — HIGH (ref 70–99)
GLUCOSE BLDC GLUCOMTR-MCNC: 192 MG/DL — HIGH (ref 70–99)
GLUCOSE BLDC GLUCOMTR-MCNC: 200 MG/DL — HIGH (ref 70–99)
GLUCOSE BLDC GLUCOMTR-MCNC: 200 MG/DL — HIGH (ref 70–99)
GLUCOSE BLDC GLUCOMTR-MCNC: 287 MG/DL — HIGH (ref 70–99)
GLUCOSE BLDC GLUCOMTR-MCNC: 287 MG/DL — HIGH (ref 70–99)
GLUCOSE SERPL-MCNC: 148 MG/DL — HIGH (ref 70–99)
GLUCOSE SERPL-MCNC: 148 MG/DL — HIGH (ref 70–99)
GLUCOSE SERPL-MCNC: 163 MG/DL — HIGH (ref 70–99)
GLUCOSE SERPL-MCNC: 163 MG/DL — HIGH (ref 70–99)
GLUCOSE UR QL: 100 MG/DL
HCT VFR BLD CALC: 22.3 % — LOW (ref 34.5–45)
HCT VFR BLD CALC: 22.3 % — LOW (ref 34.5–45)
HGB BLD-MCNC: 7.2 G/DL — LOW (ref 11.5–15.5)
HGB BLD-MCNC: 7.2 G/DL — LOW (ref 11.5–15.5)
KETONES UR-MCNC: NEGATIVE MG/DL — SIGNIFICANT CHANGE UP
LEUKOCYTE ESTERASE UR-ACNC: ABNORMAL
MAGNESIUM SERPL-MCNC: 2.5 MG/DL — SIGNIFICANT CHANGE UP (ref 1.6–2.6)
MAGNESIUM SERPL-MCNC: 2.5 MG/DL — SIGNIFICANT CHANGE UP (ref 1.6–2.6)
MCHC RBC-ENTMCNC: 29.8 PG — SIGNIFICANT CHANGE UP (ref 27–34)
MCHC RBC-ENTMCNC: 29.8 PG — SIGNIFICANT CHANGE UP (ref 27–34)
MCHC RBC-ENTMCNC: 32.3 GM/DL — SIGNIFICANT CHANGE UP (ref 32–36)
MCHC RBC-ENTMCNC: 32.3 GM/DL — SIGNIFICANT CHANGE UP (ref 32–36)
MCV RBC AUTO: 92.1 FL — SIGNIFICANT CHANGE UP (ref 80–100)
MCV RBC AUTO: 92.1 FL — SIGNIFICANT CHANGE UP (ref 80–100)
NITRITE UR-MCNC: NEGATIVE — SIGNIFICANT CHANGE UP
NRBC # BLD: 0 /100 WBCS — SIGNIFICANT CHANGE UP (ref 0–0)
NRBC # BLD: 0 /100 WBCS — SIGNIFICANT CHANGE UP (ref 0–0)
OSMOLALITY UR: 363 MOS/KG — SIGNIFICANT CHANGE UP (ref 300–900)
OSMOLALITY UR: 363 MOS/KG — SIGNIFICANT CHANGE UP (ref 300–900)
PH UR: 5 — SIGNIFICANT CHANGE UP (ref 5–8)
PHOSPHATE SERPL-MCNC: 5 MG/DL — HIGH (ref 2.5–4.5)
PHOSPHATE SERPL-MCNC: 5 MG/DL — HIGH (ref 2.5–4.5)
PHOSPHOLIPASE A2 RECEPTOR ELISA: <1.8 RU/ML — SIGNIFICANT CHANGE UP (ref 0–19.9)
PHOSPHOLIPASE A2 RECEPTOR ELISA: <1.8 RU/ML — SIGNIFICANT CHANGE UP (ref 0–19.9)
PLATELET # BLD AUTO: 208 K/UL — SIGNIFICANT CHANGE UP (ref 150–400)
PLATELET # BLD AUTO: 208 K/UL — SIGNIFICANT CHANGE UP (ref 150–400)
POTASSIUM SERPL-MCNC: 5.4 MMOL/L — HIGH (ref 3.5–5.3)
POTASSIUM SERPL-SCNC: 5.4 MMOL/L — HIGH (ref 3.5–5.3)
POTASSIUM UR-SCNC: 34 MMOL/L — SIGNIFICANT CHANGE UP
POTASSIUM UR-SCNC: 34 MMOL/L — SIGNIFICANT CHANGE UP
PROT ?TM UR-MCNC: 313 MG/DL — HIGH (ref 0–12)
PROT UR-MCNC: 300 MG/DL
PROT/CREAT UR-RTO: 2.2 RATIO — HIGH (ref 0–0.2)
RBC # BLD: 2.42 M/UL — LOW (ref 3.8–5.2)
RBC # BLD: 2.42 M/UL — LOW (ref 3.8–5.2)
RBC # FLD: 15 % — HIGH (ref 10.3–14.5)
RBC # FLD: 15 % — HIGH (ref 10.3–14.5)
RBC CASTS # UR COMP ASSIST: 0 /HPF — SIGNIFICANT CHANGE UP (ref 0–4)
RBC CASTS # UR COMP ASSIST: 0 /HPF — SIGNIFICANT CHANGE UP (ref 0–4)
RBC CASTS # UR COMP ASSIST: 1 /HPF — SIGNIFICANT CHANGE UP (ref 0–4)
RBC CASTS # UR COMP ASSIST: 1 /HPF — SIGNIFICANT CHANGE UP (ref 0–4)
REVIEW: SIGNIFICANT CHANGE UP
RH IG SCN BLD-IMP: POSITIVE — SIGNIFICANT CHANGE UP
RH IG SCN BLD-IMP: POSITIVE — SIGNIFICANT CHANGE UP
SODIUM SERPL-SCNC: 135 MMOL/L — SIGNIFICANT CHANGE UP (ref 135–145)
SODIUM SERPL-SCNC: 135 MMOL/L — SIGNIFICANT CHANGE UP (ref 135–145)
SODIUM SERPL-SCNC: 136 MMOL/L — SIGNIFICANT CHANGE UP (ref 135–145)
SODIUM SERPL-SCNC: 136 MMOL/L — SIGNIFICANT CHANGE UP (ref 135–145)
SODIUM UR-SCNC: 24 MMOL/L — SIGNIFICANT CHANGE UP
SODIUM UR-SCNC: 24 MMOL/L — SIGNIFICANT CHANGE UP
SP GR SPEC: 1.02 — SIGNIFICANT CHANGE UP (ref 1–1.03)
SQUAMOUS # UR AUTO: 4 /HPF — SIGNIFICANT CHANGE UP (ref 0–5)
SQUAMOUS # UR AUTO: 4 /HPF — SIGNIFICANT CHANGE UP (ref 0–5)
SQUAMOUS # UR AUTO: 5 /HPF — SIGNIFICANT CHANGE UP (ref 0–5)
SQUAMOUS # UR AUTO: 5 /HPF — SIGNIFICANT CHANGE UP (ref 0–5)
UROBILINOGEN FLD QL: 0.2 MG/DL — SIGNIFICANT CHANGE UP (ref 0.2–1)
WBC # BLD: 8.04 K/UL — SIGNIFICANT CHANGE UP (ref 3.8–10.5)
WBC # BLD: 8.04 K/UL — SIGNIFICANT CHANGE UP (ref 3.8–10.5)
WBC # FLD AUTO: 8.04 K/UL — SIGNIFICANT CHANGE UP (ref 3.8–10.5)
WBC # FLD AUTO: 8.04 K/UL — SIGNIFICANT CHANGE UP (ref 3.8–10.5)
WBC UR QL: 12 /HPF — HIGH (ref 0–5)
WBC UR QL: 12 /HPF — HIGH (ref 0–5)
WBC UR QL: 15 /HPF — HIGH (ref 0–5)
WBC UR QL: 15 /HPF — HIGH (ref 0–5)

## 2023-11-28 PROCEDURE — 99223 1ST HOSP IP/OBS HIGH 75: CPT | Mod: GC

## 2023-11-28 PROCEDURE — 99232 SBSQ HOSP IP/OBS MODERATE 35: CPT | Mod: GC

## 2023-11-28 RX ORDER — INSULIN HUMAN 100 [IU]/ML
10 INJECTION, SOLUTION SUBCUTANEOUS ONCE
Refills: 0 | Status: COMPLETED | OUTPATIENT
Start: 2023-11-28 | End: 2023-11-28

## 2023-11-28 RX ORDER — SODIUM ZIRCONIUM CYCLOSILICATE 10 G/10G
10 POWDER, FOR SUSPENSION ORAL ONCE
Refills: 0 | Status: COMPLETED | OUTPATIENT
Start: 2023-11-28 | End: 2023-11-28

## 2023-11-28 RX ORDER — AZITHROMYCIN 500 MG/1
500 TABLET, FILM COATED ORAL DAILY
Refills: 0 | Status: COMPLETED | OUTPATIENT
Start: 2023-11-28 | End: 2023-11-29

## 2023-11-28 RX ORDER — SODIUM CHLORIDE 0.65 %
1 AEROSOL, SPRAY (ML) NASAL DAILY
Refills: 0 | Status: DISCONTINUED | OUTPATIENT
Start: 2023-11-28 | End: 2023-12-15

## 2023-11-28 RX ORDER — FLUTICASONE PROPIONATE 50 MCG
1 SPRAY, SUSPENSION NASAL
Refills: 0 | Status: DISCONTINUED | OUTPATIENT
Start: 2023-11-28 | End: 2023-12-15

## 2023-11-28 RX ORDER — DEXTROSE 50 % IN WATER 50 %
50 SYRINGE (ML) INTRAVENOUS ONCE
Refills: 0 | Status: COMPLETED | OUTPATIENT
Start: 2023-11-28 | End: 2023-11-28

## 2023-11-28 RX ADMIN — Medication 25 MILLIGRAM(S): at 05:38

## 2023-11-28 RX ADMIN — Medication 3 MILLILITER(S): at 05:39

## 2023-11-28 RX ADMIN — Medication 3 MILLILITER(S): at 12:10

## 2023-11-28 RX ADMIN — Medication 1: at 12:53

## 2023-11-28 RX ADMIN — Medication 50 MILLILITER(S): at 22:30

## 2023-11-28 RX ADMIN — SEVELAMER CARBONATE 800 MILLIGRAM(S): 2400 POWDER, FOR SUSPENSION ORAL at 13:17

## 2023-11-28 RX ADMIN — Medication 25 MILLIGRAM(S): at 21:24

## 2023-11-28 RX ADMIN — Medication 1 SPRAY(S): at 17:34

## 2023-11-28 RX ADMIN — Medication 40 MILLIGRAM(S): at 17:33

## 2023-11-28 RX ADMIN — Medication 325 MILLIGRAM(S): at 12:10

## 2023-11-28 RX ADMIN — MUPIROCIN 1 APPLICATION(S): 20 OINTMENT TOPICAL at 17:21

## 2023-11-28 RX ADMIN — HEPARIN SODIUM 5000 UNIT(S): 5000 INJECTION INTRAVENOUS; SUBCUTANEOUS at 21:24

## 2023-11-28 RX ADMIN — SODIUM ZIRCONIUM CYCLOSILICATE 10 GRAM(S): 10 POWDER, FOR SUSPENSION ORAL at 10:08

## 2023-11-28 RX ADMIN — CALCITRIOL 0.25 MICROGRAM(S): 0.5 CAPSULE ORAL at 12:10

## 2023-11-28 RX ADMIN — SEVELAMER CARBONATE 800 MILLIGRAM(S): 2400 POWDER, FOR SUSPENSION ORAL at 21:24

## 2023-11-28 RX ADMIN — Medication 3 MILLILITER(S): at 21:25

## 2023-11-28 RX ADMIN — HEPARIN SODIUM 5000 UNIT(S): 5000 INJECTION INTRAVENOUS; SUBCUTANEOUS at 13:18

## 2023-11-28 RX ADMIN — ATORVASTATIN CALCIUM 80 MILLIGRAM(S): 80 TABLET, FILM COATED ORAL at 21:24

## 2023-11-28 RX ADMIN — HEPARIN SODIUM 5000 UNIT(S): 5000 INJECTION INTRAVENOUS; SUBCUTANEOUS at 05:39

## 2023-11-28 RX ADMIN — MUPIROCIN 1 APPLICATION(S): 20 OINTMENT TOPICAL at 05:42

## 2023-11-28 RX ADMIN — SODIUM ZIRCONIUM CYCLOSILICATE 10 GRAM(S): 10 POWDER, FOR SUSPENSION ORAL at 23:29

## 2023-11-28 RX ADMIN — MONTELUKAST 10 MILLIGRAM(S): 4 TABLET, CHEWABLE ORAL at 12:11

## 2023-11-28 RX ADMIN — Medication 25 MILLIGRAM(S): at 13:17

## 2023-11-28 RX ADMIN — BUDESONIDE AND FORMOTEROL FUMARATE DIHYDRATE 2 PUFF(S): 160; 4.5 AEROSOL RESPIRATORY (INHALATION) at 17:20

## 2023-11-28 RX ADMIN — INSULIN HUMAN 10 UNIT(S): 100 INJECTION, SOLUTION SUBCUTANEOUS at 22:30

## 2023-11-28 RX ADMIN — AZITHROMYCIN 500 MILLIGRAM(S): 500 TABLET, FILM COATED ORAL at 18:25

## 2023-11-28 RX ADMIN — Medication 1: at 08:31

## 2023-11-28 RX ADMIN — SEVELAMER CARBONATE 800 MILLIGRAM(S): 2400 POWDER, FOR SUSPENSION ORAL at 05:38

## 2023-11-28 RX ADMIN — Medication 3 MILLILITER(S): at 17:20

## 2023-11-28 RX ADMIN — AMLODIPINE BESYLATE 10 MILLIGRAM(S): 2.5 TABLET ORAL at 05:39

## 2023-11-28 RX ADMIN — BUDESONIDE AND FORMOTEROL FUMARATE DIHYDRATE 2 PUFF(S): 160; 4.5 AEROSOL RESPIRATORY (INHALATION) at 05:39

## 2023-11-28 RX ADMIN — CHLORHEXIDINE GLUCONATE 1 APPLICATION(S): 213 SOLUTION TOPICAL at 12:11

## 2023-11-28 NOTE — PROGRESS NOTE ADULT - ATTENDING COMMENTS
82 yo woman adm for increased SOB and edema  Tx w diuretics -    CAL on CKD vs CKD  Serum creatinine stabilized at 6.4 today  She received IV contrast (CTA chest)on 11/21/2023- probably too long ago to explain this SCr increase.     No uremic symptoms, no asterixis, no pleural rub. We will continue to monitor closely for dialysis needs.      UA- U Pro 5.6 grams- serum albumin close to normal    Repeat labs 84 yo woman adm for increased SOB and edema  Tx w diuretics -    CAL on CKD vs CKD  Serum creatinine stabilized at 6.4 today  She received IV contrast (CTA chest)on 11/21/2023- probably too long ago to explain this SCr increase.     No uremic symptoms, no asterixis, no pleural rub. We will continue to monitor closely for dialysis needs.      UA- U Pro 5.6 grams- serum albumin close to normal    Repeat labs

## 2023-11-28 NOTE — CONSULT NOTE ADULT - SUBJECTIVE AND OBJECTIVE BOX
PATIENT:  STEFANEI KEENAN  [id number]    CHIEF COMPLAINT:  Patient is a 83y old  Female who presents with a chief complaint of LE swelling, dyspnea (2023 10:58)    INTERVAL HISTORY/OVERNIGHT EVENTS:  83 year old female with HTN, HLD, CAD, T2DM, CKD, remote hx of breast CA s/p mastectomy, and chronic hypoxemic respiratory failure of unknown etiology on home O2 who presents for lower extremity swelling and shortness of breath. History obtained from the patient and her  at bedside who report that over the past 1-2 days the patient has had increasing breathlessness and bilateral lower extremity swelling. The patient notes that over the past 2-3 weeks, she has become progressively more fatigued and intermittently more dyspneic. She reports that her dyspnea worsened significantly over the past 2 days, though her oxygen requirements have remained consistent. The patient also endorses paroxysmal nocturnal dyspnea and orthopnea for several years, in addition to relatively new onset bendopnea. She does endorse chronic lower extremity edema, but reports acute worsening over the past few days.    Notably, the patient has had chronic hypoxemic respiratory failure for over 1 year, and had PFTs at her pulmonologist's office, but does not know if she was ever formally diagnosed with COPD or asthma. The patient has been on home oxygen via nasal canula (baseline 2L) for over a year, and has had a persistent dry cough for several years. She reports that she was in the process of seeing several specialists to figure out her diagnoses but had to stop due to COVID 19. She presently denies any chest pain, shortness of breath, fevers, chills, or urinary symptoms.     ED Course:  VS: HR 80-90s, BP 180s/70s, afebrile, SpO2 95% on 3L NC. Labs remarkable for hgb 8.7, HCO3 17, BUN 55, Cr 4.54, pro-BNP 1300, pH 7.26, hsTropT 74 --> 74. CTA chest with cardiomegaly and bilateral groundglass opacities.     MEDICATIONS:  MEDICATIONS  (STANDING):  albuterol    90 MICROgram(s) HFA Inhaler 2 Puff(s) Inhalation every 6 hours  albuterol/ipratropium for Nebulization 3 milliLiter(s) Nebulizer every 6 hours  amLODIPine   Tablet 10 milliGRAM(s) Oral daily  atorvastatin 80 milliGRAM(s) Oral at bedtime  budesonide  80 MICROgram(s)/formoterol 4.5 MICROgram(s) Inhaler 2 Puff(s) Inhalation every 12 hours  calcitriol   Capsule 0.25 MICROGram(s) Oral daily  chlorhexidine 2% Cloths 1 Application(s) Topical daily  dextrose 5%. 1000 milliLiter(s) (50 mL/Hr) IV Continuous <Continuous>  dextrose 5%. 1000 milliLiter(s) (100 mL/Hr) IV Continuous <Continuous>  dextrose 50% Injectable 12.5 Gram(s) IV Push once  dextrose 50% Injectable 25 Gram(s) IV Push once  dextrose 50% Injectable 25 Gram(s) IV Push once  ferrous    sulfate 325 milliGRAM(s) Oral daily  glucagon  Injectable 1 milliGRAM(s) IntraMuscular once  heparin   Injectable 5000 Unit(s) SubCutaneous every 8 hours  hydrALAZINE 25 milliGRAM(s) Oral three times a day  influenza  Vaccine (HIGH DOSE) 0.7 milliLiter(s) IntraMuscular once  insulin lispro (ADMELOG) corrective regimen sliding scale   SubCutaneous at bedtime  insulin lispro (ADMELOG) corrective regimen sliding scale   SubCutaneous three times a day before meals  montelukast 10 milliGRAM(s) Oral daily  mupirocin 2% Nasal 1 Application(s) Both Nostrils every 12 hours  sevelamer carbonate 800 milliGRAM(s) Oral every 8 hours    MEDICATIONS  (PRN):  acetaminophen     Tablet .. 650 milliGRAM(s) Oral every 6 hours PRN Temp greater or equal to 38C (100.4F), Mild Pain (1 - 3)  dextrose Oral Gel 15 Gram(s) Oral once PRN Blood Glucose LESS THAN 70 milliGRAM(s)/deciliter    ALLERGIES:  Allergies    No Known Allergies    Intolerances    OBJECTIVE:  ICU Vital Signs Last 24 Hrs  T(C): 36.7 (2023 11:11), Max: 37.1 (2023 04:54)  T(F): 98 (2023 11:11), Max: 98.8 (2023 04:54)  HR: 90 (2023 13:12) (76 - 91)  BP: 127/58 (2023 13:12) (121/58 - 133/55)  RR: 21 (2023 11:11) (20 - 21)  SpO2: 95% (2023 11:11) (95% - 98%)    O2 Parameters below as of 2023 11:11  Patient On (Oxygen Delivery Method): nasal cannula    POCT Blood Glucose.: 192 mg/dL (2023 11:40)  POCT Blood Glucose.: 152 mg/dL (2023 07:42)  POCT Blood Glucose.: 261 mg/dL (2023 21:47)  POCT Blood Glucose.: 225 mg/dL (2023 16:43)  POCT Blood Glucose.: 192 mg/dL (2023 11:40)    I&O's Summary    2023 07:01  -  2023 07:00  --------------------------------------------------------  IN: 560 mL / OUT: 400 mL / NET: 160 mL    2023 07:01  -  2023 14:42  --------------------------------------------------------  IN: 240 mL / OUT: 200 mL / NET: 40 mL      Daily Height in cm: 160.02 (2023 09:16)    Daily Weight in k.2 (2023 07:53)    PHYSICAL EXAMINATION:  General: WN/WD NAD  HEENT: EOMI, moist mucous membranes  Neurology: A&Ox2  Respiratory: wheezing, coughing  CV: s1s2  Abdominal: Soft, NT, ND +BS, Last BM  Extremities: 2+ pitting edema, wrinkled      LABS:                          7.2    8.04  )-----------( 208      ( 2023 06:22 )             22.3         135  |  104  |  88<H>  ----------------------------<  148<H>  5.4<H>   |  18<L>  |  6.40<H>    Ca    8.4      2023 06:20  Phos  5.0       Mg     2.5                     Urinalysis Basic - ( 2023 06:20 )    Color: x / Appearance: x / SG: x / pH: x  Gluc: 148 mg/dL / Ketone: x  / Bili: x / Urobili: x   Blood: x / Protein: x / Nitrite: x   Leuk Esterase: x / RBC: x / WBC x   Sq Epi: x / Non Sq Epi: x / Bacteria: x

## 2023-11-28 NOTE — PROGRESS NOTE ADULT - SUBJECTIVE AND OBJECTIVE BOX
DATE OF SERVICE: 11-28-23 @ 17:03    Patient is a 83y old  Female who presents with a chief complaint of LE swelling, dyspnea (28 Nov 2023 14:41)      INTERVAL HISTORY: Feels ok. Does report some SOB.     REVIEW OF SYSTEMS:  CONSTITUTIONAL: No weakness  EYES/ENT: No visual changes;  No throat pain   NECK: No pain or stiffness  RESPIRATORY: No cough, wheezing; + shortness of breath  CARDIOVASCULAR: No chest pain or palpitations  GASTROINTESTINAL: No abdominal  pain. No nausea, vomiting, or hematemesis  GENITOURINARY: No dysuria, frequency or hematuria  NEUROLOGICAL: No stroke like symptoms  SKIN: No rashes     TELEMETRY Personally reviewed: SR 70-90  	  MEDICATIONS:  amLODIPine   Tablet 10 milliGRAM(s) Oral daily  hydrALAZINE 25 milliGRAM(s) Oral three times a day        PHYSICAL EXAM:  T(C): 36.7 (11-28-23 @ 11:11), Max: 37.1 (11-28-23 @ 04:54)  HR: 90 (11-28-23 @ 13:12) (76 - 91)  BP: 127/58 (11-28-23 @ 13:12) (121/58 - 133/55)  RR: 21 (11-28-23 @ 11:11) (20 - 21)  SpO2: 95% (11-28-23 @ 11:11) (95% - 98%)  Wt(kg): --  I&O's Summary    27 Nov 2023 07:01  -  28 Nov 2023 07:00  --------------------------------------------------------  IN: 560 mL / OUT: 400 mL / NET: 160 mL    28 Nov 2023 07:01  -  28 Nov 2023 17:03  --------------------------------------------------------  IN: 240 mL / OUT: 200 mL / NET: 40 mL      Height (cm): 160 (11-28 @ 09:16)  Weight (kg): 68 (11-28 @ 09:16)  BMI (kg/m2): 26.6 (11-28 @ 09:16)  BSA (m2): 1.71 (11-28 @ 09:16)    Appearance: In no distress	  HEENT:    PERRL, EOMI	  Cardiovascular:  S1 S2, No JVD  Respiratory: Lungs clear to auscultation	  Gastrointestinal:  Soft, Non-tender, + BS	  Vascularature:  No edema of LE  Psychiatric: Appropriate affect   Neuro: no acute focal deficits                               7.2    8.04  )-----------( 208      ( 28 Nov 2023 06:22 )             22.3     11-28    135  |  104  |  88<H>  ----------------------------<  148<H>  5.4<H>   |  18<L>  |  6.40<H>    Ca    8.4      28 Nov 2023 06:20  Phos  5.0     11-28  Mg     2.5     11-28          Labs personally reviewed      ASSESSMENT/PLAN: 	    83 year old female with HTN, HLD, CAD, T2DM, CKD, remote hx of breast CA s/p mastectomy, and chronic hypoxemic respiratory failure of unknown etiology on home O2 who presents for lower extremity swelling and shortness of breath.      1. Acute diastolic heart failure  - Pt with LE edema and pulmonary congestion  - Agree with IV diuresis once no renal objection  - Monitor renal function   - Echo shows normal LV systolic function with Bi-atrial enlargement, G2DD and LVH  - EKG shows sinus rhythm (not atrial flutter)  - c/w IV Lasix as per renal recommendation, responding well to IV diuresis thus far though with steady rise in Cr  - Plan for poss HD tomorrow as per Renal  - Pulm consult appreciated for continued hypoxia despite diuresis.    2. CAL on CKD  - Renal following  - Plan for poss HD tomorrow     3. HLD  - Statin therapy    4. HTN  - BP acceptable          DAFNE Epperson-MEG Ash DO [facility]  Cardiovascular Medicine  [address], [address]  Available through call or text on [employer]  Office: [phone]

## 2023-11-28 NOTE — CONSULT NOTE ADULT - TIME BILLING
review of records/results/imaging, pulmonary evaluation/assessment/documentation, and discussion with patient/family/medical team review of records/results/imaging, pulmonary evaluation/assessment/documentation, and discussion with patient/family/medical team    time spent is independent of teaching services

## 2023-11-28 NOTE — PROGRESS NOTE ADULT - PROBLEM SELECTOR PROBLEM 5
[id number] Pt's dgtr requested a BSC. Spoke with sandoval who reported pt does not qualify for the BSC. Informed pt's dgtr   Hypertension

## 2023-11-28 NOTE — PROGRESS NOTE ADULT - SUBJECTIVE AND OBJECTIVE BOX
PROGRESS NOTE:   Authored by Dr. Luis Perla MD (PGY-1).     Patient is a 83y old  Female who presents with a chief complaint of LE swelling, dyspnea (27 Nov 2023 14:48)      SUBJECTIVE / OVERNIGHT EVENTS:  No acute events overnight. She has no acute complaints this morning.     MEDICATIONS  (STANDING):  albuterol    90 MICROgram(s) HFA Inhaler 2 Puff(s) Inhalation every 6 hours  albuterol/ipratropium for Nebulization 3 milliLiter(s) Nebulizer every 6 hours  amLODIPine   Tablet 10 milliGRAM(s) Oral daily  atorvastatin 80 milliGRAM(s) Oral at bedtime  budesonide  80 MICROgram(s)/formoterol 4.5 MICROgram(s) Inhaler 2 Puff(s) Inhalation every 12 hours  calcitriol   Capsule 0.25 MICROGram(s) Oral daily  chlorhexidine 2% Cloths 1 Application(s) Topical daily  dextrose 5%. 1000 milliLiter(s) (50 mL/Hr) IV Continuous <Continuous>  dextrose 5%. 1000 milliLiter(s) (100 mL/Hr) IV Continuous <Continuous>  dextrose 50% Injectable 12.5 Gram(s) IV Push once  dextrose 50% Injectable 25 Gram(s) IV Push once  dextrose 50% Injectable 25 Gram(s) IV Push once  ferrous    sulfate 325 milliGRAM(s) Oral daily  glucagon  Injectable 1 milliGRAM(s) IntraMuscular once  heparin   Injectable 5000 Unit(s) SubCutaneous every 8 hours  hydrALAZINE 25 milliGRAM(s) Oral three times a day  influenza  Vaccine (HIGH DOSE) 0.7 milliLiter(s) IntraMuscular once  insulin lispro (ADMELOG) corrective regimen sliding scale   SubCutaneous at bedtime  insulin lispro (ADMELOG) corrective regimen sliding scale   SubCutaneous three times a day before meals  montelukast 10 milliGRAM(s) Oral daily  mupirocin 2% Nasal 1 Application(s) Both Nostrils every 12 hours  sevelamer carbonate 800 milliGRAM(s) Oral every 8 hours    MEDICATIONS  (PRN):  acetaminophen     Tablet .. 650 milliGRAM(s) Oral every 6 hours PRN Temp greater or equal to 38C (100.4F), Mild Pain (1 - 3)  dextrose Oral Gel 15 Gram(s) Oral once PRN Blood Glucose LESS THAN 70 milliGRAM(s)/deciliter      CAPILLARY BLOOD GLUCOSE      POCT Blood Glucose.: 152 mg/dL (28 Nov 2023 07:42)  POCT Blood Glucose.: 261 mg/dL (27 Nov 2023 21:47)  POCT Blood Glucose.: 225 mg/dL (27 Nov 2023 16:43)  POCT Blood Glucose.: 355 mg/dL (27 Nov 2023 11:46)    I&O's Summary    27 Nov 2023 07:01  -  28 Nov 2023 07:00  --------------------------------------------------------  IN: 560 mL / OUT: 400 mL / NET: 160 mL        PHYSICAL EXAM:  Vital Signs Last 24 Hrs  T(C): 37.1 (28 Nov 2023 04:54), Max: 37.1 (28 Nov 2023 04:54)  T(F): 98.8 (28 Nov 2023 04:54), Max: 98.8 (28 Nov 2023 04:54)  HR: 91 (28 Nov 2023 04:54) (76 - 91)  BP: 121/58 (28 Nov 2023 04:54) (113/58 - 133/55)  BP(mean): --  RR: 20 (28 Nov 2023 04:54) (20 - 22)  SpO2: 98% (28 Nov 2023 04:54) (95% - 98%)    Parameters below as of 28 Nov 2023 04:54  Patient On (Oxygen Delivery Method): nasal cannula  O2 Flow (L/min): 3      CONSTITUTIONAL: NAD, well-developed  HEET: MMM, EOMI, PERRLA  NECK: supple  RESPIRATORY: crackles bilaterally  CARDIOVASCULAR: Regular rate and rhythm, normal S1 and S2, no murmur/rub/gallop; No lower extremity edema; Peripheral pulses are 2+ bilaterally  ABDOMEN: Nontender to palpation, normoactive bowel sounds, no rebound/guarding; No hepatosplenomegaly  MUSCULOSKELETAL: no clubbing or cyanosis of digits; no joint swelling or tenderness to palpation  NEURO: Moving all four extremities, sensation grossly intact  PSYCH: A+O to person, place, and time; affect appropriate  SKIN: No rash    LABS:                        7.2    8.04  )-----------( 208      ( 28 Nov 2023 06:22 )             22.3     11-28    135  |  104  |  88<H>  ----------------------------<  148<H>  5.4<H>   |  18<L>  |  6.40<H>    Ca    8.4      28 Nov 2023 06:20  Phos  5.0     11-28  Mg     2.5     11-28            Urinalysis Basic - ( 28 Nov 2023 06:20 )    Color: x / Appearance: x / SG: x / pH: x  Gluc: 148 mg/dL / Ketone: x  / Bili: x / Urobili: x   Blood: x / Protein: x / Nitrite: x   Leuk Esterase: x / RBC: x / WBC x   Sq Epi: x / Non Sq Epi: x / Bacteria: x          Tele Reviewed:    RADIOLOGY & ADDITIONAL TESTS:  Results Reviewed:   Imaging Personally Reviewed:  Electrocardiogram Personally Reviewed:

## 2023-11-28 NOTE — PROGRESS NOTE ADULT - CONVERSATION DETAILS
Discussed with patient, wishes to pursue all medical management at this time, full code. Discussed with patient's  given pt confused and deferring - wishes to pursue all medical management at this time, full code.

## 2023-11-28 NOTE — PROGRESS NOTE ADULT - ATTENDING COMMENTS
83F with chronic hypoxemic respiratory failure, CKD, T2DM, CAD, HTN, and HLD who presents for worsening dyspnea and lower extremity edema, admitted for suspected acute decompensated heart failure with CAL on likely CKD. Unclear baseline Cr, but significantly elevated currently with nephrotic range proteinuria. TTE showing diastolic HF, s/p diuresis with worsening renal failure.     #acute on chronic diastolic HF   #CAL on CKD, #nephrotic range proteinuria   #Acute on chronic respiratory failure  #HTN, HLD, DM2    - volume status stable, significant CAL on CKD  - diuretics held since 11/27 - Cr slightly better today, will c/t monitor BMP and UOP   - hyperK 5.4, will give lokelma, repeat BMP this evening - d/w  at bedside, may need HD on this admission   - strict I/Os, daily weights   - continue to hold home spironolactone, telmisartan, and farxiga given CAL  - c/w hydralazine 25mg TID for afterload reduction - uptitrate as needed   - TTE w/moderate (grade 2) left ventricular diastolic dysfunction, with elevated filling pressure.  - pt with chronic hypoxemic RF of unclear etiology, ?COPD given long standing smoking history -  c/w home O2, pulm consult to further eval    d/w  at bedside, full code, would agree to HD if needed

## 2023-11-28 NOTE — PROGRESS NOTE ADULT - PROBLEM SELECTOR PLAN 1
CAL on CKD  - Has not seen a physician in the recent past. Given her history its likely to be CAL on CKD 2/2 SAUMYA vs CKD progression. She has pedal edema. US duplex Kidney - showed renal parenchymal disease. UPCR 5.6g, no RBCs, no bacteria. FeUrea borderline but more suggestive of prerenal etiology.  SCr 4.5 on admission, received IV contrast for CTA on evening of 11/21, and Cr had been stable but increased about 2 days after IV contrast exposure suggesting SAUMYA.  Serology work up ongoing- so far negative HIV, Hep B, Hep C, C3, C4, Anti GBM antibody, Anti Ds DNA, JAYLAN, ANCA, Serum free light chains, immunofixation. A1c 6.1%.   negative for PLA2R Ab.  - O2 requirement: 4L/min. CTA chest showed b/l ground-glass opacities. staph aureus swab PCR pos (not MRSA) and pt does have a cough and intermittent wheezing but no leukocytosis or fever. Would consider treating other possible etiologies for resp failure like COPD or PNA with Duonebs +/- Prednisone +/- abx (consider checking procal)  - BNP 1300 initially. TTE with G2DD with elevated filling pressures and severe LA dilation. Was on Lasix 40mg IV BID, but iso rising Cr, decreased Lasix to 40mg IV daily on 11/25/23. However, repeat CXR looks unchanged with pulm vasc congestion and BNP increased to 3000. Renal function was worsening initially to 6.7 and then improved to 6.4 today. UOP was good. Pt was off diuretics.   PLAN:  She has asterixis on examination. No other symptoms/ signs of uremia. Pt has tremor as well. She mentioned that she takes a glass of wine everyday. Please assess for alcohol withdrawal.   - Pt's  was at the bedside and was explained about the HD. He requested that he wanted to be here in the hospital when she is getting HD. He cant be here today as he has an appointment with his PCP. No HD for today. Will assess again tomorrow for HD.   Pt has mild hyperkalemia and she was given lokelma. Repeat BMP in 3 hours.   - Avoid nephrotoxic agents when possible and dose meds per eGFR

## 2023-11-28 NOTE — PROGRESS NOTE ADULT - PROBLEM SELECTOR PLAN 3
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

## 2023-11-28 NOTE — PROGRESS NOTE ADULT - SUBJECTIVE AND OBJECTIVE BOX
[facility] DIVISION OF KIDNEY DISEASES AND HYPERTENSION -- FOLLOW UP NOTE  --------------------------------------------------------------------------------  Chief Complaint:    24 hour events/subjective:        PAST HISTORY  --------------------------------------------------------------------------------  No significant changes to PMH, PSH, FHx, SHx, unless otherwise noted    ALLERGIES & MEDICATIONS  --------------------------------------------------------------------------------  Allergies    No Known Allergies    Intolerances      Standing Inpatient Medications  albuterol    90 MICROgram(s) HFA Inhaler 2 Puff(s) Inhalation every 6 hours  albuterol/ipratropium for Nebulization 3 milliLiter(s) Nebulizer every 6 hours  amLODIPine   Tablet 10 milliGRAM(s) Oral daily  atorvastatin 80 milliGRAM(s) Oral at bedtime  budesonide  80 MICROgram(s)/formoterol 4.5 MICROgram(s) Inhaler 2 Puff(s) Inhalation every 12 hours  calcitriol   Capsule 0.25 MICROGram(s) Oral daily  chlorhexidine 2% Cloths 1 Application(s) Topical daily  dextrose 5%. 1000 milliLiter(s) IV Continuous <Continuous>  dextrose 5%. 1000 milliLiter(s) IV Continuous <Continuous>  dextrose 50% Injectable 25 Gram(s) IV Push once  dextrose 50% Injectable 12.5 Gram(s) IV Push once  dextrose 50% Injectable 25 Gram(s) IV Push once  ferrous    sulfate 325 milliGRAM(s) Oral daily  glucagon  Injectable 1 milliGRAM(s) IntraMuscular once  heparin   Injectable 5000 Unit(s) SubCutaneous every 8 hours  hydrALAZINE 25 milliGRAM(s) Oral three times a day  influenza  Vaccine (HIGH DOSE) 0.7 milliLiter(s) IntraMuscular once  insulin lispro (ADMELOG) corrective regimen sliding scale   SubCutaneous at bedtime  insulin lispro (ADMELOG) corrective regimen sliding scale   SubCutaneous three times a day before meals  montelukast 10 milliGRAM(s) Oral daily  mupirocin 2% Nasal 1 Application(s) Both Nostrils every 12 hours  sevelamer carbonate 800 milliGRAM(s) Oral every 8 hours    PRN Inpatient Medications  acetaminophen     Tablet .. 650 milliGRAM(s) Oral every 6 hours PRN  dextrose Oral Gel 15 Gram(s) Oral once PRN      REVIEW OF SYSTEMS  --------------------------------------------------------------------------------  Gen: No weight changes, fatigue, fevers/chills, weakness  Skin: No rashes  Head/Eyes/Ears/Mouth: No headache; Normal hearing; Normal vision w/o blurriness; No sinus pain/discomfort, sore throat  Respiratory: No dyspnea, cough, wheezing, hemoptysis  CV: No chest pain, PND, orthopnea  GI: No abdominal pain, diarrhea, constipation, nausea, vomiting, melena, hematochezia  : No increased frequency, dysuria, hematuria, nocturia  MSK: No joint pain/swelling; no back pain; no edema  Neuro: No dizziness/lightheadedness, weakness, seizures, numbness, tingling  Heme: No easy bruising or bleeding  Endo: No heat/cold intolerance  Psych: No significant nervousness, anxiety, stress, depression    All other systems were reviewed and are negative, except as noted.    VITALS/PHYSICAL EXAM  --------------------------------------------------------------------------------  T(C): 37.1 (11-28-23 @ 04:54), Max: 37.1 (11-28-23 @ 04:54)  HR: 91 (11-28-23 @ 04:54) (76 - 91)  BP: 121/58 (11-28-23 @ 04:54) (113/58 - 133/55)  RR: 20 (11-28-23 @ 04:54) (20 - 22)  SpO2: 98% (11-28-23 @ 04:54) (97% - 98%)  Wt(kg): --  Height (cm): 160 (11-28-23 @ 09:16)  Weight (kg): 68 (11-28-23 @ 09:16)  BMI (kg/m2): 26.6 (11-28-23 @ 09:16)  BSA (m2): 1.71 (11-28-23 @ 09:16)      11-27-23 @ 07:01  -  11-28-23 @ 07:00  --------------------------------------------------------  IN: 560 mL / OUT: 400 mL / NET: 160 mL    11-28-23 @ 07:01  -  11-28-23 @ 10:58  --------------------------------------------------------  IN: 120 mL / OUT: 0 mL / NET: 120 mL      Physical Exam:  	Gen: NAD, well-appearing  	HEENT: PERRL, supple neck, clear oropharynx  	Pulm: CTA B/L  	CV: RRR, S1S2; no rub  	Back: No spinal or CVA tenderness; no sacral edema  	Abd: +BS, soft, nontender/nondistended  	: No suprapubic tenderness  	UE: Warm, FROM, no clubbing, intact strength; no edema; no asterixis  	LE: Warm, FROM, no clubbing, intact strength; no edema  	Neuro: No focal deficits, intact gait  	Psych: Normal affect and mood  	Skin: Warm, without rashes  	Vascular access:    LABS/STUDIES  --------------------------------------------------------------------------------              7.2    8.04  >-----------<  208      [11-28-23 @ 06:22]              22.3     135  |  104  |  88  ----------------------------<  148      [11-28-23 @ 06:20]  5.4   |  18  |  6.40        Ca     8.4     [11-28-23 @ 06:20]      Mg     2.5     [11-28-23 @ 06:20]      Phos  5.0     [11-28-23 @ 06:20]            Creatinine Trend:  SCr 6.40 [11-28 @ 06:20]  SCr 6.71 [11-27 @ 05:37]  SCr 6.18 [11-26 @ 06:47]  SCr 5.96 [11-25 @ 06:14]  SCr 5.48 [11-24 @ 06:16]    Urinalysis - [11-28-23 @ 06:20]      Color  / Appearance  / SG  / pH       Gluc 148 / Ketone   / Bili  / Urobili        Blood  / Protein  / Leuk Est  / Nitrite       RBC  / WBC  / Hyaline  / Gran  / Sq Epi  / Non Sq Epi  / Bacteria     Urine Creatinine 36      [11-22-23 @ 07:44]  Urine Protein 203      [11-22-23 @ 07:44]  Urine Sodium 113      [11-22-23 @ 07:44]  Urine Urea Nitrogen 150      [11-22-23 @ 07:51]  Urine Potassium 15      [11-22-23 @ 07:44]  Urine Osmolality 364      [11-22-23 @ 07:44]    Iron 68, TIBC 261, %sat 26      [11-25-23 @ 06:14]  Ferritin 81      [11-25-23 @ 06:14]  PTH -- (Ca 8.6)      [11-25-23 @ 06:14]   73  Vitamin D (25OH) 22.5      [11-25-23 @ 06:14]  Lipid: chol 116, TG 79, HDL 39, LDL --      [11-23-23 @ 06:25]    HBsAb <3.0      [11-23-23 @ 06:25]  HBsAg Nonreact      [11-23-23 @ 06:25]  HCV 0.08, Nonreact      [11-23-23 @ 06:25]  HIV Nonreact      [11-23-23 @ 06:25]    JAYLAN: titer Negative, pattern --      [11-23-23 @ 06:25]  dsDNA <12      [11-23-23 @ 06:25]  C3 Complement 126      [11-23-23 @ 06:25]  C4 Complement 26      [11-23-23 @ 06:25]  ANCA: cANCA Negative, pANCA Negative, atypical ANCA Negative      [11-23-23 @ 06:25]  anti-GBM <0.2      [11-23-23 @ 06:25]  PLA2R: RAVI <1.8, IFA --      [11-23-23 @ 06:25]  Free Light Chains: kappa 13.06, lambda 7.08, ratio = 1.84      [11-23 @ 06:25]   [facility] DIVISION OF KIDNEY DISEASES AND HYPERTENSION -- FOLLOW UP NOTE  --------------------------------------------------------------------------------  Chief Complaint:    24 hour events/subjective:        PAST HISTORY  --------------------------------------------------------------------------------  No significant changes to PMH, PSH, FHx, SHx, unless otherwise noted    ALLERGIES & MEDICATIONS  --------------------------------------------------------------------------------  Allergies    No Known Allergies    Intolerances      Standing Inpatient Medications  albuterol    90 MICROgram(s) HFA Inhaler 2 Puff(s) Inhalation every 6 hours  albuterol/ipratropium for Nebulization 3 milliLiter(s) Nebulizer every 6 hours  amLODIPine   Tablet 10 milliGRAM(s) Oral daily  atorvastatin 80 milliGRAM(s) Oral at bedtime  budesonide  80 MICROgram(s)/formoterol 4.5 MICROgram(s) Inhaler 2 Puff(s) Inhalation every 12 hours  calcitriol   Capsule 0.25 MICROGram(s) Oral daily  chlorhexidine 2% Cloths 1 Application(s) Topical daily  dextrose 5%. 1000 milliLiter(s) IV Continuous <Continuous>  dextrose 5%. 1000 milliLiter(s) IV Continuous <Continuous>  dextrose 50% Injectable 25 Gram(s) IV Push once  dextrose 50% Injectable 12.5 Gram(s) IV Push once  dextrose 50% Injectable 25 Gram(s) IV Push once  ferrous    sulfate 325 milliGRAM(s) Oral daily  glucagon  Injectable 1 milliGRAM(s) IntraMuscular once  heparin   Injectable 5000 Unit(s) SubCutaneous every 8 hours  hydrALAZINE 25 milliGRAM(s) Oral three times a day  influenza  Vaccine (HIGH DOSE) 0.7 milliLiter(s) IntraMuscular once  insulin lispro (ADMELOG) corrective regimen sliding scale   SubCutaneous at bedtime  insulin lispro (ADMELOG) corrective regimen sliding scale   SubCutaneous three times a day before meals  montelukast 10 milliGRAM(s) Oral daily  mupirocin 2% Nasal 1 Application(s) Both Nostrils every 12 hours  sevelamer carbonate 800 milliGRAM(s) Oral every 8 hours    PRN Inpatient Medications  acetaminophen     Tablet .. 650 milliGRAM(s) Oral every 6 hours PRN  dextrose Oral Gel 15 Gram(s) Oral once PRN      REVIEW OF SYSTEMS  --------------------------------------------------------------------------------  Gen: No weight changes, fatigue, fevers/chills, weakness  Skin: No rashes  Head/Eyes/Ears/Mouth: No headache; Normal hearing; Normal vision w/o blurriness; No sinus pain/discomfort, sore throat  Respiratory: No dyspnea, cough, wheezing, hemoptysis  CV: No chest pain, PND, orthopnea  GI: No abdominal pain, diarrhea, constipation, nausea, vomiting, melena, hematochezia  : No increased frequency, dysuria, hematuria, nocturia  MSK: No joint pain/swelling; no back pain; no edema  Neuro: No dizziness/lightheadedness, weakness, seizures, numbness, tingling  Heme: No easy bruising or bleeding  Endo: No heat/cold intolerance  Psych: No significant nervousness, anxiety, stress, depression    All other systems were reviewed and are negative, except as noted.    VITALS/PHYSICAL EXAM  --------------------------------------------------------------------------------  T(C): 37.1 (11-28-23 @ 04:54), Max: 37.1 (11-28-23 @ 04:54)  HR: 91 (11-28-23 @ 04:54) (76 - 91)  BP: 121/58 (11-28-23 @ 04:54) (113/58 - 133/55)  RR: 20 (11-28-23 @ 04:54) (20 - 22)  SpO2: 98% (11-28-23 @ 04:54) (97% - 98%)  Wt(kg): --  Height (cm): 160 (11-28-23 @ 09:16)  Weight (kg): 68 (11-28-23 @ 09:16)  BMI (kg/m2): 26.6 (11-28-23 @ 09:16)  BSA (m2): 1.71 (11-28-23 @ 09:16)      11-27-23 @ 07:01  -  11-28-23 @ 07:00  --------------------------------------------------------  IN: 560 mL / OUT: 400 mL / NET: 160 mL    11-28-23 @ 07:01  -  11-28-23 @ 10:58  --------------------------------------------------------  IN: 120 mL / OUT: 0 mL / NET: 120 mL      Physical Exam:  	Gen: NAD, well-appearing  	HEENT: PERRL, supple neck, clear oropharynx  	Pulm: CTA B/L  	CV: RRR, S1S2; no rub  	Back: No spinal or CVA tenderness; no sacral edema  	Abd: +BS, soft, nontender/nondistended  	: No suprapubic tenderness  	UE: Warm, FROM, no clubbing, intact strength; no edema; no asterixis  	LE: Warm, FROM, no clubbing, intact strength; no edema  	Neuro: No focal deficits, intact gait  	Psych: Normal affect and mood  	Skin: Warm, without rashes  	Vascular access:    LABS/STUDIES  --------------------------------------------------------------------------------              7.2    8.04  >-----------<  208      [11-28-23 @ 06:22]              22.3     135  |  104  |  88  ----------------------------<  148      [11-28-23 @ 06:20]  5.4   |  18  |  6.40        Ca     8.4     [11-28-23 @ 06:20]      Mg     2.5     [11-28-23 @ 06:20]      Phos  5.0     [11-28-23 @ 06:20]            Creatinine Trend:  SCr 6.40 [11-28 @ 06:20]  SCr 6.71 [11-27 @ 05:37]  SCr 6.18 [11-26 @ 06:47]  SCr 5.96 [11-25 @ 06:14]  SCr 5.48 [11-24 @ 06:16]    Urinalysis - [11-28-23 @ 06:20]      Color  / Appearance  / SG  / pH       Gluc 148 / Ketone   / Bili  / Urobili        Blood  / Protein  / Leuk Est  / Nitrite       RBC  / WBC  / Hyaline  / Gran  / Sq Epi  / Non Sq Epi  / Bacteria     Urine Creatinine 36      [11-22-23 @ 07:44]  Urine Protein 203      [11-22-23 @ 07:44]  Urine Sodium 113      [11-22-23 @ 07:44]  Urine Urea Nitrogen 150      [11-22-23 @ 07:51]  Urine Potassium 15      [11-22-23 @ 07:44]  Urine Osmolality 364      [11-22-23 @ 07:44]    Iron 68, TIBC 261, %sat 26      [11-25-23 @ 06:14]  Ferritin 81      [11-25-23 @ 06:14]  PTH -- (Ca 8.6)      [11-25-23 @ 06:14]   73  Vitamin D (25OH) 22.5      [11-25-23 @ 06:14]  Lipid: chol 116, TG 79, HDL 39, LDL --      [11-23-23 @ 06:25]    HBsAb <3.0      [11-23-23 @ 06:25]  HBsAg Nonreact      [11-23-23 @ 06:25]  HCV 0.08, Nonreact      [11-23-23 @ 06:25]  HIV Nonreact      [11-23-23 @ 06:25]    JAYLAN: titer Negative, pattern --      [11-23-23 @ 06:25]  dsDNA <12      [11-23-23 @ 06:25]  C3 Complement 126      [11-23-23 @ 06:25]  C4 Complement 26      [11-23-23 @ 06:25]  ANCA: cANCA Negative, pANCA Negative, atypical ANCA Negative      [11-23-23 @ 06:25]  anti-GBM <0.2      [11-23-23 @ 06:25]  PLA2R: RAVI <1.8, IFA --      [11-23-23 @ 06:25]  Free Light Chains: kappa 13.06, lambda 7.08, ratio = 1.84      [11-23 @ 06:25]   [facility] DIVISION OF KIDNEY DISEASES AND HYPERTENSION -- FOLLOW UP NOTE  --------------------------------------------------------------------------------  Chief Complaint:    24 hour events/subjective:        PAST HISTORY  --------------------------------------------------------------------------------  No significant changes to PMH, PSH, FHx, SHx, unless otherwise noted    ALLERGIES & MEDICATIONS  --------------------------------------------------------------------------------  Allergies    No Known Allergies    Intolerances      Standing Inpatient Medications  albuterol    90 MICROgram(s) HFA Inhaler 2 Puff(s) Inhalation every 6 hours  albuterol/ipratropium for Nebulization 3 milliLiter(s) Nebulizer every 6 hours  amLODIPine   Tablet 10 milliGRAM(s) Oral daily  atorvastatin 80 milliGRAM(s) Oral at bedtime  budesonide  80 MICROgram(s)/formoterol 4.5 MICROgram(s) Inhaler 2 Puff(s) Inhalation every 12 hours  calcitriol   Capsule 0.25 MICROGram(s) Oral daily  chlorhexidine 2% Cloths 1 Application(s) Topical daily  dextrose 5%. 1000 milliLiter(s) IV Continuous <Continuous>  dextrose 5%. 1000 milliLiter(s) IV Continuous <Continuous>  dextrose 50% Injectable 25 Gram(s) IV Push once  dextrose 50% Injectable 12.5 Gram(s) IV Push once  dextrose 50% Injectable 25 Gram(s) IV Push once  ferrous    sulfate 325 milliGRAM(s) Oral daily  glucagon  Injectable 1 milliGRAM(s) IntraMuscular once  heparin   Injectable 5000 Unit(s) SubCutaneous every 8 hours  hydrALAZINE 25 milliGRAM(s) Oral three times a day  influenza  Vaccine (HIGH DOSE) 0.7 milliLiter(s) IntraMuscular once  insulin lispro (ADMELOG) corrective regimen sliding scale   SubCutaneous at bedtime  insulin lispro (ADMELOG) corrective regimen sliding scale   SubCutaneous three times a day before meals  montelukast 10 milliGRAM(s) Oral daily  mupirocin 2% Nasal 1 Application(s) Both Nostrils every 12 hours  sevelamer carbonate 800 milliGRAM(s) Oral every 8 hours    PRN Inpatient Medications  acetaminophen     Tablet .. 650 milliGRAM(s) Oral every 6 hours PRN  dextrose Oral Gel 15 Gram(s) Oral once PRN      REVIEW OF SYSTEMS  --------------------------------------------------------------------------------  Gen: No weight changes, fatigue, fevers/chills, weakness  Skin: No rashes  Head/Eyes/Ears/Mouth: No headache; Normal hearing; Normal vision w/o blurriness; No sinus pain/discomfort, sore throat  Respiratory: No dyspnea, cough, wheezing, hemoptysis  CV: No chest pain, PND, orthopnea  GI: No abdominal pain, diarrhea, constipation, nausea, vomiting, melena, hematochezia  : No increased frequency, dysuria, hematuria, nocturia  MSK: No joint pain/swelling; no back pain; no edema  Neuro: No dizziness/lightheadedness, weakness, seizures, numbness, tingling  Heme: No easy bruising or bleeding  Endo: No heat/cold intolerance  Psych: No significant nervousness, anxiety, stress, depression    All other systems were reviewed and are negative, except as noted.    VITALS/PHYSICAL EXAM  --------------------------------------------------------------------------------  T(C): 37.1 (11-28-23 @ 04:54), Max: 37.1 (11-28-23 @ 04:54)  HR: 91 (11-28-23 @ 04:54) (76 - 91)  BP: 121/58 (11-28-23 @ 04:54) (113/58 - 133/55)  RR: 20 (11-28-23 @ 04:54) (20 - 22)  SpO2: 98% (11-28-23 @ 04:54) (97% - 98%)  Wt(kg): --  Height (cm): 160 (11-28-23 @ 09:16)  Weight (kg): 68 (11-28-23 @ 09:16)  BMI (kg/m2): 26.6 (11-28-23 @ 09:16)  BSA (m2): 1.71 (11-28-23 @ 09:16)      11-27-23 @ 07:01  -  11-28-23 @ 07:00  --------------------------------------------------------  IN: 560 mL / OUT: 400 mL / NET: 160 mL    11-28-23 @ 07:01  -  11-28-23 @ 10:58  --------------------------------------------------------  IN: 120 mL / OUT: 0 mL / NET: 120 mL      Physical Exam:  	Gen: NAD, well-appearing  	HEENT: PERRL, supple neck, clear oropharynx  	Pulm: CTA B/L  	CV: RRR, S1S2; no rub  	Back: No spinal or CVA tenderness; no sacral edema  	Abd: +BS, soft, nontender/nondistended  	: No suprapubic tenderness  	UE: Warm, FROM, no clubbing, intact strength; no edema; no asterixis  	LE: Warm, FROM, no clubbing, intact strength; no edema  	Neuro: No focal deficits, intact gait  	Psych: Normal affect and mood  	Skin: Warm, without rashes  	Vascular access:    LABS/STUDIES  --------------------------------------------------------------------------------              7.2    8.04  >-----------<  208      [11-28-23 @ 06:22]              22.3     135  |  104  |  88  ----------------------------<  148      [11-28-23 @ 06:20]  5.4   |  18  |  6.40        Ca     8.4     [11-28-23 @ 06:20]      Mg     2.5     [11-28-23 @ 06:20]      Phos  5.0     [11-28-23 @ 06:20]            Creatinine Trend:  SCr 6.40 [11-28 @ 06:20]  SCr 6.71 [11-27 @ 05:37]  SCr 6.18 [11-26 @ 06:47]  SCr 5.96 [11-25 @ 06:14]  SCr 5.48 [11-24 @ 06:16]    Urinalysis - [11-28-23 @ 06:20]      Color  / Appearance  / SG  / pH       Gluc 148 / Ketone   / Bili  / Urobili        Blood  / Protein  / Leuk Est  / Nitrite       RBC  / WBC  / Hyaline  / Gran  / Sq Epi  / Non Sq Epi  / Bacteria     Urine Creatinine 36      [11-22-23 @ 07:44]  Urine Protein 203      [11-22-23 @ 07:44]  Urine Sodium 113      [11-22-23 @ 07:44]  Urine Urea Nitrogen 150      [11-22-23 @ 07:51]  Urine Potassium 15      [11-22-23 @ 07:44]  Urine Osmolality 364      [11-22-23 @ 07:44]    Iron 68, TIBC 261, %sat 26      [11-25-23 @ 06:14]  Ferritin 81      [11-25-23 @ 06:14]  PTH -- (Ca 8.6)      [11-25-23 @ 06:14]   73  Vitamin D (25OH) 22.5      [11-25-23 @ 06:14]  Lipid: chol 116, TG 79, HDL 39, LDL --      [11-23-23 @ 06:25]    HBsAb <3.0      [11-23-23 @ 06:25]  HBsAg Nonreact      [11-23-23 @ 06:25]  HCV 0.08, Nonreact      [11-23-23 @ 06:25]  HIV Nonreact      [11-23-23 @ 06:25]    JAYLAN: titer Negative, pattern --      [11-23-23 @ 06:25]  dsDNA <12      [11-23-23 @ 06:25]  C3 Complement 126      [11-23-23 @ 06:25]  C4 Complement 26      [11-23-23 @ 06:25]  ANCA: cANCA Negative, pANCA Negative, atypical ANCA Negative      [11-23-23 @ 06:25]  anti-GBM <0.2      [11-23-23 @ 06:25]  PLA2R: RAVI <1.8, IFA --      [11-23-23 @ 06:25]  Free Light Chains: kappa 13.06, lambda 7.08, ratio = 1.84      [11-23 @ 06:25]   [facility] DIVISION OF KIDNEY DISEASES AND HYPERTENSION -- FOLLOW UP NOTE  --------------------------------------------------------------------------------  Chief Complaint:    24 hour events/subjective:  Pt was seen and examined, while her  was at the bedside. No acute overnight events. No fresh complaints.   She said that her SOB and her appetite remained the same.   She denied any change in mental status/ metallic taste in the mouth/ poor appetite/ chest pain/ abdominal pain/ constipation/ nausea/ vomiting/ dizziness.        PAST HISTORY  --------------------------------------------------------------------------------  No significant changes to PMH, PSH, FHx, SHx, unless otherwise noted    ALLERGIES & MEDICATIONS  --------------------------------------------------------------------------------  Allergies    No Known Allergies    Intolerances      Standing Inpatient Medications  albuterol    90 MICROgram(s) HFA Inhaler 2 Puff(s) Inhalation every 6 hours  albuterol/ipratropium for Nebulization 3 milliLiter(s) Nebulizer every 6 hours  amLODIPine   Tablet 10 milliGRAM(s) Oral daily  atorvastatin 80 milliGRAM(s) Oral at bedtime  budesonide  80 MICROgram(s)/formoterol 4.5 MICROgram(s) Inhaler 2 Puff(s) Inhalation every 12 hours  calcitriol   Capsule 0.25 MICROGram(s) Oral daily  chlorhexidine 2% Cloths 1 Application(s) Topical daily  dextrose 5%. 1000 milliLiter(s) IV Continuous <Continuous>  dextrose 5%. 1000 milliLiter(s) IV Continuous <Continuous>  dextrose 50% Injectable 25 Gram(s) IV Push once  dextrose 50% Injectable 12.5 Gram(s) IV Push once  dextrose 50% Injectable 25 Gram(s) IV Push once  ferrous    sulfate 325 milliGRAM(s) Oral daily  glucagon  Injectable 1 milliGRAM(s) IntraMuscular once  heparin   Injectable 5000 Unit(s) SubCutaneous every 8 hours  hydrALAZINE 25 milliGRAM(s) Oral three times a day  influenza  Vaccine (HIGH DOSE) 0.7 milliLiter(s) IntraMuscular once  insulin lispro (ADMELOG) corrective regimen sliding scale   SubCutaneous at bedtime  insulin lispro (ADMELOG) corrective regimen sliding scale   SubCutaneous three times a day before meals  montelukast 10 milliGRAM(s) Oral daily  mupirocin 2% Nasal 1 Application(s) Both Nostrils every 12 hours  sevelamer carbonate 800 milliGRAM(s) Oral every 8 hours    PRN Inpatient Medications  acetaminophen     Tablet .. 650 milliGRAM(s) Oral every 6 hours PRN  dextrose Oral Gel 15 Gram(s) Oral once PRN      REVIEW OF SYSTEMS  --------------------------------------------------------------------------------  As per above.     VITALS/PHYSICAL EXAM  --------------------------------------------------------------------------------  T(C): 37.1 (11-28-23 @ 04:54), Max: 37.1 (11-28-23 @ 04:54)  HR: 91 (11-28-23 @ 04:54) (76 - 91)  BP: 121/58 (11-28-23 @ 04:54) (113/58 - 133/55)  RR: 20 (11-28-23 @ 04:54) (20 - 22)  SpO2: 98% (11-28-23 @ 04:54) (97% - 98%)  Wt(kg): --  Height (cm): 160 (11-28-23 @ 09:16)  Weight (kg): 68 (11-28-23 @ 09:16)  BMI (kg/m2): 26.6 (11-28-23 @ 09:16)  BSA (m2): 1.71 (11-28-23 @ 09:16)      11-27-23 @ 07:01  -  11-28-23 @ 07:00  --------------------------------------------------------  IN: 560 mL / OUT: 400 mL / NET: 160 mL    11-28-23 @ 07:01  -  11-28-23 @ 10:58  --------------------------------------------------------  IN: 120 mL / OUT: 0 mL / NET: 120 mL      Physical Exam:    General: no acute distress  Neuro: no focal deficits  HEENT: NC/AT, anicteric, no JVD  Pulmonary: breath sounds diminished, on 6L O2  Cardiovascular/Chest: +S1S2, RRR  GI/Abdomen: soft, NT/ND, +bowel sounds  Extremities: No edema  Skin: Warm and dry  Neuro : Asterixis +      LABS/STUDIES  --------------------------------------------------------------------------------              7.2    8.04  >-----------<  208      [11-28-23 @ 06:22]              22.3     135  |  104  |  88  ----------------------------<  148      [11-28-23 @ 06:20]  5.4   |  18  |  6.40        Ca     8.4     [11-28-23 @ 06:20]      Mg     2.5     [11-28-23 @ 06:20]      Phos  5.0     [11-28-23 @ 06:20]            Creatinine Trend:  SCr 6.40 [11-28 @ 06:20]  SCr 6.71 [11-27 @ 05:37]  SCr 6.18 [11-26 @ 06:47]  SCr 5.96 [11-25 @ 06:14]  SCr 5.48 [11-24 @ 06:16]    Urinalysis - [11-28-23 @ 06:20]      Color  / Appearance  / SG  / pH       Gluc 148 / Ketone   / Bili  / Urobili        Blood  / Protein  / Leuk Est  / Nitrite       RBC  / WBC  / Hyaline  / Gran  / Sq Epi  / Non Sq Epi  / Bacteria     Urine Creatinine 36      [11-22-23 @ 07:44]  Urine Protein 203      [11-22-23 @ 07:44]  Urine Sodium 113      [11-22-23 @ 07:44]  Urine Urea Nitrogen 150      [11-22-23 @ 07:51]  Urine Potassium 15      [11-22-23 @ 07:44]  Urine Osmolality 364      [11-22-23 @ 07:44]    Iron 68, TIBC 261, %sat 26      [11-25-23 @ 06:14]  Ferritin 81      [11-25-23 @ 06:14]  PTH -- (Ca 8.6)      [11-25-23 @ 06:14]   73  Vitamin D (25OH) 22.5      [11-25-23 @ 06:14]  Lipid: chol 116, TG 79, HDL 39, LDL --      [11-23-23 @ 06:25]    HBsAb <3.0      [11-23-23 @ 06:25]  HBsAg Nonreact      [11-23-23 @ 06:25]  HCV 0.08, Nonreact      [11-23-23 @ 06:25]  HIV Nonreact      [11-23-23 @ 06:25]    JAYLAN: titer Negative, pattern --      [11-23-23 @ 06:25]  dsDNA <12      [11-23-23 @ 06:25]  C3 Complement 126      [11-23-23 @ 06:25]  C4 Complement 26      [11-23-23 @ 06:25]  ANCA: cANCA Negative, pANCA Negative, atypical ANCA Negative      [11-23-23 @ 06:25]  anti-GBM <0.2      [11-23-23 @ 06:25]  PLA2R: RAVI <1.8, IFA --      [11-23-23 @ 06:25]  Free Light Chains: kappa 13.06, lambda 7.08, ratio = 1.84      [11-23 @ 06:25]   [facility] DIVISION OF KIDNEY DISEASES AND HYPERTENSION -- FOLLOW UP NOTE  --------------------------------------------------------------------------------  Chief Complaint:    24 hour events/subjective:  Pt was seen and examined, while her  was at the bedside. No acute overnight events. No fresh complaints.   She said that her SOB and her appetite remained the same.   She denied any change in mental status/ metallic taste in the mouth/ poor appetite/ chest pain/ abdominal pain/ constipation/ nausea/ vomiting/ dizziness.        PAST HISTORY  --------------------------------------------------------------------------------  No significant changes to PMH, PSH, FHx, SHx, unless otherwise noted    ALLERGIES & MEDICATIONS  --------------------------------------------------------------------------------  Allergies    No Known Allergies    Intolerances      Standing Inpatient Medications  albuterol    90 MICROgram(s) HFA Inhaler 2 Puff(s) Inhalation every 6 hours  albuterol/ipratropium for Nebulization 3 milliLiter(s) Nebulizer every 6 hours  amLODIPine   Tablet 10 milliGRAM(s) Oral daily  atorvastatin 80 milliGRAM(s) Oral at bedtime  budesonide  80 MICROgram(s)/formoterol 4.5 MICROgram(s) Inhaler 2 Puff(s) Inhalation every 12 hours  calcitriol   Capsule 0.25 MICROGram(s) Oral daily  chlorhexidine 2% Cloths 1 Application(s) Topical daily  dextrose 5%. 1000 milliLiter(s) IV Continuous <Continuous>  dextrose 5%. 1000 milliLiter(s) IV Continuous <Continuous>  dextrose 50% Injectable 25 Gram(s) IV Push once  dextrose 50% Injectable 12.5 Gram(s) IV Push once  dextrose 50% Injectable 25 Gram(s) IV Push once  ferrous    sulfate 325 milliGRAM(s) Oral daily  glucagon  Injectable 1 milliGRAM(s) IntraMuscular once  heparin   Injectable 5000 Unit(s) SubCutaneous every 8 hours  hydrALAZINE 25 milliGRAM(s) Oral three times a day  influenza  Vaccine (HIGH DOSE) 0.7 milliLiter(s) IntraMuscular once  insulin lispro (ADMELOG) corrective regimen sliding scale   SubCutaneous at bedtime  insulin lispro (ADMELOG) corrective regimen sliding scale   SubCutaneous three times a day before meals  montelukast 10 milliGRAM(s) Oral daily  mupirocin 2% Nasal 1 Application(s) Both Nostrils every 12 hours  sevelamer carbonate 800 milliGRAM(s) Oral every 8 hours    PRN Inpatient Medications  acetaminophen     Tablet .. 650 milliGRAM(s) Oral every 6 hours PRN  dextrose Oral Gel 15 Gram(s) Oral once PRN      REVIEW OF SYSTEMS  --------------------------------------------------------------------------------  As per above.     VITALS/PHYSICAL EXAM  --------------------------------------------------------------------------------  T(C): 37.1 (11-28-23 @ 04:54), Max: 37.1 (11-28-23 @ 04:54)  HR: 91 (11-28-23 @ 04:54) (76 - 91)  BP: 121/58 (11-28-23 @ 04:54) (113/58 - 133/55)  RR: 20 (11-28-23 @ 04:54) (20 - 22)  SpO2: 98% (11-28-23 @ 04:54) (97% - 98%)  Wt(kg): --  Height (cm): 160 (11-28-23 @ 09:16)  Weight (kg): 68 (11-28-23 @ 09:16)  BMI (kg/m2): 26.6 (11-28-23 @ 09:16)  BSA (m2): 1.71 (11-28-23 @ 09:16)      11-27-23 @ 07:01  -  11-28-23 @ 07:00  --------------------------------------------------------  IN: 560 mL / OUT: 400 mL / NET: 160 mL    11-28-23 @ 07:01  -  11-28-23 @ 10:58  --------------------------------------------------------  IN: 120 mL / OUT: 0 mL / NET: 120 mL      Physical Exam:    General: no acute distress  Neuro: no focal deficits  HEENT: NC/AT, anicteric, no JVD  Pulmonary: breath sounds diminished, on 6L O2  Cardiovascular/Chest: +S1S2, RRR  GI/Abdomen: soft, NT/ND, +bowel sounds  Extremities: No edema  Skin: Warm and dry  Neuro : Asterixis +      LABS/STUDIES  --------------------------------------------------------------------------------              7.2    8.04  >-----------<  208      [11-28-23 @ 06:22]              22.3     135  |  104  |  88  ----------------------------<  148      [11-28-23 @ 06:20]  5.4   |  18  |  6.40        Ca     8.4     [11-28-23 @ 06:20]      Mg     2.5     [11-28-23 @ 06:20]      Phos  5.0     [11-28-23 @ 06:20]            Creatinine Trend:  SCr 6.40 [11-28 @ 06:20]  SCr 6.71 [11-27 @ 05:37]  SCr 6.18 [11-26 @ 06:47]  SCr 5.96 [11-25 @ 06:14]  SCr 5.48 [11-24 @ 06:16]    Urinalysis - [11-28-23 @ 06:20]      Color  / Appearance  / SG  / pH       Gluc 148 / Ketone   / Bili  / Urobili        Blood  / Protein  / Leuk Est  / Nitrite       RBC  / WBC  / Hyaline  / Gran  / Sq Epi  / Non Sq Epi  / Bacteria     Urine Creatinine 36      [11-22-23 @ 07:44]  Urine Protein 203      [11-22-23 @ 07:44]  Urine Sodium 113      [11-22-23 @ 07:44]  Urine Urea Nitrogen 150      [11-22-23 @ 07:51]  Urine Potassium 15      [11-22-23 @ 07:44]  Urine Osmolality 364      [11-22-23 @ 07:44]    Iron 68, TIBC 261, %sat 26      [11-25-23 @ 06:14]  Ferritin 81      [11-25-23 @ 06:14]  PTH -- (Ca 8.6)      [11-25-23 @ 06:14]   73  Vitamin D (25OH) 22.5      [11-25-23 @ 06:14]  Lipid: chol 116, TG 79, HDL 39, LDL --      [11-23-23 @ 06:25]    HBsAb <3.0      [11-23-23 @ 06:25]  HBsAg Nonreact      [11-23-23 @ 06:25]  HCV 0.08, Nonreact      [11-23-23 @ 06:25]  HIV Nonreact      [11-23-23 @ 06:25]    JAYLAN: titer Negative, pattern --      [11-23-23 @ 06:25]  dsDNA <12      [11-23-23 @ 06:25]  C3 Complement 126      [11-23-23 @ 06:25]  C4 Complement 26      [11-23-23 @ 06:25]  ANCA: cANCA Negative, pANCA Negative, atypical ANCA Negative      [11-23-23 @ 06:25]  anti-GBM <0.2      [11-23-23 @ 06:25]  PLA2R: RAVI <1.8, IFA --      [11-23-23 @ 06:25]  Free Light Chains: kappa 13.06, lambda 7.08, ratio = 1.84      [11-23 @ 06:25]   [facility] DIVISION OF KIDNEY DISEASES AND HYPERTENSION -- FOLLOW UP NOTE  --------------------------------------------------------------------------------  Chief Complaint:    24 hour events/subjective:  Pt was seen and examined, while her  was at the bedside. No acute overnight events. No fresh complaints.   She said that her SOB and her appetite remained the same.   She denied any change in mental status/ metallic taste in the mouth/ poor appetite/ chest pain/ abdominal pain/ constipation/ nausea/ vomiting/ dizziness.        PAST HISTORY  --------------------------------------------------------------------------------  No significant changes to PMH, PSH, FHx, SHx, unless otherwise noted    ALLERGIES & MEDICATIONS  --------------------------------------------------------------------------------  Allergies    No Known Allergies    Intolerances      Standing Inpatient Medications  albuterol    90 MICROgram(s) HFA Inhaler 2 Puff(s) Inhalation every 6 hours  albuterol/ipratropium for Nebulization 3 milliLiter(s) Nebulizer every 6 hours  amLODIPine   Tablet 10 milliGRAM(s) Oral daily  atorvastatin 80 milliGRAM(s) Oral at bedtime  budesonide  80 MICROgram(s)/formoterol 4.5 MICROgram(s) Inhaler 2 Puff(s) Inhalation every 12 hours  calcitriol   Capsule 0.25 MICROGram(s) Oral daily  chlorhexidine 2% Cloths 1 Application(s) Topical daily  dextrose 5%. 1000 milliLiter(s) IV Continuous <Continuous>  dextrose 5%. 1000 milliLiter(s) IV Continuous <Continuous>  dextrose 50% Injectable 25 Gram(s) IV Push once  dextrose 50% Injectable 12.5 Gram(s) IV Push once  dextrose 50% Injectable 25 Gram(s) IV Push once  ferrous    sulfate 325 milliGRAM(s) Oral daily  glucagon  Injectable 1 milliGRAM(s) IntraMuscular once  heparin   Injectable 5000 Unit(s) SubCutaneous every 8 hours  hydrALAZINE 25 milliGRAM(s) Oral three times a day  influenza  Vaccine (HIGH DOSE) 0.7 milliLiter(s) IntraMuscular once  insulin lispro (ADMELOG) corrective regimen sliding scale   SubCutaneous at bedtime  insulin lispro (ADMELOG) corrective regimen sliding scale   SubCutaneous three times a day before meals  montelukast 10 milliGRAM(s) Oral daily  mupirocin 2% Nasal 1 Application(s) Both Nostrils every 12 hours  sevelamer carbonate 800 milliGRAM(s) Oral every 8 hours    PRN Inpatient Medications  acetaminophen     Tablet .. 650 milliGRAM(s) Oral every 6 hours PRN  dextrose Oral Gel 15 Gram(s) Oral once PRN      REVIEW OF SYSTEMS  --------------------------------------------------------------------------------  As per above.     VITALS/PHYSICAL EXAM  --------------------------------------------------------------------------------  T(C): 37.1 (11-28-23 @ 04:54), Max: 37.1 (11-28-23 @ 04:54)  HR: 91 (11-28-23 @ 04:54) (76 - 91)  BP: 121/58 (11-28-23 @ 04:54) (113/58 - 133/55)  RR: 20 (11-28-23 @ 04:54) (20 - 22)  SpO2: 98% (11-28-23 @ 04:54) (97% - 98%)  Wt(kg): --  Height (cm): 160 (11-28-23 @ 09:16)  Weight (kg): 68 (11-28-23 @ 09:16)  BMI (kg/m2): 26.6 (11-28-23 @ 09:16)  BSA (m2): 1.71 (11-28-23 @ 09:16)      11-27-23 @ 07:01  -  11-28-23 @ 07:00  --------------------------------------------------------  IN: 560 mL / OUT: 400 mL / NET: 160 mL    11-28-23 @ 07:01  -  11-28-23 @ 10:58  --------------------------------------------------------  IN: 120 mL / OUT: 0 mL / NET: 120 mL      Physical Exam:    General: no acute distress  Neuro: no focal deficits  HEENT: NC/AT, anicteric, no JVD  Pulmonary: breath sounds diminished, on 6L O2  Cardiovascular/Chest: +S1S2, RRR  GI/Abdomen: soft, NT/ND, +bowel sounds  Extremities: No edema  Skin: Warm and dry  Neuro : Asterixis +      LABS/STUDIES  --------------------------------------------------------------------------------              7.2    8.04  >-----------<  208      [11-28-23 @ 06:22]              22.3     135  |  104  |  88  ----------------------------<  148      [11-28-23 @ 06:20]  5.4   |  18  |  6.40        Ca     8.4     [11-28-23 @ 06:20]      Mg     2.5     [11-28-23 @ 06:20]      Phos  5.0     [11-28-23 @ 06:20]            Creatinine Trend:  SCr 6.40 [11-28 @ 06:20]  SCr 6.71 [11-27 @ 05:37]  SCr 6.18 [11-26 @ 06:47]  SCr 5.96 [11-25 @ 06:14]  SCr 5.48 [11-24 @ 06:16]    Urinalysis - [11-28-23 @ 06:20]      Color  / Appearance  / SG  / pH       Gluc 148 / Ketone   / Bili  / Urobili        Blood  / Protein  / Leuk Est  / Nitrite       RBC  / WBC  / Hyaline  / Gran  / Sq Epi  / Non Sq Epi  / Bacteria     Urine Creatinine 36      [11-22-23 @ 07:44]  Urine Protein 203      [11-22-23 @ 07:44]  Urine Sodium 113      [11-22-23 @ 07:44]  Urine Urea Nitrogen 150      [11-22-23 @ 07:51]  Urine Potassium 15      [11-22-23 @ 07:44]  Urine Osmolality 364      [11-22-23 @ 07:44]    Iron 68, TIBC 261, %sat 26      [11-25-23 @ 06:14]  Ferritin 81      [11-25-23 @ 06:14]  PTH -- (Ca 8.6)      [11-25-23 @ 06:14]   73  Vitamin D (25OH) 22.5      [11-25-23 @ 06:14]  Lipid: chol 116, TG 79, HDL 39, LDL --      [11-23-23 @ 06:25]    HBsAb <3.0      [11-23-23 @ 06:25]  HBsAg Nonreact      [11-23-23 @ 06:25]  HCV 0.08, Nonreact      [11-23-23 @ 06:25]  HIV Nonreact      [11-23-23 @ 06:25]    JAYLAN: titer Negative, pattern --      [11-23-23 @ 06:25]  dsDNA <12      [11-23-23 @ 06:25]  C3 Complement 126      [11-23-23 @ 06:25]  C4 Complement 26      [11-23-23 @ 06:25]  ANCA: cANCA Negative, pANCA Negative, atypical ANCA Negative      [11-23-23 @ 06:25]  anti-GBM <0.2      [11-23-23 @ 06:25]  PLA2R: RAVI <1.8, IFA --      [11-23-23 @ 06:25]  Free Light Chains: kappa 13.06, lambda 7.08, ratio = 1.84      [11-23 @ 06:25]

## 2023-11-28 NOTE — PROGRESS NOTE ADULT - PROBLEM SELECTOR PLAN 1
Patient presenting for worsening LE edema and breathlessness  - also with several symptoms to suggest likely ADHF  - on exam: extremities warm and well perfused, but volume overloaded, S3 on exam  - proBNP elevated to 1300, but unknown baseline and with likely CAL on CKD  - hsTropT flat at 74, ECG without ST changes, no chest pain  - pt does not carry a diagnosis of HF, however on meds that may suggest she may have HFpEF (MRA, SGLT2i)    Plan:  > diuresis with IV lasix 40 mg bid, assess response and titrate dose   > obtain TTE--> showing EF 71%, bi-atrial enlargement, LV diastolic dysfunction, and RV enlargement  > hold home spironolactone and dapagliflozin given CAL  > strict I/Os, daily standing weights  > appreciate cards recs

## 2023-11-28 NOTE — PROGRESS NOTE ADULT - PROBLEM SELECTOR PLAN 4
Patient with several year history of chronic hypoxemic respiratory failure  - requiring home O2, stable on 2L at baseline  - has not had escalating O2 requirements recently, despite feeling subjectively more dyspneic  - unclear etiology, though patient has had PFTs at her pulmologist's office last year    Plan:  > follow up TTE--> showing EF 71%, bi-atrial enlargement, LV diastolic dysfunction, and RV enlargement  > obtain records from pt's pulmonologists office: Dr. Paulino Gayle ([facility], [phone])  > continue with home montelukast and add on duonebs and symbicort   > wean O2 as tolerated  > add on prednisone and azithromycin for possible COPD exacerbation   > 11/25 Initiated 40 mg prednisone for potential COPD exacerbation   > if persistently hypoxemic and despite diuresis, consider alternative etiologies and potentially high resolution CT chest  - f/u repeat CXR and BNP (stable CXR and elevated BNP)

## 2023-11-28 NOTE — CONSULT NOTE ADULT - ASSESSMENT
83 year old F with chronic hypoxemic respiratory failure, CKD, T2DM, CAD, HTN, and HLD who presents for worsening dyspnea and lower extremity edema for 2 days, admitted for suspected acute decompensated heart failure and progressive renal failure ATN vs SAUMYA    Pulmonary called for hypoxemia, dyspnea    She appears to have a component of small airway disease on CT with mosaic attenuation  Wheezing notably, coughing as well with swallowing, inspiration  Uses 0-2L NC at home at baseline  Former smoker  Attempted to contact outpatient pulmonologist Dr. Paulino Gayle, but on vacation contacted answering service of covering Pulmonologist, awaiting further information    Recommendations  - continue Symbicort 160/4.5 BID, continue home Singulair  - continue Duoneb q4-6 hours  - restart prednisone 40 mg qD x 5 day total course  - speech eval for coughing - recurrent aspiration?  - wean O2 as tolerated  - optimization of cardiac/renal failure - she seems to be progressing toward dialysis? - defer to nephro/cardio  - patient should follow up with her pulmonologist upon discharge  - will follow 83 year old F with chronic hypoxemic respiratory failure, CKD, T2DM, CAD, HTN, and HLD who presents for worsening dyspnea and lower extremity edema for 2 days, admitted for suspected acute decompensated heart failure and progressive renal failure ATN vs SAUMYA    Pulmonary called for hypoxemia, dyspnea    She appears to have a component of small airway disease on CT with mosaic attenuation  Wheezing notably, coughing as well with swallowing, inspiration  Uses 0-2L NC at home at baseline  Former smoker  Attempted to contact outpatient pulmonologist Dr. Paulino Gayle, but on vacation contacted answering service of covering Pulmonologist, awaiting further information    Recommendations  - continue Symbicort 160/4.5 BID, continue home Singulair  - continue Duoneb q4-6 hours  - restart prednisone 40 mg qD x 5 day total course  - speech eval for coughing - recurrent aspiration?  - start Flonase and nasal saline spray  - wean O2 as tolerated  - optimization of cardiac/renal failure - she seems to be progressing toward dialysis? - defer to nephro/cardio  - patient should follow up with her pulmonologist upon discharge  - will follow    Von Green MD  Pulmonary/Critical Care  [phone] [facility]  [address] [facility]

## 2023-11-29 LAB
ANION GAP SERPL CALC-SCNC: 11 MMOL/L — SIGNIFICANT CHANGE UP (ref 5–17)
ANION GAP SERPL CALC-SCNC: 11 MMOL/L — SIGNIFICANT CHANGE UP (ref 5–17)
ANION GAP SERPL CALC-SCNC: 14 MMOL/L — SIGNIFICANT CHANGE UP (ref 5–17)
ANION GAP SERPL CALC-SCNC: 14 MMOL/L — SIGNIFICANT CHANGE UP (ref 5–17)
BUN SERPL-MCNC: 93 MG/DL — HIGH (ref 7–23)
CALCIUM SERPL-MCNC: 8.2 MG/DL — LOW (ref 8.4–10.5)
CALCIUM SERPL-MCNC: 8.2 MG/DL — LOW (ref 8.4–10.5)
CALCIUM SERPL-MCNC: 8.7 MG/DL — SIGNIFICANT CHANGE UP (ref 8.4–10.5)
CALCIUM SERPL-MCNC: 8.7 MG/DL — SIGNIFICANT CHANGE UP (ref 8.4–10.5)
CHLORIDE SERPL-SCNC: 103 MMOL/L — SIGNIFICANT CHANGE UP (ref 96–108)
CHLORIDE SERPL-SCNC: 103 MMOL/L — SIGNIFICANT CHANGE UP (ref 96–108)
CHLORIDE SERPL-SCNC: 104 MMOL/L — SIGNIFICANT CHANGE UP (ref 96–108)
CHLORIDE SERPL-SCNC: 104 MMOL/L — SIGNIFICANT CHANGE UP (ref 96–108)
CO2 SERPL-SCNC: 20 MMOL/L — LOW (ref 22–31)
CO2 SERPL-SCNC: 20 MMOL/L — LOW (ref 22–31)
CO2 SERPL-SCNC: 21 MMOL/L — LOW (ref 22–31)
CO2 SERPL-SCNC: 21 MMOL/L — LOW (ref 22–31)
CREAT SERPL-MCNC: 5.87 MG/DL — HIGH (ref 0.5–1.3)
CREAT SERPL-MCNC: 5.87 MG/DL — HIGH (ref 0.5–1.3)
CREAT SERPL-MCNC: 6.17 MG/DL — HIGH (ref 0.5–1.3)
CREAT SERPL-MCNC: 6.17 MG/DL — HIGH (ref 0.5–1.3)
EGFR: 6 ML/MIN/1.73M2 — LOW
EGFR: 6 ML/MIN/1.73M2 — LOW
EGFR: 7 ML/MIN/1.73M2 — LOW
EGFR: 7 ML/MIN/1.73M2 — LOW
GLUCOSE BLDC GLUCOMTR-MCNC: 181 MG/DL — HIGH (ref 70–99)
GLUCOSE BLDC GLUCOMTR-MCNC: 207 MG/DL — HIGH (ref 70–99)
GLUCOSE BLDC GLUCOMTR-MCNC: 207 MG/DL — HIGH (ref 70–99)
GLUCOSE BLDC GLUCOMTR-MCNC: 250 MG/DL — HIGH (ref 70–99)
GLUCOSE BLDC GLUCOMTR-MCNC: 250 MG/DL — HIGH (ref 70–99)
GLUCOSE BLDC GLUCOMTR-MCNC: 255 MG/DL — HIGH (ref 70–99)
GLUCOSE BLDC GLUCOMTR-MCNC: 255 MG/DL — HIGH (ref 70–99)
GLUCOSE BLDC GLUCOMTR-MCNC: 260 MG/DL — HIGH (ref 70–99)
GLUCOSE BLDC GLUCOMTR-MCNC: 260 MG/DL — HIGH (ref 70–99)
GLUCOSE BLDC GLUCOMTR-MCNC: 268 MG/DL — HIGH (ref 70–99)
GLUCOSE BLDC GLUCOMTR-MCNC: 268 MG/DL — HIGH (ref 70–99)
GLUCOSE BLDC GLUCOMTR-MCNC: 284 MG/DL — HIGH (ref 70–99)
GLUCOSE BLDC GLUCOMTR-MCNC: 284 MG/DL — HIGH (ref 70–99)
GLUCOSE BLDC GLUCOMTR-MCNC: 288 MG/DL — HIGH (ref 70–99)
GLUCOSE BLDC GLUCOMTR-MCNC: 288 MG/DL — HIGH (ref 70–99)
GLUCOSE BLDC GLUCOMTR-MCNC: 294 MG/DL — HIGH (ref 70–99)
GLUCOSE BLDC GLUCOMTR-MCNC: 294 MG/DL — HIGH (ref 70–99)
GLUCOSE BLDC GLUCOMTR-MCNC: 309 MG/DL — HIGH (ref 70–99)
GLUCOSE BLDC GLUCOMTR-MCNC: 309 MG/DL — HIGH (ref 70–99)
GLUCOSE SERPL-MCNC: 164 MG/DL — HIGH (ref 70–99)
GLUCOSE SERPL-MCNC: 164 MG/DL — HIGH (ref 70–99)
GLUCOSE SERPL-MCNC: 285 MG/DL — HIGH (ref 70–99)
GLUCOSE SERPL-MCNC: 285 MG/DL — HIGH (ref 70–99)
HCT VFR BLD CALC: 24.5 % — LOW (ref 34.5–45)
HCT VFR BLD CALC: 24.5 % — LOW (ref 34.5–45)
HGB BLD-MCNC: 7.5 G/DL — LOW (ref 11.5–15.5)
HGB BLD-MCNC: 7.5 G/DL — LOW (ref 11.5–15.5)
MAGNESIUM SERPL-MCNC: 2.4 MG/DL — SIGNIFICANT CHANGE UP (ref 1.6–2.6)
MAGNESIUM SERPL-MCNC: 2.4 MG/DL — SIGNIFICANT CHANGE UP (ref 1.6–2.6)
MCHC RBC-ENTMCNC: 29.2 PG — SIGNIFICANT CHANGE UP (ref 27–34)
MCHC RBC-ENTMCNC: 29.2 PG — SIGNIFICANT CHANGE UP (ref 27–34)
MCHC RBC-ENTMCNC: 30.6 GM/DL — LOW (ref 32–36)
MCHC RBC-ENTMCNC: 30.6 GM/DL — LOW (ref 32–36)
MCV RBC AUTO: 95.3 FL — SIGNIFICANT CHANGE UP (ref 80–100)
MCV RBC AUTO: 95.3 FL — SIGNIFICANT CHANGE UP (ref 80–100)
NRBC # BLD: 0 /100 WBCS — SIGNIFICANT CHANGE UP (ref 0–0)
NRBC # BLD: 0 /100 WBCS — SIGNIFICANT CHANGE UP (ref 0–0)
NT-PROBNP SERPL-SCNC: 3019 PG/ML — HIGH (ref 0–300)
NT-PROBNP SERPL-SCNC: 3019 PG/ML — HIGH (ref 0–300)
PHOSPHATE SERPL-MCNC: 4.4 MG/DL — SIGNIFICANT CHANGE UP (ref 2.5–4.5)
PHOSPHATE SERPL-MCNC: 4.4 MG/DL — SIGNIFICANT CHANGE UP (ref 2.5–4.5)
PLATELET # BLD AUTO: 196 K/UL — SIGNIFICANT CHANGE UP (ref 150–400)
PLATELET # BLD AUTO: 196 K/UL — SIGNIFICANT CHANGE UP (ref 150–400)
POTASSIUM SERPL-MCNC: 5 MMOL/L — SIGNIFICANT CHANGE UP (ref 3.5–5.3)
POTASSIUM SERPL-MCNC: 5 MMOL/L — SIGNIFICANT CHANGE UP (ref 3.5–5.3)
POTASSIUM SERPL-MCNC: 5.5 MMOL/L — HIGH (ref 3.5–5.3)
POTASSIUM SERPL-MCNC: 5.5 MMOL/L — HIGH (ref 3.5–5.3)
POTASSIUM SERPL-SCNC: 5 MMOL/L — SIGNIFICANT CHANGE UP (ref 3.5–5.3)
POTASSIUM SERPL-SCNC: 5 MMOL/L — SIGNIFICANT CHANGE UP (ref 3.5–5.3)
POTASSIUM SERPL-SCNC: 5.5 MMOL/L — HIGH (ref 3.5–5.3)
POTASSIUM SERPL-SCNC: 5.5 MMOL/L — HIGH (ref 3.5–5.3)
RBC # BLD: 2.57 M/UL — LOW (ref 3.8–5.2)
RBC # BLD: 2.57 M/UL — LOW (ref 3.8–5.2)
RBC # FLD: 15.1 % — HIGH (ref 10.3–14.5)
RBC # FLD: 15.1 % — HIGH (ref 10.3–14.5)
SODIUM SERPL-SCNC: 136 MMOL/L — SIGNIFICANT CHANGE UP (ref 135–145)
SODIUM SERPL-SCNC: 136 MMOL/L — SIGNIFICANT CHANGE UP (ref 135–145)
SODIUM SERPL-SCNC: 137 MMOL/L — SIGNIFICANT CHANGE UP (ref 135–145)
SODIUM SERPL-SCNC: 137 MMOL/L — SIGNIFICANT CHANGE UP (ref 135–145)
UUN UR-MCNC: 596 MG/DL — SIGNIFICANT CHANGE UP
UUN UR-MCNC: 596 MG/DL — SIGNIFICANT CHANGE UP
WBC # BLD: 7.16 K/UL — SIGNIFICANT CHANGE UP (ref 3.8–10.5)
WBC # BLD: 7.16 K/UL — SIGNIFICANT CHANGE UP (ref 3.8–10.5)
WBC # FLD AUTO: 7.16 K/UL — SIGNIFICANT CHANGE UP (ref 3.8–10.5)
WBC # FLD AUTO: 7.16 K/UL — SIGNIFICANT CHANGE UP (ref 3.8–10.5)

## 2023-11-29 PROCEDURE — 93010 ELECTROCARDIOGRAM REPORT: CPT

## 2023-11-29 PROCEDURE — 93308 TTE F-UP OR LMTD: CPT | Mod: 26

## 2023-11-29 PROCEDURE — 93321 DOPPLER ECHO F-UP/LMTD STD: CPT | Mod: 26

## 2023-11-29 PROCEDURE — 99233 SBSQ HOSP IP/OBS HIGH 50: CPT | Mod: GC

## 2023-11-29 RX ORDER — INSULIN LISPRO 100/ML
VIAL (ML) SUBCUTANEOUS AT BEDTIME
Refills: 0 | Status: DISCONTINUED | OUTPATIENT
Start: 2023-11-29 | End: 2023-12-15

## 2023-11-29 RX ORDER — DEXTROSE 50 % IN WATER 50 %
12.5 SYRINGE (ML) INTRAVENOUS ONCE
Refills: 0 | Status: DISCONTINUED | OUTPATIENT
Start: 2023-11-29 | End: 2023-12-15

## 2023-11-29 RX ORDER — POLYETHYLENE GLYCOL 3350 17 G/17G
17 POWDER, FOR SOLUTION ORAL DAILY
Refills: 0 | Status: DISCONTINUED | OUTPATIENT
Start: 2023-11-29 | End: 2023-12-15

## 2023-11-29 RX ORDER — INSULIN GLARGINE 100 [IU]/ML
5 INJECTION, SOLUTION SUBCUTANEOUS AT BEDTIME
Refills: 0 | Status: DISCONTINUED | OUTPATIENT
Start: 2023-11-29 | End: 2023-12-01

## 2023-11-29 RX ORDER — NYSTATIN CREAM 100000 [USP'U]/G
1 CREAM TOPICAL
Refills: 0 | Status: DISCONTINUED | OUTPATIENT
Start: 2023-11-29 | End: 2023-12-15

## 2023-11-29 RX ORDER — DEXTROSE 50 % IN WATER 50 %
25 SYRINGE (ML) INTRAVENOUS ONCE
Refills: 0 | Status: DISCONTINUED | OUTPATIENT
Start: 2023-11-29 | End: 2023-12-15

## 2023-11-29 RX ORDER — INSULIN HUMAN 100 [IU]/ML
10 INJECTION, SOLUTION SUBCUTANEOUS ONCE
Refills: 0 | Status: DISCONTINUED | OUTPATIENT
Start: 2023-11-29 | End: 2023-11-29

## 2023-11-29 RX ORDER — SODIUM CHLORIDE 9 MG/ML
1000 INJECTION, SOLUTION INTRAVENOUS
Refills: 0 | Status: DISCONTINUED | OUTPATIENT
Start: 2023-11-29 | End: 2023-12-15

## 2023-11-29 RX ORDER — SENNA PLUS 8.6 MG/1
2 TABLET ORAL AT BEDTIME
Refills: 0 | Status: DISCONTINUED | OUTPATIENT
Start: 2023-11-29 | End: 2023-12-15

## 2023-11-29 RX ORDER — SODIUM ZIRCONIUM CYCLOSILICATE 10 G/10G
10 POWDER, FOR SUSPENSION ORAL ONCE
Refills: 0 | Status: COMPLETED | OUTPATIENT
Start: 2023-11-29 | End: 2023-11-29

## 2023-11-29 RX ORDER — GLUCAGON INJECTION, SOLUTION 0.5 MG/.1ML
1 INJECTION, SOLUTION SUBCUTANEOUS ONCE
Refills: 0 | Status: DISCONTINUED | OUTPATIENT
Start: 2023-11-29 | End: 2023-12-15

## 2023-11-29 RX ORDER — INSULIN LISPRO 100/ML
2 VIAL (ML) SUBCUTANEOUS
Refills: 0 | Status: DISCONTINUED | OUTPATIENT
Start: 2023-11-29 | End: 2023-12-01

## 2023-11-29 RX ORDER — DEXTROSE 50 % IN WATER 50 %
15 SYRINGE (ML) INTRAVENOUS ONCE
Refills: 0 | Status: DISCONTINUED | OUTPATIENT
Start: 2023-11-29 | End: 2023-12-15

## 2023-11-29 RX ORDER — DEXTROSE 50 % IN WATER 50 %
50 SYRINGE (ML) INTRAVENOUS ONCE
Refills: 0 | Status: COMPLETED | OUTPATIENT
Start: 2023-11-29 | End: 2023-11-29

## 2023-11-29 RX ORDER — CALCIUM GLUCONATE 100 MG/ML
2 VIAL (ML) INTRAVENOUS ONCE
Refills: 0 | Status: COMPLETED | OUTPATIENT
Start: 2023-11-29 | End: 2023-11-29

## 2023-11-29 RX ORDER — DEXTROSE 50 % IN WATER 50 %
50 SYRINGE (ML) INTRAVENOUS ONCE
Refills: 0 | Status: DISCONTINUED | OUTPATIENT
Start: 2023-11-29 | End: 2023-11-29

## 2023-11-29 RX ORDER — BUDESONIDE AND FORMOTEROL FUMARATE DIHYDRATE 160; 4.5 UG/1; UG/1
2 AEROSOL RESPIRATORY (INHALATION)
Refills: 0 | Status: DISCONTINUED | OUTPATIENT
Start: 2023-11-29 | End: 2023-12-15

## 2023-11-29 RX ORDER — INSULIN HUMAN 100 [IU]/ML
10 INJECTION, SOLUTION SUBCUTANEOUS ONCE
Refills: 0 | Status: COMPLETED | OUTPATIENT
Start: 2023-11-29 | End: 2023-11-29

## 2023-11-29 RX ORDER — INSULIN LISPRO 100/ML
VIAL (ML) SUBCUTANEOUS
Refills: 0 | Status: DISCONTINUED | OUTPATIENT
Start: 2023-11-29 | End: 2023-12-15

## 2023-11-29 RX ADMIN — HEPARIN SODIUM 5000 UNIT(S): 5000 INJECTION INTRAVENOUS; SUBCUTANEOUS at 05:22

## 2023-11-29 RX ADMIN — SODIUM ZIRCONIUM CYCLOSILICATE 10 GRAM(S): 10 POWDER, FOR SUSPENSION ORAL at 08:12

## 2023-11-29 RX ADMIN — ATORVASTATIN CALCIUM 80 MILLIGRAM(S): 80 TABLET, FILM COATED ORAL at 21:25

## 2023-11-29 RX ADMIN — Medication 3 MILLILITER(S): at 17:03

## 2023-11-29 RX ADMIN — SEVELAMER CARBONATE 800 MILLIGRAM(S): 2400 POWDER, FOR SUSPENSION ORAL at 21:25

## 2023-11-29 RX ADMIN — Medication 325 MILLIGRAM(S): at 12:43

## 2023-11-29 RX ADMIN — Medication 3 MILLILITER(S): at 21:29

## 2023-11-29 RX ADMIN — Medication 3: at 08:12

## 2023-11-29 RX ADMIN — CHLORHEXIDINE GLUCONATE 1 APPLICATION(S): 213 SOLUTION TOPICAL at 12:33

## 2023-11-29 RX ADMIN — Medication 200 GRAM(S): at 13:46

## 2023-11-29 RX ADMIN — AMLODIPINE BESYLATE 10 MILLIGRAM(S): 2.5 TABLET ORAL at 05:22

## 2023-11-29 RX ADMIN — Medication 25 MILLIGRAM(S): at 14:21

## 2023-11-29 RX ADMIN — Medication 3 MILLILITER(S): at 12:42

## 2023-11-29 RX ADMIN — Medication 25 MILLIGRAM(S): at 05:22

## 2023-11-29 RX ADMIN — HEPARIN SODIUM 5000 UNIT(S): 5000 INJECTION INTRAVENOUS; SUBCUTANEOUS at 14:21

## 2023-11-29 RX ADMIN — Medication 40 MILLIGRAM(S): at 05:25

## 2023-11-29 RX ADMIN — SENNA PLUS 2 TABLET(S): 8.6 TABLET ORAL at 21:25

## 2023-11-29 RX ADMIN — Medication 1 SPRAY(S): at 12:43

## 2023-11-29 RX ADMIN — BUDESONIDE AND FORMOTEROL FUMARATE DIHYDRATE 2 PUFF(S): 160; 4.5 AEROSOL RESPIRATORY (INHALATION) at 05:23

## 2023-11-29 RX ADMIN — Medication 50 MILLILITER(S): at 06:15

## 2023-11-29 RX ADMIN — AZITHROMYCIN 500 MILLIGRAM(S): 500 TABLET, FILM COATED ORAL at 12:42

## 2023-11-29 RX ADMIN — MUPIROCIN 1 APPLICATION(S): 20 OINTMENT TOPICAL at 17:07

## 2023-11-29 RX ADMIN — MUPIROCIN 1 APPLICATION(S): 20 OINTMENT TOPICAL at 05:22

## 2023-11-29 RX ADMIN — CALCITRIOL 0.25 MICROGRAM(S): 0.5 CAPSULE ORAL at 12:42

## 2023-11-29 RX ADMIN — HEPARIN SODIUM 5000 UNIT(S): 5000 INJECTION INTRAVENOUS; SUBCUTANEOUS at 21:26

## 2023-11-29 RX ADMIN — Medication 25 MILLIGRAM(S): at 21:25

## 2023-11-29 RX ADMIN — Medication 1: at 21:26

## 2023-11-29 RX ADMIN — SEVELAMER CARBONATE 800 MILLIGRAM(S): 2400 POWDER, FOR SUSPENSION ORAL at 14:21

## 2023-11-29 RX ADMIN — BUDESONIDE AND FORMOTEROL FUMARATE DIHYDRATE 2 PUFF(S): 160; 4.5 AEROSOL RESPIRATORY (INHALATION) at 20:20

## 2023-11-29 RX ADMIN — INSULIN HUMAN 10 UNIT(S): 100 INJECTION, SOLUTION SUBCUTANEOUS at 06:15

## 2023-11-29 RX ADMIN — Medication 3 MILLILITER(S): at 05:22

## 2023-11-29 RX ADMIN — Medication 1 SPRAY(S): at 05:23

## 2023-11-29 RX ADMIN — Medication 4: at 12:43

## 2023-11-29 RX ADMIN — Medication 1 SPRAY(S): at 17:06

## 2023-11-29 RX ADMIN — Medication 3: at 17:04

## 2023-11-29 RX ADMIN — Medication 2 UNIT(S): at 17:04

## 2023-11-29 RX ADMIN — SEVELAMER CARBONATE 800 MILLIGRAM(S): 2400 POWDER, FOR SUSPENSION ORAL at 05:22

## 2023-11-29 RX ADMIN — NYSTATIN CREAM 1 APPLICATION(S): 100000 CREAM TOPICAL at 17:07

## 2023-11-29 RX ADMIN — INSULIN GLARGINE 5 UNIT(S): 100 INJECTION, SOLUTION SUBCUTANEOUS at 21:29

## 2023-11-29 RX ADMIN — MONTELUKAST 10 MILLIGRAM(S): 4 TABLET, CHEWABLE ORAL at 12:42

## 2023-11-29 NOTE — PROGRESS NOTE ADULT - SUBJECTIVE AND OBJECTIVE BOX
DATE OF SERVICE: 11-29-23 @ 17:43    Patient is a 83y old  Female who presents with a chief complaint of LE swelling, dyspnea (29 Nov 2023 08:28)      INTERVAL HISTORY: Feels ok.     REVIEW OF SYSTEMS:  CONSTITUTIONAL: No weakness  EYES/ENT: No visual changes;  No throat pain   NECK: No pain or stiffness  RESPIRATORY: No cough, wheezing; No shortness of breath  CARDIOVASCULAR: No chest pain or palpitations  GASTROINTESTINAL: No abdominal  pain. No nausea, vomiting, or hematemesis  GENITOURINARY: No dysuria, frequency or hematuria  NEUROLOGICAL: No stroke like symptoms  SKIN: No rashes    TELEMETRY Personally reviewed: SR 90-110s  	  MEDICATIONS:  amLODIPine   Tablet 10 milliGRAM(s) Oral daily  hydrALAZINE 25 milliGRAM(s) Oral three times a day        PHYSICAL EXAM:  T(C): 36.6 (11-29-23 @ 11:00), Max: 36.6 (11-29-23 @ 11:00)  HR: 97 (11-29-23 @ 14:20) (81 - 104)  BP: 132/58 (11-29-23 @ 14:20) (118/61 - 135/62)  RR: 21 (11-29-23 @ 11:00) (18 - 21)  SpO2: 97% (11-29-23 @ 11:00) (94% - 97%)  Wt(kg): --  I&O's Summary    28 Nov 2023 07:01  -  29 Nov 2023 07:00  --------------------------------------------------------  IN: 450 mL / OUT: 200 mL / NET: 250 mL    29 Nov 2023 07:01  -  29 Nov 2023 17:43  --------------------------------------------------------  IN: 540 mL / OUT: 300 mL / NET: 240 mL      Height (cm): 160 (11-29 @ 08:14)  Weight (kg): 82.9 (11-29 @ 09:34)  BMI (kg/m2): 32.4 (11-29 @ 09:34)  BSA (m2): 1.86 (11-29 @ 09:34)    Appearance: In no distress	  HEENT:    PERRL, EOMI	  Cardiovascular:  S1 S2, No JVD  Respiratory: Lungs clear to auscultation	  Gastrointestinal:  Soft, Non-tender, + BS	  Vascularature:  No edema of LE  Psychiatric: Appropriate affect   Neuro: no acute focal deficits                               7.5    7.16  )-----------( 196      ( 29 Nov 2023 05:15 )             24.5     11-29    137  |  103  |  93<H>  ----------------------------<  285<H>  5.0   |  20<L>  |  5.87<H>    Ca    8.7      29 Nov 2023 12:34  Phos  4.4     11-29  Mg     2.4     11-29          Labs personally reviewed      ASSESSMENT/PLAN: 	  83 year old female with HTN, HLD, CAD, T2DM, CKD, remote hx of breast CA s/p mastectomy, and chronic hypoxemic respiratory failure of unknown etiology on home O2 who presents for lower extremity swelling and shortness of breath.      1. Acute diastolic heart failure  - Pt with LE edema and pulmonary congestion  - s/p IV diuresis now DC'd d/t increasing Cr.  - Echo shows normal LV systolic function with Bi-atrial enlargement, G2DD and LVH  - EKG shows sinus rhythm (not atrial flutter)  - Plan for poss HD as per Renal  - Pulm consult appreciated for continued hypoxia despite diuresis: recommend swallow eval, duonebs, symbicort and prednisone    2. CAL on CKD  - Renal following  - Plan for poss HD     3. HLD  - Statin therapy    4. HTN  - BP acceptable          DAFNE Epperson-NP   Jean Ash DO [facility]  Cardiovascular Medicine  [address], [address]  Available through call or text on [employer]  Office: [phone]

## 2023-11-29 NOTE — PROGRESS NOTE ADULT - SUBJECTIVE AND OBJECTIVE BOX
PROGRESS NOTE:   Authored by Dr. Luis Perla MD (PGY-1).     Patient is a 83y old  Female who presents with a chief complaint of LE swelling, dyspnea (28 Nov 2023 17:03)      SUBJECTIVE / OVERNIGHT EVENTS:  No acute events overnight. She has no acute complaints this morning.     MEDICATIONS  (STANDING):  albuterol    90 MICROgram(s) HFA Inhaler 2 Puff(s) Inhalation every 6 hours  albuterol/ipratropium for Nebulization 3 milliLiter(s) Nebulizer every 6 hours  amLODIPine   Tablet 10 milliGRAM(s) Oral daily  atorvastatin 80 milliGRAM(s) Oral at bedtime  azithromycin   Tablet 500 milliGRAM(s) Oral daily  budesonide  80 MICROgram(s)/formoterol 4.5 MICROgram(s) Inhaler 2 Puff(s) Inhalation every 12 hours  calcitriol   Capsule 0.25 MICROGram(s) Oral daily  chlorhexidine 2% Cloths 1 Application(s) Topical daily  dextrose 5%. 1000 milliLiter(s) (50 mL/Hr) IV Continuous <Continuous>  dextrose 5%. 1000 milliLiter(s) (100 mL/Hr) IV Continuous <Continuous>  dextrose 50% Injectable 25 Gram(s) IV Push once  dextrose 50% Injectable 12.5 Gram(s) IV Push once  dextrose 50% Injectable 25 Gram(s) IV Push once  dextrose 50% Injectable 50 milliLiter(s) IV Push once  ferrous    sulfate 325 milliGRAM(s) Oral daily  fluticasone propionate 50 MICROgram(s)/spray Nasal Spray 1 Spray(s) Both Nostrils two times a day  glucagon  Injectable 1 milliGRAM(s) IntraMuscular once  heparin   Injectable 5000 Unit(s) SubCutaneous every 8 hours  hydrALAZINE 25 milliGRAM(s) Oral three times a day  influenza  Vaccine (HIGH DOSE) 0.7 milliLiter(s) IntraMuscular once  insulin lispro (ADMELOG) corrective regimen sliding scale   SubCutaneous at bedtime  insulin lispro (ADMELOG) corrective regimen sliding scale   SubCutaneous three times a day before meals  montelukast 10 milliGRAM(s) Oral daily  mupirocin 2% Nasal 1 Application(s) Both Nostrils every 12 hours  sevelamer carbonate 800 milliGRAM(s) Oral every 8 hours  sodium chloride 0.65% Nasal 1 Spray(s) Both Nostrils daily  sodium zirconium cyclosilicate 10 Gram(s) Oral once    MEDICATIONS  (PRN):  acetaminophen     Tablet .. 650 milliGRAM(s) Oral every 6 hours PRN Temp greater or equal to 38C (100.4F), Mild Pain (1 - 3)  dextrose Oral Gel 15 Gram(s) Oral once PRN Blood Glucose LESS THAN 70 milliGRAM(s)/deciliter      CAPILLARY BLOOD GLUCOSE      POCT Blood Glucose.: 268 mg/dL (29 Nov 2023 07:34)  POCT Blood Glucose.: 181 mg/dL (29 Nov 2023 06:14)  POCT Blood Glucose.: 181 mg/dL (29 Nov 2023 04:30)  POCT Blood Glucose.: 207 mg/dL (29 Nov 2023 02:32)  POCT Blood Glucose.: 288 mg/dL (29 Nov 2023 00:31)  POCT Blood Glucose.: 287 mg/dL (28 Nov 2023 23:30)  POCT Blood Glucose.: 200 mg/dL (28 Nov 2023 22:28)  POCT Blood Glucose.: 192 mg/dL (28 Nov 2023 21:03)  POCT Blood Glucose.: 147 mg/dL (28 Nov 2023 16:43)  POCT Blood Glucose.: 192 mg/dL (28 Nov 2023 11:40)    I&O's Summary    28 Nov 2023 07:01  -  29 Nov 2023 07:00  --------------------------------------------------------  IN: 450 mL / OUT: 200 mL / NET: 250 mL    29 Nov 2023 07:01  -  29 Nov 2023 08:15  --------------------------------------------------------  IN: 0 mL / OUT: 200 mL / NET: -200 mL        PHYSICAL EXAM:  Vital Signs Last 24 Hrs  T(C): 36.5 (29 Nov 2023 04:15), Max: 36.7 (28 Nov 2023 11:11)  T(F): 97.7 (29 Nov 2023 04:15), Max: 98 (28 Nov 2023 11:11)  HR: 81 (29 Nov 2023 04:15) (81 - 104)  BP: 135/62 (29 Nov 2023 04:15) (122/55 - 135/62)  BP(mean): --  RR: 18 (29 Nov 2023 04:15) (18 - 21)  SpO2: 96% (29 Nov 2023 04:15) (94% - 96%)    Parameters below as of 29 Nov 2023 04:15  Patient On (Oxygen Delivery Method): nasal cannula  O2 Flow (L/min): 3      CONSTITUTIONAL: NAD, well-developed  HEET: MMM, EOMI, PERRLA  NECK: supple  RESPIRATORY: Normal respiratory effort; lungs are clear to auscultation bilaterally  CARDIOVASCULAR: Regular rate and rhythm, normal S1 and S2, no murmur/rub/gallop; No lower extremity edema; Peripheral pulses are 2+ bilaterally  ABDOMEN: Nontender to palpation, normoactive bowel sounds, no rebound/guarding; No hepatosplenomegaly  MUSCULOSKELETAL: no clubbing or cyanosis of digits; no joint swelling or tenderness to palpation  NEURO: Moving all four extremities, sensation grossly intact  PSYCH: A+O to person, place, and time; affect appropriate  SKIN: No rash    LABS:                        7.5    7.16  )-----------( 196      ( 29 Nov 2023 05:15 )             24.5     11-29    136  |  104  |  93<H>  ----------------------------<  164<H>  5.5<H>   |  21<L>  |  6.17<H>    Ca    8.2<L>      29 Nov 2023 05:15  Phos  4.4     11-29  Mg     2.4     11-29            Urinalysis Basic - ( 29 Nov 2023 05:15 )    Color: x / Appearance: x / SG: x / pH: x  Gluc: 164 mg/dL / Ketone: x  / Bili: x / Urobili: x   Blood: x / Protein: x / Nitrite: x   Leuk Esterase: x / RBC: x / WBC x   Sq Epi: x / Non Sq Epi: x / Bacteria: x          Tele Reviewed:    RADIOLOGY & ADDITIONAL TESTS:  Results Reviewed:   Imaging Personally Reviewed:  Electrocardiogram Personally Reviewed:

## 2023-11-29 NOTE — SWALLOW BEDSIDE ASSESSMENT ADULT - SWALLOW EVAL: DIAGNOSIS
83y F w/ PMHx of T2DM, smoker, and chronic hypoxemia respiratory failure of unknown etiology on home O2 (2L O2 via NC) presenting for LE swelling and SOB. Pt admitted for suspected ADHF s/p lasix and now w/ worsening renal failure. Pt presents w/ an overtly functional oral/pharyngeal swallow for a diet of regular solids/thin liquids. No overt s/s of laryngeal penetration/aspiration observed across PO trials. Upon palpation, hyolaryngeal excursion and onset of pharyngeal swallow are WNL. Given c/f aspiration, hx of smoking, and compromised respiratory status, objective testing is recommended to assess the swallow mechanism and r/o silent aspiration.

## 2023-11-29 NOTE — SWALLOW BEDSIDE ASSESSMENT ADULT - SLP GENERAL OBSERVATIONS
Encountered Pt sitting upright in bed on 3L O2 via NC. Pt is A&Ox3, verbally responsive, and able to follow commands. 18-Nov-2018 06:02

## 2023-11-29 NOTE — PROGRESS NOTE ADULT - PROBLEM SELECTOR PLAN 1
Patient presenting for worsening LE edema and breathlessness  - also with several symptoms to suggest likely ADHF  - on exam: extremities warm and well perfused, but volume overloaded, S3 on exam  - proBNP elevated to 1300, but unknown baseline and with likely CAL on CKD  - hsTropT flat at 74, ECG without ST changes, no chest pain  - pt does not carry a diagnosis of HF, however on meds that may suggest she may have HFpEF (MRA, SGLT2i)    Plan:  > diuresis with IV lasix 40 mg bid, assess response and titrate dose   > obtain TTE--> showing EF 71%, bi-atrial enlargement, LV diastolic dysfunction, and RV enlargement  > hold home spironolactone and dapagliflozin given CAL  > strict I/Os, daily standing weights  > appreciate cards recs  > will need to determine DC med rec based on GFR

## 2023-11-29 NOTE — PROGRESS NOTE ADULT - PROBLEM SELECTOR PLAN 3
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

## 2023-11-29 NOTE — PROGRESS NOTE ADULT - ATTENDING COMMENTS
83F with chronic hypoxemic respiratory failure, CKD, T2DM, CAD, HTN, and HLD who presents for worsening dyspnea and lower extremity edema, admitted for suspected acute decompensated heart failure with CAL on likely CKD. Unclear baseline Cr, but significantly elevated currently with nephrotic range proteinuria. TTE showing diastolic HF, s/p diuresis with worsening renal failure, ongoing SOB c/f possible COPD exacerbation.     #acute on chronic diastolic HF   #CAL on CKD, #nephrotic range proteinuria   #Acute on chronic respiratory failure  #HTN, HLD, DM2    - volume status stable, but significant CAL on CKD  - diuretics held since 11/27 - Cr slightly better, but remains persistently hyperK   - hyperK 5.5, c/w lokelma and temporizing measures, repeat BMP this evening - will d/w renal regarding possible initiation of HD,  agreeable  - strict I/Os, daily weights   - continue to hold home spironolactone, telmisartan, and farxiga given CAL  - c/w hydralazine 25mg TID for afterload reduction - uptitrate as needed   - TTE w/moderate (grade 2) left ventricular diastolic dysfunction, with elevated filling pressure -> repeat TTE to eval filling pressures   - pt with chronic hypoxemic RF of unclear etiology, ?COPD given long standing smoking history - s/p 5 days of prednisone, still dyspneic on exam, unclear if combination of fluid and COPD - pulm following, recs appreciated, c/w 3LNC     pt high risk given worsening hyperkalemia, likely to require HD initiation

## 2023-11-29 NOTE — PROGRESS NOTE ADULT - ASSESSMENT
83 year old F with chronic hypoxemic respiratory failure, CKD, T2DM, CAD, HTN, and HLD who presents for worsening dyspnea and lower extremity edema for 2 days, admitted for suspected acute decompensated heart failure and progressive renal failure ATN vs SAUMYA    Pulmonary called for hypoxemia, dyspnea    She appears to have a component of small airway disease on CT with mosaic attenuation  Wheezing notably, coughing as well with swallowing, inspiration  Uses 0-2L NC at home at baseline  Former smoker  Attempted to contact outpatient pulmonologist Dr. Paulino Gayle, but on vacation contacted answering service of covering Pulmonologist, awaiting further information    Recommendations  - continue Symbicort 160/4.5 BID, continue home Singulair  - continue Duoneb q4-6 hours  - restart prednisone 40 mg qD x 5 day total course  - speech eval for coughing - recurrent aspiration?  - start Flonase and nasal saline spray  - wean O2 as tolerated  - optimization of cardiac/renal failure - she seems to be progressing toward dialysis? - defer to nephro/cardio  - patient should follow up with her pulmonologist upon discharge  - will follow    Von Green MD  Pulmonary/Critical Care  [phone] [facility]  [id number] [facility] 83 year old F with chronic hypoxemic respiratory failure, CKD, T2DM, CAD, HTN, and HLD who presents for worsening dyspnea and lower extremity edema for 2 days, admitted for suspected acute decompensated heart failure and progressive renal failure ATN vs SAUMYA    Pulmonary called for hypoxemia, dyspnea    She appears to have a component of small airway disease on CT with mosaic attenuation  Wheezing notably, coughing as well with swallowing, inspiration  Uses 0-2L NC at home at baseline  Former smoker  Attempted to contact outpatient pulmonologist Dr. Paulino Gayle, but on vacation contacted answering service of covering pulmonologist, awaiting further information    Recommendations  - continue Symbicort 160/4.5 BID, continue home Singulair  - continue Duoneb q4-6 hours  - restart prednisone 40 mg qD x 5 day total course  - speech eval for coughing - recurrent aspiration?  - start Flonase and nasal saline spray  - wean O2 as tolerated  - optimization of cardiac/renal failure - she seems to be progressing toward dialysis? - defer to nephro/cardio  - patient should follow up with her pulmonologist upon discharge  - will follow    Von Green MD  Pulmonary/Critical Care  [phone] [facility]  [id number] [facility]

## 2023-11-29 NOTE — SWALLOW BEDSIDE ASSESSMENT ADULT - SLP PERTINENT HISTORY OF CURRENT PROBLEM
83y F w/ PMHx of HTN, HLD, CAD, T2DM, CKD, smoker, and chronic hypoxemia respiratory failure of unknown etiology on home O2 (2L O2 via NC) presenting for LE swelling and SOB. Pt admitted for suspected ADHF. Nephrology following for CAL on CKD vs. CAL. Lasix decreased 2/2 uptrending Cr. On 11/27, volume status noted to be better but Pt tachypneic and CR rising significantly; ?need for HD this admission. Pulm consulted for SOB 83y F w/ PMHx of HTN, HLD, CAD, T2DM, CKD, smoker, and chronic hypoxemia respiratory failure of unknown etiology on home O2 (2L O2 via NC) presenting for LE swelling and SOB. Pt admitted for suspected ADHF. Nephrology following for CAL on CKD vs. CAL. Lasix decreased 2/2 uptrending Cr. On 11/27, volume status noted to be better but Pt tachypneic and CR rising significantly; ?need for HD this admission. Pulm consulted and reports that Pt appears to have component of small airway disease on CT w/ mosaic attenuation. +wheeze and cough w/ swallowing and inspiration. C/f component of aspiration.

## 2023-11-29 NOTE — SWALLOW BEDSIDE ASSESSMENT ADULT - COMMENTS
IMAGIN/21 CTA chest: IMPRESSION: No central pulmonary embolus. Lower lobe subsegmental branches are poorly   evaluated due to respiratory motion artifact. Nonspecific bilateral patchy groundglass opacities. Cardiomegaly.   CXR: IMPRESSION: No focal consolidations.    ***Pt is not previously known to this service and no prior swallow studies noted in PACS.

## 2023-11-29 NOTE — PROGRESS NOTE ADULT - PROBLEM SELECTOR PLAN 4
Patient with several year history of chronic hypoxemic respiratory failure  - requiring home O2, stable on 2L at baseline  - has not had escalating O2 requirements recently, despite feeling subjectively more dyspneic  - unclear etiology, though patient has had PFTs at her pulmologist's office last year    Plan:  > follow up TTE--> showing EF 71%, bi-atrial enlargement, LV diastolic dysfunction, and RV enlargement  > obtain records from pt's pulmonologists office: Dr. Paulino Gayle ([facility], [phone])  > continue with home montelukast and add on duonebs and symbicort   > wean O2 as tolerated  > add on prednisone and azithromycin for possible COPD exacerbation   > if persistently hypoxemic and despite diuresis, consider alternative etiologies and potentially high resolution CT chest  - f/u repeat CXR and BNP (stable CXR and elevated BNP)  - appreciate pulm recs: started on flonase and saline spray

## 2023-11-29 NOTE — PROGRESS NOTE ADULT - ATTENDING COMMENTS
83 year old F with chronic hypoxemic respiratory failure, CKD, T2DM, CAD, HTN, and HLD who presents for worsening dyspnea and lower extremity edema for 2 days, admitted for suspected acute decompensated heart failure and progressive renal failure ATN vs SAUMYA  Pulmonary consulted for hypoxemia, dyspnea and was noted to be wheezing bilaterally, coughing yesterday.    She appears to have a component of small airway disease on CT with mosaic attenuation  she is a former heavy smoker.  Uses 0-2L NC at home at baseline  Former heavy smoker,  Likely COPD exacerbation in the setting of CAL and additional volume overload.  Agree with plan as outlined above.

## 2023-11-30 LAB
ANION GAP SERPL CALC-SCNC: 15 MMOL/L — SIGNIFICANT CHANGE UP (ref 5–17)
ANION GAP SERPL CALC-SCNC: 15 MMOL/L — SIGNIFICANT CHANGE UP (ref 5–17)
BUN SERPL-MCNC: 100 MG/DL — HIGH (ref 7–23)
BUN SERPL-MCNC: 100 MG/DL — HIGH (ref 7–23)
CALCIUM SERPL-MCNC: 8.7 MG/DL — SIGNIFICANT CHANGE UP (ref 8.4–10.5)
CALCIUM SERPL-MCNC: 8.7 MG/DL — SIGNIFICANT CHANGE UP (ref 8.4–10.5)
CHLORIDE SERPL-SCNC: 103 MMOL/L — SIGNIFICANT CHANGE UP (ref 96–108)
CHLORIDE SERPL-SCNC: 103 MMOL/L — SIGNIFICANT CHANGE UP (ref 96–108)
CO2 SERPL-SCNC: 19 MMOL/L — LOW (ref 22–31)
CO2 SERPL-SCNC: 19 MMOL/L — LOW (ref 22–31)
CREAT SERPL-MCNC: 5.75 MG/DL — HIGH (ref 0.5–1.3)
CREAT SERPL-MCNC: 5.75 MG/DL — HIGH (ref 0.5–1.3)
EGFR: 7 ML/MIN/1.73M2 — LOW
EGFR: 7 ML/MIN/1.73M2 — LOW
GLUCOSE BLDC GLUCOMTR-MCNC: 165 MG/DL — HIGH (ref 70–99)
GLUCOSE BLDC GLUCOMTR-MCNC: 165 MG/DL — HIGH (ref 70–99)
GLUCOSE BLDC GLUCOMTR-MCNC: 237 MG/DL — HIGH (ref 70–99)
GLUCOSE BLDC GLUCOMTR-MCNC: 237 MG/DL — HIGH (ref 70–99)
GLUCOSE BLDC GLUCOMTR-MCNC: 270 MG/DL — HIGH (ref 70–99)
GLUCOSE BLDC GLUCOMTR-MCNC: 270 MG/DL — HIGH (ref 70–99)
GLUCOSE BLDC GLUCOMTR-MCNC: 281 MG/DL — HIGH (ref 70–99)
GLUCOSE BLDC GLUCOMTR-MCNC: 281 MG/DL — HIGH (ref 70–99)
GLUCOSE BLDC GLUCOMTR-MCNC: 311 MG/DL — HIGH (ref 70–99)
GLUCOSE BLDC GLUCOMTR-MCNC: 311 MG/DL — HIGH (ref 70–99)
GLUCOSE SERPL-MCNC: 157 MG/DL — HIGH (ref 70–99)
GLUCOSE SERPL-MCNC: 157 MG/DL — HIGH (ref 70–99)
HCT VFR BLD CALC: 23 % — LOW (ref 34.5–45)
HCT VFR BLD CALC: 23 % — LOW (ref 34.5–45)
HGB BLD-MCNC: 7.2 G/DL — LOW (ref 11.5–15.5)
HGB BLD-MCNC: 7.2 G/DL — LOW (ref 11.5–15.5)
MAGNESIUM SERPL-MCNC: 2.4 MG/DL — SIGNIFICANT CHANGE UP (ref 1.6–2.6)
MAGNESIUM SERPL-MCNC: 2.4 MG/DL — SIGNIFICANT CHANGE UP (ref 1.6–2.6)
MCHC RBC-ENTMCNC: 29.1 PG — SIGNIFICANT CHANGE UP (ref 27–34)
MCHC RBC-ENTMCNC: 29.1 PG — SIGNIFICANT CHANGE UP (ref 27–34)
MCHC RBC-ENTMCNC: 31.3 GM/DL — LOW (ref 32–36)
MCHC RBC-ENTMCNC: 31.3 GM/DL — LOW (ref 32–36)
MCV RBC AUTO: 93.1 FL — SIGNIFICANT CHANGE UP (ref 80–100)
MCV RBC AUTO: 93.1 FL — SIGNIFICANT CHANGE UP (ref 80–100)
NRBC # BLD: 0 /100 WBCS — SIGNIFICANT CHANGE UP (ref 0–0)
NRBC # BLD: 0 /100 WBCS — SIGNIFICANT CHANGE UP (ref 0–0)
NT-PROBNP SERPL-SCNC: 3202 PG/ML — HIGH (ref 0–300)
NT-PROBNP SERPL-SCNC: 3202 PG/ML — HIGH (ref 0–300)
PHOSPHATE SERPL-MCNC: 4.2 MG/DL — SIGNIFICANT CHANGE UP (ref 2.5–4.5)
PHOSPHATE SERPL-MCNC: 4.2 MG/DL — SIGNIFICANT CHANGE UP (ref 2.5–4.5)
PLATELET # BLD AUTO: 234 K/UL — SIGNIFICANT CHANGE UP (ref 150–400)
PLATELET # BLD AUTO: 234 K/UL — SIGNIFICANT CHANGE UP (ref 150–400)
POTASSIUM SERPL-MCNC: 4.7 MMOL/L — SIGNIFICANT CHANGE UP (ref 3.5–5.3)
POTASSIUM SERPL-MCNC: 4.7 MMOL/L — SIGNIFICANT CHANGE UP (ref 3.5–5.3)
POTASSIUM SERPL-SCNC: 4.7 MMOL/L — SIGNIFICANT CHANGE UP (ref 3.5–5.3)
POTASSIUM SERPL-SCNC: 4.7 MMOL/L — SIGNIFICANT CHANGE UP (ref 3.5–5.3)
RBC # BLD: 2.47 M/UL — LOW (ref 3.8–5.2)
RBC # BLD: 2.47 M/UL — LOW (ref 3.8–5.2)
RBC # FLD: 14.7 % — HIGH (ref 10.3–14.5)
RBC # FLD: 14.7 % — HIGH (ref 10.3–14.5)
SODIUM SERPL-SCNC: 137 MMOL/L — SIGNIFICANT CHANGE UP (ref 135–145)
SODIUM SERPL-SCNC: 137 MMOL/L — SIGNIFICANT CHANGE UP (ref 135–145)
WBC # BLD: 9.64 K/UL — SIGNIFICANT CHANGE UP (ref 3.8–10.5)
WBC # BLD: 9.64 K/UL — SIGNIFICANT CHANGE UP (ref 3.8–10.5)
WBC # FLD AUTO: 9.64 K/UL — SIGNIFICANT CHANGE UP (ref 3.8–10.5)
WBC # FLD AUTO: 9.64 K/UL — SIGNIFICANT CHANGE UP (ref 3.8–10.5)

## 2023-11-30 PROCEDURE — 99232 SBSQ HOSP IP/OBS MODERATE 35: CPT | Mod: GC

## 2023-11-30 PROCEDURE — 99233 SBSQ HOSP IP/OBS HIGH 50: CPT | Mod: GC

## 2023-11-30 RX ADMIN — Medication 3: at 16:50

## 2023-11-30 RX ADMIN — Medication 25 MILLIGRAM(S): at 21:26

## 2023-11-30 RX ADMIN — SEVELAMER CARBONATE 800 MILLIGRAM(S): 2400 POWDER, FOR SUSPENSION ORAL at 21:26

## 2023-11-30 RX ADMIN — BUDESONIDE AND FORMOTEROL FUMARATE DIHYDRATE 2 PUFF(S): 160; 4.5 AEROSOL RESPIRATORY (INHALATION) at 08:10

## 2023-11-30 RX ADMIN — AMLODIPINE BESYLATE 10 MILLIGRAM(S): 2.5 TABLET ORAL at 06:11

## 2023-11-30 RX ADMIN — Medication 1: at 08:07

## 2023-11-30 RX ADMIN — Medication 3 MILLILITER(S): at 21:25

## 2023-11-30 RX ADMIN — MUPIROCIN 1 APPLICATION(S): 20 OINTMENT TOPICAL at 06:11

## 2023-11-30 RX ADMIN — SEVELAMER CARBONATE 800 MILLIGRAM(S): 2400 POWDER, FOR SUSPENSION ORAL at 06:10

## 2023-11-30 RX ADMIN — Medication 325 MILLIGRAM(S): at 11:36

## 2023-11-30 RX ADMIN — Medication 1 SPRAY(S): at 06:11

## 2023-11-30 RX ADMIN — Medication 2 UNIT(S): at 16:49

## 2023-11-30 RX ADMIN — Medication 25 MILLIGRAM(S): at 13:34

## 2023-11-30 RX ADMIN — NYSTATIN CREAM 1 APPLICATION(S): 100000 CREAM TOPICAL at 17:28

## 2023-11-30 RX ADMIN — Medication 1 SPRAY(S): at 11:36

## 2023-11-30 RX ADMIN — CHLORHEXIDINE GLUCONATE 1 APPLICATION(S): 213 SOLUTION TOPICAL at 11:37

## 2023-11-30 RX ADMIN — CALCITRIOL 0.25 MICROGRAM(S): 0.5 CAPSULE ORAL at 11:36

## 2023-11-30 RX ADMIN — MUPIROCIN 1 APPLICATION(S): 20 OINTMENT TOPICAL at 17:27

## 2023-11-30 RX ADMIN — Medication 40 MILLIGRAM(S): at 06:10

## 2023-11-30 RX ADMIN — Medication 3 MILLILITER(S): at 11:35

## 2023-11-30 RX ADMIN — POLYETHYLENE GLYCOL 3350 17 GRAM(S): 17 POWDER, FOR SOLUTION ORAL at 11:35

## 2023-11-30 RX ADMIN — Medication 1 SPRAY(S): at 17:27

## 2023-11-30 RX ADMIN — SEVELAMER CARBONATE 800 MILLIGRAM(S): 2400 POWDER, FOR SUSPENSION ORAL at 13:34

## 2023-11-30 RX ADMIN — INSULIN GLARGINE 5 UNIT(S): 100 INJECTION, SOLUTION SUBCUTANEOUS at 21:27

## 2023-11-30 RX ADMIN — ATORVASTATIN CALCIUM 80 MILLIGRAM(S): 80 TABLET, FILM COATED ORAL at 21:26

## 2023-11-30 RX ADMIN — Medication 3 MILLILITER(S): at 06:10

## 2023-11-30 RX ADMIN — BUDESONIDE AND FORMOTEROL FUMARATE DIHYDRATE 2 PUFF(S): 160; 4.5 AEROSOL RESPIRATORY (INHALATION) at 20:08

## 2023-11-30 RX ADMIN — Medication 2 UNIT(S): at 11:42

## 2023-11-30 RX ADMIN — HEPARIN SODIUM 5000 UNIT(S): 5000 INJECTION INTRAVENOUS; SUBCUTANEOUS at 21:26

## 2023-11-30 RX ADMIN — MONTELUKAST 10 MILLIGRAM(S): 4 TABLET, CHEWABLE ORAL at 11:38

## 2023-11-30 RX ADMIN — NYSTATIN CREAM 1 APPLICATION(S): 100000 CREAM TOPICAL at 06:10

## 2023-11-30 RX ADMIN — Medication 2 UNIT(S): at 08:07

## 2023-11-30 RX ADMIN — SENNA PLUS 2 TABLET(S): 8.6 TABLET ORAL at 21:26

## 2023-11-30 RX ADMIN — Medication 3: at 11:42

## 2023-11-30 RX ADMIN — Medication 25 MILLIGRAM(S): at 06:10

## 2023-11-30 RX ADMIN — Medication 3 MILLILITER(S): at 17:27

## 2023-11-30 RX ADMIN — HEPARIN SODIUM 5000 UNIT(S): 5000 INJECTION INTRAVENOUS; SUBCUTANEOUS at 13:34

## 2023-11-30 RX ADMIN — HEPARIN SODIUM 5000 UNIT(S): 5000 INJECTION INTRAVENOUS; SUBCUTANEOUS at 06:11

## 2023-11-30 NOTE — PROGRESS NOTE ADULT - ATTENDING COMMENTS
83 year old F with chronic hypoxemic respiratory failure, CKD, T2DM, CAD, HTN, and HLD who presents for worsening dyspnea and lower extremity edema for 2 days, admitted for suspected acute decompensated heart failure and progressive renal failure ATN erwin KERNS  Pulmonary consulted for hypoxemia, dyspnea and was noted to be wheezing bilaterally, coughing yesterday.    She appears to have a component of small airway disease on CT with mosaic attenuation  she is a former heavy smoker.  Uses 0-2L NC at home at baseline  Former heavy smoker,  Likely COPD exacerbation in the setting of CAL and additional volume overload.  she seems more comfortable today- lungs are clear on exam.  Agree with plan as outlined above.

## 2023-11-30 NOTE — PROGRESS NOTE ADULT - PROBLEM SELECTOR PLAN 1
CAL on CKD  - Has not seen a physician in the recent past. Given her history its likely to be CAL on CKD 2/2 SAUMYA vs CKD progression. She has pedal edema. US duplex Kidney - showed renal parenchymal disease. UPCR 5.6g, no RBCs, no bacteria. FeUrea borderline but more suggestive of prerenal etiology.  SCr 4.5 on admission, received IV contrast for CTA on evening of 11/21, and Cr had been stable but increased about 2 days after IV contrast exposure suggesting SAUMYA.  Serology work up ongoing- so far negative HIV, Hep B, Hep C, C3, C4, Anti GBM antibody, Anti Ds DNA, JAYLAN, ANCA, Serum free light chains, immunofixation. A1c 6.1%.   negative for PLA2R Ab.  - O2 requirement: 4L/min. CTA chest showed b/l ground-glass opacities. staph aureus swab PCR pos (not MRSA) and pt does have a cough and intermittent wheezing but no leukocytosis or fever. Would consider treating other possible etiologies for resp failure like COPD or PNA with Duonebs +/- Prednisone +/- abx (consider checking procal)  - BNP 1300 initially. TTE with G2DD with elevated filling pressures and severe LA dilation. Was on Lasix 40mg IV BID, but iso rising Cr, decreased Lasix to 40mg IV daily on 11/25/23. However, repeat CXR looks unchanged with pulm vasc congestion and BNP increased to 3000. Renal function was worsening initially to 6.7 and then improved to 6.4 today. UOP was good. Pt was off diuretics.   PLAN:  She has asterixis on examination. No other symptoms/ signs of uremia. Pt has tremor as well. She mentioned that she takes a glass of wine everyday. Please assess for alcohol withdrawal.   Pt has some renal improvement. No HD today. Will reassess her for HD again tomorrow.   Hyperkalemia has improved to 5.0. Trend and monitor.   - Avoid nephrotoxic agents when possible and dose meds per eGFR

## 2023-11-30 NOTE — PROGRESS NOTE ADULT - PROBLEM SELECTOR PLAN 3
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

## 2023-11-30 NOTE — PROGRESS NOTE ADULT - ATTENDING COMMENTS
83F with chronic hypoxemic respiratory failure, CKD, T2DM, CAD, HTN, and HLD who presents for worsening dyspnea and lower extremity edema, admitted for suspected acute decompensated heart failure with CAL on likely CKD. Unclear baseline Cr, but significantly elevated currently with nephrotic range proteinuria. TTE showing diastolic HF, s/p diuresis with worsening renal failure, ongoing SOB c/f possible COPD exacerbation.     #acute on chronic diastolic HF   #CAL on CKD, #nephrotic range proteinuria   #Acute on chronic respiratory failure  #HTN, HLD, DM2    - volume status stable, but significant CAL on CKD  - diuretics held since 11/27 - Cr slowly improving, hyperK appears to have resolved - c/t trend BMP closely, renal following for possible HD   - strict I/Os, daily weights   - continue to hold home spironolactone, telmisartan, and farxiga given CAL  - c/w hydralazine 25mg TID for afterload reduction - uptitrate as needed   - TTE w/moderate (grade 2) left ventricular diastolic dysfunction, repeat TTE unchanged   - pt with chronic hypoxemic RF of unclear etiology, ?COPD given long standing smoking history - c/w prednisone for now (11/25--), pulm following, possible taper pending clinical status

## 2023-11-30 NOTE — PROGRESS NOTE ADULT - SUBJECTIVE AND OBJECTIVE BOX
[facility] Division of Kidney Diseases & Hypertension  FOLLOW UP NOTE  [phone]--------------------------------------------------------------------------------  Chief Complaint:Shortness of breath        24 hour events/subjective:  Pt was seen and examined at the bedside. No acute overnight events. No fresh complaints.   Pt denies any worsening of SOB/ Constipation/ Diarrhea/ Nausea/ Vomiting/ abdominal pain/ chest pain/ tingling/ numbness.         PAST HISTORY  --------------------------------------------------------------------------------  No significant changes to PMH, PSH, FHx, SHx, unless otherwise noted    ALLERGIES & MEDICATIONS  --------------------------------------------------------------------------------  Allergies    No Known Allergies    Intolerances      Standing Inpatient Medications  albuterol    90 MICROgram(s) HFA Inhaler 2 Puff(s) Inhalation every 6 hours  albuterol/ipratropium for Nebulization 3 milliLiter(s) Nebulizer every 6 hours  amLODIPine   Tablet 10 milliGRAM(s) Oral daily  atorvastatin 80 milliGRAM(s) Oral at bedtime  budesonide 160 MICROgram(s)/formoterol 4.5 MICROgram(s) Inhaler 2 Puff(s) Inhalation two times a day  calcitriol   Capsule 0.25 MICROGram(s) Oral daily  chlorhexidine 2% Cloths 1 Application(s) Topical daily  dextrose 5%. 1000 milliLiter(s) IV Continuous <Continuous>  dextrose 5%. 1000 milliLiter(s) IV Continuous <Continuous>  dextrose 50% Injectable 25 Gram(s) IV Push once  dextrose 50% Injectable 25 Gram(s) IV Push once  dextrose 50% Injectable 12.5 Gram(s) IV Push once  ferrous    sulfate 325 milliGRAM(s) Oral daily  fluticasone propionate 50 MICROgram(s)/spray Nasal Spray 1 Spray(s) Both Nostrils two times a day  glucagon  Injectable 1 milliGRAM(s) IntraMuscular once  heparin   Injectable 5000 Unit(s) SubCutaneous every 8 hours  hydrALAZINE 25 milliGRAM(s) Oral three times a day  influenza  Vaccine (HIGH DOSE) 0.7 milliLiter(s) IntraMuscular once  insulin glargine Injectable (LANTUS) 5 Unit(s) SubCutaneous at bedtime  insulin lispro (ADMELOG) corrective regimen sliding scale   SubCutaneous three times a day before meals  insulin lispro (ADMELOG) corrective regimen sliding scale   SubCutaneous at bedtime  insulin lispro Injectable (ADMELOG) 2 Unit(s) SubCutaneous three times a day before meals  montelukast 10 milliGRAM(s) Oral daily  mupirocin 2% Nasal 1 Application(s) Both Nostrils every 12 hours  nystatin Powder 1 Application(s) Topical two times a day  polyethylene glycol 3350 17 Gram(s) Oral daily  predniSONE   Tablet 40 milliGRAM(s) Oral daily  senna 2 Tablet(s) Oral at bedtime  sevelamer carbonate 800 milliGRAM(s) Oral every 8 hours  sodium chloride 0.65% Nasal 1 Spray(s) Both Nostrils daily    PRN Inpatient Medications  acetaminophen     Tablet .. 650 milliGRAM(s) Oral every 6 hours PRN  dextrose Oral Gel 15 Gram(s) Oral once PRN      REVIEW OF SYSTEMS  --------------------------------------------------------------------------------  As above.     VITALS/PHYSICAL EXAM  --------------------------------------------------------------------------------  T(C): 36.6 (11-30-23 @ 11:01), Max: 36.7 (11-30-23 @ 04:56)  HR: 94 (11-30-23 @ 11:01) (94 - 97)  BP: 120/53 (11-30-23 @ 13:00) (120/53 - 132/58)  RR: 18 (11-30-23 @ 13:00) (18 - 18)  SpO2: 97% (11-30-23 @ 13:00) (95% - 97%)  Wt(kg): --  Height (cm): 160 (11-30-23 @ 08:05)  Weight (kg): 82.9 (11-30-23 @ 08:05)  BMI (kg/m2): 32.4 (11-30-23 @ 08:05)  BSA (m2): 1.86 (11-30-23 @ 08:05)      11-29-23 @ 07:01  -  11-30-23 @ 07:00  --------------------------------------------------------  IN: 540 mL / OUT: 300 mL / NET: 240 mL    11-30-23 @ 07:01  -  11-30-23 @ 14:15  --------------------------------------------------------  IN: 240 mL / OUT: 0 mL / NET: 240 mL      Physical Exam:  General: no acute distress  Neuro: no focal deficits  HEENT: NC/AT, anicteric, no JVD  Pulmonary: breath sounds diminished, on 4L O2  Cardiovascular/Chest: +S1S2,  GI/Abdomen: soft, NT/ND, +bowel sounds  Extremities: No edema  Skin: Warm and dry  Neuro : Tremor + ( Essential ?)          LABS/STUDIES  --------------------------------------------------------------------------------              7.2    9.64  >-----------<  234      [11-30-23 @ 06:39]              23.0     137  |  103  |  93  ----------------------------<  285      [11-29-23 @ 12:34]  5.0   |  20  |  5.87        Ca     8.7     [11-29-23 @ 12:34]      Mg     2.4     [11-30-23 @ 06:39]      Phos  4.2     [11-30-23 @ 06:39]            Creatinine Trend:  SCr 5.87 [11-29 @ 12:34]  SCr 6.17 [11-29 @ 05:15]  SCr 6.45 [11-28 @ 20:13]  SCr 6.40 [11-28 @ 06:20]  SCr 6.71 [11-27 @ 05:37]    Urinalysis - [11-29-23 @ 12:34]      Color  / Appearance  / SG  / pH       Gluc 285 / Ketone   / Bili  / Urobili        Blood  / Protein  / Leuk Est  / Nitrite       RBC  / WBC  / Hyaline  / Gran  / Sq Epi  / Non Sq Epi  / Bacteria     Urine Creatinine 141      [11-28-23 @ 18:09]  Urine Protein 313      [11-28-23 @ 18:09]  Urine Sodium 24      [11-28-23 @ 18:08]  Urine Urea Nitrogen 596      [11-28-23 @ 18:08]  Urine Potassium 34      [11-28-23 @ 18:08]  Urine Osmolality 363      [11-28-23 @ 18:10]    Iron 68, TIBC 261, %sat 26      [11-25-23 @ 06:14]  Ferritin 81      [11-25-23 @ 06:14]  PTH -- (Ca 8.6)      [11-25-23 @ 06:14]   73  Vitamin D (25OH) 22.5      [11-25-23 @ 06:14]       [facility] Division of Kidney Diseases & Hypertension  FOLLOW UP NOTE  [phone]--------------------------------------------------------------------------------  Chief Complaint:Shortness of breath        24 hour events/subjective:  Pt was seen and examined at the bedside. No acute overnight events. No fresh complaints.   Pt denies any worsening of SOB/ Constipation/ Diarrhea/ Nausea/ Vomiting/ abdominal pain/ chest pain/ tingling/ numbness.         PAST HISTORY  --------------------------------------------------------------------------------  No significant changes to PMH, PSH, FHx, SHx, unless otherwise noted    ALLERGIES & MEDICATIONS  --------------------------------------------------------------------------------  Allergies    No Known Allergies    Intolerances      Standing Inpatient Medications  albuterol    90 MICROgram(s) HFA Inhaler 2 Puff(s) Inhalation every 6 hours  albuterol/ipratropium for Nebulization 3 milliLiter(s) Nebulizer every 6 hours  amLODIPine   Tablet 10 milliGRAM(s) Oral daily  atorvastatin 80 milliGRAM(s) Oral at bedtime  budesonide 160 MICROgram(s)/formoterol 4.5 MICROgram(s) Inhaler 2 Puff(s) Inhalation two times a day  calcitriol   Capsule 0.25 MICROGram(s) Oral daily  chlorhexidine 2% Cloths 1 Application(s) Topical daily  dextrose 5%. 1000 milliLiter(s) IV Continuous <Continuous>  dextrose 5%. 1000 milliLiter(s) IV Continuous <Continuous>  dextrose 50% Injectable 25 Gram(s) IV Push once  dextrose 50% Injectable 25 Gram(s) IV Push once  dextrose 50% Injectable 12.5 Gram(s) IV Push once  ferrous    sulfate 325 milliGRAM(s) Oral daily  fluticasone propionate 50 MICROgram(s)/spray Nasal Spray 1 Spray(s) Both Nostrils two times a day  glucagon  Injectable 1 milliGRAM(s) IntraMuscular once  heparin   Injectable 5000 Unit(s) SubCutaneous every 8 hours  hydrALAZINE 25 milliGRAM(s) Oral three times a day  influenza  Vaccine (HIGH DOSE) 0.7 milliLiter(s) IntraMuscular once  insulin glargine Injectable (LANTUS) 5 Unit(s) SubCutaneous at bedtime  insulin lispro (ADMELOG) corrective regimen sliding scale   SubCutaneous three times a day before meals  insulin lispro (ADMELOG) corrective regimen sliding scale   SubCutaneous at bedtime  insulin lispro Injectable (ADMELOG) 2 Unit(s) SubCutaneous three times a day before meals  montelukast 10 milliGRAM(s) Oral daily  mupirocin 2% Nasal 1 Application(s) Both Nostrils every 12 hours  nystatin Powder 1 Application(s) Topical two times a day  polyethylene glycol 3350 17 Gram(s) Oral daily  predniSONE   Tablet 40 milliGRAM(s) Oral daily  senna 2 Tablet(s) Oral at bedtime  sevelamer carbonate 800 milliGRAM(s) Oral every 8 hours  sodium chloride 0.65% Nasal 1 Spray(s) Both Nostrils daily    PRN Inpatient Medications  acetaminophen     Tablet .. 650 milliGRAM(s) Oral every 6 hours PRN  dextrose Oral Gel 15 Gram(s) Oral once PRN      REVIEW OF SYSTEMS  --------------------------------------------------------------------------------  As above.     VITALS/PHYSICAL EXAM  --------------------------------------------------------------------------------  T(C): 36.6 (11-30-23 @ 11:01), Max: 36.7 (11-30-23 @ 04:56)  HR: 94 (11-30-23 @ 11:01) (94 - 97)  BP: 120/53 (11-30-23 @ 13:00) (120/53 - 132/58)  RR: 18 (11-30-23 @ 13:00) (18 - 18)  SpO2: 97% (11-30-23 @ 13:00) (95% - 97%)  Wt(kg): --  Height (cm): 160 (11-30-23 @ 08:05)  Weight (kg): 82.9 (11-30-23 @ 08:05)  BMI (kg/m2): 32.4 (11-30-23 @ 08:05)  BSA (m2): 1.86 (11-30-23 @ 08:05)      11-29-23 @ 07:01  -  11-30-23 @ 07:00  --------------------------------------------------------  IN: 540 mL / OUT: 300 mL / NET: 240 mL    11-30-23 @ 07:01  -  11-30-23 @ 14:15  --------------------------------------------------------  IN: 240 mL / OUT: 0 mL / NET: 240 mL      Physical Exam:  General: no acute distress  Neuro: no focal deficits  HEENT: NC/AT, anicteric, no JVD  Pulmonary: breath sounds diminished, on 4L O2  Cardiovascular/Chest: +S1S2,  GI/Abdomen: soft, NT/ND, +bowel sounds  Extremities: No edema  Skin: Warm and dry  Neuro : Tremor + ( Essential ?)          LABS/STUDIES  --------------------------------------------------------------------------------              7.2    9.64  >-----------<  234      [11-30-23 @ 06:39]              23.0     137  |  103  |  93  ----------------------------<  285      [11-29-23 @ 12:34]  5.0   |  20  |  5.87        Ca     8.7     [11-29-23 @ 12:34]      Mg     2.4     [11-30-23 @ 06:39]      Phos  4.2     [11-30-23 @ 06:39]            Creatinine Trend:  SCr 5.87 [11-29 @ 12:34]  SCr 6.17 [11-29 @ 05:15]  SCr 6.45 [11-28 @ 20:13]  SCr 6.40 [11-28 @ 06:20]  SCr 6.71 [11-27 @ 05:37]    Urinalysis - [11-29-23 @ 12:34]      Color  / Appearance  / SG  / pH       Gluc 285 / Ketone   / Bili  / Urobili        Blood  / Protein  / Leuk Est  / Nitrite       RBC  / WBC  / Hyaline  / Gran  / Sq Epi  / Non Sq Epi  / Bacteria     Urine Creatinine 141      [11-28-23 @ 18:09]  Urine Protein 313      [11-28-23 @ 18:09]  Urine Sodium 24      [11-28-23 @ 18:08]  Urine Urea Nitrogen 596      [11-28-23 @ 18:08]  Urine Potassium 34      [11-28-23 @ 18:08]  Urine Osmolality 363      [11-28-23 @ 18:10]    Iron 68, TIBC 261, %sat 26      [11-25-23 @ 06:14]  Ferritin 81      [11-25-23 @ 06:14]  PTH -- (Ca 8.6)      [11-25-23 @ 06:14]   73  Vitamin D (25OH) 22.5      [11-25-23 @ 06:14]       [facility] Division of Kidney Diseases & Hypertension  FOLLOW UP NOTE  [phone]--------------------------------------------------------------------------------  Chief Complaint:Shortness of breath        24 hour events/subjective:  Pt was seen and examined at the bedside. No acute overnight events. No fresh complaints.   Pt denies any worsening of SOB/ Constipation/ Diarrhea/ Nausea/ Vomiting/ abdominal pain/ chest pain/ tingling/ numbness.         PAST HISTORY  --------------------------------------------------------------------------------  No significant changes to PMH, PSH, FHx, SHx, unless otherwise noted    ALLERGIES & MEDICATIONS  --------------------------------------------------------------------------------  Allergies    No Known Allergies    Intolerances      Standing Inpatient Medications  albuterol    90 MICROgram(s) HFA Inhaler 2 Puff(s) Inhalation every 6 hours  albuterol/ipratropium for Nebulization 3 milliLiter(s) Nebulizer every 6 hours  amLODIPine   Tablet 10 milliGRAM(s) Oral daily  atorvastatin 80 milliGRAM(s) Oral at bedtime  budesonide 160 MICROgram(s)/formoterol 4.5 MICROgram(s) Inhaler 2 Puff(s) Inhalation two times a day  calcitriol   Capsule 0.25 MICROGram(s) Oral daily  chlorhexidine 2% Cloths 1 Application(s) Topical daily  dextrose 5%. 1000 milliLiter(s) IV Continuous <Continuous>  dextrose 5%. 1000 milliLiter(s) IV Continuous <Continuous>  dextrose 50% Injectable 25 Gram(s) IV Push once  dextrose 50% Injectable 25 Gram(s) IV Push once  dextrose 50% Injectable 12.5 Gram(s) IV Push once  ferrous    sulfate 325 milliGRAM(s) Oral daily  fluticasone propionate 50 MICROgram(s)/spray Nasal Spray 1 Spray(s) Both Nostrils two times a day  glucagon  Injectable 1 milliGRAM(s) IntraMuscular once  heparin   Injectable 5000 Unit(s) SubCutaneous every 8 hours  hydrALAZINE 25 milliGRAM(s) Oral three times a day  influenza  Vaccine (HIGH DOSE) 0.7 milliLiter(s) IntraMuscular once  insulin glargine Injectable (LANTUS) 5 Unit(s) SubCutaneous at bedtime  insulin lispro (ADMELOG) corrective regimen sliding scale   SubCutaneous three times a day before meals  insulin lispro (ADMELOG) corrective regimen sliding scale   SubCutaneous at bedtime  insulin lispro Injectable (ADMELOG) 2 Unit(s) SubCutaneous three times a day before meals  montelukast 10 milliGRAM(s) Oral daily  mupirocin 2% Nasal 1 Application(s) Both Nostrils every 12 hours  nystatin Powder 1 Application(s) Topical two times a day  polyethylene glycol 3350 17 Gram(s) Oral daily  predniSONE   Tablet 40 milliGRAM(s) Oral daily  senna 2 Tablet(s) Oral at bedtime  sevelamer carbonate 800 milliGRAM(s) Oral every 8 hours  sodium chloride 0.65% Nasal 1 Spray(s) Both Nostrils daily    PRN Inpatient Medications  acetaminophen     Tablet .. 650 milliGRAM(s) Oral every 6 hours PRN  dextrose Oral Gel 15 Gram(s) Oral once PRN      REVIEW OF SYSTEMS  --------------------------------------------------------------------------------  As above.     VITALS/PHYSICAL EXAM  --------------------------------------------------------------------------------  T(C): 36.6 (11-30-23 @ 11:01), Max: 36.7 (11-30-23 @ 04:56)  HR: 94 (11-30-23 @ 11:01) (94 - 97)  BP: 120/53 (11-30-23 @ 13:00) (120/53 - 132/58)  RR: 18 (11-30-23 @ 13:00) (18 - 18)  SpO2: 97% (11-30-23 @ 13:00) (95% - 97%)  Wt(kg): --  Height (cm): 160 (11-30-23 @ 08:05)  Weight (kg): 82.9 (11-30-23 @ 08:05)  BMI (kg/m2): 32.4 (11-30-23 @ 08:05)  BSA (m2): 1.86 (11-30-23 @ 08:05)      11-29-23 @ 07:01  -  11-30-23 @ 07:00  --------------------------------------------------------  IN: 540 mL / OUT: 300 mL / NET: 240 mL    11-30-23 @ 07:01  -  11-30-23 @ 14:15  --------------------------------------------------------  IN: 240 mL / OUT: 0 mL / NET: 240 mL      Physical Exam:  General: no acute distress  Neuro: no focal deficits  HEENT: NC/AT, anicteric, no JVD  Pulmonary: breath sounds diminished, on 4L O2  Cardiovascular/Chest: +S1S2,  GI/Abdomen: soft, NT/ND, +bowel sounds  Extremities: No edema  Skin: Warm and dry  Neuro : Tremor + ( Essential ?)          LABS/STUDIES  --------------------------------------------------------------------------------              7.2    9.64  >-----------<  234      [11-30-23 @ 06:39]              23.0     137  |  103  |  93  ----------------------------<  285      [11-29-23 @ 12:34]  5.0   |  20  |  5.87        Ca     8.7     [11-29-23 @ 12:34]      Mg     2.4     [11-30-23 @ 06:39]      Phos  4.2     [11-30-23 @ 06:39]            Creatinine Trend:  SCr 5.87 [11-29 @ 12:34]  SCr 6.17 [11-29 @ 05:15]  SCr 6.45 [11-28 @ 20:13]  SCr 6.40 [11-28 @ 06:20]  SCr 6.71 [11-27 @ 05:37]    Urinalysis - [11-29-23 @ 12:34]      Color  / Appearance  / SG  / pH       Gluc 285 / Ketone   / Bili  / Urobili        Blood  / Protein  / Leuk Est  / Nitrite       RBC  / WBC  / Hyaline  / Gran  / Sq Epi  / Non Sq Epi  / Bacteria     Urine Creatinine 141      [11-28-23 @ 18:09]  Urine Protein 313      [11-28-23 @ 18:09]  Urine Sodium 24      [11-28-23 @ 18:08]  Urine Urea Nitrogen 596      [11-28-23 @ 18:08]  Urine Potassium 34      [11-28-23 @ 18:08]  Urine Osmolality 363      [11-28-23 @ 18:10]    Iron 68, TIBC 261, %sat 26      [11-25-23 @ 06:14]  Ferritin 81      [11-25-23 @ 06:14]  PTH -- (Ca 8.6)      [11-25-23 @ 06:14]   73  Vitamin D (25OH) 22.5      [11-25-23 @ 06:14]

## 2023-11-30 NOTE — PROGRESS NOTE ADULT - SUBJECTIVE AND OBJECTIVE BOX
PROGRESS NOTE:   Authored by Dr. Luis Perla MD (PGY-1).     Patient is a 83y old  Female who presents with a chief complaint of LE swelling, dyspnea (29 Nov 2023 17:43)      SUBJECTIVE / OVERNIGHT EVENTS:  No acute events overnight. She has no acute complaints this morning.     MEDICATIONS  (STANDING):  albuterol    90 MICROgram(s) HFA Inhaler 2 Puff(s) Inhalation every 6 hours  albuterol/ipratropium for Nebulization 3 milliLiter(s) Nebulizer every 6 hours  amLODIPine   Tablet 10 milliGRAM(s) Oral daily  atorvastatin 80 milliGRAM(s) Oral at bedtime  budesonide 160 MICROgram(s)/formoterol 4.5 MICROgram(s) Inhaler 2 Puff(s) Inhalation two times a day  calcitriol   Capsule 0.25 MICROGram(s) Oral daily  chlorhexidine 2% Cloths 1 Application(s) Topical daily  dextrose 5%. 1000 milliLiter(s) (100 mL/Hr) IV Continuous <Continuous>  dextrose 5%. 1000 milliLiter(s) (50 mL/Hr) IV Continuous <Continuous>  dextrose 50% Injectable 12.5 Gram(s) IV Push once  dextrose 50% Injectable 25 Gram(s) IV Push once  dextrose 50% Injectable 25 Gram(s) IV Push once  ferrous    sulfate 325 milliGRAM(s) Oral daily  fluticasone propionate 50 MICROgram(s)/spray Nasal Spray 1 Spray(s) Both Nostrils two times a day  glucagon  Injectable 1 milliGRAM(s) IntraMuscular once  heparin   Injectable 5000 Unit(s) SubCutaneous every 8 hours  hydrALAZINE 25 milliGRAM(s) Oral three times a day  influenza  Vaccine (HIGH DOSE) 0.7 milliLiter(s) IntraMuscular once  insulin glargine Injectable (LANTUS) 5 Unit(s) SubCutaneous at bedtime  insulin lispro (ADMELOG) corrective regimen sliding scale   SubCutaneous three times a day before meals  insulin lispro (ADMELOG) corrective regimen sliding scale   SubCutaneous at bedtime  insulin lispro Injectable (ADMELOG) 2 Unit(s) SubCutaneous three times a day before meals  montelukast 10 milliGRAM(s) Oral daily  mupirocin 2% Nasal 1 Application(s) Both Nostrils every 12 hours  nystatin Powder 1 Application(s) Topical two times a day  polyethylene glycol 3350 17 Gram(s) Oral daily  predniSONE   Tablet 40 milliGRAM(s) Oral daily  senna 2 Tablet(s) Oral at bedtime  sevelamer carbonate 800 milliGRAM(s) Oral every 8 hours  sodium chloride 0.65% Nasal 1 Spray(s) Both Nostrils daily    MEDICATIONS  (PRN):  acetaminophen     Tablet .. 650 milliGRAM(s) Oral every 6 hours PRN Temp greater or equal to 38C (100.4F), Mild Pain (1 - 3)  dextrose Oral Gel 15 Gram(s) Oral once PRN Blood Glucose LESS THAN 70 milliGRAM(s)/deciliter      CAPILLARY BLOOD GLUCOSE      POCT Blood Glucose.: 165 mg/dL (30 Nov 2023 07:34)  POCT Blood Glucose.: 255 mg/dL (29 Nov 2023 20:46)  POCT Blood Glucose.: 260 mg/dL (29 Nov 2023 16:23)  POCT Blood Glucose.: 284 mg/dL (29 Nov 2023 14:29)  POCT Blood Glucose.: 309 mg/dL (29 Nov 2023 12:33)  POCT Blood Glucose.: 294 mg/dL (29 Nov 2023 11:06)  POCT Blood Glucose.: 250 mg/dL (29 Nov 2023 08:36)    I&O's Summary    29 Nov 2023 07:01  -  30 Nov 2023 07:00  --------------------------------------------------------  IN: 540 mL / OUT: 300 mL / NET: 240 mL        PHYSICAL EXAM:  Vital Signs Last 24 Hrs  T(C): 36.7 (30 Nov 2023 04:56), Max: 36.7 (30 Nov 2023 04:56)  T(F): 98 (30 Nov 2023 04:56), Max: 98 (30 Nov 2023 04:56)  HR: 95 (30 Nov 2023 04:56) (91 - 97)  BP: 128/63 (30 Nov 2023 04:56) (118/61 - 132/58)  BP(mean): --  RR: 18 (30 Nov 2023 04:56) (18 - 21)  SpO2: 95% (30 Nov 2023 04:56) (95% - 97%)    Parameters below as of 30 Nov 2023 04:56  Patient On (Oxygen Delivery Method): room air        CONSTITUTIONAL: NAD, well-developed  HEET: MMM, EOMI, PERRLA  NECK: supple  RESPIRATORY: crackles bilaterally  CARDIOVASCULAR: Regular rate and rhythm, normal S1 and S2, no murmur/rub/gallop; No lower extremity edema; Peripheral pulses are 2+ bilaterally  ABDOMEN: Nontender to palpation, normoactive bowel sounds, no rebound/guarding; No hepatosplenomegaly  MUSCULOSKELETAL: no clubbing or cyanosis of digits; no joint swelling or tenderness to palpation  SKIN: scaly plaques noted over lower extremities     LABS:                        7.2    9.64  )-----------( 234      ( 30 Nov 2023 06:39 )             23.0     11-29    137  |  103  |  93<H>  ----------------------------<  285<H>  5.0   |  20<L>  |  5.87<H>    Ca    8.7      29 Nov 2023 12:34  Phos  4.2     11-30  Mg     2.4     11-30            Urinalysis Basic - ( 29 Nov 2023 12:34 )    Color: x / Appearance: x / SG: x / pH: x  Gluc: 285 mg/dL / Ketone: x  / Bili: x / Urobili: x   Blood: x / Protein: x / Nitrite: x   Leuk Esterase: x / RBC: x / WBC x   Sq Epi: x / Non Sq Epi: x / Bacteria: x          Tele Reviewed:    RADIOLOGY & ADDITIONAL TESTS:  Results Reviewed:   Imaging Personally Reviewed:  Electrocardiogram Personally Reviewed:

## 2023-11-30 NOTE — PROGRESS NOTE ADULT - PROBLEM SELECTOR PLAN 4
Patient with several year history of chronic hypoxemic respiratory failure  - requiring home O2, stable on 2L at baseline  - has not had escalating O2 requirements recently, despite feeling subjectively more dyspneic  - unclear etiology, though patient has had PFTs at her pulmologist's office last year    Plan:  > follow up TTE--> showing EF 71%, bi-atrial enlargement, LV diastolic dysfunction, and RV enlargement  > obtain records from pt's pulmonologists office: Dr. Paulino Gayle ([facility], [phone])  > continue with home montelukast and add on duonebs and symbicort   > wean O2 as tolerated  > add on prednisone for possible COPD exacerbation   > if persistently hypoxemic and despite diuresis, consider alternative etiologies and potentially high resolution CT chest  - f/u repeat CXR and BNP (stable CXR and elevated BNP)  - appreciate pulm recs: started on flonase and saline spray

## 2023-11-30 NOTE — PROGRESS NOTE ADULT - SUBJECTIVE AND OBJECTIVE BOX
PATIENT:  STEFANIE KEENAN  [id number]    CHIEF COMPLAINT:  Patient is a 83y old  Female who presents with a chief complaint of LE swelling, dyspnea (2023 08:05)      INTERVAL HISTORY/OVERNIGHT EVENTS:  - no acute events  - cough as per  and pt is at baseline; she takes anti-tussive meds at home    MEDICATIONS:  MEDICATIONS  (STANDING):  albuterol    90 MICROgram(s) HFA Inhaler 2 Puff(s) Inhalation every 6 hours  albuterol/ipratropium for Nebulization 3 milliLiter(s) Nebulizer every 6 hours  amLODIPine   Tablet 10 milliGRAM(s) Oral daily  atorvastatin 80 milliGRAM(s) Oral at bedtime  budesonide 160 MICROgram(s)/formoterol 4.5 MICROgram(s) Inhaler 2 Puff(s) Inhalation two times a day  calcitriol   Capsule 0.25 MICROGram(s) Oral daily  chlorhexidine 2% Cloths 1 Application(s) Topical daily  dextrose 5%. 1000 milliLiter(s) (100 mL/Hr) IV Continuous <Continuous>  dextrose 5%. 1000 milliLiter(s) (50 mL/Hr) IV Continuous <Continuous>  dextrose 50% Injectable 12.5 Gram(s) IV Push once  dextrose 50% Injectable 25 Gram(s) IV Push once  dextrose 50% Injectable 25 Gram(s) IV Push once  ferrous    sulfate 325 milliGRAM(s) Oral daily  fluticasone propionate 50 MICROgram(s)/spray Nasal Spray 1 Spray(s) Both Nostrils two times a day  glucagon  Injectable 1 milliGRAM(s) IntraMuscular once  heparin   Injectable 5000 Unit(s) SubCutaneous every 8 hours  hydrALAZINE 25 milliGRAM(s) Oral three times a day  influenza  Vaccine (HIGH DOSE) 0.7 milliLiter(s) IntraMuscular once  insulin glargine Injectable (LANTUS) 5 Unit(s) SubCutaneous at bedtime  insulin lispro (ADMELOG) corrective regimen sliding scale   SubCutaneous three times a day before meals  insulin lispro (ADMELOG) corrective regimen sliding scale   SubCutaneous at bedtime  insulin lispro Injectable (ADMELOG) 2 Unit(s) SubCutaneous three times a day before meals  montelukast 10 milliGRAM(s) Oral daily  mupirocin 2% Nasal 1 Application(s) Both Nostrils every 12 hours  nystatin Powder 1 Application(s) Topical two times a day  polyethylene glycol 3350 17 Gram(s) Oral daily  predniSONE   Tablet 40 milliGRAM(s) Oral daily  senna 2 Tablet(s) Oral at bedtime  sevelamer carbonate 800 milliGRAM(s) Oral every 8 hours  sodium chloride 0.65% Nasal 1 Spray(s) Both Nostrils daily    MEDICATIONS  (PRN):  acetaminophen     Tablet .. 650 milliGRAM(s) Oral every 6 hours PRN Temp greater or equal to 38C (100.4F), Mild Pain (1 - 3)  dextrose Oral Gel 15 Gram(s) Oral once PRN Blood Glucose LESS THAN 70 milliGRAM(s)/deciliter      ALLERGIES:  Allergies    No Known Allergies    Intolerances        OBJECTIVE:  ICU Vital Signs Last 24 Hrs  T(C): 36.7 (2023 04:56), Max: 36.7 (2023 04:56)  T(F): 98 (2023 04:56), Max: 98 (2023 04:56)  HR: 95 (2023 04:56) (91 - 97)  BP: 128/63 (2023 04:56) (118/61 - 132/58)  RR: 18 (2023 04:56) (18 - 21)  SpO2: 95% (2023 04:56) (95% - 97%)    O2 Parameters below as of 2023 04:56  Patient On (Oxygen Delivery Method): room air      POCT Blood Glucose.: 165 mg/dL (2023 07:34)  POCT Blood Glucose.: 255 mg/dL (2023 20:46)  POCT Blood Glucose.: 260 mg/dL (2023 16:23)  POCT Blood Glucose.: 284 mg/dL (2023 14:29)  POCT Blood Glucose.: 309 mg/dL (2023 12:33)  POCT Blood Glucose.: 294 mg/dL (2023 11:06)    CAPILLARY BLOOD GLUCOSE      POCT Blood Glucose.: 165 mg/dL (2023 07:34)    I&O's Summary    2023 07:01  -  2023 07:00  --------------------------------------------------------  IN: 540 mL / OUT: 300 mL / NET: 240 mL    2023 07:01  -  2023 10:36  --------------------------------------------------------  IN: 120 mL / OUT: 0 mL / NET: 120 mL      Daily Height in cm: 160.02 (2023 08:05)    Daily Weight in k (2023 07:24)    PHYSICAL EXAMINATION:  General: WN/WD NAD  HEENT: EOMI, moist mucous membranes  Neurology: A&Ox2 but logical, nonfocal, ARMIRES x 4  Respiratory: CTA B/L, normal respiratory effort, no wheezes, crackles, rales  CV: RRR, S1S2, no murmurs, rubs or gallops  Abdominal: Soft, NT, ND +BS  Extremities: 1+ pitting, wrinkled      LABS:                          7.2    9.64  )-----------( 234      ( 2023 06:39 )             23.0     11-29    137  |  103  |  93<H>  ----------------------------<  285<H>  5.0   |  20<L>  |  5.87<H>    Ca    8.7      2023 12:34  Phos  4.2     11-30  Mg     2.4     11-30                Urinalysis Basic - ( 2023 12:34 )    Color: x / Appearance: x / SG: x / pH: x  Gluc: 285 mg/dL / Ketone: x  / Bili: x / Urobili: x   Blood: x / Protein: x / Nitrite: x   Leuk Esterase: x / RBC: x / WBC x   Sq Epi: x / Non Sq Epi: x / Bacteria: x        TELEMETRY:     EKG:     IMAGING:

## 2023-11-30 NOTE — PROGRESS NOTE ADULT - PROBLEM SELECTOR PLAN 6
Patient on several oral medications at home including dapaglifozin, saxagliptin  - unclear baseline glycemic control    Plan:  > ISS for now, add basal/bolus as needed  > f/u A1c: 6.1  - also on lantus 5U and admelog 2U TID due to steroid induced hyperglycemia

## 2023-11-30 NOTE — PROGRESS NOTE ADULT - ASSESSMENT
83 year old F with chronic hypoxemic respiratory failure, CKD, T2DM, CAD, HTN, and HLD who presents for worsening dyspnea and lower extremity edema for 2 days, admitted for suspected acute decompensated heart failure and progressive renal failure ATN vs SAUMYA    Pulmonary called for hypoxemia, dyspnea    She appears to have a component of small airway disease on CT with mosaic attenuation - she almost certainly has COPD  Wheezing notably, coughing as well with swallowing, inspiration  Uses 0-2L NC at home at baseline  Former smoker  Attempted to contact outpatient pulmonologist Dr. Paulino Gayle, but on vacation contacted answering service of covering pulmonologist, awaiting further information    Recommendations  - continue Symbicort 160/4.5 BID, continue home Singulair  - continue Duoneb q6 hours  - prednisone 40 mg qD x 5 day course  - speech eval for coughing - recurrent aspiration?  - start Flonase and nasal saline spray  - restart home anti-tussive  - wean O2 as tolerated  - optimization of cardiac/renal failure - defer to nephro/cardio - Cr improving  - patient should follow up with her pulmonologist upon discharge  - will follow    Von Green MD  Pulmonary/Critical Care  [phone] [facility]  [id number] [facility] 83 year old F with chronic hypoxemic respiratory failure, CKD, T2DM, CAD, HTN, and HLD who presents for worsening dyspnea and lower extremity edema for 2 days, admitted for suspected acute decompensated heart failure and progressive renal failure ATN vs SAUMYA    Pulmonary called for hypoxemia, dyspnea    She appears to have a component of small airway disease on CT with mosaic attenuation - she almost certainly has COPD  Wheezing notably, coughing as well with swallowing, inspiration  Uses 0-2L NC at home at baseline  Former smoker  Attempted to contact outpatient pulmonologist Dr. Paulino Gayle, but on vacation contacted answering service of covering pulmonologist, awaiting further information    Recommendations  - continue Symbicort 160/4.5 BID, continue home Singulair  - continue Duoneb q6 hours  - prednisone 40 mg qD x 5 day course  - speech eval for coughing - recurrent aspiration?  - start Flonase and nasal saline spray  - restart home anti-tussive  - PPI  - wean O2 as tolerated  - optimization of cardiac/renal failure - defer to nephro/cardio - Cr improving  - patient should follow up with her pulmonologist upon discharge  - pulmonary to sign off at this time, back to baseline O2    Von Green MD  Pulmonary/Critical Care  [phone] [facility]  [address] [facility]

## 2023-11-30 NOTE — PROGRESS NOTE ADULT - SUBJECTIVE AND OBJECTIVE BOX
DATE OF SERVICE: 11-30-23 @ 16:20    Patient is a 83y old  Female who presents with a chief complaint of LE swelling, dyspnea (30 Nov 2023 14:15)      INTERVAL HISTORY: Feels ok.     REVIEW OF SYSTEMS:   CONSTITUTIONAL: No weakness  EYES/ENT: No visual changes;  No throat pain   NECK: No pain or stiffness  RESPIRATORY: No cough, wheezing; No shortness of breath  CARDIOVASCULAR: No chest pain or palpitations  GASTROINTESTINAL: No abdominal  pain. No nausea, vomiting, or hematemesis  GENITOURINARY: No dysuria, frequency or hematuria  NEUROLOGICAL: No stroke like symptoms  SKIN: No rashes    TELEMETRY Personally reviewed: SR/ST 80-110s with occ PVCs  	  MEDICATIONS:  amLODIPine   Tablet 10 milliGRAM(s) Oral daily  hydrALAZINE 25 milliGRAM(s) Oral three times a day        PHYSICAL EXAM:  T(C): 36.6 (11-30-23 @ 11:01), Max: 36.7 (11-30-23 @ 04:56)  HR: 94 (11-30-23 @ 11:01) (94 - 95)  BP: 120/53 (11-30-23 @ 13:00) (120/53 - 128/63)  RR: 18 (11-30-23 @ 13:00) (18 - 18)  SpO2: 97% (11-30-23 @ 13:00) (95% - 97%)  Wt(kg): --  I&O's Summary    29 Nov 2023 07:01  -  30 Nov 2023 07:00  --------------------------------------------------------  IN: 540 mL / OUT: 300 mL / NET: 240 mL    30 Nov 2023 07:01  -  30 Nov 2023 16:20  --------------------------------------------------------  IN: 240 mL / OUT: 0 mL / NET: 240 mL      Height (cm): 160 (11-30 @ 08:05)  Weight (kg): 82.9 (11-30 @ 08:05)  BMI (kg/m2): 32.4 (11-30 @ 08:05)  BSA (m2): 1.86 (11-30 @ 08:05)    Appearance: In no distress	  HEENT:    PERRL, EOMI	  Cardiovascular:  S1 S2, No JVD  Respiratory: Lungs clear to auscultation	  Gastrointestinal:  Soft, Non-tender, + BS	  Vascularature:  + edema of LE  Psychiatric: Appropriate affect   Neuro: no acute focal deficits                               7.2    9.64  )-----------( 234      ( 30 Nov 2023 06:39 )             23.0     11-30    137  |  103  |  100<H>  ----------------------------<  157<H>  4.7   |  19<L>  |  5.75<H>    Ca    8.7      30 Nov 2023 06:39  Phos  4.2     11-30  Mg     2.4     11-30          Labs personally reviewed      ASSESSMENT/PLAN: 	    83 year old female with HTN, HLD, CAD, T2DM, CKD, remote hx of breast CA s/p mastectomy, and chronic hypoxemic respiratory failure of unknown etiology on home O2 who presents for lower extremity swelling and shortness of breath.      1. Acute diastolic heart failure  - Pt with LE edema and pulmonary congestion  - s/p IV diuresis now DC'd d/t increasing Cr which is now improving.  - Echo shows normal LV systolic function with Bi-atrial enlargement, G2DD and LVH  - EKG shows sinus rhythm (not atrial flutter)  - Plan for poss HD as per Renal  - Pulm consult appreciated for continued hypoxia despite diuresis: recommend swallow eval, duonebs, symbicort and prednisone    2. CAL on CKD  - Renal following  - Plan for poss HD     3. HLD  - Statin therapy    4. HTN  - BP acceptable        DAFNE Eppesron-NP   Jean Ash DO [facility]  Cardiovascular Medicine  [address], [address]  Available through call or text on [employer]  Office: [phone]

## 2023-12-01 LAB
ANION GAP SERPL CALC-SCNC: 13 MMOL/L — SIGNIFICANT CHANGE UP (ref 5–17)
ANION GAP SERPL CALC-SCNC: 13 MMOL/L — SIGNIFICANT CHANGE UP (ref 5–17)
BLD GP AB SCN SERPL QL: NEGATIVE — SIGNIFICANT CHANGE UP
BLD GP AB SCN SERPL QL: NEGATIVE — SIGNIFICANT CHANGE UP
BUN SERPL-MCNC: 102 MG/DL — HIGH (ref 7–23)
BUN SERPL-MCNC: 102 MG/DL — HIGH (ref 7–23)
CALCIUM SERPL-MCNC: 8.4 MG/DL — SIGNIFICANT CHANGE UP (ref 8.4–10.5)
CALCIUM SERPL-MCNC: 8.4 MG/DL — SIGNIFICANT CHANGE UP (ref 8.4–10.5)
CHLORIDE SERPL-SCNC: 103 MMOL/L — SIGNIFICANT CHANGE UP (ref 96–108)
CHLORIDE SERPL-SCNC: 103 MMOL/L — SIGNIFICANT CHANGE UP (ref 96–108)
CO2 SERPL-SCNC: 20 MMOL/L — LOW (ref 22–31)
CO2 SERPL-SCNC: 20 MMOL/L — LOW (ref 22–31)
CREAT SERPL-MCNC: 5.55 MG/DL — HIGH (ref 0.5–1.3)
CREAT SERPL-MCNC: 5.55 MG/DL — HIGH (ref 0.5–1.3)
EGFR: 7 ML/MIN/1.73M2 — LOW
EGFR: 7 ML/MIN/1.73M2 — LOW
GLUCOSE BLDC GLUCOMTR-MCNC: 200 MG/DL — HIGH (ref 70–99)
GLUCOSE BLDC GLUCOMTR-MCNC: 200 MG/DL — HIGH (ref 70–99)
GLUCOSE BLDC GLUCOMTR-MCNC: 244 MG/DL — HIGH (ref 70–99)
GLUCOSE BLDC GLUCOMTR-MCNC: 244 MG/DL — HIGH (ref 70–99)
GLUCOSE BLDC GLUCOMTR-MCNC: 293 MG/DL — HIGH (ref 70–99)
GLUCOSE BLDC GLUCOMTR-MCNC: 293 MG/DL — HIGH (ref 70–99)
GLUCOSE BLDC GLUCOMTR-MCNC: 305 MG/DL — HIGH (ref 70–99)
GLUCOSE BLDC GLUCOMTR-MCNC: 305 MG/DL — HIGH (ref 70–99)
GLUCOSE SERPL-MCNC: 194 MG/DL — HIGH (ref 70–99)
GLUCOSE SERPL-MCNC: 194 MG/DL — HIGH (ref 70–99)
HCT VFR BLD CALC: 22.3 % — LOW (ref 34.5–45)
HCT VFR BLD CALC: 22.3 % — LOW (ref 34.5–45)
HCT VFR BLD CALC: 27.3 % — LOW (ref 34.5–45)
HCT VFR BLD CALC: 27.3 % — LOW (ref 34.5–45)
HGB BLD-MCNC: 6.9 G/DL — CRITICAL LOW (ref 11.5–15.5)
HGB BLD-MCNC: 6.9 G/DL — CRITICAL LOW (ref 11.5–15.5)
HGB BLD-MCNC: 8.8 G/DL — LOW (ref 11.5–15.5)
HGB BLD-MCNC: 8.8 G/DL — LOW (ref 11.5–15.5)
MAGNESIUM SERPL-MCNC: 2.4 MG/DL — SIGNIFICANT CHANGE UP (ref 1.6–2.6)
MAGNESIUM SERPL-MCNC: 2.4 MG/DL — SIGNIFICANT CHANGE UP (ref 1.6–2.6)
MCHC RBC-ENTMCNC: 28.8 PG — SIGNIFICANT CHANGE UP (ref 27–34)
MCHC RBC-ENTMCNC: 28.8 PG — SIGNIFICANT CHANGE UP (ref 27–34)
MCHC RBC-ENTMCNC: 29.2 PG — SIGNIFICANT CHANGE UP (ref 27–34)
MCHC RBC-ENTMCNC: 29.2 PG — SIGNIFICANT CHANGE UP (ref 27–34)
MCHC RBC-ENTMCNC: 30.9 GM/DL — LOW (ref 32–36)
MCHC RBC-ENTMCNC: 30.9 GM/DL — LOW (ref 32–36)
MCHC RBC-ENTMCNC: 32.2 GM/DL — SIGNIFICANT CHANGE UP (ref 32–36)
MCHC RBC-ENTMCNC: 32.2 GM/DL — SIGNIFICANT CHANGE UP (ref 32–36)
MCV RBC AUTO: 90.7 FL — SIGNIFICANT CHANGE UP (ref 80–100)
MCV RBC AUTO: 90.7 FL — SIGNIFICANT CHANGE UP (ref 80–100)
MCV RBC AUTO: 92.9 FL — SIGNIFICANT CHANGE UP (ref 80–100)
MCV RBC AUTO: 92.9 FL — SIGNIFICANT CHANGE UP (ref 80–100)
NRBC # BLD: 0 /100 WBCS — SIGNIFICANT CHANGE UP (ref 0–0)
PHOSPHATE SERPL-MCNC: 4.3 MG/DL — SIGNIFICANT CHANGE UP (ref 2.5–4.5)
PHOSPHATE SERPL-MCNC: 4.3 MG/DL — SIGNIFICANT CHANGE UP (ref 2.5–4.5)
PLATELET # BLD AUTO: 236 K/UL — SIGNIFICANT CHANGE UP (ref 150–400)
PLATELET # BLD AUTO: 236 K/UL — SIGNIFICANT CHANGE UP (ref 150–400)
PLATELET # BLD AUTO: 294 K/UL — SIGNIFICANT CHANGE UP (ref 150–400)
PLATELET # BLD AUTO: 294 K/UL — SIGNIFICANT CHANGE UP (ref 150–400)
POTASSIUM SERPL-MCNC: 4.8 MMOL/L — SIGNIFICANT CHANGE UP (ref 3.5–5.3)
POTASSIUM SERPL-MCNC: 4.8 MMOL/L — SIGNIFICANT CHANGE UP (ref 3.5–5.3)
POTASSIUM SERPL-SCNC: 4.8 MMOL/L — SIGNIFICANT CHANGE UP (ref 3.5–5.3)
POTASSIUM SERPL-SCNC: 4.8 MMOL/L — SIGNIFICANT CHANGE UP (ref 3.5–5.3)
RBC # BLD: 2.4 M/UL — LOW (ref 3.8–5.2)
RBC # BLD: 2.4 M/UL — LOW (ref 3.8–5.2)
RBC # BLD: 3.01 M/UL — LOW (ref 3.8–5.2)
RBC # BLD: 3.01 M/UL — LOW (ref 3.8–5.2)
RBC # FLD: 14.6 % — HIGH (ref 10.3–14.5)
RBC # FLD: 14.6 % — HIGH (ref 10.3–14.5)
RBC # FLD: 14.8 % — HIGH (ref 10.3–14.5)
RBC # FLD: 14.8 % — HIGH (ref 10.3–14.5)
RH IG SCN BLD-IMP: POSITIVE — SIGNIFICANT CHANGE UP
RH IG SCN BLD-IMP: POSITIVE — SIGNIFICANT CHANGE UP
SODIUM SERPL-SCNC: 136 MMOL/L — SIGNIFICANT CHANGE UP (ref 135–145)
SODIUM SERPL-SCNC: 136 MMOL/L — SIGNIFICANT CHANGE UP (ref 135–145)
WBC # BLD: 10.12 K/UL — SIGNIFICANT CHANGE UP (ref 3.8–10.5)
WBC # BLD: 10.12 K/UL — SIGNIFICANT CHANGE UP (ref 3.8–10.5)
WBC # BLD: 9.06 K/UL — SIGNIFICANT CHANGE UP (ref 3.8–10.5)
WBC # BLD: 9.06 K/UL — SIGNIFICANT CHANGE UP (ref 3.8–10.5)
WBC # FLD AUTO: 10.12 K/UL — SIGNIFICANT CHANGE UP (ref 3.8–10.5)
WBC # FLD AUTO: 10.12 K/UL — SIGNIFICANT CHANGE UP (ref 3.8–10.5)
WBC # FLD AUTO: 9.06 K/UL — SIGNIFICANT CHANGE UP (ref 3.8–10.5)
WBC # FLD AUTO: 9.06 K/UL — SIGNIFICANT CHANGE UP (ref 3.8–10.5)

## 2023-12-01 PROCEDURE — 99232 SBSQ HOSP IP/OBS MODERATE 35: CPT | Mod: GC

## 2023-12-01 PROCEDURE — 74230 X-RAY XM SWLNG FUNCJ C+: CPT | Mod: 26

## 2023-12-01 RX ORDER — INSULIN GLARGINE 100 [IU]/ML
7 INJECTION, SOLUTION SUBCUTANEOUS AT BEDTIME
Refills: 0 | Status: DISCONTINUED | OUTPATIENT
Start: 2023-12-01 | End: 2023-12-02

## 2023-12-01 RX ORDER — FUROSEMIDE 40 MG
20 TABLET ORAL ONCE
Refills: 0 | Status: COMPLETED | OUTPATIENT
Start: 2023-12-01 | End: 2023-12-01

## 2023-12-01 RX ORDER — INSULIN LISPRO 100/ML
3 VIAL (ML) SUBCUTANEOUS
Refills: 0 | Status: DISCONTINUED | OUTPATIENT
Start: 2023-12-01 | End: 2023-12-02

## 2023-12-01 RX ADMIN — Medication 3 MILLILITER(S): at 12:08

## 2023-12-01 RX ADMIN — ATORVASTATIN CALCIUM 80 MILLIGRAM(S): 80 TABLET, FILM COATED ORAL at 21:53

## 2023-12-01 RX ADMIN — BUDESONIDE AND FORMOTEROL FUMARATE DIHYDRATE 2 PUFF(S): 160; 4.5 AEROSOL RESPIRATORY (INHALATION) at 21:52

## 2023-12-01 RX ADMIN — Medication 3 UNIT(S): at 16:43

## 2023-12-01 RX ADMIN — NYSTATIN CREAM 1 APPLICATION(S): 100000 CREAM TOPICAL at 16:44

## 2023-12-01 RX ADMIN — INSULIN GLARGINE 7 UNIT(S): 100 INJECTION, SOLUTION SUBCUTANEOUS at 21:56

## 2023-12-01 RX ADMIN — Medication 3: at 12:09

## 2023-12-01 RX ADMIN — Medication 1: at 08:56

## 2023-12-01 RX ADMIN — Medication 325 MILLIGRAM(S): at 12:08

## 2023-12-01 RX ADMIN — Medication 25 MILLIGRAM(S): at 06:33

## 2023-12-01 RX ADMIN — HEPARIN SODIUM 5000 UNIT(S): 5000 INJECTION INTRAVENOUS; SUBCUTANEOUS at 06:32

## 2023-12-01 RX ADMIN — Medication 1 SPRAY(S): at 16:45

## 2023-12-01 RX ADMIN — Medication 20 MILLIGRAM(S): at 17:06

## 2023-12-01 RX ADMIN — BUDESONIDE AND FORMOTEROL FUMARATE DIHYDRATE 2 PUFF(S): 160; 4.5 AEROSOL RESPIRATORY (INHALATION) at 08:57

## 2023-12-01 RX ADMIN — Medication 25 MILLIGRAM(S): at 21:53

## 2023-12-01 RX ADMIN — Medication 2 UNIT(S): at 08:56

## 2023-12-01 RX ADMIN — SEVELAMER CARBONATE 800 MILLIGRAM(S): 2400 POWDER, FOR SUSPENSION ORAL at 21:54

## 2023-12-01 RX ADMIN — CALCITRIOL 0.25 MICROGRAM(S): 0.5 CAPSULE ORAL at 12:08

## 2023-12-01 RX ADMIN — MUPIROCIN 1 APPLICATION(S): 20 OINTMENT TOPICAL at 16:45

## 2023-12-01 RX ADMIN — Medication 3 UNIT(S): at 12:09

## 2023-12-01 RX ADMIN — Medication 1 SPRAY(S): at 12:09

## 2023-12-01 RX ADMIN — MUPIROCIN 1 APPLICATION(S): 20 OINTMENT TOPICAL at 06:32

## 2023-12-01 RX ADMIN — CHLORHEXIDINE GLUCONATE 1 APPLICATION(S): 213 SOLUTION TOPICAL at 12:08

## 2023-12-01 RX ADMIN — SENNA PLUS 2 TABLET(S): 8.6 TABLET ORAL at 21:54

## 2023-12-01 RX ADMIN — SEVELAMER CARBONATE 800 MILLIGRAM(S): 2400 POWDER, FOR SUSPENSION ORAL at 06:34

## 2023-12-01 RX ADMIN — Medication 1 SPRAY(S): at 06:33

## 2023-12-01 RX ADMIN — Medication 3 MILLILITER(S): at 06:32

## 2023-12-01 RX ADMIN — MONTELUKAST 10 MILLIGRAM(S): 4 TABLET, CHEWABLE ORAL at 12:08

## 2023-12-01 RX ADMIN — SEVELAMER CARBONATE 800 MILLIGRAM(S): 2400 POWDER, FOR SUSPENSION ORAL at 12:14

## 2023-12-01 RX ADMIN — Medication 4: at 16:43

## 2023-12-01 RX ADMIN — NYSTATIN CREAM 1 APPLICATION(S): 100000 CREAM TOPICAL at 06:33

## 2023-12-01 RX ADMIN — Medication 3 MILLILITER(S): at 16:45

## 2023-12-01 RX ADMIN — HEPARIN SODIUM 5000 UNIT(S): 5000 INJECTION INTRAVENOUS; SUBCUTANEOUS at 21:53

## 2023-12-01 RX ADMIN — AMLODIPINE BESYLATE 10 MILLIGRAM(S): 2.5 TABLET ORAL at 06:33

## 2023-12-01 RX ADMIN — Medication 40 MILLIGRAM(S): at 06:33

## 2023-12-01 NOTE — PROGRESS NOTE ADULT - PROBLEM SELECTOR PLAN 1
Patient presenting for worsening LE edema and breathlessness  - also with several symptoms to suggest likely ADHF  - on exam: extremities warm and well perfused, but volume overloaded, S3 on exam  - proBNP elevated to 1300, but unknown baseline and with likely CAL on CKD  - hsTropT flat at 74, ECG without ST changes, no chest pain  - pt does not carry a diagnosis of HF, however on meds that may suggest she may have HFpEF (MRA, SGLT2i)    Plan:  > diuresis with IV lasix 40 mg bid, assess response and titrate dose (diuretic break)  > obtain TTE--> showing EF 71%, bi-atrial enlargement, LV diastolic dysfunction, and RV enlargement  > hold home spironolactone and dapagliflozin given CAL  > strict I/Os, daily standing weights  > appreciate cards recs  > will need to determine DC med rec based on GFR

## 2023-12-01 NOTE — PROGRESS NOTE ADULT - SUBJECTIVE AND OBJECTIVE BOX
PROGRESS NOTE:   Authored by Dr. Luis Perla MD (PGY-1).     Patient is a 83y old  Female who presents with a chief complaint of LE swelling, dyspnea (30 Nov 2023 16:20)      SUBJECTIVE / OVERNIGHT EVENTS:  No acute events overnight. She has no acute complaints this morning.     MEDICATIONS  (STANDING):  albuterol    90 MICROgram(s) HFA Inhaler 2 Puff(s) Inhalation every 6 hours  albuterol/ipratropium for Nebulization 3 milliLiter(s) Nebulizer every 6 hours  amLODIPine   Tablet 10 milliGRAM(s) Oral daily  atorvastatin 80 milliGRAM(s) Oral at bedtime  budesonide 160 MICROgram(s)/formoterol 4.5 MICROgram(s) Inhaler 2 Puff(s) Inhalation two times a day  calcitriol   Capsule 0.25 MICROGram(s) Oral daily  chlorhexidine 2% Cloths 1 Application(s) Topical daily  dextrose 5%. 1000 milliLiter(s) (50 mL/Hr) IV Continuous <Continuous>  dextrose 5%. 1000 milliLiter(s) (100 mL/Hr) IV Continuous <Continuous>  dextrose 50% Injectable 25 Gram(s) IV Push once  dextrose 50% Injectable 25 Gram(s) IV Push once  dextrose 50% Injectable 12.5 Gram(s) IV Push once  ferrous    sulfate 325 milliGRAM(s) Oral daily  fluticasone propionate 50 MICROgram(s)/spray Nasal Spray 1 Spray(s) Both Nostrils two times a day  glucagon  Injectable 1 milliGRAM(s) IntraMuscular once  heparin   Injectable 5000 Unit(s) SubCutaneous every 8 hours  hydrALAZINE 25 milliGRAM(s) Oral three times a day  influenza  Vaccine (HIGH DOSE) 0.7 milliLiter(s) IntraMuscular once  insulin glargine Injectable (LANTUS) 7 Unit(s) SubCutaneous at bedtime  insulin lispro (ADMELOG) corrective regimen sliding scale   SubCutaneous three times a day before meals  insulin lispro (ADMELOG) corrective regimen sliding scale   SubCutaneous at bedtime  insulin lispro Injectable (ADMELOG) 3 Unit(s) SubCutaneous three times a day before meals  montelukast 10 milliGRAM(s) Oral daily  mupirocin 2% Nasal 1 Application(s) Both Nostrils every 12 hours  nystatin Powder 1 Application(s) Topical two times a day  polyethylene glycol 3350 17 Gram(s) Oral daily  predniSONE   Tablet 40 milliGRAM(s) Oral daily  senna 2 Tablet(s) Oral at bedtime  sevelamer carbonate 800 milliGRAM(s) Oral every 8 hours  sodium chloride 0.65% Nasal 1 Spray(s) Both Nostrils daily    MEDICATIONS  (PRN):  acetaminophen     Tablet .. 650 milliGRAM(s) Oral every 6 hours PRN Temp greater or equal to 38C (100.4F), Mild Pain (1 - 3)  dextrose Oral Gel 15 Gram(s) Oral once PRN Blood Glucose LESS THAN 70 milliGRAM(s)/deciliter      CAPILLARY BLOOD GLUCOSE      POCT Blood Glucose.: 200 mg/dL (01 Dec 2023 08:54)  POCT Blood Glucose.: 237 mg/dL (30 Nov 2023 21:22)  POCT Blood Glucose.: 270 mg/dL (30 Nov 2023 16:34)  POCT Blood Glucose.: 311 mg/dL (30 Nov 2023 14:03)  POCT Blood Glucose.: 281 mg/dL (30 Nov 2023 11:38)    I&O's Summary    30 Nov 2023 07:01  -  01 Dec 2023 07:00  --------------------------------------------------------  IN: 940 mL / OUT: 0 mL / NET: 940 mL        PHYSICAL EXAM:  Vital Signs Last 24 Hrs  T(C): 36.8 (01 Dec 2023 05:14), Max: 36.8 (01 Dec 2023 05:14)  T(F): 98.2 (01 Dec 2023 05:14), Max: 98.2 (01 Dec 2023 05:14)  HR: 89 (01 Dec 2023 05:14) (88 - 94)  BP: 146/62 (01 Dec 2023 05:14) (120/53 - 146/62)  BP(mean): --  RR: 18 (01 Dec 2023 05:14) (18 - 18)  SpO2: 97% (01 Dec 2023 05:14) (96% - 97%)    Parameters below as of 01 Dec 2023 05:14  Patient On (Oxygen Delivery Method): nasal cannula        CONSTITUTIONAL: NAD, well-developed  HEET: MMM, EOMI, PERRLA  NECK: supple  RESPIRATORY: crackles bilaterally  CARDIOVASCULAR: Regular rate and rhythm, normal S1 and S2, no murmur/rub/gallop; No lower extremity edema; Peripheral pulses are 2+ bilaterally  ABDOMEN: Nontender to palpation, normoactive bowel sounds, no rebound/guarding; No hepatosplenomegaly  MUSCULOSKELETAL: no clubbing or cyanosis of digits; no joint swelling or tenderness to palpation  PSYCH: A+O to person, place, and time;   SKIN: No rash    LABS:                        6.9    9.06  )-----------( 236      ( 01 Dec 2023 06:10 )             22.3     12-01    136  |  103  |  102<H>  ----------------------------<  194<H>  4.8   |  20<L>  |  5.55<H>    Ca    8.4      01 Dec 2023 06:10  Phos  4.3     12-01  Mg     2.4     12-01            Urinalysis Basic - ( 01 Dec 2023 06:10 )    Color: x / Appearance: x / SG: x / pH: x  Gluc: 194 mg/dL / Ketone: x  / Bili: x / Urobili: x   Blood: x / Protein: x / Nitrite: x   Leuk Esterase: x / RBC: x / WBC x   Sq Epi: x / Non Sq Epi: x / Bacteria: x          Tele Reviewed:    RADIOLOGY & ADDITIONAL TESTS:  Results Reviewed:   Imaging Personally Reviewed:  Electrocardiogram Personally Reviewed:

## 2023-12-01 NOTE — PROGRESS NOTE ADULT - SUBJECTIVE AND OBJECTIVE BOX
DATE OF SERVICE: 12-01-23 @ 11:04    Patient is a 83y old  Female who presents with a chief complaint of LE swelling, dyspnea (01 Dec 2023 09:12)      INTERVAL HISTORY: Feels ok.     REVIEW OF SYSTEMS:  CONSTITUTIONAL: No weakness  EYES/ENT: No visual changes;  No throat pain   NECK: No pain or stiffness  RESPIRATORY: No cough, wheezing; No shortness of breath  CARDIOVASCULAR: No chest pain or palpitations  GASTROINTESTINAL: No abdominal  pain. No nausea, vomiting, or hematemesis  GENITOURINARY: No dysuria, frequency or hematuria  NEUROLOGICAL: No stroke like symptoms  SKIN: No rashes    TELEMETRY Personally reviewed: SR   	  MEDICATIONS:  amLODIPine   Tablet 10 milliGRAM(s) Oral daily  furosemide   Injectable 20 milliGRAM(s) IV Push once  hydrALAZINE 25 milliGRAM(s) Oral three times a day        PHYSICAL EXAM:  T(C): 36.8 (12-01-23 @ 10:56), Max: 36.8 (12-01-23 @ 05:14)  HR: 91 (12-01-23 @ 10:56) (88 - 91)  BP: 145/56 (12-01-23 @ 10:56) (120/53 - 146/62)  RR: 18 (12-01-23 @ 10:56) (18 - 18)  SpO2: 96% (12-01-23 @ 10:56) (96% - 97%)  Wt(kg): --  I&O's Summary    30 Nov 2023 07:01  -  01 Dec 2023 07:00  --------------------------------------------------------  IN: 940 mL / OUT: 0 mL / NET: 940 mL      Height (cm): 160 (12-01 @ 09:12)  Weight (kg): 82.9 (12-01 @ 09:12)  BMI (kg/m2): 32.4 (12-01 @ 09:12)  BSA (m2): 1.86 (12-01 @ 09:12)    Appearance: In no distress	  HEENT:    PERRL, EOMI	  Cardiovascular:  S1 S2, No JVD  Respiratory: Lungs clear to auscultation	  Gastrointestinal:  Soft, Non-tender, + BS	  Vascularature:  No edema of LE  Psychiatric: Appropriate affect   Neuro: no acute focal deficits                               6.9    9.06  )-----------( 236      ( 01 Dec 2023 06:10 )             22.3     12-01    136  |  103  |  102<H>  ----------------------------<  194<H>  4.8   |  20<L>  |  5.55<H>    Ca    8.4      01 Dec 2023 06:10  Phos  4.3     12-01  Mg     2.4     12-01          Labs personally reviewed      ASSESSMENT/PLAN: 	    83 year old female with HTN, HLD, CAD, T2DM, CKD, remote hx of breast CA s/p mastectomy, and chronic hypoxemic respiratory failure of unknown etiology on home O2 who presents for lower extremity swelling and shortness of breath.      1. Acute diastolic heart failure  - Pt with LE edema and pulmonary congestion  - s/p IV diuresis now DC'd d/t increasing Cr which is now improving.  - Echo shows normal LV systolic function with Bi-atrial enlargement, G2DD and LVH  - EKG shows sinus rhythm (not atrial flutter)  - Plan for poss HD as per Renal  - Pulm consult appreciated for continued hypoxia despite diuresis: recommend swallow eval, duonebs, symbicort and prednisone  - 12/1 given Lasix 20mg IVP x1.     2. CAL on CKD  - Renal following  - Plan for poss HD     3. HLD  - Statin therapy    4. HTN  - BP acceptable          Elba Ortega, DAFNE-NP   Jean Ash DO [facility]  Cardiovascular Medicine  [address] [place] [address], [address]  Available through call or text on [employer]  Office: [phone]

## 2023-12-01 NOTE — PROGRESS NOTE ADULT - ATTENDING COMMENTS
# CAL on CKD vs CKD. Serum creatinine continues to rise to 6.7 mg/dL. She received IV contrast (CTA chest)on 11/21/2023- probably too long ago to explain this SCr increase.     No uremic symptoms, no asterixis, no pleural rub. We will continue to monitor closely for dialysis needs.      Proteinuria decreasing  SCr decreasing 4 straight days

## 2023-12-01 NOTE — SWALLOW VFSS/MBS ASSESSMENT ADULT - SLP PERTINENT HISTORY OF CURRENT PROBLEM
83y F w/ PMHx of HTN, HLD, CAD, T2DM, CKD, smoker, and chronic hypoxemia respiratory failure of unknown etiology on home O2 (2L O2 via NC) presenting for LE swelling and SOB. Pt admitted for suspected ADHF. Nephrology following for CAL on CKD vs. CAL. Lasix decreased 2/2 uptrending Cr. On 11/27, volume status noted to be better but Pt tachypneic and CR rising significantly; ?need for HD this admission. Pulm consulted and reports that Pt appears to have component of small airway disease on CT w/ mosaic attenuation. +wheeze and cough w/ swallowing and inspiration. C/f component of aspiration.

## 2023-12-01 NOTE — SWALLOW VFSS/MBS ASSESSMENT ADULT - DIAGNOSTIC IMPRESSIONS
Pt seen for MBS to rule out silent aspiration. Pt presents with a normal oropharyngeal swallow sequence. There is adequate mastication/ bolus transfer. Pharyngeal swallow trigger is timely. No laryngeal penetration/aspiration observed. No significant pharyngeal residue. Pt appears to be at low risk for aspiration-related complications, low risk for malnutrition/dehydration. No indication for diet modification or further dysphagia work-up at this time.

## 2023-12-01 NOTE — PROGRESS NOTE ADULT - PROBLEM SELECTOR PLAN 6
Patient on several oral medications at home including dapaglifozin, saxagliptin  - unclear baseline glycemic control    Plan:  > ISS for now, add basal/bolus as needed  > f/u A1c: 6.1  - also on lantus 7U and admelog 3U TID due to steroid induced hyperglycemia

## 2023-12-01 NOTE — PROGRESS NOTE ADULT - PROBLEM SELECTOR PLAN 4
Patient with several year history of chronic hypoxemic respiratory failure  - requiring home O2, stable on 2L at baseline  - has not had escalating O2 requirements recently, despite feeling subjectively more dyspneic  - unclear etiology, though patient has had PFTs at her pulmologist's office last year    Plan:  > follow up TTE--> showing EF 71%, bi-atrial enlargement, LV diastolic dysfunction, and RV enlargement  > obtain records from pt's pulmonologists office: Dr. Paulino Gayle ([facility], [phone])  > continue with home montelukast and add on duonebs and symbicort   > wean O2 as tolerated  > add on prednisone for possible COPD exacerbation   > if persistently hypoxemic and despite diuresis, consider alternative etiologies and potentially high resolution CT chest  - f/u repeat CXR and BNP (stable CXR and elevated BNP)  - appreciate pulm recs: started on flonase and saline spray

## 2023-12-01 NOTE — PROGRESS NOTE ADULT - ATTENDING COMMENTS
83F with chronic hypoxemic respiratory failure, CKD, T2DM, CAD, HTN, and HLD who presents for worsening dyspnea and lower extremity edema, admitted for suspected acute decompensated heart failure with CAL on likely CKD. Unclear baseline Cr, but significantly elevated currently with nephrotic range proteinuria. TTE showing diastolic HF, s/p diuresis with worsening renal failure, ongoing SOB c/f possible COPD exacerbation.     #acute on chronic diastolic HF   #CAL on CKD, #nephrotic range proteinuria   #Acute on chronic respiratory failure  #HTN, HLD, DM2    - looks slightly more edematous today compared to prior, has  been off diuretics since 11/27 2/2 CAL on CKD   - Cr slowly improving, rising BUN likely 2/2 steroids, hyperK appears to have resolved - c/t trend BMP closely, renal following for possible HD   - anemic with Hb 6.9 today, likely 2/2 ESRD - given one unit and lasix after to help precent overload   - strict I/Os, daily weights   - continue to hold home spironolactone, telmisartan, and farxiga given CAL  - c/w hydralazine 25mg TID for afterload reduction - uptitrate as needed   - TTE w/moderate (grade 2) left ventricular diastolic dysfunction, repeat TTE unchanged   - pt with chronic hypoxemic RF of unclear etiology, ?COPD given long standing smoking history - c/w prednisone for now (11/25--), pulm following, possible taper pending clinical status .

## 2023-12-01 NOTE — PROGRESS NOTE ADULT - PROBLEM SELECTOR PLAN 1
CAL on CKD  - Has not seen a physician in the recent past. Given her history its likely to be CAL on CKD 2/2 SAUMYA vs CKD progression. She has pedal edema. US duplex Kidney - showed renal parenchymal disease. UPCR 5.6g, no RBCs, no bacteria. FeUrea borderline but more suggestive of prerenal etiology.  SCr 4.5 on admission, received IV contrast for CTA on evening of 11/21, and Cr had been stable but increased about 2 days after IV contrast exposure suggesting SAUMYA.  Serology work up ongoing- so far negative HIV, Hep B, Hep C, C3, C4, Anti GBM antibody, Anti Ds DNA, JAYLAN, ANCA, Serum free light chains, immunofixation. A1c 6.1%.   negative for PLA2R Ab.  - O2 requirement: 2L/min. CTA chest showed b/l ground-glass opacities. staph aureus swab PCR pos (not MRSA) and pt does have a cough and intermittent wheezing but no leukocytosis or fever. Would consider treating other possible etiologies for resp failure like COPD or PNA with Duonebs +/- Prednisone +/- abx (consider checking procal)  - BNP 1300 initially. TTE with G2DD with elevated filling pressures and severe LA dilation. Was on Lasix 40mg IV BID, but iso rising Cr, decreased Lasix to 40mg IV daily on 11/25/23. However, repeat CXR looks unchanged with pulm vasc congestion and BNP increased to 3000. Renal function was worsening initially to 6.7 and then improved to 5.5 today. UOP was good. Pt was off diuretics.   PLAN:  She has asterixis on examination. No other symptoms/ signs of uremia. Pt has tremor as well. She mentioned that she takes a glass of wine everyday. Please assess for alcohol withdrawal.   Pt has some renal improvement. No HD today. Will reassess.  Hyperkalemia has improved. Trend and monitor.   - Avoid nephrotoxic agents when possible and dose meds per eGFR

## 2023-12-01 NOTE — PROGRESS NOTE ADULT - SUBJECTIVE AND OBJECTIVE BOX
[facility] Division of Kidney Diseases & Hypertension  FOLLOW UP NOTE  [phone]--------------------------------------------------------------------------------  Chief Complaint:Shortness of breath        24 hour events/subjective:  Pt was seen and examined at the bedside. No acute overnight events. No fresh complaints.   Pt denies worsening of SOB/ Constipation/ Diarrhea/ Nausea/ Vomiting/ abdominal pain/ chest pain/ tingling/ numbness.         PAST HISTORY  --------------------------------------------------------------------------------  No significant changes to PMH, PSH, FHx, SHx, unless otherwise noted    ALLERGIES & MEDICATIONS  --------------------------------------------------------------------------------  Allergies    No Known Allergies    Intolerances      Standing Inpatient Medications  albuterol    90 MICROgram(s) HFA Inhaler 2 Puff(s) Inhalation every 6 hours  albuterol/ipratropium for Nebulization 3 milliLiter(s) Nebulizer every 6 hours  amLODIPine   Tablet 10 milliGRAM(s) Oral daily  atorvastatin 80 milliGRAM(s) Oral at bedtime  budesonide 160 MICROgram(s)/formoterol 4.5 MICROgram(s) Inhaler 2 Puff(s) Inhalation two times a day  calcitriol   Capsule 0.25 MICROGram(s) Oral daily  chlorhexidine 2% Cloths 1 Application(s) Topical daily  dextrose 5%. 1000 milliLiter(s) IV Continuous <Continuous>  dextrose 5%. 1000 milliLiter(s) IV Continuous <Continuous>  dextrose 50% Injectable 25 Gram(s) IV Push once  dextrose 50% Injectable 12.5 Gram(s) IV Push once  dextrose 50% Injectable 25 Gram(s) IV Push once  ferrous    sulfate 325 milliGRAM(s) Oral daily  fluticasone propionate 50 MICROgram(s)/spray Nasal Spray 1 Spray(s) Both Nostrils two times a day  furosemide   Injectable 20 milliGRAM(s) IV Push once  glucagon  Injectable 1 milliGRAM(s) IntraMuscular once  heparin   Injectable 5000 Unit(s) SubCutaneous every 8 hours  hydrALAZINE 25 milliGRAM(s) Oral three times a day  influenza  Vaccine (HIGH DOSE) 0.7 milliLiter(s) IntraMuscular once  insulin glargine Injectable (LANTUS) 7 Unit(s) SubCutaneous at bedtime  insulin lispro (ADMELOG) corrective regimen sliding scale   SubCutaneous three times a day before meals  insulin lispro (ADMELOG) corrective regimen sliding scale   SubCutaneous at bedtime  insulin lispro Injectable (ADMELOG) 3 Unit(s) SubCutaneous three times a day before meals  montelukast 10 milliGRAM(s) Oral daily  mupirocin 2% Nasal 1 Application(s) Both Nostrils every 12 hours  nystatin Powder 1 Application(s) Topical two times a day  polyethylene glycol 3350 17 Gram(s) Oral daily  predniSONE   Tablet 40 milliGRAM(s) Oral daily  senna 2 Tablet(s) Oral at bedtime  sevelamer carbonate 800 milliGRAM(s) Oral every 8 hours  sodium chloride 0.65% Nasal 1 Spray(s) Both Nostrils daily    PRN Inpatient Medications  acetaminophen     Tablet .. 650 milliGRAM(s) Oral every 6 hours PRN  dextrose Oral Gel 15 Gram(s) Oral once PRN      REVIEW OF SYSTEMS  --------------------------------------------------------------------------------  As above    VITALS/PHYSICAL EXAM  --------------------------------------------------------------------------------  T(C): 36.8 (12-01-23 @ 10:56), Max: 36.8 (12-01-23 @ 05:14)  HR: 91 (12-01-23 @ 10:56) (88 - 91)  BP: 145/56 (12-01-23 @ 10:56) (128/54 - 146/62)  RR: 18 (12-01-23 @ 10:56) (18 - 18)  SpO2: 96% (12-01-23 @ 10:56) (96% - 97%)  Wt(kg): --  Height (cm): 160 (12-01-23 @ 09:12)  Weight (kg): 82.9 (12-01-23 @ 09:12)  BMI (kg/m2): 32.4 (12-01-23 @ 09:12)  BSA (m2): 1.86 (12-01-23 @ 09:12)      11-30-23 @ 07:01  -  12-01-23 @ 07:00  --------------------------------------------------------  IN: 940 mL / OUT: 0 mL / NET: 940 mL      Physical Exam:  General: no acute distress  Neuro: no focal deficits  HEENT: NC/AT, anicteric, no JVD  Pulmonary: breath sounds diminished, on 4L O2  Cardiovascular/Chest: +S1S2,  GI/Abdomen: soft, NT/ND, +bowel sounds  Extremities: No edema  Skin: Warm and dry  Neuro : Tremor + ( Essential ?)      LABS/STUDIES  --------------------------------------------------------------------------------              6.9    9.06  >-----------<  236      [12-01-23 @ 06:10]              22.3     136  |  103  |  102  ----------------------------<  194      [12-01-23 @ 06:10]  4.8   |  20  |  5.55        Ca     8.4     [12-01-23 @ 06:10]      Mg     2.4     [12-01-23 @ 06:10]      Phos  4.3     [12-01-23 @ 06:10]            Creatinine Trend:  SCr 5.55 [12-01 @ 06:10]  SCr 5.75 [11-30 @ 06:39]  SCr 5.87 [11-29 @ 12:34]  SCr 6.17 [11-29 @ 05:15]  SCr 6.45 [11-28 @ 20:13]    Urinalysis - [12-01-23 @ 06:10]      Color  / Appearance  / SG  / pH       Gluc 194 / Ketone   / Bili  / Urobili        Blood  / Protein  / Leuk Est  / Nitrite       RBC  / WBC  / Hyaline  / Gran  / Sq Epi  / Non Sq Epi  / Bacteria     Urine Creatinine 141      [11-28-23 @ 18:09]  Urine Protein 313      [11-28-23 @ 18:09]  Urine Sodium 24      [11-28-23 @ 18:08]  Urine Urea Nitrogen 596      [11-28-23 @ 18:08]  Urine Potassium 34      [11-28-23 @ 18:08]  Urine Osmolality 363      [11-28-23 @ 18:10]    Iron 68, TIBC 261, %sat 26      [11-25-23 @ 06:14]  Ferritin 81      [11-25-23 @ 06:14]  PTH -- (Ca 8.6)      [11-25-23 @ 06:14]   73  Vitamin D (25OH) 22.5      [11-25-23 @ 06:14]

## 2023-12-02 DIAGNOSIS — D72.829 ELEVATED WHITE BLOOD CELL COUNT, UNSPECIFIED: ICD-10-CM

## 2023-12-02 LAB
ANION GAP SERPL CALC-SCNC: 14 MMOL/L — SIGNIFICANT CHANGE UP (ref 5–17)
ANION GAP SERPL CALC-SCNC: 14 MMOL/L — SIGNIFICANT CHANGE UP (ref 5–17)
BUN SERPL-MCNC: 100 MG/DL — HIGH (ref 7–23)
BUN SERPL-MCNC: 100 MG/DL — HIGH (ref 7–23)
CALCIUM SERPL-MCNC: 8.4 MG/DL — SIGNIFICANT CHANGE UP (ref 8.4–10.5)
CALCIUM SERPL-MCNC: 8.4 MG/DL — SIGNIFICANT CHANGE UP (ref 8.4–10.5)
CHLORIDE SERPL-SCNC: 104 MMOL/L — SIGNIFICANT CHANGE UP (ref 96–108)
CHLORIDE SERPL-SCNC: 104 MMOL/L — SIGNIFICANT CHANGE UP (ref 96–108)
CO2 SERPL-SCNC: 19 MMOL/L — LOW (ref 22–31)
CO2 SERPL-SCNC: 19 MMOL/L — LOW (ref 22–31)
CREAT SERPL-MCNC: 4.91 MG/DL — HIGH (ref 0.5–1.3)
CREAT SERPL-MCNC: 4.91 MG/DL — HIGH (ref 0.5–1.3)
EGFR: 8 ML/MIN/1.73M2 — LOW
EGFR: 8 ML/MIN/1.73M2 — LOW
GLUCOSE BLDC GLUCOMTR-MCNC: 156 MG/DL — HIGH (ref 70–99)
GLUCOSE BLDC GLUCOMTR-MCNC: 156 MG/DL — HIGH (ref 70–99)
GLUCOSE BLDC GLUCOMTR-MCNC: 268 MG/DL — HIGH (ref 70–99)
GLUCOSE BLDC GLUCOMTR-MCNC: 268 MG/DL — HIGH (ref 70–99)
GLUCOSE BLDC GLUCOMTR-MCNC: 272 MG/DL — HIGH (ref 70–99)
GLUCOSE BLDC GLUCOMTR-MCNC: 272 MG/DL — HIGH (ref 70–99)
GLUCOSE BLDC GLUCOMTR-MCNC: 314 MG/DL — HIGH (ref 70–99)
GLUCOSE BLDC GLUCOMTR-MCNC: 314 MG/DL — HIGH (ref 70–99)
GLUCOSE SERPL-MCNC: 134 MG/DL — HIGH (ref 70–99)
GLUCOSE SERPL-MCNC: 134 MG/DL — HIGH (ref 70–99)
HCT VFR BLD CALC: 25.4 % — LOW (ref 34.5–45)
HCT VFR BLD CALC: 25.4 % — LOW (ref 34.5–45)
HGB BLD-MCNC: 8.2 G/DL — LOW (ref 11.5–15.5)
HGB BLD-MCNC: 8.2 G/DL — LOW (ref 11.5–15.5)
MAGNESIUM SERPL-MCNC: 2.3 MG/DL — SIGNIFICANT CHANGE UP (ref 1.6–2.6)
MAGNESIUM SERPL-MCNC: 2.3 MG/DL — SIGNIFICANT CHANGE UP (ref 1.6–2.6)
MCHC RBC-ENTMCNC: 29.5 PG — SIGNIFICANT CHANGE UP (ref 27–34)
MCHC RBC-ENTMCNC: 29.5 PG — SIGNIFICANT CHANGE UP (ref 27–34)
MCHC RBC-ENTMCNC: 32.3 GM/DL — SIGNIFICANT CHANGE UP (ref 32–36)
MCHC RBC-ENTMCNC: 32.3 GM/DL — SIGNIFICANT CHANGE UP (ref 32–36)
MCV RBC AUTO: 91.4 FL — SIGNIFICANT CHANGE UP (ref 80–100)
MCV RBC AUTO: 91.4 FL — SIGNIFICANT CHANGE UP (ref 80–100)
NRBC # BLD: 0 /100 WBCS — SIGNIFICANT CHANGE UP (ref 0–0)
NRBC # BLD: 0 /100 WBCS — SIGNIFICANT CHANGE UP (ref 0–0)
PHOSPHATE SERPL-MCNC: 3.5 MG/DL — SIGNIFICANT CHANGE UP (ref 2.5–4.5)
PHOSPHATE SERPL-MCNC: 3.5 MG/DL — SIGNIFICANT CHANGE UP (ref 2.5–4.5)
PLATELET # BLD AUTO: 268 K/UL — SIGNIFICANT CHANGE UP (ref 150–400)
PLATELET # BLD AUTO: 268 K/UL — SIGNIFICANT CHANGE UP (ref 150–400)
POTASSIUM SERPL-MCNC: 5.1 MMOL/L — SIGNIFICANT CHANGE UP (ref 3.5–5.3)
POTASSIUM SERPL-MCNC: 5.1 MMOL/L — SIGNIFICANT CHANGE UP (ref 3.5–5.3)
POTASSIUM SERPL-SCNC: 5.1 MMOL/L — SIGNIFICANT CHANGE UP (ref 3.5–5.3)
POTASSIUM SERPL-SCNC: 5.1 MMOL/L — SIGNIFICANT CHANGE UP (ref 3.5–5.3)
RBC # BLD: 2.78 M/UL — LOW (ref 3.8–5.2)
RBC # BLD: 2.78 M/UL — LOW (ref 3.8–5.2)
RBC # FLD: 15 % — HIGH (ref 10.3–14.5)
RBC # FLD: 15 % — HIGH (ref 10.3–14.5)
SODIUM SERPL-SCNC: 137 MMOL/L — SIGNIFICANT CHANGE UP (ref 135–145)
SODIUM SERPL-SCNC: 137 MMOL/L — SIGNIFICANT CHANGE UP (ref 135–145)
WBC # BLD: 10.92 K/UL — HIGH (ref 3.8–10.5)
WBC # BLD: 10.92 K/UL — HIGH (ref 3.8–10.5)
WBC # FLD AUTO: 10.92 K/UL — HIGH (ref 3.8–10.5)
WBC # FLD AUTO: 10.92 K/UL — HIGH (ref 3.8–10.5)

## 2023-12-02 PROCEDURE — 99232 SBSQ HOSP IP/OBS MODERATE 35: CPT | Mod: GC

## 2023-12-02 RX ORDER — INSULIN GLARGINE 100 [IU]/ML
7 INJECTION, SOLUTION SUBCUTANEOUS AT BEDTIME
Refills: 0 | Status: DISCONTINUED | OUTPATIENT
Start: 2023-12-02 | End: 2023-12-03

## 2023-12-02 RX ORDER — FUROSEMIDE 40 MG
60 TABLET ORAL ONCE
Refills: 0 | Status: COMPLETED | OUTPATIENT
Start: 2023-12-02 | End: 2023-12-02

## 2023-12-02 RX ORDER — INSULIN LISPRO 100/ML
3 VIAL (ML) SUBCUTANEOUS
Refills: 0 | Status: DISCONTINUED | OUTPATIENT
Start: 2023-12-02 | End: 2023-12-03

## 2023-12-02 RX ORDER — INSULIN LISPRO 100/ML
5 VIAL (ML) SUBCUTANEOUS
Refills: 0 | Status: DISCONTINUED | OUTPATIENT
Start: 2023-12-02 | End: 2023-12-02

## 2023-12-02 RX ORDER — INSULIN GLARGINE 100 [IU]/ML
10 INJECTION, SOLUTION SUBCUTANEOUS AT BEDTIME
Refills: 0 | Status: DISCONTINUED | OUTPATIENT
Start: 2023-12-02 | End: 2023-12-02

## 2023-12-02 RX ADMIN — BUDESONIDE AND FORMOTEROL FUMARATE DIHYDRATE 2 PUFF(S): 160; 4.5 AEROSOL RESPIRATORY (INHALATION) at 21:37

## 2023-12-02 RX ADMIN — Medication 3 MILLILITER(S): at 23:08

## 2023-12-02 RX ADMIN — Medication 1 SPRAY(S): at 17:03

## 2023-12-02 RX ADMIN — BUDESONIDE AND FORMOTEROL FUMARATE DIHYDRATE 2 PUFF(S): 160; 4.5 AEROSOL RESPIRATORY (INHALATION) at 08:37

## 2023-12-02 RX ADMIN — ATORVASTATIN CALCIUM 80 MILLIGRAM(S): 80 TABLET, FILM COATED ORAL at 21:41

## 2023-12-02 RX ADMIN — MONTELUKAST 10 MILLIGRAM(S): 4 TABLET, CHEWABLE ORAL at 11:43

## 2023-12-02 RX ADMIN — Medication 1 SPRAY(S): at 05:33

## 2023-12-02 RX ADMIN — HEPARIN SODIUM 5000 UNIT(S): 5000 INJECTION INTRAVENOUS; SUBCUTANEOUS at 13:52

## 2023-12-02 RX ADMIN — Medication 3 UNIT(S): at 17:02

## 2023-12-02 RX ADMIN — SEVELAMER CARBONATE 800 MILLIGRAM(S): 2400 POWDER, FOR SUSPENSION ORAL at 13:53

## 2023-12-02 RX ADMIN — Medication 25 MILLIGRAM(S): at 05:32

## 2023-12-02 RX ADMIN — Medication 3 MILLILITER(S): at 05:32

## 2023-12-02 RX ADMIN — Medication 325 MILLIGRAM(S): at 11:44

## 2023-12-02 RX ADMIN — SEVELAMER CARBONATE 800 MILLIGRAM(S): 2400 POWDER, FOR SUSPENSION ORAL at 21:41

## 2023-12-02 RX ADMIN — MUPIROCIN 1 APPLICATION(S): 20 OINTMENT TOPICAL at 05:37

## 2023-12-02 RX ADMIN — Medication 3 MILLILITER(S): at 11:44

## 2023-12-02 RX ADMIN — Medication 3 MILLILITER(S): at 17:02

## 2023-12-02 RX ADMIN — MUPIROCIN 1 APPLICATION(S): 20 OINTMENT TOPICAL at 17:03

## 2023-12-02 RX ADMIN — Medication 3 UNIT(S): at 11:48

## 2023-12-02 RX ADMIN — INSULIN GLARGINE 7 UNIT(S): 100 INJECTION, SOLUTION SUBCUTANEOUS at 21:43

## 2023-12-02 RX ADMIN — Medication 40 MILLIGRAM(S): at 05:33

## 2023-12-02 RX ADMIN — Medication 1 SPRAY(S): at 11:44

## 2023-12-02 RX ADMIN — SEVELAMER CARBONATE 800 MILLIGRAM(S): 2400 POWDER, FOR SUSPENSION ORAL at 05:33

## 2023-12-02 RX ADMIN — Medication 3: at 17:02

## 2023-12-02 RX ADMIN — NYSTATIN CREAM 1 APPLICATION(S): 100000 CREAM TOPICAL at 17:03

## 2023-12-02 RX ADMIN — AMLODIPINE BESYLATE 10 MILLIGRAM(S): 2.5 TABLET ORAL at 05:38

## 2023-12-02 RX ADMIN — HEPARIN SODIUM 5000 UNIT(S): 5000 INJECTION INTRAVENOUS; SUBCUTANEOUS at 21:47

## 2023-12-02 RX ADMIN — CHLORHEXIDINE GLUCONATE 1 APPLICATION(S): 213 SOLUTION TOPICAL at 11:44

## 2023-12-02 RX ADMIN — HEPARIN SODIUM 5000 UNIT(S): 5000 INJECTION INTRAVENOUS; SUBCUTANEOUS at 05:32

## 2023-12-02 RX ADMIN — Medication 25 MILLIGRAM(S): at 21:41

## 2023-12-02 RX ADMIN — Medication 3 MILLILITER(S): at 00:14

## 2023-12-02 RX ADMIN — Medication 25 MILLIGRAM(S): at 13:52

## 2023-12-02 RX ADMIN — Medication 1: at 21:46

## 2023-12-02 RX ADMIN — Medication 1: at 08:35

## 2023-12-02 RX ADMIN — Medication 60 MILLIGRAM(S): at 10:52

## 2023-12-02 RX ADMIN — Medication 4: at 11:48

## 2023-12-02 RX ADMIN — NYSTATIN CREAM 1 APPLICATION(S): 100000 CREAM TOPICAL at 05:37

## 2023-12-02 NOTE — PROGRESS NOTE ADULT - PROBLEM SELECTOR PLAN 5
- pt with rising leukocytosis  - likely steroid induced and no signs of infection  - will continue to monitor

## 2023-12-02 NOTE — PROGRESS NOTE ADULT - PROBLEM SELECTOR PLAN 4
Patient with several year history of chronic hypoxemic respiratory failure  - requiring home O2, stable on 2L at baseline  - has not had escalating O2 requirements recently, despite feeling subjectively more dyspneic  - unclear etiology, though patient has had PFTs at her pulmologist's office last year    Plan:  > follow up TTE--> showing EF 71%, bi-atrial enlargement, LV diastolic dysfunction, and RV enlargement  > obtain records from pt's pulmonologists office: Dr. Paulino Gayle ([facility], [phone])  > continue with home montelukast and add on duonebs and symbicort   > wean O2 as tolerated  > add on prednisone for possible COPD exacerbation   > if persistently hypoxemic and despite diuresis, consider alternative etiologies and potentially high resolution CT chest  - f/u repeat CXR and BNP (stable CXR and elevated BNP)  - appreciate pulm recs: started on flonase and saline spray

## 2023-12-02 NOTE — PROGRESS NOTE ADULT - SUBJECTIVE AND OBJECTIVE BOX
PROGRESS NOTE:   Authored by Dr. Luis Perla MD (PGY-1).     Patient is a 83y old  Female who presents with a chief complaint of LE swelling, dyspnea (01 Dec 2023 15:46)      SUBJECTIVE / OVERNIGHT EVENTS:  No acute events overnight. She has no acute complaints this morning.     MEDICATIONS  (STANDING):  albuterol    90 MICROgram(s) HFA Inhaler 2 Puff(s) Inhalation every 6 hours  albuterol/ipratropium for Nebulization 3 milliLiter(s) Nebulizer every 6 hours  amLODIPine   Tablet 10 milliGRAM(s) Oral daily  atorvastatin 80 milliGRAM(s) Oral at bedtime  budesonide 160 MICROgram(s)/formoterol 4.5 MICROgram(s) Inhaler 2 Puff(s) Inhalation two times a day  calcitriol   Capsule 0.25 MICROGram(s) Oral daily  chlorhexidine 2% Cloths 1 Application(s) Topical daily  dextrose 5%. 1000 milliLiter(s) (100 mL/Hr) IV Continuous <Continuous>  dextrose 5%. 1000 milliLiter(s) (50 mL/Hr) IV Continuous <Continuous>  dextrose 50% Injectable 12.5 Gram(s) IV Push once  dextrose 50% Injectable 25 Gram(s) IV Push once  dextrose 50% Injectable 25 Gram(s) IV Push once  ferrous    sulfate 325 milliGRAM(s) Oral daily  fluticasone propionate 50 MICROgram(s)/spray Nasal Spray 1 Spray(s) Both Nostrils two times a day  glucagon  Injectable 1 milliGRAM(s) IntraMuscular once  heparin   Injectable 5000 Unit(s) SubCutaneous every 8 hours  hydrALAZINE 25 milliGRAM(s) Oral three times a day  influenza  Vaccine (HIGH DOSE) 0.7 milliLiter(s) IntraMuscular once  insulin glargine Injectable (LANTUS) 7 Unit(s) SubCutaneous at bedtime  insulin lispro (ADMELOG) corrective regimen sliding scale   SubCutaneous at bedtime  insulin lispro (ADMELOG) corrective regimen sliding scale   SubCutaneous three times a day before meals  insulin lispro Injectable (ADMELOG) 3 Unit(s) SubCutaneous three times a day before meals  montelukast 10 milliGRAM(s) Oral daily  mupirocin 2% Nasal 1 Application(s) Both Nostrils every 12 hours  nystatin Powder 1 Application(s) Topical two times a day  polyethylene glycol 3350 17 Gram(s) Oral daily  senna 2 Tablet(s) Oral at bedtime  sevelamer carbonate 800 milliGRAM(s) Oral every 8 hours  sodium chloride 0.65% Nasal 1 Spray(s) Both Nostrils daily    MEDICATIONS  (PRN):  acetaminophen     Tablet .. 650 milliGRAM(s) Oral every 6 hours PRN Temp greater or equal to 38C (100.4F), Mild Pain (1 - 3)  dextrose Oral Gel 15 Gram(s) Oral once PRN Blood Glucose LESS THAN 70 milliGRAM(s)/deciliter      CAPILLARY BLOOD GLUCOSE      POCT Blood Glucose.: 156 mg/dL (02 Dec 2023 07:44)  POCT Blood Glucose.: 244 mg/dL (01 Dec 2023 21:43)  POCT Blood Glucose.: 305 mg/dL (01 Dec 2023 16:37)  POCT Blood Glucose.: 293 mg/dL (01 Dec 2023 11:58)  POCT Blood Glucose.: 200 mg/dL (01 Dec 2023 08:54)    I&O's Summary    01 Dec 2023 07:01  -  02 Dec 2023 07:00  --------------------------------------------------------  IN: 480 mL / OUT: 0 mL / NET: 480 mL        PHYSICAL EXAM:  Vital Signs Last 24 Hrs  T(C): 36.6 (02 Dec 2023 04:57), Max: 36.8 (01 Dec 2023 10:56)  T(F): 97.9 (02 Dec 2023 04:57), Max: 98.3 (01 Dec 2023 10:56)  HR: 82 (02 Dec 2023 04:57) (75 - 97)  BP: 143/54 (02 Dec 2023 04:57) (133/55 - 145/56)  BP(mean): --  RR: 18 (02 Dec 2023 04:57) (18 - 18)  SpO2: 96% (02 Dec 2023 04:57) (93% - 98%)    Parameters below as of 02 Dec 2023 04:57  Patient On (Oxygen Delivery Method): nasal cannula        CONSTITUTIONAL: NAD, well-developed  HEET: MMM, EOMI, PERRLA  NECK: supple  RESPIRATORY: crackles bilaterally  CARDIOVASCULAR: Regular rate and rhythm, normal S1 and S2, no murmur/rub/gallop; No lower extremity edema; Peripheral pulses are 2+ bilaterally  ABDOMEN: Nontender to palpation, normoactive bowel sounds, no rebound/guarding; No hepatosplenomegaly  MUSCULOSKELETAL: no clubbing or cyanosis of digits; no joint swelling or tenderness to palpation  PSYCH: A+O to person, place, and time  SKIN: No rash    LABS:                        8.2    10.92 )-----------( 268      ( 02 Dec 2023 07:40 )             25.4     12-01    136  |  103  |  102<H>  ----------------------------<  194<H>  4.8   |  20<L>  |  5.55<H>    Ca    8.4      01 Dec 2023 06:10  Phos  4.3     12-01  Mg     2.4     12-01            Urinalysis Basic - ( 01 Dec 2023 06:10 )    Color: x / Appearance: x / SG: x / pH: x  Gluc: 194 mg/dL / Ketone: x  / Bili: x / Urobili: x   Blood: x / Protein: x / Nitrite: x   Leuk Esterase: x / RBC: x / WBC x   Sq Epi: x / Non Sq Epi: x / Bacteria: x          Tele Reviewed:    RADIOLOGY & ADDITIONAL TESTS:  Results Reviewed:   Imaging Personally Reviewed:  Electrocardiogram Personally Reviewed:

## 2023-12-02 NOTE — PROGRESS NOTE ADULT - ATTENDING COMMENTS
83F with chronic hypoxemic respiratory failure, CKD, T2DM, CAD, HTN, and HLD who presents for worsening dyspnea and lower extremity edema, admitted for suspected acute decompensated heart failure with CAL on likely CKD. Unclear baseline Cr, but significantly elevated currently with nephrotic range proteinuria. TTE showing diastolic HF, s/p diuresis with worsening renal failure, ongoing SOB c/f possible COPD exacerbation.     #acute on chronic diastolic HF   #CAL on CKD, #nephrotic range proteinuria   #Acute on chronic respiratory failure  #HTN, HLD, DM2    - LEs more edematous today compared to prior, has  been off diuretics since 11/27 2/2 CAL on CKD - will trial lasix 60 IV once today, monitor output   - Cr slowly improving, rising BUN likely 2/2 steroids, hyperK appears to have resolved - c/t trend BMP closely, renal following for possible HD   - anemia improved s/p 1PRBC, likely 2/2 ESRD - c/t trend   - strict I/Os, daily weights   - continue to hold home spironolactone, telmisartan, and farxiga given CAL  - c/w hydralazine 25mg TID for afterload reduction - uptitrate as needed   - TTE w/moderate (grade 2) left ventricular diastolic dysfunction, repeat TTE unchanged   - pt with chronic hypoxemic RF of unclear etiology, ?COPD given long standing smoking history - s/p prednisone for now (11/25-12/2), pulm following, possible taper pending clinical status .

## 2023-12-02 NOTE — PROGRESS NOTE ADULT - SUBJECTIVE AND OBJECTIVE BOX
DATE OF SERVICE: 12-02-23 @ 12:57    Patient is a 83y old  Female who presents with a chief complaint of LE swelling, dyspnea (02 Dec 2023 08:30)      INTERVAL HISTORY:     REVIEW OF SYSTEMS:  CONSTITUTIONAL: No weakness  EYES/ENT: No visual changes;  No throat pain   NECK: No pain or stiffness  RESPIRATORY: No cough, wheezing; No shortness of breath  CARDIOVASCULAR: No chest pain or palpitations  GASTROINTESTINAL: No abdominal  pain. No nausea, vomiting, or hematemesis  GENITOURINARY: No dysuria, frequency or hematuria  NEUROLOGICAL: No stroke like symptoms  SKIN: No rashes    TELEMETRY Personally reviewed: SR  BPM   	  MEDICATIONS:  amLODIPine   Tablet 10 milliGRAM(s) Oral daily  hydrALAZINE 25 milliGRAM(s) Oral three times a day        PHYSICAL EXAM:  T(C): 36.6 (12-02-23 @ 10:49), Max: 36.7 (12-01-23 @ 17:05)  HR: 91 (12-02-23 @ 10:49) (75 - 97)  BP: 151/65 (12-02-23 @ 10:49) (133/55 - 151/65)  RR: 20 (12-02-23 @ 10:49) (18 - 20)  SpO2: 95% (12-02-23 @ 10:49) (93% - 98%)  Wt(kg): --  I&O's Summary    01 Dec 2023 07:01  -  02 Dec 2023 07:00  --------------------------------------------------------  IN: 480 mL / OUT: 0 mL / NET: 480 mL    02 Dec 2023 07:01  -  02 Dec 2023 12:57  --------------------------------------------------------  IN: 342 mL / OUT: 300 mL / NET: 42 mL      Height (cm): 160 (12-02 @ 08:30)  Weight (kg): 82.9 (12-02 @ 08:30)  BMI (kg/m2): 32.4 (12-02 @ 08:30)  BSA (m2): 1.86 (12-02 @ 08:30)    Appearance: In no distress	  HEENT:    PERRL, EOMI	  Cardiovascular:  S1 S2, No JVD  Respiratory: Lungs clear to auscultation	  Gastrointestinal:  Soft, Non-tender, + BS	  Vascularature:  No edema of LE  Psychiatric: Appropriate affect   Neuro: no acute focal deficits                               8.2    10.92 )-----------( 268      ( 02 Dec 2023 07:40 )             25.4     12-02    137  |  104  |  100<H>  ----------------------------<  134<H>  5.1   |  19<L>  |  4.91<H>    Ca    8.4      02 Dec 2023 07:39  Phos  3.5     12-02  Mg     2.3     12-02          Labs personally reviewed      ASSESSMENT/PLAN: 	    83 year old female with HTN, HLD, CAD, T2DM, CKD, remote hx of breast CA s/p mastectomy, and chronic hypoxemic respiratory failure of unknown etiology on home O2 who presents for lower extremity swelling and shortness of breath.      1. Acute diastolic heart failure  - Pt with LE edema and pulmonary congestion  - s/p IV diuresis now DC'd d/t increasing Cr which is now improving.  - Echo shows normal LV systolic function with Bi-atrial enlargement, G2DD and LVH  - EKG shows sinus rhythm (not atrial flutter)  - Plan for poss HD as per Renal  - Pulm consult appreciated for continued hypoxia despite diuresis: recommend swallow eval, duonebs, symbicort and prednisone  - 12/1 given Lasix 20mg IVP x1.     2. ESRD  - Renal on board  - Plan for poss HD     3. HLD  - Statin therapy    4. HTN  - BP acceptable given patient with ESRD        MEG Carrillo DO [facility]  Cardiovascular Medicine  [address], [address]  Available through call or text on [employer]  Office: [phone]

## 2023-12-03 LAB
ANION GAP SERPL CALC-SCNC: 13 MMOL/L — SIGNIFICANT CHANGE UP (ref 5–17)
ANION GAP SERPL CALC-SCNC: 13 MMOL/L — SIGNIFICANT CHANGE UP (ref 5–17)
BUN SERPL-MCNC: 103 MG/DL — HIGH (ref 7–23)
BUN SERPL-MCNC: 103 MG/DL — HIGH (ref 7–23)
CALCIUM SERPL-MCNC: 8.5 MG/DL — SIGNIFICANT CHANGE UP (ref 8.4–10.5)
CALCIUM SERPL-MCNC: 8.5 MG/DL — SIGNIFICANT CHANGE UP (ref 8.4–10.5)
CHLORIDE SERPL-SCNC: 103 MMOL/L — SIGNIFICANT CHANGE UP (ref 96–108)
CHLORIDE SERPL-SCNC: 103 MMOL/L — SIGNIFICANT CHANGE UP (ref 96–108)
CO2 SERPL-SCNC: 21 MMOL/L — LOW (ref 22–31)
CO2 SERPL-SCNC: 21 MMOL/L — LOW (ref 22–31)
CREAT SERPL-MCNC: 4.46 MG/DL — HIGH (ref 0.5–1.3)
CREAT SERPL-MCNC: 4.46 MG/DL — HIGH (ref 0.5–1.3)
EGFR: 9 ML/MIN/1.73M2 — LOW
EGFR: 9 ML/MIN/1.73M2 — LOW
GLUCOSE BLDC GLUCOMTR-MCNC: 130 MG/DL — HIGH (ref 70–99)
GLUCOSE BLDC GLUCOMTR-MCNC: 130 MG/DL — HIGH (ref 70–99)
GLUCOSE BLDC GLUCOMTR-MCNC: 155 MG/DL — HIGH (ref 70–99)
GLUCOSE BLDC GLUCOMTR-MCNC: 155 MG/DL — HIGH (ref 70–99)
GLUCOSE BLDC GLUCOMTR-MCNC: 161 MG/DL — HIGH (ref 70–99)
GLUCOSE BLDC GLUCOMTR-MCNC: 161 MG/DL — HIGH (ref 70–99)
GLUCOSE BLDC GLUCOMTR-MCNC: 171 MG/DL — HIGH (ref 70–99)
GLUCOSE BLDC GLUCOMTR-MCNC: 171 MG/DL — HIGH (ref 70–99)
GLUCOSE SERPL-MCNC: 112 MG/DL — HIGH (ref 70–99)
GLUCOSE SERPL-MCNC: 112 MG/DL — HIGH (ref 70–99)
HCT VFR BLD CALC: 26.9 % — LOW (ref 34.5–45)
HCT VFR BLD CALC: 26.9 % — LOW (ref 34.5–45)
HGB BLD-MCNC: 8.7 G/DL — LOW (ref 11.5–15.5)
HGB BLD-MCNC: 8.7 G/DL — LOW (ref 11.5–15.5)
MAGNESIUM SERPL-MCNC: 2.2 MG/DL — SIGNIFICANT CHANGE UP (ref 1.6–2.6)
MAGNESIUM SERPL-MCNC: 2.2 MG/DL — SIGNIFICANT CHANGE UP (ref 1.6–2.6)
MCHC RBC-ENTMCNC: 29.3 PG — SIGNIFICANT CHANGE UP (ref 27–34)
MCHC RBC-ENTMCNC: 29.3 PG — SIGNIFICANT CHANGE UP (ref 27–34)
MCHC RBC-ENTMCNC: 32.3 GM/DL — SIGNIFICANT CHANGE UP (ref 32–36)
MCHC RBC-ENTMCNC: 32.3 GM/DL — SIGNIFICANT CHANGE UP (ref 32–36)
MCV RBC AUTO: 90.6 FL — SIGNIFICANT CHANGE UP (ref 80–100)
MCV RBC AUTO: 90.6 FL — SIGNIFICANT CHANGE UP (ref 80–100)
NRBC # BLD: 0 /100 WBCS — SIGNIFICANT CHANGE UP (ref 0–0)
NRBC # BLD: 0 /100 WBCS — SIGNIFICANT CHANGE UP (ref 0–0)
PHOSPHATE SERPL-MCNC: 3.3 MG/DL — SIGNIFICANT CHANGE UP (ref 2.5–4.5)
PHOSPHATE SERPL-MCNC: 3.3 MG/DL — SIGNIFICANT CHANGE UP (ref 2.5–4.5)
PLATELET # BLD AUTO: 282 K/UL — SIGNIFICANT CHANGE UP (ref 150–400)
PLATELET # BLD AUTO: 282 K/UL — SIGNIFICANT CHANGE UP (ref 150–400)
POTASSIUM SERPL-MCNC: 5 MMOL/L — SIGNIFICANT CHANGE UP (ref 3.5–5.3)
POTASSIUM SERPL-MCNC: 5 MMOL/L — SIGNIFICANT CHANGE UP (ref 3.5–5.3)
POTASSIUM SERPL-SCNC: 5 MMOL/L — SIGNIFICANT CHANGE UP (ref 3.5–5.3)
POTASSIUM SERPL-SCNC: 5 MMOL/L — SIGNIFICANT CHANGE UP (ref 3.5–5.3)
RBC # BLD: 2.97 M/UL — LOW (ref 3.8–5.2)
RBC # BLD: 2.97 M/UL — LOW (ref 3.8–5.2)
RBC # FLD: 14.6 % — HIGH (ref 10.3–14.5)
RBC # FLD: 14.6 % — HIGH (ref 10.3–14.5)
SODIUM SERPL-SCNC: 137 MMOL/L — SIGNIFICANT CHANGE UP (ref 135–145)
SODIUM SERPL-SCNC: 137 MMOL/L — SIGNIFICANT CHANGE UP (ref 135–145)
WBC # BLD: 11.98 K/UL — HIGH (ref 3.8–10.5)
WBC # BLD: 11.98 K/UL — HIGH (ref 3.8–10.5)
WBC # FLD AUTO: 11.98 K/UL — HIGH (ref 3.8–10.5)
WBC # FLD AUTO: 11.98 K/UL — HIGH (ref 3.8–10.5)

## 2023-12-03 PROCEDURE — 99232 SBSQ HOSP IP/OBS MODERATE 35: CPT | Mod: GC

## 2023-12-03 RX ORDER — BUMETANIDE 0.25 MG/ML
2 INJECTION INTRAMUSCULAR; INTRAVENOUS ONCE
Refills: 0 | Status: COMPLETED | OUTPATIENT
Start: 2023-12-03 | End: 2023-12-03

## 2023-12-03 RX ADMIN — NYSTATIN CREAM 1 APPLICATION(S): 100000 CREAM TOPICAL at 17:01

## 2023-12-03 RX ADMIN — Medication 3 MILLILITER(S): at 17:01

## 2023-12-03 RX ADMIN — MUPIROCIN 1 APPLICATION(S): 20 OINTMENT TOPICAL at 05:22

## 2023-12-03 RX ADMIN — AMLODIPINE BESYLATE 10 MILLIGRAM(S): 2.5 TABLET ORAL at 05:20

## 2023-12-03 RX ADMIN — BUDESONIDE AND FORMOTEROL FUMARATE DIHYDRATE 2 PUFF(S): 160; 4.5 AEROSOL RESPIRATORY (INHALATION) at 09:54

## 2023-12-03 RX ADMIN — Medication 25 MILLIGRAM(S): at 13:41

## 2023-12-03 RX ADMIN — Medication 3 MILLILITER(S): at 11:26

## 2023-12-03 RX ADMIN — Medication 25 MILLIGRAM(S): at 22:17

## 2023-12-03 RX ADMIN — NYSTATIN CREAM 1 APPLICATION(S): 100000 CREAM TOPICAL at 05:29

## 2023-12-03 RX ADMIN — MONTELUKAST 10 MILLIGRAM(S): 4 TABLET, CHEWABLE ORAL at 11:27

## 2023-12-03 RX ADMIN — BUDESONIDE AND FORMOTEROL FUMARATE DIHYDRATE 2 PUFF(S): 160; 4.5 AEROSOL RESPIRATORY (INHALATION) at 22:17

## 2023-12-03 RX ADMIN — SEVELAMER CARBONATE 800 MILLIGRAM(S): 2400 POWDER, FOR SUSPENSION ORAL at 22:17

## 2023-12-03 RX ADMIN — Medication 3 MILLILITER(S): at 05:23

## 2023-12-03 RX ADMIN — Medication 1: at 17:00

## 2023-12-03 RX ADMIN — BUMETANIDE 2 MILLIGRAM(S): 0.25 INJECTION INTRAMUSCULAR; INTRAVENOUS at 11:32

## 2023-12-03 RX ADMIN — Medication 1 SPRAY(S): at 17:01

## 2023-12-03 RX ADMIN — HEPARIN SODIUM 5000 UNIT(S): 5000 INJECTION INTRAVENOUS; SUBCUTANEOUS at 22:20

## 2023-12-03 RX ADMIN — Medication 1 SPRAY(S): at 05:22

## 2023-12-03 RX ADMIN — SEVELAMER CARBONATE 800 MILLIGRAM(S): 2400 POWDER, FOR SUSPENSION ORAL at 13:39

## 2023-12-03 RX ADMIN — SENNA PLUS 2 TABLET(S): 8.6 TABLET ORAL at 22:18

## 2023-12-03 RX ADMIN — Medication 325 MILLIGRAM(S): at 11:28

## 2023-12-03 RX ADMIN — MUPIROCIN 1 APPLICATION(S): 20 OINTMENT TOPICAL at 17:01

## 2023-12-03 RX ADMIN — HEPARIN SODIUM 5000 UNIT(S): 5000 INJECTION INTRAVENOUS; SUBCUTANEOUS at 13:38

## 2023-12-03 RX ADMIN — Medication 25 MILLIGRAM(S): at 05:21

## 2023-12-03 RX ADMIN — CHLORHEXIDINE GLUCONATE 1 APPLICATION(S): 213 SOLUTION TOPICAL at 11:28

## 2023-12-03 RX ADMIN — ATORVASTATIN CALCIUM 80 MILLIGRAM(S): 80 TABLET, FILM COATED ORAL at 22:18

## 2023-12-03 RX ADMIN — SEVELAMER CARBONATE 800 MILLIGRAM(S): 2400 POWDER, FOR SUSPENSION ORAL at 05:20

## 2023-12-03 RX ADMIN — CALCITRIOL 0.25 MICROGRAM(S): 0.5 CAPSULE ORAL at 11:28

## 2023-12-03 RX ADMIN — HEPARIN SODIUM 5000 UNIT(S): 5000 INJECTION INTRAVENOUS; SUBCUTANEOUS at 05:23

## 2023-12-03 RX ADMIN — Medication 1: at 12:07

## 2023-12-03 RX ADMIN — Medication 1 SPRAY(S): at 11:27

## 2023-12-03 NOTE — PROGRESS NOTE ADULT - ATTENDING COMMENTS
Attempted to contact patient. No answer. No option to leave a voice message.    83F with chronic hypoxemic respiratory failure, CKD, T2DM, CAD, HTN, and HLD who presents for worsening dyspnea and lower extremity edema, admitted for suspected acute decompensated heart failure with CAL on likely CKD. Unclear baseline Cr, but significantly elevated currently with nephrotic range proteinuria. TTE showing diastolic HF, s/p diuresis with worsening renal failure, ongoing SOB c/f possible COPD exacerbation.     #acute on chronic diastolic HF   #CAL on CKD, #nephrotic range proteinuria   #Acute on chronic respiratory failure  #HTN, HLD, DM2    - LEs continue to looks more edematous compared to prior, had been off diuretics since 11/27 2/2 CAL on CKD - give another IV lasix 80 once today, monitor output.   - Cr slowly improving, rising BUN likely 2/2 steroids, hyperK appears to have resolved - c/t trend BMP closely, renal following for possible HD   - anemia improved s/p 1PRBC, likely 2/2 ESRD - c/t trend   - strict I/Os, daily weights   - continue to hold home spironolactone, telmisartan, and farxiga given CAL  - c/w hydralazine 25mg TID for afterload reduction - uptitrate as needed   - TTE w/moderate (grade 2) left ventricular diastolic dysfunction, repeat TTE unchanged   - pt with chronic hypoxemic RF of unclear etiology, ?COPD given long standing smoking history - s/p prednisone for now (11/25-12/2), pulm following, possible taper pending clinical status   - HSQ for VTE ppx 83F with chronic hypoxemic respiratory failure, CKD, T2DM, CAD, HTN, and HLD who presents for worsening dyspnea and lower extremity edema, admitted for suspected acute decompensated heart failure with CAL on likely CKD. Unclear baseline Cr, but significantly elevated currently with nephrotic range proteinuria. TTE showing diastolic HF, s/p diuresis with worsening renal failure, ongoing SOB c/f possible COPD exacerbation.     #acute on chronic diastolic HF   #CAL on CKD, #nephrotic range proteinuria   #Acute on chronic respiratory failure  #HTN, HLD, DM2    - LEs continue to looks more edematous compared to prior, had been off diuretics since 11/27 2/2 CAL on CKD - give another bumex 2IVP once today, monitor output.   - Cr slowly improving, rising BUN likely 2/2 steroids, hyperK appears to have resolved - c/t trend BMP closely, renal following for possible HD   - anemia improved s/p 1PRBC, likely 2/2 ESRD - c/t trend   - strict I/Os, daily weights   - continue to hold home spironolactone, telmisartan, and farxiga given CAL  - c/w hydralazine 25mg TID for afterload reduction - uptitrate as needed   - TTE w/moderate (grade 2) left ventricular diastolic dysfunction, repeat TTE unchanged   - pt with chronic hypoxemic RF of unclear etiology, ?COPD given long standing smoking history - s/p prednisone for now (11/25-12/2), pulm following, possible taper pending clinical status   - HSQ for VTE ppx

## 2023-12-03 NOTE — PROGRESS NOTE ADULT - SUBJECTIVE AND OBJECTIVE BOX
Subjective:    INTERVAL HPI/OVERNIGHT EVENTS:   No acute events overnight  Afebrile, hemodynamically stable   - patient feels at baseline SOB today  - she reports she is urinating less than usual  - eating comfortably,   - Patient Denies Fevers, Chills, Headache, CP, Palpitations, Abdominal Pain, Dysuria, N/V/D    Telemetry:    T(F): 97.9 (12-03-23 @ 05:09), Max: 98 (12-02-23 @ 20:40)  HR: 82 (12-03-23 @ 05:09) (77 - 91)  BP: 159/65 (12-03-23 @ 05:09) (146/58 - 159/65)  RR: 20 (12-03-23 @ 05:09) (20 - 20)  SpO2: 95% (12-03-23 @ 05:09) (95% - 96%)  I&O's Summary    02 Dec 2023 07:01  -  03 Dec 2023 07:00  --------------------------------------------------------  IN: 1012 mL / OUT: 300 mL / NET: 712 mL    03 Dec 2023 07:01  -  03 Dec 2023 10:14  --------------------------------------------------------  IN: 120 mL / OUT: 0 mL / NET: 120 mL      Weight (kg): 82.9 (12-02-23 @ 08:30)    Medications:  acetaminophen     Tablet .. 650 milliGRAM(s) Oral every 6 hours PRN  albuterol    90 MICROgram(s) HFA Inhaler 2 Puff(s) Inhalation every 6 hours  albuterol/ipratropium for Nebulization 3 milliLiter(s) Nebulizer every 6 hours  amLODIPine   Tablet 10 milliGRAM(s) Oral daily  atorvastatin 80 milliGRAM(s) Oral at bedtime  budesonide 160 MICROgram(s)/formoterol 4.5 MICROgram(s) Inhaler 2 Puff(s) Inhalation two times a day  calcitriol   Capsule 0.25 MICROGram(s) Oral daily  chlorhexidine 2% Cloths 1 Application(s) Topical daily  dextrose 5%. 1000 milliLiter(s) (50 mL/Hr) IV Continuous <Continuous>  dextrose 5%. 1000 milliLiter(s) (100 mL/Hr) IV Continuous <Continuous>  dextrose 50% Injectable 25 Gram(s) IV Push once  dextrose 50% Injectable 25 Gram(s) IV Push once  dextrose 50% Injectable 12.5 Gram(s) IV Push once  dextrose Oral Gel 15 Gram(s) Oral once PRN  ferrous    sulfate 325 milliGRAM(s) Oral daily  fluticasone propionate 50 MICROgram(s)/spray Nasal Spray 1 Spray(s) Both Nostrils two times a day  glucagon  Injectable 1 milliGRAM(s) IntraMuscular once  heparin   Injectable 5000 Unit(s) SubCutaneous every 8 hours  hydrALAZINE 25 milliGRAM(s) Oral three times a day  influenza  Vaccine (HIGH DOSE) 0.7 milliLiter(s) IntraMuscular once  insulin glargine Injectable (LANTUS) 7 Unit(s) SubCutaneous at bedtime  insulin lispro (ADMELOG) corrective regimen sliding scale   SubCutaneous at bedtime  insulin lispro (ADMELOG) corrective regimen sliding scale   SubCutaneous three times a day before meals  insulin lispro Injectable (ADMELOG) 3 Unit(s) SubCutaneous three times a day before meals  montelukast 10 milliGRAM(s) Oral daily  mupirocin 2% Nasal 1 Application(s) Both Nostrils every 12 hours  nystatin Powder 1 Application(s) Topical two times a day  polyethylene glycol 3350 17 Gram(s) Oral daily  senna 2 Tablet(s) Oral at bedtime  sevelamer carbonate 800 milliGRAM(s) Oral every 8 hours  sodium chloride 0.65% Nasal 1 Spray(s) Both Nostrils daily      PHYSICAL EXAM:  CONSTITUTIONAL: NAD, well-developed  HEET: MMM, EOMI, PERRLA  NECK: supple  RESPIRATORY: crackles bilaterally, poor respiratory effort due to coughing   CARDIOVASCULAR: Regular rate and rhythm, normal S1 and S2, no murmur/rub/gallop; No lower extremity edema; Peripheral pulses are 2+ bilaterally  ABDOMEN: Nontender to palpation, normoactive bowel sounds, no rebound/guarding; No hepatosplenomegaly  MUSCULOSKELETAL: no clubbing or cyanosis of digits; no joint swelling or tenderness to palpation  PSYCH: A+O to person, place, and time  SKIN: No rash    Notable Labs:    Labs:                          8.7    11.98 )-----------( 282      ( 03 Dec 2023 06:11 )             26.9     12-03    137  |  103  |  103<H>  ----------------------------<  112<H>  5.0   |  21<L>  |  4.46<H>    Ca    8.5      03 Dec 2023 06:12  Phos  3.3     12-03  Mg     2.2     12-03          CAPILLARY BLOOD GLUCOSE      POCT Blood Glucose.: 130 mg/dL (03 Dec 2023 07:34)  POCT Blood Glucose.: 268 mg/dL (02 Dec 2023 21:40)  POCT Blood Glucose.: 272 mg/dL (02 Dec 2023 16:25)  POCT Blood Glucose.: 314 mg/dL (02 Dec 2023 11:45)            Urinalysis Basic - ( 03 Dec 2023 06:12 )    Color: x / Appearance: x / SG: x / pH: x  Gluc: 112 mg/dL / Ketone: x  / Bili: x / Urobili: x   Blood: x / Protein: x / Nitrite: x   Leuk Esterase: x / RBC: x / WBC x   Sq Epi: x / Non Sq Epi: x / Bacteria: x        Micro:      RADIOLOGY & ADDITIONAL TESTS:    NNI

## 2023-12-03 NOTE — PROGRESS NOTE ADULT - PROBLEM SELECTOR PLAN 3
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

## 2023-12-03 NOTE — PROGRESS NOTE ADULT - SUBJECTIVE AND OBJECTIVE BOX
DATE OF SERVICE: 12-03-23 @ 11:07    Patient is a 83y old  Female who presents with a chief complaint of LE swelling, dyspnea (03 Dec 2023 10:14)      INTERVAL HISTORY:     REVIEW OF SYSTEMS:  CONSTITUTIONAL: No weakness  EYES/ENT: No visual changes;  No throat pain   NECK: No pain or stiffness  RESPIRATORY: No cough, wheezing; No shortness of breath  CARDIOVASCULAR: No chest pain or palpitations  GASTROINTESTINAL: No abdominal  pain. No nausea, vomiting, or hematemesis  GENITOURINARY: No dysuria, frequency or hematuria  NEUROLOGICAL: No stroke like symptoms  SKIN: No rashes    TELEMETRY Personally reviewed:  	  MEDICATIONS:  amLODIPine   Tablet 10 milliGRAM(s) Oral daily  buMETAnide Injectable 2 milliGRAM(s) IV Push once  hydrALAZINE 25 milliGRAM(s) Oral three times a day        PHYSICAL EXAM:  T(C): 36.5 (12-03-23 @ 11:01), Max: 36.7 (12-02-23 @ 20:40)  HR: 88 (12-03-23 @ 11:01) (77 - 89)  BP: 125/55 (12-03-23 @ 11:01) (125/55 - 159/65)  RR: 20 (12-03-23 @ 11:01) (20 - 20)  SpO2: 98% (12-03-23 @ 11:01) (95% - 98%)  Wt(kg): --  I&O's Summary    02 Dec 2023 07:01  -  03 Dec 2023 07:00  --------------------------------------------------------  IN: 1012 mL / OUT: 300 mL / NET: 712 mL    03 Dec 2023 07:01  -  03 Dec 2023 11:07  --------------------------------------------------------  IN: 120 mL / OUT: 200 mL / NET: -80 mL          Appearance: In no distress	  HEENT:    PERRL, EOMI	  Cardiovascular:  S1 S2, No JVD  Respiratory: Lungs clear to auscultation	  Gastrointestinal:  Soft, Non-tender, + BS	  Vascularature:  No edema of LE  Psychiatric: Appropriate affect   Neuro: no acute focal deficits                               8.7    11.98 )-----------( 282      ( 03 Dec 2023 06:11 )             26.9     12-03    137  |  103  |  103<H>  ----------------------------<  112<H>  5.0   |  21<L>  |  4.46<H>    Ca    8.5      03 Dec 2023 06:12  Phos  3.3     12-03  Mg     2.2     12-03          Labs personally reviewed      ASSESSMENT/PLAN: 	  83 year old female with HTN, HLD, CAD, T2DM, CKD, remote hx of breast CA s/p mastectomy, and chronic hypoxemic respiratory failure of unknown etiology on home O2 who presents for lower extremity swelling and shortness of breath.      1. Acute diastolic heart failure  - Pt with LE edema and pulmonary congestion  - s/p IV diuresis now DC'd d/t increasing Cr which is now improving.  - Echo shows normal LV systolic function with Bi-atrial enlargement, G2DD and LVH  - EKG shows sinus rhythm (not atrial flutter)  - Plan for poss HD as per Renal  - Pulm consult appreciated for continued hypoxia despite diuresis: recommend swallow eval, duonebs, symbicort and prednisone  - 12/1 given Lasix 20mg IVP x1.     2. ESRD  - Renal on board  - Plan for poss HD     3. HLD  - Statin therapy    4. HTN - stable  - Patient with ESRD            MEG Carrillo DO [facility]  Cardiovascular Medicine  [address], [address]  Available through call or text on [employer] TEAMs  Office: [phone]   DATE OF SERVICE: 12-03-23 @ 11:07    Patient is a 83y old  Female who presents with a chief complaint of LE swelling, dyspnea (03 Dec 2023 10:14)      INTERVAL HISTORY:     REVIEW OF SYSTEMS:  CONSTITUTIONAL: No weakness  EYES/ENT: No visual changes;  No throat pain   NECK: No pain or stiffness  RESPIRATORY: No cough, wheezing; No shortness of breath  CARDIOVASCULAR: No chest pain or palpitations  GASTROINTESTINAL: No abdominal  pain. No nausea, vomiting, or hematemesis  GENITOURINARY: No dysuria, frequency or hematuria  NEUROLOGICAL: No stroke like symptoms  SKIN: No rashes    TELEMETRY Personally reviewed:  	  MEDICATIONS:  amLODIPine   Tablet 10 milliGRAM(s) Oral daily  buMETAnide Injectable 2 milliGRAM(s) IV Push once  hydrALAZINE 25 milliGRAM(s) Oral three times a day        PHYSICAL EXAM:  T(C): 36.5 (12-03-23 @ 11:01), Max: 36.7 (12-02-23 @ 20:40)  HR: 88 (12-03-23 @ 11:01) (77 - 89)  BP: 125/55 (12-03-23 @ 11:01) (125/55 - 159/65)  RR: 20 (12-03-23 @ 11:01) (20 - 20)  SpO2: 98% (12-03-23 @ 11:01) (95% - 98%)  Wt(kg): --  I&O's Summary    02 Dec 2023 07:01  -  03 Dec 2023 07:00  --------------------------------------------------------  IN: 1012 mL / OUT: 300 mL / NET: 712 mL    03 Dec 2023 07:01  -  03 Dec 2023 11:07  --------------------------------------------------------  IN: 120 mL / OUT: 200 mL / NET: -80 mL          Appearance: In no distress	  HEENT:    PERRL, EOMI	  Cardiovascular:  S1 S2, No JVD  Respiratory: Lungs clear to auscultation	  Gastrointestinal:  Soft, Non-tender, + BS	  Vascularature:  No edema of LE  Psychiatric: Appropriate affect   Neuro: no acute focal deficits                               8.7    11.98 )-----------( 282      ( 03 Dec 2023 06:11 )             26.9     12-03    137  |  103  |  103<H>  ----------------------------<  112<H>  5.0   |  21<L>  |  4.46<H>    Ca    8.5      03 Dec 2023 06:12  Phos  3.3     12-03  Mg     2.2     12-03          Labs personally reviewed      ASSESSMENT/PLAN: 	  83 year old female with HTN, HLD, CAD, T2DM, CKD, remote hx of breast CA s/p mastectomy, and chronic hypoxemic respiratory failure of unknown etiology on home O2 who presents for lower extremity swelling and shortness of breath.      1. Acute diastolic heart failure  - Pt with LE edema and pulmonary congestion  - s/p IV diuresis now DC'd d/t increasing Cr which is now improving.  - Echo shows normal LV systolic function with Bi-atrial enlargement, G2DD and LVH  - EKG shows sinus rhythm (not atrial flutter)  - Plan for poss HD as per Renal  - Pulm consult appreciated for continued hypoxia despite diuresis: recommend swallow eval, duonebs, symbicort and prednisone  - 12/1 given Lasix 20mg IVP x1.     2. ESRD  - Renal on board  - Plan for poss HD     3. HLD  - Statin therapy    4. HTN - stable  - Patient with ESRD            MEG Carrillo DO [facility]  Cardiovascular Medicine  [address], [address]  Available through call or text on [employer] TEAMs  Office: [phone]   DATE OF SERVICE: 12-03-23 @ 11:07    Patient is a 83y old  Female who presents with a chief complaint of LE swelling, dyspnea (03 Dec 2023 10:14)      INTERVAL HISTORY:     REVIEW OF SYSTEMS:  CONSTITUTIONAL: No weakness  EYES/ENT: No visual changes;  No throat pain   NECK: No pain or stiffness  RESPIRATORY: No cough, wheezing; No shortness of breath  CARDIOVASCULAR: No chest pain or palpitations  GASTROINTESTINAL: No abdominal  pain. No nausea, vomiting, or hematemesis  GENITOURINARY: No dysuria, frequency or hematuria  NEUROLOGICAL: No stroke like symptoms  SKIN: No rashes    TELEMETRY Personally reviewed:  	  MEDICATIONS:  amLODIPine   Tablet 10 milliGRAM(s) Oral daily  buMETAnide Injectable 2 milliGRAM(s) IV Push once  hydrALAZINE 25 milliGRAM(s) Oral three times a day        PHYSICAL EXAM:  T(C): 36.5 (12-03-23 @ 11:01), Max: 36.7 (12-02-23 @ 20:40)  HR: 88 (12-03-23 @ 11:01) (77 - 89)  BP: 125/55 (12-03-23 @ 11:01) (125/55 - 159/65)  RR: 20 (12-03-23 @ 11:01) (20 - 20)  SpO2: 98% (12-03-23 @ 11:01) (95% - 98%)  Wt(kg): --  I&O's Summary    02 Dec 2023 07:01  -  03 Dec 2023 07:00  --------------------------------------------------------  IN: 1012 mL / OUT: 300 mL / NET: 712 mL    03 Dec 2023 07:01  -  03 Dec 2023 11:07  --------------------------------------------------------  IN: 120 mL / OUT: 200 mL / NET: -80 mL          Appearance: In no distress	  HEENT:    PERRL, EOMI	  Cardiovascular:  S1 S2, No JVD  Respiratory: Lungs clear to auscultation	  Gastrointestinal:  Soft, Non-tender, + BS	  Vascularature:  No edema of LE  Psychiatric: Appropriate affect   Neuro: no acute focal deficits                               8.7    11.98 )-----------( 282      ( 03 Dec 2023 06:11 )             26.9     12-03    137  |  103  |  103<H>  ----------------------------<  112<H>  5.0   |  21<L>  |  4.46<H>    Ca    8.5      03 Dec 2023 06:12  Phos  3.3     12-03  Mg     2.2     12-03          Labs personally reviewed      ASSESSMENT/PLAN: 	  83 year old female with HTN, HLD, CAD, T2DM, CKD, remote hx of breast CA s/p mastectomy, and chronic hypoxemic respiratory failure of unknown etiology on home O2 who presents for lower extremity swelling and shortness of breath.      1. Acute diastolic heart failure  - Pt with LE edema and pulmonary congestion  - s/p IV diuresis now DC'd d/t increasing Cr which is now improving.  - Echo shows normal LV systolic function with Bi-atrial enlargement, G2DD and LVH  - EKG shows sinus rhythm (not atrial flutter)  - Plan for poss HD as per Renal  - Pulm consult appreciated for continued hypoxia despite diuresis: recommend swallow eval, duonebs, symbicort and prednisone  - 12/1 given Lasix 20mg IVP x1.     2. ESRD  - Renal on board  - Plan for poss HD     3. HLD  - Statin therapy    4. HTN - stable  - Patient with ESRD            MEG Carrillo DO [facility]  Cardiovascular Medicine  [address], [address]  Available through call or text on [employer] TEAMs  Office: [phone]

## 2023-12-03 NOTE — PROGRESS NOTE ADULT - PROBLEM SELECTOR PLAN 1
Patient presenting for worsening LE edema and breathlessness  - also with several symptoms to suggest likely ADHF  - on exam: extremities warm and well perfused, but volume overloaded, S3 on exam  - proBNP elevated to 1300, but unknown baseline and with likely CAL on CKD  - hsTropT flat at 74, ECG without ST changes, no chest pain  - pt does not carry a diagnosis of HF, however on meds that may suggest she may have HFpEF (MRA, SGLT2i)    Plan:  >consider diuretic daily at expense of renal failure   > obtain TTE--> showing EF 71%, bi-atrial enlargement, LV diastolic dysfunction, and RV enlargement  > hold home spironolactone and dapagliflozin given CAL  > strict I/Os, daily standing weights  > appreciate cards recs  > will need to determine DC med rec based on GFR

## 2023-12-03 NOTE — PROGRESS NOTE ADULT - PROBLEM SELECTOR PLAN 4
Patient with several year history of chronic hypoxemic respiratory failure  - requiring home O2, stable on 2L at baseline  - has not had escalating O2 requirements recently, despite feeling subjectively more dyspneic  - unclear etiology, though patient has had PFTs at her pulmologist's office last year    Plan:  > follow up TTE--> showing EF 71%, bi-atrial enlargement, LV diastolic dysfunction, and RV enlargement  > obtain records from pt's pulmonologists office: Dr. Paulino Gayle ([facility], [phone])  > continue with home montelukast and add on duonebs and symbicort   > wean O2 as tolerated  > add on prednisone for possible COPD exacerbation   > if persistently hypoxemic and despite diuresis, consider alternative etiologies and potentially high resolution CT chest  - f/u repeat CXR and BNP (stable CXR and elevated BNP)  - appreciate pulm recs: started on flonase and saline spray Patient with several year history of chronic hypoxemic respiratory failure  - requiring home O2, stable on 2L at baseline  - has not had escalating O2 requirements recently, despite feeling subjectively more dyspneic  - unclear etiology, though patient has had PFTs at her pulmologist's office last year    Plan:  > follow up TTE--> showing EF 71%, bi-atrial enlargement, LV diastolic dysfunction, and RV enlargement  > obtain records from pt's pulmonologists office: Dr. Paulino Gayle ([facility], [phone])  > continue with home montelukast and add on duonebs and symbicort   > wean O2 as tolerated  > add on prednisone for possible COPD exacerbation   > if persistently hypoxemic and despite diuresis, consider alternative etiologies and potentially high resolution CT chest  - f/u repeat CXR and BNP (stable CXR and elevated BNP)  - appreciate pulm recs: started on flonase and saline spray Patient with several year history of chronic hypoxemic respiratory failure  - requiring home O2, stable on 2L at baseline  - has not had escalating O2 requirements recently, despite feeling subjectively more dyspneic  - unclear etiology, though patient has had PFTs at her pulmologist's office last year    Plan:  > follow up TTE--> showing EF 71%, bi-atrial enlargement, LV diastolic dysfunction, and RV enlargement  > obtain records from pt's pulmonologists office: Dr. Paulino Gayle ([facility], [phone])  > continue with home montelukast and add on duonebs and symbicort   > wean O2 as tolerated  > add on prednisone for possible COPD exacerbation   > if persistently hypoxemic and despite diuresis, consider alternative etiologies and potentially high resolution CT chest  - f/u repeat CXR and BNP (stable CXR and elevated BNP)  - appreciate pulm recs: started on flonase and saline spray

## 2023-12-04 LAB
ALBUMIN SERPL ELPH-MCNC: 3.3 G/DL — SIGNIFICANT CHANGE UP (ref 3.3–5)
ALBUMIN SERPL ELPH-MCNC: 3.3 G/DL — SIGNIFICANT CHANGE UP (ref 3.3–5)
ALP SERPL-CCNC: 46 U/L — SIGNIFICANT CHANGE UP (ref 40–120)
ALP SERPL-CCNC: 46 U/L — SIGNIFICANT CHANGE UP (ref 40–120)
ALT FLD-CCNC: 16 U/L — SIGNIFICANT CHANGE UP (ref 10–45)
ALT FLD-CCNC: 16 U/L — SIGNIFICANT CHANGE UP (ref 10–45)
ANION GAP SERPL CALC-SCNC: 15 MMOL/L — SIGNIFICANT CHANGE UP (ref 5–17)
ANION GAP SERPL CALC-SCNC: 15 MMOL/L — SIGNIFICANT CHANGE UP (ref 5–17)
AST SERPL-CCNC: 17 U/L — SIGNIFICANT CHANGE UP (ref 10–40)
AST SERPL-CCNC: 17 U/L — SIGNIFICANT CHANGE UP (ref 10–40)
BASOPHILS # BLD AUTO: 0.02 K/UL — SIGNIFICANT CHANGE UP (ref 0–0.2)
BASOPHILS # BLD AUTO: 0.02 K/UL — SIGNIFICANT CHANGE UP (ref 0–0.2)
BASOPHILS NFR BLD AUTO: 0.2 % — SIGNIFICANT CHANGE UP (ref 0–2)
BASOPHILS NFR BLD AUTO: 0.2 % — SIGNIFICANT CHANGE UP (ref 0–2)
BILIRUB SERPL-MCNC: 0.3 MG/DL — SIGNIFICANT CHANGE UP (ref 0.2–1.2)
BILIRUB SERPL-MCNC: 0.3 MG/DL — SIGNIFICANT CHANGE UP (ref 0.2–1.2)
BUN SERPL-MCNC: 106 MG/DL — HIGH (ref 7–23)
BUN SERPL-MCNC: 106 MG/DL — HIGH (ref 7–23)
CALCIUM SERPL-MCNC: 8.9 MG/DL — SIGNIFICANT CHANGE UP (ref 8.4–10.5)
CALCIUM SERPL-MCNC: 8.9 MG/DL — SIGNIFICANT CHANGE UP (ref 8.4–10.5)
CHLORIDE SERPL-SCNC: 103 MMOL/L — SIGNIFICANT CHANGE UP (ref 96–108)
CHLORIDE SERPL-SCNC: 103 MMOL/L — SIGNIFICANT CHANGE UP (ref 96–108)
CO2 SERPL-SCNC: 20 MMOL/L — LOW (ref 22–31)
CO2 SERPL-SCNC: 20 MMOL/L — LOW (ref 22–31)
CREAT SERPL-MCNC: 4.93 MG/DL — HIGH (ref 0.5–1.3)
CREAT SERPL-MCNC: 4.93 MG/DL — HIGH (ref 0.5–1.3)
EGFR: 8 ML/MIN/1.73M2 — LOW
EGFR: 8 ML/MIN/1.73M2 — LOW
EOSINOPHIL # BLD AUTO: 0.14 K/UL — SIGNIFICANT CHANGE UP (ref 0–0.5)
EOSINOPHIL # BLD AUTO: 0.14 K/UL — SIGNIFICANT CHANGE UP (ref 0–0.5)
EOSINOPHIL NFR BLD AUTO: 1.4 % — SIGNIFICANT CHANGE UP (ref 0–6)
EOSINOPHIL NFR BLD AUTO: 1.4 % — SIGNIFICANT CHANGE UP (ref 0–6)
GLUCOSE BLDC GLUCOMTR-MCNC: 138 MG/DL — HIGH (ref 70–99)
GLUCOSE BLDC GLUCOMTR-MCNC: 138 MG/DL — HIGH (ref 70–99)
GLUCOSE BLDC GLUCOMTR-MCNC: 163 MG/DL — HIGH (ref 70–99)
GLUCOSE BLDC GLUCOMTR-MCNC: 163 MG/DL — HIGH (ref 70–99)
GLUCOSE BLDC GLUCOMTR-MCNC: 177 MG/DL — HIGH (ref 70–99)
GLUCOSE BLDC GLUCOMTR-MCNC: 177 MG/DL — HIGH (ref 70–99)
GLUCOSE BLDC GLUCOMTR-MCNC: 197 MG/DL — HIGH (ref 70–99)
GLUCOSE BLDC GLUCOMTR-MCNC: 197 MG/DL — HIGH (ref 70–99)
GLUCOSE BLDC GLUCOMTR-MCNC: 210 MG/DL — HIGH (ref 70–99)
GLUCOSE BLDC GLUCOMTR-MCNC: 210 MG/DL — HIGH (ref 70–99)
GLUCOSE SERPL-MCNC: 120 MG/DL — HIGH (ref 70–99)
GLUCOSE SERPL-MCNC: 120 MG/DL — HIGH (ref 70–99)
HCT VFR BLD CALC: 27.2 % — LOW (ref 34.5–45)
HCT VFR BLD CALC: 27.2 % — LOW (ref 34.5–45)
HGB BLD-MCNC: 8.7 G/DL — LOW (ref 11.5–15.5)
HGB BLD-MCNC: 8.7 G/DL — LOW (ref 11.5–15.5)
IMM GRANULOCYTES NFR BLD AUTO: 1.7 % — HIGH (ref 0–0.9)
IMM GRANULOCYTES NFR BLD AUTO: 1.7 % — HIGH (ref 0–0.9)
LYMPHOCYTES # BLD AUTO: 2.12 K/UL — SIGNIFICANT CHANGE UP (ref 1–3.3)
LYMPHOCYTES # BLD AUTO: 2.12 K/UL — SIGNIFICANT CHANGE UP (ref 1–3.3)
LYMPHOCYTES # BLD AUTO: 20.8 % — SIGNIFICANT CHANGE UP (ref 13–44)
LYMPHOCYTES # BLD AUTO: 20.8 % — SIGNIFICANT CHANGE UP (ref 13–44)
MAGNESIUM SERPL-MCNC: 2.2 MG/DL — SIGNIFICANT CHANGE UP (ref 1.6–2.6)
MAGNESIUM SERPL-MCNC: 2.2 MG/DL — SIGNIFICANT CHANGE UP (ref 1.6–2.6)
MCHC RBC-ENTMCNC: 29.3 PG — SIGNIFICANT CHANGE UP (ref 27–34)
MCHC RBC-ENTMCNC: 29.3 PG — SIGNIFICANT CHANGE UP (ref 27–34)
MCHC RBC-ENTMCNC: 32 GM/DL — SIGNIFICANT CHANGE UP (ref 32–36)
MCHC RBC-ENTMCNC: 32 GM/DL — SIGNIFICANT CHANGE UP (ref 32–36)
MCV RBC AUTO: 91.6 FL — SIGNIFICANT CHANGE UP (ref 80–100)
MCV RBC AUTO: 91.6 FL — SIGNIFICANT CHANGE UP (ref 80–100)
MONOCYTES # BLD AUTO: 1.34 K/UL — HIGH (ref 0–0.9)
MONOCYTES # BLD AUTO: 1.34 K/UL — HIGH (ref 0–0.9)
MONOCYTES NFR BLD AUTO: 13.2 % — SIGNIFICANT CHANGE UP (ref 2–14)
MONOCYTES NFR BLD AUTO: 13.2 % — SIGNIFICANT CHANGE UP (ref 2–14)
NEUTROPHILS # BLD AUTO: 6.4 K/UL — SIGNIFICANT CHANGE UP (ref 1.8–7.4)
NEUTROPHILS # BLD AUTO: 6.4 K/UL — SIGNIFICANT CHANGE UP (ref 1.8–7.4)
NEUTROPHILS NFR BLD AUTO: 62.7 % — SIGNIFICANT CHANGE UP (ref 43–77)
NEUTROPHILS NFR BLD AUTO: 62.7 % — SIGNIFICANT CHANGE UP (ref 43–77)
NRBC # BLD: 0 /100 WBCS — SIGNIFICANT CHANGE UP (ref 0–0)
NRBC # BLD: 0 /100 WBCS — SIGNIFICANT CHANGE UP (ref 0–0)
PHOSPHATE SERPL-MCNC: 3 MG/DL — SIGNIFICANT CHANGE UP (ref 2.5–4.5)
PHOSPHATE SERPL-MCNC: 3 MG/DL — SIGNIFICANT CHANGE UP (ref 2.5–4.5)
PLATELET # BLD AUTO: 296 K/UL — SIGNIFICANT CHANGE UP (ref 150–400)
PLATELET # BLD AUTO: 296 K/UL — SIGNIFICANT CHANGE UP (ref 150–400)
POTASSIUM SERPL-MCNC: 5.6 MMOL/L — HIGH (ref 3.5–5.3)
POTASSIUM SERPL-MCNC: 5.6 MMOL/L — HIGH (ref 3.5–5.3)
POTASSIUM SERPL-SCNC: 5.6 MMOL/L — HIGH (ref 3.5–5.3)
POTASSIUM SERPL-SCNC: 5.6 MMOL/L — HIGH (ref 3.5–5.3)
PROT SERPL-MCNC: 6.5 G/DL — SIGNIFICANT CHANGE UP (ref 6–8.3)
PROT SERPL-MCNC: 6.5 G/DL — SIGNIFICANT CHANGE UP (ref 6–8.3)
RBC # BLD: 2.97 M/UL — LOW (ref 3.8–5.2)
RBC # BLD: 2.97 M/UL — LOW (ref 3.8–5.2)
RBC # FLD: 14.6 % — HIGH (ref 10.3–14.5)
RBC # FLD: 14.6 % — HIGH (ref 10.3–14.5)
SODIUM SERPL-SCNC: 138 MMOL/L — SIGNIFICANT CHANGE UP (ref 135–145)
SODIUM SERPL-SCNC: 138 MMOL/L — SIGNIFICANT CHANGE UP (ref 135–145)
WBC # BLD: 10.19 K/UL — SIGNIFICANT CHANGE UP (ref 3.8–10.5)
WBC # BLD: 10.19 K/UL — SIGNIFICANT CHANGE UP (ref 3.8–10.5)
WBC # FLD AUTO: 10.19 K/UL — SIGNIFICANT CHANGE UP (ref 3.8–10.5)
WBC # FLD AUTO: 10.19 K/UL — SIGNIFICANT CHANGE UP (ref 3.8–10.5)

## 2023-12-04 PROCEDURE — 99232 SBSQ HOSP IP/OBS MODERATE 35: CPT | Mod: GC

## 2023-12-04 PROCEDURE — 77001 FLUOROGUIDE FOR VEIN DEVICE: CPT | Mod: 26

## 2023-12-04 PROCEDURE — 76937 US GUIDE VASCULAR ACCESS: CPT | Mod: 26

## 2023-12-04 PROCEDURE — 36556 INSERT NON-TUNNEL CV CATH: CPT

## 2023-12-04 RX ORDER — DESMOPRESSIN ACETATE 0.1 MG/1
25 TABLET ORAL ONCE
Refills: 0 | Status: COMPLETED | OUTPATIENT
Start: 2023-12-04 | End: 2023-12-04

## 2023-12-04 RX ORDER — CHLORHEXIDINE GLUCONATE 213 G/1000ML
1 SOLUTION TOPICAL
Refills: 0 | Status: DISCONTINUED | OUTPATIENT
Start: 2023-12-04 | End: 2023-12-15

## 2023-12-04 RX ORDER — SODIUM ZIRCONIUM CYCLOSILICATE 10 G/10G
10 POWDER, FOR SUSPENSION ORAL ONCE
Refills: 0 | Status: COMPLETED | OUTPATIENT
Start: 2023-12-04 | End: 2023-12-04

## 2023-12-04 RX ORDER — SODIUM CHLORIDE 9 MG/ML
10 INJECTION INTRAMUSCULAR; INTRAVENOUS; SUBCUTANEOUS
Refills: 0 | Status: DISCONTINUED | OUTPATIENT
Start: 2023-12-04 | End: 2023-12-15

## 2023-12-04 RX ADMIN — POLYETHYLENE GLYCOL 3350 17 GRAM(S): 17 POWDER, FOR SOLUTION ORAL at 11:55

## 2023-12-04 RX ADMIN — SENNA PLUS 2 TABLET(S): 8.6 TABLET ORAL at 23:27

## 2023-12-04 RX ADMIN — Medication 25 MILLIGRAM(S): at 23:29

## 2023-12-04 RX ADMIN — DESMOPRESSIN ACETATE 225 MICROGRAM(S): 0.1 TABLET ORAL at 16:40

## 2023-12-04 RX ADMIN — MONTELUKAST 10 MILLIGRAM(S): 4 TABLET, CHEWABLE ORAL at 11:50

## 2023-12-04 RX ADMIN — SEVELAMER CARBONATE 800 MILLIGRAM(S): 2400 POWDER, FOR SUSPENSION ORAL at 23:26

## 2023-12-04 RX ADMIN — BUDESONIDE AND FORMOTEROL FUMARATE DIHYDRATE 2 PUFF(S): 160; 4.5 AEROSOL RESPIRATORY (INHALATION) at 23:29

## 2023-12-04 RX ADMIN — MUPIROCIN 1 APPLICATION(S): 20 OINTMENT TOPICAL at 05:39

## 2023-12-04 RX ADMIN — HEPARIN SODIUM 5000 UNIT(S): 5000 INJECTION INTRAVENOUS; SUBCUTANEOUS at 13:44

## 2023-12-04 RX ADMIN — SODIUM ZIRCONIUM CYCLOSILICATE 10 GRAM(S): 10 POWDER, FOR SUSPENSION ORAL at 08:51

## 2023-12-04 RX ADMIN — MUPIROCIN 1 APPLICATION(S): 20 OINTMENT TOPICAL at 18:01

## 2023-12-04 RX ADMIN — NYSTATIN CREAM 1 APPLICATION(S): 100000 CREAM TOPICAL at 18:00

## 2023-12-04 RX ADMIN — SEVELAMER CARBONATE 800 MILLIGRAM(S): 2400 POWDER, FOR SUSPENSION ORAL at 05:38

## 2023-12-04 RX ADMIN — Medication 1 SPRAY(S): at 05:39

## 2023-12-04 RX ADMIN — SEVELAMER CARBONATE 800 MILLIGRAM(S): 2400 POWDER, FOR SUSPENSION ORAL at 13:43

## 2023-12-04 RX ADMIN — Medication 3 MILLILITER(S): at 11:54

## 2023-12-04 RX ADMIN — Medication 2: at 12:09

## 2023-12-04 RX ADMIN — Medication 3 MILLILITER(S): at 00:44

## 2023-12-04 RX ADMIN — Medication 325 MILLIGRAM(S): at 11:50

## 2023-12-04 RX ADMIN — CHLORHEXIDINE GLUCONATE 1 APPLICATION(S): 213 SOLUTION TOPICAL at 11:56

## 2023-12-04 RX ADMIN — NYSTATIN CREAM 1 APPLICATION(S): 100000 CREAM TOPICAL at 05:39

## 2023-12-04 RX ADMIN — AMLODIPINE BESYLATE 10 MILLIGRAM(S): 2.5 TABLET ORAL at 05:38

## 2023-12-04 RX ADMIN — CALCITRIOL 0.25 MICROGRAM(S): 0.5 CAPSULE ORAL at 11:50

## 2023-12-04 RX ADMIN — ATORVASTATIN CALCIUM 80 MILLIGRAM(S): 80 TABLET, FILM COATED ORAL at 23:25

## 2023-12-04 RX ADMIN — Medication 25 MILLIGRAM(S): at 13:43

## 2023-12-04 RX ADMIN — Medication 1 SPRAY(S): at 11:56

## 2023-12-04 RX ADMIN — BUDESONIDE AND FORMOTEROL FUMARATE DIHYDRATE 2 PUFF(S): 160; 4.5 AEROSOL RESPIRATORY (INHALATION) at 08:52

## 2023-12-04 RX ADMIN — Medication 1: at 18:07

## 2023-12-04 RX ADMIN — HEPARIN SODIUM 5000 UNIT(S): 5000 INJECTION INTRAVENOUS; SUBCUTANEOUS at 05:38

## 2023-12-04 RX ADMIN — Medication 3 MILLILITER(S): at 18:00

## 2023-12-04 RX ADMIN — Medication 25 MILLIGRAM(S): at 05:38

## 2023-12-04 RX ADMIN — Medication 1 SPRAY(S): at 18:00

## 2023-12-04 NOTE — PROGRESS NOTE ADULT - PROBLEM SELECTOR PLAN 5
- pt with rising leukocytosis  - likely steroid induced and no signs of infection  - will continue to monitor - pt with rising leukocytosis; now downtrending  - likely steroid induced and no signs of infection  - will continue to monitor

## 2023-12-04 NOTE — PROGRESS NOTE ADULT - PROBLEM SELECTOR PLAN 3
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  - reportedly has continued to have good urine output  - metabolic  - FeUrea <30.9%--> pre renal picture     Plan:  > follow up urine studies--> FeUrea >35%; prerenal picture   > f/u US Kidney/bladder--> showing parenchymal disease   > hold home ARB and MRA, avoid nephrotoxic meds  - appreciate cardio-renal recs: pending workup --> US showing medical renal disease  - also started on phos binder  - hyperkalemia 2/2 kidney disease s/p lokelma and insulin + D50--> repeat BMP wnl  - ctm electrolytes and creatinine to determine HD initiation needs 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

## 2023-12-04 NOTE — PROGRESS NOTE ADULT - PROBLEM SELECTOR PLAN 2
Hgb below goal. 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 Both 25-OH and 1-25-OH vitamin D levels low. Sevelamer 800 TID for now. Started calcitriol 25mcg daily on 11/25.    Hyperkalemia- please give lokelma 10g now for K of 5.6 Monitor potassium    If you have any questions, please feel free to contact me  Keaton Dye  Nephrology Fellow  [phone]; Prefer [employer] TEAMS  (After 5pm or on weekends please page the on-call fellow). Hgb below goal. 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 Both 25-OH and 1-25-OH vitamin D levels low. Sevelamer 800 TID for now. Started calcitriol 25mcg daily on 11/25.    Hyperkalemia- please give lokelma 10g now for K of 5.6 Monitor potassium    If you have any questions, please feel free to contact me  Keaton Dye  Nephrology Fellow  [phone]; Prefer [employer] TEAMS  (After 5pm or on weekends please page the on-call fellow).

## 2023-12-04 NOTE — PROGRESS NOTE ADULT - CONVERSATION DETAILS
Discussed code status with patient due to poor prognosis-patient is elderly with many comorbidities with initiation of HD this week. She states she wants to do everything to keep living and remains full code at this time.

## 2023-12-04 NOTE — PROGRESS NOTE ADULT - PROBLEM SELECTOR PLAN 4
Patient with several year history of chronic hypoxemic respiratory failure  - requiring home O2, stable on 2L at baseline  - has not had escalating O2 requirements recently, despite feeling subjectively more dyspneic  - unclear etiology, though patient has had PFTs at her pulmologist's office last year    Plan:  > follow up TTE--> showing EF 71%, bi-atrial enlargement, LV diastolic dysfunction, and RV enlargement  > obtain records from pt's pulmonologists office: Dr. Paulino Gayle ([facility], [phone])  > continue with home montelukast and add on duonebs and symbicort   > wean O2 as tolerated  > add on prednisone for possible COPD exacerbation   > if persistently hypoxemic and despite diuresis, consider alternative etiologies and potentially high resolution CT chest  - f/u repeat CXR and BNP (stable CXR and elevated BNP)  - appreciate pulm recs: started on flonase and saline spray Patient with several year history of chronic hypoxemic respiratory failure  - requiring home O2, stable on 2L at baseline  - has not had escalating O2 requirements recently, despite feeling subjectively more dyspneic  - unclear etiology, though patient has had PFTs at her pulmologist's office last year    Plan:  > follow up TTE--> showing EF 71%, bi-atrial enlargement, LV diastolic dysfunction, and RV enlargement  > obtain records from pt's pulmonologists office: Dr. Paulino Gayle ([facility], [phone])  > continue with home montelukast and add on duonebs and symbicort   > wean O2 as tolerated  > add on prednisone for possible COPD exacerbation   > if persistently hypoxemic and despite diuresis, consider alternative etiologies and potentially high resolution CT chest  - f/u repeat CXR and BNP (stable CXR and elevated BNP)  - appreciate pulm recs: started on flonase and saline spray

## 2023-12-04 NOTE — PROGRESS NOTE ADULT - SUBJECTIVE AND OBJECTIVE BOX
DATE OF SERVICE: 12-04-23 @ 15:06    Patient is a 83y old  Female who presents with a chief complaint of LE swelling, dyspnea (04 Dec 2023 11:52)      INTERVAL HISTORY: Feels ok.  at bedside.     REVIEW OF SYSTEMS:  CONSTITUTIONAL: No weakness  EYES/ENT: No visual changes;  No throat pain   NECK: No pain or stiffness  RESPIRATORY: No cough, wheezing; No shortness of breath  CARDIOVASCULAR: No chest pain or palpitations  GASTROINTESTINAL: No abdominal  pain. No nausea, vomiting, or hematemesis  GENITOURINARY: No dysuria, frequency or hematuria  NEUROLOGICAL: No stroke like symptoms  SKIN: No rashes    TELEMETRY Personally reviewed: SR   	  MEDICATIONS:  amLODIPine   Tablet 10 milliGRAM(s) Oral daily  hydrALAZINE 25 milliGRAM(s) Oral three times a day        PHYSICAL EXAM:  T(C): 36.6 (12-04-23 @ 11:14), Max: 36.9 (12-03-23 @ 21:04)  HR: 82 (12-04-23 @ 11:14) (74 - 93)  BP: 122/57 (12-04-23 @ 11:14) (122/57 - 152/69)  RR: 18 (12-04-23 @ 11:14) (18 - 20)  SpO2: 95% (12-04-23 @ 11:14) (95% - 97%)  Wt(kg): --  I&O's Summary    03 Dec 2023 07:01  -  04 Dec 2023 07:00  --------------------------------------------------------  IN: 340 mL / OUT: 600 mL / NET: -260 mL          Appearance: In no distress	  HEENT:    PERRL, EOMI	  Cardiovascular:  S1 S2, No JVD  Respiratory: Lungs clear to auscultation	  Gastrointestinal:  Soft, Non-tender, + BS	  Vascularature:  + edema of LE  Psychiatric: Appropriate affect   Neuro: no acute focal deficits                               8.7    10.19 )-----------( 296      ( 04 Dec 2023 07:06 )             27.2     12-04    138  |  103  |  106<H>  ----------------------------<  120<H>  5.6<H>   |  20<L>  |  4.93<H>    Ca    8.9      04 Dec 2023 07:06  Phos  3.0     12-04  Mg     2.2     12-04    TPro  6.5  /  Alb  3.3  /  TBili  0.3  /  DBili  x   /  AST  17  /  ALT  16  /  AlkPhos  46  12-04        Labs personally reviewed      ASSESSMENT/PLAN: 	    83 year old female with HTN, HLD, CAD, T2DM, CKD, remote hx of breast CA s/p mastectomy, and chronic hypoxemic respiratory failure of unknown etiology on home O2 who presents for lower extremity swelling and shortness of breath.      1. Acute diastolic heart failure  - Pt with LE edema and pulmonary congestion  - s/p IV diuresis now DC'd d/t increasing Cr which is now improving.  - Echo shows normal LV systolic function with Bi-atrial enlargement, G2DD and LVH  - EKG shows sinus rhythm (not atrial flutter)  - Plan for HD as per Renal  - Pulm consult appreciated for continued hypoxia despite diuresis: recommend swallow eval, duonebs, symbicort and prednisone      2. ESRD  - Renal on board  - Plan for HD     3. HLD  - Statin therapy    4. HTN - stable  - Patient with ESRD          SHEELA Epperson DO [facility]  Cardiovascular Medicine  [address], [address]  Available through call or text on [employer]  Office: [phone]   DATE OF SERVICE: 12-04-23 @ 15:06    Patient is a 83y old  Female who presents with a chief complaint of LE swelling, dyspnea (04 Dec 2023 11:52)      INTERVAL HISTORY: Feels ok.  at bedside.     REVIEW OF SYSTEMS:  CONSTITUTIONAL: No weakness  EYES/ENT: No visual changes;  No throat pain   NECK: No pain or stiffness  RESPIRATORY: No cough, wheezing; No shortness of breath  CARDIOVASCULAR: No chest pain or palpitations  GASTROINTESTINAL: No abdominal  pain. No nausea, vomiting, or hematemesis  GENITOURINARY: No dysuria, frequency or hematuria  NEUROLOGICAL: No stroke like symptoms  SKIN: No rashes    TELEMETRY Personally reviewed: SR   	  MEDICATIONS:  amLODIPine   Tablet 10 milliGRAM(s) Oral daily  hydrALAZINE 25 milliGRAM(s) Oral three times a day        PHYSICAL EXAM:  T(C): 36.6 (12-04-23 @ 11:14), Max: 36.9 (12-03-23 @ 21:04)  HR: 82 (12-04-23 @ 11:14) (74 - 93)  BP: 122/57 (12-04-23 @ 11:14) (122/57 - 152/69)  RR: 18 (12-04-23 @ 11:14) (18 - 20)  SpO2: 95% (12-04-23 @ 11:14) (95% - 97%)  Wt(kg): --  I&O's Summary    03 Dec 2023 07:01  -  04 Dec 2023 07:00  --------------------------------------------------------  IN: 340 mL / OUT: 600 mL / NET: -260 mL          Appearance: In no distress	  HEENT:    PERRL, EOMI	  Cardiovascular:  S1 S2, No JVD  Respiratory: Lungs clear to auscultation	  Gastrointestinal:  Soft, Non-tender, + BS	  Vascularature:  + edema of LE  Psychiatric: Appropriate affect   Neuro: no acute focal deficits                               8.7    10.19 )-----------( 296      ( 04 Dec 2023 07:06 )             27.2     12-04    138  |  103  |  106<H>  ----------------------------<  120<H>  5.6<H>   |  20<L>  |  4.93<H>    Ca    8.9      04 Dec 2023 07:06  Phos  3.0     12-04  Mg     2.2     12-04    TPro  6.5  /  Alb  3.3  /  TBili  0.3  /  DBili  x   /  AST  17  /  ALT  16  /  AlkPhos  46  12-04        Labs personally reviewed      ASSESSMENT/PLAN: 	    83 year old female with HTN, HLD, CAD, T2DM, CKD, remote hx of breast CA s/p mastectomy, and chronic hypoxemic respiratory failure of unknown etiology on home O2 who presents for lower extremity swelling and shortness of breath.      1. Acute diastolic heart failure  - Pt with LE edema and pulmonary congestion  - s/p IV diuresis now DC'd d/t increasing Cr which is now improving.  - Echo shows normal LV systolic function with Bi-atrial enlargement, G2DD and LVH  - EKG shows sinus rhythm (not atrial flutter)  - Plan for HD as per Renal  - Pulm consult appreciated for continued hypoxia despite diuresis: recommend swallow eval, duonebs, symbicort and prednisone      2. ESRD  - Renal on board  - Plan for HD     3. HLD  - Statin therapy    4. HTN - stable  - Patient with ESRD          SHEELA Epperson DO [facility]  Cardiovascular Medicine  [address], [address]  Available through call or text on [employer]  Office: [phone]   DATE OF SERVICE: 12-04-23 @ 15:06    Patient is a 83y old  Female who presents with a chief complaint of LE swelling, dyspnea (04 Dec 2023 11:52)      INTERVAL HISTORY: Feels ok.  at bedside.     REVIEW OF SYSTEMS:  CONSTITUTIONAL: No weakness  EYES/ENT: No visual changes;  No throat pain   NECK: No pain or stiffness  RESPIRATORY: No cough, wheezing; No shortness of breath  CARDIOVASCULAR: No chest pain or palpitations  GASTROINTESTINAL: No abdominal  pain. No nausea, vomiting, or hematemesis  GENITOURINARY: No dysuria, frequency or hematuria  NEUROLOGICAL: No stroke like symptoms  SKIN: No rashes    TELEMETRY Personally reviewed: SR   	  MEDICATIONS:  amLODIPine   Tablet 10 milliGRAM(s) Oral daily  hydrALAZINE 25 milliGRAM(s) Oral three times a day        PHYSICAL EXAM:  T(C): 36.6 (12-04-23 @ 11:14), Max: 36.9 (12-03-23 @ 21:04)  HR: 82 (12-04-23 @ 11:14) (74 - 93)  BP: 122/57 (12-04-23 @ 11:14) (122/57 - 152/69)  RR: 18 (12-04-23 @ 11:14) (18 - 20)  SpO2: 95% (12-04-23 @ 11:14) (95% - 97%)  Wt(kg): --  I&O's Summary    03 Dec 2023 07:01  -  04 Dec 2023 07:00  --------------------------------------------------------  IN: 340 mL / OUT: 600 mL / NET: -260 mL          Appearance: In no distress	  HEENT:    PERRL, EOMI	  Cardiovascular:  S1 S2, No JVD  Respiratory: Lungs clear to auscultation	  Gastrointestinal:  Soft, Non-tender, + BS	  Vascularature:  + edema of LE  Psychiatric: Appropriate affect   Neuro: no acute focal deficits                               8.7    10.19 )-----------( 296      ( 04 Dec 2023 07:06 )             27.2     12-04    138  |  103  |  106<H>  ----------------------------<  120<H>  5.6<H>   |  20<L>  |  4.93<H>    Ca    8.9      04 Dec 2023 07:06  Phos  3.0     12-04  Mg     2.2     12-04    TPro  6.5  /  Alb  3.3  /  TBili  0.3  /  DBili  x   /  AST  17  /  ALT  16  /  AlkPhos  46  12-04        Labs personally reviewed      ASSESSMENT/PLAN: 	  83 year old female with HTN, HLD, CAD, T2DM, CKD, remote hx of breast CA s/p mastectomy, and chronic hypoxemic respiratory failure of unknown etiology on home O2 who presents for lower extremity swelling and shortness of breath.      1. Acute diastolic heart failure  - Pt with LE edema and pulmonary congestion  - s/p IV diuresis now DC'd d/t increasing Cr which is now improving.  - Echo shows normal LV systolic function with Bi-atrial enlargement, G2DD and LVH  - EKG shows sinus rhythm (not atrial flutter)  - Plan for HD as per Renal  - Pulm consult appreciated for continued hypoxia despite diuresis: recommend swallow eval, duonebs, symbicort and prednisone      2. ESRD  - Renal on board  - Plan for HD     3. HLD  - Statin therapy    4. HTN - stable  - Patient with ESRD          SHEELA Epperson DO [facility]  Cardiovascular Medicine  [address], [address]  Available through call or text on [employer]  Office: [phone]   DATE OF SERVICE: 12-04-23 @ 15:06    Patient is a 83y old  Female who presents with a chief complaint of LE swelling, dyspnea (04 Dec 2023 11:52)      INTERVAL HISTORY: Feels ok.  at bedside.     REVIEW OF SYSTEMS:  CONSTITUTIONAL: No weakness  EYES/ENT: No visual changes;  No throat pain   NECK: No pain or stiffness  RESPIRATORY: No cough, wheezing; No shortness of breath  CARDIOVASCULAR: No chest pain or palpitations  GASTROINTESTINAL: No abdominal  pain. No nausea, vomiting, or hematemesis  GENITOURINARY: No dysuria, frequency or hematuria  NEUROLOGICAL: No stroke like symptoms  SKIN: No rashes    TELEMETRY Personally reviewed: SR   	  MEDICATIONS:  amLODIPine   Tablet 10 milliGRAM(s) Oral daily  hydrALAZINE 25 milliGRAM(s) Oral three times a day        PHYSICAL EXAM:  T(C): 36.6 (12-04-23 @ 11:14), Max: 36.9 (12-03-23 @ 21:04)  HR: 82 (12-04-23 @ 11:14) (74 - 93)  BP: 122/57 (12-04-23 @ 11:14) (122/57 - 152/69)  RR: 18 (12-04-23 @ 11:14) (18 - 20)  SpO2: 95% (12-04-23 @ 11:14) (95% - 97%)  Wt(kg): --  I&O's Summary    03 Dec 2023 07:01  -  04 Dec 2023 07:00  --------------------------------------------------------  IN: 340 mL / OUT: 600 mL / NET: -260 mL          Appearance: In no distress	  HEENT:    PERRL, EOMI	  Cardiovascular:  S1 S2, No JVD  Respiratory: Lungs clear to auscultation	  Gastrointestinal:  Soft, Non-tender, + BS	  Vascularature:  + edema of LE  Psychiatric: Appropriate affect   Neuro: no acute focal deficits                               8.7    10.19 )-----------( 296      ( 04 Dec 2023 07:06 )             27.2     12-04    138  |  103  |  106<H>  ----------------------------<  120<H>  5.6<H>   |  20<L>  |  4.93<H>    Ca    8.9      04 Dec 2023 07:06  Phos  3.0     12-04  Mg     2.2     12-04    TPro  6.5  /  Alb  3.3  /  TBili  0.3  /  DBili  x   /  AST  17  /  ALT  16  /  AlkPhos  46  12-04        Labs personally reviewed      ASSESSMENT/PLAN: 	  83 year old female with HTN, HLD, CAD, T2DM, CKD, remote hx of breast CA s/p mastectomy, and chronic hypoxemic respiratory failure of unknown etiology on home O2 who presents for lower extremity swelling and shortness of breath.      1. Acute diastolic heart failure  - Pt with LE edema and pulmonary congestion  - s/p IV diuresis now DC'd d/t increasing Cr which is now improving.  - Echo shows normal LV systolic function with Bi-atrial enlargement, G2DD and LVH  - EKG shows sinus rhythm (not atrial flutter)  - Plan for HD as per Renal  - Pulm consult appreciated for continued hypoxia despite diuresis: recommend swallow eval, duonebs, symbicort and prednisone      2. ESRD  - Renal on board  - Plan for HD     3. HLD  - Statin therapy    4. HTN - stable  - Patient with ESRD          SHEELA Epperson DO [facility]  Cardiovascular Medicine  [address], [address]  Available through call or text on [employer]  Office: [phone]

## 2023-12-04 NOTE — PROGRESS NOTE ADULT - SUBJECTIVE AND OBJECTIVE BOX
[facility] Division of Kidney Diseases & Hypertension  FOLLOW UP NOTE  [phone]--------------------------------------------------------------------------------    Chief Complaint: Advanced CKD    24 hour events/subjective:  Pt was seen and examined at the bedside. No acute overnight events. Pt denies worsening of SOB/ Constipation/ Diarrhea/ Nausea/ Vomiting/ abdominal pain/ chest pain/ tingling/ numbness.       PAST HISTORY  --------------------------------------------------------------------------------  No significant changes to PMH, PSH, FHx, SHx, unless otherwise noted    ALLERGIES & MEDICATIONS  --------------------------------------------------------------------------------  Allergies    No Known Allergies    Intolerances      Standing Inpatient Medications  albuterol    90 MICROgram(s) HFA Inhaler 2 Puff(s) Inhalation every 6 hours  albuterol/ipratropium for Nebulization 3 milliLiter(s) Nebulizer every 6 hours  amLODIPine   Tablet 10 milliGRAM(s) Oral daily  atorvastatin 80 milliGRAM(s) Oral at bedtime  budesonide 160 MICROgram(s)/formoterol 4.5 MICROgram(s) Inhaler 2 Puff(s) Inhalation two times a day  calcitriol   Capsule 0.25 MICROGram(s) Oral daily  chlorhexidine 2% Cloths 1 Application(s) Topical daily  dextrose 5%. 1000 milliLiter(s) IV Continuous <Continuous>  dextrose 5%. 1000 milliLiter(s) IV Continuous <Continuous>  dextrose 50% Injectable 12.5 Gram(s) IV Push once  dextrose 50% Injectable 25 Gram(s) IV Push once  dextrose 50% Injectable 25 Gram(s) IV Push once  ferrous    sulfate 325 milliGRAM(s) Oral daily  fluticasone propionate 50 MICROgram(s)/spray Nasal Spray 1 Spray(s) Both Nostrils two times a day  glucagon  Injectable 1 milliGRAM(s) IntraMuscular once  heparin   Injectable 5000 Unit(s) SubCutaneous every 8 hours  hydrALAZINE 25 milliGRAM(s) Oral three times a day  influenza  Vaccine (HIGH DOSE) 0.7 milliLiter(s) IntraMuscular once  insulin lispro (ADMELOG) corrective regimen sliding scale   SubCutaneous at bedtime  insulin lispro (ADMELOG) corrective regimen sliding scale   SubCutaneous three times a day before meals  montelukast 10 milliGRAM(s) Oral daily  mupirocin 2% Nasal 1 Application(s) Both Nostrils every 12 hours  nystatin Powder 1 Application(s) Topical two times a day  polyethylene glycol 3350 17 Gram(s) Oral daily  senna 2 Tablet(s) Oral at bedtime  sevelamer carbonate 800 milliGRAM(s) Oral every 8 hours  sodium chloride 0.65% Nasal 1 Spray(s) Both Nostrils daily    PRN Inpatient Medications  acetaminophen     Tablet .. 650 milliGRAM(s) Oral every 6 hours PRN  dextrose Oral Gel 15 Gram(s) Oral once PRN      REVIEW OF SYSTEMS  --------------------------------------------------------------------------------  per above    VITALS/PHYSICAL EXAM  --------------------------------------------------------------------------------  T(C): 36.6 (12-04-23 @ 11:14), Max: 36.9 (12-03-23 @ 21:04)  HR: 82 (12-04-23 @ 11:14) (74 - 93)  BP: 122/57 (12-04-23 @ 11:14) (122/57 - 152/69)  RR: 18 (12-04-23 @ 11:14) (18 - 20)  SpO2: 95% (12-04-23 @ 11:14) (95% - 97%)  Wt(kg): --        12-03-23 @ 07:01  -  12-04-23 @ 07:00  --------------------------------------------------------  IN: 340 mL / OUT: 600 mL / NET: -260 mL      Physical Exam:  General: no acute distress  Neuro: no focal deficits  HEENT: NC/AT, anicteric, no JVD  Pulmonary: breath sounds diminished, on 4L O2  Cardiovascular/Chest: +S1S2,  GI/Abdomen: soft, NT/ND, +bowel sounds  Extremities: No edema  Skin: Warm and dry  Neuro : Tremor + ( Essential ?)- unlikely to be uremic related      LABS/STUDIES  --------------------------------------------------------------------------------              8.7    10.19 >-----------<  296      [12-04-23 @ 07:06]              27.2     138  |  103  |  106  ----------------------------<  120      [12-04-23 @ 07:06]  5.6   |  20  |  4.93        Ca     8.9     [12-04-23 @ 07:06]      Mg     2.2     [12-04-23 @ 07:06]      Phos  3.0     [12-04-23 @ 07:06]    TPro  6.5  /  Alb  3.3  /  TBili  0.3  /  DBili  x   /  AST  17  /  ALT  16  /  AlkPhos  46  [12-04-23 @ 07:06]          Creatinine Trend:  SCr 4.93 [12-04 @ 07:06]  SCr 4.46 [12-03 @ 06:12]  SCr 4.91 [12-02 @ 07:39]  SCr 5.55 [12-01 @ 06:10]  SCr 5.75 [11-30 @ 06:39]    Urinalysis - [12-04-23 @ 07:06]      Color  / Appearance  / SG  / pH       Gluc 120 / Ketone   / Bili  / Urobili        Blood  / Protein  / Leuk Est  / Nitrite       RBC  / WBC  / Hyaline  / Gran  / Sq Epi  / Non Sq Epi  / Bacteria     Urine Creatinine 141      [11-28-23 @ 18:09]  Urine Protein 313      [11-28-23 @ 18:09]  Urine Sodium 24      [11-28-23 @ 18:08]  Urine Urea Nitrogen 596      [11-28-23 @ 18:08]  Urine Potassium 34      [11-28-23 @ 18:08]  Urine Osmolality 363      [11-28-23 @ 18:10]         [facility] Division of Kidney Diseases & Hypertension  FOLLOW UP NOTE  [phone]--------------------------------------------------------------------------------    Chief Complaint: Advanced CKD    24 hour events/subjective:  Pt was seen and examined at the bedside. No acute overnight events. Pt denies worsening of SOB/ Constipation/ Diarrhea/ Nausea/ Vomiting/ abdominal pain/ chest pain/ tingling/ numbness.       PAST HISTORY  --------------------------------------------------------------------------------  No significant changes to PMH, PSH, FHx, SHx, unless otherwise noted    ALLERGIES & MEDICATIONS  --------------------------------------------------------------------------------  Allergies    No Known Allergies    Intolerances      Standing Inpatient Medications  albuterol    90 MICROgram(s) HFA Inhaler 2 Puff(s) Inhalation every 6 hours  albuterol/ipratropium for Nebulization 3 milliLiter(s) Nebulizer every 6 hours  amLODIPine   Tablet 10 milliGRAM(s) Oral daily  atorvastatin 80 milliGRAM(s) Oral at bedtime  budesonide 160 MICROgram(s)/formoterol 4.5 MICROgram(s) Inhaler 2 Puff(s) Inhalation two times a day  calcitriol   Capsule 0.25 MICROGram(s) Oral daily  chlorhexidine 2% Cloths 1 Application(s) Topical daily  dextrose 5%. 1000 milliLiter(s) IV Continuous <Continuous>  dextrose 5%. 1000 milliLiter(s) IV Continuous <Continuous>  dextrose 50% Injectable 12.5 Gram(s) IV Push once  dextrose 50% Injectable 25 Gram(s) IV Push once  dextrose 50% Injectable 25 Gram(s) IV Push once  ferrous    sulfate 325 milliGRAM(s) Oral daily  fluticasone propionate 50 MICROgram(s)/spray Nasal Spray 1 Spray(s) Both Nostrils two times a day  glucagon  Injectable 1 milliGRAM(s) IntraMuscular once  heparin   Injectable 5000 Unit(s) SubCutaneous every 8 hours  hydrALAZINE 25 milliGRAM(s) Oral three times a day  influenza  Vaccine (HIGH DOSE) 0.7 milliLiter(s) IntraMuscular once  insulin lispro (ADMELOG) corrective regimen sliding scale   SubCutaneous at bedtime  insulin lispro (ADMELOG) corrective regimen sliding scale   SubCutaneous three times a day before meals  montelukast 10 milliGRAM(s) Oral daily  mupirocin 2% Nasal 1 Application(s) Both Nostrils every 12 hours  nystatin Powder 1 Application(s) Topical two times a day  polyethylene glycol 3350 17 Gram(s) Oral daily  senna 2 Tablet(s) Oral at bedtime  sevelamer carbonate 800 milliGRAM(s) Oral every 8 hours  sodium chloride 0.65% Nasal 1 Spray(s) Both Nostrils daily    PRN Inpatient Medications  acetaminophen     Tablet .. 650 milliGRAM(s) Oral every 6 hours PRN  dextrose Oral Gel 15 Gram(s) Oral once PRN      REVIEW OF SYSTEMS  --------------------------------------------------------------------------------  per above    VITALS/PHYSICAL EXAM  --------------------------------------------------------------------------------  T(C): 36.6 (12-04-23 @ 11:14), Max: 36.9 (12-03-23 @ 21:04)  HR: 82 (12-04-23 @ 11:14) (74 - 93)  BP: 122/57 (12-04-23 @ 11:14) (122/57 - 152/69)  RR: 18 (12-04-23 @ 11:14) (18 - 20)  SpO2: 95% (12-04-23 @ 11:14) (95% - 97%)  Wt(kg): --        12-03-23 @ 07:01  -  12-04-23 @ 07:00  --------------------------------------------------------  IN: 340 mL / OUT: 600 mL / NET: -260 mL      Physical Exam:  General: no acute distress  Neuro: no focal deficits  HEENT: NC/AT, anicteric, no JVD  Pulmonary: breath sounds diminished, on 4L O2  Cardiovascular/Chest: +S1S2,  GI/Abdomen: soft, NT/ND, +bowel sounds  Extremities: No edema  Skin: Warm and dry  Neuro : Tremor + ( Essential ?)- unlikely to be uremic related      LABS/STUDIES  --------------------------------------------------------------------------------              8.7    10.19 >-----------<  296      [12-04-23 @ 07:06]              27.2     138  |  103  |  106  ----------------------------<  120      [12-04-23 @ 07:06]  5.6   |  20  |  4.93        Ca     8.9     [12-04-23 @ 07:06]      Mg     2.2     [12-04-23 @ 07:06]      Phos  3.0     [12-04-23 @ 07:06]    TPro  6.5  /  Alb  3.3  /  TBili  0.3  /  DBili  x   /  AST  17  /  ALT  16  /  AlkPhos  46  [12-04-23 @ 07:06]          Creatinine Trend:  SCr 4.93 [12-04 @ 07:06]  SCr 4.46 [12-03 @ 06:12]  SCr 4.91 [12-02 @ 07:39]  SCr 5.55 [12-01 @ 06:10]  SCr 5.75 [11-30 @ 06:39]    Urinalysis - [12-04-23 @ 07:06]      Color  / Appearance  / SG  / pH       Gluc 120 / Ketone   / Bili  / Urobili        Blood  / Protein  / Leuk Est  / Nitrite       RBC  / WBC  / Hyaline  / Gran  / Sq Epi  / Non Sq Epi  / Bacteria     Urine Creatinine 141      [11-28-23 @ 18:09]  Urine Protein 313      [11-28-23 @ 18:09]  Urine Sodium 24      [11-28-23 @ 18:08]  Urine Urea Nitrogen 596      [11-28-23 @ 18:08]  Urine Potassium 34      [11-28-23 @ 18:08]  Urine Osmolality 363      [11-28-23 @ 18:10]

## 2023-12-04 NOTE — PROGRESS NOTE ADULT - ATTENDING COMMENTS
# CAL on CKD. Developed CAL due to contrast-induced nephropathy. Unclear baseline serum creatinine, but likely in the 4s. Given that eGFR is 7-9, we will start dialysis --> having asterexis.     # Hyperkalemia - secondary to kidney dysfunction. For lokelma.     # Medication monitoring encounter: medication dose reviewed. Please dose medications to CrCl<15    The rest of the recommendations as per fellow's note.    Kerri Leyva MD  Attending Nephrologist  [phone] or via [employer] # CAL on CKD. Developed CAL due to contrast-induced nephropathy. Unclear baseline serum creatinine, but likely in the 4s. Given that eGFR is 7-9, we will start dialysis --> having asterexis.     # Hyperkalemia - secondary to kidney dysfunction. For lokelma.     # Medication monitoring encounter: medication dose reviewed. Please dose medications to CrCl<15    The rest of the recommendations as per fellow's note.    Kerri Leyva MD  Attending Nephrologist  [phone] or via [employer]

## 2023-12-04 NOTE — PROGRESS NOTE ADULT - ATTENDING COMMENTS
83 year old female with PMH chronic hypoxic respiratory failure, DM2, CAD, HTN and HLD who presented with dyspnea and lower extremity edema admitted for acute decompensated congestive heart failure. TTE shows LV diastolic dysfunction but normal EF. She was started on IV diuretics however, her creatinine increased to nearly 7 but has trended back down to 4.93. Her GFR is less than 10 and she is having asterixis so nephrology is planning initiation of HD. IR has been consulted for HD catheter placement which will take place today. Her K today is 5.6 s/p Elana, will repeat BMP. Rest of plan as above.

## 2023-12-04 NOTE — PROGRESS NOTE ADULT - SUBJECTIVE AND OBJECTIVE BOX
Patient is a 83y old  Female who presents with a chief complaint of LE swelling, dyspnea (03 Dec 2023 11:07)      SUBJECTIVE / OVERNIGHT EVENTS: NAEO. Pt denies chest pain, SOB, N/V, fever/chills, or changes in bowel movements.    MEDICATIONS  (STANDING):  albuterol    90 MICROgram(s) HFA Inhaler 2 Puff(s) Inhalation every 6 hours  albuterol/ipratropium for Nebulization 3 milliLiter(s) Nebulizer every 6 hours  amLODIPine   Tablet 10 milliGRAM(s) Oral daily  atorvastatin 80 milliGRAM(s) Oral at bedtime  budesonide 160 MICROgram(s)/formoterol 4.5 MICROgram(s) Inhaler 2 Puff(s) Inhalation two times a day  calcitriol   Capsule 0.25 MICROGram(s) Oral daily  chlorhexidine 2% Cloths 1 Application(s) Topical daily  dextrose 5%. 1000 milliLiter(s) (50 mL/Hr) IV Continuous <Continuous>  dextrose 5%. 1000 milliLiter(s) (100 mL/Hr) IV Continuous <Continuous>  dextrose 50% Injectable 25 Gram(s) IV Push once  dextrose 50% Injectable 12.5 Gram(s) IV Push once  dextrose 50% Injectable 25 Gram(s) IV Push once  ferrous    sulfate 325 milliGRAM(s) Oral daily  fluticasone propionate 50 MICROgram(s)/spray Nasal Spray 1 Spray(s) Both Nostrils two times a day  glucagon  Injectable 1 milliGRAM(s) IntraMuscular once  heparin   Injectable 5000 Unit(s) SubCutaneous every 8 hours  hydrALAZINE 25 milliGRAM(s) Oral three times a day  influenza  Vaccine (HIGH DOSE) 0.7 milliLiter(s) IntraMuscular once  insulin lispro (ADMELOG) corrective regimen sliding scale   SubCutaneous three times a day before meals  insulin lispro (ADMELOG) corrective regimen sliding scale   SubCutaneous at bedtime  montelukast 10 milliGRAM(s) Oral daily  mupirocin 2% Nasal 1 Application(s) Both Nostrils every 12 hours  nystatin Powder 1 Application(s) Topical two times a day  polyethylene glycol 3350 17 Gram(s) Oral daily  senna 2 Tablet(s) Oral at bedtime  sevelamer carbonate 800 milliGRAM(s) Oral every 8 hours  sodium chloride 0.65% Nasal 1 Spray(s) Both Nostrils daily    MEDICATIONS  (PRN):  acetaminophen     Tablet .. 650 milliGRAM(s) Oral every 6 hours PRN Temp greater or equal to 38C (100.4F), Mild Pain (1 - 3)  dextrose Oral Gel 15 Gram(s) Oral once PRN Blood Glucose LESS THAN 70 milliGRAM(s)/deciliter      CAPILLARY BLOOD GLUCOSE      POCT Blood Glucose.: 161 mg/dL (03 Dec 2023 21:37)  POCT Blood Glucose.: 155 mg/dL (03 Dec 2023 16:22)  POCT Blood Glucose.: 171 mg/dL (03 Dec 2023 11:38)  POCT Blood Glucose.: 130 mg/dL (03 Dec 2023 07:34)    I&O's Summary    03 Dec 2023 07:01  -  04 Dec 2023 07:00  --------------------------------------------------------  IN: 340 mL / OUT: 600 mL / NET: -260 mL        Vital Signs Last 24 Hrs  T(C): 36.4 (04 Dec 2023 04:40), Max: 36.9 (03 Dec 2023 21:04)  T(F): 97.6 (04 Dec 2023 04:40), Max: 98.4 (03 Dec 2023 21:04)  HR: 93 (04 Dec 2023 04:40) (74 - 93)  BP: 124/60 (04 Dec 2023 04:40) (124/60 - 152/69)  BP(mean): --  RR: 18 (04 Dec 2023 04:40) (18 - 20)  SpO2: 97% (04 Dec 2023 04:40) (94% - 98%)    Parameters below as of 04 Dec 2023 04:40  Patient On (Oxygen Delivery Method): nasal cannula  O2 Flow (L/min): 2      PHYSICAL EXAM:  GENERAL: NAD, well-developed, well-nourished  HEAD: Atraumatic, Normocephalic  EYES: Conjunctiva and sclera clear  CHEST/LUNG: Clear to auscultation bilaterally; No wheezes or crackles  HEART: Normal S1/S2; Regular rate and rhythm; No murmurs, rubs, or gallops  ABDOMEN: Soft, Nontender, Nondistended; Bowel sounds present  EXTREMITIES: No clubbing, cyanosis, or edema  PSYCH: A&Ox3      LABS:                        8.7    11.98 )-----------( 282      ( 03 Dec 2023 06:11 )             26.9      12-03    137  |  103  |  103<H>  ----------------------------<  112<H>  5.0   |  21<L>  |  4.46<H>    Ca    8.5      03 Dec 2023 06:12  Phos  3.3     12-03  Mg     2.2     12-03            Urinalysis Basic - ( 03 Dec 2023 06:12 )    Color: x / Appearance: x / SG: x / pH: x  Gluc: 112 mg/dL / Ketone: x  / Bili: x / Urobili: x   Blood: x / Protein: x / Nitrite: x   Leuk Esterase: x / RBC: x / WBC x   Sq Epi: x / Non Sq Epi: x / Bacteria: x        RADIOLOGY & ADDITIONAL TESTS:     Patient is a 83y old  Female who presents with a chief complaint of LE swelling, dyspnea (03 Dec 2023 11:07)      SUBJECTIVE / OVERNIGHT EVENTS: NAEO. Pt denies chest pain, SOB, N/V, fever/chills, or changes in bowel movements. Per pt's  at bedside, nephrology came to assess and recommended dialysis. Pt and family in agreement.    MEDICATIONS  (STANDING):  albuterol    90 MICROgram(s) HFA Inhaler 2 Puff(s) Inhalation every 6 hours  albuterol/ipratropium for Nebulization 3 milliLiter(s) Nebulizer every 6 hours  amLODIPine   Tablet 10 milliGRAM(s) Oral daily  atorvastatin 80 milliGRAM(s) Oral at bedtime  budesonide 160 MICROgram(s)/formoterol 4.5 MICROgram(s) Inhaler 2 Puff(s) Inhalation two times a day  calcitriol   Capsule 0.25 MICROGram(s) Oral daily  chlorhexidine 2% Cloths 1 Application(s) Topical daily  dextrose 5%. 1000 milliLiter(s) (50 mL/Hr) IV Continuous <Continuous>  dextrose 5%. 1000 milliLiter(s) (100 mL/Hr) IV Continuous <Continuous>  dextrose 50% Injectable 25 Gram(s) IV Push once  dextrose 50% Injectable 12.5 Gram(s) IV Push once  dextrose 50% Injectable 25 Gram(s) IV Push once  ferrous    sulfate 325 milliGRAM(s) Oral daily  fluticasone propionate 50 MICROgram(s)/spray Nasal Spray 1 Spray(s) Both Nostrils two times a day  glucagon  Injectable 1 milliGRAM(s) IntraMuscular once  heparin   Injectable 5000 Unit(s) SubCutaneous every 8 hours  hydrALAZINE 25 milliGRAM(s) Oral three times a day  influenza  Vaccine (HIGH DOSE) 0.7 milliLiter(s) IntraMuscular once  insulin lispro (ADMELOG) corrective regimen sliding scale   SubCutaneous three times a day before meals  insulin lispro (ADMELOG) corrective regimen sliding scale   SubCutaneous at bedtime  montelukast 10 milliGRAM(s) Oral daily  mupirocin 2% Nasal 1 Application(s) Both Nostrils every 12 hours  nystatin Powder 1 Application(s) Topical two times a day  polyethylene glycol 3350 17 Gram(s) Oral daily  senna 2 Tablet(s) Oral at bedtime  sevelamer carbonate 800 milliGRAM(s) Oral every 8 hours  sodium chloride 0.65% Nasal 1 Spray(s) Both Nostrils daily    MEDICATIONS  (PRN):  acetaminophen     Tablet .. 650 milliGRAM(s) Oral every 6 hours PRN Temp greater or equal to 38C (100.4F), Mild Pain (1 - 3)  dextrose Oral Gel 15 Gram(s) Oral once PRN Blood Glucose LESS THAN 70 milliGRAM(s)/deciliter      CAPILLARY BLOOD GLUCOSE      POCT Blood Glucose.: 161 mg/dL (03 Dec 2023 21:37)  POCT Blood Glucose.: 155 mg/dL (03 Dec 2023 16:22)  POCT Blood Glucose.: 171 mg/dL (03 Dec 2023 11:38)  POCT Blood Glucose.: 130 mg/dL (03 Dec 2023 07:34)    I&O's Summary    03 Dec 2023 07:01  -  04 Dec 2023 07:00  --------------------------------------------------------  IN: 340 mL / OUT: 600 mL / NET: -260 mL        Vital Signs Last 24 Hrs  T(C): 36.4 (04 Dec 2023 04:40), Max: 36.9 (03 Dec 2023 21:04)  T(F): 97.6 (04 Dec 2023 04:40), Max: 98.4 (03 Dec 2023 21:04)  HR: 93 (04 Dec 2023 04:40) (74 - 93)  BP: 124/60 (04 Dec 2023 04:40) (124/60 - 152/69)  BP(mean): --  RR: 18 (04 Dec 2023 04:40) (18 - 20)  SpO2: 97% (04 Dec 2023 04:40) (94% - 98%)    Parameters below as of 04 Dec 2023 04:40  Patient On (Oxygen Delivery Method): nasal cannula  O2 Flow (L/min): 2      PHYSICAL EXAM:  GENERAL: NAD, well-developed, well-nourished  HEAD: Atraumatic, Normocephalic  EYES: Conjunctiva and sclera clear  CHEST/LUNG: Wheezes and crackles heard b/l  HEART: Normal S1/S2; Regular rate and rhythm; No murmurs, rubs, or gallops  ABDOMEN: Soft, Nontender, Nondistended; Bowel sounds present  EXTREMITIES: No clubbing, cyanosis, noted 3+ pitting edema  PSYCH: A&Ox3      LABS:                        8.7    11.98 )-----------( 282      ( 03 Dec 2023 06:11 )             26.9      12-03    137  |  103  |  103<H>  ----------------------------<  112<H>  5.0   |  21<L>  |  4.46<H>    Ca    8.5      03 Dec 2023 06:12  Phos  3.3     12-03  Mg     2.2     12-03            Urinalysis Basic - ( 03 Dec 2023 06:12 )    Color: x / Appearance: x / SG: x / pH: x  Gluc: 112 mg/dL / Ketone: x  / Bili: x / Urobili: x   Blood: x / Protein: x / Nitrite: x   Leuk Esterase: x / RBC: x / WBC x   Sq Epi: x / Non Sq Epi: x / Bacteria: x        RADIOLOGY & ADDITIONAL TESTS:

## 2023-12-04 NOTE — CONSULT NOTE ADULT - SUBJECTIVE AND OBJECTIVE BOX
Interventional Radiology    Evaluate for Procedure:     HPI: 83 year old female with HTN, HLD, CAD, T2DM, CKD, remote hx of breast CA s/p mastectomy, and chronic hypoxemic respiratory failure of unknown etiology on home O2 who presents for lower extremity swelling and shortness of breath. Patient for initiation of dialysis. IR being consulted for non tunneled HD catheter placement.    Allergies: No Known Allergies    Medications (Abx/Cardiac/Anticoagulation/Blood Products)    amLODIPine   Tablet: 10 milliGRAM(s) Oral (12-04 @ 05:38)  buMETAnide Injectable: 2 milliGRAM(s) IV Push (12-03 @ 11:32)  heparin   Injectable: 5000 Unit(s) SubCutaneous (12-04 @ 05:38)  hydrALAZINE: 25 milliGRAM(s) Oral (12-04 @ 05:38)    Data:    T(C): 36.6  HR: 82  BP: 122/57  RR: 18  SpO2: 95%    -WBC 10.19 / HgB 8.7 / Hct 27.2 / Plt 296  -Na 138 / Cl 103 /  / Glucose 120  -K 5.6 / CO2 20 / Cr 4.93  -ALT 16 / Alk Phos 46 / T.Bili 0.3  -INR 0.99 / PTT 38.8    Radiology:     Assessment/Plan:   83 year old female with HTN, HLD, CAD, T2DM, CKD, remote hx of breast CA s/p mastectomy, and chronic hypoxemic respiratory failure of unknown etiology on home O2 who presents for lower extremity swelling and shortness of breath. Patient for initiation of dialysis. IR being consulted for non tunneled HD catheter placement.      - case reviewed and approved for today 12/4/23  - please place IR procedure order under [facility]  - sada AC  - Patient does not need to be NPO  - d/w primary team Interventional Radiology    Evaluate for Procedure:     HPI: 83 year old female with HTN, HLD, CAD, T2DM, CKD, remote hx of breast CA s/p mastectomy, and chronic hypoxemic respiratory failure of unknown etiology on home O2 who presents for lower extremity swelling and shortness of breath. Patient for initiation of dialysis. IR being consulted for non tunneled HD catheter placement.    Allergies: No Known Allergies    Medications (Abx/Cardiac/Anticoagulation/Blood Products)    amLODIPine   Tablet: 10 milliGRAM(s) Oral (12-04 @ 05:38)  buMETAnide Injectable: 2 milliGRAM(s) IV Push (12-03 @ 11:32)  heparin   Injectable: 5000 Unit(s) SubCutaneous (12-04 @ 05:38)  hydrALAZINE: 25 milliGRAM(s) Oral (12-04 @ 05:38)    Data:    T(C): 36.6  HR: 82  BP: 122/57  RR: 18  SpO2: 95%    -WBC 10.19 / HgB 8.7 / Hct 27.2 / Plt 296  -Na 138 / Cl 103 /  / Glucose 120  -K 5.6 / CO2 20 / Cr 4.93  -ALT 16 / Alk Phos 46 / T.Bili 0.3  -INR 0.99 / PTT 38.8    Radiology:     Assessment/Plan:   83 year old female with HTN, HLD, CAD, T2DM, CKD, remote hx of breast CA s/p mastectomy, and chronic hypoxemic respiratory failure of unknown etiology on home O2 who presents for lower extremity swelling and shortness of breath. Patient for initiation of dialysis. IR being consulted for non tunneled HD catheter placement.      - case reviewed and approved for today 12/4/23  - please place IR procedure order under [facility]  -  - patient will require DDAVP prior to catheter placement to minimize bleeding risk  - hold AC  - Patient does not need to be NPO  - d/w primary team

## 2023-12-04 NOTE — PROGRESS NOTE ADULT - PROBLEM SELECTOR PLAN 1
CAL on CKD. Has not seen a physician in the recent past. Given her history its likely to be CAL on CKD 2/2 SAUMYA vs CKD progression. She has pedal edema. US duplex Kidney - showed renal parenchymal disease. UPCR 5.6g, no RBCs, no bacteria. FeUrea borderline but more suggestive of prerenal etiology.  SCr 4.5 on admission, received IV contrast for CTA on evening of 11/21, and Cr had been stable but increased about 2 days after IV contrast exposure suggesting SAUMYA. Serology work up ongoing- so far negative HIV, Hep B, Hep C, C3, C4, Anti GBM antibody, Anti Ds DNA, JAYLAN, ANCA, Serum free light chains, immunofixation. A1c 6.1%. Negative for PLA2R Ab. BNP 1300 initially. TTE with G2DD with elevated filling pressures and severe LA dilation. Patient was diuresed throughout hospitalization. UOP was good.     Currently, eGFR of 9 with Scr of 4.93. She has asterixis on examination- but per family also with history of baseline tremors. Not confused, endorses good appetite and N/V.     Will plan for dialysis initiation. Discussed with  different modalities of dialysis- he said home dialysis would not be a good option for them. Please consult IR for catheter placement. Give 20mcg prior to placement

## 2023-12-05 LAB
ALBUMIN SERPL ELPH-MCNC: 3.6 G/DL — SIGNIFICANT CHANGE UP (ref 3.3–5)
ALBUMIN SERPL ELPH-MCNC: 3.6 G/DL — SIGNIFICANT CHANGE UP (ref 3.3–5)
ALP SERPL-CCNC: 42 U/L — SIGNIFICANT CHANGE UP (ref 40–120)
ALP SERPL-CCNC: 42 U/L — SIGNIFICANT CHANGE UP (ref 40–120)
ALT FLD-CCNC: 15 U/L — SIGNIFICANT CHANGE UP (ref 10–45)
ALT FLD-CCNC: 15 U/L — SIGNIFICANT CHANGE UP (ref 10–45)
ANION GAP SERPL CALC-SCNC: 12 MMOL/L — SIGNIFICANT CHANGE UP (ref 5–17)
ANION GAP SERPL CALC-SCNC: 12 MMOL/L — SIGNIFICANT CHANGE UP (ref 5–17)
ANION GAP SERPL CALC-SCNC: 13 MMOL/L — SIGNIFICANT CHANGE UP (ref 5–17)
ANION GAP SERPL CALC-SCNC: 13 MMOL/L — SIGNIFICANT CHANGE UP (ref 5–17)
AST SERPL-CCNC: 15 U/L — SIGNIFICANT CHANGE UP (ref 10–40)
AST SERPL-CCNC: 15 U/L — SIGNIFICANT CHANGE UP (ref 10–40)
BASOPHILS # BLD AUTO: 0.01 K/UL — SIGNIFICANT CHANGE UP (ref 0–0.2)
BASOPHILS # BLD AUTO: 0.01 K/UL — SIGNIFICANT CHANGE UP (ref 0–0.2)
BASOPHILS NFR BLD AUTO: 0.1 % — SIGNIFICANT CHANGE UP (ref 0–2)
BASOPHILS NFR BLD AUTO: 0.1 % — SIGNIFICANT CHANGE UP (ref 0–2)
BILIRUB SERPL-MCNC: 0.3 MG/DL — SIGNIFICANT CHANGE UP (ref 0.2–1.2)
BILIRUB SERPL-MCNC: 0.3 MG/DL — SIGNIFICANT CHANGE UP (ref 0.2–1.2)
BUN SERPL-MCNC: 105 MG/DL — HIGH (ref 7–23)
BUN SERPL-MCNC: 105 MG/DL — HIGH (ref 7–23)
BUN SERPL-MCNC: 98 MG/DL — HIGH (ref 7–23)
BUN SERPL-MCNC: 98 MG/DL — HIGH (ref 7–23)
CALCIUM SERPL-MCNC: 8.7 MG/DL — SIGNIFICANT CHANGE UP (ref 8.4–10.5)
CALCIUM SERPL-MCNC: 8.7 MG/DL — SIGNIFICANT CHANGE UP (ref 8.4–10.5)
CALCIUM SERPL-MCNC: 9.1 MG/DL — SIGNIFICANT CHANGE UP (ref 8.4–10.5)
CALCIUM SERPL-MCNC: 9.1 MG/DL — SIGNIFICANT CHANGE UP (ref 8.4–10.5)
CHLORIDE SERPL-SCNC: 102 MMOL/L — SIGNIFICANT CHANGE UP (ref 96–108)
CO2 SERPL-SCNC: 22 MMOL/L — SIGNIFICANT CHANGE UP (ref 22–31)
CO2 SERPL-SCNC: 22 MMOL/L — SIGNIFICANT CHANGE UP (ref 22–31)
CO2 SERPL-SCNC: 23 MMOL/L — SIGNIFICANT CHANGE UP (ref 22–31)
CO2 SERPL-SCNC: 23 MMOL/L — SIGNIFICANT CHANGE UP (ref 22–31)
CREAT SERPL-MCNC: 4.51 MG/DL — HIGH (ref 0.5–1.3)
CREAT SERPL-MCNC: 4.51 MG/DL — HIGH (ref 0.5–1.3)
CREAT SERPL-MCNC: 4.56 MG/DL — HIGH (ref 0.5–1.3)
CREAT SERPL-MCNC: 4.56 MG/DL — HIGH (ref 0.5–1.3)
EGFR: 9 ML/MIN/1.73M2 — LOW
EOSINOPHIL # BLD AUTO: 0.22 K/UL — SIGNIFICANT CHANGE UP (ref 0–0.5)
EOSINOPHIL # BLD AUTO: 0.22 K/UL — SIGNIFICANT CHANGE UP (ref 0–0.5)
EOSINOPHIL NFR BLD AUTO: 2.4 % — SIGNIFICANT CHANGE UP (ref 0–6)
EOSINOPHIL NFR BLD AUTO: 2.4 % — SIGNIFICANT CHANGE UP (ref 0–6)
GLUCOSE BLDC GLUCOMTR-MCNC: 148 MG/DL — HIGH (ref 70–99)
GLUCOSE BLDC GLUCOMTR-MCNC: 148 MG/DL — HIGH (ref 70–99)
GLUCOSE BLDC GLUCOMTR-MCNC: 156 MG/DL — HIGH (ref 70–99)
GLUCOSE BLDC GLUCOMTR-MCNC: 156 MG/DL — HIGH (ref 70–99)
GLUCOSE BLDC GLUCOMTR-MCNC: 158 MG/DL — HIGH (ref 70–99)
GLUCOSE BLDC GLUCOMTR-MCNC: 158 MG/DL — HIGH (ref 70–99)
GLUCOSE BLDC GLUCOMTR-MCNC: 192 MG/DL — HIGH (ref 70–99)
GLUCOSE BLDC GLUCOMTR-MCNC: 192 MG/DL — HIGH (ref 70–99)
GLUCOSE SERPL-MCNC: 148 MG/DL — HIGH (ref 70–99)
GLUCOSE SERPL-MCNC: 148 MG/DL — HIGH (ref 70–99)
GLUCOSE SERPL-MCNC: 159 MG/DL — HIGH (ref 70–99)
GLUCOSE SERPL-MCNC: 159 MG/DL — HIGH (ref 70–99)
HCT VFR BLD CALC: 25 % — LOW (ref 34.5–45)
HCT VFR BLD CALC: 25 % — LOW (ref 34.5–45)
HGB BLD-MCNC: 8.1 G/DL — LOW (ref 11.5–15.5)
HGB BLD-MCNC: 8.1 G/DL — LOW (ref 11.5–15.5)
IMM GRANULOCYTES NFR BLD AUTO: 1.2 % — HIGH (ref 0–0.9)
IMM GRANULOCYTES NFR BLD AUTO: 1.2 % — HIGH (ref 0–0.9)
LYMPHOCYTES # BLD AUTO: 1.65 K/UL — SIGNIFICANT CHANGE UP (ref 1–3.3)
LYMPHOCYTES # BLD AUTO: 1.65 K/UL — SIGNIFICANT CHANGE UP (ref 1–3.3)
LYMPHOCYTES # BLD AUTO: 18.1 % — SIGNIFICANT CHANGE UP (ref 13–44)
LYMPHOCYTES # BLD AUTO: 18.1 % — SIGNIFICANT CHANGE UP (ref 13–44)
MAGNESIUM SERPL-MCNC: 2.2 MG/DL — SIGNIFICANT CHANGE UP (ref 1.6–2.6)
MAGNESIUM SERPL-MCNC: 2.2 MG/DL — SIGNIFICANT CHANGE UP (ref 1.6–2.6)
MCHC RBC-ENTMCNC: 29.6 PG — SIGNIFICANT CHANGE UP (ref 27–34)
MCHC RBC-ENTMCNC: 29.6 PG — SIGNIFICANT CHANGE UP (ref 27–34)
MCHC RBC-ENTMCNC: 32.4 GM/DL — SIGNIFICANT CHANGE UP (ref 32–36)
MCHC RBC-ENTMCNC: 32.4 GM/DL — SIGNIFICANT CHANGE UP (ref 32–36)
MCV RBC AUTO: 91.2 FL — SIGNIFICANT CHANGE UP (ref 80–100)
MCV RBC AUTO: 91.2 FL — SIGNIFICANT CHANGE UP (ref 80–100)
MONOCYTES # BLD AUTO: 1.15 K/UL — HIGH (ref 0–0.9)
MONOCYTES # BLD AUTO: 1.15 K/UL — HIGH (ref 0–0.9)
MONOCYTES NFR BLD AUTO: 12.6 % — SIGNIFICANT CHANGE UP (ref 2–14)
MONOCYTES NFR BLD AUTO: 12.6 % — SIGNIFICANT CHANGE UP (ref 2–14)
NEUTROPHILS # BLD AUTO: 6 K/UL — SIGNIFICANT CHANGE UP (ref 1.8–7.4)
NEUTROPHILS # BLD AUTO: 6 K/UL — SIGNIFICANT CHANGE UP (ref 1.8–7.4)
NEUTROPHILS NFR BLD AUTO: 65.6 % — SIGNIFICANT CHANGE UP (ref 43–77)
NEUTROPHILS NFR BLD AUTO: 65.6 % — SIGNIFICANT CHANGE UP (ref 43–77)
NRBC # BLD: 0 /100 WBCS — SIGNIFICANT CHANGE UP (ref 0–0)
NRBC # BLD: 0 /100 WBCS — SIGNIFICANT CHANGE UP (ref 0–0)
PHOSPHATE SERPL-MCNC: 3 MG/DL — SIGNIFICANT CHANGE UP (ref 2.5–4.5)
PHOSPHATE SERPL-MCNC: 3 MG/DL — SIGNIFICANT CHANGE UP (ref 2.5–4.5)
PLATELET # BLD AUTO: 263 K/UL — SIGNIFICANT CHANGE UP (ref 150–400)
PLATELET # BLD AUTO: 263 K/UL — SIGNIFICANT CHANGE UP (ref 150–400)
POTASSIUM SERPL-MCNC: 5.2 MMOL/L — SIGNIFICANT CHANGE UP (ref 3.5–5.3)
POTASSIUM SERPL-SCNC: 5.2 MMOL/L — SIGNIFICANT CHANGE UP (ref 3.5–5.3)
PROT SERPL-MCNC: 6.4 G/DL — SIGNIFICANT CHANGE UP (ref 6–8.3)
PROT SERPL-MCNC: 6.4 G/DL — SIGNIFICANT CHANGE UP (ref 6–8.3)
RBC # BLD: 2.74 M/UL — LOW (ref 3.8–5.2)
RBC # BLD: 2.74 M/UL — LOW (ref 3.8–5.2)
RBC # FLD: 14.6 % — HIGH (ref 10.3–14.5)
RBC # FLD: 14.6 % — HIGH (ref 10.3–14.5)
SODIUM SERPL-SCNC: 137 MMOL/L — SIGNIFICANT CHANGE UP (ref 135–145)
WBC # BLD: 9.14 K/UL — SIGNIFICANT CHANGE UP (ref 3.8–10.5)
WBC # BLD: 9.14 K/UL — SIGNIFICANT CHANGE UP (ref 3.8–10.5)
WBC # FLD AUTO: 9.14 K/UL — SIGNIFICANT CHANGE UP (ref 3.8–10.5)
WBC # FLD AUTO: 9.14 K/UL — SIGNIFICANT CHANGE UP (ref 3.8–10.5)

## 2023-12-05 PROCEDURE — 90935 HEMODIALYSIS ONE EVALUATION: CPT | Mod: GC

## 2023-12-05 PROCEDURE — 99223 1ST HOSP IP/OBS HIGH 75: CPT

## 2023-12-05 PROCEDURE — 99233 SBSQ HOSP IP/OBS HIGH 50: CPT | Mod: GC

## 2023-12-05 RX ORDER — ERYTHROPOIETIN 10000 [IU]/ML
5000 INJECTION, SOLUTION INTRAVENOUS; SUBCUTANEOUS ONCE
Refills: 0 | Status: COMPLETED | OUTPATIENT
Start: 2023-12-05 | End: 2023-12-05

## 2023-12-05 RX ADMIN — CALCITRIOL 0.25 MICROGRAM(S): 0.5 CAPSULE ORAL at 12:28

## 2023-12-05 RX ADMIN — Medication 1: at 17:15

## 2023-12-05 RX ADMIN — Medication 3 MILLILITER(S): at 17:17

## 2023-12-05 RX ADMIN — Medication 1 SPRAY(S): at 06:46

## 2023-12-05 RX ADMIN — Medication 3 MILLILITER(S): at 12:28

## 2023-12-05 RX ADMIN — MONTELUKAST 10 MILLIGRAM(S): 4 TABLET, CHEWABLE ORAL at 12:28

## 2023-12-05 RX ADMIN — BUDESONIDE AND FORMOTEROL FUMARATE DIHYDRATE 2 PUFF(S): 160; 4.5 AEROSOL RESPIRATORY (INHALATION) at 21:57

## 2023-12-05 RX ADMIN — Medication 1 SPRAY(S): at 17:18

## 2023-12-05 RX ADMIN — Medication 325 MILLIGRAM(S): at 12:29

## 2023-12-05 RX ADMIN — CHLORHEXIDINE GLUCONATE 1 APPLICATION(S): 213 SOLUTION TOPICAL at 12:52

## 2023-12-05 RX ADMIN — SENNA PLUS 2 TABLET(S): 8.6 TABLET ORAL at 21:53

## 2023-12-05 RX ADMIN — BUDESONIDE AND FORMOTEROL FUMARATE DIHYDRATE 2 PUFF(S): 160; 4.5 AEROSOL RESPIRATORY (INHALATION) at 08:38

## 2023-12-05 RX ADMIN — Medication 25 MILLIGRAM(S): at 13:45

## 2023-12-05 RX ADMIN — SEVELAMER CARBONATE 800 MILLIGRAM(S): 2400 POWDER, FOR SUSPENSION ORAL at 13:45

## 2023-12-05 RX ADMIN — NYSTATIN CREAM 1 APPLICATION(S): 100000 CREAM TOPICAL at 06:47

## 2023-12-05 RX ADMIN — NYSTATIN CREAM 1 APPLICATION(S): 100000 CREAM TOPICAL at 17:18

## 2023-12-05 RX ADMIN — MUPIROCIN 1 APPLICATION(S): 20 OINTMENT TOPICAL at 06:46

## 2023-12-05 RX ADMIN — ATORVASTATIN CALCIUM 80 MILLIGRAM(S): 80 TABLET, FILM COATED ORAL at 21:53

## 2023-12-05 RX ADMIN — SEVELAMER CARBONATE 800 MILLIGRAM(S): 2400 POWDER, FOR SUSPENSION ORAL at 21:54

## 2023-12-05 RX ADMIN — ERYTHROPOIETIN 5000 UNIT(S): 10000 INJECTION, SOLUTION INTRAVENOUS; SUBCUTANEOUS at 11:29

## 2023-12-05 RX ADMIN — Medication 3 MILLILITER(S): at 01:54

## 2023-12-05 RX ADMIN — Medication 25 MILLIGRAM(S): at 23:08

## 2023-12-05 RX ADMIN — MUPIROCIN 1 APPLICATION(S): 20 OINTMENT TOPICAL at 17:17

## 2023-12-05 RX ADMIN — SEVELAMER CARBONATE 800 MILLIGRAM(S): 2400 POWDER, FOR SUSPENSION ORAL at 06:46

## 2023-12-05 RX ADMIN — Medication 1: at 12:35

## 2023-12-05 RX ADMIN — CHLORHEXIDINE GLUCONATE 1 APPLICATION(S): 213 SOLUTION TOPICAL at 06:46

## 2023-12-05 RX ADMIN — POLYETHYLENE GLYCOL 3350 17 GRAM(S): 17 POWDER, FOR SOLUTION ORAL at 12:27

## 2023-12-05 NOTE — PROGRESS NOTE ADULT - PROBLEM SELECTOR PLAN 4
Patient with several year history of chronic hypoxemic respiratory failure  - requiring home O2, stable on 2L at baseline  - has not had escalating O2 requirements recently, despite feeling subjectively more dyspneic  - unclear etiology, though patient has had PFTs at her pulmologist's office last year    Plan:  > follow up TTE--> showing EF 71%, bi-atrial enlargement, LV diastolic dysfunction, and RV enlargement  > obtain records from pt's pulmonologists office: Dr. Paulino Gayle ([facility], [phone])  > continue with home montelukast and add on duonebs and symbicort   > wean O2 as tolerated  > add on prednisone for possible COPD exacerbation   > if persistently hypoxemic and despite diuresis, consider alternative etiologies and potentially high resolution CT chest  - f/u repeat CXR and BNP (stable CXR and elevated BNP)  - appreciate pulm recs: started on flonase and saline spray Patient with several year history of chronic hypoxemic respiratory failure  - requiring home O2, stable on 2L at baseline  - has not had escalating O2 requirements recently, despite feeling subjectively more dyspneic  - unclear etiology, though patient has had PFTs at her pulmologist's office last year    Plan:  > follow up TTE--> showing EF 71%, bi-atrial enlargement, LV diastolic dysfunction, and RV enlargement  > obtain records from pt's pulmonologists office: Dr. Paulino Gayle ([facility], [phone])  > continue with home montelukast and add on duonebs and symbicort   > wean O2 as tolerated  > add on prednisone for possible COPD exacerbation   > if persistently hypoxemic and despite diuresis, consider alternative etiologies and potentially high resolution CT chest  - f/u repeat CXR and BNP (stable CXR and elevated BNP)  - appreciate pulm recs: started on flonase and saline spray

## 2023-12-05 NOTE — CONSULT NOTE ADULT - ASSESSMENT
83 year old female with HTN, HLD, CAD, T2DM, CKD, remote hx of breast CA s/p mastectomy, and chronic hypoxemic respiratory failure of unknown etiology on home O2 who presents for lower extremity swelling and shortness of breath with subsequent CAL superimposed on CKD. Vascular consulted for AVF planning. Pt is RHD.   - protect LUE- no sticks, blood draws, cuffs  - obtain bilateral upper extremity vein mapping  - vascular will follow      Discussed with fellow on behalf of attending

## 2023-12-05 NOTE — PROGRESS NOTE ADULT - ATTENDING COMMENTS
83 year old female with PMH chronic hypoxic respiratory failure, DM2, CAD, HTN and HLD who presented with dyspnea and lower extremity edema admitted for acute decompensated congestive heart failure. TTE shows LV diastolic dysfunction but normal EF. She was started on IV diuretics however, her creatinine increased to nearly 7 but has trended back down to the 4-5. Her GFR is less than 10 so HD has been initiated. Will continue to follow up renal recs. Rest of plan as above.

## 2023-12-05 NOTE — PROGRESS NOTE ADULT - SUBJECTIVE AND OBJECTIVE BOX
DATE OF SERVICE: 12-05-23 @ 17:42    Patient is a 83y old  Female who presents with a chief complaint of LE swelling, dyspnea (05 Dec 2023 13:49)      INTERVAL HISTORY: Feels ok.     REVIEW OF SYSTEMS:  CONSTITUTIONAL: No weakness  EYES/ENT: No visual changes;  No throat pain   NECK: No pain or stiffness  RESPIRATORY: No cough, wheezing; No shortness of breath  CARDIOVASCULAR: No chest pain or palpitations  GASTROINTESTINAL: No abdominal  pain. No nausea, vomiting, or hematemesis  GENITOURINARY: No dysuria, frequency or hematuria  NEUROLOGICAL: No stroke like symptoms  SKIN: No rashes    TELEMETRY Personally reviewed:  70-90  	  MEDICATIONS:  amLODIPine   Tablet 10 milliGRAM(s) Oral daily  hydrALAZINE 25 milliGRAM(s) Oral three times a day        PHYSICAL EXAM:  T(C): 36.2 (12-05-23 @ 11:50), Max: 36.9 (12-04-23 @ 21:30)  HR: 97 (12-05-23 @ 13:44) (62 - 97)  BP: 145/64 (12-05-23 @ 13:44) (122/61 - 153/68)  RR: 18 (12-05-23 @ 11:50) (18 - 20)  SpO2: 99% (12-05-23 @ 11:50) (95% - 99%)  Wt(kg): --  I&O's Summary    04 Dec 2023 07:01  -  05 Dec 2023 07:00  --------------------------------------------------------  IN: 920 mL / OUT: 200 mL / NET: 720 mL    05 Dec 2023 07:01  -  05 Dec 2023 17:42  --------------------------------------------------------  IN: 0 mL / OUT: 500 mL / NET: -500 mL          Appearance: In no distress	  HEENT:    PERRL, EOMI	  Cardiovascular:  S1 S2, No JVD  Respiratory: Lungs clear to auscultation	  Gastrointestinal:  Soft, Non-tender, + BS	  Vascularature:  + edema of LE  Psychiatric: Appropriate affect   Neuro: no acute focal deficits                               8.1    9.14  )-----------( 263      ( 05 Dec 2023 07:21 )             25.0     12-05    137  |  102  |  98<H>  ----------------------------<  148<H>  5.2   |  23  |  4.56<H>    Ca    8.7      05 Dec 2023 07:19  Phos  3.0     12-05  Mg     2.2     12-05    TPro  6.4  /  Alb  3.6  /  TBili  0.3  /  DBili  x   /  AST  15  /  ALT  15  /  AlkPhos  42  12-05        Labs personally reviewed      ASSESSMENT/PLAN: 	    83 year old female with HTN, HLD, CAD, T2DM, CKD, remote hx of breast CA s/p mastectomy, and chronic hypoxemic respiratory failure of unknown etiology on home O2 who presents for lower extremity swelling and shortness of breath.      1. Acute diastolic heart failure  - Pt with LE edema and pulmonary congestion  - s/p IV diuresis now DC'd d/t increasing Cr which is now improving.  - Echo shows normal LV systolic function with Bi-atrial enlargement, G2DD and LVH  - EKG shows sinus rhythm (not atrial flutter)  - s/p HD as per Renal  - Pulm consult appreciated for continued hypoxia despite diuresis: recommend swallow eval, duonebs, symbicort and prednisone    2. ESRD  - Renal on board  - HD as per renal    3. HLD  - Statin therapy    4. HTN - stable  - Patient with ESRD        Elba Ortega, DAFNE-NP   Jean Ash DO [facility]  Cardiovascular Medicine  [address], [address]  Available through call or text on [employer] TE[employer]  Office: [phone]   DATE OF SERVICE: 12-05-23 @ 17:42    Patient is a 83y old  Female who presents with a chief complaint of LE swelling, dyspnea (05 Dec 2023 13:49)      INTERVAL HISTORY: Feels ok.     REVIEW OF SYSTEMS:  CONSTITUTIONAL: No weakness  EYES/ENT: No visual changes;  No throat pain   NECK: No pain or stiffness  RESPIRATORY: No cough, wheezing; No shortness of breath  CARDIOVASCULAR: No chest pain or palpitations  GASTROINTESTINAL: No abdominal  pain. No nausea, vomiting, or hematemesis  GENITOURINARY: No dysuria, frequency or hematuria  NEUROLOGICAL: No stroke like symptoms  SKIN: No rashes    TELEMETRY Personally reviewed:  70-90  	  MEDICATIONS:  amLODIPine   Tablet 10 milliGRAM(s) Oral daily  hydrALAZINE 25 milliGRAM(s) Oral three times a day        PHYSICAL EXAM:  T(C): 36.2 (12-05-23 @ 11:50), Max: 36.9 (12-04-23 @ 21:30)  HR: 97 (12-05-23 @ 13:44) (62 - 97)  BP: 145/64 (12-05-23 @ 13:44) (122/61 - 153/68)  RR: 18 (12-05-23 @ 11:50) (18 - 20)  SpO2: 99% (12-05-23 @ 11:50) (95% - 99%)  Wt(kg): --  I&O's Summary    04 Dec 2023 07:01  -  05 Dec 2023 07:00  --------------------------------------------------------  IN: 920 mL / OUT: 200 mL / NET: 720 mL    05 Dec 2023 07:01  -  05 Dec 2023 17:42  --------------------------------------------------------  IN: 0 mL / OUT: 500 mL / NET: -500 mL          Appearance: In no distress	  HEENT:    PERRL, EOMI	  Cardiovascular:  S1 S2, No JVD  Respiratory: Lungs clear to auscultation	  Gastrointestinal:  Soft, Non-tender, + BS	  Vascularature:  + edema of LE  Psychiatric: Appropriate affect   Neuro: no acute focal deficits                               8.1    9.14  )-----------( 263      ( 05 Dec 2023 07:21 )             25.0     12-05    137  |  102  |  98<H>  ----------------------------<  148<H>  5.2   |  23  |  4.56<H>    Ca    8.7      05 Dec 2023 07:19  Phos  3.0     12-05  Mg     2.2     12-05    TPro  6.4  /  Alb  3.6  /  TBili  0.3  /  DBili  x   /  AST  15  /  ALT  15  /  AlkPhos  42  12-05        Labs personally reviewed      ASSESSMENT/PLAN: 	    83 year old female with HTN, HLD, CAD, T2DM, CKD, remote hx of breast CA s/p mastectomy, and chronic hypoxemic respiratory failure of unknown etiology on home O2 who presents for lower extremity swelling and shortness of breath.      1. Acute diastolic heart failure  - Pt with LE edema and pulmonary congestion  - s/p IV diuresis now DC'd d/t increasing Cr which is now improving.  - Echo shows normal LV systolic function with Bi-atrial enlargement, G2DD and LVH  - EKG shows sinus rhythm (not atrial flutter)  - s/p HD as per Renal  - Pulm consult appreciated for continued hypoxia despite diuresis: recommend swallow eval, duonebs, symbicort and prednisone    2. ESRD  - Renal on board  - HD as per renal    3. HLD  - Statin therapy    4. HTN - stable  - Patient with ESRD        Elba Ortega, DAFNE-NP   Jean Ash DO [facility]  Cardiovascular Medicine  [address], [address]  Available through call or text on [employer] TE[employer]  Office: [phone]

## 2023-12-05 NOTE — PROGRESS NOTE ADULT - PROBLEM SELECTOR PLAN 3
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

## 2023-12-05 NOTE — PROGRESS NOTE ADULT - PROBLEM SELECTOR PLAN 1
Patient presenting for worsening LE edema and breathlessness  - also with several symptoms to suggest likely ADHF  - on exam: extremities warm and well perfused, but volume overloaded, S3 on exam  - proBNP elevated to 1300, but unknown baseline and with likely CAL on CKD  - hsTropT flat at 74, ECG without ST changes, no chest pain  - pt does not carry a diagnosis of HF, however on meds that may suggest she may have HFpEF (MRA, SGLT2i)    Plan:  >consider diuretic daily at expense of renal failure   > obtain TTE--> showing EF 71%, bi-atrial enlargement, LV diastolic dysfunction, and RV enlargement  > hold home spironolactone and dapagliflozin given CAL  > strict I/Os, daily standing weights  > appreciate cards recs  > will need to determine DC med rec based on GFR Patient presenting for worsening LE edema and breathlessness  - also with several symptoms to suggest likely ADHF  - on exam: extremities warm and well perfused, but volume overloaded, S3 on exam  - proBNP elevated to 1300, but unknown baseline and with likely CAL on CKD  - hsTropT flat at 74, ECG without ST changes, no chest pain  - pt does not carry a diagnosis of HF, however on meds that may suggest she may have HFpEF (MRA, SGLT2i)    Plan:  > consider diuretic daily at expense of renal failure   > obtain TTE--> showing EF 71%, bi-atrial enlargement, LV diastolic dysfunction, and RV enlargement  > hold home spironolactone and dapagliflozin given CAL  > strict I/Os, daily standing weights  > appreciate cards recs  > will need to determine DC med rec based on GFR

## 2023-12-05 NOTE — CONSULT NOTE ADULT - SUBJECTIVE AND OBJECTIVE BOX
HPI:  83 year old female with HTN, HLD, CAD, T2DM, CKD, remote hx of breast CA s/p mastectomy, and chronic hypoxemic respiratory failure of unknown etiology on home O2 who presents for lower extremity swelling and shortness of breath. History obtained from the patient and her  at bedside who report that over the past 1-2 days the patient has had increasing breathlessness and bilateral lower extremity swelling. The patient notes that over the past 2-3 weeks, she has become progressively more fatigued and intermittently more dyspneic. She reports that her dyspnea worsened significantly over the past 2 days, though her oxygen requirements have remained consistent. The patient also endorses paroxysmal nocturnal dyspnea and orthopnea for several years, in addition to relatively new onset bendopnea. She does endorse chronic lower extremity edema, but reports acute worsening over the past few days.    Notably, the patient has had chronic hypoxemic respiratory failure for over 1 year, and had PFTs at her pulmonologist's office, but does not know if she was ever formally diagnosed with COPD or asthma. The patient has been on home oxygen via nasal canula (baseline 2L) for over a year, and has had a persistent dry cough for several years. She reports that she was in the process of seeing several specialists to figure out her diagnoses but had to stop due to COVID 19. She presently denies any chest pain, shortness of breath, fevers, chills, or urinary symptoms. Vascular consulted for AVF planning        PAST MEDICAL & SURGICAL HISTORY:  CA - Breast Cancer  1996 s/p lumpectomy, chemo, and radiation      CAD (Coronary Artery Disease)      Diabetes      Dyslipidemia      HTN (Hypertension)      Asthma      H/O: Obesity      H/O: Osteoarthritis      S/P Breast Lumpectomy          MEDICATIONS  (STANDING):  albuterol    90 MICROgram(s) HFA Inhaler 2 Puff(s) Inhalation every 6 hours  albuterol/ipratropium for Nebulization 3 milliLiter(s) Nebulizer every 6 hours  amLODIPine   Tablet 10 milliGRAM(s) Oral daily  atorvastatin 80 milliGRAM(s) Oral at bedtime  budesonide 160 MICROgram(s)/formoterol 4.5 MICROgram(s) Inhaler 2 Puff(s) Inhalation two times a day  calcitriol   Capsule 0.25 MICROGram(s) Oral daily  chlorhexidine 2% Cloths 1 Application(s) Topical daily  chlorhexidine 4% Liquid 1 Application(s) Topical <User Schedule>  dextrose 5%. 1000 milliLiter(s) (50 mL/Hr) IV Continuous <Continuous>  dextrose 5%. 1000 milliLiter(s) (100 mL/Hr) IV Continuous <Continuous>  dextrose 50% Injectable 25 Gram(s) IV Push once  dextrose 50% Injectable 12.5 Gram(s) IV Push once  dextrose 50% Injectable 25 Gram(s) IV Push once  ferrous    sulfate 325 milliGRAM(s) Oral daily  fluticasone propionate 50 MICROgram(s)/spray Nasal Spray 1 Spray(s) Both Nostrils two times a day  glucagon  Injectable 1 milliGRAM(s) IntraMuscular once  hydrALAZINE 25 milliGRAM(s) Oral three times a day  influenza  Vaccine (HIGH DOSE) 0.7 milliLiter(s) IntraMuscular once  insulin lispro (ADMELOG) corrective regimen sliding scale   SubCutaneous three times a day before meals  insulin lispro (ADMELOG) corrective regimen sliding scale   SubCutaneous at bedtime  montelukast 10 milliGRAM(s) Oral daily  mupirocin 2% Nasal 1 Application(s) Both Nostrils every 12 hours  nystatin Powder 1 Application(s) Topical two times a day  polyethylene glycol 3350 17 Gram(s) Oral daily  senna 2 Tablet(s) Oral at bedtime  sevelamer carbonate 800 milliGRAM(s) Oral every 8 hours  sodium chloride 0.65% Nasal 1 Spray(s) Both Nostrils daily    MEDICATIONS  (PRN):  acetaminophen     Tablet .. 650 milliGRAM(s) Oral every 6 hours PRN Temp greater or equal to 38C (100.4F), Mild Pain (1 - 3)  dextrose Oral Gel 15 Gram(s) Oral once PRN Blood Glucose LESS THAN 70 milliGRAM(s)/deciliter  sodium chloride 0.9% lock flush 10 milliLiter(s) IV Push every 1 hour PRN Pre/post blood products, medications, blood draw, and to maintain line patency      Allergies    No Known Allergies    Intolerances        SOCIAL HISTORY:    FAMILY HISTORY:          Physical Exam:  General: NAD, resting comfortably  HEENT: NC/AT, EOMI, normal hearing, no oral lesions, no LAD, neck supple  Pulmonary: normal resp effort, CTA-B  Cardiovascular: NSR, no murmurs  Abdominal: soft, ND/NT, no organomegaly  Extremities: WWP, normal strength, no clubbing/cyanosis/edema  Neuro: A/O x 3, CNs II-XII grossly intact, normal sensation, no focal deficits  Pulses: palpable distal pulses  LUE protected    Vital Signs Last 24 Hrs  T(C): 36.2 (05 Dec 2023 11:50), Max: 37 (04 Dec 2023 15:48)  T(F): 97.2 (05 Dec 2023 11:50), Max: 98.6 (04 Dec 2023 15:48)  HR: 81 (05 Dec 2023 11:50) (62 - 91)  BP: 153/68 (05 Dec 2023 11:50) (122/61 - 153/68)  BP(mean): --  RR: 18 (05 Dec 2023 11:50) (18 - 20)  SpO2: 99% (05 Dec 2023 11:50) (95% - 99%)    Parameters below as of 05 Dec 2023 11:50  Patient On (Oxygen Delivery Method): nasal cannula  O2 Flow (L/min): 2      I&O's Summary    04 Dec 2023 07:01  -  05 Dec 2023 07:00  --------------------------------------------------------  IN: 920 mL / OUT: 200 mL / NET: 720 mL    05 Dec 2023 07:01  -  05 Dec 2023 13:50  --------------------------------------------------------  IN: 0 mL / OUT: 500 mL / NET: -500 mL            LABS:                        8.1    9.14  )-----------( 263      ( 05 Dec 2023 07:21 )             25.0     12-05    137  |  102  |  98<H>  ----------------------------<  148<H>  5.2   |  23  |  4.56<H>    Ca    8.7      05 Dec 2023 07:19  Phos  3.0     12-05  Mg     2.2     12-05    TPro  6.4  /  Alb  3.6  /  TBili  0.3  /  DBili  x   /  AST  15  /  ALT  15  /  AlkPhos  42  12-05      Urinalysis Basic - ( 05 Dec 2023 07:19 )    Color: x / Appearance: x / SG: x / pH: x  Gluc: 148 mg/dL / Ketone: x  / Bili: x / Urobili: x   Blood: x / Protein: x / Nitrite: x   Leuk Esterase: x / RBC: x / WBC x   Sq Epi: x / Non Sq Epi: x / Bacteria: x      CAPILLARY BLOOD GLUCOSE      POCT Blood Glucose.: 158 mg/dL (05 Dec 2023 12:30)  POCT Blood Glucose.: 148 mg/dL (05 Dec 2023 07:56)  POCT Blood Glucose.: 177 mg/dL (04 Dec 2023 23:24)  POCT Blood Glucose.: 197 mg/dL (04 Dec 2023 21:44)  POCT Blood Glucose.: 163 mg/dL (04 Dec 2023 18:04)    LIVER FUNCTIONS - ( 05 Dec 2023 07:19 )  Alb: 3.6 g/dL / Pro: 6.4 g/dL / ALK PHOS: 42 U/L / ALT: 15 U/L / AST: 15 U/L / GGT: x             Cultures:      RADIOLOGY & ADDITIONAL STUDIES:      Plan:

## 2023-12-05 NOTE — PROGRESS NOTE ADULT - ATTENDING COMMENTS
Patient seen and examined on dialysis. Tolerating HD.     # CAL on CKD. Developed CAL due to contrast-induced nephropathy. Unclear baseline serum creatinine, but likely in the 4s. Given that eGFR is 7-9, we will start dialysis --> having asterexis.  First session of dialysis today.     # Hyperkalemia - secondary to kidney dysfunction. 3 K dialysis bath.     # Medication monitoring encounter: medication dose reviewed. Please dose medications to CrCl<15    The rest of the recommendations as per fellow's note.    Kerri Leyva MD  Attending Nephrologist  [phone] or via [email or web address] . Patient seen and examined on dialysis. Tolerating HD.     # CAL on CKD. Developed CAL due to contrast-induced nephropathy. Unclear baseline serum creatinine, but likely in the 4s. Given that eGFR is 7-9, we will start dialysis --> having asterexis.  First session of dialysis today.     # Hyperkalemia - secondary to kidney dysfunction. 3 K dialysis bath.     # Medication monitoring encounter: medication dose reviewed. Please dose medications to CrCl<15    The rest of the recommendations as per fellow's note.    Kerri Leyva MD  Attending Nephrologist  [phone] or via [email or web address] .

## 2023-12-05 NOTE — PROGRESS NOTE ADULT - SUBJECTIVE AND OBJECTIVE BOX
Patient is a 83y old  Female who presents with a chief complaint of LE swelling, dyspnea (04 Dec 2023 15:06)      SUBJECTIVE / OVERNIGHT EVENTS: NAEO. Pt denies chest pain, SOB, N/V, fever/chills, or changes in bowel movements.    MEDICATIONS  (STANDING):  albuterol    90 MICROgram(s) HFA Inhaler 2 Puff(s) Inhalation every 6 hours  albuterol/ipratropium for Nebulization 3 milliLiter(s) Nebulizer every 6 hours  amLODIPine   Tablet 10 milliGRAM(s) Oral daily  atorvastatin 80 milliGRAM(s) Oral at bedtime  budesonide 160 MICROgram(s)/formoterol 4.5 MICROgram(s) Inhaler 2 Puff(s) Inhalation two times a day  calcitriol   Capsule 0.25 MICROGram(s) Oral daily  chlorhexidine 2% Cloths 1 Application(s) Topical daily  chlorhexidine 4% Liquid 1 Application(s) Topical <User Schedule>  dextrose 5%. 1000 milliLiter(s) (100 mL/Hr) IV Continuous <Continuous>  dextrose 5%. 1000 milliLiter(s) (50 mL/Hr) IV Continuous <Continuous>  dextrose 50% Injectable 12.5 Gram(s) IV Push once  dextrose 50% Injectable 25 Gram(s) IV Push once  dextrose 50% Injectable 25 Gram(s) IV Push once  ferrous    sulfate 325 milliGRAM(s) Oral daily  fluticasone propionate 50 MICROgram(s)/spray Nasal Spray 1 Spray(s) Both Nostrils two times a day  glucagon  Injectable 1 milliGRAM(s) IntraMuscular once  hydrALAZINE 25 milliGRAM(s) Oral three times a day  influenza  Vaccine (HIGH DOSE) 0.7 milliLiter(s) IntraMuscular once  insulin lispro (ADMELOG) corrective regimen sliding scale   SubCutaneous at bedtime  insulin lispro (ADMELOG) corrective regimen sliding scale   SubCutaneous three times a day before meals  montelukast 10 milliGRAM(s) Oral daily  mupirocin 2% Nasal 1 Application(s) Both Nostrils every 12 hours  nystatin Powder 1 Application(s) Topical two times a day  polyethylene glycol 3350 17 Gram(s) Oral daily  senna 2 Tablet(s) Oral at bedtime  sevelamer carbonate 800 milliGRAM(s) Oral every 8 hours  sodium chloride 0.65% Nasal 1 Spray(s) Both Nostrils daily    MEDICATIONS  (PRN):  acetaminophen     Tablet .. 650 milliGRAM(s) Oral every 6 hours PRN Temp greater or equal to 38C (100.4F), Mild Pain (1 - 3)  dextrose Oral Gel 15 Gram(s) Oral once PRN Blood Glucose LESS THAN 70 milliGRAM(s)/deciliter  sodium chloride 0.9% lock flush 10 milliLiter(s) IV Push every 1 hour PRN Pre/post blood products, medications, blood draw, and to maintain line patency      CAPILLARY BLOOD GLUCOSE      POCT Blood Glucose.: 177 mg/dL (04 Dec 2023 23:24)  POCT Blood Glucose.: 197 mg/dL (04 Dec 2023 21:44)  POCT Blood Glucose.: 163 mg/dL (04 Dec 2023 18:04)  POCT Blood Glucose.: 210 mg/dL (04 Dec 2023 11:59)  POCT Blood Glucose.: 138 mg/dL (04 Dec 2023 07:52)    I&O's Summary    04 Dec 2023 07:01  -  05 Dec 2023 07:00  --------------------------------------------------------  IN: 920 mL / OUT: 200 mL / NET: 720 mL        Vital Signs Last 24 Hrs  T(C): 36.7 (05 Dec 2023 04:47), Max: 37 (04 Dec 2023 15:48)  T(F): 98.1 (05 Dec 2023 04:47), Max: 98.6 (04 Dec 2023 15:48)  HR: 91 (05 Dec 2023 04:47) (62 - 91)  BP: 122/61 (05 Dec 2023 04:47) (122/57 - 148/63)  BP(mean): --  RR: 20 (05 Dec 2023 04:47) (18 - 20)  SpO2: 98% (05 Dec 2023 04:47) (95% - 98%)    Parameters below as of 05 Dec 2023 04:47  Patient On (Oxygen Delivery Method): nasal cannula  O2 Flow (L/min): 2      PHYSICAL EXAM:  GENERAL: NAD, well-developed, well-nourished  HEAD: Atraumatic, Normocephalic  EYES: Conjunctiva and sclera clear  CHEST/LUNG: Clear to auscultation bilaterally; No wheezes or crackles  HEART: Normal S1/S2; Regular rate and rhythm; No murmurs, rubs, or gallops  ABDOMEN: Soft, Nontender, Nondistended; Bowel sounds present  EXTREMITIES: No clubbing, cyanosis, or edema  PSYCH: A&Ox3      LABS:                        8.7    10.19 )-----------( 296      ( 04 Dec 2023 07:06 )             27.2      12-05    137  |  102  |  105<H>  ----------------------------<  159<H>  5.2   |  22  |  4.51<H>    Ca    9.1      05 Dec 2023 01:03  Phos  3.0     12-04  Mg     2.2     12-04    TPro  6.5  /  Alb  3.3  /  TBili  0.3  /  DBili  x   /  AST  17  /  ALT  16  /  AlkPhos  46  12-04          Urinalysis Basic - ( 05 Dec 2023 01:03 )    Color: x / Appearance: x / SG: x / pH: x  Gluc: 159 mg/dL / Ketone: x  / Bili: x / Urobili: x   Blood: x / Protein: x / Nitrite: x   Leuk Esterase: x / RBC: x / WBC x   Sq Epi: x / Non Sq Epi: x / Bacteria: x        RADIOLOGY & ADDITIONAL TESTS:     Patient is a 83y old  Female who presents with a chief complaint of LE swelling, dyspnea (04 Dec 2023 15:06)      SUBJECTIVE / OVERNIGHT EVENTS: NAEO. Pt denies chest pain, SOB, N/V, fever/chills, or changes in bowel movements.    MEDICATIONS  (STANDING):  albuterol    90 MICROgram(s) HFA Inhaler 2 Puff(s) Inhalation every 6 hours  albuterol/ipratropium for Nebulization 3 milliLiter(s) Nebulizer every 6 hours  amLODIPine   Tablet 10 milliGRAM(s) Oral daily  atorvastatin 80 milliGRAM(s) Oral at bedtime  budesonide 160 MICROgram(s)/formoterol 4.5 MICROgram(s) Inhaler 2 Puff(s) Inhalation two times a day  calcitriol   Capsule 0.25 MICROGram(s) Oral daily  chlorhexidine 2% Cloths 1 Application(s) Topical daily  chlorhexidine 4% Liquid 1 Application(s) Topical <User Schedule>  dextrose 5%. 1000 milliLiter(s) (100 mL/Hr) IV Continuous <Continuous>  dextrose 5%. 1000 milliLiter(s) (50 mL/Hr) IV Continuous <Continuous>  dextrose 50% Injectable 12.5 Gram(s) IV Push once  dextrose 50% Injectable 25 Gram(s) IV Push once  dextrose 50% Injectable 25 Gram(s) IV Push once  ferrous    sulfate 325 milliGRAM(s) Oral daily  fluticasone propionate 50 MICROgram(s)/spray Nasal Spray 1 Spray(s) Both Nostrils two times a day  glucagon  Injectable 1 milliGRAM(s) IntraMuscular once  hydrALAZINE 25 milliGRAM(s) Oral three times a day  influenza  Vaccine (HIGH DOSE) 0.7 milliLiter(s) IntraMuscular once  insulin lispro (ADMELOG) corrective regimen sliding scale   SubCutaneous at bedtime  insulin lispro (ADMELOG) corrective regimen sliding scale   SubCutaneous three times a day before meals  montelukast 10 milliGRAM(s) Oral daily  mupirocin 2% Nasal 1 Application(s) Both Nostrils every 12 hours  nystatin Powder 1 Application(s) Topical two times a day  polyethylene glycol 3350 17 Gram(s) Oral daily  senna 2 Tablet(s) Oral at bedtime  sevelamer carbonate 800 milliGRAM(s) Oral every 8 hours  sodium chloride 0.65% Nasal 1 Spray(s) Both Nostrils daily    MEDICATIONS  (PRN):  acetaminophen     Tablet .. 650 milliGRAM(s) Oral every 6 hours PRN Temp greater or equal to 38C (100.4F), Mild Pain (1 - 3)  dextrose Oral Gel 15 Gram(s) Oral once PRN Blood Glucose LESS THAN 70 milliGRAM(s)/deciliter  sodium chloride 0.9% lock flush 10 milliLiter(s) IV Push every 1 hour PRN Pre/post blood products, medications, blood draw, and to maintain line patency      CAPILLARY BLOOD GLUCOSE      POCT Blood Glucose.: 177 mg/dL (04 Dec 2023 23:24)  POCT Blood Glucose.: 197 mg/dL (04 Dec 2023 21:44)  POCT Blood Glucose.: 163 mg/dL (04 Dec 2023 18:04)  POCT Blood Glucose.: 210 mg/dL (04 Dec 2023 11:59)  POCT Blood Glucose.: 138 mg/dL (04 Dec 2023 07:52)    I&O's Summary    04 Dec 2023 07:01  -  05 Dec 2023 07:00  --------------------------------------------------------  IN: 920 mL / OUT: 200 mL / NET: 720 mL        Vital Signs Last 24 Hrs  T(C): 36.7 (05 Dec 2023 04:47), Max: 37 (04 Dec 2023 15:48)  T(F): 98.1 (05 Dec 2023 04:47), Max: 98.6 (04 Dec 2023 15:48)  HR: 91 (05 Dec 2023 04:47) (62 - 91)  BP: 122/61 (05 Dec 2023 04:47) (122/57 - 148/63)  BP(mean): --  RR: 20 (05 Dec 2023 04:47) (18 - 20)  SpO2: 98% (05 Dec 2023 04:47) (95% - 98%)    Parameters below as of 05 Dec 2023 04:47  Patient On (Oxygen Delivery Method): nasal cannula  O2 Flow (L/min): 2      PHYSICAL EXAM:  GENERAL: NAD, well-developed, well-nourished  HEAD: Atraumatic, Normocephalic  EYES: Conjunctiva and sclera clear  CHEST/LUNG: Crackles auscultated b/l  HEART: Normal S1/S2; Regular rate and rhythm; No murmurs, rubs, or gallops  ABDOMEN: Soft, Nontender, Nondistended; Bowel sounds present  EXTREMITIES: 3+ pitting edema  PSYCH: A&Ox3      LABS:                        8.7    10.19 )-----------( 296      ( 04 Dec 2023 07:06 )             27.2      12-05    137  |  102  |  105<H>  ----------------------------<  159<H>  5.2   |  22  |  4.51<H>    Ca    9.1      05 Dec 2023 01:03  Phos  3.0     12-04  Mg     2.2     12-04    TPro  6.5  /  Alb  3.3  /  TBili  0.3  /  DBili  x   /  AST  17  /  ALT  16  /  AlkPhos  46  12-04          Urinalysis Basic - ( 05 Dec 2023 01:03 )    Color: x / Appearance: x / SG: x / pH: x  Gluc: 159 mg/dL / Ketone: x  / Bili: x / Urobili: x   Blood: x / Protein: x / Nitrite: x   Leuk Esterase: x / RBC: x / WBC x   Sq Epi: x / Non Sq Epi: x / Bacteria: x        RADIOLOGY & ADDITIONAL TESTS:

## 2023-12-05 NOTE — PROGRESS NOTE ADULT - PROBLEM SELECTOR PLAN 5
- pt with rising leukocytosis; now downtrending  - likely steroid induced and no signs of infection  - will continue to monitor

## 2023-12-05 NOTE — PROGRESS NOTE ADULT - PROBLEM SELECTOR PLAN 1
CAL on CKD. Has not seen a physician in the recent past. Given her history its likely to be CAL on CKD 2/2 SAUMYA vs CKD progression. She has pedal edema. US duplex Kidney - showed renal parenchymal disease. UPCR 5.6g, no RBCs, no bacteria. FeUrea borderline but more suggestive of prerenal etiology.  SCr 4.5 on admission, received IV contrast for CTA on evening of 11/21, and Cr had been stable but increased about 2 days after IV contrast exposure suggesting SAUMYA. Serology work up ongoing- so far negative HIV, Hep B, Hep C, C3, C4, Anti GBM antibody, Anti Ds DNA, JAYLAN, ANCA, Serum free light chains, immunofixation. A1c 6.1%. Negative for PLA2R Ab. BNP 1300 initially. TTE with G2DD with elevated filling pressures and severe LA dilation. Patient was diuresed throughout hospitalization. UOP was good.     Currently, eGFR of ~9. She has asterixis on examination- but per family also with history of baseline tremors. Not confused, endorses good appetite and N/V. S/p nontunneled iHD catheter placement, will plan for first iHD today. HD consent given to HD unit. Consult IR for permacath placement.    Discussed with  different modalities of dialysis- he said home dialysis would not be a good option for them.

## 2023-12-05 NOTE — CONSULT NOTE ADULT - ATTENDING COMMENTS
82 yo woman with CAD, CKD, past hx of breast CA   Admitted with  lower extremity swelling and shortness of breath.  Probably CAL on CKD  Nephrotic range proteinuria- but nl serum albumin  Sono w smallish kidneys- suggest CKD  IV diuretic 40 mg given
Patient seen and examined earlier today on [facility]. Patient's  called by Dr. Green to obtain additional history. Despite this limited history obtained. Outpatient pulmonologist, Dr. Gayle's office called, but he is presently on vacation until 12/18 and message left for covering physician.    The patient has a history of chronic hypoxemia on home O2 (2L PRN), CKD with a baseline Cr 2's, and a distant smoking history. At some point she was started on supplemental O2 but she is unsure why (whether pulmonary or cardiac reason or otherwise). She is now presenting with dyspnea and LE edema. She has been diuresed but continues to require supplemental O2. She was on 3L O2 today and pulmonary evaluation called in this setting.    - Will need to try and obtain outpatient records though this has been difficult today  - Continue supplemental O2 with goal O2 sat > 90% - taper as able  - Incentive spirometer and acapella to facilitate breathing  - Aspiration precautions and check swallow evaluation  - CT noted with mosaicism vs patchy ground glass opacities - and this in conjunction with wheezing on exam - may suggest an underlying obstructive lung disease  - Continue Symbicort and Duoneb  - Would restart Prednisone 40mg PO daily and monitor for improvement  - Patient previously received Azithromycin x 3 days. RVP negative. Would check urine legionella, urine Strep Ag, and sputum culture if able. Monitor off antibiotics for now pending further testing unless patient has clinical signs of infection.   - Interestingly the patient does not have pleural effusions on imaging but she is noted to have diastolic dysfunction and LE edema. She is also noted to have worsening renal failure, which appears to correlate to timing of CT scan, and hyperkalemia. Would repeat BMP. Nephrology follow-up. Patient may ultimately require renal replacement therapy if this continues to   - Would repeat echo to evaluate pulmonary pressures/assess for pulmonary hypertension  - Grade 2 diastolic dysfunction  - DVT prophylaxis    Do not hesitate to call pulmonary team with any questions or concerns
Patient with CKD. Vascular surgery consulted for HD access creation. Please obtain vein mapping BUE. Medical optimization/clearance.

## 2023-12-05 NOTE — PROGRESS NOTE ADULT - PROBLEM SELECTOR PLAN 2
Hgb below goal. Iron studies reviewed, will start epo with iHD today    CKD-MBD: low PTH (73) with phos 5.2-6.2 and Ca 8.6. Both 25-OH and 1-25-OH vitamin D levels low. Sevelamer 800 TID for now. Started calcitriol 25mcg daily on 11/25.    If you have any questions, please feel free to contact me  Keaton Dye  Nephrology Fellow  [phone]; Prefer [employer] TEAMS  (After 5pm or on weekends please page the on-call fellow). Hgb below goal. Iron studies reviewed, will start epo with iHD today    CKD-MBD: low PTH (73) with phos 5.2-6.2 and Ca 8.6. Both 25-OH and 1-25-OH vitamin D levels low. Sevelamer 800 TID for now. Started calcitriol 25mcg daily on 11/25.    If you have any questions, please feel free to contact me  Keaton Dye  Nephrology Fellow  [phone]; Prefer [employer] TEAMS  (After 5pm or on weekends please page the on-call fellow).

## 2023-12-05 NOTE — PROGRESS NOTE ADULT - SUBJECTIVE AND OBJECTIVE BOX
[facility] Division of Kidney Diseases & Hypertension  FOLLOW UP NOTE  [phone]--------------------------------------------------------------------------------    Chief Complaint: Advanced CKD    24 hour events/subjective:  Pt was seen and examined at the bedside. No acute overnight events. Pt denies worsening of SOB/ Constipation/ Diarrhea/ Nausea/ Vomiting/ abdominal pain/ chest pain/ tingling/ numbness.  Obtained consent for iHD by     PAST HISTORY  --------------------------------------------------------------------------------  No significant changes to PMH, PSH, FHx, SHx, unless otherwise noted    ALLERGIES & MEDICATIONS  --------------------------------------------------------------------------------  Allergies    No Known Allergies    Intolerances      Standing Inpatient Medications  albuterol    90 MICROgram(s) HFA Inhaler 2 Puff(s) Inhalation every 6 hours  albuterol/ipratropium for Nebulization 3 milliLiter(s) Nebulizer every 6 hours  amLODIPine   Tablet 10 milliGRAM(s) Oral daily  atorvastatin 80 milliGRAM(s) Oral at bedtime  budesonide 160 MICROgram(s)/formoterol 4.5 MICROgram(s) Inhaler 2 Puff(s) Inhalation two times a day  calcitriol   Capsule 0.25 MICROGram(s) Oral daily  chlorhexidine 2% Cloths 1 Application(s) Topical daily  chlorhexidine 4% Liquid 1 Application(s) Topical <User Schedule>  dextrose 5%. 1000 milliLiter(s) IV Continuous <Continuous>  dextrose 5%. 1000 milliLiter(s) IV Continuous <Continuous>  dextrose 50% Injectable 25 Gram(s) IV Push once  dextrose 50% Injectable 12.5 Gram(s) IV Push once  dextrose 50% Injectable 25 Gram(s) IV Push once  ferrous    sulfate 325 milliGRAM(s) Oral daily  fluticasone propionate 50 MICROgram(s)/spray Nasal Spray 1 Spray(s) Both Nostrils two times a day  glucagon  Injectable 1 milliGRAM(s) IntraMuscular once  hydrALAZINE 25 milliGRAM(s) Oral three times a day  influenza  Vaccine (HIGH DOSE) 0.7 milliLiter(s) IntraMuscular once  insulin lispro (ADMELOG) corrective regimen sliding scale   SubCutaneous three times a day before meals  insulin lispro (ADMELOG) corrective regimen sliding scale   SubCutaneous at bedtime  montelukast 10 milliGRAM(s) Oral daily  mupirocin 2% Nasal 1 Application(s) Both Nostrils every 12 hours  nystatin Powder 1 Application(s) Topical two times a day  polyethylene glycol 3350 17 Gram(s) Oral daily  senna 2 Tablet(s) Oral at bedtime  sevelamer carbonate 800 milliGRAM(s) Oral every 8 hours  sodium chloride 0.65% Nasal 1 Spray(s) Both Nostrils daily    PRN Inpatient Medications  acetaminophen     Tablet .. 650 milliGRAM(s) Oral every 6 hours PRN  dextrose Oral Gel 15 Gram(s) Oral once PRN  sodium chloride 0.9% lock flush 10 milliLiter(s) IV Push every 1 hour PRN      REVIEW OF SYSTEMS  --------------------------------------------------------------------------------  per above    VITALS/PHYSICAL EXAM  --------------------------------------------------------------------------------  T(C): 36.7 (12-05-23 @ 04:47), Max: 37 (12-04-23 @ 15:48)  HR: 91 (12-05-23 @ 04:47) (62 - 91)  BP: 122/61 (12-05-23 @ 04:47) (122/57 - 148/63)  RR: 20 (12-05-23 @ 04:47) (18 - 20)  SpO2: 98% (12-05-23 @ 04:47) (95% - 98%)  Wt(kg): --        12-04-23 @ 07:01  -  12-05-23 @ 07:00  --------------------------------------------------------  IN: 920 mL / OUT: 200 mL / NET: 720 mL      Physical Exam:  General: no acute distress  Neuro: no focal deficits  HEENT: NC/AT, anicteric, no JVD  Pulmonary: breath sounds diminished, on 2L O2  Cardiovascular/Chest: +S1S2,  GI/Abdomen: soft, NT/ND, +bowel sounds  Extremities: No edema  Skin: Warm and dry  Neuro : Tremor + ( Essential ?)- unlikely to be uremic related      LABS/STUDIES  --------------------------------------------------------------------------------              8.1    9.14  >-----------<  263      [12-05-23 @ 07:21]              25.0     137  |  102  |  98  ----------------------------<  148      [12-05-23 @ 07:19]  5.2   |  23  |  4.56        Ca     8.7     [12-05-23 @ 07:19]      Mg     2.2     [12-05-23 @ 07:19]      Phos  3.0     [12-05-23 @ 07:19]    TPro  6.4  /  Alb  3.6  /  TBili  0.3  /  DBili  x   /  AST  15  /  ALT  15  /  AlkPhos  42  [12-05-23 @ 07:19]          Creatinine Trend:  SCr 4.56 [12-05 @ 07:19]  SCr 4.51 [12-05 @ 01:03]  SCr 4.93 [12-04 @ 07:06]  SCr 4.46 [12-03 @ 06:12]  SCr 4.91 [12-02 @ 07:39]    Urinalysis - [12-05-23 @ 07:19]      Color  / Appearance  / SG  / pH       Gluc 148 / Ketone   / Bili  / Urobili        Blood  / Protein  / Leuk Est  / Nitrite       RBC  / WBC  / Hyaline  / Gran  / Sq Epi  / Non Sq Epi  / Bacteria     Urine Creatinine 141      [11-28-23 @ 18:09]  Urine Protein 313      [11-28-23 @ 18:09]  Urine Sodium 24      [11-28-23 @ 18:08]  Urine Urea Nitrogen 596      [11-28-23 @ 18:08]  Urine Potassium 34      [11-28-23 @ 18:08]  Urine Osmolality 363      [11-28-23 @ 18:10]         [facility] Division of Kidney Diseases & Hypertension  FOLLOW UP NOTE  [phone]--------------------------------------------------------------------------------    Chief Complaint: Advanced CKD    24 hour events/subjective:  Pt was seen and examined at the bedside. No acute overnight events. Pt denies worsening of SOB/ Constipation/ Diarrhea/ Nausea/ Vomiting/ abdominal pain/ chest pain/ tingling/ numbness.  Obtained consent for iHD by     PAST HISTORY  --------------------------------------------------------------------------------  No significant changes to PMH, PSH, FHx, SHx, unless otherwise noted    ALLERGIES & MEDICATIONS  --------------------------------------------------------------------------------  Allergies    No Known Allergies    Intolerances      Standing Inpatient Medications  albuterol    90 MICROgram(s) HFA Inhaler 2 Puff(s) Inhalation every 6 hours  albuterol/ipratropium for Nebulization 3 milliLiter(s) Nebulizer every 6 hours  amLODIPine   Tablet 10 milliGRAM(s) Oral daily  atorvastatin 80 milliGRAM(s) Oral at bedtime  budesonide 160 MICROgram(s)/formoterol 4.5 MICROgram(s) Inhaler 2 Puff(s) Inhalation two times a day  calcitriol   Capsule 0.25 MICROGram(s) Oral daily  chlorhexidine 2% Cloths 1 Application(s) Topical daily  chlorhexidine 4% Liquid 1 Application(s) Topical <User Schedule>  dextrose 5%. 1000 milliLiter(s) IV Continuous <Continuous>  dextrose 5%. 1000 milliLiter(s) IV Continuous <Continuous>  dextrose 50% Injectable 25 Gram(s) IV Push once  dextrose 50% Injectable 12.5 Gram(s) IV Push once  dextrose 50% Injectable 25 Gram(s) IV Push once  ferrous    sulfate 325 milliGRAM(s) Oral daily  fluticasone propionate 50 MICROgram(s)/spray Nasal Spray 1 Spray(s) Both Nostrils two times a day  glucagon  Injectable 1 milliGRAM(s) IntraMuscular once  hydrALAZINE 25 milliGRAM(s) Oral three times a day  influenza  Vaccine (HIGH DOSE) 0.7 milliLiter(s) IntraMuscular once  insulin lispro (ADMELOG) corrective regimen sliding scale   SubCutaneous three times a day before meals  insulin lispro (ADMELOG) corrective regimen sliding scale   SubCutaneous at bedtime  montelukast 10 milliGRAM(s) Oral daily  mupirocin 2% Nasal 1 Application(s) Both Nostrils every 12 hours  nystatin Powder 1 Application(s) Topical two times a day  polyethylene glycol 3350 17 Gram(s) Oral daily  senna 2 Tablet(s) Oral at bedtime  sevelamer carbonate 800 milliGRAM(s) Oral every 8 hours  sodium chloride 0.65% Nasal 1 Spray(s) Both Nostrils daily    PRN Inpatient Medications  acetaminophen     Tablet .. 650 milliGRAM(s) Oral every 6 hours PRN  dextrose Oral Gel 15 Gram(s) Oral once PRN  sodium chloride 0.9% lock flush 10 milliLiter(s) IV Push every 1 hour PRN      REVIEW OF SYSTEMS  --------------------------------------------------------------------------------  per above    VITALS/PHYSICAL EXAM  --------------------------------------------------------------------------------  T(C): 36.7 (12-05-23 @ 04:47), Max: 37 (12-04-23 @ 15:48)  HR: 91 (12-05-23 @ 04:47) (62 - 91)  BP: 122/61 (12-05-23 @ 04:47) (122/57 - 148/63)  RR: 20 (12-05-23 @ 04:47) (18 - 20)  SpO2: 98% (12-05-23 @ 04:47) (95% - 98%)  Wt(kg): --        12-04-23 @ 07:01  -  12-05-23 @ 07:00  --------------------------------------------------------  IN: 920 mL / OUT: 200 mL / NET: 720 mL      Physical Exam:  General: no acute distress  Neuro: no focal deficits  HEENT: NC/AT, anicteric, no JVD  Pulmonary: breath sounds diminished, on 2L O2  Cardiovascular/Chest: +S1S2,  GI/Abdomen: soft, NT/ND, +bowel sounds  Extremities: No edema  Skin: Warm and dry  Neuro : Tremor + ( Essential ?)- unlikely to be uremic related      LABS/STUDIES  --------------------------------------------------------------------------------              8.1    9.14  >-----------<  263      [12-05-23 @ 07:21]              25.0     137  |  102  |  98  ----------------------------<  148      [12-05-23 @ 07:19]  5.2   |  23  |  4.56        Ca     8.7     [12-05-23 @ 07:19]      Mg     2.2     [12-05-23 @ 07:19]      Phos  3.0     [12-05-23 @ 07:19]    TPro  6.4  /  Alb  3.6  /  TBili  0.3  /  DBili  x   /  AST  15  /  ALT  15  /  AlkPhos  42  [12-05-23 @ 07:19]          Creatinine Trend:  SCr 4.56 [12-05 @ 07:19]  SCr 4.51 [12-05 @ 01:03]  SCr 4.93 [12-04 @ 07:06]  SCr 4.46 [12-03 @ 06:12]  SCr 4.91 [12-02 @ 07:39]    Urinalysis - [12-05-23 @ 07:19]      Color  / Appearance  / SG  / pH       Gluc 148 / Ketone   / Bili  / Urobili        Blood  / Protein  / Leuk Est  / Nitrite       RBC  / WBC  / Hyaline  / Gran  / Sq Epi  / Non Sq Epi  / Bacteria     Urine Creatinine 141      [11-28-23 @ 18:09]  Urine Protein 313      [11-28-23 @ 18:09]  Urine Sodium 24      [11-28-23 @ 18:08]  Urine Urea Nitrogen 596      [11-28-23 @ 18:08]  Urine Potassium 34      [11-28-23 @ 18:08]  Urine Osmolality 363      [11-28-23 @ 18:10]

## 2023-12-06 DIAGNOSIS — N25.0 RENAL OSTEODYSTROPHY: ICD-10-CM

## 2023-12-06 LAB
ALBUMIN SERPL ELPH-MCNC: 3.5 G/DL — SIGNIFICANT CHANGE UP (ref 3.3–5)
ALBUMIN SERPL ELPH-MCNC: 3.5 G/DL — SIGNIFICANT CHANGE UP (ref 3.3–5)
ALP SERPL-CCNC: 46 U/L — SIGNIFICANT CHANGE UP (ref 40–120)
ALP SERPL-CCNC: 46 U/L — SIGNIFICANT CHANGE UP (ref 40–120)
ALT FLD-CCNC: 18 U/L — SIGNIFICANT CHANGE UP (ref 10–45)
ALT FLD-CCNC: 18 U/L — SIGNIFICANT CHANGE UP (ref 10–45)
ANION GAP SERPL CALC-SCNC: 11 MMOL/L — SIGNIFICANT CHANGE UP (ref 5–17)
ANION GAP SERPL CALC-SCNC: 11 MMOL/L — SIGNIFICANT CHANGE UP (ref 5–17)
AST SERPL-CCNC: 19 U/L — SIGNIFICANT CHANGE UP (ref 10–40)
AST SERPL-CCNC: 19 U/L — SIGNIFICANT CHANGE UP (ref 10–40)
BASOPHILS # BLD AUTO: 0.01 K/UL — SIGNIFICANT CHANGE UP (ref 0–0.2)
BASOPHILS # BLD AUTO: 0.01 K/UL — SIGNIFICANT CHANGE UP (ref 0–0.2)
BASOPHILS NFR BLD AUTO: 0.1 % — SIGNIFICANT CHANGE UP (ref 0–2)
BASOPHILS NFR BLD AUTO: 0.1 % — SIGNIFICANT CHANGE UP (ref 0–2)
BILIRUB SERPL-MCNC: 0.3 MG/DL — SIGNIFICANT CHANGE UP (ref 0.2–1.2)
BILIRUB SERPL-MCNC: 0.3 MG/DL — SIGNIFICANT CHANGE UP (ref 0.2–1.2)
BUN SERPL-MCNC: 66 MG/DL — HIGH (ref 7–23)
BUN SERPL-MCNC: 66 MG/DL — HIGH (ref 7–23)
CALCIUM SERPL-MCNC: 8.8 MG/DL — SIGNIFICANT CHANGE UP (ref 8.4–10.5)
CALCIUM SERPL-MCNC: 8.8 MG/DL — SIGNIFICANT CHANGE UP (ref 8.4–10.5)
CHLORIDE SERPL-SCNC: 102 MMOL/L — SIGNIFICANT CHANGE UP (ref 96–108)
CHLORIDE SERPL-SCNC: 102 MMOL/L — SIGNIFICANT CHANGE UP (ref 96–108)
CO2 SERPL-SCNC: 25 MMOL/L — SIGNIFICANT CHANGE UP (ref 22–31)
CO2 SERPL-SCNC: 25 MMOL/L — SIGNIFICANT CHANGE UP (ref 22–31)
CREAT SERPL-MCNC: 3.15 MG/DL — HIGH (ref 0.5–1.3)
CREAT SERPL-MCNC: 3.15 MG/DL — HIGH (ref 0.5–1.3)
EGFR: 14 ML/MIN/1.73M2 — LOW
EGFR: 14 ML/MIN/1.73M2 — LOW
EOSINOPHIL # BLD AUTO: 0.3 K/UL — SIGNIFICANT CHANGE UP (ref 0–0.5)
EOSINOPHIL # BLD AUTO: 0.3 K/UL — SIGNIFICANT CHANGE UP (ref 0–0.5)
EOSINOPHIL NFR BLD AUTO: 3.1 % — SIGNIFICANT CHANGE UP (ref 0–6)
EOSINOPHIL NFR BLD AUTO: 3.1 % — SIGNIFICANT CHANGE UP (ref 0–6)
GLUCOSE BLDC GLUCOMTR-MCNC: 134 MG/DL — HIGH (ref 70–99)
GLUCOSE BLDC GLUCOMTR-MCNC: 134 MG/DL — HIGH (ref 70–99)
GLUCOSE BLDC GLUCOMTR-MCNC: 143 MG/DL — HIGH (ref 70–99)
GLUCOSE BLDC GLUCOMTR-MCNC: 143 MG/DL — HIGH (ref 70–99)
GLUCOSE BLDC GLUCOMTR-MCNC: 147 MG/DL — HIGH (ref 70–99)
GLUCOSE BLDC GLUCOMTR-MCNC: 147 MG/DL — HIGH (ref 70–99)
GLUCOSE BLDC GLUCOMTR-MCNC: 162 MG/DL — HIGH (ref 70–99)
GLUCOSE BLDC GLUCOMTR-MCNC: 162 MG/DL — HIGH (ref 70–99)
GLUCOSE SERPL-MCNC: 114 MG/DL — HIGH (ref 70–99)
GLUCOSE SERPL-MCNC: 114 MG/DL — HIGH (ref 70–99)
HCT VFR BLD CALC: 24.9 % — LOW (ref 34.5–45)
HCT VFR BLD CALC: 24.9 % — LOW (ref 34.5–45)
HGB BLD-MCNC: 7.8 G/DL — LOW (ref 11.5–15.5)
HGB BLD-MCNC: 7.8 G/DL — LOW (ref 11.5–15.5)
IMM GRANULOCYTES NFR BLD AUTO: 1.6 % — HIGH (ref 0–0.9)
IMM GRANULOCYTES NFR BLD AUTO: 1.6 % — HIGH (ref 0–0.9)
LYMPHOCYTES # BLD AUTO: 1.69 K/UL — SIGNIFICANT CHANGE UP (ref 1–3.3)
LYMPHOCYTES # BLD AUTO: 1.69 K/UL — SIGNIFICANT CHANGE UP (ref 1–3.3)
LYMPHOCYTES # BLD AUTO: 17.3 % — SIGNIFICANT CHANGE UP (ref 13–44)
LYMPHOCYTES # BLD AUTO: 17.3 % — SIGNIFICANT CHANGE UP (ref 13–44)
MAGNESIUM SERPL-MCNC: 2.1 MG/DL — SIGNIFICANT CHANGE UP (ref 1.6–2.6)
MAGNESIUM SERPL-MCNC: 2.1 MG/DL — SIGNIFICANT CHANGE UP (ref 1.6–2.6)
MCHC RBC-ENTMCNC: 28.8 PG — SIGNIFICANT CHANGE UP (ref 27–34)
MCHC RBC-ENTMCNC: 28.8 PG — SIGNIFICANT CHANGE UP (ref 27–34)
MCHC RBC-ENTMCNC: 31.3 GM/DL — LOW (ref 32–36)
MCHC RBC-ENTMCNC: 31.3 GM/DL — LOW (ref 32–36)
MCV RBC AUTO: 91.9 FL — SIGNIFICANT CHANGE UP (ref 80–100)
MCV RBC AUTO: 91.9 FL — SIGNIFICANT CHANGE UP (ref 80–100)
MONOCYTES # BLD AUTO: 1.49 K/UL — HIGH (ref 0–0.9)
MONOCYTES # BLD AUTO: 1.49 K/UL — HIGH (ref 0–0.9)
MONOCYTES NFR BLD AUTO: 15.3 % — HIGH (ref 2–14)
MONOCYTES NFR BLD AUTO: 15.3 % — HIGH (ref 2–14)
NEUTROPHILS # BLD AUTO: 6.11 K/UL — SIGNIFICANT CHANGE UP (ref 1.8–7.4)
NEUTROPHILS # BLD AUTO: 6.11 K/UL — SIGNIFICANT CHANGE UP (ref 1.8–7.4)
NEUTROPHILS NFR BLD AUTO: 62.6 % — SIGNIFICANT CHANGE UP (ref 43–77)
NEUTROPHILS NFR BLD AUTO: 62.6 % — SIGNIFICANT CHANGE UP (ref 43–77)
NRBC # BLD: 0 /100 WBCS — SIGNIFICANT CHANGE UP (ref 0–0)
NRBC # BLD: 0 /100 WBCS — SIGNIFICANT CHANGE UP (ref 0–0)
PHOSPHATE SERPL-MCNC: 2.2 MG/DL — LOW (ref 2.5–4.5)
PHOSPHATE SERPL-MCNC: 2.2 MG/DL — LOW (ref 2.5–4.5)
PLATELET # BLD AUTO: 243 K/UL — SIGNIFICANT CHANGE UP (ref 150–400)
PLATELET # BLD AUTO: 243 K/UL — SIGNIFICANT CHANGE UP (ref 150–400)
POTASSIUM SERPL-MCNC: 4.9 MMOL/L — SIGNIFICANT CHANGE UP (ref 3.5–5.3)
POTASSIUM SERPL-MCNC: 4.9 MMOL/L — SIGNIFICANT CHANGE UP (ref 3.5–5.3)
POTASSIUM SERPL-SCNC: 4.9 MMOL/L — SIGNIFICANT CHANGE UP (ref 3.5–5.3)
POTASSIUM SERPL-SCNC: 4.9 MMOL/L — SIGNIFICANT CHANGE UP (ref 3.5–5.3)
PROT SERPL-MCNC: 6.2 G/DL — SIGNIFICANT CHANGE UP (ref 6–8.3)
PROT SERPL-MCNC: 6.2 G/DL — SIGNIFICANT CHANGE UP (ref 6–8.3)
RBC # BLD: 2.71 M/UL — LOW (ref 3.8–5.2)
RBC # BLD: 2.71 M/UL — LOW (ref 3.8–5.2)
RBC # FLD: 14.6 % — HIGH (ref 10.3–14.5)
RBC # FLD: 14.6 % — HIGH (ref 10.3–14.5)
SODIUM SERPL-SCNC: 138 MMOL/L — SIGNIFICANT CHANGE UP (ref 135–145)
SODIUM SERPL-SCNC: 138 MMOL/L — SIGNIFICANT CHANGE UP (ref 135–145)
WBC # BLD: 9.76 K/UL — SIGNIFICANT CHANGE UP (ref 3.8–10.5)
WBC # BLD: 9.76 K/UL — SIGNIFICANT CHANGE UP (ref 3.8–10.5)
WBC # FLD AUTO: 9.76 K/UL — SIGNIFICANT CHANGE UP (ref 3.8–10.5)
WBC # FLD AUTO: 9.76 K/UL — SIGNIFICANT CHANGE UP (ref 3.8–10.5)

## 2023-12-06 PROCEDURE — 99232 SBSQ HOSP IP/OBS MODERATE 35: CPT

## 2023-12-06 PROCEDURE — 90935 HEMODIALYSIS ONE EVALUATION: CPT | Mod: GC

## 2023-12-06 PROCEDURE — 99233 SBSQ HOSP IP/OBS HIGH 50: CPT | Mod: GC

## 2023-12-06 PROCEDURE — 93970 EXTREMITY STUDY: CPT | Mod: 26

## 2023-12-06 RX ORDER — ERYTHROPOIETIN 10000 [IU]/ML
5000 INJECTION, SOLUTION INTRAVENOUS; SUBCUTANEOUS ONCE
Refills: 0 | Status: DISCONTINUED | OUTPATIENT
Start: 2023-12-06 | End: 2023-12-06

## 2023-12-06 RX ORDER — ERYTHROPOIETIN 10000 [IU]/ML
5000 INJECTION, SOLUTION INTRAVENOUS; SUBCUTANEOUS ONCE
Refills: 0 | Status: COMPLETED | OUTPATIENT
Start: 2023-12-07 | End: 2023-12-07

## 2023-12-06 RX ADMIN — CHLORHEXIDINE GLUCONATE 1 APPLICATION(S): 213 SOLUTION TOPICAL at 05:57

## 2023-12-06 RX ADMIN — CHLORHEXIDINE GLUCONATE 1 APPLICATION(S): 213 SOLUTION TOPICAL at 12:29

## 2023-12-06 RX ADMIN — Medication 1 SPRAY(S): at 17:24

## 2023-12-06 RX ADMIN — MUPIROCIN 1 APPLICATION(S): 20 OINTMENT TOPICAL at 07:58

## 2023-12-06 RX ADMIN — Medication 25 MILLIGRAM(S): at 15:14

## 2023-12-06 RX ADMIN — MUPIROCIN 1 APPLICATION(S): 20 OINTMENT TOPICAL at 17:25

## 2023-12-06 RX ADMIN — Medication 25 MILLIGRAM(S): at 22:04

## 2023-12-06 RX ADMIN — Medication 3 MILLILITER(S): at 12:30

## 2023-12-06 RX ADMIN — BUDESONIDE AND FORMOTEROL FUMARATE DIHYDRATE 2 PUFF(S): 160; 4.5 AEROSOL RESPIRATORY (INHALATION) at 22:04

## 2023-12-06 RX ADMIN — Medication 1 SPRAY(S): at 12:31

## 2023-12-06 RX ADMIN — MONTELUKAST 10 MILLIGRAM(S): 4 TABLET, CHEWABLE ORAL at 12:31

## 2023-12-06 RX ADMIN — ATORVASTATIN CALCIUM 80 MILLIGRAM(S): 80 TABLET, FILM COATED ORAL at 22:03

## 2023-12-06 RX ADMIN — Medication 325 MILLIGRAM(S): at 12:31

## 2023-12-06 RX ADMIN — Medication 3 MILLILITER(S): at 22:04

## 2023-12-06 RX ADMIN — SENNA PLUS 2 TABLET(S): 8.6 TABLET ORAL at 22:03

## 2023-12-06 RX ADMIN — NYSTATIN CREAM 1 APPLICATION(S): 100000 CREAM TOPICAL at 17:25

## 2023-12-06 RX ADMIN — Medication 1 SPRAY(S): at 05:59

## 2023-12-06 RX ADMIN — SEVELAMER CARBONATE 800 MILLIGRAM(S): 2400 POWDER, FOR SUSPENSION ORAL at 05:58

## 2023-12-06 RX ADMIN — Medication 3 MILLILITER(S): at 17:22

## 2023-12-06 RX ADMIN — NYSTATIN CREAM 1 APPLICATION(S): 100000 CREAM TOPICAL at 05:59

## 2023-12-06 RX ADMIN — Medication 3 MILLILITER(S): at 05:59

## 2023-12-06 NOTE — PROGRESS NOTE ADULT - PROBLEM SELECTOR PLAN 3
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

## 2023-12-06 NOTE — PROGRESS NOTE ADULT - ATTENDING COMMENTS
Patient with CKD. Vascular surgery consulted for HD access creation. Please obtain vein mapping BUE. Medical optimization/clearance.

## 2023-12-06 NOTE — PROGRESS NOTE ADULT - ASSESSMENT
83 year old female with HTN, HLD, CAD, T2DM, CKD, remote hx of breast CA s/p mastectomy, and chronic hypoxemic respiratory failure of unknown etiology on home O2 who presents for lower extremity swelling and shortness of breath. Nephrology consulted for diuresis management with advanced CKD.  eGFR persistently ~9, with possible uremic symptoms (asterxis, SOB, fatigue) so iHD started 12/5.

## 2023-12-06 NOTE — PROGRESS NOTE ADULT - SUBJECTIVE AND OBJECTIVE BOX
DATE OF SERVICE: 12-06-23 @ 16:22    Patient is a 83y old  Female who presents with a chief complaint of LE swelling, dyspnea (06 Dec 2023 10:29)      INTERVAL HISTORY: Feels ok.     REVIEW OF SYSTEMS:  CONSTITUTIONAL: No weakness  EYES/ENT: No visual changes;  No throat pain   NECK: No pain or stiffness  RESPIRATORY: No cough, wheezing; No shortness of breath  CARDIOVASCULAR: No chest pain or palpitations  GASTROINTESTINAL: No abdominal  pain. No nausea, vomiting, or hematemesis  GENITOURINARY: No dysuria, frequency or hematuria  NEUROLOGICAL: No stroke like symptoms  SKIN: No rashes    TELEMETRY Personally reviewed: SR   	  MEDICATIONS:  amLODIPine   Tablet 10 milliGRAM(s) Oral daily  hydrALAZINE 25 milliGRAM(s) Oral three times a day        PHYSICAL EXAM:  T(C): 36.6 (12-06-23 @ 12:14), Max: 37 (12-05-23 @ 17:49)  HR: 77 (12-06-23 @ 12:14) (73 - 97)  BP: 148/62 (12-06-23 @ 12:14) (138/65 - 165/72)  RR: 18 (12-06-23 @ 12:14) (16 - 18)  SpO2: 100% (12-06-23 @ 12:14) (96% - 100%)  Wt(kg): --  I&O's Summary    05 Dec 2023 07:01  -  06 Dec 2023 07:00  --------------------------------------------------------  IN: 270 mL / OUT: 500 mL / NET: -230 mL    06 Dec 2023 07:01  -  06 Dec 2023 16:22  --------------------------------------------------------  IN: 800 mL / OUT: 1800 mL / NET: -1000 mL          Appearance: In no distress	  HEENT:    PERRL, EOMI	  Cardiovascular:  S1 S2, No JVD  Respiratory: Lungs clear to auscultation	  Gastrointestinal:  Soft, Non-tender, + BS	  Vascularature:  + edema of LE  Psychiatric: Appropriate affect   Neuro: no acute focal deficits                               7.8    9.76  )-----------( 243      ( 06 Dec 2023 06:21 )             24.9     12-06    138  |  102  |  66<H>  ----------------------------<  114<H>  4.9   |  25  |  3.15<H>    Ca    8.8      06 Dec 2023 06:20  Phos  2.2     12-06  Mg     2.1     12-06    TPro  6.2  /  Alb  3.5  /  TBili  0.3  /  DBili  x   /  AST  19  /  ALT  18  /  AlkPhos  46  12-06        Labs personally reviewed      ASSESSMENT/PLAN: 	    83 year old female with HTN, HLD, CAD, T2DM, CKD, remote hx of breast CA s/p mastectomy, and chronic hypoxemic respiratory failure of unknown etiology on home O2 who presents for lower extremity swelling and shortness of breath.      1. Acute diastolic heart failure  - Pt with LE edema and pulmonary congestion  - s/p IV diuresis now DC'd d/t increasing Cr which is now improving.  - Echo shows normal LV systolic function with Bi-atrial enlargement, G2DD and LVH  - EKG shows sinus rhythm (not atrial flutter)  - s/p HD as per Renal  - Pulm consult appreciated for continued hypoxia despite diuresis: recommend swallow eval, duonebs, symbicort and prednisone    2. ESRD  - Renal on board  - HD as per renal    3. HLD  - Statin therapy    4. HTN - stable  - Patient with ESRD          Elba Ortega, DAFNE-NP   Jean Ash DO [facility]  Cardiovascular Medicine  [address], [address]  Available through call or text on [employer] TE[employer]  Office: [phone]   DATE OF SERVICE: 12-06-23 @ 16:22    Patient is a 83y old  Female who presents with a chief complaint of LE swelling, dyspnea (06 Dec 2023 10:29)      INTERVAL HISTORY: Feels ok.     REVIEW OF SYSTEMS:  CONSTITUTIONAL: No weakness  EYES/ENT: No visual changes;  No throat pain   NECK: No pain or stiffness  RESPIRATORY: No cough, wheezing; No shortness of breath  CARDIOVASCULAR: No chest pain or palpitations  GASTROINTESTINAL: No abdominal  pain. No nausea, vomiting, or hematemesis  GENITOURINARY: No dysuria, frequency or hematuria  NEUROLOGICAL: No stroke like symptoms  SKIN: No rashes    TELEMETRY Personally reviewed: SR   	  MEDICATIONS:  amLODIPine   Tablet 10 milliGRAM(s) Oral daily  hydrALAZINE 25 milliGRAM(s) Oral three times a day        PHYSICAL EXAM:  T(C): 36.6 (12-06-23 @ 12:14), Max: 37 (12-05-23 @ 17:49)  HR: 77 (12-06-23 @ 12:14) (73 - 97)  BP: 148/62 (12-06-23 @ 12:14) (138/65 - 165/72)  RR: 18 (12-06-23 @ 12:14) (16 - 18)  SpO2: 100% (12-06-23 @ 12:14) (96% - 100%)  Wt(kg): --  I&O's Summary    05 Dec 2023 07:01  -  06 Dec 2023 07:00  --------------------------------------------------------  IN: 270 mL / OUT: 500 mL / NET: -230 mL    06 Dec 2023 07:01  -  06 Dec 2023 16:22  --------------------------------------------------------  IN: 800 mL / OUT: 1800 mL / NET: -1000 mL          Appearance: In no distress	  HEENT:    PERRL, EOMI	  Cardiovascular:  S1 S2, No JVD  Respiratory: Lungs clear to auscultation	  Gastrointestinal:  Soft, Non-tender, + BS	  Vascularature:  + edema of LE  Psychiatric: Appropriate affect   Neuro: no acute focal deficits                               7.8    9.76  )-----------( 243      ( 06 Dec 2023 06:21 )             24.9     12-06    138  |  102  |  66<H>  ----------------------------<  114<H>  4.9   |  25  |  3.15<H>    Ca    8.8      06 Dec 2023 06:20  Phos  2.2     12-06  Mg     2.1     12-06    TPro  6.2  /  Alb  3.5  /  TBili  0.3  /  DBili  x   /  AST  19  /  ALT  18  /  AlkPhos  46  12-06        Labs personally reviewed      ASSESSMENT/PLAN: 	    83 year old female with HTN, HLD, CAD, T2DM, CKD, remote hx of breast CA s/p mastectomy, and chronic hypoxemic respiratory failure of unknown etiology on home O2 who presents for lower extremity swelling and shortness of breath.      1. Acute diastolic heart failure  - Pt with LE edema and pulmonary congestion  - s/p IV diuresis now DC'd d/t increasing Cr which is now improving.  - Echo shows normal LV systolic function with Bi-atrial enlargement, G2DD and LVH  - EKG shows sinus rhythm (not atrial flutter)  - s/p HD as per Renal  - Pulm consult appreciated for continued hypoxia despite diuresis: recommend swallow eval, duonebs, symbicort and prednisone    2. ESRD  - Renal on board  - HD as per renal    3. HLD  - Statin therapy    4. HTN - stable  - Patient with ESRD          Elba Ortega, DAFNE-NP   Jean Ash DO [facility]  Cardiovascular Medicine  [address], [address]  Available through call or text on [employer] TE[employer]  Office: [phone]   DATE OF SERVICE: 12-06-23 @ 16:22    Patient is a 83y old  Female who presents with a chief complaint of LE swelling, dyspnea (06 Dec 2023 10:29)      INTERVAL HISTORY: Feels ok.     REVIEW OF SYSTEMS:  CONSTITUTIONAL: No weakness  EYES/ENT: No visual changes;  No throat pain   NECK: No pain or stiffness  RESPIRATORY: No cough, wheezing; No shortness of breath  CARDIOVASCULAR: No chest pain or palpitations  GASTROINTESTINAL: No abdominal  pain. No nausea, vomiting, or hematemesis  GENITOURINARY: No dysuria, frequency or hematuria  NEUROLOGICAL: No stroke like symptoms  SKIN: No rashes    TELEMETRY Personally reviewed: SR   	  MEDICATIONS:  amLODIPine   Tablet 10 milliGRAM(s) Oral daily  hydrALAZINE 25 milliGRAM(s) Oral three times a day        PHYSICAL EXAM:  T(C): 36.6 (12-06-23 @ 12:14), Max: 37 (12-05-23 @ 17:49)  HR: 77 (12-06-23 @ 12:14) (73 - 97)  BP: 148/62 (12-06-23 @ 12:14) (138/65 - 165/72)  RR: 18 (12-06-23 @ 12:14) (16 - 18)  SpO2: 100% (12-06-23 @ 12:14) (96% - 100%)  Wt(kg): --  I&O's Summary    05 Dec 2023 07:01  -  06 Dec 2023 07:00  --------------------------------------------------------  IN: 270 mL / OUT: 500 mL / NET: -230 mL    06 Dec 2023 07:01  -  06 Dec 2023 16:22  --------------------------------------------------------  IN: 800 mL / OUT: 1800 mL / NET: -1000 mL          Appearance: In no distress	  HEENT:    PERRL, EOMI	  Cardiovascular:  S1 S2, No JVD  Respiratory: Lungs clear to auscultation	  Gastrointestinal:  Soft, Non-tender, + BS	  Vascularature:  + edema of LE  Psychiatric: Appropriate affect   Neuro: no acute focal deficits                               7.8    9.76  )-----------( 243      ( 06 Dec 2023 06:21 )             24.9     12-06    138  |  102  |  66<H>  ----------------------------<  114<H>  4.9   |  25  |  3.15<H>    Ca    8.8      06 Dec 2023 06:20  Phos  2.2     12-06  Mg     2.1     12-06    TPro  6.2  /  Alb  3.5  /  TBili  0.3  /  DBili  x   /  AST  19  /  ALT  18  /  AlkPhos  46  12-06        Labs personally reviewed      ASSESSMENT/PLAN: 	    83 year old female with HTN, HLD, CAD, T2DM, CKD, remote hx of breast CA s/p mastectomy, and chronic hypoxemic respiratory failure of unknown etiology on home O2 who presents for lower extremity swelling and shortness of breath.      1. Acute diastolic heart failure  - Pt with LE edema and pulmonary congestion  - s/p IV diuresis now DC'd d/t increasing Cr which is now improving.  - Echo shows normal LV systolic function with Bi-atrial enlargement, G2DD and LVH  - EKG shows sinus rhythm (not atrial flutter)  - s/p HD as per Renal  - Pulm consult appreciated for continued hypoxia despite diuresis: recommend swallow eval, duonebs, symbicort and prednisone    2. ESRD  - Renal on board  - HD as per renal    3. HLD  - Statin therapy    4. HTN - stable  - Patient with ESRD    5. Preop Risk Stratification - planned for AVF  - Would ideally defer until hypoxia corrected, more volume removal with HD sessions and euvolemic           Elba Ortega, DAFNE-NP   Jean Ash DO [facility]  Cardiovascular Medicine  [address], [address]  Available through call or text on [employer] TE[employer]  Office: [phone]   DATE OF SERVICE: 12-06-23 @ 16:22    Patient is a 83y old  Female who presents with a chief complaint of LE swelling, dyspnea (06 Dec 2023 10:29)      INTERVAL HISTORY: Feels ok.     REVIEW OF SYSTEMS:  CONSTITUTIONAL: No weakness  EYES/ENT: No visual changes;  No throat pain   NECK: No pain or stiffness  RESPIRATORY: No cough, wheezing; No shortness of breath  CARDIOVASCULAR: No chest pain or palpitations  GASTROINTESTINAL: No abdominal  pain. No nausea, vomiting, or hematemesis  GENITOURINARY: No dysuria, frequency or hematuria  NEUROLOGICAL: No stroke like symptoms  SKIN: No rashes    TELEMETRY Personally reviewed: SR   	  MEDICATIONS:  amLODIPine   Tablet 10 milliGRAM(s) Oral daily  hydrALAZINE 25 milliGRAM(s) Oral three times a day        PHYSICAL EXAM:  T(C): 36.6 (12-06-23 @ 12:14), Max: 37 (12-05-23 @ 17:49)  HR: 77 (12-06-23 @ 12:14) (73 - 97)  BP: 148/62 (12-06-23 @ 12:14) (138/65 - 165/72)  RR: 18 (12-06-23 @ 12:14) (16 - 18)  SpO2: 100% (12-06-23 @ 12:14) (96% - 100%)  Wt(kg): --  I&O's Summary    05 Dec 2023 07:01  -  06 Dec 2023 07:00  --------------------------------------------------------  IN: 270 mL / OUT: 500 mL / NET: -230 mL    06 Dec 2023 07:01  -  06 Dec 2023 16:22  --------------------------------------------------------  IN: 800 mL / OUT: 1800 mL / NET: -1000 mL          Appearance: In no distress	  HEENT:    PERRL, EOMI	  Cardiovascular:  S1 S2, No JVD  Respiratory: Lungs clear to auscultation	  Gastrointestinal:  Soft, Non-tender, + BS	  Vascularature:  + edema of LE  Psychiatric: Appropriate affect   Neuro: no acute focal deficits                               7.8    9.76  )-----------( 243      ( 06 Dec 2023 06:21 )             24.9     12-06    138  |  102  |  66<H>  ----------------------------<  114<H>  4.9   |  25  |  3.15<H>    Ca    8.8      06 Dec 2023 06:20  Phos  2.2     12-06  Mg     2.1     12-06    TPro  6.2  /  Alb  3.5  /  TBili  0.3  /  DBili  x   /  AST  19  /  ALT  18  /  AlkPhos  46  12-06        Labs personally reviewed      ASSESSMENT/PLAN: 	    83 year old female with HTN, HLD, CAD, T2DM, CKD, remote hx of breast CA s/p mastectomy, and chronic hypoxemic respiratory failure of unknown etiology on home O2 who presents for lower extremity swelling and shortness of breath.      1. Acute diastolic heart failure  - Pt with LE edema and pulmonary congestion  - s/p IV diuresis now DC'd d/t increasing Cr which is now improving.  - Echo shows normal LV systolic function with Bi-atrial enlargement, G2DD and LVH  - EKG shows sinus rhythm (not atrial flutter)  - s/p HD as per Renal  - Pulm consult appreciated for continued hypoxia despite diuresis: recommend swallow eval, duonebs, symbicort and prednisone    2. ESRD  - Renal on board  - HD as per renal    3. HLD  - Statin therapy    4. HTN - stable  - Patient with ESRD    5. Preop Risk Stratification - planned for AVF  - Would ideally defer until hypoxia corrected, more volume removal with HD sessions and euvolemic           Elba Ortega, DAFNE-NP   Jean Ash DO [facility]  Cardiovascular Medicine  [address], [address]  Available through call or text on [employer] TE[employer]  Office: [phone]

## 2023-12-06 NOTE — PROGRESS NOTE ADULT - SUBJECTIVE AND OBJECTIVE BOX
Patient is a 83y old  Female who presents with a chief complaint of LE swelling, dyspnea (05 Dec 2023 17:41)      SUBJECTIVE / OVERNIGHT EVENTS: NAEO. Pt denies chest pain, SOB, N/V, fever/chills, or changes in bowel movements.    MEDICATIONS  (STANDING):  albuterol    90 MICROgram(s) HFA Inhaler 2 Puff(s) Inhalation every 6 hours  albuterol/ipratropium for Nebulization 3 milliLiter(s) Nebulizer every 6 hours  amLODIPine   Tablet 10 milliGRAM(s) Oral daily  atorvastatin 80 milliGRAM(s) Oral at bedtime  budesonide 160 MICROgram(s)/formoterol 4.5 MICROgram(s) Inhaler 2 Puff(s) Inhalation two times a day  calcitriol   Capsule 0.25 MICROGram(s) Oral daily  chlorhexidine 2% Cloths 1 Application(s) Topical daily  chlorhexidine 4% Liquid 1 Application(s) Topical <User Schedule>  dextrose 5%. 1000 milliLiter(s) (50 mL/Hr) IV Continuous <Continuous>  dextrose 5%. 1000 milliLiter(s) (100 mL/Hr) IV Continuous <Continuous>  dextrose 50% Injectable 25 Gram(s) IV Push once  dextrose 50% Injectable 12.5 Gram(s) IV Push once  dextrose 50% Injectable 25 Gram(s) IV Push once  ferrous    sulfate 325 milliGRAM(s) Oral daily  fluticasone propionate 50 MICROgram(s)/spray Nasal Spray 1 Spray(s) Both Nostrils two times a day  glucagon  Injectable 1 milliGRAM(s) IntraMuscular once  hydrALAZINE 25 milliGRAM(s) Oral three times a day  influenza  Vaccine (HIGH DOSE) 0.7 milliLiter(s) IntraMuscular once  insulin lispro (ADMELOG) corrective regimen sliding scale   SubCutaneous three times a day before meals  insulin lispro (ADMELOG) corrective regimen sliding scale   SubCutaneous at bedtime  montelukast 10 milliGRAM(s) Oral daily  mupirocin 2% Nasal 1 Application(s) Both Nostrils every 12 hours  nystatin Powder 1 Application(s) Topical two times a day  polyethylene glycol 3350 17 Gram(s) Oral daily  senna 2 Tablet(s) Oral at bedtime  sevelamer carbonate 800 milliGRAM(s) Oral every 8 hours  sodium chloride 0.65% Nasal 1 Spray(s) Both Nostrils daily    MEDICATIONS  (PRN):  acetaminophen     Tablet .. 650 milliGRAM(s) Oral every 6 hours PRN Temp greater or equal to 38C (100.4F), Mild Pain (1 - 3)  dextrose Oral Gel 15 Gram(s) Oral once PRN Blood Glucose LESS THAN 70 milliGRAM(s)/deciliter  sodium chloride 0.9% lock flush 10 milliLiter(s) IV Push every 1 hour PRN Pre/post blood products, medications, blood draw, and to maintain line patency      CAPILLARY BLOOD GLUCOSE      POCT Blood Glucose.: 156 mg/dL (05 Dec 2023 21:32)  POCT Blood Glucose.: 192 mg/dL (05 Dec 2023 16:29)  POCT Blood Glucose.: 158 mg/dL (05 Dec 2023 12:30)  POCT Blood Glucose.: 148 mg/dL (05 Dec 2023 07:56)    I&O's Summary    04 Dec 2023 07:01  -  05 Dec 2023 07:00  --------------------------------------------------------  IN: 920 mL / OUT: 200 mL / NET: 720 mL    05 Dec 2023 07:01  -  06 Dec 2023 06:08  --------------------------------------------------------  IN: 270 mL / OUT: 500 mL / NET: -230 mL        Vital Signs Last 24 Hrs  T(C): 36.6 (06 Dec 2023 04:20), Max: 37 (05 Dec 2023 17:49)  T(F): 97.8 (06 Dec 2023 04:20), Max: 98.6 (05 Dec 2023 17:49)  HR: 87 (06 Dec 2023 04:20) (74 - 97)  BP: 165/72 (06 Dec 2023 04:20) (129/60 - 165/72)  BP(mean): --  RR: 16 (06 Dec 2023 04:20) (16 - 19)  SpO2: 96% (06 Dec 2023 04:20) (96% - 100%)    Parameters below as of 06 Dec 2023 04:20  Patient On (Oxygen Delivery Method): nasal cannula  O2 Flow (L/min): 2      PHYSICAL EXAM:  GENERAL: NAD, well-developed, well-nourished  HEAD: Atraumatic, Normocephalic  EYES: Conjunctiva and sclera clear  CHEST/LUNG: Clear to auscultation bilaterally; No wheezes or crackles  HEART: Normal S1/S2; Regular rate and rhythm; No murmurs, rubs, or gallops  ABDOMEN: Soft, Nontender, Nondistended; Bowel sounds present  EXTREMITIES: No clubbing, cyanosis, or edema  PSYCH: A&Ox3      LABS:                        8.1    9.14  )-----------( 263      ( 05 Dec 2023 07:21 )             25.0      12-05    137  |  102  |  98<H>  ----------------------------<  148<H>  5.2   |  23  |  4.56<H>    Ca    8.7      05 Dec 2023 07:19  Phos  3.0     12-05  Mg     2.2     12-05    TPro  6.4  /  Alb  3.6  /  TBili  0.3  /  DBili  x   /  AST  15  /  ALT  15  /  AlkPhos  42  12-05          Urinalysis Basic - ( 05 Dec 2023 07:19 )    Color: x / Appearance: x / SG: x / pH: x  Gluc: 148 mg/dL / Ketone: x  / Bili: x / Urobili: x   Blood: x / Protein: x / Nitrite: x   Leuk Esterase: x / RBC: x / WBC x   Sq Epi: x / Non Sq Epi: x / Bacteria: x        RADIOLOGY & ADDITIONAL TESTS:     Patient is a 83y old  Female who presents with a chief complaint of LE swelling, dyspnea (05 Dec 2023 17:41)      SUBJECTIVE / OVERNIGHT EVENTS: NAEO. Pt denies chest pain, SOB, N/V, fever/chills, or changes in bowel movements.    MEDICATIONS  (STANDING):  albuterol    90 MICROgram(s) HFA Inhaler 2 Puff(s) Inhalation every 6 hours  albuterol/ipratropium for Nebulization 3 milliLiter(s) Nebulizer every 6 hours  amLODIPine   Tablet 10 milliGRAM(s) Oral daily  atorvastatin 80 milliGRAM(s) Oral at bedtime  budesonide 160 MICROgram(s)/formoterol 4.5 MICROgram(s) Inhaler 2 Puff(s) Inhalation two times a day  calcitriol   Capsule 0.25 MICROGram(s) Oral daily  chlorhexidine 2% Cloths 1 Application(s) Topical daily  chlorhexidine 4% Liquid 1 Application(s) Topical <User Schedule>  dextrose 5%. 1000 milliLiter(s) (50 mL/Hr) IV Continuous <Continuous>  dextrose 5%. 1000 milliLiter(s) (100 mL/Hr) IV Continuous <Continuous>  dextrose 50% Injectable 25 Gram(s) IV Push once  dextrose 50% Injectable 12.5 Gram(s) IV Push once  dextrose 50% Injectable 25 Gram(s) IV Push once  ferrous    sulfate 325 milliGRAM(s) Oral daily  fluticasone propionate 50 MICROgram(s)/spray Nasal Spray 1 Spray(s) Both Nostrils two times a day  glucagon  Injectable 1 milliGRAM(s) IntraMuscular once  hydrALAZINE 25 milliGRAM(s) Oral three times a day  influenza  Vaccine (HIGH DOSE) 0.7 milliLiter(s) IntraMuscular once  insulin lispro (ADMELOG) corrective regimen sliding scale   SubCutaneous three times a day before meals  insulin lispro (ADMELOG) corrective regimen sliding scale   SubCutaneous at bedtime  montelukast 10 milliGRAM(s) Oral daily  mupirocin 2% Nasal 1 Application(s) Both Nostrils every 12 hours  nystatin Powder 1 Application(s) Topical two times a day  polyethylene glycol 3350 17 Gram(s) Oral daily  senna 2 Tablet(s) Oral at bedtime  sevelamer carbonate 800 milliGRAM(s) Oral every 8 hours  sodium chloride 0.65% Nasal 1 Spray(s) Both Nostrils daily    MEDICATIONS  (PRN):  acetaminophen     Tablet .. 650 milliGRAM(s) Oral every 6 hours PRN Temp greater or equal to 38C (100.4F), Mild Pain (1 - 3)  dextrose Oral Gel 15 Gram(s) Oral once PRN Blood Glucose LESS THAN 70 milliGRAM(s)/deciliter  sodium chloride 0.9% lock flush 10 milliLiter(s) IV Push every 1 hour PRN Pre/post blood products, medications, blood draw, and to maintain line patency      CAPILLARY BLOOD GLUCOSE      POCT Blood Glucose.: 156 mg/dL (05 Dec 2023 21:32)  POCT Blood Glucose.: 192 mg/dL (05 Dec 2023 16:29)  POCT Blood Glucose.: 158 mg/dL (05 Dec 2023 12:30)  POCT Blood Glucose.: 148 mg/dL (05 Dec 2023 07:56)    I&O's Summary    04 Dec 2023 07:01  -  05 Dec 2023 07:00  --------------------------------------------------------  IN: 920 mL / OUT: 200 mL / NET: 720 mL    05 Dec 2023 07:01  -  06 Dec 2023 06:08  --------------------------------------------------------  IN: 270 mL / OUT: 500 mL / NET: -230 mL        Vital Signs Last 24 Hrs  T(C): 36.6 (06 Dec 2023 04:20), Max: 37 (05 Dec 2023 17:49)  T(F): 97.8 (06 Dec 2023 04:20), Max: 98.6 (05 Dec 2023 17:49)  HR: 87 (06 Dec 2023 04:20) (74 - 97)  BP: 165/72 (06 Dec 2023 04:20) (129/60 - 165/72)  BP(mean): --  RR: 16 (06 Dec 2023 04:20) (16 - 19)  SpO2: 96% (06 Dec 2023 04:20) (96% - 100%)    Parameters below as of 06 Dec 2023 04:20  Patient On (Oxygen Delivery Method): nasal cannula  O2 Flow (L/min): 2    PHYSICAL EXAM:  GENERAL: NAD, well-developed, well-nourished  HEAD: Atraumatic, Normocephalic  EYES: Conjunctiva and sclera clear  CHEST/LUNG: Crackles auscultated b/l  HEART: Normal S1/S2; Regular rate and rhythm; No murmurs, rubs, or gallops  ABDOMEN: Soft, Nontender, Nondistended; Bowel sounds present  EXTREMITIES: 3+ pitting edema  PSYCH: A&Ox3    LABS:                        8.1    9.14  )-----------( 263      ( 05 Dec 2023 07:21 )             25.0      12-05    137  |  102  |  98<H>  ----------------------------<  148<H>  5.2   |  23  |  4.56<H>    Ca    8.7      05 Dec 2023 07:19  Phos  3.0     12-05  Mg     2.2     12-05    TPro  6.4  /  Alb  3.6  /  TBili  0.3  /  DBili  x   /  AST  15  /  ALT  15  /  AlkPhos  42  12-05          Urinalysis Basic - ( 05 Dec 2023 07:19 )    Color: x / Appearance: x / SG: x / pH: x  Gluc: 148 mg/dL / Ketone: x  / Bili: x / Urobili: x   Blood: x / Protein: x / Nitrite: x   Leuk Esterase: x / RBC: x / WBC x   Sq Epi: x / Non Sq Epi: x / Bacteria: x        RADIOLOGY & ADDITIONAL TESTS:

## 2023-12-06 NOTE — PROGRESS NOTE ADULT - PROBLEM SELECTOR PLAN 9
DVT PPx: heparin subq  Diet: DASH  Code: FULL  Dispo: medically active  Communication: discussed with  at bedside DVT PPx: heparin subq  Diet: DASH  Code: FULL  Dispo: medically active  Communication: discussed with  at bedside  Risk stratification for patient's procedure: RCRI of 2 points, Class III Risk -- 10.1% 30-day risk of death, MI, or cardiac arrest DVT PPx: heparin subq  Diet: DASH  Code: FULL  Dispo: medically active  Communication: discussed with  at bedside  Risk stratification for fistula placement  - RCRI of 2 points, Class III Risk -- 10.1% 30-day risk of death, MI, or cardiac arrest  - Pt reports being able to do 4 METS activities indicating moderate functional capacity; pt states she is able to walk up stairs, walks, dances, does chores at home

## 2023-12-06 NOTE — CONSULT NOTE ADULT - SUBJECTIVE AND OBJECTIVE BOX
Interventional Radiology    Evaluate for Procedure: Tunneled HD catheter    HPI: 83 year old female with HTN, HLD, CAD, T2DM, CKD, remote hx of breast CA s/p mastectomy, and chronic hypoxemic respiratory failure of unknown etiology on home O2 who presents for lower extremity swelling and shortness of breath with subsequent CAL superimposed on CKD. Vascular consulted for AVF planning. IR placed non tunneled HD catheter on 12/4. IR now consulted for non tunneled to Tunneled HD catheter conversion.    Allergies: No Known Allergies    Medications (Abx/Cardiac/Anticoagulation/Blood Products)    heparin   Injectable: 5000 Unit(s) SubCutaneous (12-04 @ 13:44)  hydrALAZINE: 25 milliGRAM(s) Oral (12-05 @ 23:08)    Data: T(C): 36.6  HR: 87  BP: 165/72  RR: 16  SpO2: 96%    -WBC 9.76 / HgB 7.8 / Hct 24.9 / Plt 243  -Na 138 / Cl 102 / BUN 66 / Glucose 114  -K 4.9 / CO2 25 / Cr 3.15  -ALT 18 / Alk Phos 46 / T.Bili 0.3  -INR 0.99 / PTT 38.8        Assessment/Plan: 83 year old female with HTN, HLD, CAD, T2DM, CKD, remote hx of breast CA s/p mastectomy, and chronic hypoxemic respiratory failure of unknown etiology on home O2 who presents for lower extremity swelling and shortness of breath with subsequent CAL superimposed on CKD. Vascular consulted for AVF planning. IR placed non tunneled HD catheter on 12/4. IR now consulted for non tunneled to Tunneled HD catheter conversion.    - Will plan for Non tunneled to tunneled HD catheter conversion Thursday 12/7/23  - Place IR procedure order   - NPO after midnight tonight  - Send AM labs including coags on the day of the procedure

## 2023-12-06 NOTE — PROGRESS NOTE ADULT - SUBJECTIVE AND OBJECTIVE BOX
Vascular Surgery Progress Note  Patient is a 83y old  Female who presents with a chief complaint of LE swelling, dyspnea (06 Dec 2023 07:40)      INTERVAL EVENTS: No acute events overnight.  SUBJECTIVE: Patient seen and examined at bedside with surgical team, patient without complaints. Denies fever, chills, CP, SOB nausea, vomiting, abdominal pain.      OBJECTIVE:    Vital Signs Last 24 Hrs  T(C): 36.6 (06 Dec 2023 04:20), Max: 37 (05 Dec 2023 17:49)  T(F): 97.8 (06 Dec 2023 04:20), Max: 98.6 (05 Dec 2023 17:49)  HR: 87 (06 Dec 2023 04:20) (74 - 97)  BP: 165/72 (06 Dec 2023 04:20) (129/60 - 165/72)  BP(mean): --  RR: 16 (06 Dec 2023 04:20) (16 - 19)  SpO2: 96% (06 Dec 2023 04:20) (96% - 100%)    Parameters below as of 06 Dec 2023 04:20  Patient On (Oxygen Delivery Method): nasal cannula  O2 Flow (L/min): 2  I&O's Detail    05 Dec 2023 07:01  -  06 Dec 2023 07:00  --------------------------------------------------------  IN:    Oral Fluid: 270 mL  Total IN: 270 mL    OUT:    Other (mL): 500 mL  Total OUT: 500 mL    Total NET: -230 mL      MEDICATIONS  (STANDING):  albuterol    90 MICROgram(s) HFA Inhaler 2 Puff(s) Inhalation every 6 hours  albuterol/ipratropium for Nebulization 3 milliLiter(s) Nebulizer every 6 hours  amLODIPine   Tablet 10 milliGRAM(s) Oral daily  atorvastatin 80 milliGRAM(s) Oral at bedtime  budesonide 160 MICROgram(s)/formoterol 4.5 MICROgram(s) Inhaler 2 Puff(s) Inhalation two times a day  calcitriol   Capsule 0.25 MICROGram(s) Oral daily  chlorhexidine 2% Cloths 1 Application(s) Topical daily  chlorhexidine 4% Liquid 1 Application(s) Topical <User Schedule>  dextrose 5%. 1000 milliLiter(s) (100 mL/Hr) IV Continuous <Continuous>  dextrose 5%. 1000 milliLiter(s) (50 mL/Hr) IV Continuous <Continuous>  dextrose 50% Injectable 12.5 Gram(s) IV Push once  dextrose 50% Injectable 25 Gram(s) IV Push once  dextrose 50% Injectable 25 Gram(s) IV Push once  ferrous    sulfate 325 milliGRAM(s) Oral daily  fluticasone propionate 50 MICROgram(s)/spray Nasal Spray 1 Spray(s) Both Nostrils two times a day  glucagon  Injectable 1 milliGRAM(s) IntraMuscular once  hydrALAZINE 25 milliGRAM(s) Oral three times a day  influenza  Vaccine (HIGH DOSE) 0.7 milliLiter(s) IntraMuscular once  insulin lispro (ADMELOG) corrective regimen sliding scale   SubCutaneous at bedtime  insulin lispro (ADMELOG) corrective regimen sliding scale   SubCutaneous three times a day before meals  montelukast 10 milliGRAM(s) Oral daily  mupirocin 2% Nasal 1 Application(s) Both Nostrils every 12 hours  nystatin Powder 1 Application(s) Topical two times a day  polyethylene glycol 3350 17 Gram(s) Oral daily  senna 2 Tablet(s) Oral at bedtime  sevelamer carbonate 800 milliGRAM(s) Oral every 8 hours  sodium chloride 0.65% Nasal 1 Spray(s) Both Nostrils daily    MEDICATIONS  (PRN):  acetaminophen     Tablet .. 650 milliGRAM(s) Oral every 6 hours PRN Temp greater or equal to 38C (100.4F), Mild Pain (1 - 3)  dextrose Oral Gel 15 Gram(s) Oral once PRN Blood Glucose LESS THAN 70 milliGRAM(s)/deciliter  sodium chloride 0.9% lock flush 10 milliLiter(s) IV Push every 1 hour PRN Pre/post blood products, medications, blood draw, and to maintain line patency      PHYSICAL EXAM:  General: NAD, resting comfortably  HEENT: NC/AT, EOMI, normal hearing, no oral lesions, no LAD, neck supple  Pulmonary: normal resp effort, CTA-B  Cardiovascular: NSR, no murmurs  Abdominal: soft, ND/NT, no organomegaly  Extremities: WWP, normal strength, no clubbing/cyanosis/edema  Neuro: A/O x 3, CNs II-XII grossly intact, normal sensation, no focal deficits  Pulses: palpable distal pulses  LUE protected    LABS:                        7.8    9.76  )-----------( 243      ( 06 Dec 2023 06:21 )             24.9     12-06    138  |  102  |  66<H>  ----------------------------<  114<H>  4.9   |  25  |  3.15<H>    Ca    8.8      06 Dec 2023 06:20  Phos  2.2     12-06  Mg     2.1     12-06    TPro  6.2  /  Alb  3.5  /  TBili  0.3  /  DBili  x   /  AST  19  /  ALT  18  /  AlkPhos  46  12-06      LIVER FUNCTIONS - ( 06 Dec 2023 06:20 )  Alb: 3.5 g/dL / Pro: 6.2 g/dL / ALK PHOS: 46 U/L / ALT: 18 U/L / AST: 19 U/L / GGT: x           Urinalysis Basic - ( 06 Dec 2023 06:20 )    Color: x / Appearance: x / SG: x / pH: x  Gluc: 114 mg/dL / Ketone: x  / Bili: x / Urobili: x   Blood: x / Protein: x / Nitrite: x   Leuk Esterase: x / RBC: x / WBC x   Sq Epi: x / Non Sq Epi: x / Bacteria: x          IMAGING:

## 2023-12-06 NOTE — CHART NOTE - NSCHARTNOTEFT_GEN_A_CORE
Patient will require a rolling walker at home due to their Dx of chronic hypoxemic respiratory failure and consequent debility to help complete MRADLs.

## 2023-12-06 NOTE — PROGRESS NOTE ADULT - PROBLEM SELECTOR PLAN 1
CAL on CKD. Has not seen a physician in the recent past. Given her history its likely to be CAL on CKD 2/2 SAUMYA vs CKD progression. She has pedal edema. US duplex Kidney - showed renal parenchymal disease. UPCR 5.6g, no RBCs, no bacteria. FeUrea borderline but more suggestive of prerenal etiology.  SCr 4.5 on admission, received IV contrast for CTA on evening of 11/21, and Cr had been stable but increased about 2 days after IV contrast exposure suggesting SAUMYA. Serology work up ongoing- so far negative HIV, Hep B, Hep C, C3, C4, Anti GBM antibody, Anti Ds DNA, JAYLAN, ANCA, Serum free light chains, immunofixation. A1c 6.1%. Negative for PLA2R Ab. BNP 1300 initially. TTE with G2DD with elevated filling pressures and severe LA dilation. Patient was diuresed throughout hospitalization. eGFR persistently ~9, with possible uremic symptoms (asterxis, SOB, fatigue) so iHD started 12/5.     S/p non-tunneled iHD catheter placement 12/4, iHD done 12/5, tolerated well. Will plan for 2nd session today. Reviewed IR note for permacath placement tomorrow 12/7

## 2023-12-06 NOTE — PROGRESS NOTE ADULT - ASSESSMENT
83 year old female with HTN, HLD, CAD, T2DM, CKD, remote hx of breast CA s/p mastectomy, and chronic hypoxemic respiratory failure of unknown etiology on home O2 who presents for lower extremity swelling and shortness of breath with subsequent CAL superimposed on CKD. Vascular consulted for AVF planning. Pt is RHD.   - protect LUE- no sticks, blood draws, cuffs  - obtain bilateral upper extremity vein mapping  - please document medical/cardiac clearances for AVF  - vascular will follow      Discussed with fellow on behalf of attending

## 2023-12-06 NOTE — PROGRESS NOTE ADULT - PROBLEM SELECTOR PLAN 4
Patient with several year history of chronic hypoxemic respiratory failure  - requiring home O2, stable on 2L at baseline  - has not had escalating O2 requirements recently, despite feeling subjectively more dyspneic  - unclear etiology, though patient has had PFTs at her pulmologist's office last year    Plan:  > follow up TTE--> showing EF 71%, bi-atrial enlargement, LV diastolic dysfunction, and RV enlargement  > obtain records from pt's pulmonologists office: Dr. Paulino Gayle ([facility], [phone])  > continue with home montelukast and add on duonebs and symbicort   > wean O2 as tolerated  > add on prednisone for possible COPD exacerbation   > if persistently hypoxemic and despite diuresis, consider alternative etiologies and potentially high resolution CT chest  - f/u repeat CXR and BNP (stable CXR and elevated BNP)  - appreciate pulm recs: started on flonase and saline spray Patient with several year history of chronic hypoxemic respiratory failure  - requiring home O2, stable on 2L at baseline  - has not had escalating O2 requirements recently, despite feeling subjectively more dyspneic  - unclear etiology, though patient has had PFTs at her pulmologist's office last year    Plan:  > follow up TTE--> showing EF 71%, bi-atrial enlargement, LV diastolic dysfunction, and RV enlargement  > obtain records from pt's pulmonologists office: Dr. Paulino Gayle ([facility], [phone])  > continue with home montelukast and add on duonebs and symbicort   > wean O2 as tolerated  > add on prednisone for possible COPD exacerbation   > if persistently hypoxemic and despite diuresis, consider alternative etiologies and potentially high resolution CT chest  - f/u repeat CXR and BNP (stable CXR and elevated BNP)  - appreciate pulm recs: started on flonase and saline spray

## 2023-12-06 NOTE — PROGRESS NOTE ADULT - PROBLEM SELECTOR PLAN 1
Patient presenting for worsening LE edema and breathlessness  - also with several symptoms to suggest likely ADHF  - on exam: extremities warm and well perfused, but volume overloaded, S3 on exam  - proBNP elevated to 1300, but unknown baseline and with likely CAL on CKD  - hsTropT flat at 74, ECG without ST changes, no chest pain  - pt does not carry a diagnosis of HF, however on meds that may suggest she may have HFpEF (MRA, SGLT2i)    Plan:  > consider diuretic daily at expense of renal failure   > obtain TTE--> showing EF 71%, bi-atrial enlargement, LV diastolic dysfunction, and RV enlargement  > hold home spironolactone and dapagliflozin given CAL  > strict I/Os, daily standing weights  > appreciate cards recs  > will need to determine DC med rec based on GFR

## 2023-12-06 NOTE — PROGRESS NOTE ADULT - SUBJECTIVE AND OBJECTIVE BOX
[facility] Division of Kidney Diseases & Hypertension  FOLLOW UP NOTE  [phone]--------------------------------------------------------------------------------    Chief Complaint: Advanced CKD    24 hour events/subjective:  Pt was seen and examined at the bedside. First HD tolerated well yesterday. Per , patient appears to have more energy this morning and improved SOB. Pt denies worsening of SOB/ Constipation/ Diarrhea/ Nausea/ Vomiting/ abdominal pain/ chest pain/ tingling/ numbness.      PAST HISTORY  --------------------------------------------------------------------------------  No significant changes to PMH, PSH, FHx, SHx, unless otherwise noted    ALLERGIES & MEDICATIONS  --------------------------------------------------------------------------------  Allergies    No Known Allergies    Intolerances      Standing Inpatient Medications  albuterol    90 MICROgram(s) HFA Inhaler 2 Puff(s) Inhalation every 6 hours  albuterol/ipratropium for Nebulization 3 milliLiter(s) Nebulizer every 6 hours  amLODIPine   Tablet 10 milliGRAM(s) Oral daily  atorvastatin 80 milliGRAM(s) Oral at bedtime  budesonide 160 MICROgram(s)/formoterol 4.5 MICROgram(s) Inhaler 2 Puff(s) Inhalation two times a day  calcitriol   Capsule 0.25 MICROGram(s) Oral daily  chlorhexidine 2% Cloths 1 Application(s) Topical daily  chlorhexidine 4% Liquid 1 Application(s) Topical <User Schedule>  dextrose 5%. 1000 milliLiter(s) IV Continuous <Continuous>  dextrose 5%. 1000 milliLiter(s) IV Continuous <Continuous>  dextrose 50% Injectable 25 Gram(s) IV Push once  dextrose 50% Injectable 25 Gram(s) IV Push once  dextrose 50% Injectable 12.5 Gram(s) IV Push once  ferrous    sulfate 325 milliGRAM(s) Oral daily  fluticasone propionate 50 MICROgram(s)/spray Nasal Spray 1 Spray(s) Both Nostrils two times a day  glucagon  Injectable 1 milliGRAM(s) IntraMuscular once  hydrALAZINE 25 milliGRAM(s) Oral three times a day  influenza  Vaccine (HIGH DOSE) 0.7 milliLiter(s) IntraMuscular once  insulin lispro (ADMELOG) corrective regimen sliding scale   SubCutaneous at bedtime  insulin lispro (ADMELOG) corrective regimen sliding scale   SubCutaneous three times a day before meals  montelukast 10 milliGRAM(s) Oral daily  mupirocin 2% Nasal 1 Application(s) Both Nostrils every 12 hours  nystatin Powder 1 Application(s) Topical two times a day  polyethylene glycol 3350 17 Gram(s) Oral daily  senna 2 Tablet(s) Oral at bedtime  sevelamer carbonate 800 milliGRAM(s) Oral every 8 hours  sodium chloride 0.65% Nasal 1 Spray(s) Both Nostrils daily    PRN Inpatient Medications  acetaminophen     Tablet .. 650 milliGRAM(s) Oral every 6 hours PRN  dextrose Oral Gel 15 Gram(s) Oral once PRN  sodium chloride 0.9% lock flush 10 milliLiter(s) IV Push every 1 hour PRN      REVIEW OF SYSTEMS  --------------------------------------------------------------------------------  per above    VITALS/PHYSICAL EXAM  --------------------------------------------------------------------------------  T(C): 36.4 (12-06-23 @ 08:50), Max: 37 (12-05-23 @ 17:49)  HR: 73 (12-06-23 @ 08:50) (73 - 97)  BP: 155/67 (12-06-23 @ 08:50) (138/65 - 165/72)  RR: 18 (12-06-23 @ 08:50) (16 - 18)  SpO2: 100% (12-06-23 @ 08:50) (96% - 100%)  Wt(kg): --        12-05-23 @ 07:01  -  12-06-23 @ 07:00  --------------------------------------------------------  IN: 270 mL / OUT: 500 mL / NET: -230 mL      Physical Exam:  General: no acute distress  Neuro: no focal deficits +forgetful  HEENT: NC/AT, anicteric, no JVD  Pulmonary: breath sounds diminished, on 2L O2  Cardiovascular/Chest: +S1S2,  GI/Abdomen: soft, NT/ND, +bowel sounds  Extremities: No edema  Skin: Warm and dry  Pysch: appropriate mood and affect      LABS/STUDIES  --------------------------------------------------------------------------------              7.8    9.76  >-----------<  243      [12-06-23 @ 06:21]              24.9     138  |  102  |  66  ----------------------------<  114      [12-06-23 @ 06:20]  4.9   |  25  |  3.15        Ca     8.8     [12-06-23 @ 06:20]      Mg     2.1     [12-06-23 @ 06:20]      Phos  2.2     [12-06-23 @ 06:20]    TPro  6.2  /  Alb  3.5  /  TBili  0.3  /  DBili  x   /  AST  19  /  ALT  18  /  AlkPhos  46  [12-06-23 @ 06:20]          Creatinine Trend:  SCr 3.15 [12-06 @ 06:20]  SCr 4.56 [12-05 @ 07:19]  SCr 4.51 [12-05 @ 01:03]  SCr 4.93 [12-04 @ 07:06]  SCr 4.46 [12-03 @ 06:12]    Urinalysis - [12-06-23 @ 06:20]      Color  / Appearance  / SG  / pH       Gluc 114 / Ketone   / Bili  / Urobili        Blood  / Protein  / Leuk Est  / Nitrite       RBC  / WBC  / Hyaline  / Gran  / Sq Epi  / Non Sq Epi  / Bacteria            [facility] Division of Kidney Diseases & Hypertension  FOLLOW UP NOTE  [phone]--------------------------------------------------------------------------------    Chief Complaint: Advanced CKD    24 hour events/subjective:  Pt was seen and examined at the bedside. First HD tolerated well yesterday. Per , patient appears to have more energy this morning and improved SOB. Pt denies worsening of SOB/ Constipation/ Diarrhea/ Nausea/ Vomiting/ abdominal pain/ chest pain/ tingling/ numbness.      PAST HISTORY  --------------------------------------------------------------------------------  No significant changes to PMH, PSH, FHx, SHx, unless otherwise noted    ALLERGIES & MEDICATIONS  --------------------------------------------------------------------------------  Allergies    No Known Allergies    Intolerances      Standing Inpatient Medications  albuterol    90 MICROgram(s) HFA Inhaler 2 Puff(s) Inhalation every 6 hours  albuterol/ipratropium for Nebulization 3 milliLiter(s) Nebulizer every 6 hours  amLODIPine   Tablet 10 milliGRAM(s) Oral daily  atorvastatin 80 milliGRAM(s) Oral at bedtime  budesonide 160 MICROgram(s)/formoterol 4.5 MICROgram(s) Inhaler 2 Puff(s) Inhalation two times a day  calcitriol   Capsule 0.25 MICROGram(s) Oral daily  chlorhexidine 2% Cloths 1 Application(s) Topical daily  chlorhexidine 4% Liquid 1 Application(s) Topical <User Schedule>  dextrose 5%. 1000 milliLiter(s) IV Continuous <Continuous>  dextrose 5%. 1000 milliLiter(s) IV Continuous <Continuous>  dextrose 50% Injectable 25 Gram(s) IV Push once  dextrose 50% Injectable 25 Gram(s) IV Push once  dextrose 50% Injectable 12.5 Gram(s) IV Push once  ferrous    sulfate 325 milliGRAM(s) Oral daily  fluticasone propionate 50 MICROgram(s)/spray Nasal Spray 1 Spray(s) Both Nostrils two times a day  glucagon  Injectable 1 milliGRAM(s) IntraMuscular once  hydrALAZINE 25 milliGRAM(s) Oral three times a day  influenza  Vaccine (HIGH DOSE) 0.7 milliLiter(s) IntraMuscular once  insulin lispro (ADMELOG) corrective regimen sliding scale   SubCutaneous at bedtime  insulin lispro (ADMELOG) corrective regimen sliding scale   SubCutaneous three times a day before meals  montelukast 10 milliGRAM(s) Oral daily  mupirocin 2% Nasal 1 Application(s) Both Nostrils every 12 hours  nystatin Powder 1 Application(s) Topical two times a day  polyethylene glycol 3350 17 Gram(s) Oral daily  senna 2 Tablet(s) Oral at bedtime  sevelamer carbonate 800 milliGRAM(s) Oral every 8 hours  sodium chloride 0.65% Nasal 1 Spray(s) Both Nostrils daily    PRN Inpatient Medications  acetaminophen     Tablet .. 650 milliGRAM(s) Oral every 6 hours PRN  dextrose Oral Gel 15 Gram(s) Oral once PRN  sodium chloride 0.9% lock flush 10 milliLiter(s) IV Push every 1 hour PRN      REVIEW OF SYSTEMS  --------------------------------------------------------------------------------  per above    VITALS/PHYSICAL EXAM  --------------------------------------------------------------------------------  T(C): 36.4 (12-06-23 @ 08:50), Max: 37 (12-05-23 @ 17:49)  HR: 73 (12-06-23 @ 08:50) (73 - 97)  BP: 155/67 (12-06-23 @ 08:50) (138/65 - 165/72)  RR: 18 (12-06-23 @ 08:50) (16 - 18)  SpO2: 100% (12-06-23 @ 08:50) (96% - 100%)  Wt(kg): --        12-05-23 @ 07:01  -  12-06-23 @ 07:00  --------------------------------------------------------  IN: 270 mL / OUT: 500 mL / NET: -230 mL      Physical Exam:  General: no acute distress  Neuro: no focal deficits +forgetful  HEENT: NC/AT, anicteric, no JVD  Pulmonary: breath sounds diminished, on 2L O2  Cardiovascular/Chest: +S1S2,  GI/Abdomen: soft, NT/ND, +bowel sounds  Extremities: No edema  Skin: Warm and dry  Pysch: appropriate mood and affect      LABS/STUDIES  --------------------------------------------------------------------------------              7.8    9.76  >-----------<  243      [12-06-23 @ 06:21]              24.9     138  |  102  |  66  ----------------------------<  114      [12-06-23 @ 06:20]  4.9   |  25  |  3.15        Ca     8.8     [12-06-23 @ 06:20]      Mg     2.1     [12-06-23 @ 06:20]      Phos  2.2     [12-06-23 @ 06:20]    TPro  6.2  /  Alb  3.5  /  TBili  0.3  /  DBili  x   /  AST  19  /  ALT  18  /  AlkPhos  46  [12-06-23 @ 06:20]          Creatinine Trend:  SCr 3.15 [12-06 @ 06:20]  SCr 4.56 [12-05 @ 07:19]  SCr 4.51 [12-05 @ 01:03]  SCr 4.93 [12-04 @ 07:06]  SCr 4.46 [12-03 @ 06:12]    Urinalysis - [12-06-23 @ 06:20]      Color  / Appearance  / SG  / pH       Gluc 114 / Ketone   / Bili  / Urobili        Blood  / Protein  / Leuk Est  / Nitrite       RBC  / WBC  / Hyaline  / Gran  / Sq Epi  / Non Sq Epi  / Bacteria

## 2023-12-06 NOTE — PROGRESS NOTE ADULT - ATTENDING COMMENTS
Patient seen and examined on dialysis. Tolerating HD.     # CAL on CKD. Developed CAL due to contrast-induced nephropathy. Unclear baseline serum creatinine, but likely in the 4s. Given that eGFR is 7-9, Started dialysis on 12/5/2023.  Day 2 HD today.     # Hyperkalemia - secondary to kidney dysfunction. 3 K dialysis bath.     # Medication monitoring encounter: medication dose reviewed. Please dose medications to CrCl<15    The rest of the recommendations as per fellow's note.    Kerri Leyva MD  Attending Nephrologist  [phone] or via [employer] . Patient seen and examined on dialysis. Tolerating HD.     # CAL on CKD. Developed CAL due to contrast-induced nephropathy. Unclear baseline serum creatinine, but likely in the 4s. Given that eGFR is 7-9, Started dialysis on 12/5/2023.  Day 2 HD today.     # Hyperkalemia - secondary to kidney dysfunction. 3 K dialysis bath.     # Medication monitoring encounter: medication dose reviewed. Please dose medications to CrCl<15    The rest of the recommendations as per fellow's note.    Kerri Leyva MD  Attending Nephrologist  [phone] or via [employer] .

## 2023-12-06 NOTE — PROGRESS NOTE ADULT - PROBLEM SELECTOR PLAN 2
Hgb below goal. Iron studies reviewed. 5k Epo given with iHD on 12/5. Will give another 5 today and then order TThSat with iHD starting on Sat.

## 2023-12-06 NOTE — PROGRESS NOTE ADULT - PROBLEM SELECTOR PLAN 3
Reviewed PTH, phos calcium and vitamin D levels. Patient now with low phos while on phos binder.   Stop phos binders and calcium as PTH is suppressed (72). Monitor calcium, phos with every BMP.     Discussed with primary team.     If you have any questions, please feel free to contact me  Keaton Dye  Nephrology Fellow  [phone]; Prefer [employer] TEAMS  (After 5pm or on weekends please page the on-call fellow). Reviewed PTH, phos calcium and vitamin D levels. Patient now with low phos while on phos binder.   Stop phos binders and calcium as PTH is suppressed (72). Monitor calcium, phos with every BMP.     Discussed with primary team.     If you have any questions, please feel free to contact me  Keaton Dye  Nephrology Fellow  [phone]; Prefer [employer] TEAMS  (After 5pm or on weekends please page the on-call fellow).

## 2023-12-06 NOTE — PROGRESS NOTE ADULT - ATTENDING COMMENTS
83 year old female with PMH chronic hypoxic respiratory failure, DM2, CAD, HTN and HLD who presented with dyspnea and lower extremity edema admitted for acute decompensated congestive heart failure. TTE shows LV diastolic dysfunction but normal EF. She was started on IV diuretics however, her creatinine increased to nearly 7 and her GFR is less than 10 so HD has been initiated. She is for non tunneled HD catheter tomorrow with IR and AVF fistula after that for more permanent access. Rest of plan as above. 83 year old female with PMH chronic hypoxic respiratory failure, DM2, CAD, HTN and HLD who presented with dyspnea and lower extremity edema admitted for acute decompensated congestive heart failure. TTE shows LV diastolic dysfunction but normal EF. She was started on IV diuretics however, her creatinine increased to nearly 7 and her GFR is less than 10 so HD has been initiated. She is for tunneled HD catheter tomorrow with IR and AVF fistula after that for more permanent access. Rest of plan as above.

## 2023-12-07 LAB
ANION GAP SERPL CALC-SCNC: 9 MMOL/L — SIGNIFICANT CHANGE UP (ref 5–17)
ANION GAP SERPL CALC-SCNC: 9 MMOL/L — SIGNIFICANT CHANGE UP (ref 5–17)
APTT BLD: 28.8 SEC — SIGNIFICANT CHANGE UP (ref 24.5–35.6)
APTT BLD: 28.8 SEC — SIGNIFICANT CHANGE UP (ref 24.5–35.6)
BASOPHILS # BLD AUTO: 0.02 K/UL — SIGNIFICANT CHANGE UP (ref 0–0.2)
BASOPHILS # BLD AUTO: 0.02 K/UL — SIGNIFICANT CHANGE UP (ref 0–0.2)
BASOPHILS NFR BLD AUTO: 0.2 % — SIGNIFICANT CHANGE UP (ref 0–2)
BASOPHILS NFR BLD AUTO: 0.2 % — SIGNIFICANT CHANGE UP (ref 0–2)
BLD GP AB SCN SERPL QL: NEGATIVE — SIGNIFICANT CHANGE UP
BLD GP AB SCN SERPL QL: NEGATIVE — SIGNIFICANT CHANGE UP
BUN SERPL-MCNC: 37 MG/DL — HIGH (ref 7–23)
BUN SERPL-MCNC: 37 MG/DL — HIGH (ref 7–23)
CALCIUM SERPL-MCNC: 8.5 MG/DL — SIGNIFICANT CHANGE UP (ref 8.4–10.5)
CALCIUM SERPL-MCNC: 8.5 MG/DL — SIGNIFICANT CHANGE UP (ref 8.4–10.5)
CHLORIDE SERPL-SCNC: 101 MMOL/L — SIGNIFICANT CHANGE UP (ref 96–108)
CHLORIDE SERPL-SCNC: 101 MMOL/L — SIGNIFICANT CHANGE UP (ref 96–108)
CO2 SERPL-SCNC: 29 MMOL/L — SIGNIFICANT CHANGE UP (ref 22–31)
CO2 SERPL-SCNC: 29 MMOL/L — SIGNIFICANT CHANGE UP (ref 22–31)
CREAT SERPL-MCNC: 2.43 MG/DL — HIGH (ref 0.5–1.3)
CREAT SERPL-MCNC: 2.43 MG/DL — HIGH (ref 0.5–1.3)
EGFR: 20 ML/MIN/1.73M2 — LOW
EGFR: 20 ML/MIN/1.73M2 — LOW
EOSINOPHIL # BLD AUTO: 0.48 K/UL — SIGNIFICANT CHANGE UP (ref 0–0.5)
EOSINOPHIL # BLD AUTO: 0.48 K/UL — SIGNIFICANT CHANGE UP (ref 0–0.5)
EOSINOPHIL NFR BLD AUTO: 4.6 % — SIGNIFICANT CHANGE UP (ref 0–6)
EOSINOPHIL NFR BLD AUTO: 4.6 % — SIGNIFICANT CHANGE UP (ref 0–6)
GLUCOSE BLDC GLUCOMTR-MCNC: 113 MG/DL — HIGH (ref 70–99)
GLUCOSE BLDC GLUCOMTR-MCNC: 113 MG/DL — HIGH (ref 70–99)
GLUCOSE BLDC GLUCOMTR-MCNC: 115 MG/DL — HIGH (ref 70–99)
GLUCOSE BLDC GLUCOMTR-MCNC: 115 MG/DL — HIGH (ref 70–99)
GLUCOSE BLDC GLUCOMTR-MCNC: 143 MG/DL — HIGH (ref 70–99)
GLUCOSE BLDC GLUCOMTR-MCNC: 143 MG/DL — HIGH (ref 70–99)
GLUCOSE SERPL-MCNC: 112 MG/DL — HIGH (ref 70–99)
GLUCOSE SERPL-MCNC: 112 MG/DL — HIGH (ref 70–99)
HCT VFR BLD CALC: 25.4 % — LOW (ref 34.5–45)
HCT VFR BLD CALC: 25.4 % — LOW (ref 34.5–45)
HGB BLD-MCNC: 7.9 G/DL — LOW (ref 11.5–15.5)
HGB BLD-MCNC: 7.9 G/DL — LOW (ref 11.5–15.5)
IMM GRANULOCYTES NFR BLD AUTO: 1.3 % — HIGH (ref 0–0.9)
IMM GRANULOCYTES NFR BLD AUTO: 1.3 % — HIGH (ref 0–0.9)
INR BLD: 1 RATIO — SIGNIFICANT CHANGE UP (ref 0.85–1.18)
INR BLD: 1 RATIO — SIGNIFICANT CHANGE UP (ref 0.85–1.18)
LYMPHOCYTES # BLD AUTO: 1.41 K/UL — SIGNIFICANT CHANGE UP (ref 1–3.3)
LYMPHOCYTES # BLD AUTO: 1.41 K/UL — SIGNIFICANT CHANGE UP (ref 1–3.3)
LYMPHOCYTES # BLD AUTO: 13.5 % — SIGNIFICANT CHANGE UP (ref 13–44)
LYMPHOCYTES # BLD AUTO: 13.5 % — SIGNIFICANT CHANGE UP (ref 13–44)
MAGNESIUM SERPL-MCNC: 1.8 MG/DL — SIGNIFICANT CHANGE UP (ref 1.6–2.6)
MAGNESIUM SERPL-MCNC: 1.8 MG/DL — SIGNIFICANT CHANGE UP (ref 1.6–2.6)
MCHC RBC-ENTMCNC: 28.6 PG — SIGNIFICANT CHANGE UP (ref 27–34)
MCHC RBC-ENTMCNC: 28.6 PG — SIGNIFICANT CHANGE UP (ref 27–34)
MCHC RBC-ENTMCNC: 31.1 GM/DL — LOW (ref 32–36)
MCHC RBC-ENTMCNC: 31.1 GM/DL — LOW (ref 32–36)
MCV RBC AUTO: 92 FL — SIGNIFICANT CHANGE UP (ref 80–100)
MCV RBC AUTO: 92 FL — SIGNIFICANT CHANGE UP (ref 80–100)
MONOCYTES # BLD AUTO: 1.59 K/UL — HIGH (ref 0–0.9)
MONOCYTES # BLD AUTO: 1.59 K/UL — HIGH (ref 0–0.9)
MONOCYTES NFR BLD AUTO: 15.2 % — HIGH (ref 2–14)
MONOCYTES NFR BLD AUTO: 15.2 % — HIGH (ref 2–14)
NEUTROPHILS # BLD AUTO: 6.81 K/UL — SIGNIFICANT CHANGE UP (ref 1.8–7.4)
NEUTROPHILS # BLD AUTO: 6.81 K/UL — SIGNIFICANT CHANGE UP (ref 1.8–7.4)
NEUTROPHILS NFR BLD AUTO: 65.2 % — SIGNIFICANT CHANGE UP (ref 43–77)
NEUTROPHILS NFR BLD AUTO: 65.2 % — SIGNIFICANT CHANGE UP (ref 43–77)
NRBC # BLD: 0 /100 WBCS — SIGNIFICANT CHANGE UP (ref 0–0)
NRBC # BLD: 0 /100 WBCS — SIGNIFICANT CHANGE UP (ref 0–0)
PHOSPHATE SERPL-MCNC: 1.9 MG/DL — LOW (ref 2.5–4.5)
PHOSPHATE SERPL-MCNC: 1.9 MG/DL — LOW (ref 2.5–4.5)
PLATELET # BLD AUTO: 240 K/UL — SIGNIFICANT CHANGE UP (ref 150–400)
PLATELET # BLD AUTO: 240 K/UL — SIGNIFICANT CHANGE UP (ref 150–400)
POTASSIUM SERPL-MCNC: 4.3 MMOL/L — SIGNIFICANT CHANGE UP (ref 3.5–5.3)
POTASSIUM SERPL-MCNC: 4.3 MMOL/L — SIGNIFICANT CHANGE UP (ref 3.5–5.3)
POTASSIUM SERPL-SCNC: 4.3 MMOL/L — SIGNIFICANT CHANGE UP (ref 3.5–5.3)
POTASSIUM SERPL-SCNC: 4.3 MMOL/L — SIGNIFICANT CHANGE UP (ref 3.5–5.3)
PROTHROM AB SERPL-ACNC: 10.5 SEC — SIGNIFICANT CHANGE UP (ref 9.5–13)
PROTHROM AB SERPL-ACNC: 10.5 SEC — SIGNIFICANT CHANGE UP (ref 9.5–13)
RBC # BLD: 2.76 M/UL — LOW (ref 3.8–5.2)
RBC # BLD: 2.76 M/UL — LOW (ref 3.8–5.2)
RBC # FLD: 14.3 % — SIGNIFICANT CHANGE UP (ref 10.3–14.5)
RBC # FLD: 14.3 % — SIGNIFICANT CHANGE UP (ref 10.3–14.5)
RH IG SCN BLD-IMP: POSITIVE — SIGNIFICANT CHANGE UP
RH IG SCN BLD-IMP: POSITIVE — SIGNIFICANT CHANGE UP
SARS-COV-2 RNA SPEC QL NAA+PROBE: SIGNIFICANT CHANGE UP
SARS-COV-2 RNA SPEC QL NAA+PROBE: SIGNIFICANT CHANGE UP
SODIUM SERPL-SCNC: 139 MMOL/L — SIGNIFICANT CHANGE UP (ref 135–145)
SODIUM SERPL-SCNC: 139 MMOL/L — SIGNIFICANT CHANGE UP (ref 135–145)
WBC # BLD: 10.45 K/UL — SIGNIFICANT CHANGE UP (ref 3.8–10.5)
WBC # BLD: 10.45 K/UL — SIGNIFICANT CHANGE UP (ref 3.8–10.5)
WBC # FLD AUTO: 10.45 K/UL — SIGNIFICANT CHANGE UP (ref 3.8–10.5)
WBC # FLD AUTO: 10.45 K/UL — SIGNIFICANT CHANGE UP (ref 3.8–10.5)

## 2023-12-07 PROCEDURE — 36558 INSERT TUNNELED CV CATH: CPT | Mod: RT

## 2023-12-07 PROCEDURE — 99232 SBSQ HOSP IP/OBS MODERATE 35: CPT

## 2023-12-07 PROCEDURE — 77001 FLUOROGUIDE FOR VEIN DEVICE: CPT | Mod: 26

## 2023-12-07 PROCEDURE — 99232 SBSQ HOSP IP/OBS MODERATE 35: CPT | Mod: GC

## 2023-12-07 PROCEDURE — 99233 SBSQ HOSP IP/OBS HIGH 50: CPT | Mod: GC

## 2023-12-07 RX ORDER — SODIUM,POTASSIUM PHOSPHATES 278-250MG
1 POWDER IN PACKET (EA) ORAL ONCE
Refills: 0 | Status: COMPLETED | OUTPATIENT
Start: 2023-12-07 | End: 2023-12-07

## 2023-12-07 RX ORDER — ERYTHROPOIETIN 10000 [IU]/ML
5000 INJECTION, SOLUTION INTRAVENOUS; SUBCUTANEOUS
Refills: 0 | Status: DISCONTINUED | OUTPATIENT
Start: 2023-12-07 | End: 2023-12-15

## 2023-12-07 RX ORDER — ONDANSETRON 8 MG/1
4 TABLET, FILM COATED ORAL ONCE
Refills: 0 | Status: DISCONTINUED | OUTPATIENT
Start: 2023-12-07 | End: 2023-12-15

## 2023-12-07 RX ADMIN — MUPIROCIN 1 APPLICATION(S): 20 OINTMENT TOPICAL at 17:57

## 2023-12-07 RX ADMIN — Medication 1 PACKET(S): at 05:58

## 2023-12-07 RX ADMIN — BUDESONIDE AND FORMOTEROL FUMARATE DIHYDRATE 2 PUFF(S): 160; 4.5 AEROSOL RESPIRATORY (INHALATION) at 22:09

## 2023-12-07 RX ADMIN — ATORVASTATIN CALCIUM 80 MILLIGRAM(S): 80 TABLET, FILM COATED ORAL at 22:09

## 2023-12-07 RX ADMIN — AMLODIPINE BESYLATE 10 MILLIGRAM(S): 2.5 TABLET ORAL at 05:21

## 2023-12-07 RX ADMIN — Medication 3 MILLILITER(S): at 05:10

## 2023-12-07 RX ADMIN — ERYTHROPOIETIN 5000 UNIT(S): 10000 INJECTION, SOLUTION INTRAVENOUS; SUBCUTANEOUS at 13:53

## 2023-12-07 RX ADMIN — MONTELUKAST 10 MILLIGRAM(S): 4 TABLET, CHEWABLE ORAL at 11:36

## 2023-12-07 RX ADMIN — CHLORHEXIDINE GLUCONATE 1 APPLICATION(S): 213 SOLUTION TOPICAL at 05:11

## 2023-12-07 RX ADMIN — Medication 1 SPRAY(S): at 05:11

## 2023-12-07 RX ADMIN — NYSTATIN CREAM 1 APPLICATION(S): 100000 CREAM TOPICAL at 17:51

## 2023-12-07 RX ADMIN — NYSTATIN CREAM 1 APPLICATION(S): 100000 CREAM TOPICAL at 05:11

## 2023-12-07 RX ADMIN — Medication 25 MILLIGRAM(S): at 22:08

## 2023-12-07 RX ADMIN — Medication 3 MILLILITER(S): at 22:09

## 2023-12-07 RX ADMIN — CHLORHEXIDINE GLUCONATE 1 APPLICATION(S): 213 SOLUTION TOPICAL at 11:36

## 2023-12-07 RX ADMIN — Medication 325 MILLIGRAM(S): at 11:36

## 2023-12-07 RX ADMIN — POLYETHYLENE GLYCOL 3350 17 GRAM(S): 17 POWDER, FOR SOLUTION ORAL at 11:36

## 2023-12-07 RX ADMIN — Medication 3 MILLILITER(S): at 17:50

## 2023-12-07 RX ADMIN — Medication 3 MILLILITER(S): at 11:35

## 2023-12-07 RX ADMIN — MUPIROCIN 1 APPLICATION(S): 20 OINTMENT TOPICAL at 05:11

## 2023-12-07 RX ADMIN — SENNA PLUS 2 TABLET(S): 8.6 TABLET ORAL at 22:08

## 2023-12-07 RX ADMIN — Medication 25 MILLIGRAM(S): at 05:21

## 2023-12-07 RX ADMIN — Medication 1 SPRAY(S): at 17:51

## 2023-12-07 RX ADMIN — Medication 1 SPRAY(S): at 11:36

## 2023-12-07 NOTE — ASU PREOP CHECKLIST - HEART RATE (BEATS/MIN)
3 mos S/P L Tplasty/ underlay.    Has max CHL AD with total OF/ short malleus.    Pt doing well post op.  No unusual pain or drainage. No significant vertigo or nausea.    Exam: TM's well healed.          Consider R BAHA attract.      I have explained the risks , benefits and alternatives of the procedure in detail. This includes infection, bleeding, further loss of hearing,tinnitus, failure to improve, chorda symptoms, dizziness and facial nerve problems. All questions have been answered.  The patient desires to proceed.       83

## 2023-12-07 NOTE — PROGRESS NOTE ADULT - PROBLEM SELECTOR PLAN 9
DVT PPx: heparin subq  Diet: DASH  Code: FULL  Dispo: medically active  Communication: discussed with  at bedside  Risk stratification for fistula placement  - RCRI of 2 points, Class III Risk -- 10.1% 30-day risk of death, MI, or cardiac arrest  - Pt reports being able to do 4 METS activities indicating moderate functional capacity; pt states she is able to walk up stairs, walks, dances, does chores at home DVT PPx: heparin subq  Diet: DASH  Code: FULL  Dispo: medically active  Communication: discussed with  at bedside  Risk stratification for fistula placement  - RCRI of 2 points, Class III Risk -- 10.1% 30-day risk of death, MI, or cardiac arrest  - Pt reports being able to do 3-4 METS activities indicating moderate functional capacity; pt states she is able to walk up stairs, walks, dances, does chores at home

## 2023-12-07 NOTE — PROGRESS NOTE ADULT - PROBLEM SELECTOR PLAN 2
Hgb below goal. Iron studies reviewed. 5k Epo given with iHD on 12/5. Will order TTHS epo with HD. Monitor Hgb

## 2023-12-07 NOTE — PRE PROCEDURE NOTE - PRE PROCEDURE EVALUATION
Interventional Radiology    HPI: 83 year old Female with a PMH of HTN, HLD, CAD, T2DM, CKD, remote hx of breast CA s/p mastectomy, and chronic hypoxemic respiratory failure of unknown etiology on home O2 who presents for lower extremity swelling and shortness of breath. Patient for initiation of dialysis. IR being consulted for non tunneled HD catheter placement.    Allergies: No Known Allergies    Medications (Abx/Cardiac/Anticoagulation/Blood Products)  amLODIPine   Tablet: 10 milliGRAM(s) Oral (12-04 @ 05:38)  buMETAnide Injectable: 2 milliGRAM(s) IV Push (12-03 @ 11:32)  heparin   Injectable: 5000 Unit(s) SubCutaneous (12-04 @ 13:44)  hydrALAZINE: 25 milliGRAM(s) Oral (12-04 @ 13:43)    Data:    T(C): 37  HR: 79  BP: 147/63  RR: 19  SpO2: 95%    Exam  General: No acute distress  Chest: Non labored breathing  Abdomen: Non-distended  Extremities: No swelling, warm    -WBC 10.19 / HgB 8.7 / Hct 27.2 / Plt 296  -Na 138 / Cl 103 /  / Glucose 120  -K 5.6 / CO2 20 / Cr 4.93  -ALT 16 / Alk Phos 46 / T.Bili 0.3    Plan: 83 year old Female presents for Non Tunneled HD Catheter  -Risks/Benefits/alternatives explained with the patient and/or healthcare proxy and witnessed informed consent obtained.   
Interventional Radiology    HPI: 83y Female with ESRD s/p non tunneled 12/4 presents for conversion to tunneled HD catheter.     Allergies: No Known Allergies    Medications (Abx/Cardiac/Anticoagulation/Blood Products)  amLODIPine   Tablet: 10 milliGRAM(s) Oral (12-07 @ 05:21)  hydrALAZINE: 25 milliGRAM(s) Oral (12-07 @ 05:21)    Data:    T(C): 36.7  HR: 85  BP: 152/62  RR: 18  SpO2: 96%    Exam  General: No acute distress  Chest: Non labored breathing  Abdomen: Non-distended  Extremities: No swelling, warm    -WBC 10.45 / HgB 7.9 / Hct 25.4 / Plt 240  -Na 139 / Cl 101 / BUN 37 / Glucose 112  -K 4.3 / CO2 29 / Cr 2.43  -ALT -- / Alk Phos -- / T.Bili --  -INR1.00    Imaging: reviewed    Plan: 83y Female presents for tunneled dialysis catheter placement  -Risks/Benefits/alternatives explained with the patient and/or healthcare proxy and witnessed informed consent obtained.

## 2023-12-07 NOTE — PROGRESS NOTE ADULT - PROBLEM SELECTOR PLAN 3
Reviewed PTH, phos calcium and vitamin D levels. Patient now with low phos while on phos binder.   Stop phos binders and calcium as PTH is suppressed (72). Monitor calcium, phos with every BMP.     Discussed with primary team.     If you have any questions, please feel free to contact me  Keaton Dye  Nephrology Fellow  [phone]; Prefer [employer] TEAMS  (After 5pm or on weekends please page the on-call fellow). Reviewed PTH, phos calcium and vitamin D levels. Patient now with low phos while on phos binder.   Stop phos binders and calcium as PTH is suppressed (72). Monitor calcium, phos with every BMP.     Discussed with primary team.     If you have any questions, please feel free to contact me  Keaton Dye  Nephrology Fellow  [phone]; Prefer [employer] TEAMS  (After 5pm or on weekends please page the on-call fellow).

## 2023-12-07 NOTE — PROGRESS NOTE ADULT - SUBJECTIVE AND OBJECTIVE BOX
[facility] Division of Kidney Diseases & Hypertension  FOLLOW UP NOTE  [phone]--------------------------------------------------------------------------------    Chief Complaint: advanced CKD    24 hour events/subjective:  Pt was seen and examined at the bedside. Pt denies worsening of SOB/ Constipation/ Diarrhea/ Nausea/ Vomiting/ abdominal pain/ chest pain/ tingling/ numbness.        PAST HISTORY  --------------------------------------------------------------------------------  No significant changes to PMH, PSH, FHx, SHx, unless otherwise noted    ALLERGIES & MEDICATIONS  --------------------------------------------------------------------------------  Allergies    No Known Allergies    Intolerances      Standing Inpatient Medications  albuterol    90 MICROgram(s) HFA Inhaler 2 Puff(s) Inhalation every 6 hours  albuterol/ipratropium for Nebulization 3 milliLiter(s) Nebulizer every 6 hours  amLODIPine   Tablet 10 milliGRAM(s) Oral daily  atorvastatin 80 milliGRAM(s) Oral at bedtime  budesonide 160 MICROgram(s)/formoterol 4.5 MICROgram(s) Inhaler 2 Puff(s) Inhalation two times a day  chlorhexidine 2% Cloths 1 Application(s) Topical daily  chlorhexidine 4% Liquid 1 Application(s) Topical <User Schedule>  dextrose 5%. 1000 milliLiter(s) IV Continuous <Continuous>  dextrose 5%. 1000 milliLiter(s) IV Continuous <Continuous>  dextrose 50% Injectable 25 Gram(s) IV Push once  dextrose 50% Injectable 12.5 Gram(s) IV Push once  dextrose 50% Injectable 25 Gram(s) IV Push once  epoetin sheryl (PROCRIT) Injectable 5000 Unit(s) IV Push once  ferrous    sulfate 325 milliGRAM(s) Oral daily  fluticasone propionate 50 MICROgram(s)/spray Nasal Spray 1 Spray(s) Both Nostrils two times a day  glucagon  Injectable 1 milliGRAM(s) IntraMuscular once  hydrALAZINE 25 milliGRAM(s) Oral three times a day  influenza  Vaccine (HIGH DOSE) 0.7 milliLiter(s) IntraMuscular once  insulin lispro (ADMELOG) corrective regimen sliding scale   SubCutaneous at bedtime  insulin lispro (ADMELOG) corrective regimen sliding scale   SubCutaneous three times a day before meals  montelukast 10 milliGRAM(s) Oral daily  mupirocin 2% Nasal 1 Application(s) Both Nostrils every 12 hours  nystatin Powder 1 Application(s) Topical two times a day  polyethylene glycol 3350 17 Gram(s) Oral daily  senna 2 Tablet(s) Oral at bedtime  sodium chloride 0.65% Nasal 1 Spray(s) Both Nostrils daily    PRN Inpatient Medications  acetaminophen     Tablet .. 650 milliGRAM(s) Oral every 6 hours PRN  dextrose Oral Gel 15 Gram(s) Oral once PRN  ondansetron Injectable 4 milliGRAM(s) IV Push once PRN  sodium chloride 0.9% lock flush 10 milliLiter(s) IV Push every 1 hour PRN      REVIEW OF SYSTEMS  --------------------------------------------------------------------------------  per above    VITALS/PHYSICAL EXAM  --------------------------------------------------------------------------------  T(C): 36.8 (12-07-23 @ 11:34), Max: 37.3 (12-07-23 @ 08:37)  HR: 81 (12-07-23 @ 11:34) (70 - 87)  BP: 138/58 (12-07-23 @ 11:34) (120/55 - 154/70)  RR: 18 (12-07-23 @ 11:34) (16 - 18)  SpO2: 93% (12-07-23 @ 11:34) (93% - 100%)  Wt(kg): --  Height (cm): 160 (12-07-23 @ 08:41)  Weight (kg): 82.9 (12-07-23 @ 08:41)  BMI (kg/m2): 32.4 (12-07-23 @ 08:41)  BSA (m2): 1.86 (12-07-23 @ 08:41)      12-06-23 @ 07:01  -  12-07-23 @ 07:00  --------------------------------------------------------  IN: 900 mL / OUT: 1800 mL / NET: -900 mL      Physical Exam:  General: no acute distress  Neuro: no focal deficits +forgetful  HEENT: NC/AT, anicteric, no JVD  Pulmonary: breath sounds diminished, on 2L O2  Cardiovascular/Chest: +S1S2,  GI/Abdomen: soft, NT/ND, +bowel sounds  Extremities: No edema  Skin: Warm and dry  Pysch: appropriate mood and affect    LABS/STUDIES  --------------------------------------------------------------------------------              7.9    10.45 >-----------<  240      [12-07-23 @ 05:03]              25.4     139  |  101  |  37  ----------------------------<  112      [12-07-23 @ 05:03]  4.3   |  29  |  2.43        Ca     8.5     [12-07-23 @ 05:03]      Mg     1.8     [12-07-23 @ 05:03]      Phos  1.9     [12-07-23 @ 05:03]    TPro  6.2  /  Alb  3.5  /  TBili  0.3  /  DBili  x   /  AST  19  /  ALT  18  /  AlkPhos  46  [12-06-23 @ 06:20]    PT/INR: PT 10.5 , INR 1.00       [12-07-23 @ 05:03]  PTT: 28.8       [12-07-23 @ 05:03]      Creatinine Trend:  SCr 2.43 [12-07 @ 05:03]  SCr 3.15 [12-06 @ 06:20]  SCr 4.56 [12-05 @ 07:19]  SCr 4.51 [12-05 @ 01:03]  SCr 4.93 [12-04 @ 07:06]    Urinalysis - [12-07-23 @ 05:03]      Color  / Appearance  / SG  / pH       Gluc 112 / Ketone   / Bili  / Urobili        Blood  / Protein  / Leuk Est  / Nitrite       RBC  / WBC  / Hyaline  / Gran  / Sq Epi  / Non Sq Epi  / Bacteria            [facility] Division of Kidney Diseases & Hypertension  FOLLOW UP NOTE  [phone]--------------------------------------------------------------------------------    Chief Complaint: advanced CKD    24 hour events/subjective:  Pt was seen and examined at the bedside. Pt denies worsening of SOB/ Constipation/ Diarrhea/ Nausea/ Vomiting/ abdominal pain/ chest pain/ tingling/ numbness.        PAST HISTORY  --------------------------------------------------------------------------------  No significant changes to PMH, PSH, FHx, SHx, unless otherwise noted    ALLERGIES & MEDICATIONS  --------------------------------------------------------------------------------  Allergies    No Known Allergies    Intolerances      Standing Inpatient Medications  albuterol    90 MICROgram(s) HFA Inhaler 2 Puff(s) Inhalation every 6 hours  albuterol/ipratropium for Nebulization 3 milliLiter(s) Nebulizer every 6 hours  amLODIPine   Tablet 10 milliGRAM(s) Oral daily  atorvastatin 80 milliGRAM(s) Oral at bedtime  budesonide 160 MICROgram(s)/formoterol 4.5 MICROgram(s) Inhaler 2 Puff(s) Inhalation two times a day  chlorhexidine 2% Cloths 1 Application(s) Topical daily  chlorhexidine 4% Liquid 1 Application(s) Topical <User Schedule>  dextrose 5%. 1000 milliLiter(s) IV Continuous <Continuous>  dextrose 5%. 1000 milliLiter(s) IV Continuous <Continuous>  dextrose 50% Injectable 25 Gram(s) IV Push once  dextrose 50% Injectable 12.5 Gram(s) IV Push once  dextrose 50% Injectable 25 Gram(s) IV Push once  epoetin sheryl (PROCRIT) Injectable 5000 Unit(s) IV Push once  ferrous    sulfate 325 milliGRAM(s) Oral daily  fluticasone propionate 50 MICROgram(s)/spray Nasal Spray 1 Spray(s) Both Nostrils two times a day  glucagon  Injectable 1 milliGRAM(s) IntraMuscular once  hydrALAZINE 25 milliGRAM(s) Oral three times a day  influenza  Vaccine (HIGH DOSE) 0.7 milliLiter(s) IntraMuscular once  insulin lispro (ADMELOG) corrective regimen sliding scale   SubCutaneous at bedtime  insulin lispro (ADMELOG) corrective regimen sliding scale   SubCutaneous three times a day before meals  montelukast 10 milliGRAM(s) Oral daily  mupirocin 2% Nasal 1 Application(s) Both Nostrils every 12 hours  nystatin Powder 1 Application(s) Topical two times a day  polyethylene glycol 3350 17 Gram(s) Oral daily  senna 2 Tablet(s) Oral at bedtime  sodium chloride 0.65% Nasal 1 Spray(s) Both Nostrils daily    PRN Inpatient Medications  acetaminophen     Tablet .. 650 milliGRAM(s) Oral every 6 hours PRN  dextrose Oral Gel 15 Gram(s) Oral once PRN  ondansetron Injectable 4 milliGRAM(s) IV Push once PRN  sodium chloride 0.9% lock flush 10 milliLiter(s) IV Push every 1 hour PRN      REVIEW OF SYSTEMS  --------------------------------------------------------------------------------  per above    VITALS/PHYSICAL EXAM  --------------------------------------------------------------------------------  T(C): 36.8 (12-07-23 @ 11:34), Max: 37.3 (12-07-23 @ 08:37)  HR: 81 (12-07-23 @ 11:34) (70 - 87)  BP: 138/58 (12-07-23 @ 11:34) (120/55 - 154/70)  RR: 18 (12-07-23 @ 11:34) (16 - 18)  SpO2: 93% (12-07-23 @ 11:34) (93% - 100%)  Wt(kg): --  Height (cm): 160 (12-07-23 @ 08:41)  Weight (kg): 82.9 (12-07-23 @ 08:41)  BMI (kg/m2): 32.4 (12-07-23 @ 08:41)  BSA (m2): 1.86 (12-07-23 @ 08:41)      12-06-23 @ 07:01  -  12-07-23 @ 07:00  --------------------------------------------------------  IN: 900 mL / OUT: 1800 mL / NET: -900 mL      Physical Exam:  General: no acute distress  Neuro: no focal deficits +forgetful  HEENT: NC/AT, anicteric, no JVD  Pulmonary: breath sounds diminished, on 2L O2  Cardiovascular/Chest: +S1S2,  GI/Abdomen: soft, NT/ND, +bowel sounds  Extremities: No edema  Skin: Warm and dry  Pysch: appropriate mood and affect    LABS/STUDIES  --------------------------------------------------------------------------------              7.9    10.45 >-----------<  240      [12-07-23 @ 05:03]              25.4     139  |  101  |  37  ----------------------------<  112      [12-07-23 @ 05:03]  4.3   |  29  |  2.43        Ca     8.5     [12-07-23 @ 05:03]      Mg     1.8     [12-07-23 @ 05:03]      Phos  1.9     [12-07-23 @ 05:03]    TPro  6.2  /  Alb  3.5  /  TBili  0.3  /  DBili  x   /  AST  19  /  ALT  18  /  AlkPhos  46  [12-06-23 @ 06:20]    PT/INR: PT 10.5 , INR 1.00       [12-07-23 @ 05:03]  PTT: 28.8       [12-07-23 @ 05:03]      Creatinine Trend:  SCr 2.43 [12-07 @ 05:03]  SCr 3.15 [12-06 @ 06:20]  SCr 4.56 [12-05 @ 07:19]  SCr 4.51 [12-05 @ 01:03]  SCr 4.93 [12-04 @ 07:06]    Urinalysis - [12-07-23 @ 05:03]      Color  / Appearance  / SG  / pH       Gluc 112 / Ketone   / Bili  / Urobili        Blood  / Protein  / Leuk Est  / Nitrite       RBC  / WBC  / Hyaline  / Gran  / Sq Epi  / Non Sq Epi  / Bacteria

## 2023-12-07 NOTE — PROGRESS NOTE ADULT - ATTENDING COMMENTS
Patient with CKD. Vascular surgery consulted for HD access creation. Medical optimization/clearance. Tentatively scheduled for surgery on 12/14/23.

## 2023-12-07 NOTE — PROGRESS NOTE ADULT - PROBLEM SELECTOR PLAN 5
- pt with rising leukocytosis; now downtrending  - likely steroid induced and no signs of infection  - will continue to monitor - pt with rising leukocytosis; now resolved  - likely steroid induced and no signs of infection  - will continue to monitor

## 2023-12-07 NOTE — PROGRESS NOTE ADULT - ATTENDING COMMENTS
83 year old female with PMH chronic hypoxic respiratory failure, DM2, CAD, HTN and HLD who presented with dyspnea and lower extremity edema admitted for acute decompensated congestive heart failure. TTE shows LV diastolic dysfunction but normal EF. She was started on IV diuretics however, her creatinine increased to nearly 7 and her GFR is less than 10 so HD has been initiated. She is for tunneled HD catheter today with IR and AVF fistula after that for more permanent access. Rest of plan as above.

## 2023-12-07 NOTE — PROGRESS NOTE ADULT - SUBJECTIVE AND OBJECTIVE BOX
Vascular Surgery Progress Note  Patient is a 83y old  Female who presents with a chief complaint of LE swelling, dyspnea (07 Dec 2023 08:56)      INTERVAL EVENTS: No acute events overnight.  SUBJECTIVE: Patient seen and examined at bedside with surgical team, patient without complaints. Denies fever, chills, CP, SOB nausea, vomiting, abdominal pain.    OBJECTIVE:    Vital Signs Last 24 Hrs  T(C): 37.3 (07 Dec 2023 08:41), Max: 37.3 (07 Dec 2023 08:37)  T(F): 99.1 (07 Dec 2023 08:37), Max: 99.1 (07 Dec 2023 08:37)  HR: 81 (07 Dec 2023 08:41) (77 - 85)  BP: 137/63 (07 Dec 2023 08:41) (137/63 - 162/70)  BP(mean): 87 (07 Dec 2023 08:41) (87 - 87)  RR: 17 (07 Dec 2023 08:41) (17 - 18)  SpO2: 98% (07 Dec 2023 08:41) (96% - 100%)    Parameters below as of 07 Dec 2023 08:41    O2 Flow (L/min): 3  I&O's Detail    06 Dec 2023 07:01  -  07 Dec 2023 07:00  --------------------------------------------------------  IN:    Oral Fluid: 100 mL    Other (mL): 800 mL  Total IN: 900 mL    OUT:    Other (mL): 1800 mL  Total OUT: 1800 mL    Total NET: -900 mL      MEDICATIONS  (STANDING):  albuterol    90 MICROgram(s) HFA Inhaler 2 Puff(s) Inhalation every 6 hours  albuterol/ipratropium for Nebulization 3 milliLiter(s) Nebulizer every 6 hours  amLODIPine   Tablet 10 milliGRAM(s) Oral daily  atorvastatin 80 milliGRAM(s) Oral at bedtime  budesonide 160 MICROgram(s)/formoterol 4.5 MICROgram(s) Inhaler 2 Puff(s) Inhalation two times a day  chlorhexidine 2% Cloths 1 Application(s) Topical daily  chlorhexidine 4% Liquid 1 Application(s) Topical <User Schedule>  dextrose 5%. 1000 milliLiter(s) (100 mL/Hr) IV Continuous <Continuous>  dextrose 5%. 1000 milliLiter(s) (50 mL/Hr) IV Continuous <Continuous>  dextrose 50% Injectable 12.5 Gram(s) IV Push once  dextrose 50% Injectable 25 Gram(s) IV Push once  dextrose 50% Injectable 25 Gram(s) IV Push once  epoetin sheryl (PROCRIT) Injectable 5000 Unit(s) IV Push once  ferrous    sulfate 325 milliGRAM(s) Oral daily  fluticasone propionate 50 MICROgram(s)/spray Nasal Spray 1 Spray(s) Both Nostrils two times a day  glucagon  Injectable 1 milliGRAM(s) IntraMuscular once  hydrALAZINE 25 milliGRAM(s) Oral three times a day  influenza  Vaccine (HIGH DOSE) 0.7 milliLiter(s) IntraMuscular once  insulin lispro (ADMELOG) corrective regimen sliding scale   SubCutaneous at bedtime  insulin lispro (ADMELOG) corrective regimen sliding scale   SubCutaneous three times a day before meals  montelukast 10 milliGRAM(s) Oral daily  mupirocin 2% Nasal 1 Application(s) Both Nostrils every 12 hours  nystatin Powder 1 Application(s) Topical two times a day  polyethylene glycol 3350 17 Gram(s) Oral daily  senna 2 Tablet(s) Oral at bedtime  sodium chloride 0.65% Nasal 1 Spray(s) Both Nostrils daily    MEDICATIONS  (PRN):  acetaminophen     Tablet .. 650 milliGRAM(s) Oral every 6 hours PRN Temp greater or equal to 38C (100.4F), Mild Pain (1 - 3)  dextrose Oral Gel 15 Gram(s) Oral once PRN Blood Glucose LESS THAN 70 milliGRAM(s)/deciliter  ondansetron Injectable 4 milliGRAM(s) IV Push once PRN Nausea and/or Vomiting  sodium chloride 0.9% lock flush 10 milliLiter(s) IV Push every 1 hour PRN Pre/post blood products, medications, blood draw, and to maintain line patency      PHYSICAL EXAM:    General: NAD, resting comfortably  Pulmonary: normal resp effort, CTA-B  Cardiovascular: NSR, no murmurs  Abdominal: soft, ND/NT, no organomegaly  Extremities: WWP +radial/ulnar pulses, arms soft  LUE protected    LABS:                        7.9    10.45 )-----------( 240      ( 07 Dec 2023 05:03 )             25.4     12-07    139  |  101  |  37<H>  ----------------------------<  112<H>  4.3   |  29  |  2.43<H>    Ca    8.5      07 Dec 2023 05:03  Phos  1.9     12-07  Mg     1.8     12-07    TPro  6.2  /  Alb  3.5  /  TBili  0.3  /  DBili  x   /  AST  19  /  ALT  18  /  AlkPhos  46  12-06    PT/INR - ( 07 Dec 2023 05:03 )   PT: 10.5 sec;   INR: 1.00 ratio         PTT - ( 07 Dec 2023 05:03 )  PTT:28.8 sec  LIVER FUNCTIONS - ( 06 Dec 2023 06:20 )  Alb: 3.5 g/dL / Pro: 6.2 g/dL / ALK PHOS: 46 U/L / ALT: 18 U/L / AST: 19 U/L / GGT: x           Urinalysis Basic - ( 07 Dec 2023 05:03 )    Color: x / Appearance: x / SG: x / pH: x  Gluc: 112 mg/dL / Ketone: x  / Bili: x / Urobili: x   Blood: x / Protein: x / Nitrite: x   Leuk Esterase: x / RBC: x / WBC x   Sq Epi: x / Non Sq Epi: x / Bacteria: x      ABO Interpretation: O (12-07-23 @ 05:17)      IMAGING:

## 2023-12-07 NOTE — PROGRESS NOTE ADULT - ATTENDING COMMENTS
# CAL on CKD. Developed CAL due to contrast-induced nephropathy. Unclear baseline serum creatinine, but likely in the 4s. Given that eGFR is 7-9, Started dialysis on 12/5/2023.  Day 3 HD today.   TUnneled dialysis catheter placed today.    to work on finding outpatient dialysis unit.     # Hyperkalemia - secondary to kidney dysfunction. 3 K dialysis bath.     # Anemia - EPO during dialysis.     # Medication monitoring encounter: medication dose reviewed. Please dose medications to CrCl<15    The rest of the recommendations as per fellow's note.    Kerri Leyva MD  Attending Nephrologist  [phone] or via [employer] . # CAL on CKD. Developed CAL due to contrast-induced nephropathy. Unclear baseline serum creatinine, but likely in the 4s. Given that eGFR is 7-9, Started dialysis on 12/5/2023.  Day 3 HD today.   TUnneled dialysis catheter placed today.    to work on finding outpatient dialysis unit.     # Hyperkalemia - secondary to kidney dysfunction. 3 K dialysis bath.     # Anemia - EPO during dialysis.     # Medication monitoring encounter: medication dose reviewed. Please dose medications to CrCl<15    The rest of the recommendations as per fellow's note.    Kerri Leyva MD  Attending Nephrologist  [phone] or via [employer] .

## 2023-12-07 NOTE — PROGRESS NOTE ADULT - PROBLEM SELECTOR PLAN 4
Patient with several year history of chronic hypoxemic respiratory failure  - requiring home O2, stable on 2L at baseline  - has not had escalating O2 requirements recently, despite feeling subjectively more dyspneic  - unclear etiology, though patient has had PFTs at her pulmologist's office last year    Plan:  > follow up TTE--> showing EF 71%, bi-atrial enlargement, LV diastolic dysfunction, and RV enlargement  > obtain records from pt's pulmonologists office: Dr. Paulino Gayle ([facility], [phone])  > continue with home montelukast and add on duonebs and symbicort   > wean O2 as tolerated  > add on prednisone for possible COPD exacerbation   > if persistently hypoxemic and despite diuresis, consider alternative etiologies and potentially high resolution CT chest  - f/u repeat CXR and BNP (stable CXR and elevated BNP)  - appreciate pulm recs: started on flonase and saline spray Patient with several year history of chronic hypoxemic respiratory failure  - requiring home O2, stable on 2L at baseline  - has not had escalating O2 requirements recently, despite feeling subjectively more dyspneic  - unclear etiology, though patient has had PFTs at her pulmologist's office last year    Plan:  > follow up TTE--> showing EF 71%, bi-atrial enlargement, LV diastolic dysfunction, and RV enlargement  > obtain records from pt's pulmonologists office: Dr. Paulino Gayle ([facility], [phone])  > continue with home montelukast and add on duonebs and symbicort   > wean O2 as tolerated  > add on prednisone for possible COPD exacerbation   > if persistently hypoxemic and despite diuresis, consider alternative etiologies and potentially high resolution CT chest  - f/u repeat CXR and BNP (stable CXR and elevated BNP)  - appreciate pulm recs: started on flonase and saline spray Patient with several year history of chronic hypoxemic respiratory failure  - requiring home O2, stable on 2L at baseline  - has not had escalating O2 requirements recently, despite feeling subjectively more dyspneic  - unclear etiology, though patient has had PFTs at her pulmologist's office last year    Plan:  > follow up TTE--> showing EF 71%, bi-atrial enlargement, LV diastolic dysfunction, and RV enlargement  > obtain records from pt's pulmonologists office: Dr. Paulino Gyale ([facility], [phone])  > continue with home montelukast and add on duonebs and symbicort   > now on baseline of 2L O2  > s/p prednisone for possible COPD exacerbation   - f/u repeat CXR and BNP (stable CXR and elevated BNP)  - appreciate pulm recs: started on flonase and saline spray Patient with several year history of chronic hypoxemic respiratory failure  - requiring home O2, stable on 2L at baseline  - has not had escalating O2 requirements recently, despite feeling subjectively more dyspneic  - unclear etiology, though patient has had PFTs at her pulmologist's office last year    Plan:  > follow up TTE--> showing EF 71%, bi-atrial enlargement, LV diastolic dysfunction, and RV enlargement  > obtain records from pt's pulmonologists office: Dr. Paulino Gayle ([facility], [phone])  > continue with home montelukast and add on duonebs and symbicort   > now on baseline of 2L O2  > s/p prednisone for possible COPD exacerbation   - f/u repeat CXR and BNP (stable CXR and elevated BNP)  - appreciate pulm recs: started on flonase and saline spray

## 2023-12-07 NOTE — PROGRESS NOTE ADULT - PROBLEM SELECTOR PLAN 7
Patient on several oral medications at home including dapaglifozin, saxagliptin  - unclear baseline glycemic control    Plan:  > ISS for now, add basal/bolus as needed  > f/u A1c: 6.1  - also on lantus 7U and admelog 3U TID due to steroid induced hyperglycemia Patient on several oral medications at home including dapaglifozin, saxagliptin  - unclear baseline glycemic control    Plan:  > ISS for now, add basal/bolus as needed  > f/u A1c: 6.1

## 2023-12-07 NOTE — PROGRESS NOTE ADULT - ASSESSMENT
83 year old female with HTN, HLD, CAD, T2DM, CKD, remote hx of breast CA s/p mastectomy, and chronic hypoxemic respiratory failure of unknown etiology on home O2 who presents for lower extremity swelling and shortness of breath with subsequent CAL superimposed on CKD. Vascular consulted for AVF planning. Pt is RHD.   - protect LUE- no sticks, blood draws, cuffs  - obtain bilateral upper extremity vein mapping (ntoed)  - please document medical/cardiac clearances for AVF 12/14  - vascular will follow    #[id number]  Discussed with fellow on behalf of attending 83 year old female with HTN, HLD, CAD, T2DM, CKD, remote hx of breast CA s/p mastectomy, and chronic hypoxemic respiratory failure of unknown etiology on home O2 who presents for lower extremity swelling and shortness of breath with subsequent CAL superimposed on CKD. Vascular consulted for AVF planning. Pt is RHD.   - protect LUE- no sticks, blood draws, cuffs  - obtain bilateral upper extremity vein mapping (ntoed)  - please document medical/cardiac clearances for AVF 12/14  - vascular will follow    #[id number]  Discussed with fellow on behalf of attending

## 2023-12-07 NOTE — PROCEDURE NOTE - PROCEDURE FINDINGS AND DETAILS
Successful exchange of nontunneled to tunneled dialysis catheter with tip in RA. Dialysis catheter okay to use.
RIJ nontunneled dialysis catheter placed

## 2023-12-07 NOTE — PROGRESS NOTE ADULT - SUBJECTIVE AND OBJECTIVE BOX
DATE OF SERVICE: 12-07-23 @ 14:26    Patient is a 83y old  Female who presents with a chief complaint of LE swelling, dyspnea (07 Dec 2023 11:53)      INTERVAL HISTORY: Feels ok. s/p conversion of HF catheter via IR today.     REVIEW OF SYSTEMS:  CONSTITUTIONAL: No weakness  EYES/ENT: No visual changes;  No throat pain   NECK: No pain or stiffness  RESPIRATORY: No cough, wheezing; No shortness of breath  CARDIOVASCULAR: No chest pain or palpitations  GASTROINTESTINAL: No abdominal  pain. No nausea, vomiting, or hematemesis  GENITOURINARY: No dysuria, frequency or hematuria  NEUROLOGICAL: No stroke like symptoms  SKIN: No rashes    -ELEMETRY Personally reviewed: SR 70-90s  	  MEDICATIONS:  amLODIPine   Tablet 10 milliGRAM(s) Oral daily  hydrALAZINE 25 milliGRAM(s) Oral three times a day        PHYSICAL EXAM:  T(C): 36.9 (12-07-23 @ 13:40), Max: 37.3 (12-07-23 @ 08:37)  HR: 89 (12-07-23 @ 13:40) (70 - 89)  BP: 129/53 (12-07-23 @ 13:40) (120/55 - 154/70)  RR: 18 (12-07-23 @ 13:40) (16 - 18)  SpO2: 94% (12-07-23 @ 13:40) (93% - 100%)  Wt(kg): --  I&O's Summary    06 Dec 2023 07:01  -  07 Dec 2023 07:00  --------------------------------------------------------  IN: 900 mL / OUT: 1800 mL / NET: -900 mL      Height (cm): 160 (12-07 @ 08:41)  Weight (kg): 82.9 (12-07 @ 08:41)  BMI (kg/m2): 32.4 (12-07 @ 08:41)  BSA (m2): 1.86 (12-07 @ 08:41)    Appearance: In no distress	  HEENT:    PERRL, EOMI	  Cardiovascular:  S1 S2, No JVD  Respiratory: Lungs clear to auscultation	  Gastrointestinal:  Soft, Non-tender, + BS	  Vascularature:  + edema of LE  Psychiatric: Appropriate affect   Neuro: no acute focal deficits                               7.9    10.45 )-----------( 240      ( 07 Dec 2023 05:03 )             25.4     12-07    139  |  101  |  37<H>  ----------------------------<  112<H>  4.3   |  29  |  2.43<H>    Ca    8.5      07 Dec 2023 05:03  Phos  1.9     12-07  Mg     1.8     12-07    TPro  6.2  /  Alb  3.5  /  TBili  0.3  /  DBili  x   /  AST  19  /  ALT  18  /  AlkPhos  46  12-06        Labs personally reviewed      ASSESSMENT/PLAN: 	    83 year old female with HTN, HLD, CAD, T2DM, CKD, remote hx of breast CA s/p mastectomy, and chronic hypoxemic respiratory failure of unknown etiology on home O2 who presents for lower extremity swelling and shortness of breath.      1. Acute diastolic heart failure  - Pt with LE edema and pulmonary congestion  - s/p IV diuresis now DC'd d/t increasing Cr which is now improving. HD intiated.   - Echo shows normal LV systolic function with Bi-atrial enlargement, G2DD and LVH  - EKG shows sinus rhythm (not atrial flutter)  - Pulm consult appreciated for continued hypoxia despite diuresis: recommend swallow eval, duonebs, symbicort and prednisone    2. ESRD  - Renal on board  - HD as per renal    3. HLD  - Statin therapy    4. HTN - stable  - BP well controlled on Hydral 25mg PO TID and Amlodipine 10mg PO daily    5. Preop Risk Stratification - planned for AVF  - Would ideally defer until hypoxia corrected, more volume removal with HD sessions and euvolemic         DAFNE Epperson-MEG Ash DO [facility]  Cardiovascular Medicine  [address], [address]  Available through call or text on [employer] TE[employer]  Office: [phone]   DATE OF SERVICE: 12-07-23 @ 14:26    Patient is a 83y old  Female who presents with a chief complaint of LE swelling, dyspnea (07 Dec 2023 11:53)      INTERVAL HISTORY: Feels ok. s/p conversion of HF catheter via IR today.     REVIEW OF SYSTEMS:  CONSTITUTIONAL: No weakness  EYES/ENT: No visual changes;  No throat pain   NECK: No pain or stiffness  RESPIRATORY: No cough, wheezing; No shortness of breath  CARDIOVASCULAR: No chest pain or palpitations  GASTROINTESTINAL: No abdominal  pain. No nausea, vomiting, or hematemesis  GENITOURINARY: No dysuria, frequency or hematuria  NEUROLOGICAL: No stroke like symptoms  SKIN: No rashes    -ELEMETRY Personally reviewed: SR 70-90s  	  MEDICATIONS:  amLODIPine   Tablet 10 milliGRAM(s) Oral daily  hydrALAZINE 25 milliGRAM(s) Oral three times a day        PHYSICAL EXAM:  T(C): 36.9 (12-07-23 @ 13:40), Max: 37.3 (12-07-23 @ 08:37)  HR: 89 (12-07-23 @ 13:40) (70 - 89)  BP: 129/53 (12-07-23 @ 13:40) (120/55 - 154/70)  RR: 18 (12-07-23 @ 13:40) (16 - 18)  SpO2: 94% (12-07-23 @ 13:40) (93% - 100%)  Wt(kg): --  I&O's Summary    06 Dec 2023 07:01  -  07 Dec 2023 07:00  --------------------------------------------------------  IN: 900 mL / OUT: 1800 mL / NET: -900 mL      Height (cm): 160 (12-07 @ 08:41)  Weight (kg): 82.9 (12-07 @ 08:41)  BMI (kg/m2): 32.4 (12-07 @ 08:41)  BSA (m2): 1.86 (12-07 @ 08:41)    Appearance: In no distress	  HEENT:    PERRL, EOMI	  Cardiovascular:  S1 S2, No JVD  Respiratory: Lungs clear to auscultation	  Gastrointestinal:  Soft, Non-tender, + BS	  Vascularature:  + edema of LE  Psychiatric: Appropriate affect   Neuro: no acute focal deficits                               7.9    10.45 )-----------( 240      ( 07 Dec 2023 05:03 )             25.4     12-07    139  |  101  |  37<H>  ----------------------------<  112<H>  4.3   |  29  |  2.43<H>    Ca    8.5      07 Dec 2023 05:03  Phos  1.9     12-07  Mg     1.8     12-07    TPro  6.2  /  Alb  3.5  /  TBili  0.3  /  DBili  x   /  AST  19  /  ALT  18  /  AlkPhos  46  12-06        Labs personally reviewed      ASSESSMENT/PLAN: 	    83 year old female with HTN, HLD, CAD, T2DM, CKD, remote hx of breast CA s/p mastectomy, and chronic hypoxemic respiratory failure of unknown etiology on home O2 who presents for lower extremity swelling and shortness of breath.      1. Acute diastolic heart failure  - Pt with LE edema and pulmonary congestion  - s/p IV diuresis now DC'd d/t increasing Cr which is now improving. HD intiated.   - Echo shows normal LV systolic function with Bi-atrial enlargement, G2DD and LVH  - EKG shows sinus rhythm (not atrial flutter)  - Pulm consult appreciated for continued hypoxia despite diuresis: recommend swallow eval, duonebs, symbicort and prednisone    2. ESRD  - Renal on board  - HD as per renal    3. HLD  - Statin therapy    4. HTN - stable  - BP well controlled on Hydral 25mg PO TID and Amlodipine 10mg PO daily    5. Preop Risk Stratification - planned for AVF  - Would ideally defer until hypoxia corrected, more volume removal with HD sessions and euvolemic         DAFNE Epperson-MEG Ash DO [facility]  Cardiovascular Medicine  [address], [address]  Available through call or text on [employer] TE[employer]  Office: [phone]   DATE OF SERVICE: 12-07-23 @ 14:26    Patient is a 83y old  Female who presents with a chief complaint of LE swelling, dyspnea (07 Dec 2023 11:53)      INTERVAL HISTORY: Feels ok. s/p conversion of HF catheter via IR today.     REVIEW OF SYSTEMS:  CONSTITUTIONAL: No weakness  EYES/ENT: No visual changes;  No throat pain   NECK: No pain or stiffness  RESPIRATORY: No cough, wheezing; No shortness of breath  CARDIOVASCULAR: No chest pain or palpitations  GASTROINTESTINAL: No abdominal  pain. No nausea, vomiting, or hematemesis  GENITOURINARY: No dysuria, frequency or hematuria  NEUROLOGICAL: No stroke like symptoms  SKIN: No rashes    -ELEMETRY Personally reviewed: SR 70-90s  	  MEDICATIONS:  amLODIPine   Tablet 10 milliGRAM(s) Oral daily  hydrALAZINE 25 milliGRAM(s) Oral three times a day        PHYSICAL EXAM:  T(C): 36.9 (12-07-23 @ 13:40), Max: 37.3 (12-07-23 @ 08:37)  HR: 89 (12-07-23 @ 13:40) (70 - 89)  BP: 129/53 (12-07-23 @ 13:40) (120/55 - 154/70)  RR: 18 (12-07-23 @ 13:40) (16 - 18)  SpO2: 94% (12-07-23 @ 13:40) (93% - 100%)  Wt(kg): --  I&O's Summary    06 Dec 2023 07:01  -  07 Dec 2023 07:00  --------------------------------------------------------  IN: 900 mL / OUT: 1800 mL / NET: -900 mL      Height (cm): 160 (12-07 @ 08:41)  Weight (kg): 82.9 (12-07 @ 08:41)  BMI (kg/m2): 32.4 (12-07 @ 08:41)  BSA (m2): 1.86 (12-07 @ 08:41)    Appearance: In no distress	  HEENT:    PERRL, EOMI	  Cardiovascular:  S1 S2, No JVD  Respiratory: Lungs clear to auscultation	  Gastrointestinal:  Soft, Non-tender, + BS	  Vascularature:  + edema of LE  Psychiatric: Appropriate affect   Neuro: no acute focal deficits                               7.9    10.45 )-----------( 240      ( 07 Dec 2023 05:03 )             25.4     12-07    139  |  101  |  37<H>  ----------------------------<  112<H>  4.3   |  29  |  2.43<H>    Ca    8.5      07 Dec 2023 05:03  Phos  1.9     12-07  Mg     1.8     12-07    TPro  6.2  /  Alb  3.5  /  TBili  0.3  /  DBili  x   /  AST  19  /  ALT  18  /  AlkPhos  46  12-06        Labs personally reviewed      ASSESSMENT/PLAN: 	    83 year old female with HTN, HLD, CAD, T2DM, CKD, remote hx of breast CA s/p mastectomy, and chronic hypoxemic respiratory failure of unknown etiology on home O2 who presents for lower extremity swelling and shortness of breath.      1. Acute diastolic heart failure  - Pt with LE edema and pulmonary congestion  - s/p IV diuresis now DC'd d/t increasing Cr which is now improving. HD intiated.   - Echo shows normal LV systolic function with Bi-atrial enlargement, G2DD and LVH  - EKG shows sinus rhythm    - volume removal with HD    2. ESRD  - Renal on board  - HD as per renal    3. HLD  - Statin therapy    4. HTN - stable  - BP well controlled on Hydral 25mg PO TID and Amlodipine 10mg PO daily    5. Preop Risk Stratification - planned for AVF  - Would ideally defer until post several HD sessions and euvolemic   - Should be optimized by Monday        DAFNE Epperson-NP   Jean Ash DO [facility]  Cardiovascular Medicine  [address], [address]  Available through call or text on [employer] TE[employer]  Office: [phone]   DATE OF SERVICE: 12-07-23 @ 14:26    Patient is a 83y old  Female who presents with a chief complaint of LE swelling, dyspnea (07 Dec 2023 11:53)      INTERVAL HISTORY: Feels ok. s/p conversion of HF catheter via IR today.     REVIEW OF SYSTEMS:  CONSTITUTIONAL: No weakness  EYES/ENT: No visual changes;  No throat pain   NECK: No pain or stiffness  RESPIRATORY: No cough, wheezing; No shortness of breath  CARDIOVASCULAR: No chest pain or palpitations  GASTROINTESTINAL: No abdominal  pain. No nausea, vomiting, or hematemesis  GENITOURINARY: No dysuria, frequency or hematuria  NEUROLOGICAL: No stroke like symptoms  SKIN: No rashes    -ELEMETRY Personally reviewed: SR 70-90s  	  MEDICATIONS:  amLODIPine   Tablet 10 milliGRAM(s) Oral daily  hydrALAZINE 25 milliGRAM(s) Oral three times a day        PHYSICAL EXAM:  T(C): 36.9 (12-07-23 @ 13:40), Max: 37.3 (12-07-23 @ 08:37)  HR: 89 (12-07-23 @ 13:40) (70 - 89)  BP: 129/53 (12-07-23 @ 13:40) (120/55 - 154/70)  RR: 18 (12-07-23 @ 13:40) (16 - 18)  SpO2: 94% (12-07-23 @ 13:40) (93% - 100%)  Wt(kg): --  I&O's Summary    06 Dec 2023 07:01  -  07 Dec 2023 07:00  --------------------------------------------------------  IN: 900 mL / OUT: 1800 mL / NET: -900 mL      Height (cm): 160 (12-07 @ 08:41)  Weight (kg): 82.9 (12-07 @ 08:41)  BMI (kg/m2): 32.4 (12-07 @ 08:41)  BSA (m2): 1.86 (12-07 @ 08:41)    Appearance: In no distress	  HEENT:    PERRL, EOMI	  Cardiovascular:  S1 S2, No JVD  Respiratory: Lungs clear to auscultation	  Gastrointestinal:  Soft, Non-tender, + BS	  Vascularature:  + edema of LE  Psychiatric: Appropriate affect   Neuro: no acute focal deficits                               7.9    10.45 )-----------( 240      ( 07 Dec 2023 05:03 )             25.4     12-07    139  |  101  |  37<H>  ----------------------------<  112<H>  4.3   |  29  |  2.43<H>    Ca    8.5      07 Dec 2023 05:03  Phos  1.9     12-07  Mg     1.8     12-07    TPro  6.2  /  Alb  3.5  /  TBili  0.3  /  DBili  x   /  AST  19  /  ALT  18  /  AlkPhos  46  12-06        Labs personally reviewed      ASSESSMENT/PLAN: 	    83 year old female with HTN, HLD, CAD, T2DM, CKD, remote hx of breast CA s/p mastectomy, and chronic hypoxemic respiratory failure of unknown etiology on home O2 who presents for lower extremity swelling and shortness of breath.      1. Acute diastolic heart failure  - Pt with LE edema and pulmonary congestion  - s/p IV diuresis now DC'd d/t increasing Cr which is now improving. HD intiated.   - Echo shows normal LV systolic function with Bi-atrial enlargement, G2DD and LVH  - EKG shows sinus rhythm    - volume removal with HD    2. ESRD  - Renal on board  - HD as per renal    3. HLD  - Statin therapy    4. HTN - stable  - BP well controlled on Hydral 25mg PO TID and Amlodipine 10mg PO daily    5. Preop Risk Stratification - planned for AVF  - Would ideally defer until post several HD sessions and euvolemic   - Should be optimized by Monday        DAFNE Epperson-NP   Jean Ash DO [facility]  Cardiovascular Medicine  [address], [address]  Available through call or text on [employer] TE[employer]  Office: [phone]

## 2023-12-07 NOTE — PROGRESS NOTE ADULT - PROBLEM SELECTOR PLAN 1
CAL on CKD. Has not seen a physician in the recent past. Given her history its likely to be CAL on CKD 2/2 SAUMYA vs CKD progression. She has pedal edema. US duplex Kidney - showed renal parenchymal disease. UPCR 5.6g, no RBCs, no bacteria. FeUrea borderline but more suggestive of prerenal etiology.  SCr 4.5 on admission, received IV contrast for CTA on evening of 11/21, and Cr had been stable but increased about 2 days after IV contrast exposure suggesting SAUMYA. Serology work up ongoing- so far negative HIV, Hep B, Hep C, C3, C4, Anti GBM antibody, Anti Ds DNA, JAYLAN, ANCA, Serum free light chains, immunofixation. A1c 6.1%. Negative for PLA2R Ab. BNP 1300 initially. TTE with G2DD with elevated filling pressures and severe LA dilation. Patient was diuresed throughout hospitalization. eGFR persistently ~9, with possible uremic symptoms (asterixes, SOB, fatigue) so iHD started 12/5.    Tolerated second iHD yesterday. Will plan for third iHD today. Tunneled catheter placed today. Monitor BMP and I/O. Please have case management help set up outpatient HD

## 2023-12-07 NOTE — PROGRESS NOTE ADULT - SUBJECTIVE AND OBJECTIVE BOX
Patient is a 83y old  Female who presents with a chief complaint of LE swelling, dyspnea (06 Dec 2023 16:21)      SUBJECTIVE / OVERNIGHT EVENTS: NAEO.    MEDICATIONS  (STANDING):  albuterol    90 MICROgram(s) HFA Inhaler 2 Puff(s) Inhalation every 6 hours  albuterol/ipratropium for Nebulization 3 milliLiter(s) Nebulizer every 6 hours  amLODIPine   Tablet 10 milliGRAM(s) Oral daily  atorvastatin 80 milliGRAM(s) Oral at bedtime  budesonide 160 MICROgram(s)/formoterol 4.5 MICROgram(s) Inhaler 2 Puff(s) Inhalation two times a day  chlorhexidine 2% Cloths 1 Application(s) Topical daily  chlorhexidine 4% Liquid 1 Application(s) Topical <User Schedule>  dextrose 5%. 1000 milliLiter(s) (50 mL/Hr) IV Continuous <Continuous>  dextrose 5%. 1000 milliLiter(s) (100 mL/Hr) IV Continuous <Continuous>  dextrose 50% Injectable 25 Gram(s) IV Push once  dextrose 50% Injectable 12.5 Gram(s) IV Push once  dextrose 50% Injectable 25 Gram(s) IV Push once  epoetin sheryl (PROCRIT) Injectable 5000 Unit(s) IV Push once  ferrous    sulfate 325 milliGRAM(s) Oral daily  fluticasone propionate 50 MICROgram(s)/spray Nasal Spray 1 Spray(s) Both Nostrils two times a day  glucagon  Injectable 1 milliGRAM(s) IntraMuscular once  hydrALAZINE 25 milliGRAM(s) Oral three times a day  influenza  Vaccine (HIGH DOSE) 0.7 milliLiter(s) IntraMuscular once  insulin lispro (ADMELOG) corrective regimen sliding scale   SubCutaneous three times a day before meals  insulin lispro (ADMELOG) corrective regimen sliding scale   SubCutaneous at bedtime  montelukast 10 milliGRAM(s) Oral daily  mupirocin 2% Nasal 1 Application(s) Both Nostrils every 12 hours  nystatin Powder 1 Application(s) Topical two times a day  polyethylene glycol 3350 17 Gram(s) Oral daily  senna 2 Tablet(s) Oral at bedtime  sodium chloride 0.65% Nasal 1 Spray(s) Both Nostrils daily    MEDICATIONS  (PRN):  acetaminophen     Tablet .. 650 milliGRAM(s) Oral every 6 hours PRN Temp greater or equal to 38C (100.4F), Mild Pain (1 - 3)  dextrose Oral Gel 15 Gram(s) Oral once PRN Blood Glucose LESS THAN 70 milliGRAM(s)/deciliter  ondansetron Injectable 4 milliGRAM(s) IV Push once PRN Nausea and/or Vomiting  sodium chloride 0.9% lock flush 10 milliLiter(s) IV Push every 1 hour PRN Pre/post blood products, medications, blood draw, and to maintain line patency      CAPILLARY BLOOD GLUCOSE      POCT Blood Glucose.: 113 mg/dL (07 Dec 2023 07:30)  POCT Blood Glucose.: 162 mg/dL (06 Dec 2023 21:48)  POCT Blood Glucose.: 134 mg/dL (06 Dec 2023 16:49)  POCT Blood Glucose.: 147 mg/dL (06 Dec 2023 12:04)    I&O's Summary    06 Dec 2023 07:01  -  07 Dec 2023 07:00  --------------------------------------------------------  IN: 900 mL / OUT: 1800 mL / NET: -900 mL        Vital Signs Last 24 Hrs  T(C): 37.3 (07 Dec 2023 08:41), Max: 37.3 (07 Dec 2023 08:37)  T(F): 99.1 (07 Dec 2023 08:37), Max: 99.1 (07 Dec 2023 08:37)  HR: 81 (07 Dec 2023 08:41) (77 - 85)  BP: 137/63 (07 Dec 2023 08:41) (137/63 - 162/70)  BP(mean): 87 (07 Dec 2023 08:41) (87 - 87)  RR: 17 (07 Dec 2023 08:41) (17 - 18)  SpO2: 98% (07 Dec 2023 08:41) (96% - 100%)    Parameters below as of 07 Dec 2023 08:41    O2 Flow (L/min): 3      PHYSICAL EXAM:  GENERAL: NAD, well-developed, well-nourished  HEAD: Atraumatic, Normocephalic  EYES: Conjunctiva and sclera clear  CHEST/LUNG: Clear to auscultation bilaterally; No wheezes or crackles  HEART: Normal S1/S2; Regular rate and rhythm; No murmurs, rubs, or gallops  ABDOMEN: Soft, Nontender, Nondistended; Bowel sounds present  EXTREMITIES: No clubbing, cyanosis, or edema  PSYCH: A&Ox3      LABS:                        7.9    10.45 )-----------( 240      ( 07 Dec 2023 05:03 )             25.4      12-07    139  |  101  |  37<H>  ----------------------------<  112<H>  4.3   |  29  |  2.43<H>    Ca    8.5      07 Dec 2023 05:03  Phos  1.9     12-07  Mg     1.8     12-07    TPro  6.2  /  Alb  3.5  /  TBili  0.3  /  DBili  x   /  AST  19  /  ALT  18  /  AlkPhos  46  12-06    PT/INR - ( 07 Dec 2023 05:03 )   PT: 10.5 sec;   INR: 1.00 ratio         PTT - ( 07 Dec 2023 05:03 )  PTT:28.8 sec      Urinalysis Basic - ( 07 Dec 2023 05:03 )    Color: x / Appearance: x / SG: x / pH: x  Gluc: 112 mg/dL / Ketone: x  / Bili: x / Urobili: x   Blood: x / Protein: x / Nitrite: x   Leuk Esterase: x / RBC: x / WBC x   Sq Epi: x / Non Sq Epi: x / Bacteria: x        RADIOLOGY & ADDITIONAL TESTS:     Patient is a 83y old  Female who presents with a chief complaint of LE swelling, dyspnea (06 Dec 2023 16:21)      SUBJECTIVE / OVERNIGHT EVENTS: NAEO. Denies fevers, chills, SOB, chest pain, abdominal pain, nausea, vomiting, changes in BM.    MEDICATIONS  (STANDING):  albuterol    90 MICROgram(s) HFA Inhaler 2 Puff(s) Inhalation every 6 hours  albuterol/ipratropium for Nebulization 3 milliLiter(s) Nebulizer every 6 hours  amLODIPine   Tablet 10 milliGRAM(s) Oral daily  atorvastatin 80 milliGRAM(s) Oral at bedtime  budesonide 160 MICROgram(s)/formoterol 4.5 MICROgram(s) Inhaler 2 Puff(s) Inhalation two times a day  chlorhexidine 2% Cloths 1 Application(s) Topical daily  chlorhexidine 4% Liquid 1 Application(s) Topical <User Schedule>  dextrose 5%. 1000 milliLiter(s) (50 mL/Hr) IV Continuous <Continuous>  dextrose 5%. 1000 milliLiter(s) (100 mL/Hr) IV Continuous <Continuous>  dextrose 50% Injectable 25 Gram(s) IV Push once  dextrose 50% Injectable 12.5 Gram(s) IV Push once  dextrose 50% Injectable 25 Gram(s) IV Push once  epoetin sheryl (PROCRIT) Injectable 5000 Unit(s) IV Push once  ferrous    sulfate 325 milliGRAM(s) Oral daily  fluticasone propionate 50 MICROgram(s)/spray Nasal Spray 1 Spray(s) Both Nostrils two times a day  glucagon  Injectable 1 milliGRAM(s) IntraMuscular once  hydrALAZINE 25 milliGRAM(s) Oral three times a day  influenza  Vaccine (HIGH DOSE) 0.7 milliLiter(s) IntraMuscular once  insulin lispro (ADMELOG) corrective regimen sliding scale   SubCutaneous three times a day before meals  insulin lispro (ADMELOG) corrective regimen sliding scale   SubCutaneous at bedtime  montelukast 10 milliGRAM(s) Oral daily  mupirocin 2% Nasal 1 Application(s) Both Nostrils every 12 hours  nystatin Powder 1 Application(s) Topical two times a day  polyethylene glycol 3350 17 Gram(s) Oral daily  senna 2 Tablet(s) Oral at bedtime  sodium chloride 0.65% Nasal 1 Spray(s) Both Nostrils daily    MEDICATIONS  (PRN):  acetaminophen     Tablet .. 650 milliGRAM(s) Oral every 6 hours PRN Temp greater or equal to 38C (100.4F), Mild Pain (1 - 3)  dextrose Oral Gel 15 Gram(s) Oral once PRN Blood Glucose LESS THAN 70 milliGRAM(s)/deciliter  ondansetron Injectable 4 milliGRAM(s) IV Push once PRN Nausea and/or Vomiting  sodium chloride 0.9% lock flush 10 milliLiter(s) IV Push every 1 hour PRN Pre/post blood products, medications, blood draw, and to maintain line patency      CAPILLARY BLOOD GLUCOSE      POCT Blood Glucose.: 113 mg/dL (07 Dec 2023 07:30)  POCT Blood Glucose.: 162 mg/dL (06 Dec 2023 21:48)  POCT Blood Glucose.: 134 mg/dL (06 Dec 2023 16:49)  POCT Blood Glucose.: 147 mg/dL (06 Dec 2023 12:04)    I&O's Summary    06 Dec 2023 07:01  -  07 Dec 2023 07:00  --------------------------------------------------------  IN: 900 mL / OUT: 1800 mL / NET: -900 mL        Vital Signs Last 24 Hrs  T(C): 37.3 (07 Dec 2023 08:41), Max: 37.3 (07 Dec 2023 08:37)  T(F): 99.1 (07 Dec 2023 08:37), Max: 99.1 (07 Dec 2023 08:37)  HR: 81 (07 Dec 2023 08:41) (77 - 85)  BP: 137/63 (07 Dec 2023 08:41) (137/63 - 162/70)  BP(mean): 87 (07 Dec 2023 08:41) (87 - 87)  RR: 17 (07 Dec 2023 08:41) (17 - 18)  SpO2: 98% (07 Dec 2023 08:41) (96% - 100%)    Parameters below as of 07 Dec 2023 08:41    O2 Flow (L/min): 3      PHYSICAL EXAM:  GENERAL: NAD, well-developed, well-nourished  HEAD: Atraumatic, Normocephalic  EYES: Conjunctiva and sclera clear  CHEST/LUNG: Crackles heard b/l  HEART: Normal S1/S2; Regular rate and rhythm; No murmurs, rubs, or gallops  ABDOMEN: Soft, Nontender, Nondistended; Bowel sounds present  EXTREMITIES: No clubbing, cyanosis, or edema  PSYCH: A&Ox3      LABS:                        7.9    10.45 )-----------( 240      ( 07 Dec 2023 05:03 )             25.4      12-07    139  |  101  |  37<H>  ----------------------------<  112<H>  4.3   |  29  |  2.43<H>    Ca    8.5      07 Dec 2023 05:03  Phos  1.9     12-07  Mg     1.8     12-07    TPro  6.2  /  Alb  3.5  /  TBili  0.3  /  DBili  x   /  AST  19  /  ALT  18  /  AlkPhos  46  12-06    PT/INR - ( 07 Dec 2023 05:03 )   PT: 10.5 sec;   INR: 1.00 ratio         PTT - ( 07 Dec 2023 05:03 )  PTT:28.8 sec      Urinalysis Basic - ( 07 Dec 2023 05:03 )    Color: x / Appearance: x / SG: x / pH: x  Gluc: 112 mg/dL / Ketone: x  / Bili: x / Urobili: x   Blood: x / Protein: x / Nitrite: x   Leuk Esterase: x / RBC: x / WBC x   Sq Epi: x / Non Sq Epi: x / Bacteria: x        RADIOLOGY & ADDITIONAL TESTS:

## 2023-12-07 NOTE — PROGRESS NOTE ADULT - PROBLEM SELECTOR PLAN 3
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  - reportedly has continued to have good urine output  - metabolic  - FeUrea <30.9%--> pre renal picture     Plan:  > follow up urine studies--> FeUrea >35%; prerenal picture   > f/u US Kidney/bladder--> showing parenchymal disease   > hold home ARB and MRA, avoid nephrotoxic meds  - appreciate cardio-renal recs: pending workup --> US showing medical renal disease  - phos binder d/c-ed due to low phosphorus  - hyperkalemia 2/2 kidney disease s/p lokelma and insulin + D50--> repeat BMP wnl  - s/p dialysis 12/5, 12/6, 12/7; consulted vascular surgery for fistula planning  - permacath placed by IR 12/7

## 2023-12-08 LAB
ALBUMIN SERPL ELPH-MCNC: 3.7 G/DL — SIGNIFICANT CHANGE UP (ref 3.3–5)
ALBUMIN SERPL ELPH-MCNC: 3.7 G/DL — SIGNIFICANT CHANGE UP (ref 3.3–5)
ALP SERPL-CCNC: 51 U/L — SIGNIFICANT CHANGE UP (ref 40–120)
ALP SERPL-CCNC: 51 U/L — SIGNIFICANT CHANGE UP (ref 40–120)
ALT FLD-CCNC: 25 U/L — SIGNIFICANT CHANGE UP (ref 10–45)
ALT FLD-CCNC: 25 U/L — SIGNIFICANT CHANGE UP (ref 10–45)
ANION GAP SERPL CALC-SCNC: 10 MMOL/L — SIGNIFICANT CHANGE UP (ref 5–17)
ANION GAP SERPL CALC-SCNC: 10 MMOL/L — SIGNIFICANT CHANGE UP (ref 5–17)
AST SERPL-CCNC: 24 U/L — SIGNIFICANT CHANGE UP (ref 10–40)
AST SERPL-CCNC: 24 U/L — SIGNIFICANT CHANGE UP (ref 10–40)
BASOPHILS # BLD AUTO: 0.04 K/UL — SIGNIFICANT CHANGE UP (ref 0–0.2)
BASOPHILS # BLD AUTO: 0.04 K/UL — SIGNIFICANT CHANGE UP (ref 0–0.2)
BASOPHILS NFR BLD AUTO: 0.4 % — SIGNIFICANT CHANGE UP (ref 0–2)
BASOPHILS NFR BLD AUTO: 0.4 % — SIGNIFICANT CHANGE UP (ref 0–2)
BILIRUB SERPL-MCNC: 0.2 MG/DL — SIGNIFICANT CHANGE UP (ref 0.2–1.2)
BILIRUB SERPL-MCNC: 0.2 MG/DL — SIGNIFICANT CHANGE UP (ref 0.2–1.2)
BUN SERPL-MCNC: 21 MG/DL — SIGNIFICANT CHANGE UP (ref 7–23)
BUN SERPL-MCNC: 21 MG/DL — SIGNIFICANT CHANGE UP (ref 7–23)
CALCIUM SERPL-MCNC: 8.8 MG/DL — SIGNIFICANT CHANGE UP (ref 8.4–10.5)
CALCIUM SERPL-MCNC: 8.8 MG/DL — SIGNIFICANT CHANGE UP (ref 8.4–10.5)
CHLORIDE SERPL-SCNC: 100 MMOL/L — SIGNIFICANT CHANGE UP (ref 96–108)
CHLORIDE SERPL-SCNC: 100 MMOL/L — SIGNIFICANT CHANGE UP (ref 96–108)
CO2 SERPL-SCNC: 29 MMOL/L — SIGNIFICANT CHANGE UP (ref 22–31)
CO2 SERPL-SCNC: 29 MMOL/L — SIGNIFICANT CHANGE UP (ref 22–31)
CREAT SERPL-MCNC: 2.4 MG/DL — HIGH (ref 0.5–1.3)
CREAT SERPL-MCNC: 2.4 MG/DL — HIGH (ref 0.5–1.3)
EGFR: 20 ML/MIN/1.73M2 — LOW
EGFR: 20 ML/MIN/1.73M2 — LOW
EOSINOPHIL # BLD AUTO: 0.41 K/UL — SIGNIFICANT CHANGE UP (ref 0–0.5)
EOSINOPHIL # BLD AUTO: 0.41 K/UL — SIGNIFICANT CHANGE UP (ref 0–0.5)
EOSINOPHIL NFR BLD AUTO: 3.6 % — SIGNIFICANT CHANGE UP (ref 0–6)
EOSINOPHIL NFR BLD AUTO: 3.6 % — SIGNIFICANT CHANGE UP (ref 0–6)
GLUCOSE BLDC GLUCOMTR-MCNC: 115 MG/DL — HIGH (ref 70–99)
GLUCOSE BLDC GLUCOMTR-MCNC: 115 MG/DL — HIGH (ref 70–99)
GLUCOSE BLDC GLUCOMTR-MCNC: 122 MG/DL — HIGH (ref 70–99)
GLUCOSE BLDC GLUCOMTR-MCNC: 122 MG/DL — HIGH (ref 70–99)
GLUCOSE BLDC GLUCOMTR-MCNC: 155 MG/DL — HIGH (ref 70–99)
GLUCOSE BLDC GLUCOMTR-MCNC: 155 MG/DL — HIGH (ref 70–99)
GLUCOSE BLDC GLUCOMTR-MCNC: 237 MG/DL — HIGH (ref 70–99)
GLUCOSE BLDC GLUCOMTR-MCNC: 237 MG/DL — HIGH (ref 70–99)
GLUCOSE SERPL-MCNC: 114 MG/DL — HIGH (ref 70–99)
GLUCOSE SERPL-MCNC: 114 MG/DL — HIGH (ref 70–99)
HCT VFR BLD CALC: 27 % — LOW (ref 34.5–45)
HCT VFR BLD CALC: 27 % — LOW (ref 34.5–45)
HGB BLD-MCNC: 8.4 G/DL — LOW (ref 11.5–15.5)
HGB BLD-MCNC: 8.4 G/DL — LOW (ref 11.5–15.5)
IMM GRANULOCYTES NFR BLD AUTO: 1.9 % — HIGH (ref 0–0.9)
IMM GRANULOCYTES NFR BLD AUTO: 1.9 % — HIGH (ref 0–0.9)
LYMPHOCYTES # BLD AUTO: 1.55 K/UL — SIGNIFICANT CHANGE UP (ref 1–3.3)
LYMPHOCYTES # BLD AUTO: 1.55 K/UL — SIGNIFICANT CHANGE UP (ref 1–3.3)
LYMPHOCYTES # BLD AUTO: 13.7 % — SIGNIFICANT CHANGE UP (ref 13–44)
LYMPHOCYTES # BLD AUTO: 13.7 % — SIGNIFICANT CHANGE UP (ref 13–44)
MAGNESIUM SERPL-MCNC: 1.9 MG/DL — SIGNIFICANT CHANGE UP (ref 1.6–2.6)
MAGNESIUM SERPL-MCNC: 1.9 MG/DL — SIGNIFICANT CHANGE UP (ref 1.6–2.6)
MCHC RBC-ENTMCNC: 28.9 PG — SIGNIFICANT CHANGE UP (ref 27–34)
MCHC RBC-ENTMCNC: 28.9 PG — SIGNIFICANT CHANGE UP (ref 27–34)
MCHC RBC-ENTMCNC: 31.1 GM/DL — LOW (ref 32–36)
MCHC RBC-ENTMCNC: 31.1 GM/DL — LOW (ref 32–36)
MCV RBC AUTO: 92.8 FL — SIGNIFICANT CHANGE UP (ref 80–100)
MCV RBC AUTO: 92.8 FL — SIGNIFICANT CHANGE UP (ref 80–100)
MONOCYTES # BLD AUTO: 1.55 K/UL — HIGH (ref 0–0.9)
MONOCYTES # BLD AUTO: 1.55 K/UL — HIGH (ref 0–0.9)
MONOCYTES NFR BLD AUTO: 13.7 % — SIGNIFICANT CHANGE UP (ref 2–14)
MONOCYTES NFR BLD AUTO: 13.7 % — SIGNIFICANT CHANGE UP (ref 2–14)
NEUTROPHILS # BLD AUTO: 7.55 K/UL — HIGH (ref 1.8–7.4)
NEUTROPHILS # BLD AUTO: 7.55 K/UL — HIGH (ref 1.8–7.4)
NEUTROPHILS NFR BLD AUTO: 66.7 % — SIGNIFICANT CHANGE UP (ref 43–77)
NEUTROPHILS NFR BLD AUTO: 66.7 % — SIGNIFICANT CHANGE UP (ref 43–77)
NRBC # BLD: 0 /100 WBCS — SIGNIFICANT CHANGE UP (ref 0–0)
NRBC # BLD: 0 /100 WBCS — SIGNIFICANT CHANGE UP (ref 0–0)
PHOSPHATE SERPL-MCNC: 2.3 MG/DL — LOW (ref 2.5–4.5)
PHOSPHATE SERPL-MCNC: 2.3 MG/DL — LOW (ref 2.5–4.5)
PLATELET # BLD AUTO: 238 K/UL — SIGNIFICANT CHANGE UP (ref 150–400)
PLATELET # BLD AUTO: 238 K/UL — SIGNIFICANT CHANGE UP (ref 150–400)
POTASSIUM SERPL-MCNC: 4 MMOL/L — SIGNIFICANT CHANGE UP (ref 3.5–5.3)
POTASSIUM SERPL-MCNC: 4 MMOL/L — SIGNIFICANT CHANGE UP (ref 3.5–5.3)
POTASSIUM SERPL-SCNC: 4 MMOL/L — SIGNIFICANT CHANGE UP (ref 3.5–5.3)
POTASSIUM SERPL-SCNC: 4 MMOL/L — SIGNIFICANT CHANGE UP (ref 3.5–5.3)
PROT SERPL-MCNC: 6.4 G/DL — SIGNIFICANT CHANGE UP (ref 6–8.3)
PROT SERPL-MCNC: 6.4 G/DL — SIGNIFICANT CHANGE UP (ref 6–8.3)
RBC # BLD: 2.91 M/UL — LOW (ref 3.8–5.2)
RBC # BLD: 2.91 M/UL — LOW (ref 3.8–5.2)
RBC # FLD: 14.4 % — SIGNIFICANT CHANGE UP (ref 10.3–14.5)
RBC # FLD: 14.4 % — SIGNIFICANT CHANGE UP (ref 10.3–14.5)
SODIUM SERPL-SCNC: 139 MMOL/L — SIGNIFICANT CHANGE UP (ref 135–145)
SODIUM SERPL-SCNC: 139 MMOL/L — SIGNIFICANT CHANGE UP (ref 135–145)
WBC # BLD: 11.32 K/UL — HIGH (ref 3.8–10.5)
WBC # BLD: 11.32 K/UL — HIGH (ref 3.8–10.5)
WBC # FLD AUTO: 11.32 K/UL — HIGH (ref 3.8–10.5)
WBC # FLD AUTO: 11.32 K/UL — HIGH (ref 3.8–10.5)

## 2023-12-08 PROCEDURE — 99233 SBSQ HOSP IP/OBS HIGH 50: CPT | Mod: GC

## 2023-12-08 RX ORDER — SODIUM,POTASSIUM PHOSPHATES 278-250MG
1 POWDER IN PACKET (EA) ORAL ONCE
Refills: 0 | Status: DISCONTINUED | OUTPATIENT
Start: 2023-12-08 | End: 2023-12-08

## 2023-12-08 RX ADMIN — Medication 25 MILLIGRAM(S): at 05:02

## 2023-12-08 RX ADMIN — NYSTATIN CREAM 1 APPLICATION(S): 100000 CREAM TOPICAL at 17:19

## 2023-12-08 RX ADMIN — Medication 25 MILLIGRAM(S): at 22:14

## 2023-12-08 RX ADMIN — NYSTATIN CREAM 1 APPLICATION(S): 100000 CREAM TOPICAL at 05:04

## 2023-12-08 RX ADMIN — MUPIROCIN 1 APPLICATION(S): 20 OINTMENT TOPICAL at 05:04

## 2023-12-08 RX ADMIN — Medication 3 MILLILITER(S): at 11:11

## 2023-12-08 RX ADMIN — MUPIROCIN 1 APPLICATION(S): 20 OINTMENT TOPICAL at 17:19

## 2023-12-08 RX ADMIN — Medication 25 MILLIGRAM(S): at 13:08

## 2023-12-08 RX ADMIN — ATORVASTATIN CALCIUM 80 MILLIGRAM(S): 80 TABLET, FILM COATED ORAL at 22:14

## 2023-12-08 RX ADMIN — AMLODIPINE BESYLATE 10 MILLIGRAM(S): 2.5 TABLET ORAL at 05:02

## 2023-12-08 RX ADMIN — Medication 3 MILLILITER(S): at 17:18

## 2023-12-08 RX ADMIN — Medication 3 MILLILITER(S): at 05:02

## 2023-12-08 RX ADMIN — Medication 1 SPRAY(S): at 05:03

## 2023-12-08 RX ADMIN — BUDESONIDE AND FORMOTEROL FUMARATE DIHYDRATE 2 PUFF(S): 160; 4.5 AEROSOL RESPIRATORY (INHALATION) at 22:13

## 2023-12-08 RX ADMIN — Medication 3 MILLILITER(S): at 22:13

## 2023-12-08 RX ADMIN — Medication 2: at 13:09

## 2023-12-08 RX ADMIN — BUDESONIDE AND FORMOTEROL FUMARATE DIHYDRATE 2 PUFF(S): 160; 4.5 AEROSOL RESPIRATORY (INHALATION) at 11:12

## 2023-12-08 RX ADMIN — SENNA PLUS 2 TABLET(S): 8.6 TABLET ORAL at 22:14

## 2023-12-08 RX ADMIN — Medication 325 MILLIGRAM(S): at 11:10

## 2023-12-08 RX ADMIN — CHLORHEXIDINE GLUCONATE 1 APPLICATION(S): 213 SOLUTION TOPICAL at 05:03

## 2023-12-08 RX ADMIN — MONTELUKAST 10 MILLIGRAM(S): 4 TABLET, CHEWABLE ORAL at 11:10

## 2023-12-08 RX ADMIN — CHLORHEXIDINE GLUCONATE 1 APPLICATION(S): 213 SOLUTION TOPICAL at 11:16

## 2023-12-08 RX ADMIN — Medication 1 SPRAY(S): at 11:11

## 2023-12-08 NOTE — PROGRESS NOTE ADULT - PROBLEM SELECTOR PLAN 4
Patient with several year history of chronic hypoxemic respiratory failure  - requiring home O2, stable on 2L at baseline  - has not had escalating O2 requirements recently, despite feeling subjectively more dyspneic  - unclear etiology, though patient has had PFTs at her pulmologist's office last year    Plan:  > follow up TTE--> showing EF 71%, bi-atrial enlargement, LV diastolic dysfunction, and RV enlargement  > obtain records from pt's pulmonologists office: Dr. Paulino Gayle ([facility], [phone])  > continue with home montelukast and add on duonebs and symbicort   > now on baseline of 2L O2  > s/p prednisone for possible COPD exacerbation   - f/u repeat CXR and BNP (stable CXR and elevated BNP)  - appreciate pulm recs: started on flonase and saline spray Patient with several year history of chronic hypoxemic respiratory failure  - requiring home O2, stable on 2L at baseline  - has not had escalating O2 requirements recently, despite feeling subjectively more dyspneic  - unclear etiology, though patient has had PFTs at her pulmologist's office last year    Plan:  > follow up TTE--> showing EF 71%, bi-atrial enlargement, LV diastolic dysfunction, and RV enlargement  > obtain records from pt's pulmonologists office: Dr. Paulino Gayle ([facility], [phone])  > continue with home montelukast and add on duonebs and symbicort   > now on baseline of 2L O2  > s/p prednisone for possible COPD exacerbation   - f/u repeat CXR and BNP (stable CXR and elevated BNP)  - appreciate pulm recs: started on flonase and saline spray

## 2023-12-08 NOTE — PROGRESS NOTE ADULT - PROBLEM SELECTOR PLAN 5
- pt with rising leukocytosis; now resolved  - likely steroid induced and no signs of infection  - will continue to monitor

## 2023-12-08 NOTE — PROGRESS NOTE ADULT - SUBJECTIVE AND OBJECTIVE BOX
DATE OF SERVICE: 12-08-23 @ 23:18    Patient is a 83y old  Female who presents with a chief complaint of LE swelling, dyspnea (08 Dec 2023 07:01)      INTERVAL HISTORY: feels ok       MEDICATIONS:  amLODIPine   Tablet 10 milliGRAM(s) Oral daily  hydrALAZINE 25 milliGRAM(s) Oral three times a day        PHYSICAL EXAM:  T(C): 37 (12-08-23 @ 20:51), Max: 37 (12-08-23 @ 20:51)  HR: 91 (12-08-23 @ 20:51) (81 - 96)  BP: 113/54 (12-08-23 @ 20:51) (113/54 - 143/64)  RR: 19 (12-08-23 @ 20:51) (18 - 20)  SpO2: 96% (12-08-23 @ 20:51) (95% - 99%)  Wt(kg): --  I&O's Summary    07 Dec 2023 07:01  -  08 Dec 2023 07:00  --------------------------------------------------------  IN: 480 mL / OUT: 3350 mL / NET: -2870 mL    08 Dec 2023 07:01  -  08 Dec 2023 23:18  --------------------------------------------------------  IN: 360 mL / OUT: 0 mL / NET: 360 mL          Appearance: In no distress	  HEENT:    PERRL, EOMI	  Cardiovascular:  S1 S2, No JVD  Respiratory: Lungs clear to auscultation	  Gastrointestinal:  Soft, Non-tender, + BS	  Vascularature:  No edema of LE  Psychiatric: Appropriate affect   Neuro: no acute focal deficits                               8.4    11.32 )-----------( 238      ( 08 Dec 2023 06:13 )             27.0     12-08    139  |  100  |  21  ----------------------------<  114<H>  4.0   |  29  |  2.40<H>    Ca    8.8      08 Dec 2023 06:14  Phos  2.3     12-08  Mg     1.9     12-08    TPro  6.4  /  Alb  3.7  /  TBili  0.2  /  DBili  x   /  AST  24  /  ALT  25  /  AlkPhos  51  12-08        Labs personally reviewed      ASSESSMENT/PLAN: 	    83 year old female with HTN, HLD, CAD, T2DM, CKD, remote hx of breast CA s/p mastectomy, and chronic hypoxemic respiratory failure of unknown etiology on home O2 who presents for lower extremity swelling and shortness of breath.      1. Acute diastolic heart failure  - Pt with LE edema and pulmonary congestion  - s/p IV diuresis now DC'd d/t increasing Cr which is now improving. HD intiated.   - Echo shows normal LV systolic function with Bi-atrial enlargement, G2DD and LVH  - EKG shows sinus rhythm    - volume removal with HD    2. ESRD  - Renal on board  - HD as per renal    3. HLD  - Statin therapy    4. HTN - stable  - BP well controlled on Hydral 25mg PO TID and Amlodipine 10mg PO daily    5. Preop Risk Stratification - planned for AVF  - Would ideally defer until post several HD sessions and euvolemic   - Mod risk for low risk AVF, no contraindication to proceed           Jean Ash DO [facility]  Cardiovascular Medicine  [address] [place] [address], [address]  Office: [phone]  Available via Text/call on [employer] Teams DATE OF SERVICE: 12-08-23 @ 23:18    Patient is a 83y old  Female who presents with a chief complaint of LE swelling, dyspnea (08 Dec 2023 07:01)      INTERVAL HISTORY: feels ok       MEDICATIONS:  amLODIPine   Tablet 10 milliGRAM(s) Oral daily  hydrALAZINE 25 milliGRAM(s) Oral three times a day        PHYSICAL EXAM:  T(C): 37 (12-08-23 @ 20:51), Max: 37 (12-08-23 @ 20:51)  HR: 91 (12-08-23 @ 20:51) (81 - 96)  BP: 113/54 (12-08-23 @ 20:51) (113/54 - 143/64)  RR: 19 (12-08-23 @ 20:51) (18 - 20)  SpO2: 96% (12-08-23 @ 20:51) (95% - 99%)  Wt(kg): --  I&O's Summary    07 Dec 2023 07:01  -  08 Dec 2023 07:00  --------------------------------------------------------  IN: 480 mL / OUT: 3350 mL / NET: -2870 mL    08 Dec 2023 07:01  -  08 Dec 2023 23:18  --------------------------------------------------------  IN: 360 mL / OUT: 0 mL / NET: 360 mL          Appearance: In no distress	  HEENT:    PERRL, EOMI	  Cardiovascular:  S1 S2, No JVD  Respiratory: Lungs clear to auscultation	  Gastrointestinal:  Soft, Non-tender, + BS	  Vascularature:  No edema of LE  Psychiatric: Appropriate affect   Neuro: no acute focal deficits                               8.4    11.32 )-----------( 238      ( 08 Dec 2023 06:13 )             27.0     12-08    139  |  100  |  21  ----------------------------<  114<H>  4.0   |  29  |  2.40<H>    Ca    8.8      08 Dec 2023 06:14  Phos  2.3     12-08  Mg     1.9     12-08    TPro  6.4  /  Alb  3.7  /  TBili  0.2  /  DBili  x   /  AST  24  /  ALT  25  /  AlkPhos  51  12-08        Labs personally reviewed      ASSESSMENT/PLAN: 	    83 year old female with HTN, HLD, CAD, T2DM, CKD, remote hx of breast CA s/p mastectomy, and chronic hypoxemic respiratory failure of unknown etiology on home O2 who presents for lower extremity swelling and shortness of breath.      1. Acute diastolic heart failure  - Pt with LE edema and pulmonary congestion  - s/p IV diuresis now DC'd d/t increasing Cr which is now improving. HD intiated.   - Echo shows normal LV systolic function with Bi-atrial enlargement, G2DD and LVH  - EKG shows sinus rhythm    - volume removal with HD    2. ESRD  - Renal on board  - HD as per renal    3. HLD  - Statin therapy    4. HTN - stable  - BP well controlled on Hydral 25mg PO TID and Amlodipine 10mg PO daily    5. Preop Risk Stratification - planned for AVF  - Would ideally defer until post several HD sessions and euvolemic   - Mod risk for low risk AVF, no contraindication to proceed           Jean Ash DO [facility]  Cardiovascular Medicine  [address] [place] [address], [address]  Office: [phone]  Available via Text/call on Microsoft Teams

## 2023-12-08 NOTE — PROGRESS NOTE ADULT - SUBJECTIVE AND OBJECTIVE BOX
Patient is a 83y old  Female who presents with a chief complaint of LE swelling, dyspnea (07 Dec 2023 14:26)      SUBJECTIVE / OVERNIGHT EVENTS: NAEO. Pt denies chest pain, SOB, N/V, fever/chills, or changes in bowel movements.    MEDICATIONS  (STANDING):  albuterol    90 MICROgram(s) HFA Inhaler 2 Puff(s) Inhalation every 6 hours  albuterol/ipratropium for Nebulization 3 milliLiter(s) Nebulizer every 6 hours  amLODIPine   Tablet 10 milliGRAM(s) Oral daily  atorvastatin 80 milliGRAM(s) Oral at bedtime  budesonide 160 MICROgram(s)/formoterol 4.5 MICROgram(s) Inhaler 2 Puff(s) Inhalation two times a day  chlorhexidine 2% Cloths 1 Application(s) Topical daily  chlorhexidine 4% Liquid 1 Application(s) Topical <User Schedule>  dextrose 5%. 1000 milliLiter(s) (50 mL/Hr) IV Continuous <Continuous>  dextrose 5%. 1000 milliLiter(s) (100 mL/Hr) IV Continuous <Continuous>  dextrose 50% Injectable 25 Gram(s) IV Push once  dextrose 50% Injectable 12.5 Gram(s) IV Push once  dextrose 50% Injectable 25 Gram(s) IV Push once  epoetin sheryl (EPOGEN) Injectable 5000 Unit(s) IV Push <User Schedule>  ferrous    sulfate 325 milliGRAM(s) Oral daily  fluticasone propionate 50 MICROgram(s)/spray Nasal Spray 1 Spray(s) Both Nostrils two times a day  glucagon  Injectable 1 milliGRAM(s) IntraMuscular once  hydrALAZINE 25 milliGRAM(s) Oral three times a day  influenza  Vaccine (HIGH DOSE) 0.7 milliLiter(s) IntraMuscular once  insulin lispro (ADMELOG) corrective regimen sliding scale   SubCutaneous at bedtime  insulin lispro (ADMELOG) corrective regimen sliding scale   SubCutaneous three times a day before meals  montelukast 10 milliGRAM(s) Oral daily  mupirocin 2% Nasal 1 Application(s) Both Nostrils every 12 hours  nystatin Powder 1 Application(s) Topical two times a day  polyethylene glycol 3350 17 Gram(s) Oral daily  senna 2 Tablet(s) Oral at bedtime  sodium chloride 0.65% Nasal 1 Spray(s) Both Nostrils daily    MEDICATIONS  (PRN):  acetaminophen     Tablet .. 650 milliGRAM(s) Oral every 6 hours PRN Temp greater or equal to 38C (100.4F), Mild Pain (1 - 3)  dextrose Oral Gel 15 Gram(s) Oral once PRN Blood Glucose LESS THAN 70 milliGRAM(s)/deciliter  ondansetron Injectable 4 milliGRAM(s) IV Push once PRN Nausea and/or Vomiting  sodium chloride 0.9% lock flush 10 milliLiter(s) IV Push every 1 hour PRN Pre/post blood products, medications, blood draw, and to maintain line patency      CAPILLARY BLOOD GLUCOSE      POCT Blood Glucose.: 143 mg/dL (07 Dec 2023 22:06)  POCT Blood Glucose.: 115 mg/dL (07 Dec 2023 11:28)  POCT Blood Glucose.: 113 mg/dL (07 Dec 2023 07:30)    I&O's Summary    07 Dec 2023 07:01  -  08 Dec 2023 07:00  --------------------------------------------------------  IN: 480 mL / OUT: 3350 mL / NET: -2870 mL        Vital Signs Last 24 Hrs  T(C): 36.8 (08 Dec 2023 04:22), Max: 37.3 (07 Dec 2023 08:37)  T(F): 98.2 (08 Dec 2023 04:22), Max: 99.1 (07 Dec 2023 08:37)  HR: 85 (08 Dec 2023 04:22) (70 - 94)  BP: 143/64 (08 Dec 2023 04:22) (120/55 - 143/64)  BP(mean): 87 (07 Dec 2023 08:41) (87 - 87)  RR: 18 (08 Dec 2023 04:22) (16 - 18)  SpO2: 95% (08 Dec 2023 04:22) (93% - 100%)    Parameters below as of 08 Dec 2023 04:22  Patient On (Oxygen Delivery Method): nasal cannula  O2 Flow (L/min): 2      PHYSICAL EXAM:  GENERAL: NAD, well-developed, well-nourished  HEAD: Atraumatic, Normocephalic  EYES: Conjunctiva and sclera clear  CHEST/LUNG: Clear to auscultation bilaterally; No wheezes or crackles  HEART: Normal S1/S2; Regular rate and rhythm; No murmurs, rubs, or gallops  ABDOMEN: Soft, Nontender, Nondistended; Bowel sounds present  EXTREMITIES: No clubbing, cyanosis, or edema  PSYCH: A&Ox3      LABS:                        8.4    11.32 )-----------( 238      ( 08 Dec 2023 06:13 )             27.0      12-08    139  |  100  |  21  ----------------------------<  114<H>  4.0   |  29  |  2.40<H>    Ca    8.8      08 Dec 2023 06:14  Phos  2.3     12-08  Mg     1.9     12-08    TPro  6.4  /  Alb  3.7  /  TBili  0.2  /  DBili  x   /  AST  24  /  ALT  25  /  AlkPhos  51  12-08    PT/INR - ( 07 Dec 2023 05:03 )   PT: 10.5 sec;   INR: 1.00 ratio         PTT - ( 07 Dec 2023 05:03 )  PTT:28.8 sec      Urinalysis Basic - ( 08 Dec 2023 06:14 )    Color: x / Appearance: x / SG: x / pH: x  Gluc: 114 mg/dL / Ketone: x  / Bili: x / Urobili: x   Blood: x / Protein: x / Nitrite: x   Leuk Esterase: x / RBC: x / WBC x   Sq Epi: x / Non Sq Epi: x / Bacteria: x        RADIOLOGY & ADDITIONAL TESTS:     Patient is a 83y old  Female who presents with a chief complaint of LE swelling, dyspnea (07 Dec 2023 14:26)      SUBJECTIVE / OVERNIGHT EVENTS: NAEO. Pt denies chest pain, SOB, N/V, fever/chills, or changes in bowel movements.    MEDICATIONS  (STANDING):  albuterol    90 MICROgram(s) HFA Inhaler 2 Puff(s) Inhalation every 6 hours  albuterol/ipratropium for Nebulization 3 milliLiter(s) Nebulizer every 6 hours  amLODIPine   Tablet 10 milliGRAM(s) Oral daily  atorvastatin 80 milliGRAM(s) Oral at bedtime  budesonide 160 MICROgram(s)/formoterol 4.5 MICROgram(s) Inhaler 2 Puff(s) Inhalation two times a day  chlorhexidine 2% Cloths 1 Application(s) Topical daily  chlorhexidine 4% Liquid 1 Application(s) Topical <User Schedule>  dextrose 5%. 1000 milliLiter(s) (50 mL/Hr) IV Continuous <Continuous>  dextrose 5%. 1000 milliLiter(s) (100 mL/Hr) IV Continuous <Continuous>  dextrose 50% Injectable 25 Gram(s) IV Push once  dextrose 50% Injectable 12.5 Gram(s) IV Push once  dextrose 50% Injectable 25 Gram(s) IV Push once  epoetin sheryl (EPOGEN) Injectable 5000 Unit(s) IV Push <User Schedule>  ferrous    sulfate 325 milliGRAM(s) Oral daily  fluticasone propionate 50 MICROgram(s)/spray Nasal Spray 1 Spray(s) Both Nostrils two times a day  glucagon  Injectable 1 milliGRAM(s) IntraMuscular once  hydrALAZINE 25 milliGRAM(s) Oral three times a day  influenza  Vaccine (HIGH DOSE) 0.7 milliLiter(s) IntraMuscular once  insulin lispro (ADMELOG) corrective regimen sliding scale   SubCutaneous at bedtime  insulin lispro (ADMELOG) corrective regimen sliding scale   SubCutaneous three times a day before meals  montelukast 10 milliGRAM(s) Oral daily  mupirocin 2% Nasal 1 Application(s) Both Nostrils every 12 hours  nystatin Powder 1 Application(s) Topical two times a day  polyethylene glycol 3350 17 Gram(s) Oral daily  senna 2 Tablet(s) Oral at bedtime  sodium chloride 0.65% Nasal 1 Spray(s) Both Nostrils daily    MEDICATIONS  (PRN):  acetaminophen     Tablet .. 650 milliGRAM(s) Oral every 6 hours PRN Temp greater or equal to 38C (100.4F), Mild Pain (1 - 3)  dextrose Oral Gel 15 Gram(s) Oral once PRN Blood Glucose LESS THAN 70 milliGRAM(s)/deciliter  ondansetron Injectable 4 milliGRAM(s) IV Push once PRN Nausea and/or Vomiting  sodium chloride 0.9% lock flush 10 milliLiter(s) IV Push every 1 hour PRN Pre/post blood products, medications, blood draw, and to maintain line patency      CAPILLARY BLOOD GLUCOSE      POCT Blood Glucose.: 143 mg/dL (07 Dec 2023 22:06)  POCT Blood Glucose.: 115 mg/dL (07 Dec 2023 11:28)  POCT Blood Glucose.: 113 mg/dL (07 Dec 2023 07:30)    I&O's Summary    07 Dec 2023 07:01  -  08 Dec 2023 07:00  --------------------------------------------------------  IN: 480 mL / OUT: 3350 mL / NET: -2870 mL        Vital Signs Last 24 Hrs  T(C): 36.8 (08 Dec 2023 04:22), Max: 37.3 (07 Dec 2023 08:37)  T(F): 98.2 (08 Dec 2023 04:22), Max: 99.1 (07 Dec 2023 08:37)  HR: 85 (08 Dec 2023 04:22) (70 - 94)  BP: 143/64 (08 Dec 2023 04:22) (120/55 - 143/64)  BP(mean): 87 (07 Dec 2023 08:41) (87 - 87)  RR: 18 (08 Dec 2023 04:22) (16 - 18)  SpO2: 95% (08 Dec 2023 04:22) (93% - 100%)    Parameters below as of 08 Dec 2023 04:22  Patient On (Oxygen Delivery Method): nasal cannula  O2 Flow (L/min): 2      PHYSICAL EXAM:  GENERAL: NAD, well-developed, well-nourished  HEAD: Atraumatic, Normocephalic  EYES: Conjunctiva and sclera clear  CHEST/LUNG: Crackles b/l, improved from prior exam  HEART: Normal S1/S2; Regular rate and rhythm; No murmurs, rubs, or gallops  ABDOMEN: Soft, Nontender, Nondistended; Bowel sounds present  EXTREMITIES: 1+ pitting edema  PSYCH: A&Ox3      LABS:                        8.4    11.32 )-----------( 238      ( 08 Dec 2023 06:13 )             27.0      12-08    139  |  100  |  21  ----------------------------<  114<H>  4.0   |  29  |  2.40<H>    Ca    8.8      08 Dec 2023 06:14  Phos  2.3     12-08  Mg     1.9     12-08    TPro  6.4  /  Alb  3.7  /  TBili  0.2  /  DBili  x   /  AST  24  /  ALT  25  /  AlkPhos  51  12-08    PT/INR - ( 07 Dec 2023 05:03 )   PT: 10.5 sec;   INR: 1.00 ratio         PTT - ( 07 Dec 2023 05:03 )  PTT:28.8 sec      Urinalysis Basic - ( 08 Dec 2023 06:14 )    Color: x / Appearance: x / SG: x / pH: x  Gluc: 114 mg/dL / Ketone: x  / Bili: x / Urobili: x   Blood: x / Protein: x / Nitrite: x   Leuk Esterase: x / RBC: x / WBC x   Sq Epi: x / Non Sq Epi: x / Bacteria: x        RADIOLOGY & ADDITIONAL TESTS:

## 2023-12-08 NOTE — PROGRESS NOTE ADULT - PROBLEM SELECTOR PLAN 9
DVT PPx: heparin subq  Diet: DASH  Code: FULL  Dispo: medically active  Communication: discussed with  at bedside  Risk stratification for fistula placement  - RCRI of 2 points, Class III Risk -- 10.1% 30-day risk of death, MI, or cardiac arrest  - Pt reports being able to do 3-4 METS activities indicating moderate functional capacity; pt states she is able to walk up stairs, walks, dances, does chores at home

## 2023-12-08 NOTE — PROGRESS NOTE ADULT - PROBLEM SELECTOR PLAN 3
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  - reportedly has continued to have good urine output  - metabolic  - FeUrea <30.9%--> pre renal picture     Plan:  > follow up urine studies--> FeUrea >35%; prerenal picture   > f/u US Kidney/bladder--> showing parenchymal disease   > hold home ARB and MRA, avoid nephrotoxic meds  - appreciate cardio-renal recs: pending workup --> US showing medical renal disease  - phos binder d/c-ed due to low phosphorus  - hyperkalemia 2/2 kidney disease s/p lokelma and insulin + D50--> repeat BMP wnl  - s/p dialysis 12/5, 12/6, 12/7; consulted vascular surgery for fistula planning  - permacath placed by IR 12/7

## 2023-12-08 NOTE — PROGRESS NOTE ADULT - ATTENDING COMMENTS
83 year old female with PMH chronic hypoxic respiratory failure, DM2, CAD, HTN and HLD who presented with dyspnea and lower extremity edema admitted for acute decompensated congestive heart failure. TTE shows LV diastolic dysfunction but normal EF. She was started on IV diuretics however, her creatinine increased to nearly 7 and her GFR is less than 10 so HD was initiated. She is s/p tunneled HD catheter and AVF fistula next week for more permanent access. She has had 3 HD sessions with improvement in her respiratory status, continue HE per renal recs. Rest of plan as above. 83 year old female with PMH chronic hypoxic respiratory failure, DM2, CAD, HTN and HLD who presented with dyspnea and lower extremity edema admitted for acute decompensated congestive heart failure. TTE shows LV diastolic dysfunction but normal EF. She was started on IV diuretics however, her creatinine increased to nearly 7 and her GFR is less than 10 so HD was initiated. She is s/p tunneled HD catheter and AVF fistula next week for more permanent access. She has had 3 HD sessions with improvement in her respiratory status, continue HD per renal recs. Rest of plan as above.

## 2023-12-09 LAB
ALBUMIN SERPL ELPH-MCNC: 3.3 G/DL — SIGNIFICANT CHANGE UP (ref 3.3–5)
ALBUMIN SERPL ELPH-MCNC: 3.3 G/DL — SIGNIFICANT CHANGE UP (ref 3.3–5)
ALP SERPL-CCNC: 47 U/L — SIGNIFICANT CHANGE UP (ref 40–120)
ALP SERPL-CCNC: 47 U/L — SIGNIFICANT CHANGE UP (ref 40–120)
ALT FLD-CCNC: 22 U/L — SIGNIFICANT CHANGE UP (ref 10–45)
ALT FLD-CCNC: 22 U/L — SIGNIFICANT CHANGE UP (ref 10–45)
ANION GAP SERPL CALC-SCNC: 12 MMOL/L — SIGNIFICANT CHANGE UP (ref 5–17)
ANION GAP SERPL CALC-SCNC: 12 MMOL/L — SIGNIFICANT CHANGE UP (ref 5–17)
AST SERPL-CCNC: 17 U/L — SIGNIFICANT CHANGE UP (ref 10–40)
AST SERPL-CCNC: 17 U/L — SIGNIFICANT CHANGE UP (ref 10–40)
BILIRUB SERPL-MCNC: 0.3 MG/DL — SIGNIFICANT CHANGE UP (ref 0.2–1.2)
BILIRUB SERPL-MCNC: 0.3 MG/DL — SIGNIFICANT CHANGE UP (ref 0.2–1.2)
BUN SERPL-MCNC: 31 MG/DL — HIGH (ref 7–23)
BUN SERPL-MCNC: 31 MG/DL — HIGH (ref 7–23)
CALCIUM SERPL-MCNC: 8.6 MG/DL — SIGNIFICANT CHANGE UP (ref 8.4–10.5)
CALCIUM SERPL-MCNC: 8.6 MG/DL — SIGNIFICANT CHANGE UP (ref 8.4–10.5)
CHLORIDE SERPL-SCNC: 100 MMOL/L — SIGNIFICANT CHANGE UP (ref 96–108)
CHLORIDE SERPL-SCNC: 100 MMOL/L — SIGNIFICANT CHANGE UP (ref 96–108)
CO2 SERPL-SCNC: 26 MMOL/L — SIGNIFICANT CHANGE UP (ref 22–31)
CO2 SERPL-SCNC: 26 MMOL/L — SIGNIFICANT CHANGE UP (ref 22–31)
CREAT SERPL-MCNC: 3.62 MG/DL — HIGH (ref 0.5–1.3)
CREAT SERPL-MCNC: 3.62 MG/DL — HIGH (ref 0.5–1.3)
EGFR: 12 ML/MIN/1.73M2 — LOW
EGFR: 12 ML/MIN/1.73M2 — LOW
GLUCOSE BLDC GLUCOMTR-MCNC: 130 MG/DL — HIGH (ref 70–99)
GLUCOSE BLDC GLUCOMTR-MCNC: 130 MG/DL — HIGH (ref 70–99)
GLUCOSE BLDC GLUCOMTR-MCNC: 143 MG/DL — HIGH (ref 70–99)
GLUCOSE BLDC GLUCOMTR-MCNC: 143 MG/DL — HIGH (ref 70–99)
GLUCOSE BLDC GLUCOMTR-MCNC: 188 MG/DL — HIGH (ref 70–99)
GLUCOSE BLDC GLUCOMTR-MCNC: 188 MG/DL — HIGH (ref 70–99)
GLUCOSE BLDC GLUCOMTR-MCNC: 196 MG/DL — HIGH (ref 70–99)
GLUCOSE BLDC GLUCOMTR-MCNC: 196 MG/DL — HIGH (ref 70–99)
GLUCOSE SERPL-MCNC: 104 MG/DL — HIGH (ref 70–99)
GLUCOSE SERPL-MCNC: 104 MG/DL — HIGH (ref 70–99)
HCT VFR BLD CALC: 26.1 % — LOW (ref 34.5–45)
HCT VFR BLD CALC: 26.1 % — LOW (ref 34.5–45)
HGB BLD-MCNC: 8 G/DL — LOW (ref 11.5–15.5)
HGB BLD-MCNC: 8 G/DL — LOW (ref 11.5–15.5)
MAGNESIUM SERPL-MCNC: 1.9 MG/DL — SIGNIFICANT CHANGE UP (ref 1.6–2.6)
MAGNESIUM SERPL-MCNC: 1.9 MG/DL — SIGNIFICANT CHANGE UP (ref 1.6–2.6)
MCHC RBC-ENTMCNC: 28.7 PG — SIGNIFICANT CHANGE UP (ref 27–34)
MCHC RBC-ENTMCNC: 28.7 PG — SIGNIFICANT CHANGE UP (ref 27–34)
MCHC RBC-ENTMCNC: 30.7 GM/DL — LOW (ref 32–36)
MCHC RBC-ENTMCNC: 30.7 GM/DL — LOW (ref 32–36)
MCV RBC AUTO: 93.5 FL — SIGNIFICANT CHANGE UP (ref 80–100)
MCV RBC AUTO: 93.5 FL — SIGNIFICANT CHANGE UP (ref 80–100)
NRBC # BLD: 0 /100 WBCS — SIGNIFICANT CHANGE UP (ref 0–0)
NRBC # BLD: 0 /100 WBCS — SIGNIFICANT CHANGE UP (ref 0–0)
PHOSPHATE SERPL-MCNC: 3 MG/DL — SIGNIFICANT CHANGE UP (ref 2.5–4.5)
PHOSPHATE SERPL-MCNC: 3 MG/DL — SIGNIFICANT CHANGE UP (ref 2.5–4.5)
PLATELET # BLD AUTO: 269 K/UL — SIGNIFICANT CHANGE UP (ref 150–400)
PLATELET # BLD AUTO: 269 K/UL — SIGNIFICANT CHANGE UP (ref 150–400)
POTASSIUM SERPL-MCNC: 3.7 MMOL/L — SIGNIFICANT CHANGE UP (ref 3.5–5.3)
POTASSIUM SERPL-MCNC: 3.7 MMOL/L — SIGNIFICANT CHANGE UP (ref 3.5–5.3)
POTASSIUM SERPL-SCNC: 3.7 MMOL/L — SIGNIFICANT CHANGE UP (ref 3.5–5.3)
POTASSIUM SERPL-SCNC: 3.7 MMOL/L — SIGNIFICANT CHANGE UP (ref 3.5–5.3)
PROT SERPL-MCNC: 6 G/DL — SIGNIFICANT CHANGE UP (ref 6–8.3)
PROT SERPL-MCNC: 6 G/DL — SIGNIFICANT CHANGE UP (ref 6–8.3)
RBC # BLD: 2.79 M/UL — LOW (ref 3.8–5.2)
RBC # BLD: 2.79 M/UL — LOW (ref 3.8–5.2)
RBC # FLD: 14.6 % — HIGH (ref 10.3–14.5)
RBC # FLD: 14.6 % — HIGH (ref 10.3–14.5)
SODIUM SERPL-SCNC: 138 MMOL/L — SIGNIFICANT CHANGE UP (ref 135–145)
SODIUM SERPL-SCNC: 138 MMOL/L — SIGNIFICANT CHANGE UP (ref 135–145)
WBC # BLD: 11.65 K/UL — HIGH (ref 3.8–10.5)
WBC # BLD: 11.65 K/UL — HIGH (ref 3.8–10.5)
WBC # FLD AUTO: 11.65 K/UL — HIGH (ref 3.8–10.5)
WBC # FLD AUTO: 11.65 K/UL — HIGH (ref 3.8–10.5)

## 2023-12-09 PROCEDURE — 99233 SBSQ HOSP IP/OBS HIGH 50: CPT | Mod: GC

## 2023-12-09 PROCEDURE — 99232 SBSQ HOSP IP/OBS MODERATE 35: CPT | Mod: GC

## 2023-12-09 RX ADMIN — BUDESONIDE AND FORMOTEROL FUMARATE DIHYDRATE 2 PUFF(S): 160; 4.5 AEROSOL RESPIRATORY (INHALATION) at 08:34

## 2023-12-09 RX ADMIN — MUPIROCIN 1 APPLICATION(S): 20 OINTMENT TOPICAL at 17:24

## 2023-12-09 RX ADMIN — Medication 3 MILLILITER(S): at 11:11

## 2023-12-09 RX ADMIN — SENNA PLUS 2 TABLET(S): 8.6 TABLET ORAL at 22:21

## 2023-12-09 RX ADMIN — MUPIROCIN 1 APPLICATION(S): 20 OINTMENT TOPICAL at 05:25

## 2023-12-09 RX ADMIN — Medication 3 MILLILITER(S): at 22:23

## 2023-12-09 RX ADMIN — CHLORHEXIDINE GLUCONATE 1 APPLICATION(S): 213 SOLUTION TOPICAL at 11:15

## 2023-12-09 RX ADMIN — Medication 1: at 11:48

## 2023-12-09 RX ADMIN — Medication 3 MILLILITER(S): at 05:25

## 2023-12-09 RX ADMIN — Medication 25 MILLIGRAM(S): at 22:21

## 2023-12-09 RX ADMIN — NYSTATIN CREAM 1 APPLICATION(S): 100000 CREAM TOPICAL at 05:26

## 2023-12-09 RX ADMIN — BUDESONIDE AND FORMOTEROL FUMARATE DIHYDRATE 2 PUFF(S): 160; 4.5 AEROSOL RESPIRATORY (INHALATION) at 22:22

## 2023-12-09 RX ADMIN — NYSTATIN CREAM 1 APPLICATION(S): 100000 CREAM TOPICAL at 17:24

## 2023-12-09 RX ADMIN — Medication 1 SPRAY(S): at 05:26

## 2023-12-09 RX ADMIN — Medication 1 SPRAY(S): at 17:23

## 2023-12-09 RX ADMIN — Medication 25 MILLIGRAM(S): at 05:25

## 2023-12-09 RX ADMIN — Medication 3 MILLILITER(S): at 17:24

## 2023-12-09 RX ADMIN — Medication 1: at 17:23

## 2023-12-09 RX ADMIN — Medication 1 SPRAY(S): at 11:11

## 2023-12-09 RX ADMIN — Medication 325 MILLIGRAM(S): at 11:11

## 2023-12-09 RX ADMIN — AMLODIPINE BESYLATE 10 MILLIGRAM(S): 2.5 TABLET ORAL at 05:25

## 2023-12-09 RX ADMIN — MONTELUKAST 10 MILLIGRAM(S): 4 TABLET, CHEWABLE ORAL at 11:11

## 2023-12-09 RX ADMIN — CHLORHEXIDINE GLUCONATE 1 APPLICATION(S): 213 SOLUTION TOPICAL at 05:26

## 2023-12-09 RX ADMIN — ERYTHROPOIETIN 5000 UNIT(S): 10000 INJECTION, SOLUTION INTRAVENOUS; SUBCUTANEOUS at 12:45

## 2023-12-09 RX ADMIN — ATORVASTATIN CALCIUM 80 MILLIGRAM(S): 80 TABLET, FILM COATED ORAL at 22:21

## 2023-12-09 NOTE — PROGRESS NOTE ADULT - SUBJECTIVE AND OBJECTIVE BOX
Patient is a 83y old  Female who presents with a chief complaint of LE swelling, dyspnea (09 Dec 2023 13:16)      INTERVAL HISTORY: feels ok    TELEMETRY Personally reviewed: [facility]     REVIEW OF SYSTEMS:   CONSTITUTIONAL: No weakness  EYES/ENT: No visual changes; No throat pain  Neck: No pain or stiffness  Respiratory: No cough, wheezing, No shortness of breath  CARDIOVASCULAR: no chest pain or palpitations  GASTROINTESTINAL: No abdominal pain, no nausea, vomiting or hematemesis  GENITOURINARY: No dysuria, frequency or hematuria  NEUROLOGICAL: No stroke like symptoms  SKIN: No rashes    	  MEDICATIONS:  amLODIPine   Tablet 10 milliGRAM(s) Oral daily  hydrALAZINE 25 milliGRAM(s) Oral three times a day        PHYSICAL EXAM:  T(C): 36.8 (12-09-23 @ 15:52), Max: 37 (12-08-23 @ 20:51)  HR: 98 (12-09-23 @ 15:52) (76 - 98)  BP: 128/61 (12-09-23 @ 15:52) (113/54 - 139/66)  RR: 18 (12-09-23 @ 15:52) (18 - 19)  SpO2: 96% (12-09-23 @ 15:52) (92% - 98%)  Wt(kg): --  I&O's Summary    08 Dec 2023 07:01  -  09 Dec 2023 07:00  --------------------------------------------------------  IN: 660 mL / OUT: 700 mL / NET: -40 mL    09 Dec 2023 07:01  -  09 Dec 2023 17:46  --------------------------------------------------------  IN: 0 mL / OUT: 2000 mL / NET: -2000 mL          Appearance: In no distress	  HEENT:    PERRL, EOMI	  Cardiovascular:  S1 S2, No JVD  Respiratory: Lungs clear to auscultation	  Gastrointestinal:  Soft, Non-tender, + BS	  Vascularature:  No edema of LE  Psychiatric: Appropriate affect   Neuro: no acute focal deficits                               8.0    11.65 )-----------( 269      ( 09 Dec 2023 06:10 )             26.1     12-09    138  |  100  |  31<H>  ----------------------------<  104<H>  3.7   |  26  |  3.62<H>    Ca    8.6      09 Dec 2023 06:10  Phos  3.0     12-09  Mg     1.9     12-09    TPro  6.0  /  Alb  3.3  /  TBili  0.3  /  DBili  x   /  AST  17  /  ALT  22  /  AlkPhos  47  12-09    Labs personally reviewed    ASSESSMENT/PLAN: 	  83 year old female with HTN, HLD, CAD, T2DM, CKD, remote hx of breast CA s/p mastectomy, and chronic hypoxemic respiratory failure of unknown etiology on home O2 who presents for lower extremity swelling and shortness of breath.      1. Acute diastolic heart failure  - Pt with LE edema and pulmonary congestion  - s/p IV diuresis now DC'd d/t increasing Cr which is now improving. HD intiated.   - Echo shows normal LV systolic function with Bi-atrial enlargement, G2DD and LVH  - EKG shows sinus rhythm    - volume removal with HD    2. ESRD  - Renal on board  - HD as per renal    3. HLD  - Statin therapy    4. HTN - stable  - BP well controlled on Hydral 25mg PO TID and Amlodipine 10mg PO daily    5. Preop Risk Stratification - planned for AVF  - Would ideally defer until post several HD sessions and euvolemic   - Mod risk for low risk AVF, no contraindication to proceed               12/10- 12/13 Will be covered by Dr. Ramiro Jordan, he can be reached at [phone]     Dr. Ash can be reached via teams today, either text or call.         Radha Ash DO [facility]  Cardiovascular Medicine  [address], [address]  Office: [phone]   Patient is a 83y old  Female who presents with a chief complaint of LE swelling, dyspnea (09 Dec 2023 13:16)      INTERVAL HISTORY: feels ok    TELEMETRY Personally reviewed: [facility]     REVIEW OF SYSTEMS:   CONSTITUTIONAL: No weakness  EYES/ENT: No visual changes; No throat pain  Neck: No pain or stiffness  Respiratory: No cough, wheezing, No shortness of breath  CARDIOVASCULAR: no chest pain or palpitations  GASTROINTESTINAL: No abdominal pain, no nausea, vomiting or hematemesis  GENITOURINARY: No dysuria, frequency or hematuria  NEUROLOGICAL: No stroke like symptoms  SKIN: No rashes    	  MEDICATIONS:  amLODIPine   Tablet 10 milliGRAM(s) Oral daily  hydrALAZINE 25 milliGRAM(s) Oral three times a day        PHYSICAL EXAM:  T(C): 36.8 (12-09-23 @ 15:52), Max: 37 (12-08-23 @ 20:51)  HR: 98 (12-09-23 @ 15:52) (76 - 98)  BP: 128/61 (12-09-23 @ 15:52) (113/54 - 139/66)  RR: 18 (12-09-23 @ 15:52) (18 - 19)  SpO2: 96% (12-09-23 @ 15:52) (92% - 98%)  Wt(kg): --  I&O's Summary    08 Dec 2023 07:01  -  09 Dec 2023 07:00  --------------------------------------------------------  IN: 660 mL / OUT: 700 mL / NET: -40 mL    09 Dec 2023 07:01  -  09 Dec 2023 17:46  --------------------------------------------------------  IN: 0 mL / OUT: 2000 mL / NET: -2000 mL          Appearance: In no distress	  HEENT:    PERRL, EOMI	  Cardiovascular:  S1 S2, No JVD  Respiratory: Lungs clear to auscultation	  Gastrointestinal:  Soft, Non-tender, + BS	  Vascularature:  No edema of LE  Psychiatric: Appropriate affect   Neuro: no acute focal deficits                               8.0    11.65 )-----------( 269      ( 09 Dec 2023 06:10 )             26.1     12-09    138  |  100  |  31<H>  ----------------------------<  104<H>  3.7   |  26  |  3.62<H>    Ca    8.6      09 Dec 2023 06:10  Phos  3.0     12-09  Mg     1.9     12-09    TPro  6.0  /  Alb  3.3  /  TBili  0.3  /  DBili  x   /  AST  17  /  ALT  22  /  AlkPhos  47  12-09    Labs personally reviewed    ASSESSMENT/PLAN: 	  83 year old female with HTN, HLD, CAD, T2DM, CKD, remote hx of breast CA s/p mastectomy, and chronic hypoxemic respiratory failure of unknown etiology on home O2 who presents for lower extremity swelling and shortness of breath.      1. Acute diastolic heart failure  - Pt with LE edema and pulmonary congestion  - s/p IV diuresis now DC'd d/t increasing Cr which is now improving. HD intiated.   - Echo shows normal LV systolic function with Bi-atrial enlargement, G2DD and LVH  - EKG shows sinus rhythm    - volume removal with HD    2. ESRD  - Renal on board  - HD as per renal    3. HLD  - Statin therapy    4. HTN - stable  - BP well controlled on Hydral 25mg PO TID and Amlodipine 10mg PO daily    5. Preop Risk Stratification - planned for AVF  - Would ideally defer until post several HD sessions and euvolemic   - Mod risk for low risk AVF, no contraindication to proceed               12/10- 12/13 Will be covered by Dr. Ramiro Jordan, he can be reached at [phone]     Dr. Ash can be reached via teams today, either text or call.         Radha Ash DO [facility]  Cardiovascular Medicine  [address], [address]  Office: [phone]   Patient is a 83y old  Female who presents with a chief complaint of LE swelling, dyspnea (09 Dec 2023 13:16)      INTERVAL HISTORY: feels ok    TELEMETRY Personally reviewed: [facility]     REVIEW OF SYSTEMS:   CONSTITUTIONAL: No weakness  EYES/ENT: No visual changes; No throat pain  Neck: No pain or stiffness  Respiratory: No cough, wheezing, No shortness of breath  CARDIOVASCULAR: no chest pain or palpitations  GASTROINTESTINAL: No abdominal pain, no nausea, vomiting or hematemesis  GENITOURINARY: No dysuria, frequency or hematuria  NEUROLOGICAL: No stroke like symptoms  SKIN: No rashes    	  MEDICATIONS:  amLODIPine   Tablet 10 milliGRAM(s) Oral daily  hydrALAZINE 25 milliGRAM(s) Oral three times a day        PHYSICAL EXAM:  T(C): 36.8 (12-09-23 @ 15:52), Max: 37 (12-08-23 @ 20:51)  HR: 98 (12-09-23 @ 15:52) (76 - 98)  BP: 128/61 (12-09-23 @ 15:52) (113/54 - 139/66)  RR: 18 (12-09-23 @ 15:52) (18 - 19)  SpO2: 96% (12-09-23 @ 15:52) (92% - 98%)  Wt(kg): --  I&O's Summary    08 Dec 2023 07:01  -  09 Dec 2023 07:00  --------------------------------------------------------  IN: 660 mL / OUT: 700 mL / NET: -40 mL    09 Dec 2023 07:01  -  09 Dec 2023 17:46  --------------------------------------------------------  IN: 0 mL / OUT: 2000 mL / NET: -2000 mL          Appearance: In no distress	  HEENT:    PERRL, EOMI	  Cardiovascular:  S1 S2, No JVD  Respiratory: Lungs clear to auscultation	  Gastrointestinal:  Soft, Non-tender, + BS	  Vascularature:  No edema of LE  Psychiatric: Appropriate affect   Neuro: no acute focal deficits                               8.0    11.65 )-----------( 269      ( 09 Dec 2023 06:10 )             26.1     12-09    138  |  100  |  31<H>  ----------------------------<  104<H>  3.7   |  26  |  3.62<H>    Ca    8.6      09 Dec 2023 06:10  Phos  3.0     12-09  Mg     1.9     12-09    TPro  6.0  /  Alb  3.3  /  TBili  0.3  /  DBili  x   /  AST  17  /  ALT  22  /  AlkPhos  47  12-09    Labs personally reviewed    ASSESSMENT/PLAN: 	  83 year old female with HTN, HLD, CAD, T2DM, CKD, remote hx of breast CA s/p mastectomy, and chronic hypoxemic respiratory failure of unknown etiology on home O2 who presents for lower extremity swelling and shortness of breath.      1. Acute diastolic heart failure  - Pt with LE edema and pulmonary congestion  - s/p IV diuresis now DC'd d/t increasing Cr which is now improving. HD intiated.   - Echo shows normal LV systolic function with Bi-atrial enlargement, G2DD and LVH  - EKG shows sinus rhythm    - volume removal with HD    2. ESRD  - Renal on board  - HD as per renal    3. HLD  - Statin therapy    4. HTN - stable  - BP well controlled on Hydral 25mg PO TID and Amlodipine 10mg PO daily    5. Preop Risk Stratification - planned for AVF  - Would ideally defer until post several HD sessions and euvolemic   - Mod risk for low risk AVF, no contraindication to proceed               12/10- 12/13 Will be covered by Dr. Ramiro Guerra, he can be reached at [phone]            Radha Ash DO [facility]  Cardiovascular Medicine  [address], [address]  Office: [phone]   Patient is a 83y old  Female who presents with a chief complaint of LE swelling, dyspnea (09 Dec 2023 13:16)      INTERVAL HISTORY: feels ok    TELEMETRY Personally reviewed: [facility]     REVIEW OF SYSTEMS:   CONSTITUTIONAL: No weakness  EYES/ENT: No visual changes; No throat pain  Neck: No pain or stiffness  Respiratory: No cough, wheezing, No shortness of breath  CARDIOVASCULAR: no chest pain or palpitations  GASTROINTESTINAL: No abdominal pain, no nausea, vomiting or hematemesis  GENITOURINARY: No dysuria, frequency or hematuria  NEUROLOGICAL: No stroke like symptoms  SKIN: No rashes    	  MEDICATIONS:  amLODIPine   Tablet 10 milliGRAM(s) Oral daily  hydrALAZINE 25 milliGRAM(s) Oral three times a day        PHYSICAL EXAM:  T(C): 36.8 (12-09-23 @ 15:52), Max: 37 (12-08-23 @ 20:51)  HR: 98 (12-09-23 @ 15:52) (76 - 98)  BP: 128/61 (12-09-23 @ 15:52) (113/54 - 139/66)  RR: 18 (12-09-23 @ 15:52) (18 - 19)  SpO2: 96% (12-09-23 @ 15:52) (92% - 98%)  Wt(kg): --  I&O's Summary    08 Dec 2023 07:01  -  09 Dec 2023 07:00  --------------------------------------------------------  IN: 660 mL / OUT: 700 mL / NET: -40 mL    09 Dec 2023 07:01  -  09 Dec 2023 17:46  --------------------------------------------------------  IN: 0 mL / OUT: 2000 mL / NET: -2000 mL          Appearance: In no distress	  HEENT:    PERRL, EOMI	  Cardiovascular:  S1 S2, No JVD  Respiratory: Lungs clear to auscultation	  Gastrointestinal:  Soft, Non-tender, + BS	  Vascularature:  No edema of LE  Psychiatric: Appropriate affect   Neuro: no acute focal deficits                               8.0    11.65 )-----------( 269      ( 09 Dec 2023 06:10 )             26.1     12-09    138  |  100  |  31<H>  ----------------------------<  104<H>  3.7   |  26  |  3.62<H>    Ca    8.6      09 Dec 2023 06:10  Phos  3.0     12-09  Mg     1.9     12-09    TPro  6.0  /  Alb  3.3  /  TBili  0.3  /  DBili  x   /  AST  17  /  ALT  22  /  AlkPhos  47  12-09    Labs personally reviewed    ASSESSMENT/PLAN: 	  83 year old female with HTN, HLD, CAD, T2DM, CKD, remote hx of breast CA s/p mastectomy, and chronic hypoxemic respiratory failure of unknown etiology on home O2 who presents for lower extremity swelling and shortness of breath.      1. Acute diastolic heart failure  - Pt with LE edema and pulmonary congestion  - s/p IV diuresis now DC'd d/t increasing Cr which is now improving. HD intiated.   - Echo shows normal LV systolic function with Bi-atrial enlargement, G2DD and LVH  - EKG shows sinus rhythm    - volume removal with HD    2. ESRD  - Renal on board  - HD as per renal    3. HLD  - Statin therapy    4. HTN - stable  - BP well controlled on Hydral 25mg PO TID and Amlodipine 10mg PO daily    5. Preop Risk Stratification - planned for AVF  - Would ideally defer until post several HD sessions and euvolemic   - Mod risk for low risk AVF, no contraindication to proceed               12/10- 12/13 Will be covered by Dr. Ramiro Guerra, he can be reached at [phone]            Radha Ash DO [facility]  Cardiovascular Medicine  [address], [address]  Office: [phone]

## 2023-12-09 NOTE — PROGRESS NOTE ADULT - PROBLEM SELECTOR PLAN 1
Patient presenting for worsening LE edema and breathlessness  - also with several symptoms to suggest likely ADHF  - on exam: extremities warm and well perfused, but volume overloaded, S3 on exam  - proBNP elevated to 1300, but unknown baseline and with likely CAL on CKD  - hsTropT flat at 74, ECG without ST changes, no chest pain  - pt does not carry a diagnosis of HF, however on meds that may suggest she may have HFpEF (MRA, SGLT2i)    Plan:  > consider diuretic daily at expense of renal failure   > obtain TTE--> showing EF 71%, bi-atrial enlargement, LV diastolic dysfunction, and RV enlargement  > hold home spironolactone and dapagliflozin given CAL  > strict I/Os, daily standing weights  > appreciate cards recs  > will need to determine DC med rec based on GFR Patient presenting for worsening LE edema and breathlessness  - also with several symptoms to suggest likely ADHF  - on exam: extremities warm and well perfused, but volume overloaded, S3 on exam  - proBNP elevated to 1300, but unknown baseline and with likely CAL on CKD  - hsTropT flat at 74, ECG without ST changes, no chest pain  - pt does not carry a diagnosis of HF, however on meds that may suggest she may have HFpEF (MRA, SGLT2i)    Plan:  > TTE--> showing EF 71%, bi-atrial enlargement, LV diastolic dysfunction, and RV enlargement  > hold home spironolactone and dapagliflozin given CAL  > strict I/Os, daily standing weights  > appreciate cards recs  > will need to determine DC med rec based on GFR

## 2023-12-09 NOTE — PROGRESS NOTE ADULT - PROBLEM SELECTOR PLAN 3
Reviewed PTH, phos calcium and vitamin D levels.  Phos in range         Aissatou Villar MD  O: [phone]  Contact me on teams Reviewed PTH, phos calcium and vitamin D levels.  Phos in range         Aissatou Villar MD  O: [phone]  Contact me on teams

## 2023-12-09 NOTE — PROGRESS NOTE ADULT - PROBLEM SELECTOR PLAN 4
Patient with several year history of chronic hypoxemic respiratory failure  - requiring home O2, stable on 2L at baseline  - has not had escalating O2 requirements recently, despite feeling subjectively more dyspneic  - unclear etiology, though patient has had PFTs at her pulmologist's office last year    Plan:  > follow up TTE--> showing EF 71%, bi-atrial enlargement, LV diastolic dysfunction, and RV enlargement  > obtain records from pt's pulmonologists office: Dr. Paulino Gayle ([facility], [phone])  > continue with home montelukast and add on duonebs and symbicort   > now on baseline of 2L O2  > s/p prednisone for possible COPD exacerbation   - f/u repeat CXR and BNP (stable CXR and elevated BNP)  - appreciate pulm recs: started on flonase and saline spray Patient with several year history of chronic hypoxemic respiratory failure  - requiring home O2, stable on 2L at baseline  - has not had escalating O2 requirements recently, despite feeling subjectively more dyspneic  - unclear etiology, though patient has had PFTs at her pulmologist's office last year    Plan:  > follow up TTE--> showing EF 71%, bi-atrial enlargement, LV diastolic dysfunction, and RV enlargement  > obtain records from pt's pulmonologists office: Dr. Paulino Gayle ([facility], [phone])  > continue with home montelukast and add on duonebs and symbicort   > now on baseline of 2L O2  > s/p prednisone for possible COPD exacerbation   - f/u repeat CXR and BNP (stable CXR and elevated BNP)  - appreciate pulm recs: started on flonase and saline spray

## 2023-12-09 NOTE — PROGRESS NOTE ADULT - PROBLEM SELECTOR PLAN 9
DVT PPx: heparin subq  Diet: DASH  Code: FULL  Dispo: medically active  Communication: discussed with  at bedside  Risk stratification for fistula placement  - RCRI of 2 points, Class III Risk -- 10.1% 30-day risk of death, MI, or cardiac arrest  - Pt reports being able to do 3-4 METS activities indicating moderate functional capacity; pt states she is able to walk up stairs, walks, dances, does chores at home DVT PPx: heparin subq  Diet: DASH  Code: FULL  Dispo: medically active  Communication: discussed with  at bedside  Risk stratification for fistula placement  - RCRI of 2 points, Class III Risk -- 10.1% 30-day risk of death, MI, or cardiac arrest  - Pt reports being able to do 3-4 METS activities indicating moderate functional capacity; pt states she is able to walk up stairs, walks, dances, does chores at home  - no contraindication from medical standpoint for AVF placement

## 2023-12-09 NOTE — PROGRESS NOTE ADULT - PROBLEM SELECTOR PLAN 1
CAL on CKD versus  2/2 SAUMYA vs CKD progression. started dialysis on 12/5/2023.    HD today  UF with HD

## 2023-12-09 NOTE — PROGRESS NOTE ADULT - PROBLEM SELECTOR PLAN 2
- Hb downtrending during admission  - unclear baseline Hb  - likely due to worsening renal function  - f/u ferritin and iron studies (wnl) - Hb downtrending during admission  - unclear baseline Hb  - likely due to worsening renal function  - ferritin and iron studies (wnl)

## 2023-12-09 NOTE — PROGRESS NOTE ADULT - ATTENDING COMMENTS
83 year old female with PMH chronic hypoxic respiratory failure, DM2, CAD, HTN and HLD who presented with dyspnea and lower extremity edema admitted for acute decompensated congestive heart failure. TTE shows LV diastolic dysfunction but normal EF. She was started on IV diuretics however, her creatinine increased to nearly 7 and her GFR is less than 10 so HD was initiated. She is s/p tunneled HD catheter and AVF fistula next week for more permanent access. Her respiratory status has continued to improve after her HD sessions. Rest of plan as above.

## 2023-12-09 NOTE — PROGRESS NOTE ADULT - SUBJECTIVE AND OBJECTIVE BOX
Patient is a 83y old  Female who presents with a chief complaint of LE swelling, dyspnea (08 Dec 2023 14:18)      SUBJECTIVE / OVERNIGHT EVENTS: NAEO. Pt denies chest pain, SOB, N/V, fever/chills, or changes in bowel movements.    MEDICATIONS  (STANDING):  albuterol    90 MICROgram(s) HFA Inhaler 2 Puff(s) Inhalation every 6 hours  albuterol/ipratropium for Nebulization 3 milliLiter(s) Nebulizer every 6 hours  amLODIPine   Tablet 10 milliGRAM(s) Oral daily  atorvastatin 80 milliGRAM(s) Oral at bedtime  budesonide 160 MICROgram(s)/formoterol 4.5 MICROgram(s) Inhaler 2 Puff(s) Inhalation two times a day  chlorhexidine 2% Cloths 1 Application(s) Topical daily  chlorhexidine 4% Liquid 1 Application(s) Topical <User Schedule>  dextrose 5%. 1000 milliLiter(s) (50 mL/Hr) IV Continuous <Continuous>  dextrose 5%. 1000 milliLiter(s) (100 mL/Hr) IV Continuous <Continuous>  dextrose 50% Injectable 25 Gram(s) IV Push once  dextrose 50% Injectable 25 Gram(s) IV Push once  dextrose 50% Injectable 12.5 Gram(s) IV Push once  epoetin sheryl (EPOGEN) Injectable 5000 Unit(s) IV Push <User Schedule>  ferrous    sulfate 325 milliGRAM(s) Oral daily  fluticasone propionate 50 MICROgram(s)/spray Nasal Spray 1 Spray(s) Both Nostrils two times a day  glucagon  Injectable 1 milliGRAM(s) IntraMuscular once  hydrALAZINE 25 milliGRAM(s) Oral three times a day  influenza  Vaccine (HIGH DOSE) 0.7 milliLiter(s) IntraMuscular once  insulin lispro (ADMELOG) corrective regimen sliding scale   SubCutaneous at bedtime  insulin lispro (ADMELOG) corrective regimen sliding scale   SubCutaneous three times a day before meals  montelukast 10 milliGRAM(s) Oral daily  mupirocin 2% Nasal 1 Application(s) Both Nostrils every 12 hours  nystatin Powder 1 Application(s) Topical two times a day  polyethylene glycol 3350 17 Gram(s) Oral daily  senna 2 Tablet(s) Oral at bedtime  sodium chloride 0.65% Nasal 1 Spray(s) Both Nostrils daily    MEDICATIONS  (PRN):  acetaminophen     Tablet .. 650 milliGRAM(s) Oral every 6 hours PRN Temp greater or equal to 38C (100.4F), Mild Pain (1 - 3)  dextrose Oral Gel 15 Gram(s) Oral once PRN Blood Glucose LESS THAN 70 milliGRAM(s)/deciliter  ondansetron Injectable 4 milliGRAM(s) IV Push once PRN Nausea and/or Vomiting  sodium chloride 0.9% lock flush 10 milliLiter(s) IV Push every 1 hour PRN Pre/post blood products, medications, blood draw, and to maintain line patency      CAPILLARY BLOOD GLUCOSE      POCT Blood Glucose.: 130 mg/dL (09 Dec 2023 07:59)  POCT Blood Glucose.: 155 mg/dL (08 Dec 2023 21:45)  POCT Blood Glucose.: 115 mg/dL (08 Dec 2023 17:24)  POCT Blood Glucose.: 237 mg/dL (08 Dec 2023 12:36)  POCT Blood Glucose.: 122 mg/dL (08 Dec 2023 08:37)    I&O's Summary    08 Dec 2023 07:01  -  09 Dec 2023 07:00  --------------------------------------------------------  IN: 660 mL / OUT: 700 mL / NET: -40 mL        Vital Signs Last 24 Hrs  T(C): 36.7 (09 Dec 2023 04:52), Max: 37 (08 Dec 2023 20:51)  T(F): 98 (09 Dec 2023 04:52), Max: 98.6 (08 Dec 2023 20:51)  HR: 76 (09 Dec 2023 04:52) (76 - 96)  BP: 121/56 (09 Dec 2023 04:52) (113/54 - 121/56)  BP(mean): --  RR: 18 (09 Dec 2023 04:52) (18 - 20)  SpO2: 97% (09 Dec 2023 04:52) (95% - 99%)    Parameters below as of 09 Dec 2023 04:52  Patient On (Oxygen Delivery Method): nasal cannula        PHYSICAL EXAM:  GENERAL: NAD, well-developed, well-nourished  HEAD: Atraumatic, Normocephalic  EYES: Conjunctiva and sclera clear  CHEST/LUNG: Clear to auscultation bilaterally; No wheezes or crackles  HEART: Normal S1/S2; Regular rate and rhythm; No murmurs, rubs, or gallops  ABDOMEN: Soft, Nontender, Nondistended; Bowel sounds present  EXTREMITIES: No clubbing, cyanosis, or edema  PSYCH: A&Ox3      LABS:                        8.0    11.65 )-----------( 269      ( 09 Dec 2023 06:10 )             26.1      12-09    138  |  100  |  31<H>  ----------------------------<  104<H>  3.7   |  26  |  3.62<H>    Ca    8.6      09 Dec 2023 06:10  Phos  3.0     12-09  Mg     1.9     12-09    TPro  6.0  /  Alb  3.3  /  TBili  0.3  /  DBili  x   /  AST  17  /  ALT  22  /  AlkPhos  47  12-09          Urinalysis Basic - ( 09 Dec 2023 06:10 )    Color: x / Appearance: x / SG: x / pH: x  Gluc: 104 mg/dL / Ketone: x  / Bili: x / Urobili: x   Blood: x / Protein: x / Nitrite: x   Leuk Esterase: x / RBC: x / WBC x   Sq Epi: x / Non Sq Epi: x / Bacteria: x        RADIOLOGY & ADDITIONAL TESTS:     Patient is a 83y old  Female who presents with a chief complaint of LE swelling, dyspnea (08 Dec 2023 14:18)      SUBJECTIVE / OVERNIGHT EVENTS: NAEO. Pt denies chest pain, SOB, N/V, fever/chills, or changes in bowel movements.    MEDICATIONS  (STANDING):  albuterol    90 MICROgram(s) HFA Inhaler 2 Puff(s) Inhalation every 6 hours  albuterol/ipratropium for Nebulization 3 milliLiter(s) Nebulizer every 6 hours  amLODIPine   Tablet 10 milliGRAM(s) Oral daily  atorvastatin 80 milliGRAM(s) Oral at bedtime  budesonide 160 MICROgram(s)/formoterol 4.5 MICROgram(s) Inhaler 2 Puff(s) Inhalation two times a day  chlorhexidine 2% Cloths 1 Application(s) Topical daily  chlorhexidine 4% Liquid 1 Application(s) Topical <User Schedule>  dextrose 5%. 1000 milliLiter(s) (50 mL/Hr) IV Continuous <Continuous>  dextrose 5%. 1000 milliLiter(s) (100 mL/Hr) IV Continuous <Continuous>  dextrose 50% Injectable 25 Gram(s) IV Push once  dextrose 50% Injectable 25 Gram(s) IV Push once  dextrose 50% Injectable 12.5 Gram(s) IV Push once  epoetin sheryl (EPOGEN) Injectable 5000 Unit(s) IV Push <User Schedule>  ferrous    sulfate 325 milliGRAM(s) Oral daily  fluticasone propionate 50 MICROgram(s)/spray Nasal Spray 1 Spray(s) Both Nostrils two times a day  glucagon  Injectable 1 milliGRAM(s) IntraMuscular once  hydrALAZINE 25 milliGRAM(s) Oral three times a day  influenza  Vaccine (HIGH DOSE) 0.7 milliLiter(s) IntraMuscular once  insulin lispro (ADMELOG) corrective regimen sliding scale   SubCutaneous at bedtime  insulin lispro (ADMELOG) corrective regimen sliding scale   SubCutaneous three times a day before meals  montelukast 10 milliGRAM(s) Oral daily  mupirocin 2% Nasal 1 Application(s) Both Nostrils every 12 hours  nystatin Powder 1 Application(s) Topical two times a day  polyethylene glycol 3350 17 Gram(s) Oral daily  senna 2 Tablet(s) Oral at bedtime  sodium chloride 0.65% Nasal 1 Spray(s) Both Nostrils daily    MEDICATIONS  (PRN):  acetaminophen     Tablet .. 650 milliGRAM(s) Oral every 6 hours PRN Temp greater or equal to 38C (100.4F), Mild Pain (1 - 3)  dextrose Oral Gel 15 Gram(s) Oral once PRN Blood Glucose LESS THAN 70 milliGRAM(s)/deciliter  ondansetron Injectable 4 milliGRAM(s) IV Push once PRN Nausea and/or Vomiting  sodium chloride 0.9% lock flush 10 milliLiter(s) IV Push every 1 hour PRN Pre/post blood products, medications, blood draw, and to maintain line patency      CAPILLARY BLOOD GLUCOSE      POCT Blood Glucose.: 130 mg/dL (09 Dec 2023 07:59)  POCT Blood Glucose.: 155 mg/dL (08 Dec 2023 21:45)  POCT Blood Glucose.: 115 mg/dL (08 Dec 2023 17:24)  POCT Blood Glucose.: 237 mg/dL (08 Dec 2023 12:36)  POCT Blood Glucose.: 122 mg/dL (08 Dec 2023 08:37)    I&O's Summary    08 Dec 2023 07:01  -  09 Dec 2023 07:00  --------------------------------------------------------  IN: 660 mL / OUT: 700 mL / NET: -40 mL        Vital Signs Last 24 Hrs  T(C): 36.7 (09 Dec 2023 04:52), Max: 37 (08 Dec 2023 20:51)  T(F): 98 (09 Dec 2023 04:52), Max: 98.6 (08 Dec 2023 20:51)  HR: 76 (09 Dec 2023 04:52) (76 - 96)  BP: 121/56 (09 Dec 2023 04:52) (113/54 - 121/56)  BP(mean): --  RR: 18 (09 Dec 2023 04:52) (18 - 20)  SpO2: 97% (09 Dec 2023 04:52) (95% - 99%)    Parameters below as of 09 Dec 2023 04:52  Patient On (Oxygen Delivery Method): nasal cannula        PHYSICAL EXAM:  GENERAL: NAD, well-developed, well-nourished  HEAD: Atraumatic, Normocephalic  EYES: Conjunctiva and sclera clear  CHEST/LUNG: Crackles on exam, decreased from previous  HEART: Normal S1/S2; Regular rate and rhythm; No murmurs, rubs, or gallops  ABDOMEN: Soft, Nontender, Nondistended; Bowel sounds present  EXTREMITIES: No clubbing, cyanosis, or edema  PSYCH: A&Ox3      LABS:                        8.0    11.65 )-----------( 269      ( 09 Dec 2023 06:10 )             26.1      12-09    138  |  100  |  31<H>  ----------------------------<  104<H>  3.7   |  26  |  3.62<H>    Ca    8.6      09 Dec 2023 06:10  Phos  3.0     12-09  Mg     1.9     12-09    TPro  6.0  /  Alb  3.3  /  TBili  0.3  /  DBili  x   /  AST  17  /  ALT  22  /  AlkPhos  47  12-09          Urinalysis Basic - ( 09 Dec 2023 06:10 )    Color: x / Appearance: x / SG: x / pH: x  Gluc: 104 mg/dL / Ketone: x  / Bili: x / Urobili: x   Blood: x / Protein: x / Nitrite: x   Leuk Esterase: x / RBC: x / WBC x   Sq Epi: x / Non Sq Epi: x / Bacteria: x        RADIOLOGY & ADDITIONAL TESTS:

## 2023-12-09 NOTE — PROGRESS NOTE ADULT - SUBJECTIVE AND OBJECTIVE BOX
[facility] DIVISION OF KIDNEY DISEASES AND HYPERTENSION -- HEMODIALYSIS NOTE  --------------------------------------------------------------------------------  Chief Complaint: ESRD/Ongoing hemodialysis requirement    24 hour events/subjective:  none      PAST HISTORY  --------------------------------------------------------------------------------  No significant changes to PMH, PSH, FHx, SHx, unless otherwise noted    ALLERGIES & MEDICATIONS  --------------------------------------------------------------------------------  Allergies    No Known Allergies    Intolerances      Standing Inpatient Medications  albuterol    90 MICROgram(s) HFA Inhaler 2 Puff(s) Inhalation every 6 hours  albuterol/ipratropium for Nebulization 3 milliLiter(s) Nebulizer every 6 hours  amLODIPine   Tablet 10 milliGRAM(s) Oral daily  atorvastatin 80 milliGRAM(s) Oral at bedtime  budesonide 160 MICROgram(s)/formoterol 4.5 MICROgram(s) Inhaler 2 Puff(s) Inhalation two times a day  chlorhexidine 2% Cloths 1 Application(s) Topical daily  chlorhexidine 4% Liquid 1 Application(s) Topical <User Schedule>  dextrose 5%. 1000 milliLiter(s) IV Continuous <Continuous>  dextrose 5%. 1000 milliLiter(s) IV Continuous <Continuous>  dextrose 50% Injectable 25 Gram(s) IV Push once  dextrose 50% Injectable 12.5 Gram(s) IV Push once  dextrose 50% Injectable 25 Gram(s) IV Push once  epoetin sheryl (EPOGEN) Injectable 5000 Unit(s) IV Push <User Schedule>  ferrous    sulfate 325 milliGRAM(s) Oral daily  fluticasone propionate 50 MICROgram(s)/spray Nasal Spray 1 Spray(s) Both Nostrils two times a day  glucagon  Injectable 1 milliGRAM(s) IntraMuscular once  hydrALAZINE 25 milliGRAM(s) Oral three times a day  influenza  Vaccine (HIGH DOSE) 0.7 milliLiter(s) IntraMuscular once  insulin lispro (ADMELOG) corrective regimen sliding scale   SubCutaneous three times a day before meals  insulin lispro (ADMELOG) corrective regimen sliding scale   SubCutaneous at bedtime  montelukast 10 milliGRAM(s) Oral daily  mupirocin 2% Nasal 1 Application(s) Both Nostrils every 12 hours  nystatin Powder 1 Application(s) Topical two times a day  polyethylene glycol 3350 17 Gram(s) Oral daily  senna 2 Tablet(s) Oral at bedtime  sodium chloride 0.65% Nasal 1 Spray(s) Both Nostrils daily    PRN Inpatient Medications  acetaminophen     Tablet .. 650 milliGRAM(s) Oral every 6 hours PRN  dextrose Oral Gel 15 Gram(s) Oral once PRN  ondansetron Injectable 4 milliGRAM(s) IV Push once PRN  sodium chloride 0.9% lock flush 10 milliLiter(s) IV Push every 1 hour PRN      REVIEW OF SYSTEMS  --------------------------------------------------------------------------------  Gen: No weight changes, fatigue, fevers/chills,+ weakness  Head/Eyes/Ears/Mouth: No headache; Normal hearing; Normal vision    Respiratory: No dyspnea, cough, wheezing, hemoptysis  CV: No chest pain, PND, orthopnea  GI: No abdominal pain, diarrhea, constipation, nausea, vomiting, melena, hematochezia  : No increased frequency, dysuria, hematuria, nocturia  MSK: No joint pain/swelling; no back pain; no edema   Heme: No easy bruising or bleeding  All other systems were reviewed and are negative, except as noted.      VITALS/PHYSICAL EXAM  --------------------------------------------------------------------------------  T(C): 37 (12-09-23 @ 12:30), Max: 37 (12-08-23 @ 20:51)  HR: 88 (12-09-23 @ 12:30) (76 - 96)  BP: 129/51 (12-09-23 @ 12:30) (113/54 - 129/51)  RR: 18 (12-09-23 @ 12:30) (18 - 20)  SpO2: 92% (12-09-23 @ 12:30) (92% - 98%)  Wt(kg): --        12-08-23 @ 07:01  -  12-09-23 @ 07:00  --------------------------------------------------------  IN: 660 mL / OUT: 700 mL / NET: -40 mL      Physical Exam:  	PHYSICAL EXAM: vital signs as above  in no apparent distress  Neck: Supple, no JVD,    Lungs: no rhonchi, no wheeze, no crackles  CVS: S1 S2 no M/R/G  Abdomen: no tenderness, no organomegaly, BS present  Neuro: Grossly intact  Skin: warm, dry  Ext: no cyanosis or clubbing, no edema  Access:    LABS/STUDIES  --------------------------------------------------------------------------------              8.0    11.65 >-----------<  269      [12-09-23 @ 06:10]              26.1     138  |  100  |  31  ----------------------------<  104      [12-09-23 @ 06:10]  3.7   |  26  |  3.62        Ca     8.6     [12-09-23 @ 06:10]      Mg     1.9     [12-09-23 @ 06:10]      Phos  3.0     [12-09-23 @ 06:10]    TPro  6.0  /  Alb  3.3  /  TBili  0.3  /  DBili  x   /  AST  17  /  ALT  22  /  AlkPhos  47  [12-09-23 @ 06:10]          Iron 68, TIBC 261, %sat 26      [11-25-23 @ 06:14]  Ferritin 81      [11-25-23 @ 06:14]  PTH -- (Ca 8.6)      [11-25-23 @ 06:14]   73  Vitamin D (25OH) 22.5      [11-25-23 @ 06:14]  Lipid: chol 116, TG 79, HDL 39, LDL --      [11-23-23 @ 06:25]    HBsAb <3.0      [11-23-23 @ 06:25]  HBsAg Nonreact      [11-23-23 @ 06:25]  HCV 0.08, Nonreact      [11-23-23 @ 06:25]  HIV Nonreact      [11-23-23 @ 06:25]   [facility] DIVISION OF KIDNEY DISEASES AND HYPERTENSION -- HEMODIALYSIS NOTE  --------------------------------------------------------------------------------  Chief Complaint: ESRD/Ongoing hemodialysis requirement    24 hour events/subjective:  none      PAST HISTORY  --------------------------------------------------------------------------------  No significant changes to PMH, PSH, FHx, SHx, unless otherwise noted    ALLERGIES & MEDICATIONS  --------------------------------------------------------------------------------  Allergies    No Known Allergies    Intolerances      Standing Inpatient Medications  albuterol    90 MICROgram(s) HFA Inhaler 2 Puff(s) Inhalation every 6 hours  albuterol/ipratropium for Nebulization 3 milliLiter(s) Nebulizer every 6 hours  amLODIPine   Tablet 10 milliGRAM(s) Oral daily  atorvastatin 80 milliGRAM(s) Oral at bedtime  budesonide 160 MICROgram(s)/formoterol 4.5 MICROgram(s) Inhaler 2 Puff(s) Inhalation two times a day  chlorhexidine 2% Cloths 1 Application(s) Topical daily  chlorhexidine 4% Liquid 1 Application(s) Topical <User Schedule>  dextrose 5%. 1000 milliLiter(s) IV Continuous <Continuous>  dextrose 5%. 1000 milliLiter(s) IV Continuous <Continuous>  dextrose 50% Injectable 25 Gram(s) IV Push once  dextrose 50% Injectable 12.5 Gram(s) IV Push once  dextrose 50% Injectable 25 Gram(s) IV Push once  epoetin sheryl (EPOGEN) Injectable 5000 Unit(s) IV Push <User Schedule>  ferrous    sulfate 325 milliGRAM(s) Oral daily  fluticasone propionate 50 MICROgram(s)/spray Nasal Spray 1 Spray(s) Both Nostrils two times a day  glucagon  Injectable 1 milliGRAM(s) IntraMuscular once  hydrALAZINE 25 milliGRAM(s) Oral three times a day  influenza  Vaccine (HIGH DOSE) 0.7 milliLiter(s) IntraMuscular once  insulin lispro (ADMELOG) corrective regimen sliding scale   SubCutaneous three times a day before meals  insulin lispro (ADMELOG) corrective regimen sliding scale   SubCutaneous at bedtime  montelukast 10 milliGRAM(s) Oral daily  mupirocin 2% Nasal 1 Application(s) Both Nostrils every 12 hours  nystatin Powder 1 Application(s) Topical two times a day  polyethylene glycol 3350 17 Gram(s) Oral daily  senna 2 Tablet(s) Oral at bedtime  sodium chloride 0.65% Nasal 1 Spray(s) Both Nostrils daily    PRN Inpatient Medications  acetaminophen     Tablet .. 650 milliGRAM(s) Oral every 6 hours PRN  dextrose Oral Gel 15 Gram(s) Oral once PRN  ondansetron Injectable 4 milliGRAM(s) IV Push once PRN  sodium chloride 0.9% lock flush 10 milliLiter(s) IV Push every 1 hour PRN      REVIEW OF SYSTEMS  --------------------------------------------------------------------------------  Gen: No weight changes, fatigue, fevers/chills,+ weakness  Head/Eyes/Ears/Mouth: No headache; Normal hearing; Normal vision    Respiratory: No dyspnea, cough, wheezing, hemoptysis  CV: No chest pain, PND, orthopnea  GI: No abdominal pain, diarrhea, constipation, nausea, vomiting, melena, hematochezia  : No increased frequency, dysuria, hematuria, nocturia  MSK: No joint pain/swelling; no back pain; no edema   Heme: No easy bruising or bleeding  All other systems were reviewed and are negative, except as noted.      VITALS/PHYSICAL EXAM  --------------------------------------------------------------------------------  T(C): 37 (12-09-23 @ 12:30), Max: 37 (12-08-23 @ 20:51)  HR: 88 (12-09-23 @ 12:30) (76 - 96)  BP: 129/51 (12-09-23 @ 12:30) (113/54 - 129/51)  RR: 18 (12-09-23 @ 12:30) (18 - 20)  SpO2: 92% (12-09-23 @ 12:30) (92% - 98%)  Wt(kg): --        12-08-23 @ 07:01  -  12-09-23 @ 07:00  --------------------------------------------------------  IN: 660 mL / OUT: 700 mL / NET: -40 mL      Physical Exam:  	PHYSICAL EXAM: vital signs as above  in no apparent distress  Neck: Supple, no JVD,    Lungs: no rhonchi, no wheeze, no crackles  CVS: S1 S2 no M/R/G  Abdomen: no tenderness, no organomegaly, BS present  Neuro: Grossly intact  Skin: warm, dry  Ext: no cyanosis or clubbing, no edema  Access:    LABS/STUDIES  --------------------------------------------------------------------------------              8.0    11.65 >-----------<  269      [12-09-23 @ 06:10]              26.1     138  |  100  |  31  ----------------------------<  104      [12-09-23 @ 06:10]  3.7   |  26  |  3.62        Ca     8.6     [12-09-23 @ 06:10]      Mg     1.9     [12-09-23 @ 06:10]      Phos  3.0     [12-09-23 @ 06:10]    TPro  6.0  /  Alb  3.3  /  TBili  0.3  /  DBili  x   /  AST  17  /  ALT  22  /  AlkPhos  47  [12-09-23 @ 06:10]          Iron 68, TIBC 261, %sat 26      [11-25-23 @ 06:14]  Ferritin 81      [11-25-23 @ 06:14]  PTH -- (Ca 8.6)      [11-25-23 @ 06:14]   73  Vitamin D (25OH) 22.5      [11-25-23 @ 06:14]  Lipid: chol 116, TG 79, HDL 39, LDL --      [11-23-23 @ 06:25]    HBsAb <3.0      [11-23-23 @ 06:25]  HBsAg Nonreact      [11-23-23 @ 06:25]  HCV 0.08, Nonreact      [11-23-23 @ 06:25]  HIV Nonreact      [11-23-23 @ 06:25]

## 2023-12-10 LAB
ALBUMIN SERPL ELPH-MCNC: 3.5 G/DL — SIGNIFICANT CHANGE UP (ref 3.3–5)
ALBUMIN SERPL ELPH-MCNC: 3.5 G/DL — SIGNIFICANT CHANGE UP (ref 3.3–5)
ALP SERPL-CCNC: 56 U/L — SIGNIFICANT CHANGE UP (ref 40–120)
ALP SERPL-CCNC: 56 U/L — SIGNIFICANT CHANGE UP (ref 40–120)
ALT FLD-CCNC: 25 U/L — SIGNIFICANT CHANGE UP (ref 10–45)
ALT FLD-CCNC: 25 U/L — SIGNIFICANT CHANGE UP (ref 10–45)
ANION GAP SERPL CALC-SCNC: 9 MMOL/L — SIGNIFICANT CHANGE UP (ref 5–17)
ANION GAP SERPL CALC-SCNC: 9 MMOL/L — SIGNIFICANT CHANGE UP (ref 5–17)
AST SERPL-CCNC: 22 U/L — SIGNIFICANT CHANGE UP (ref 10–40)
AST SERPL-CCNC: 22 U/L — SIGNIFICANT CHANGE UP (ref 10–40)
BILIRUB SERPL-MCNC: 0.3 MG/DL — SIGNIFICANT CHANGE UP (ref 0.2–1.2)
BILIRUB SERPL-MCNC: 0.3 MG/DL — SIGNIFICANT CHANGE UP (ref 0.2–1.2)
BUN SERPL-MCNC: 18 MG/DL — SIGNIFICANT CHANGE UP (ref 7–23)
BUN SERPL-MCNC: 18 MG/DL — SIGNIFICANT CHANGE UP (ref 7–23)
CALCIUM SERPL-MCNC: 8.5 MG/DL — SIGNIFICANT CHANGE UP (ref 8.4–10.5)
CALCIUM SERPL-MCNC: 8.5 MG/DL — SIGNIFICANT CHANGE UP (ref 8.4–10.5)
CHLORIDE SERPL-SCNC: 99 MMOL/L — SIGNIFICANT CHANGE UP (ref 96–108)
CHLORIDE SERPL-SCNC: 99 MMOL/L — SIGNIFICANT CHANGE UP (ref 96–108)
CO2 SERPL-SCNC: 29 MMOL/L — SIGNIFICANT CHANGE UP (ref 22–31)
CO2 SERPL-SCNC: 29 MMOL/L — SIGNIFICANT CHANGE UP (ref 22–31)
CREAT SERPL-MCNC: 3.09 MG/DL — HIGH (ref 0.5–1.3)
CREAT SERPL-MCNC: 3.09 MG/DL — HIGH (ref 0.5–1.3)
EGFR: 15 ML/MIN/1.73M2 — LOW
EGFR: 15 ML/MIN/1.73M2 — LOW
GLUCOSE BLDC GLUCOMTR-MCNC: 138 MG/DL — HIGH (ref 70–99)
GLUCOSE BLDC GLUCOMTR-MCNC: 138 MG/DL — HIGH (ref 70–99)
GLUCOSE BLDC GLUCOMTR-MCNC: 140 MG/DL — HIGH (ref 70–99)
GLUCOSE BLDC GLUCOMTR-MCNC: 140 MG/DL — HIGH (ref 70–99)
GLUCOSE BLDC GLUCOMTR-MCNC: 142 MG/DL — HIGH (ref 70–99)
GLUCOSE BLDC GLUCOMTR-MCNC: 142 MG/DL — HIGH (ref 70–99)
GLUCOSE BLDC GLUCOMTR-MCNC: 163 MG/DL — HIGH (ref 70–99)
GLUCOSE BLDC GLUCOMTR-MCNC: 163 MG/DL — HIGH (ref 70–99)
GLUCOSE SERPL-MCNC: 122 MG/DL — HIGH (ref 70–99)
GLUCOSE SERPL-MCNC: 122 MG/DL — HIGH (ref 70–99)
HCT VFR BLD CALC: 28.1 % — LOW (ref 34.5–45)
HCT VFR BLD CALC: 28.1 % — LOW (ref 34.5–45)
HGB BLD-MCNC: 8.6 G/DL — LOW (ref 11.5–15.5)
HGB BLD-MCNC: 8.6 G/DL — LOW (ref 11.5–15.5)
MAGNESIUM SERPL-MCNC: 1.9 MG/DL — SIGNIFICANT CHANGE UP (ref 1.6–2.6)
MAGNESIUM SERPL-MCNC: 1.9 MG/DL — SIGNIFICANT CHANGE UP (ref 1.6–2.6)
MCHC RBC-ENTMCNC: 29 PG — SIGNIFICANT CHANGE UP (ref 27–34)
MCHC RBC-ENTMCNC: 29 PG — SIGNIFICANT CHANGE UP (ref 27–34)
MCHC RBC-ENTMCNC: 30.6 GM/DL — LOW (ref 32–36)
MCHC RBC-ENTMCNC: 30.6 GM/DL — LOW (ref 32–36)
MCV RBC AUTO: 94.6 FL — SIGNIFICANT CHANGE UP (ref 80–100)
MCV RBC AUTO: 94.6 FL — SIGNIFICANT CHANGE UP (ref 80–100)
NRBC # BLD: 0 /100 WBCS — SIGNIFICANT CHANGE UP (ref 0–0)
NRBC # BLD: 0 /100 WBCS — SIGNIFICANT CHANGE UP (ref 0–0)
PHOSPHATE SERPL-MCNC: 2 MG/DL — LOW (ref 2.5–4.5)
PHOSPHATE SERPL-MCNC: 2 MG/DL — LOW (ref 2.5–4.5)
PLATELET # BLD AUTO: 276 K/UL — SIGNIFICANT CHANGE UP (ref 150–400)
PLATELET # BLD AUTO: 276 K/UL — SIGNIFICANT CHANGE UP (ref 150–400)
POTASSIUM SERPL-MCNC: 3.7 MMOL/L — SIGNIFICANT CHANGE UP (ref 3.5–5.3)
POTASSIUM SERPL-MCNC: 3.7 MMOL/L — SIGNIFICANT CHANGE UP (ref 3.5–5.3)
POTASSIUM SERPL-SCNC: 3.7 MMOL/L — SIGNIFICANT CHANGE UP (ref 3.5–5.3)
POTASSIUM SERPL-SCNC: 3.7 MMOL/L — SIGNIFICANT CHANGE UP (ref 3.5–5.3)
PROT SERPL-MCNC: 6.5 G/DL — SIGNIFICANT CHANGE UP (ref 6–8.3)
PROT SERPL-MCNC: 6.5 G/DL — SIGNIFICANT CHANGE UP (ref 6–8.3)
RBC # BLD: 2.97 M/UL — LOW (ref 3.8–5.2)
RBC # BLD: 2.97 M/UL — LOW (ref 3.8–5.2)
RBC # FLD: 14.8 % — HIGH (ref 10.3–14.5)
RBC # FLD: 14.8 % — HIGH (ref 10.3–14.5)
SODIUM SERPL-SCNC: 137 MMOL/L — SIGNIFICANT CHANGE UP (ref 135–145)
SODIUM SERPL-SCNC: 137 MMOL/L — SIGNIFICANT CHANGE UP (ref 135–145)
WBC # BLD: 11.72 K/UL — HIGH (ref 3.8–10.5)
WBC # BLD: 11.72 K/UL — HIGH (ref 3.8–10.5)
WBC # FLD AUTO: 11.72 K/UL — HIGH (ref 3.8–10.5)
WBC # FLD AUTO: 11.72 K/UL — HIGH (ref 3.8–10.5)

## 2023-12-10 PROCEDURE — 99232 SBSQ HOSP IP/OBS MODERATE 35: CPT

## 2023-12-10 RX ADMIN — Medication 1 SPRAY(S): at 12:42

## 2023-12-10 RX ADMIN — Medication 3 MILLILITER(S): at 12:41

## 2023-12-10 RX ADMIN — Medication 1 SPRAY(S): at 05:41

## 2023-12-10 RX ADMIN — MUPIROCIN 1 APPLICATION(S): 20 OINTMENT TOPICAL at 05:40

## 2023-12-10 RX ADMIN — Medication 1: at 17:11

## 2023-12-10 RX ADMIN — NYSTATIN CREAM 1 APPLICATION(S): 100000 CREAM TOPICAL at 05:41

## 2023-12-10 RX ADMIN — SENNA PLUS 2 TABLET(S): 8.6 TABLET ORAL at 21:21

## 2023-12-10 RX ADMIN — Medication 3 MILLILITER(S): at 23:16

## 2023-12-10 RX ADMIN — CHLORHEXIDINE GLUCONATE 1 APPLICATION(S): 213 SOLUTION TOPICAL at 12:42

## 2023-12-10 RX ADMIN — MUPIROCIN 1 APPLICATION(S): 20 OINTMENT TOPICAL at 17:13

## 2023-12-10 RX ADMIN — NYSTATIN CREAM 1 APPLICATION(S): 100000 CREAM TOPICAL at 17:13

## 2023-12-10 RX ADMIN — Medication 3 MILLILITER(S): at 17:13

## 2023-12-10 RX ADMIN — ATORVASTATIN CALCIUM 80 MILLIGRAM(S): 80 TABLET, FILM COATED ORAL at 21:20

## 2023-12-10 RX ADMIN — Medication 1 SPRAY(S): at 17:10

## 2023-12-10 RX ADMIN — Medication 3 MILLILITER(S): at 05:40

## 2023-12-10 RX ADMIN — AMLODIPINE BESYLATE 10 MILLIGRAM(S): 2.5 TABLET ORAL at 05:41

## 2023-12-10 RX ADMIN — MONTELUKAST 10 MILLIGRAM(S): 4 TABLET, CHEWABLE ORAL at 12:41

## 2023-12-10 RX ADMIN — BUDESONIDE AND FORMOTEROL FUMARATE DIHYDRATE 2 PUFF(S): 160; 4.5 AEROSOL RESPIRATORY (INHALATION) at 09:03

## 2023-12-10 RX ADMIN — CHLORHEXIDINE GLUCONATE 1 APPLICATION(S): 213 SOLUTION TOPICAL at 05:41

## 2023-12-10 RX ADMIN — Medication 25 MILLIGRAM(S): at 21:21

## 2023-12-10 RX ADMIN — Medication 25 MILLIGRAM(S): at 13:03

## 2023-12-10 RX ADMIN — Medication 85 MILLIMOLE(S): at 12:48

## 2023-12-10 RX ADMIN — Medication 325 MILLIGRAM(S): at 12:41

## 2023-12-10 RX ADMIN — BUDESONIDE AND FORMOTEROL FUMARATE DIHYDRATE 2 PUFF(S): 160; 4.5 AEROSOL RESPIRATORY (INHALATION) at 21:23

## 2023-12-10 RX ADMIN — Medication 25 MILLIGRAM(S): at 05:41

## 2023-12-10 NOTE — PROGRESS NOTE ADULT - SUBJECTIVE AND OBJECTIVE BOX
Gume Villatoro  PGY-2 Resident Physician     Patient is a 83y old  Female who presents with a chief complaint of LE swelling, dyspnea (09 Dec 2023 17:45)    SUBJECTIVE / OVERNIGHT EVENTS:  NAEON. ROS (-).     MEDICATIONS  (STANDING):  albuterol    90 MICROgram(s) HFA Inhaler 2 Puff(s) Inhalation every 6 hours  albuterol/ipratropium for Nebulization 3 milliLiter(s) Nebulizer every 6 hours  amLODIPine   Tablet 10 milliGRAM(s) Oral daily  atorvastatin 80 milliGRAM(s) Oral at bedtime  budesonide 160 MICROgram(s)/formoterol 4.5 MICROgram(s) Inhaler 2 Puff(s) Inhalation two times a day  chlorhexidine 2% Cloths 1 Application(s) Topical daily  chlorhexidine 4% Liquid 1 Application(s) Topical <User Schedule>  dextrose 5%. 1000 milliLiter(s) (50 mL/Hr) IV Continuous <Continuous>  dextrose 5%. 1000 milliLiter(s) (100 mL/Hr) IV Continuous <Continuous>  dextrose 50% Injectable 25 Gram(s) IV Push once  dextrose 50% Injectable 25 Gram(s) IV Push once  dextrose 50% Injectable 12.5 Gram(s) IV Push once  epoetin sheryl (EPOGEN) Injectable 5000 Unit(s) IV Push <User Schedule>  ferrous    sulfate 325 milliGRAM(s) Oral daily  fluticasone propionate 50 MICROgram(s)/spray Nasal Spray 1 Spray(s) Both Nostrils two times a day  glucagon  Injectable 1 milliGRAM(s) IntraMuscular once  hydrALAZINE 25 milliGRAM(s) Oral three times a day  influenza  Vaccine (HIGH DOSE) 0.7 milliLiter(s) IntraMuscular once  insulin lispro (ADMELOG) corrective regimen sliding scale   SubCutaneous three times a day before meals  insulin lispro (ADMELOG) corrective regimen sliding scale   SubCutaneous at bedtime  montelukast 10 milliGRAM(s) Oral daily  mupirocin 2% Nasal 1 Application(s) Both Nostrils every 12 hours  nystatin Powder 1 Application(s) Topical two times a day  polyethylene glycol 3350 17 Gram(s) Oral daily  senna 2 Tablet(s) Oral at bedtime  sodium chloride 0.65% Nasal 1 Spray(s) Both Nostrils daily  sodium phosphate 30 milliMole(s)/500 mL IVPB 30 milliMole(s) IV Intermittent once    MEDICATIONS  (PRN):  acetaminophen     Tablet .. 650 milliGRAM(s) Oral every 6 hours PRN Temp greater or equal to 38C (100.4F), Mild Pain (1 - 3)  dextrose Oral Gel 15 Gram(s) Oral once PRN Blood Glucose LESS THAN 70 milliGRAM(s)/deciliter  ondansetron Injectable 4 milliGRAM(s) IV Push once PRN Nausea and/or Vomiting  sodium chloride 0.9% lock flush 10 milliLiter(s) IV Push every 1 hour PRN Pre/post blood products, medications, blood draw, and to maintain line patency    Allergies    No Known Allergies    Intolerances        Vital Signs Last 24 Hrs  T(C): 37.1 (10 Dec 2023 05:06), Max: 37.1 (10 Dec 2023 05:06)  T(F): 98.8 (10 Dec 2023 05:06), Max: 98.8 (10 Dec 2023 05:06)  HR: 86 (10 Dec 2023 05:06) (86 - 98)  BP: 119/62 (10 Dec 2023 05:06) (119/62 - 139/66)  BP(mean): --  RR: 18 (10 Dec 2023 05:06) (18 - 18)  SpO2: 94% (10 Dec 2023 05:06) (92% - 98%)    Parameters below as of 10 Dec 2023 05:06  Patient On (Oxygen Delivery Method): nasal cannula  O2 Flow (L/min): 2    Daily     Daily Weight in k.3 (09 Dec 2023 15:30)  I&O's Summary    09 Dec 2023 07:01  -  10 Dec 2023 07:00  --------------------------------------------------------  IN: 690 mL / OUT: 2000 mL / NET: -1310 mL        Physical Exam  GENERAL: NAD, well-developed, well-nourished  HEAD: Atraumatic, Normocephalic  EYES: Conjunctiva and sclera clear  CHEST/LUNG: Crackles on exam, decreased from previous  HEART: Normal S1/S2; Regular rate and rhythm; No murmurs, rubs, or gallops  ABDOMEN: Soft, Nontender, Nondistended; Bowel sounds present  EXTREMITIES: No clubbing, cyanosis, or edema  PSYCH: A&Ox3      DIAGNOSTICS:                         8.6    11.72 )-----------( 276      ( 10 Dec 2023 06:18 )             28.1     Hgb Trend: 8.6<--, 8.0<--, 8.4<--, 7.9<--, 7.8<--  12-10    137  |  99  |  18  ----------------------------<  122<H>  3.7   |  29  |  3.09<H>    Ca    8.5      10 Dec 2023 06:18  Phos  2.0     12-10  Mg     1.9     12-10    TPro  6.5  /  Alb  3.5  /  TBili  0.3  /  DBili  x   /  AST  22  /  ALT  25  /  AlkPhos  56  12-10    CAPILLARY BLOOD GLUCOSE      POCT Blood Glucose.: 138 mg/dL (10 Dec 2023 07:26)  POCT Blood Glucose.: 143 mg/dL (09 Dec 2023 21:54)  POCT Blood Glucose.: 196 mg/dL (09 Dec 2023 16:35)  POCT Blood Glucose.: 188 mg/dL (09 Dec 2023 11:42)    Creatinine Trend: 3.09<--, 3.62<--, 2.40<--, 2.43<--, 3.15<--, 4.56<--  LIVER FUNCTIONS - ( 10 Dec 2023 06:18 )  Alb: 3.5 g/dL / Pro: 6.5 g/dL / ALK PHOS: 56 U/L / ALT: 25 U/L / AST: 22 U/L / GGT: x                 Urinalysis Basic - ( 10 Dec 2023 06:18 )    Color: x / Appearance: x / SG: x / pH: x  Gluc: 122 mg/dL / Ketone: x  / Bili: x / Urobili: x   Blood: x / Protein: x / Nitrite: x   Leuk Esterase: x / RBC: x / WBC x   Sq Epi: x / Non Sq Epi: x / Bacteria: x

## 2023-12-10 NOTE — PROGRESS NOTE ADULT - PROBLEM SELECTOR PLAN 9
DVT PPx: heparin subq  Diet: DASH  Code: FULL  Dispo: medically active  Communication: discussed with  at bedside  Risk stratification for fistula placement  - RCRI of 2 points, Class III Risk -- 10.1% 30-day risk of death, MI, or cardiac arrest  - Pt reports being able to do 3-4 METS activities indicating moderate functional capacity; pt states she is able to walk up stairs, walks, dances, does chores at home  - no contraindication from medical standpoint for AVF placement

## 2023-12-10 NOTE — PROGRESS NOTE ADULT - ATTENDING COMMENTS
83 year old female with PMH chronic hypoxic respiratory failure, DM2, CAD, HTN and HLD who presented with dyspnea and lower extremity edema admitted for acute decompensated congestive heart failure. TTE shows LV diastolic dysfunction but normal EF. She was started on IV diuretics however, her creatinine increased to nearly 7 and her GFR is less than 10 so HD was initiated. She is s/p tunneled HD catheter and AVF fistula next week for more permanent access. stable respiratory status at this time    Discussed With HS4    Rin Mckeon MD  [facility] Division of Hospital Medicine 83 year old female with PMH chronic hypoxic respiratory failure, DM2, CAD, HTN and HLD who presented with dyspnea and lower extremity edema admitted for acute decompensated congestive heart failure. TTE shows LV diastolic dysfunction but normal EF. She was started on IV diuretics however, her creatinine increased to nearly 7 and her GFR is less than 10 so HD was initiated. She is s/p tunneled HD catheter and AVF fistula next week for more permanent access. stable respiratory status at this time    Discussed With HS4    Rin Mckeon MD  [facility] Division of Hospital Medicine

## 2023-12-10 NOTE — PROGRESS NOTE ADULT - PROBLEM SELECTOR PLAN 1
Patient presenting for worsening LE edema and breathlessness  - also with several symptoms to suggest likely ADHF  - on exam: extremities warm and well perfused, but volume overloaded, S3 on exam  - proBNP elevated to 1300, but unknown baseline and with likely CAL on CKD  - hsTropT flat at 74, ECG without ST changes, no chest pain  - pt does not carry a diagnosis of HF, however on meds that may suggest she may have HFpEF (MRA, SGLT2i)    Plan:  > TTE--> showing EF 71%, bi-atrial enlargement, LV diastolic dysfunction, and RV enlargement  > hold home spironolactone and dapagliflozin given CAL  > strict I/Os, daily standing weights  > appreciate cards recs  > will need to determine DC med rec based on GFR

## 2023-12-10 NOTE — PROGRESS NOTE ADULT - PROBLEM SELECTOR PLAN 4
Patient with several year history of chronic hypoxemic respiratory failure  - requiring home O2, stable on 2L at baseline  - has not had escalating O2 requirements recently, despite feeling subjectively more dyspneic  - unclear etiology, though patient has had PFTs at her pulmologist's office last year    Plan:  > follow up TTE--> showing EF 71%, bi-atrial enlargement, LV diastolic dysfunction, and RV enlargement  > obtain records from pt's pulmonologists office: Dr. Paulino Gayle ([facility], [phone])  > continue with home montelukast and add on duonebs and symbicort   > now on baseline of 2L O2  > s/p prednisone for possible COPD exacerbation   - f/u repeat CXR and BNP (stable CXR and elevated BNP)  - appreciate pulm recs: started on flonase and saline spray Patient with several year history of chronic hypoxemic respiratory failure  - requiring home O2, stable on 2L at baseline  - has not had escalating O2 requirements recently, despite feeling subjectively more dyspneic  - unclear etiology, though patient has had PFTs at her pulmologist's office last year    Plan:  > follow up TTE--> showing EF 71%, bi-atrial enlargement, LV diastolic dysfunction, and RV enlargement  > obtain records from pt's pulmonologists office: Dr. Paulino Gayle ([facility], [phone])  > continue with home montelukast and add on duonebs and symbicort   > now on baseline of 2L O2  > s/p prednisone for possible COPD exacerbation   - f/u repeat CXR and BNP (stable CXR and elevated BNP)  - appreciate pulm recs: started on flonase and saline spray

## 2023-12-10 NOTE — PROGRESS NOTE ADULT - PROBLEM SELECTOR PLAN 2
- Hb downtrending during admission  - unclear baseline Hb  - likely due to worsening renal function  - ferritin and iron studies (wnl)

## 2023-12-11 LAB
ALBUMIN SERPL ELPH-MCNC: 3.5 G/DL — SIGNIFICANT CHANGE UP (ref 3.3–5)
ALBUMIN SERPL ELPH-MCNC: 3.5 G/DL — SIGNIFICANT CHANGE UP (ref 3.3–5)
ALP SERPL-CCNC: 54 U/L — SIGNIFICANT CHANGE UP (ref 40–120)
ALP SERPL-CCNC: 54 U/L — SIGNIFICANT CHANGE UP (ref 40–120)
ALT FLD-CCNC: 20 U/L — SIGNIFICANT CHANGE UP (ref 10–45)
ALT FLD-CCNC: 20 U/L — SIGNIFICANT CHANGE UP (ref 10–45)
ANION GAP SERPL CALC-SCNC: 17 MMOL/L — SIGNIFICANT CHANGE UP (ref 5–17)
ANION GAP SERPL CALC-SCNC: 17 MMOL/L — SIGNIFICANT CHANGE UP (ref 5–17)
AST SERPL-CCNC: 17 U/L — SIGNIFICANT CHANGE UP (ref 10–40)
AST SERPL-CCNC: 17 U/L — SIGNIFICANT CHANGE UP (ref 10–40)
BILIRUB SERPL-MCNC: 0.4 MG/DL — SIGNIFICANT CHANGE UP (ref 0.2–1.2)
BILIRUB SERPL-MCNC: 0.4 MG/DL — SIGNIFICANT CHANGE UP (ref 0.2–1.2)
BUN SERPL-MCNC: 27 MG/DL — HIGH (ref 7–23)
BUN SERPL-MCNC: 27 MG/DL — HIGH (ref 7–23)
CALCIUM SERPL-MCNC: 8.7 MG/DL — SIGNIFICANT CHANGE UP (ref 8.4–10.5)
CALCIUM SERPL-MCNC: 8.7 MG/DL — SIGNIFICANT CHANGE UP (ref 8.4–10.5)
CHLORIDE SERPL-SCNC: 97 MMOL/L — SIGNIFICANT CHANGE UP (ref 96–108)
CHLORIDE SERPL-SCNC: 97 MMOL/L — SIGNIFICANT CHANGE UP (ref 96–108)
CO2 SERPL-SCNC: 24 MMOL/L — SIGNIFICANT CHANGE UP (ref 22–31)
CO2 SERPL-SCNC: 24 MMOL/L — SIGNIFICANT CHANGE UP (ref 22–31)
CREAT SERPL-MCNC: 4.93 MG/DL — HIGH (ref 0.5–1.3)
CREAT SERPL-MCNC: 4.93 MG/DL — HIGH (ref 0.5–1.3)
EGFR: 8 ML/MIN/1.73M2 — LOW
EGFR: 8 ML/MIN/1.73M2 — LOW
GLUCOSE BLDC GLUCOMTR-MCNC: 125 MG/DL — HIGH (ref 70–99)
GLUCOSE BLDC GLUCOMTR-MCNC: 125 MG/DL — HIGH (ref 70–99)
GLUCOSE BLDC GLUCOMTR-MCNC: 152 MG/DL — HIGH (ref 70–99)
GLUCOSE BLDC GLUCOMTR-MCNC: 152 MG/DL — HIGH (ref 70–99)
GLUCOSE BLDC GLUCOMTR-MCNC: 158 MG/DL — HIGH (ref 70–99)
GLUCOSE BLDC GLUCOMTR-MCNC: 158 MG/DL — HIGH (ref 70–99)
GLUCOSE BLDC GLUCOMTR-MCNC: 162 MG/DL — HIGH (ref 70–99)
GLUCOSE BLDC GLUCOMTR-MCNC: 162 MG/DL — HIGH (ref 70–99)
GLUCOSE BLDC GLUCOMTR-MCNC: 167 MG/DL — HIGH (ref 70–99)
GLUCOSE BLDC GLUCOMTR-MCNC: 167 MG/DL — HIGH (ref 70–99)
GLUCOSE BLDC GLUCOMTR-MCNC: 193 MG/DL — HIGH (ref 70–99)
GLUCOSE BLDC GLUCOMTR-MCNC: 193 MG/DL — HIGH (ref 70–99)
GLUCOSE SERPL-MCNC: 107 MG/DL — HIGH (ref 70–99)
GLUCOSE SERPL-MCNC: 107 MG/DL — HIGH (ref 70–99)
HCT VFR BLD CALC: 27 % — LOW (ref 34.5–45)
HCT VFR BLD CALC: 27 % — LOW (ref 34.5–45)
HGB BLD-MCNC: 8.3 G/DL — LOW (ref 11.5–15.5)
HGB BLD-MCNC: 8.3 G/DL — LOW (ref 11.5–15.5)
MAGNESIUM SERPL-MCNC: 1.8 MG/DL — SIGNIFICANT CHANGE UP (ref 1.6–2.6)
MAGNESIUM SERPL-MCNC: 1.8 MG/DL — SIGNIFICANT CHANGE UP (ref 1.6–2.6)
MCHC RBC-ENTMCNC: 28.6 PG — SIGNIFICANT CHANGE UP (ref 27–34)
MCHC RBC-ENTMCNC: 28.6 PG — SIGNIFICANT CHANGE UP (ref 27–34)
MCHC RBC-ENTMCNC: 30.7 GM/DL — LOW (ref 32–36)
MCHC RBC-ENTMCNC: 30.7 GM/DL — LOW (ref 32–36)
MCV RBC AUTO: 93.1 FL — SIGNIFICANT CHANGE UP (ref 80–100)
MCV RBC AUTO: 93.1 FL — SIGNIFICANT CHANGE UP (ref 80–100)
NRBC # BLD: 0 /100 WBCS — SIGNIFICANT CHANGE UP (ref 0–0)
NRBC # BLD: 0 /100 WBCS — SIGNIFICANT CHANGE UP (ref 0–0)
PHOSPHATE SERPL-MCNC: 5.2 MG/DL — HIGH (ref 2.5–4.5)
PHOSPHATE SERPL-MCNC: 5.2 MG/DL — HIGH (ref 2.5–4.5)
PLATELET # BLD AUTO: 281 K/UL — SIGNIFICANT CHANGE UP (ref 150–400)
PLATELET # BLD AUTO: 281 K/UL — SIGNIFICANT CHANGE UP (ref 150–400)
POTASSIUM SERPL-MCNC: 3.4 MMOL/L — LOW (ref 3.5–5.3)
POTASSIUM SERPL-MCNC: 3.4 MMOL/L — LOW (ref 3.5–5.3)
POTASSIUM SERPL-SCNC: 3.4 MMOL/L — LOW (ref 3.5–5.3)
POTASSIUM SERPL-SCNC: 3.4 MMOL/L — LOW (ref 3.5–5.3)
PROT SERPL-MCNC: 6.2 G/DL — SIGNIFICANT CHANGE UP (ref 6–8.3)
PROT SERPL-MCNC: 6.2 G/DL — SIGNIFICANT CHANGE UP (ref 6–8.3)
RBC # BLD: 2.9 M/UL — LOW (ref 3.8–5.2)
RBC # BLD: 2.9 M/UL — LOW (ref 3.8–5.2)
RBC # FLD: 14.6 % — HIGH (ref 10.3–14.5)
RBC # FLD: 14.6 % — HIGH (ref 10.3–14.5)
SODIUM SERPL-SCNC: 138 MMOL/L — SIGNIFICANT CHANGE UP (ref 135–145)
SODIUM SERPL-SCNC: 138 MMOL/L — SIGNIFICANT CHANGE UP (ref 135–145)
WBC # BLD: 9.84 K/UL — SIGNIFICANT CHANGE UP (ref 3.8–10.5)
WBC # BLD: 9.84 K/UL — SIGNIFICANT CHANGE UP (ref 3.8–10.5)
WBC # FLD AUTO: 9.84 K/UL — SIGNIFICANT CHANGE UP (ref 3.8–10.5)
WBC # FLD AUTO: 9.84 K/UL — SIGNIFICANT CHANGE UP (ref 3.8–10.5)

## 2023-12-11 PROCEDURE — 99232 SBSQ HOSP IP/OBS MODERATE 35: CPT | Mod: GC

## 2023-12-11 RX ORDER — POTASSIUM CHLORIDE 20 MEQ
40 PACKET (EA) ORAL ONCE
Refills: 0 | Status: COMPLETED | OUTPATIENT
Start: 2023-12-11 | End: 2023-12-11

## 2023-12-11 RX ADMIN — Medication 25 MILLIGRAM(S): at 21:29

## 2023-12-11 RX ADMIN — NYSTATIN CREAM 1 APPLICATION(S): 100000 CREAM TOPICAL at 05:20

## 2023-12-11 RX ADMIN — Medication 3 MILLILITER(S): at 17:34

## 2023-12-11 RX ADMIN — MUPIROCIN 1 APPLICATION(S): 20 OINTMENT TOPICAL at 05:19

## 2023-12-11 RX ADMIN — Medication 3 MILLILITER(S): at 05:22

## 2023-12-11 RX ADMIN — Medication 25 MILLIGRAM(S): at 05:17

## 2023-12-11 RX ADMIN — Medication 25 MILLIGRAM(S): at 13:38

## 2023-12-11 RX ADMIN — Medication 40 MILLIEQUIVALENT(S): at 08:26

## 2023-12-11 RX ADMIN — BUDESONIDE AND FORMOTEROL FUMARATE DIHYDRATE 2 PUFF(S): 160; 4.5 AEROSOL RESPIRATORY (INHALATION) at 08:26

## 2023-12-11 RX ADMIN — Medication 1 SPRAY(S): at 05:18

## 2023-12-11 RX ADMIN — Medication 3 MILLILITER(S): at 23:55

## 2023-12-11 RX ADMIN — Medication 1: at 13:39

## 2023-12-11 RX ADMIN — AMLODIPINE BESYLATE 10 MILLIGRAM(S): 2.5 TABLET ORAL at 05:17

## 2023-12-11 RX ADMIN — Medication 1: at 17:38

## 2023-12-11 RX ADMIN — BUDESONIDE AND FORMOTEROL FUMARATE DIHYDRATE 2 PUFF(S): 160; 4.5 AEROSOL RESPIRATORY (INHALATION) at 21:29

## 2023-12-11 RX ADMIN — Medication 1 SPRAY(S): at 17:33

## 2023-12-11 RX ADMIN — MUPIROCIN 1 APPLICATION(S): 20 OINTMENT TOPICAL at 17:34

## 2023-12-11 RX ADMIN — Medication 3 MILLILITER(S): at 12:03

## 2023-12-11 RX ADMIN — CHLORHEXIDINE GLUCONATE 1 APPLICATION(S): 213 SOLUTION TOPICAL at 12:07

## 2023-12-11 RX ADMIN — CHLORHEXIDINE GLUCONATE 1 APPLICATION(S): 213 SOLUTION TOPICAL at 05:24

## 2023-12-11 RX ADMIN — Medication 1 SPRAY(S): at 12:03

## 2023-12-11 RX ADMIN — NYSTATIN CREAM 1 APPLICATION(S): 100000 CREAM TOPICAL at 17:34

## 2023-12-11 RX ADMIN — ATORVASTATIN CALCIUM 80 MILLIGRAM(S): 80 TABLET, FILM COATED ORAL at 21:29

## 2023-12-11 RX ADMIN — MONTELUKAST 10 MILLIGRAM(S): 4 TABLET, CHEWABLE ORAL at 12:03

## 2023-12-11 RX ADMIN — Medication 325 MILLIGRAM(S): at 12:03

## 2023-12-11 RX ADMIN — SENNA PLUS 2 TABLET(S): 8.6 TABLET ORAL at 21:29

## 2023-12-11 NOTE — PROGRESS NOTE ADULT - ASSESSMENT
Patient is a 83y old  Female who presents with a chief complaint of LE swelling, dyspnea (11 Dec 2023 08:19)      SUBJECTIVE / OVERNIGHT EVENTS: NAEO. Pt denies chest pain, SOB, N/V, fever/chills, or changes in bowel movements.    MEDICATIONS  (STANDING):  albuterol    90 MICROgram(s) HFA Inhaler 2 Puff(s) Inhalation every 6 hours  albuterol/ipratropium for Nebulization 3 milliLiter(s) Nebulizer every 6 hours  amLODIPine   Tablet 10 milliGRAM(s) Oral daily  atorvastatin 80 milliGRAM(s) Oral at bedtime  budesonide 160 MICROgram(s)/formoterol 4.5 MICROgram(s) Inhaler 2 Puff(s) Inhalation two times a day  chlorhexidine 2% Cloths 1 Application(s) Topical daily  chlorhexidine 4% Liquid 1 Application(s) Topical <User Schedule>  dextrose 5%. 1000 milliLiter(s) (50 mL/Hr) IV Continuous <Continuous>  dextrose 5%. 1000 milliLiter(s) (100 mL/Hr) IV Continuous <Continuous>  dextrose 50% Injectable 25 Gram(s) IV Push once  dextrose 50% Injectable 25 Gram(s) IV Push once  dextrose 50% Injectable 12.5 Gram(s) IV Push once  epoetin sheryl (EPOGEN) Injectable 5000 Unit(s) IV Push <User Schedule>  ferrous    sulfate 325 milliGRAM(s) Oral daily  fluticasone propionate 50 MICROgram(s)/spray Nasal Spray 1 Spray(s) Both Nostrils two times a day  glucagon  Injectable 1 milliGRAM(s) IntraMuscular once  hydrALAZINE 25 milliGRAM(s) Oral three times a day  influenza  Vaccine (HIGH DOSE) 0.7 milliLiter(s) IntraMuscular once  insulin lispro (ADMELOG) corrective regimen sliding scale   SubCutaneous three times a day before meals  insulin lispro (ADMELOG) corrective regimen sliding scale   SubCutaneous at bedtime  montelukast 10 milliGRAM(s) Oral daily  mupirocin 2% Nasal 1 Application(s) Both Nostrils every 12 hours  nystatin Powder 1 Application(s) Topical two times a day  polyethylene glycol 3350 17 Gram(s) Oral daily  senna 2 Tablet(s) Oral at bedtime  sodium chloride 0.65% Nasal 1 Spray(s) Both Nostrils daily    MEDICATIONS  (PRN):  acetaminophen     Tablet .. 650 milliGRAM(s) Oral every 6 hours PRN Temp greater or equal to 38C (100.4F), Mild Pain (1 - 3)  dextrose Oral Gel 15 Gram(s) Oral once PRN Blood Glucose LESS THAN 70 milliGRAM(s)/deciliter  ondansetron Injectable 4 milliGRAM(s) IV Push once PRN Nausea and/or Vomiting  sodium chloride 0.9% lock flush 10 milliLiter(s) IV Push every 1 hour PRN Pre/post blood products, medications, blood draw, and to maintain line patency      CAPILLARY BLOOD GLUCOSE      POCT Blood Glucose.: 125 mg/dL (11 Dec 2023 07:30)  POCT Blood Glucose.: 142 mg/dL (10 Dec 2023 21:25)  POCT Blood Glucose.: 163 mg/dL (10 Dec 2023 16:31)  POCT Blood Glucose.: 140 mg/dL (10 Dec 2023 12:02)    I&O's Summary    10 Dec 2023 07:01  -  11 Dec 2023 07:00  --------------------------------------------------------  IN: 480 mL / OUT: 0 mL / NET: 480 mL    11 Dec 2023 07:01  -  11 Dec 2023 08:51  --------------------------------------------------------  IN: 120 mL / OUT: 0 mL / NET: 120 mL        Vital Signs Last 24 Hrs  T(C): 37 (11 Dec 2023 04:26), Max: 37.4 (10 Dec 2023 21:33)  T(F): 98.6 (11 Dec 2023 04:26), Max: 99.3 (10 Dec 2023 21:33)  HR: 83 (11 Dec 2023 04:26) (83 - 91)  BP: 125/64 (11 Dec 2023 04:26) (110/67 - 125/64)  BP(mean): --  RR: 18 (11 Dec 2023 04:26) (18 - 18)  SpO2: 99% (11 Dec 2023 04:26) (98% - 99%)    Parameters below as of 11 Dec 2023 04:26  Patient On (Oxygen Delivery Method): nasal cannula  O2 Flow (L/min): 2      PHYSICAL EXAM:  GENERAL: NAD, well-developed, well-nourished  HEAD: Atraumatic, Normocephalic  EYES: Conjunctiva and sclera clear  CHEST/LUNG: Clear to auscultation bilaterally; No wheezes or crackles  HEART: Normal S1/S2; Regular rate and rhythm; No murmurs, rubs, or gallops  ABDOMEN: Soft, Nontender, Nondistended; Bowel sounds present  EXTREMITIES: No clubbing, cyanosis, or edema  PSYCH: A&Ox3      LABS:                        8.3    9.84  )-----------( 281      ( 11 Dec 2023 06:22 )             27.0      12-11    138  |  97  |  27<H>  ----------------------------<  107<H>  3.4<L>   |  24  |  4.93<H>    Ca    8.7      11 Dec 2023 06:22  Phos  5.2     12-11  Mg     1.8     12-11    TPro  6.2  /  Alb  3.5  /  TBili  0.4  /  DBili  x   /  AST  17  /  ALT  20  /  AlkPhos  54  12-11          Urinalysis Basic - ( 11 Dec 2023 06:22 )    Color: x / Appearance: x / SG: x / pH: x  Gluc: 107 mg/dL / Ketone: x  / Bili: x / Urobili: x   Blood: x / Protein: x / Nitrite: x   Leuk Esterase: x / RBC: x / WBC x   Sq Epi: x / Non Sq Epi: x / Bacteria: x        RADIOLOGY & ADDITIONAL TESTS:     Patient is a 83 year old F with chronic hypoxemic respiratory failure, CKD, T2DM, CAD, HTN, and HLD who presents for worsening dyspnea and lower extremity edema for 2 days, admitted for suspected acute decompensated heart failure

## 2023-12-11 NOTE — PROGRESS NOTE ADULT - SUBJECTIVE AND OBJECTIVE BOX
Vascular Surgery Progress Note  Patient is a 83y old  Female who presents with a chief complaint of LE swelling, dyspnea (10 Dec 2023 09:47)      INTERVAL EVENTS: No acute events overnight.  SUBJECTIVE: Patient seen and examined at bedside with surgical team, patient without complaints. Denies fever, chills, CP, SOB nausea, vomiting, abdominal pain.      OBJECTIVE:    Vital Signs Last 24 Hrs  T(C): 37 (11 Dec 2023 04:26), Max: 37.4 (10 Dec 2023 21:33)  T(F): 98.6 (11 Dec 2023 04:26), Max: 99.3 (10 Dec 2023 21:33)  HR: 83 (11 Dec 2023 04:26) (83 - 91)  BP: 125/64 (11 Dec 2023 04:26) (110/67 - 125/64)  BP(mean): --  RR: 18 (11 Dec 2023 04:26) (18 - 18)  SpO2: 99% (11 Dec 2023 04:26) (98% - 99%)    Parameters below as of 11 Dec 2023 04:26  Patient On (Oxygen Delivery Method): nasal cannula  O2 Flow (L/min): 2  I&O's Detail    10 Dec 2023 07:01  -  11 Dec 2023 07:00  --------------------------------------------------------  IN:    Oral Fluid: 480 mL  Total IN: 480 mL    OUT:  Total OUT: 0 mL    Total NET: 480 mL      MEDICATIONS  (STANDING):  albuterol    90 MICROgram(s) HFA Inhaler 2 Puff(s) Inhalation every 6 hours  albuterol/ipratropium for Nebulization 3 milliLiter(s) Nebulizer every 6 hours  amLODIPine   Tablet 10 milliGRAM(s) Oral daily  atorvastatin 80 milliGRAM(s) Oral at bedtime  budesonide 160 MICROgram(s)/formoterol 4.5 MICROgram(s) Inhaler 2 Puff(s) Inhalation two times a day  chlorhexidine 2% Cloths 1 Application(s) Topical daily  chlorhexidine 4% Liquid 1 Application(s) Topical <User Schedule>  dextrose 5%. 1000 milliLiter(s) (50 mL/Hr) IV Continuous <Continuous>  dextrose 5%. 1000 milliLiter(s) (100 mL/Hr) IV Continuous <Continuous>  dextrose 50% Injectable 25 Gram(s) IV Push once  dextrose 50% Injectable 12.5 Gram(s) IV Push once  dextrose 50% Injectable 25 Gram(s) IV Push once  epoetin sheryl (EPOGEN) Injectable 5000 Unit(s) IV Push <User Schedule>  ferrous    sulfate 325 milliGRAM(s) Oral daily  fluticasone propionate 50 MICROgram(s)/spray Nasal Spray 1 Spray(s) Both Nostrils two times a day  glucagon  Injectable 1 milliGRAM(s) IntraMuscular once  hydrALAZINE 25 milliGRAM(s) Oral three times a day  influenza  Vaccine (HIGH DOSE) 0.7 milliLiter(s) IntraMuscular once  insulin lispro (ADMELOG) corrective regimen sliding scale   SubCutaneous at bedtime  insulin lispro (ADMELOG) corrective regimen sliding scale   SubCutaneous three times a day before meals  montelukast 10 milliGRAM(s) Oral daily  mupirocin 2% Nasal 1 Application(s) Both Nostrils every 12 hours  nystatin Powder 1 Application(s) Topical two times a day  polyethylene glycol 3350 17 Gram(s) Oral daily  potassium chloride    Tablet ER 40 milliEquivalent(s) Oral once  senna 2 Tablet(s) Oral at bedtime  sodium chloride 0.65% Nasal 1 Spray(s) Both Nostrils daily    MEDICATIONS  (PRN):  acetaminophen     Tablet .. 650 milliGRAM(s) Oral every 6 hours PRN Temp greater or equal to 38C (100.4F), Mild Pain (1 - 3)  dextrose Oral Gel 15 Gram(s) Oral once PRN Blood Glucose LESS THAN 70 milliGRAM(s)/deciliter  ondansetron Injectable 4 milliGRAM(s) IV Push once PRN Nausea and/or Vomiting  sodium chloride 0.9% lock flush 10 milliLiter(s) IV Push every 1 hour PRN Pre/post blood products, medications, blood draw, and to maintain line patency      PHYSICAL EXAM:    General: NAD, resting comfortably  Pulmonary: normal resp effort, CTA-B  Cardiovascular: NSR, no murmurs  Abdominal: soft, ND/NT, no organomegaly  Extremities: WWP +radial/ulnar pulses, arms soft  LUE protected    LABS:                        8.3    9.84  )-----------( 281      ( 11 Dec 2023 06:22 )             27.0     12-11    138  |  97  |  27<H>  ----------------------------<  107<H>  3.4<L>   |  24  |  4.93<H>    Ca    8.7      11 Dec 2023 06:22  Phos  5.2     12-11  Mg     1.8     12-11    TPro  6.2  /  Alb  3.5  /  TBili  0.4  /  DBili  x   /  AST  17  /  ALT  20  /  AlkPhos  54  12-11      LIVER FUNCTIONS - ( 11 Dec 2023 06:22 )  Alb: 3.5 g/dL / Pro: 6.2 g/dL / ALK PHOS: 54 U/L / ALT: 20 U/L / AST: 17 U/L / GGT: x           Urinalysis Basic - ( 11 Dec 2023 06:22 )    Color: x / Appearance: x / SG: x / pH: x  Gluc: 107 mg/dL / Ketone: x  / Bili: x / Urobili: x   Blood: x / Protein: x / Nitrite: x   Leuk Esterase: x / RBC: x / WBC x   Sq Epi: x / Non Sq Epi: x / Bacteria: x          IMAGING:

## 2023-12-11 NOTE — PROGRESS NOTE ADULT - PROBLEM SELECTOR PLAN 3
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  - reportedly has continued to have good urine output  - metabolic  - FeUrea <30.9%--> pre renal picture     Plan:  > follow up urine studies--> FeUrea >35%; prerenal picture   > f/u US Kidney/bladder--> showing parenchymal disease   > hold home ARB and MRA, avoid nephrotoxic meds  - appreciate cardio-renal recs: pending workup --> US showing medical renal disease  - phos binder d/c-ed due to low phosphorus  - hyperkalemia 2/2 kidney disease s/p lokelma and insulin + D50--> repeat BMP wnl  - s/p dialysis 12/5, 12/6, 12/7; consulted vascular surgery for fistula planning  - permacath placed by IR 12/7 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  - reportedly has continued to have good urine output  - metabolic  - FeUrea <30.9%--> pre renal picture     Plan:  > follow up urine studies--> FeUrea >35%; prerenal picture   > f/u US Kidney/bladder--> showing parenchymal disease   > hold home ARB and MRA, avoid nephrotoxic meds  - appreciate cardio-renal recs: pending workup --> US showing medical renal disease  - phos binder d/c-ed due to low phosphorus  - hyperkalemia 2/2 kidney disease s/p lokelma and insulin + D50--> repeat BMP wnl  - s/p dialysis 12/5, 12/6, 12/7; now of TT schedule for dialysis  - permacath placed by IR 12/7  - plan for AVF on 12/14 per vascular surgery

## 2023-12-11 NOTE — PROGRESS NOTE ADULT - ASSESSMENT
83 year old female with HTN, HLD, CAD, T2DM, CKD, remote hx of breast CA s/p mastectomy, and chronic hypoxemic respiratory failure of unknown etiology on home O2 who presents for lower extremity swelling and shortness of breath with subsequent CAL superimposed on CKD. Vascular consulted for AVF planning. Pt is RHD.   - protect LUE- no sticks, blood draws, cuffs  - obtain bilateral upper extremity vein mapping (noted)  - please document medical/cardiac clearances for AVF 12/14  - vascular will follow    #[id number]  Discussed with fellow on behalf of attending 83 year old female with HTN, HLD, CAD, T2DM, CKD, remote hx of breast CA s/p mastectomy, and chronic hypoxemic respiratory failure of unknown etiology on home O2 who presents for lower extremity swelling and shortness of breath with subsequent CAL superimposed on CKD. Vascular consulted for AVF planning. Pt is RHD.   - protect LUE- no sticks, blood draws, cuffs  - obtain bilateral upper extremity vein mapping (noted)  - please document medical/cardiac clearances for AVF 12/14  - vascular will follow    #[id number]  Discussed with fellow on behalf of attending

## 2023-12-11 NOTE — PROGRESS NOTE ADULT - PROBLEM SELECTOR PLAN 4
Patient with several year history of chronic hypoxemic respiratory failure  - requiring home O2, stable on 2L at baseline  - has not had escalating O2 requirements recently, despite feeling subjectively more dyspneic  - unclear etiology, though patient has had PFTs at her pulmologist's office last year    Plan:  > follow up TTE--> showing EF 71%, bi-atrial enlargement, LV diastolic dysfunction, and RV enlargement  > obtain records from pt's pulmonologists office: Dr. Paulino Gayle ([facility], [phone])  > continue with home montelukast and add on duonebs and symbicort   > now on baseline of 2L O2  > s/p prednisone for possible COPD exacerbation   - f/u repeat CXR and BNP (stable CXR and elevated BNP)  - appreciate pulm recs: started on flonase and saline spray Patient with several year history of chronic hypoxemic respiratory failure  - requiring home O2, stable on 2L at baseline  - has not had escalating O2 requirements recently, despite feeling subjectively more dyspneic  - unclear etiology, though patient has had PFTs at her pulmologist's office last year    Plan:  > follow up TTE--> showing EF 71%, bi-atrial enlargement, LV diastolic dysfunction, and RV enlargement  > obtain records from pt's pulmonologists office: Dr. Paulino Gayle ([facility], [phone])  > continue with home montelukast and add on duonebs and symbicort   > now on baseline of 2L O2  > s/p prednisone for possible COPD exacerbation   - f/u repeat CXR and BNP (stable CXR and elevated BNP)  - appreciate pulm recs: started on flonase and saline spray

## 2023-12-11 NOTE — PROGRESS NOTE ADULT - ATTENDING COMMENTS
83 year old female with PMH chronic hypoxic respiratory failure, DM2, CAD, HTN and HLD who presented with dyspnea and lower extremity edema admitted for acute decompensated congestive heart failure. TTE shows LV diastolic dysfunction but normal EF. She was started on IV diuretics however, her creatinine increased to nearly 7 and her GFR is less than 10 so HD was initiated. She is s/p tunneled HD catheter and AVF fistula this week for more permanent access. Her respiratory status has continued to improve after her HD sessions. Rest of plan as above.

## 2023-12-11 NOTE — PROGRESS NOTE ADULT - SUBJECTIVE AND OBJECTIVE BOX
Patient is a 83y old  Female who presents with a chief complaint of LE swelling, dyspnea (11 Dec 2023 08:19)      SUBJECTIVE / OVERNIGHT EVENTS: NAEO. Pt denies chest pain, SOB, N/V, fever/chills, or changes in bowel movements.    MEDICATIONS  (STANDING):  albuterol    90 MICROgram(s) HFA Inhaler 2 Puff(s) Inhalation every 6 hours  albuterol/ipratropium for Nebulization 3 milliLiter(s) Nebulizer every 6 hours  amLODIPine   Tablet 10 milliGRAM(s) Oral daily  atorvastatin 80 milliGRAM(s) Oral at bedtime  budesonide 160 MICROgram(s)/formoterol 4.5 MICROgram(s) Inhaler 2 Puff(s) Inhalation two times a day  chlorhexidine 2% Cloths 1 Application(s) Topical daily  chlorhexidine 4% Liquid 1 Application(s) Topical <User Schedule>  dextrose 5%. 1000 milliLiter(s) (50 mL/Hr) IV Continuous <Continuous>  dextrose 5%. 1000 milliLiter(s) (100 mL/Hr) IV Continuous <Continuous>  dextrose 50% Injectable 25 Gram(s) IV Push once  dextrose 50% Injectable 25 Gram(s) IV Push once  dextrose 50% Injectable 12.5 Gram(s) IV Push once  epoetin sheryl (EPOGEN) Injectable 5000 Unit(s) IV Push <User Schedule>  ferrous    sulfate 325 milliGRAM(s) Oral daily  fluticasone propionate 50 MICROgram(s)/spray Nasal Spray 1 Spray(s) Both Nostrils two times a day  glucagon  Injectable 1 milliGRAM(s) IntraMuscular once  hydrALAZINE 25 milliGRAM(s) Oral three times a day  influenza  Vaccine (HIGH DOSE) 0.7 milliLiter(s) IntraMuscular once  insulin lispro (ADMELOG) corrective regimen sliding scale   SubCutaneous three times a day before meals  insulin lispro (ADMELOG) corrective regimen sliding scale   SubCutaneous at bedtime  montelukast 10 milliGRAM(s) Oral daily  mupirocin 2% Nasal 1 Application(s) Both Nostrils every 12 hours  nystatin Powder 1 Application(s) Topical two times a day  polyethylene glycol 3350 17 Gram(s) Oral daily  senna 2 Tablet(s) Oral at bedtime  sodium chloride 0.65% Nasal 1 Spray(s) Both Nostrils daily    MEDICATIONS  (PRN):  acetaminophen     Tablet .. 650 milliGRAM(s) Oral every 6 hours PRN Temp greater or equal to 38C (100.4F), Mild Pain (1 - 3)  dextrose Oral Gel 15 Gram(s) Oral once PRN Blood Glucose LESS THAN 70 milliGRAM(s)/deciliter  ondansetron Injectable 4 milliGRAM(s) IV Push once PRN Nausea and/or Vomiting  sodium chloride 0.9% lock flush 10 milliLiter(s) IV Push every 1 hour PRN Pre/post blood products, medications, blood draw, and to maintain line patency      CAPILLARY BLOOD GLUCOSE      POCT Blood Glucose.: 125 mg/dL (11 Dec 2023 07:30)  POCT Blood Glucose.: 142 mg/dL (10 Dec 2023 21:25)  POCT Blood Glucose.: 163 mg/dL (10 Dec 2023 16:31)  POCT Blood Glucose.: 140 mg/dL (10 Dec 2023 12:02)    I&O's Summary    10 Dec 2023 07:01  -  11 Dec 2023 07:00  --------------------------------------------------------  IN: 480 mL / OUT: 0 mL / NET: 480 mL    11 Dec 2023 07:01  -  11 Dec 2023 08:51  --------------------------------------------------------  IN: 120 mL / OUT: 0 mL / NET: 120 mL        Vital Signs Last 24 Hrs  T(C): 37 (11 Dec 2023 04:26), Max: 37.4 (10 Dec 2023 21:33)  T(F): 98.6 (11 Dec 2023 04:26), Max: 99.3 (10 Dec 2023 21:33)  HR: 83 (11 Dec 2023 04:26) (83 - 91)  BP: 125/64 (11 Dec 2023 04:26) (110/67 - 125/64)  BP(mean): --  RR: 18 (11 Dec 2023 04:26) (18 - 18)  SpO2: 99% (11 Dec 2023 04:26) (98% - 99%)    Parameters below as of 11 Dec 2023 04:26  Patient On (Oxygen Delivery Method): nasal cannula  O2 Flow (L/min): 2      PHYSICAL EXAM:  GENERAL: NAD, well-developed, well-nourished  HEAD: Atraumatic, Normocephalic  EYES: Conjunctiva and sclera clear  CHEST/LUNG: Clear to auscultation bilaterally; No wheezes or crackles  HEART: Normal S1/S2; Regular rate and rhythm; No murmurs, rubs, or gallops  ABDOMEN: Soft, Nontender, Nondistended; Bowel sounds present  EXTREMITIES: No clubbing, cyanosis, or edema  PSYCH: A&Ox3      LABS:                        8.3    9.84  )-----------( 281      ( 11 Dec 2023 06:22 )             27.0      12-11    138  |  97  |  27<H>  ----------------------------<  107<H>  3.4<L>   |  24  |  4.93<H>    Ca    8.7      11 Dec 2023 06:22  Phos  5.2     12-11  Mg     1.8     12-11    TPro  6.2  /  Alb  3.5  /  TBili  0.4  /  DBili  x   /  AST  17  /  ALT  20  /  AlkPhos  54  12-11          Urinalysis Basic - ( 11 Dec 2023 06:22 )    Color: x / Appearance: x / SG: x / pH: x  Gluc: 107 mg/dL / Ketone: x  / Bili: x / Urobili: x   Blood: x / Protein: x / Nitrite: x   Leuk Esterase: x / RBC: x / WBC x   Sq Epi: x / Non Sq Epi: x / Bacteria: x        RADIOLOGY & ADDITIONAL TESTS:

## 2023-12-12 LAB
ALBUMIN SERPL ELPH-MCNC: 3.5 G/DL — SIGNIFICANT CHANGE UP (ref 3.3–5)
ALBUMIN SERPL ELPH-MCNC: 3.5 G/DL — SIGNIFICANT CHANGE UP (ref 3.3–5)
ALP SERPL-CCNC: 59 U/L — SIGNIFICANT CHANGE UP (ref 40–120)
ALP SERPL-CCNC: 59 U/L — SIGNIFICANT CHANGE UP (ref 40–120)
ALT FLD-CCNC: 19 U/L — SIGNIFICANT CHANGE UP (ref 10–45)
ALT FLD-CCNC: 19 U/L — SIGNIFICANT CHANGE UP (ref 10–45)
ANION GAP SERPL CALC-SCNC: 16 MMOL/L — SIGNIFICANT CHANGE UP (ref 5–17)
ANION GAP SERPL CALC-SCNC: 16 MMOL/L — SIGNIFICANT CHANGE UP (ref 5–17)
AST SERPL-CCNC: 17 U/L — SIGNIFICANT CHANGE UP (ref 10–40)
AST SERPL-CCNC: 17 U/L — SIGNIFICANT CHANGE UP (ref 10–40)
BILIRUB SERPL-MCNC: 0.3 MG/DL — SIGNIFICANT CHANGE UP (ref 0.2–1.2)
BILIRUB SERPL-MCNC: 0.3 MG/DL — SIGNIFICANT CHANGE UP (ref 0.2–1.2)
BUN SERPL-MCNC: 39 MG/DL — HIGH (ref 7–23)
BUN SERPL-MCNC: 39 MG/DL — HIGH (ref 7–23)
CALCIUM SERPL-MCNC: 8.7 MG/DL — SIGNIFICANT CHANGE UP (ref 8.4–10.5)
CALCIUM SERPL-MCNC: 8.7 MG/DL — SIGNIFICANT CHANGE UP (ref 8.4–10.5)
CHLORIDE SERPL-SCNC: 98 MMOL/L — SIGNIFICANT CHANGE UP (ref 96–108)
CHLORIDE SERPL-SCNC: 98 MMOL/L — SIGNIFICANT CHANGE UP (ref 96–108)
CO2 SERPL-SCNC: 25 MMOL/L — SIGNIFICANT CHANGE UP (ref 22–31)
CO2 SERPL-SCNC: 25 MMOL/L — SIGNIFICANT CHANGE UP (ref 22–31)
CREAT SERPL-MCNC: 6.46 MG/DL — HIGH (ref 0.5–1.3)
CREAT SERPL-MCNC: 6.46 MG/DL — HIGH (ref 0.5–1.3)
EGFR: 6 ML/MIN/1.73M2 — LOW
EGFR: 6 ML/MIN/1.73M2 — LOW
GLUCOSE BLDC GLUCOMTR-MCNC: 119 MG/DL — HIGH (ref 70–99)
GLUCOSE BLDC GLUCOMTR-MCNC: 136 MG/DL — HIGH (ref 70–99)
GLUCOSE BLDC GLUCOMTR-MCNC: 136 MG/DL — HIGH (ref 70–99)
GLUCOSE BLDC GLUCOMTR-MCNC: 160 MG/DL — HIGH (ref 70–99)
GLUCOSE BLDC GLUCOMTR-MCNC: 160 MG/DL — HIGH (ref 70–99)
GLUCOSE SERPL-MCNC: 122 MG/DL — HIGH (ref 70–99)
GLUCOSE SERPL-MCNC: 122 MG/DL — HIGH (ref 70–99)
HCT VFR BLD CALC: 25.5 % — LOW (ref 34.5–45)
HCT VFR BLD CALC: 25.5 % — LOW (ref 34.5–45)
HGB BLD-MCNC: 8 G/DL — LOW (ref 11.5–15.5)
HGB BLD-MCNC: 8 G/DL — LOW (ref 11.5–15.5)
MAGNESIUM SERPL-MCNC: 2 MG/DL — SIGNIFICANT CHANGE UP (ref 1.6–2.6)
MAGNESIUM SERPL-MCNC: 2 MG/DL — SIGNIFICANT CHANGE UP (ref 1.6–2.6)
MCHC RBC-ENTMCNC: 28.9 PG — SIGNIFICANT CHANGE UP (ref 27–34)
MCHC RBC-ENTMCNC: 28.9 PG — SIGNIFICANT CHANGE UP (ref 27–34)
MCHC RBC-ENTMCNC: 31.4 GM/DL — LOW (ref 32–36)
MCHC RBC-ENTMCNC: 31.4 GM/DL — LOW (ref 32–36)
MCV RBC AUTO: 92.1 FL — SIGNIFICANT CHANGE UP (ref 80–100)
MCV RBC AUTO: 92.1 FL — SIGNIFICANT CHANGE UP (ref 80–100)
NRBC # BLD: 0 /100 WBCS — SIGNIFICANT CHANGE UP (ref 0–0)
NRBC # BLD: 0 /100 WBCS — SIGNIFICANT CHANGE UP (ref 0–0)
PHOSPHATE SERPL-MCNC: 5.8 MG/DL — HIGH (ref 2.5–4.5)
PHOSPHATE SERPL-MCNC: 5.8 MG/DL — HIGH (ref 2.5–4.5)
PLATELET # BLD AUTO: 279 K/UL — SIGNIFICANT CHANGE UP (ref 150–400)
PLATELET # BLD AUTO: 279 K/UL — SIGNIFICANT CHANGE UP (ref 150–400)
POTASSIUM SERPL-MCNC: 4.1 MMOL/L — SIGNIFICANT CHANGE UP (ref 3.5–5.3)
POTASSIUM SERPL-MCNC: 4.1 MMOL/L — SIGNIFICANT CHANGE UP (ref 3.5–5.3)
POTASSIUM SERPL-SCNC: 4.1 MMOL/L — SIGNIFICANT CHANGE UP (ref 3.5–5.3)
POTASSIUM SERPL-SCNC: 4.1 MMOL/L — SIGNIFICANT CHANGE UP (ref 3.5–5.3)
PROT SERPL-MCNC: 6.3 G/DL — SIGNIFICANT CHANGE UP (ref 6–8.3)
PROT SERPL-MCNC: 6.3 G/DL — SIGNIFICANT CHANGE UP (ref 6–8.3)
RBC # BLD: 2.77 M/UL — LOW (ref 3.8–5.2)
RBC # BLD: 2.77 M/UL — LOW (ref 3.8–5.2)
RBC # FLD: 14.7 % — HIGH (ref 10.3–14.5)
RBC # FLD: 14.7 % — HIGH (ref 10.3–14.5)
SODIUM SERPL-SCNC: 139 MMOL/L — SIGNIFICANT CHANGE UP (ref 135–145)
SODIUM SERPL-SCNC: 139 MMOL/L — SIGNIFICANT CHANGE UP (ref 135–145)
WBC # BLD: 8.43 K/UL — SIGNIFICANT CHANGE UP (ref 3.8–10.5)
WBC # BLD: 8.43 K/UL — SIGNIFICANT CHANGE UP (ref 3.8–10.5)
WBC # FLD AUTO: 8.43 K/UL — SIGNIFICANT CHANGE UP (ref 3.8–10.5)
WBC # FLD AUTO: 8.43 K/UL — SIGNIFICANT CHANGE UP (ref 3.8–10.5)

## 2023-12-12 PROCEDURE — 99232 SBSQ HOSP IP/OBS MODERATE 35: CPT | Mod: GC

## 2023-12-12 RX ADMIN — Medication 3 MILLILITER(S): at 05:31

## 2023-12-12 RX ADMIN — Medication 1 SPRAY(S): at 17:11

## 2023-12-12 RX ADMIN — Medication 3 MILLILITER(S): at 23:40

## 2023-12-12 RX ADMIN — Medication 1 SPRAY(S): at 05:30

## 2023-12-12 RX ADMIN — BUDESONIDE AND FORMOTEROL FUMARATE DIHYDRATE 2 PUFF(S): 160; 4.5 AEROSOL RESPIRATORY (INHALATION) at 21:28

## 2023-12-12 RX ADMIN — POLYETHYLENE GLYCOL 3350 17 GRAM(S): 17 POWDER, FOR SOLUTION ORAL at 13:05

## 2023-12-12 RX ADMIN — MUPIROCIN 1 APPLICATION(S): 20 OINTMENT TOPICAL at 17:09

## 2023-12-12 RX ADMIN — BUDESONIDE AND FORMOTEROL FUMARATE DIHYDRATE 2 PUFF(S): 160; 4.5 AEROSOL RESPIRATORY (INHALATION) at 08:33

## 2023-12-12 RX ADMIN — MUPIROCIN 1 APPLICATION(S): 20 OINTMENT TOPICAL at 05:29

## 2023-12-12 RX ADMIN — CHLORHEXIDINE GLUCONATE 1 APPLICATION(S): 213 SOLUTION TOPICAL at 13:03

## 2023-12-12 RX ADMIN — NYSTATIN CREAM 1 APPLICATION(S): 100000 CREAM TOPICAL at 05:34

## 2023-12-12 RX ADMIN — ERYTHROPOIETIN 5000 UNIT(S): 10000 INJECTION, SOLUTION INTRAVENOUS; SUBCUTANEOUS at 09:46

## 2023-12-12 RX ADMIN — Medication 3 MILLILITER(S): at 13:08

## 2023-12-12 RX ADMIN — Medication 325 MILLIGRAM(S): at 13:04

## 2023-12-12 RX ADMIN — Medication 1: at 17:10

## 2023-12-12 RX ADMIN — CHLORHEXIDINE GLUCONATE 1 APPLICATION(S): 213 SOLUTION TOPICAL at 05:29

## 2023-12-12 RX ADMIN — Medication 25 MILLIGRAM(S): at 13:04

## 2023-12-12 RX ADMIN — ATORVASTATIN CALCIUM 80 MILLIGRAM(S): 80 TABLET, FILM COATED ORAL at 21:27

## 2023-12-12 RX ADMIN — Medication 1 SPRAY(S): at 13:05

## 2023-12-12 RX ADMIN — Medication 3 MILLILITER(S): at 17:09

## 2023-12-12 RX ADMIN — MONTELUKAST 10 MILLIGRAM(S): 4 TABLET, CHEWABLE ORAL at 13:04

## 2023-12-12 RX ADMIN — Medication 25 MILLIGRAM(S): at 21:27

## 2023-12-12 RX ADMIN — NYSTATIN CREAM 1 APPLICATION(S): 100000 CREAM TOPICAL at 17:09

## 2023-12-12 NOTE — PROGRESS NOTE ADULT - ATTENDING COMMENTS
# CAL on CKD - baseline serum creatinine was around 4. Started dialysis in the hospital. HD today.  Planned for AVF.    # Discharge at the dialysis catheter site - no fever chills. We will do a swab at the exit site.     # Medication monitoring encounter: medication dose reviewed. Please dose medications to CrCl<15    The rest of the recommendations as per fellow's note.    Kerri Leyva MD  Attending Nephrologist  [phone] or via [employer] # CAL on CKD - baseline serum creatinine was around 4. Started dialysis in the hospital. HD today.  Planned for AVF.    # Discharge at the dialysis catheter site - no fever chills. We will do a swab at the exit site.     # Medication monitoring encounter: medication dose reviewed. Please dose medications to CrCl<15    The rest of the recommendations as per fellow's note.    Kerri Leyva MD  Attending Nephrologist  [phone] or via [employer]

## 2023-12-12 NOTE — PROGRESS NOTE ADULT - PROBLEM SELECTOR PLAN 3
Reviewed PTH, phos calcium and vitamin D levels. Patient now with low phos while on phos binder.   On no phos binders. Monitor calcium, phos with every BMP.     If you have any questions, please feel free to contact me  Keaton Dye  Nephrology Fellow  [phone]; Prefer [employer] TEAMS  (After 5pm or on weekends please page the on-call fellow). Reviewed PTH, phos calcium and vitamin D levels. Patient now with low phos while on phos binder.   On no phos binders. Monitor calcium, phos with every BMP.     If you have any questions, please feel free to contact me  Keaton Dye  Nephrology Fellow  [phone]; Prefer [employer] TEAMS  (After 5pm or on weekends please page the on-call fellow).

## 2023-12-12 NOTE — PROGRESS NOTE ADULT - ASSESSMENT
83 year old female with HTN, HLD, CAD, T2DM, CKD, remote hx of breast CA s/p mastectomy, and chronic hypoxemic respiratory failure of unknown etiology on home O2 who presents for lower extremity swelling and shortness of breath with subsequent CAL superimposed on CKD. Vascular consulted for AVF planning. Pt is RHD.   - protect LUE- no sticks, blood draws, cuffs  - obtain bilateral upper extremity vein mapping (noted)  - document medical/cardiac clearances for AVF 12/14 (noted)  - OR planning for Thursday  - HD on Wednesday   - Wean off O2 as tolerated prior to OR  - vascular will follow    #[id number]  Discussed with fellow on behalf of attending 83 year old female with HTN, HLD, CAD, T2DM, CKD, remote hx of breast CA s/p mastectomy, and chronic hypoxemic respiratory failure of unknown etiology on home O2 who presents for lower extremity swelling and shortness of breath with subsequent CAL superimposed on CKD. Vascular consulted for AVF planning. Pt is RHD.   - protect LUE- no sticks, blood draws, cuffs  - obtain bilateral upper extremity vein mapping (noted)  - document medical/cardiac clearances for AVF 12/14 (noted)  - OR planning for Thursday  - HD on Wednesday   - Wean off O2 as tolerated prior to OR  - vascular will follow    #[id number]  Discussed with fellow on behalf of attending

## 2023-12-12 NOTE — PROGRESS NOTE ADULT - PROBLEM SELECTOR PLAN 3
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  - reportedly has continued to have good urine output  - metabolic  - FeUrea <30.9%--> pre renal picture     Plan:  > follow up urine studies--> FeUrea >35%; prerenal picture   > f/u US Kidney/bladder--> showing parenchymal disease   > hold home ARB and MRA, avoid nephrotoxic meds  - appreciate cardio-renal recs: pending workup --> US showing medical renal disease  - phos binder d/c-ed due to low phosphorus  - hyperkalemia 2/2 kidney disease s/p lokelma and insulin + D50--> repeat BMP wnl  - s/p dialysis 12/5, 12/6, 12/7; now of TT schedule for dialysis  - permacath placed by IR 12/7  - plan for AVF on 12/14 per vascular surgery

## 2023-12-12 NOTE — PROGRESS NOTE ADULT - PROBLEM SELECTOR PLAN 1
CAL on CKD. Has not seen a physician in the recent past. Given her history its likely to be CAL on CKD 2/2 SAUMYA vs CKD progression. She has pedal edema. US duplex Kidney - showed renal parenchymal disease. UPCR 5.6g, no RBCs, no bacteria. FeUrea borderline but more suggestive of prerenal etiology.  SCr 4.5 on admission, received IV contrast for CTA on evening of 11/21, and Cr had been stable but increased about 2 days after IV contrast exposure suggesting SAUMYA. Serology work up ongoing- so far negative HIV, Hep B, Hep C, C3, C4, Anti GBM antibody, Anti Ds DNA, JAYLAN, ANCA, Serum free light chains, immunofixation. A1c 6.1%. Negative for PLA2R Ab. BNP 1300 initially. TTE with G2DD with elevated filling pressures and severe LA dilation. Patient was diuresed throughout hospitalization. eGFR persistently ~9, with possible uremic symptoms (asterixes, SOB, fatigue) so iHD started 12/5.    Will plan for maintenance iHD today. Catheter with some drainage noted, swabbed. No other signs of infection, will monitor for now. AVF planned for Thursday, 12/14. Monitor BMP and I/O. Please have case management help set up outpatient HD

## 2023-12-12 NOTE — PROGRESS NOTE ADULT - SUBJECTIVE AND OBJECTIVE BOX
Patient is a 83y old  Female who presents with a chief complaint of LE swelling, dyspnea (11 Dec 2023 08:51)      SUBJECTIVE / OVERNIGHT EVENTS: NAEO. Pt denies chest pain, SOB, N/V, fever/chills, or changes in bowel movements.    MEDICATIONS  (STANDING):  albuterol    90 MICROgram(s) HFA Inhaler 2 Puff(s) Inhalation every 6 hours  albuterol/ipratropium for Nebulization 3 milliLiter(s) Nebulizer every 6 hours  amLODIPine   Tablet 10 milliGRAM(s) Oral daily  atorvastatin 80 milliGRAM(s) Oral at bedtime  budesonide 160 MICROgram(s)/formoterol 4.5 MICROgram(s) Inhaler 2 Puff(s) Inhalation two times a day  chlorhexidine 2% Cloths 1 Application(s) Topical daily  chlorhexidine 4% Liquid 1 Application(s) Topical <User Schedule>  dextrose 5%. 1000 milliLiter(s) (100 mL/Hr) IV Continuous <Continuous>  dextrose 5%. 1000 milliLiter(s) (50 mL/Hr) IV Continuous <Continuous>  dextrose 50% Injectable 12.5 Gram(s) IV Push once  dextrose 50% Injectable 25 Gram(s) IV Push once  dextrose 50% Injectable 25 Gram(s) IV Push once  epoetin sheryl (EPOGEN) Injectable 5000 Unit(s) IV Push <User Schedule>  ferrous    sulfate 325 milliGRAM(s) Oral daily  fluticasone propionate 50 MICROgram(s)/spray Nasal Spray 1 Spray(s) Both Nostrils two times a day  glucagon  Injectable 1 milliGRAM(s) IntraMuscular once  hydrALAZINE 25 milliGRAM(s) Oral three times a day  influenza  Vaccine (HIGH DOSE) 0.7 milliLiter(s) IntraMuscular once  insulin lispro (ADMELOG) corrective regimen sliding scale   SubCutaneous three times a day before meals  insulin lispro (ADMELOG) corrective regimen sliding scale   SubCutaneous at bedtime  montelukast 10 milliGRAM(s) Oral daily  mupirocin 2% Nasal 1 Application(s) Both Nostrils every 12 hours  nystatin Powder 1 Application(s) Topical two times a day  polyethylene glycol 3350 17 Gram(s) Oral daily  senna 2 Tablet(s) Oral at bedtime  sodium chloride 0.65% Nasal 1 Spray(s) Both Nostrils daily    MEDICATIONS  (PRN):  acetaminophen     Tablet .. 650 milliGRAM(s) Oral every 6 hours PRN Temp greater or equal to 38C (100.4F), Mild Pain (1 - 3)  dextrose Oral Gel 15 Gram(s) Oral once PRN Blood Glucose LESS THAN 70 milliGRAM(s)/deciliter  ondansetron Injectable 4 milliGRAM(s) IV Push once PRN Nausea and/or Vomiting  sodium chloride 0.9% lock flush 10 milliLiter(s) IV Push every 1 hour PRN Pre/post blood products, medications, blood draw, and to maintain line patency      CAPILLARY BLOOD GLUCOSE      POCT Blood Glucose.: 193 mg/dL (11 Dec 2023 21:44)  POCT Blood Glucose.: 158 mg/dL (11 Dec 2023 17:36)  POCT Blood Glucose.: 167 mg/dL (11 Dec 2023 16:26)  POCT Blood Glucose.: 162 mg/dL (11 Dec 2023 13:34)  POCT Blood Glucose.: 152 mg/dL (11 Dec 2023 11:46)    I&O's Summary    11 Dec 2023 07:01  -  12 Dec 2023 07:00  --------------------------------------------------------  IN: 870 mL / OUT: 200 mL / NET: 670 mL        Vital Signs Last 24 Hrs  T(C): 36.8 (12 Dec 2023 04:54), Max: 37 (11 Dec 2023 11:05)  T(F): 98.3 (12 Dec 2023 04:54), Max: 98.6 (11 Dec 2023 11:05)  HR: 80 (12 Dec 2023 04:54) (80 - 89)  BP: 117/56 (12 Dec 2023 04:54) (103/56 - 127/61)  BP(mean): --  RR: 18 (12 Dec 2023 04:54) (18 - 19)  SpO2: 97% (12 Dec 2023 04:54) (97% - 98%)    Parameters below as of 12 Dec 2023 04:54  Patient On (Oxygen Delivery Method): nasal cannula  O2 Flow (L/min): 2      PHYSICAL EXAM:  GENERAL: NAD, well-developed, well-nourished  HEAD: Atraumatic, Normocephalic  EYES: Conjunctiva and sclera clear  CHEST/LUNG: Clear to auscultation bilaterally; No wheezes or crackles  HEART: Normal S1/S2; Regular rate and rhythm; No murmurs, rubs, or gallops  ABDOMEN: Soft, Nontender, Nondistended; Bowel sounds present  EXTREMITIES: No clubbing, cyanosis, or edema  PSYCH: A&Ox3      LABS:                        8.0    8.43  )-----------( 279      ( 12 Dec 2023 06:03 )             25.5      12-12    139  |  98  |  39<H>  ----------------------------<  122<H>  4.1   |  25  |  6.46<H>    Ca    8.7      12 Dec 2023 06:02  Phos  5.8     12-12  Mg     2.0     12-12    TPro  6.3  /  Alb  3.5  /  TBili  0.3  /  DBili  x   /  AST  17  /  ALT  19  /  AlkPhos  59  12-12          Urinalysis Basic - ( 12 Dec 2023 06:02 )    Color: x / Appearance: x / SG: x / pH: x  Gluc: 122 mg/dL / Ketone: x  / Bili: x / Urobili: x   Blood: x / Protein: x / Nitrite: x   Leuk Esterase: x / RBC: x / WBC x   Sq Epi: x / Non Sq Epi: x / Bacteria: x        RADIOLOGY & ADDITIONAL TESTS:

## 2023-12-12 NOTE — PROGRESS NOTE ADULT - PROBLEM SELECTOR PLAN 4
Patient with several year history of chronic hypoxemic respiratory failure  - requiring home O2, stable on 2L at baseline  - has not had escalating O2 requirements recently, despite feeling subjectively more dyspneic  - unclear etiology, though patient has had PFTs at her pulmologist's office last year    Plan:  > follow up TTE--> showing EF 71%, bi-atrial enlargement, LV diastolic dysfunction, and RV enlargement  > obtain records from pt's pulmonologists office: Dr. Paulino Gayle ([facility], [phone])  > continue with home montelukast and add on duonebs and symbicort   > now on baseline of 2L O2  > s/p prednisone for possible COPD exacerbation   - f/u repeat CXR and BNP (stable CXR and elevated BNP)  - appreciate pulm recs: started on flonase and saline spray Patient with several year history of chronic hypoxemic respiratory failure  - requiring home O2, stable on 2L at baseline  - has not had escalating O2 requirements recently, despite feeling subjectively more dyspneic  - unclear etiology, though patient has had PFTs at her pulmologist's office last year    Plan:  > follow up TTE--> showing EF 71%, bi-atrial enlargement, LV diastolic dysfunction, and RV enlargement  > obtain records from pt's pulmonologists office: Dr. Paulino Gayle ([facility], [phone])  > continue with home montelukast and add on duonebs and symbicort   > now on baseline of 2L O2  > s/p prednisone for possible COPD exacerbation   - f/u repeat CXR and BNP (stable CXR and elevated BNP)  - appreciate pulm recs: started on flonase and saline spray

## 2023-12-12 NOTE — PROGRESS NOTE ADULT - PROBLEM SELECTOR PROBLEM 6
10/15/18 2000   [facility] Group Therapy   Group Name Community Reintegration   Specific Interventions Resocialization   Participation Level Active   Participation Quality Cooperative   Insight/Motivation Good   Affect/Mood Display Appropriate   Cognition Alert      Hypertension

## 2023-12-12 NOTE — PROGRESS NOTE ADULT - ATTENDING COMMENTS
83 year old female with PMH chronic hypoxic respiratory failure, DM2, CAD, HTN and HLD who presented with dyspnea and lower extremity edema admitted for acute decompensated congestive heart failure. TTE shows LV diastolic dysfunction but normal EF. She was started on IV diuretics however, her creatinine increased to nearly 7 and her GFR is less than 10 so HD was initiated. She is s/p tunneled HD catheter and AVF fistula planned for this week for more permanent access. Her respiratory status has continued to improve after her HD sessions. Rest of plan as above.

## 2023-12-12 NOTE — PROGRESS NOTE ADULT - SUBJECTIVE AND OBJECTIVE BOX
[facility] Division of Kidney Diseases & Hypertension  FOLLOW UP NOTE  [phone]--------------------------------------------------------------------------------    Chief Complaint: On HD     24 hour events/subjective:  Pt was seen and examined at the bedside. Pt denies worsening of SOB/ Constipation/ Diarrhea/ Nausea/ Vomiting/ abdominal pain/ chest pain/ tingling/ numbness.        PAST HISTORY  --------------------------------------------------------------------------------  No significant changes to PMH, PSH, FHx, SHx, unless otherwise noted    ALLERGIES & MEDICATIONS  --------------------------------------------------------------------------------  Allergies    No Known Allergies    Intolerances      Standing Inpatient Medications  albuterol    90 MICROgram(s) HFA Inhaler 2 Puff(s) Inhalation every 6 hours  albuterol/ipratropium for Nebulization 3 milliLiter(s) Nebulizer every 6 hours  amLODIPine   Tablet 10 milliGRAM(s) Oral daily  atorvastatin 80 milliGRAM(s) Oral at bedtime  budesonide 160 MICROgram(s)/formoterol 4.5 MICROgram(s) Inhaler 2 Puff(s) Inhalation two times a day  chlorhexidine 2% Cloths 1 Application(s) Topical daily  chlorhexidine 4% Liquid 1 Application(s) Topical <User Schedule>  dextrose 5%. 1000 milliLiter(s) IV Continuous <Continuous>  dextrose 5%. 1000 milliLiter(s) IV Continuous <Continuous>  dextrose 50% Injectable 12.5 Gram(s) IV Push once  dextrose 50% Injectable 25 Gram(s) IV Push once  dextrose 50% Injectable 25 Gram(s) IV Push once  epoetin sheryl (EPOGEN) Injectable 5000 Unit(s) IV Push <User Schedule>  ferrous    sulfate 325 milliGRAM(s) Oral daily  fluticasone propionate 50 MICROgram(s)/spray Nasal Spray 1 Spray(s) Both Nostrils two times a day  glucagon  Injectable 1 milliGRAM(s) IntraMuscular once  hydrALAZINE 25 milliGRAM(s) Oral three times a day  influenza  Vaccine (HIGH DOSE) 0.7 milliLiter(s) IntraMuscular once  insulin lispro (ADMELOG) corrective regimen sliding scale   SubCutaneous at bedtime  insulin lispro (ADMELOG) corrective regimen sliding scale   SubCutaneous three times a day before meals  montelukast 10 milliGRAM(s) Oral daily  mupirocin 2% Nasal 1 Application(s) Both Nostrils every 12 hours  nystatin Powder 1 Application(s) Topical two times a day  polyethylene glycol 3350 17 Gram(s) Oral daily  senna 2 Tablet(s) Oral at bedtime  sodium chloride 0.65% Nasal 1 Spray(s) Both Nostrils daily    PRN Inpatient Medications  acetaminophen     Tablet .. 650 milliGRAM(s) Oral every 6 hours PRN  dextrose Oral Gel 15 Gram(s) Oral once PRN  ondansetron Injectable 4 milliGRAM(s) IV Push once PRN  sodium chloride 0.9% lock flush 10 milliLiter(s) IV Push every 1 hour PRN      REVIEW OF SYSTEMS  --------------------------------------------------------------------------------  per above   All other systems were reviewed and are negative, except as noted.    VITALS/PHYSICAL EXAM  --------------------------------------------------------------------------------  T(C): 36.5 (12-12-23 @ 09:19), Max: 36.9 (12-11-23 @ 21:21)  HR: 90 (12-12-23 @ 09:19) (80 - 90)  BP: 112/61 (12-12-23 @ 09:19) (103/56 - 117/56)  RR: 18 (12-12-23 @ 09:19) (18 - 18)  SpO2: 95% (12-12-23 @ 09:19) (95% - 98%)  Wt(kg): --        12-11-23 @ 07:01  -  12-12-23 @ 07:00  --------------------------------------------------------  IN: 870 mL / OUT: 200 mL / NET: 670 mL    12-12-23 @ 07:01  -  12-12-23 @ 11:31  --------------------------------------------------------  IN: 100 mL / OUT: 0 mL / NET: 100 mL      Physical Exam:  General: no acute distress  Neuro: no focal deficits +forgetful  HEENT: NC/AT, anicteric, no JVD  Pulmonary: breath sounds diminished, on 2L O2  Cardiovascular/Chest: +S1S2,  GI/Abdomen: soft, NT/ND, +bowel sounds  Extremities: No edema  Skin: Warm and dry  Pysch: appropriate mood and affect  Access: right permacath; circumferential erythema around catheter and scabbing, serosanguineous drainage noted      LABS/STUDIES  --------------------------------------------------------------------------------              8.0    8.43  >-----------<  279      [12-12-23 @ 06:03]              25.5     139  |  98  |  39  ----------------------------<  122      [12-12-23 @ 06:02]  4.1   |  25  |  6.46        Ca     8.7     [12-12-23 @ 06:02]      Mg     2.0     [12-12-23 @ 06:02]      Phos  5.8     [12-12-23 @ 06:02]    TPro  6.3  /  Alb  3.5  /  TBili  0.3  /  DBili  x   /  AST  17  /  ALT  19  /  AlkPhos  59  [12-12-23 @ 06:02]          Creatinine Trend:  SCr 6.46 [12-12 @ 06:02]  SCr 4.93 [12-11 @ 06:22]  SCr 3.09 [12-10 @ 06:18]  SCr 3.62 [12-09 @ 06:10]  SCr 2.40 [12-08 @ 06:14]    Urinalysis - [12-12-23 @ 06:02]      Color  / Appearance  / SG  / pH       Gluc 122 / Ketone   / Bili  / Urobili        Blood  / Protein  / Leuk Est  / Nitrite       RBC  / WBC  / Hyaline  / Gran  / Sq Epi  / Non Sq Epi  / Bacteria            [facility] Division of Kidney Diseases & Hypertension  FOLLOW UP NOTE  [phone]--------------------------------------------------------------------------------    Chief Complaint: On HD     24 hour events/subjective:  Pt was seen and examined at the bedside. Pt denies worsening of SOB/ Constipation/ Diarrhea/ Nausea/ Vomiting/ abdominal pain/ chest pain/ tingling/ numbness.        PAST HISTORY  --------------------------------------------------------------------------------  No significant changes to PMH, PSH, FHx, SHx, unless otherwise noted    ALLERGIES & MEDICATIONS  --------------------------------------------------------------------------------  Allergies    No Known Allergies    Intolerances      Standing Inpatient Medications  albuterol    90 MICROgram(s) HFA Inhaler 2 Puff(s) Inhalation every 6 hours  albuterol/ipratropium for Nebulization 3 milliLiter(s) Nebulizer every 6 hours  amLODIPine   Tablet 10 milliGRAM(s) Oral daily  atorvastatin 80 milliGRAM(s) Oral at bedtime  budesonide 160 MICROgram(s)/formoterol 4.5 MICROgram(s) Inhaler 2 Puff(s) Inhalation two times a day  chlorhexidine 2% Cloths 1 Application(s) Topical daily  chlorhexidine 4% Liquid 1 Application(s) Topical <User Schedule>  dextrose 5%. 1000 milliLiter(s) IV Continuous <Continuous>  dextrose 5%. 1000 milliLiter(s) IV Continuous <Continuous>  dextrose 50% Injectable 12.5 Gram(s) IV Push once  dextrose 50% Injectable 25 Gram(s) IV Push once  dextrose 50% Injectable 25 Gram(s) IV Push once  epoetin sheryl (EPOGEN) Injectable 5000 Unit(s) IV Push <User Schedule>  ferrous    sulfate 325 milliGRAM(s) Oral daily  fluticasone propionate 50 MICROgram(s)/spray Nasal Spray 1 Spray(s) Both Nostrils two times a day  glucagon  Injectable 1 milliGRAM(s) IntraMuscular once  hydrALAZINE 25 milliGRAM(s) Oral three times a day  influenza  Vaccine (HIGH DOSE) 0.7 milliLiter(s) IntraMuscular once  insulin lispro (ADMELOG) corrective regimen sliding scale   SubCutaneous at bedtime  insulin lispro (ADMELOG) corrective regimen sliding scale   SubCutaneous three times a day before meals  montelukast 10 milliGRAM(s) Oral daily  mupirocin 2% Nasal 1 Application(s) Both Nostrils every 12 hours  nystatin Powder 1 Application(s) Topical two times a day  polyethylene glycol 3350 17 Gram(s) Oral daily  senna 2 Tablet(s) Oral at bedtime  sodium chloride 0.65% Nasal 1 Spray(s) Both Nostrils daily    PRN Inpatient Medications  acetaminophen     Tablet .. 650 milliGRAM(s) Oral every 6 hours PRN  dextrose Oral Gel 15 Gram(s) Oral once PRN  ondansetron Injectable 4 milliGRAM(s) IV Push once PRN  sodium chloride 0.9% lock flush 10 milliLiter(s) IV Push every 1 hour PRN      REVIEW OF SYSTEMS  --------------------------------------------------------------------------------  per above   All other systems were reviewed and are negative, except as noted.    VITALS/PHYSICAL EXAM  --------------------------------------------------------------------------------  T(C): 36.5 (12-12-23 @ 09:19), Max: 36.9 (12-11-23 @ 21:21)  HR: 90 (12-12-23 @ 09:19) (80 - 90)  BP: 112/61 (12-12-23 @ 09:19) (103/56 - 117/56)  RR: 18 (12-12-23 @ 09:19) (18 - 18)  SpO2: 95% (12-12-23 @ 09:19) (95% - 98%)  Wt(kg): --        12-11-23 @ 07:01  -  12-12-23 @ 07:00  --------------------------------------------------------  IN: 870 mL / OUT: 200 mL / NET: 670 mL    12-12-23 @ 07:01  -  12-12-23 @ 11:31  --------------------------------------------------------  IN: 100 mL / OUT: 0 mL / NET: 100 mL      Physical Exam:  General: no acute distress  Neuro: no focal deficits +forgetful  HEENT: NC/AT, anicteric, no JVD  Pulmonary: breath sounds diminished, on 2L O2  Cardiovascular/Chest: +S1S2,  GI/Abdomen: soft, NT/ND, +bowel sounds  Extremities: No edema  Skin: Warm and dry  Pysch: appropriate mood and affect  Access: right permacath; circumferential erythema around catheter and scabbing, serosanguineous drainage noted      LABS/STUDIES  --------------------------------------------------------------------------------              8.0    8.43  >-----------<  279      [12-12-23 @ 06:03]              25.5     139  |  98  |  39  ----------------------------<  122      [12-12-23 @ 06:02]  4.1   |  25  |  6.46        Ca     8.7     [12-12-23 @ 06:02]      Mg     2.0     [12-12-23 @ 06:02]      Phos  5.8     [12-12-23 @ 06:02]    TPro  6.3  /  Alb  3.5  /  TBili  0.3  /  DBili  x   /  AST  17  /  ALT  19  /  AlkPhos  59  [12-12-23 @ 06:02]          Creatinine Trend:  SCr 6.46 [12-12 @ 06:02]  SCr 4.93 [12-11 @ 06:22]  SCr 3.09 [12-10 @ 06:18]  SCr 3.62 [12-09 @ 06:10]  SCr 2.40 [12-08 @ 06:14]    Urinalysis - [12-12-23 @ 06:02]      Color  / Appearance  / SG  / pH       Gluc 122 / Ketone   / Bili  / Urobili        Blood  / Protein  / Leuk Est  / Nitrite       RBC  / WBC  / Hyaline  / Gran  / Sq Epi  / Non Sq Epi  / Bacteria

## 2023-12-13 LAB
ALBUMIN SERPL ELPH-MCNC: 3.9 G/DL — SIGNIFICANT CHANGE UP (ref 3.3–5)
ALBUMIN SERPL ELPH-MCNC: 3.9 G/DL — SIGNIFICANT CHANGE UP (ref 3.3–5)
ALP SERPL-CCNC: 66 U/L — SIGNIFICANT CHANGE UP (ref 40–120)
ALP SERPL-CCNC: 66 U/L — SIGNIFICANT CHANGE UP (ref 40–120)
ALT FLD-CCNC: 19 U/L — SIGNIFICANT CHANGE UP (ref 10–45)
ALT FLD-CCNC: 19 U/L — SIGNIFICANT CHANGE UP (ref 10–45)
ANION GAP SERPL CALC-SCNC: 14 MMOL/L — SIGNIFICANT CHANGE UP (ref 5–17)
ANION GAP SERPL CALC-SCNC: 14 MMOL/L — SIGNIFICANT CHANGE UP (ref 5–17)
AST SERPL-CCNC: 19 U/L — SIGNIFICANT CHANGE UP (ref 10–40)
AST SERPL-CCNC: 19 U/L — SIGNIFICANT CHANGE UP (ref 10–40)
BILIRUB SERPL-MCNC: 0.4 MG/DL — SIGNIFICANT CHANGE UP (ref 0.2–1.2)
BILIRUB SERPL-MCNC: 0.4 MG/DL — SIGNIFICANT CHANGE UP (ref 0.2–1.2)
BUN SERPL-MCNC: 28 MG/DL — HIGH (ref 7–23)
BUN SERPL-MCNC: 28 MG/DL — HIGH (ref 7–23)
CALCIUM SERPL-MCNC: 8.8 MG/DL — SIGNIFICANT CHANGE UP (ref 8.4–10.5)
CALCIUM SERPL-MCNC: 8.8 MG/DL — SIGNIFICANT CHANGE UP (ref 8.4–10.5)
CHLORIDE SERPL-SCNC: 95 MMOL/L — LOW (ref 96–108)
CHLORIDE SERPL-SCNC: 95 MMOL/L — LOW (ref 96–108)
CO2 SERPL-SCNC: 28 MMOL/L — SIGNIFICANT CHANGE UP (ref 22–31)
CO2 SERPL-SCNC: 28 MMOL/L — SIGNIFICANT CHANGE UP (ref 22–31)
CREAT SERPL-MCNC: 4.64 MG/DL — HIGH (ref 0.5–1.3)
CREAT SERPL-MCNC: 4.64 MG/DL — HIGH (ref 0.5–1.3)
EGFR: 9 ML/MIN/1.73M2 — LOW
EGFR: 9 ML/MIN/1.73M2 — LOW
GLUCOSE BLDC GLUCOMTR-MCNC: 109 MG/DL — HIGH (ref 70–99)
GLUCOSE BLDC GLUCOMTR-MCNC: 109 MG/DL — HIGH (ref 70–99)
GLUCOSE BLDC GLUCOMTR-MCNC: 127 MG/DL — HIGH (ref 70–99)
GLUCOSE BLDC GLUCOMTR-MCNC: 127 MG/DL — HIGH (ref 70–99)
GLUCOSE BLDC GLUCOMTR-MCNC: 130 MG/DL — HIGH (ref 70–99)
GLUCOSE BLDC GLUCOMTR-MCNC: 130 MG/DL — HIGH (ref 70–99)
GLUCOSE BLDC GLUCOMTR-MCNC: 194 MG/DL — HIGH (ref 70–99)
GLUCOSE BLDC GLUCOMTR-MCNC: 194 MG/DL — HIGH (ref 70–99)
GLUCOSE SERPL-MCNC: 110 MG/DL — HIGH (ref 70–99)
GLUCOSE SERPL-MCNC: 110 MG/DL — HIGH (ref 70–99)
HCT VFR BLD CALC: 29.6 % — LOW (ref 34.5–45)
HCT VFR BLD CALC: 29.6 % — LOW (ref 34.5–45)
HGB BLD-MCNC: 9.3 G/DL — LOW (ref 11.5–15.5)
HGB BLD-MCNC: 9.3 G/DL — LOW (ref 11.5–15.5)
MAGNESIUM SERPL-MCNC: 1.9 MG/DL — SIGNIFICANT CHANGE UP (ref 1.6–2.6)
MAGNESIUM SERPL-MCNC: 1.9 MG/DL — SIGNIFICANT CHANGE UP (ref 1.6–2.6)
MCHC RBC-ENTMCNC: 28.8 PG — SIGNIFICANT CHANGE UP (ref 27–34)
MCHC RBC-ENTMCNC: 28.8 PG — SIGNIFICANT CHANGE UP (ref 27–34)
MCHC RBC-ENTMCNC: 31.4 GM/DL — LOW (ref 32–36)
MCHC RBC-ENTMCNC: 31.4 GM/DL — LOW (ref 32–36)
MCV RBC AUTO: 91.6 FL — SIGNIFICANT CHANGE UP (ref 80–100)
MCV RBC AUTO: 91.6 FL — SIGNIFICANT CHANGE UP (ref 80–100)
NRBC # BLD: 0 /100 WBCS — SIGNIFICANT CHANGE UP (ref 0–0)
NRBC # BLD: 0 /100 WBCS — SIGNIFICANT CHANGE UP (ref 0–0)
PHOSPHATE SERPL-MCNC: 4.4 MG/DL — SIGNIFICANT CHANGE UP (ref 2.5–4.5)
PHOSPHATE SERPL-MCNC: 4.4 MG/DL — SIGNIFICANT CHANGE UP (ref 2.5–4.5)
PLATELET # BLD AUTO: 272 K/UL — SIGNIFICANT CHANGE UP (ref 150–400)
PLATELET # BLD AUTO: 272 K/UL — SIGNIFICANT CHANGE UP (ref 150–400)
POTASSIUM SERPL-MCNC: 3.8 MMOL/L — SIGNIFICANT CHANGE UP (ref 3.5–5.3)
POTASSIUM SERPL-MCNC: 3.8 MMOL/L — SIGNIFICANT CHANGE UP (ref 3.5–5.3)
POTASSIUM SERPL-SCNC: 3.8 MMOL/L — SIGNIFICANT CHANGE UP (ref 3.5–5.3)
POTASSIUM SERPL-SCNC: 3.8 MMOL/L — SIGNIFICANT CHANGE UP (ref 3.5–5.3)
PROT SERPL-MCNC: 6.8 G/DL — SIGNIFICANT CHANGE UP (ref 6–8.3)
PROT SERPL-MCNC: 6.8 G/DL — SIGNIFICANT CHANGE UP (ref 6–8.3)
RBC # BLD: 3.23 M/UL — LOW (ref 3.8–5.2)
RBC # BLD: 3.23 M/UL — LOW (ref 3.8–5.2)
RBC # FLD: 14.8 % — HIGH (ref 10.3–14.5)
RBC # FLD: 14.8 % — HIGH (ref 10.3–14.5)
SODIUM SERPL-SCNC: 137 MMOL/L — SIGNIFICANT CHANGE UP (ref 135–145)
SODIUM SERPL-SCNC: 137 MMOL/L — SIGNIFICANT CHANGE UP (ref 135–145)
WBC # BLD: 7.53 K/UL — SIGNIFICANT CHANGE UP (ref 3.8–10.5)
WBC # BLD: 7.53 K/UL — SIGNIFICANT CHANGE UP (ref 3.8–10.5)
WBC # FLD AUTO: 7.53 K/UL — SIGNIFICANT CHANGE UP (ref 3.8–10.5)
WBC # FLD AUTO: 7.53 K/UL — SIGNIFICANT CHANGE UP (ref 3.8–10.5)

## 2023-12-13 PROCEDURE — 99232 SBSQ HOSP IP/OBS MODERATE 35: CPT | Mod: GC

## 2023-12-13 PROCEDURE — 99232 SBSQ HOSP IP/OBS MODERATE 35: CPT

## 2023-12-13 RX ORDER — BACITRACIN AND POLYMYXIN B SULFATE 500; 10000 [USP'U]/G; [USP'U]/G
1 OINTMENT TOPICAL ONCE
Refills: 0 | Status: COMPLETED | OUTPATIENT
Start: 2023-12-13 | End: 2023-12-14

## 2023-12-13 RX ADMIN — Medication 25 MILLIGRAM(S): at 21:27

## 2023-12-13 RX ADMIN — NYSTATIN CREAM 1 APPLICATION(S): 100000 CREAM TOPICAL at 05:12

## 2023-12-13 RX ADMIN — Medication 3 MILLILITER(S): at 23:00

## 2023-12-13 RX ADMIN — Medication 3 MILLILITER(S): at 17:40

## 2023-12-13 RX ADMIN — Medication 3 MILLILITER(S): at 11:14

## 2023-12-13 RX ADMIN — AMLODIPINE BESYLATE 10 MILLIGRAM(S): 2.5 TABLET ORAL at 05:11

## 2023-12-13 RX ADMIN — CHLORHEXIDINE GLUCONATE 1 APPLICATION(S): 213 SOLUTION TOPICAL at 06:01

## 2023-12-13 RX ADMIN — Medication 1 SPRAY(S): at 17:39

## 2023-12-13 RX ADMIN — MONTELUKAST 10 MILLIGRAM(S): 4 TABLET, CHEWABLE ORAL at 11:14

## 2023-12-13 RX ADMIN — BUDESONIDE AND FORMOTEROL FUMARATE DIHYDRATE 2 PUFF(S): 160; 4.5 AEROSOL RESPIRATORY (INHALATION) at 21:27

## 2023-12-13 RX ADMIN — ATORVASTATIN CALCIUM 80 MILLIGRAM(S): 80 TABLET, FILM COATED ORAL at 21:26

## 2023-12-13 RX ADMIN — Medication 325 MILLIGRAM(S): at 11:14

## 2023-12-13 RX ADMIN — Medication 1: at 12:07

## 2023-12-13 RX ADMIN — MUPIROCIN 1 APPLICATION(S): 20 OINTMENT TOPICAL at 05:12

## 2023-12-13 RX ADMIN — Medication 1 SPRAY(S): at 05:12

## 2023-12-13 RX ADMIN — NYSTATIN CREAM 1 APPLICATION(S): 100000 CREAM TOPICAL at 17:40

## 2023-12-13 RX ADMIN — Medication 25 MILLIGRAM(S): at 15:50

## 2023-12-13 RX ADMIN — CHLORHEXIDINE GLUCONATE 1 APPLICATION(S): 213 SOLUTION TOPICAL at 11:15

## 2023-12-13 RX ADMIN — Medication 3 MILLILITER(S): at 05:12

## 2023-12-13 RX ADMIN — MUPIROCIN 1 APPLICATION(S): 20 OINTMENT TOPICAL at 17:40

## 2023-12-13 RX ADMIN — Medication 25 MILLIGRAM(S): at 05:12

## 2023-12-13 RX ADMIN — BUDESONIDE AND FORMOTEROL FUMARATE DIHYDRATE 2 PUFF(S): 160; 4.5 AEROSOL RESPIRATORY (INHALATION) at 08:44

## 2023-12-13 RX ADMIN — POLYETHYLENE GLYCOL 3350 17 GRAM(S): 17 POWDER, FOR SOLUTION ORAL at 11:14

## 2023-12-13 NOTE — PROGRESS NOTE ADULT - PROBLEM SELECTOR PLAN 8
Patient on home rosuvastatin    Plan:  > continue with therapeutic interchange with atorvastatin DVT PPx: heparin subq  Diet: DASH  Code: FULL  Dispo: medically active  Communication: discussed with  at bedside  Risk stratification for fistula placement  - RCRI of 2 points, Class III Risk -- 10.1% 30-day risk of death, MI, or cardiac arrest  - Pt reports being able to do 3-4 METS activities indicating moderate functional capacity; pt states she is able to walk up stairs, walks, dances, does chores at home  - no contraindication from medical standpoint for AVF placement

## 2023-12-13 NOTE — PROGRESS NOTE ADULT - SUBJECTIVE AND OBJECTIVE BOX
VASCULAR SURGERY DAILY PROGRESS NOTE:     SUBJECTIVE/ROS: Patient feels well, seen in HD with no complaints.    MEDICATIONS  (STANDING):  albuterol    90 MICROgram(s) HFA Inhaler 2 Puff(s) Inhalation every 6 hours  albuterol/ipratropium for Nebulization 3 milliLiter(s) Nebulizer every 6 hours  amLODIPine   Tablet 10 milliGRAM(s) Oral daily  atorvastatin 80 milliGRAM(s) Oral at bedtime  bacitracin/polymyxin B Ointment 1 Application(s) Topical once  budesonide 160 MICROgram(s)/formoterol 4.5 MICROgram(s) Inhaler 2 Puff(s) Inhalation two times a day  chlorhexidine 2% Cloths 1 Application(s) Topical daily  chlorhexidine 4% Liquid 1 Application(s) Topical <User Schedule>  dextrose 5%. 1000 milliLiter(s) (50 mL/Hr) IV Continuous <Continuous>  dextrose 5%. 1000 milliLiter(s) (100 mL/Hr) IV Continuous <Continuous>  dextrose 50% Injectable 25 Gram(s) IV Push once  dextrose 50% Injectable 12.5 Gram(s) IV Push once  dextrose 50% Injectable 25 Gram(s) IV Push once  epoetin sheryl (EPOGEN) Injectable 5000 Unit(s) IV Push <User Schedule>  ferrous    sulfate 325 milliGRAM(s) Oral daily  fluticasone propionate 50 MICROgram(s)/spray Nasal Spray 1 Spray(s) Both Nostrils two times a day  glucagon  Injectable 1 milliGRAM(s) IntraMuscular once  hydrALAZINE 25 milliGRAM(s) Oral three times a day  influenza  Vaccine (HIGH DOSE) 0.7 milliLiter(s) IntraMuscular once  insulin lispro (ADMELOG) corrective regimen sliding scale   SubCutaneous three times a day before meals  insulin lispro (ADMELOG) corrective regimen sliding scale   SubCutaneous at bedtime  montelukast 10 milliGRAM(s) Oral daily  mupirocin 2% Nasal 1 Application(s) Both Nostrils every 12 hours  nystatin Powder 1 Application(s) Topical two times a day  polyethylene glycol 3350 17 Gram(s) Oral daily  senna 2 Tablet(s) Oral at bedtime  sodium chloride 0.65% Nasal 1 Spray(s) Both Nostrils daily    MEDICATIONS  (PRN):  acetaminophen     Tablet .. 650 milliGRAM(s) Oral every 6 hours PRN Temp greater or equal to 38C (100.4F), Mild Pain (1 - 3)  dextrose Oral Gel 15 Gram(s) Oral once PRN Blood Glucose LESS THAN 70 milliGRAM(s)/deciliter  ondansetron Injectable 4 milliGRAM(s) IV Push once PRN Nausea and/or Vomiting  sodium chloride 0.9% lock flush 10 milliLiter(s) IV Push every 1 hour PRN Pre/post blood products, medications, blood draw, and to maintain line patency      OBJECTIVE:    Vital Signs Last 24 Hrs  T(C): 36.6 (13 Dec 2023 14:25), Max: 37.2 (12 Dec 2023 20:54)  T(F): 97.9 (13 Dec 2023 14:), Max: 98.9 (12 Dec 2023 20:54)  HR: 82 (13 Dec 2023 14:25) (77 - 91)  BP: 135/62 (13 Dec 2023 14:25) (116/55 - 135/62)  BP(mean): --  RR: 17 (13 Dec 2023 14:25) (17 - 18)  SpO2: 98% (13 Dec 2023 14:25) (95% - 98%)    Parameters below as of 13 Dec 2023 14:25  Patient On (Oxygen Delivery Method): nasal cannula  O2 Flow (L/min): 2          I&O's Detail    12 Dec 2023 07:01  -  13 Dec 2023 07:00  --------------------------------------------------------  IN:    Oral Fluid: 920 mL  Total IN: 920 mL    OUT:    Other (mL): 2500 mL  Total OUT: 2500 mL    Total NET: -1580 mL      13 Dec 2023 07:01  -  13 Dec 2023 15:24  --------------------------------------------------------  IN:    Oral Fluid: 200 mL  Total IN: 200 mL    OUT:    Other (mL): 500 mL    Voided (mL): 100 mL  Total OUT: 600 mL    Total NET: -400 mL          Daily     Daily Weight in k (13 Dec 2023 14:25)    LABS:                        9.3    7.53  )-----------( 272      ( 13 Dec 2023 07:16 )             29.6     12    137  |  95<L>  |  28<H>  ----------------------------<  110<H>  3.8   |  28  |  4.64<H>    Ca    8.8      13 Dec 2023 07:18  Phos  4.4     12  Mg     1.9         TPro  6.8  /  Alb  3.9  /  TBili  0.4  /  DBili  x   /  AST  19  /  ALT  19  /  AlkPhos  66  12-13      Urinalysis Basic - ( 13 Dec 2023 07:18 )    Color: x / Appearance: x / SG: x / pH: x  Gluc: 110 mg/dL / Ketone: x  / Bili: x / Urobili: x   Blood: x / Protein: x / Nitrite: x   Leuk Esterase: x / RBC: x / WBC x   Sq Epi: x / Non Sq Epi: x / Bacteria: x                PHYSICAL EXAM:  General: NAD, resting comfortably  Pulmonary: normal resp effort, CTA-B  Cardiovascular: NSR, no murmurs  Abdominal: soft, ND/NT, no organomegaly  Extremities: WWP +radial/ulnar pulses, arms soft  LUE protected

## 2023-12-13 NOTE — CONSULT NOTE ADULT - PROVIDER SPECIALTY LIST ADULT
Intervent Radiology
Cardiology
Nephrology-Cardiorenal
Pulmonology
Vascular Surgery

## 2023-12-13 NOTE — PROGRESS NOTE ADULT - PROBLEM SELECTOR PLAN 4
Patient with several year history of chronic hypoxemic respiratory failure  - requiring home O2, stable on 2L at baseline  - has not had escalating O2 requirements recently, despite feeling subjectively more dyspneic  - unclear etiology, though patient has had PFTs at her pulmologist's office last year    Plan:  > follow up TTE--> showing EF 71%, bi-atrial enlargement, LV diastolic dysfunction, and RV enlargement  > obtain records from pt's pulmonologists office: Dr. Paulino Gayle ([facility], [phone])  > continue with home montelukast and add on duonebs and symbicort   > now on baseline of 2L O2  > s/p prednisone for possible COPD exacerbation   - f/u repeat CXR and BNP (stable CXR and elevated BNP)  - appreciate pulm recs: started on flonase and saline spray Patient with several year history of chronic hypoxemic respiratory failure  - requiring home O2, stable on 2L at baseline  - has not had escalating O2 requirements recently, despite feeling subjectively more dyspneic  - unclear etiology, though patient has had PFTs at her pulmologist's office last year    Plan:  > follow up TTE--> showing EF 71%, bi-atrial enlargement, LV diastolic dysfunction, and RV enlargement  > obtain records from pt's pulmonologists office: Dr. Paulino Gayle ([facility], [phone])  > continue with home montelukast and add on duonebs and symbicort   > now on baseline of 2L O2  > s/p prednisone for possible COPD exacerbation   - f/u repeat CXR and BNP (stable CXR and elevated BNP)  - appreciate pulm recs: started on flonase and saline spray

## 2023-12-13 NOTE — PROGRESS NOTE ADULT - SUBJECTIVE AND OBJECTIVE BOX
Patient is a 83y old  Female who presents with a chief complaint of LE swelling, dyspnea (12 Dec 2023 12:11)      SUBJECTIVE / OVERNIGHT EVENTS: NAEO. Pt denies chest pain, SOB, N/V, fever/chills, or changes in bowel movements.    MEDICATIONS  (STANDING):  albuterol    90 MICROgram(s) HFA Inhaler 2 Puff(s) Inhalation every 6 hours  albuterol/ipratropium for Nebulization 3 milliLiter(s) Nebulizer every 6 hours  amLODIPine   Tablet 10 milliGRAM(s) Oral daily  atorvastatin 80 milliGRAM(s) Oral at bedtime  budesonide 160 MICROgram(s)/formoterol 4.5 MICROgram(s) Inhaler 2 Puff(s) Inhalation two times a day  chlorhexidine 2% Cloths 1 Application(s) Topical daily  chlorhexidine 4% Liquid 1 Application(s) Topical <User Schedule>  dextrose 5%. 1000 milliLiter(s) (100 mL/Hr) IV Continuous <Continuous>  dextrose 5%. 1000 milliLiter(s) (50 mL/Hr) IV Continuous <Continuous>  dextrose 50% Injectable 12.5 Gram(s) IV Push once  dextrose 50% Injectable 25 Gram(s) IV Push once  dextrose 50% Injectable 25 Gram(s) IV Push once  epoetin sheryl (EPOGEN) Injectable 5000 Unit(s) IV Push <User Schedule>  ferrous    sulfate 325 milliGRAM(s) Oral daily  fluticasone propionate 50 MICROgram(s)/spray Nasal Spray 1 Spray(s) Both Nostrils two times a day  glucagon  Injectable 1 milliGRAM(s) IntraMuscular once  hydrALAZINE 25 milliGRAM(s) Oral three times a day  influenza  Vaccine (HIGH DOSE) 0.7 milliLiter(s) IntraMuscular once  insulin lispro (ADMELOG) corrective regimen sliding scale   SubCutaneous three times a day before meals  insulin lispro (ADMELOG) corrective regimen sliding scale   SubCutaneous at bedtime  montelukast 10 milliGRAM(s) Oral daily  mupirocin 2% Nasal 1 Application(s) Both Nostrils every 12 hours  nystatin Powder 1 Application(s) Topical two times a day  polyethylene glycol 3350 17 Gram(s) Oral daily  senna 2 Tablet(s) Oral at bedtime  sodium chloride 0.65% Nasal 1 Spray(s) Both Nostrils daily    MEDICATIONS  (PRN):  acetaminophen     Tablet .. 650 milliGRAM(s) Oral every 6 hours PRN Temp greater or equal to 38C (100.4F), Mild Pain (1 - 3)  dextrose Oral Gel 15 Gram(s) Oral once PRN Blood Glucose LESS THAN 70 milliGRAM(s)/deciliter  ondansetron Injectable 4 milliGRAM(s) IV Push once PRN Nausea and/or Vomiting  sodium chloride 0.9% lock flush 10 milliLiter(s) IV Push every 1 hour PRN Pre/post blood products, medications, blood draw, and to maintain line patency      CAPILLARY BLOOD GLUCOSE      POCT Blood Glucose.: 127 mg/dL (13 Dec 2023 07:33)  POCT Blood Glucose.: 136 mg/dL (12 Dec 2023 21:49)  POCT Blood Glucose.: 160 mg/dL (12 Dec 2023 16:39)  POCT Blood Glucose.: 119 mg/dL (12 Dec 2023 12:43)    I&O's Summary    12 Dec 2023 07:01  -  13 Dec 2023 07:00  --------------------------------------------------------  IN: 920 mL / OUT: 2500 mL / NET: -1580 mL    13 Dec 2023 07:01  -  13 Dec 2023 10:32  --------------------------------------------------------  IN: 200 mL / OUT: 0 mL / NET: 200 mL        Vital Signs Last 24 Hrs  T(C): 36.4 (13 Dec 2023 05:02), Max: 37.2 (12 Dec 2023 20:54)  T(F): 97.6 (13 Dec 2023 05:02), Max: 98.9 (12 Dec 2023 20:54)  HR: 85 (13 Dec 2023 05:02) (67 - 97)  BP: 127/69 (13 Dec 2023 05:02) (123/68 - 142/71)  BP(mean): --  RR: 18 (13 Dec 2023 05:02) (18 - 18)  SpO2: 95% (13 Dec 2023 05:02) (95% - 97%)    Parameters below as of 13 Dec 2023 05:02  Patient On (Oxygen Delivery Method): nasal cannula  O2 Flow (L/min): 2      PHYSICAL EXAM:  GENERAL: NAD, well-developed, well-nourished  HEAD: Atraumatic, Normocephalic  EYES: Conjunctiva and sclera clear  CHEST/LUNG: Clear to auscultation bilaterally; No wheezes or crackles  HEART: Normal S1/S2; Regular rate and rhythm; No murmurs, rubs, or gallops  ABDOMEN: Soft, Nontender, Nondistended; Bowel sounds present  EXTREMITIES: No clubbing, cyanosis, or edema  PSYCH: A&Ox3      LABS:                        9.3    7.53  )-----------( 272      ( 13 Dec 2023 07:16 )             29.6      12-13    137  |  95<L>  |  28<H>  ----------------------------<  110<H>  3.8   |  28  |  4.64<H>    Ca    8.8      13 Dec 2023 07:18  Phos  4.4     12-13  Mg     1.9     12-13    TPro  6.8  /  Alb  3.9  /  TBili  0.4  /  DBili  x   /  AST  19  /  ALT  19  /  AlkPhos  66  12-13          Urinalysis Basic - ( 13 Dec 2023 07:18 )    Color: x / Appearance: x / SG: x / pH: x  Gluc: 110 mg/dL / Ketone: x  / Bili: x / Urobili: x   Blood: x / Protein: x / Nitrite: x   Leuk Esterase: x / RBC: x / WBC x   Sq Epi: x / Non Sq Epi: x / Bacteria: x        RADIOLOGY & ADDITIONAL TESTS:     Patient is a 83y old  Female who presents with a chief complaint of LE swelling, dyspnea (12 Dec 2023 12:11)      SUBJECTIVE / OVERNIGHT EVENTS: NAEO. Pt denies chest pain, SOB, N/V, fever/chills, or changes in bowel movements.    MEDICATIONS  (STANDING):  albuterol    90 MICROgram(s) HFA Inhaler 2 Puff(s) Inhalation every 6 hours  albuterol/ipratropium for Nebulization 3 milliLiter(s) Nebulizer every 6 hours  amLODIPine   Tablet 10 milliGRAM(s) Oral daily  atorvastatin 80 milliGRAM(s) Oral at bedtime  budesonide 160 MICROgram(s)/formoterol 4.5 MICROgram(s) Inhaler 2 Puff(s) Inhalation two times a day  chlorhexidine 2% Cloths 1 Application(s) Topical daily  chlorhexidine 4% Liquid 1 Application(s) Topical <User Schedule>  dextrose 5%. 1000 milliLiter(s) (100 mL/Hr) IV Continuous <Continuous>  dextrose 5%. 1000 milliLiter(s) (50 mL/Hr) IV Continuous <Continuous>  dextrose 50% Injectable 12.5 Gram(s) IV Push once  dextrose 50% Injectable 25 Gram(s) IV Push once  dextrose 50% Injectable 25 Gram(s) IV Push once  epoetin sheryl (EPOGEN) Injectable 5000 Unit(s) IV Push <User Schedule>  ferrous    sulfate 325 milliGRAM(s) Oral daily  fluticasone propionate 50 MICROgram(s)/spray Nasal Spray 1 Spray(s) Both Nostrils two times a day  glucagon  Injectable 1 milliGRAM(s) IntraMuscular once  hydrALAZINE 25 milliGRAM(s) Oral three times a day  influenza  Vaccine (HIGH DOSE) 0.7 milliLiter(s) IntraMuscular once  insulin lispro (ADMELOG) corrective regimen sliding scale   SubCutaneous three times a day before meals  insulin lispro (ADMELOG) corrective regimen sliding scale   SubCutaneous at bedtime  montelukast 10 milliGRAM(s) Oral daily  mupirocin 2% Nasal 1 Application(s) Both Nostrils every 12 hours  nystatin Powder 1 Application(s) Topical two times a day  polyethylene glycol 3350 17 Gram(s) Oral daily  senna 2 Tablet(s) Oral at bedtime  sodium chloride 0.65% Nasal 1 Spray(s) Both Nostrils daily    MEDICATIONS  (PRN):  acetaminophen     Tablet .. 650 milliGRAM(s) Oral every 6 hours PRN Temp greater or equal to 38C (100.4F), Mild Pain (1 - 3)  dextrose Oral Gel 15 Gram(s) Oral once PRN Blood Glucose LESS THAN 70 milliGRAM(s)/deciliter  ondansetron Injectable 4 milliGRAM(s) IV Push once PRN Nausea and/or Vomiting  sodium chloride 0.9% lock flush 10 milliLiter(s) IV Push every 1 hour PRN Pre/post blood products, medications, blood draw, and to maintain line patency      CAPILLARY BLOOD GLUCOSE      POCT Blood Glucose.: 127 mg/dL (13 Dec 2023 07:33)  POCT Blood Glucose.: 136 mg/dL (12 Dec 2023 21:49)  POCT Blood Glucose.: 160 mg/dL (12 Dec 2023 16:39)  POCT Blood Glucose.: 119 mg/dL (12 Dec 2023 12:43)    I&O's Summary    12 Dec 2023 07:01  -  13 Dec 2023 07:00  --------------------------------------------------------  IN: 920 mL / OUT: 2500 mL / NET: -1580 mL    13 Dec 2023 07:01  -  13 Dec 2023 10:32  --------------------------------------------------------  IN: 200 mL / OUT: 0 mL / NET: 200 mL        Vital Signs Last 24 Hrs  T(C): 36.4 (13 Dec 2023 05:02), Max: 37.2 (12 Dec 2023 20:54)  T(F): 97.6 (13 Dec 2023 05:02), Max: 98.9 (12 Dec 2023 20:54)  HR: 85 (13 Dec 2023 05:02) (67 - 97)  BP: 127/69 (13 Dec 2023 05:02) (123/68 - 142/71)  BP(mean): --  RR: 18 (13 Dec 2023 05:02) (18 - 18)  SpO2: 95% (13 Dec 2023 05:02) (95% - 97%)    Parameters below as of 13 Dec 2023 05:02  Patient On (Oxygen Delivery Method): nasal cannula  O2 Flow (L/min): 2      PHYSICAL EXAM:  GENERAL: NAD, well-developed, well-nourished  HEAD: Atraumatic, Normocephalic  EYES: Conjunctiva and sclera clear  CHEST/LUNG: decreased breath sounds b/l, no crackles or wheezes auscultated  HEART: Normal S1/S2; Regular rate and rhythm; No murmurs, rubs, or gallops  ABDOMEN: Soft, Nontender, Nondistended; Bowel sounds present  EXTREMITIES: No clubbing, cyanosis, or edema  PSYCH: A&Ox3      LABS:                        9.3    7.53  )-----------( 272      ( 13 Dec 2023 07:16 )             29.6      12-13    137  |  95<L>  |  28<H>  ----------------------------<  110<H>  3.8   |  28  |  4.64<H>    Ca    8.8      13 Dec 2023 07:18  Phos  4.4     12-13  Mg     1.9     12-13    TPro  6.8  /  Alb  3.9  /  TBili  0.4  /  DBili  x   /  AST  19  /  ALT  19  /  AlkPhos  66  12-13          Urinalysis Basic - ( 13 Dec 2023 07:18 )    Color: x / Appearance: x / SG: x / pH: x  Gluc: 110 mg/dL / Ketone: x  / Bili: x / Urobili: x   Blood: x / Protein: x / Nitrite: x   Leuk Esterase: x / RBC: x / WBC x   Sq Epi: x / Non Sq Epi: x / Bacteria: x        RADIOLOGY & ADDITIONAL TESTS:

## 2023-12-13 NOTE — CONSULT NOTE ADULT - CONSULT REQUESTED BY NAME
primary team
Covering Dr. Ash
ED team.
[place]
Minda, Ophelia
MRI pending, telemetry monitoring.
Minda, Ophelia
Ophelia Perla MD

## 2023-12-13 NOTE — PROGRESS NOTE ADULT - PROBLEM SELECTOR PLAN 6
Patient with hx of HTN, on home amlodipine and telmisertan  - likely worsened in the setting of CAL and ADHF    Plan:  > continue with amlodipine  > also on hydralazine Patient on several oral medications at home including dapaglifozin, saxagliptin  - unclear baseline glycemic control    Plan:  > ISS for now, add basal/bolus as needed  > A1c: 6.1

## 2023-12-13 NOTE — PROGRESS NOTE ADULT - ATTENDING COMMENTS
# CAL on CKD - baseline serum creatinine was around 4. Started dialysis in the hospital. HD today to medically optimize for prior to AVF tomorrow.       # Discharge at the dialysis catheter site - no fever chills. Swab culture pending.     # Medication monitoring encounter: medication dose reviewed. Please dose medications to CrCl<15    The rest of the recommendations as per fellow's note.    Kerri Leyva MD  Attending Nephrologist  [phone] or via [employer] . # CAL on CKD - baseline serum creatinine was around 4. Started dialysis in the hospital. HD today to medically optimize for prior to AVF tomorrow.       # Discharge at the dialysis catheter site - no fever chills. Swab culture pending.     # Medication monitoring encounter: medication dose reviewed. Please dose medications to CrCl<15    The rest of the recommendations as per fellow's note.    Kerri Leyva MD  Attending Nephrologist  [phone] or via [employer] . # CAL on CKD - baseline serum creatinine was around 4. Started dialysis in the hospital. HD today to medically optimize for prior to AVF tomorrow.       # Discharge at the dialysis catheter site - no fever chills, WBC normal. Swab culture pending. For polymixin ointment at the exit site. To consult IR to assess catheter.   If worsening, may need to consult ID.     # Medication monitoring encounter: medication dose reviewed. Please dose medications to CrCl<15    The rest of the recommendations as per fellow's note.    Kerri Leyva MD  Attending Nephrologist  [phone] or via [employer] . # CAL on CKD - baseline serum creatinine was around 4. Started dialysis in the hospital. HD today to medically optimize for prior to AVF tomorrow.       # Discharge at the dialysis catheter site - no fever chills, WBC normal. Swab culture pending. For polymixin ointment at the exit site. To consult IR to assess catheter.   If worsening, may need to consult ID.     # Medication monitoring encounter: medication dose reviewed. Please dose medications to CrCl<15    The rest of the recommendations as per fellow's note.    Kerri Leyva MD  Attending Nephrologist  [phone] or via [employer] .

## 2023-12-13 NOTE — PROGRESS NOTE ADULT - PROBLEM SELECTOR PLAN 1
Given her history its likely to be CAL on CKD 2/2 SAUMYA vs CKD progression. US duplex Kidney - showed renal parenchymal disease. UPCR 5.6g, no RBCs, no bacteria. SCr 4.5 on admission, received IV contrast for CTA on evening of 11/21, and Cr had been stable but increased about 2 days after IV contrast exposure suggesting SAUMYA. Serology work up ongoing- so far negative HIV, Hep B, Hep C, C3, C4, Anti GBM antibody, Anti Ds DNA, JAYLAN, ANCA, Serum free light chains, immunofixation. A1c 6.1%. Negative for PLA2R Ab. BNP 1300 initially. TTE with G2DD with elevated filling pressures and severe LA dilation. Patient was diuresed throughout hospitalization. eGFR persistently ~9, with possible uremic symptoms (asterixes, SOB, fatigue) so iHD started 12/5.    Last iHD 12/12; short 2 hours iHD today to optimize for vascular creation tomorrow.  Catheter with some drainage noted, swabbed on 12/12. No other signs of infection, will monitor for now. AVF planned for Thursday, 12/14. Monitor BMP and I/O. Please have case management help set up outpatient HD    Swelling superior to catheter- please have IR evaluate.

## 2023-12-13 NOTE — PROGRESS NOTE ADULT - ATTENDING COMMENTS
Patient with CKD, vascular surgery consulted for permanent HD access creation, deferred until next week to optimize respiratory status. Patient may remain hospitalized for intervention on 12/19 or document medical clearance and procedure may be performed as outpatient. Will require HD on 12/18 pre-op.

## 2023-12-13 NOTE — PROGRESS NOTE ADULT - PROBLEM SELECTOR PLAN 1
Patient presenting for worsening LE edema and breathlessness  - also with several symptoms to suggest likely ADHF  - on exam: extremities warm and well perfused, but volume overloaded, S3 on exam  - proBNP elevated to 1300, but unknown baseline and with likely CAL on CKD  - hsTropT flat at 74, ECG without ST changes, no chest pain  - pt does not carry a diagnosis of HF, however on meds that may suggest she may have HFpEF (MRA, SGLT2i)    Plan:  > TTE--> showing EF 71%, bi-atrial enlargement, LV diastolic dysfunction, and RV enlargement  > hold home spironolactone and dapagliflozin given CAL  > strict I/Os, daily standing weights  > appreciate cards recs  > will need to determine DC med rec based on GFR Patient presenting for worsening LE edema and SOB  - also with several symptoms to suggest likely ADHF  - on exam: extremities warm and well perfused, but volume overloaded, S3 on exam  - proBNP elevated to 1300, but unknown baseline and with likely CAL on CKD  - hsTropT flat at 74, ECG without ST changes, no chest pain  - pt does not carry a diagnosis of HF, however on meds that may suggest she may have HFpEF (MRA, SGLT2i)    Plan:  > TTE--> showing EF 71%, bi-atrial enlargement, LV diastolic dysfunction, and RV enlargement  > hold home spironolactone and dapagliflozin given CAL  > strict I/Os, daily standing weights  > appreciate cards recs  > will need to determine DC med rec based on GFR

## 2023-12-13 NOTE — PROGRESS NOTE ADULT - ASSESSMENT
83 year old female with HTN, HLD, CAD, T2DM, CKD, remote hx of breast CA s/p mastectomy, and chronic hypoxemic respiratory failure of unknown etiology on home O2 who presents for lower extremity swelling and shortness of breath with subsequent CAL superimposed on CKD. Vascular consulted for AVF planning. Pt is RHD.     - protect LUE- no sticks, blood draws, cuffs  - obtain bilateral upper extremity vein mapping (noted)  - document medical/cardiac clearances for AVF 12/14 (noted)  - OR planning for next week, can be as an outpatient Tuesday  - vascular will follow    #[id number]     83 year old female with HTN, HLD, CAD, T2DM, CKD, remote hx of breast CA s/p mastectomy, and chronic hypoxemic respiratory failure of unknown etiology on home O2 who presents for lower extremity swelling and shortness of breath with subsequent CAL superimposed on CKD. Vascular consulted for AVF planning. Pt is RHD.     - protect LUE- no sticks, blood draws, cuffs  - obtain bilateral upper extremity vein mapping (noted)  - document medical/cardiac clearances for AVF 12/14 (noted)  - OR planning for next week, can be as an outpatient Tuesday  - vascular will follow    #[id number]

## 2023-12-13 NOTE — CONSULT NOTE ADULT - REASON FOR ADMISSION
LE swelling, dyspnea

## 2023-12-13 NOTE — CONSULT NOTE ADULT - CONSULT REQUESTED DATE/TIME
05-Dec-2023 13:49
04-Dec-2023 11:53
06-Dec-2023 07:40
24-Nov-2023 11:18
28-Nov-2023 14:42
22-Nov-2023 15:16
13-Dec-2023 15:12

## 2023-12-13 NOTE — PROGRESS NOTE ADULT - PROBLEM SELECTOR PLAN 5
- pt with rising leukocytosis; now resolved  - likely steroid induced and no signs of infection  - will continue to monitor Patient with hx of HTN, on home amlodipine and telmisertan  - likely worsened in the setting of CAL and ADHF    Plan:  > continue with amlodipine 10mg  > started hydralazine 25mg TID

## 2023-12-13 NOTE — PROGRESS NOTE ADULT - ASSESSMENT
83 year old female with HTN, HLD, CAD, T2DM, CKD, remote hx of breast CA s/p mastectomy, and chronic hypoxemic respiratory failure of unknown etiology on home O2 who presents for lower extremity swelling and shortness of breath. Nephrology consulted for diuresis management with advanced CKD.  eGFR persistently ~9, with possible uremic symptoms asterixes SOB, fatigue) so iHD started 12/5.

## 2023-12-13 NOTE — PROGRESS NOTE ADULT - PROBLEM SELECTOR PLAN 2
- Hb downtrending during admission  - unclear baseline Hb  - likely due to worsening renal function  - ferritin and iron studies (wnl) - Hb downtrending during admission  - unclear baseline Hb  - likely due to worsening renal function  - ferritin and iron studies wnl

## 2023-12-13 NOTE — PROGRESS NOTE ADULT - ATTENDING COMMENTS
83 year old female with PMH chronic hypoxic respiratory failure, DM2, CAD, HTN and HLD who presented with dyspnea and lower extremity edema admitted for acute decompensated congestive heart failure. TTE showed LV diastolic dysfunction but normal EF. She was started on IV diuretics however, her creatinine increased to nearly 7 and her GFR was less than 10 so HD was initiated. She is planned for AVF next week as an outpatient for more permanent access. Her respiratory status has continued to improve after her HD sessions. Patient can likely be discharged on Friday after her HD session. Rest of plan as above.

## 2023-12-13 NOTE — PROGRESS NOTE ADULT - PROBLEM SELECTOR PLAN 7
Patient on several oral medications at home including dapaglifozin, saxagliptin  - unclear baseline glycemic control    Plan:  > ISS for now, add basal/bolus as needed  > f/u A1c: 6.1 Patient on home rosuvastatin    Plan:  > continue with therapeutic interchange with atorvastatin

## 2023-12-13 NOTE — CONSULT NOTE ADULT - CONSULT REASON
CHF
shortness of breath
Shortness of breath.
Non tunneled HD catheter placement
Tunneled HD catheter access site swelling
AVF planning
Tunneled HD catheter

## 2023-12-13 NOTE — PROGRESS NOTE ADULT - PROBLEM SELECTOR PLAN 3
Reviewed PTH, phos calcium and vitamin D levels. Patient now with low phos while on phos binder.   Now not on phos binders. Monitor calcium, phos with every BMP.     If you have any questions, please feel free to contact me  Keaton Dye  Nephrology Fellow  [phone]; Prefer [employer] TEAMS  (After 5pm or on weekends please page the on-call fellow). Reviewed PTH, phos calcium and vitamin D levels. Patient now with low phos while on phos binder.   Now not on phos binders. Monitor calcium, phos with every BMP.     If you have any questions, please feel free to contact me  Keaton Dye  Nephrology Fellow  [phone]; Prefer [employer] TEAMS  (After 5pm or on weekends please page the on-call fellow).

## 2023-12-13 NOTE — PROGRESS NOTE ADULT - PROBLEM SELECTOR PLAN 3
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  - reportedly has continued to have good urine output  - metabolic  - FeUrea <30.9%--> pre renal picture     Plan:  > follow up urine studies--> FeUrea >35%; prerenal picture   > f/u US Kidney/bladder--> showing parenchymal disease   > hold home ARB and MRA, avoid nephrotoxic meds  - appreciate cardio-renal recs: pending workup --> US showing medical renal disease  - phos binder d/c-ed due to low phosphorus  - hyperkalemia 2/2 kidney disease s/p lokelma and insulin + D50--> repeat BMP wnl  - s/p dialysis 12/5, 12/6, 12/7; now of TT schedule for dialysis  - permacath placed by IR 12/7  - plan for AVF on 12/14 per vascular surgery 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

## 2023-12-13 NOTE — CHART NOTE - NSCHARTNOTEFT_GEN_A_CORE
Patient will require a transport wheelchair due to chronic hypoxic respiratory failure. The beneficiary has a mobility limitation that significantly impairs his ability to participate in one or more MRADLs such as toileting, feeding, dressing, grooming, and bathing in customary locations in the home. The patient’s mobility limitation cannot be sufficiently resolved by the use of an appropriately fitted cane or walker. The patient is unable to ambulated with a walker. Use of a manual wheelchair will significantly improve the beneficiary’s ability to participate in MRADLs and the beneficiary will use it on a regular basis in the home. The beneficiary is able and willing to use the wheelchair in the home. The beneficiary has sufficient upper extremity function and other physical and mental capabilities needed to safely self-propel the manual wheelchair that is provided in the home during a typical day.    Ophelia Perla MD

## 2023-12-13 NOTE — CONSULT NOTE ADULT - SUBJECTIVE AND OBJECTIVE BOX
Interventional Radiology    Evaluate for Procedure: Tunneled HD catheter access site swelling    HPI: 83 year old F with chronic hypoxemic respiratory failure, CKD, T2DM, CAD, HTN, and HLD who presents for worsening dyspnea and lower extremity edema for 2 days, admitted for suspected acute decompensated heart failure. She is s/p tunneled HD catheter conversion on 12/7/23 and IR was consulted for swelling, irritation and purulence from access site.    Allergies: No Known Allergies    Medications (Abx/Cardiac/Anticoagulation/Blood Products)    amLODIPine   Tablet: 10 milliGRAM(s) Oral (12-13 @ 05:11)  hydrALAZINE: 25 milliGRAM(s) Oral (12-13 @ 05:12)    Data:    T(C): 36.6  HR: 82  BP: 135/62  RR: 17  SpO2: 98%    -WBC 7.53 / HgB 9.3 / Hct 29.6 / Plt 272  -Na 137 / Cl 95 / BUN 28 / Glucose 110  -K 3.8 / CO2 28 / Cr 4.64  -ALT 19 / Alk Phos 66 / T.Bili 0.4  -INR 1.00 / PTT 28.8    Assessment/Plan: 83y Female with chronic hypoxemic respiratory failure, CKD, T2DM, CAD, HTN, and HLD who presents for worsening dyspnea and lower extremity edema for 2 days, admitted for suspected acute decompensated heart failure. She is s/p tunneled HD catheter conversion on 12/7/23 and IR was consulted for swelling and purulence at tunneled site.    - Bedside examination demonstrated hub of catheter stained yellow from CHG dressing without evidence of redness, swelling, irritation or purulent drainage from tract.   - No IR intervention needed at this time.  - d/w primary team, reconsult as needed.    Jc Mak PA-C  IR call back ext. [phone]  Also available on Teams  Interventional Radiology    Evaluate for Procedure: Tunneled HD catheter access site swelling    HPI: 83 year old F with chronic hypoxemic respiratory failure, CKD, T2DM, CAD, HTN, and HLD who presents for worsening dyspnea and lower extremity edema for 2 days, admitted for suspected acute decompensated heart failure. She is s/p tunneled HD catheter conversion on 12/7/23 and IR was consulted for swelling, irritation and purulence from access site.    Allergies: No Known Allergies    Medications (Abx/Cardiac/Anticoagulation/Blood Products)    amLODIPine   Tablet: 10 milliGRAM(s) Oral (12-13 @ 05:11)  hydrALAZINE: 25 milliGRAM(s) Oral (12-13 @ 05:12)    Data:    T(C): 36.6  HR: 82  BP: 135/62  RR: 17  SpO2: 98%    -WBC 7.53 / HgB 9.3 / Hct 29.6 / Plt 272  -Na 137 / Cl 95 / BUN 28 / Glucose 110  -K 3.8 / CO2 28 / Cr 4.64  -ALT 19 / Alk Phos 66 / T.Bili 0.4  -INR 1.00 / PTT 28.8    Assessment/Plan: 83y Female with chronic hypoxemic respiratory failure, CKD, T2DM, CAD, HTN, and HLD who presents for worsening dyspnea and lower extremity edema for 2 days, admitted for suspected acute decompensated heart failure. She is s/p tunneled HD catheter conversion on 12/7/23 and IR was consulted for swelling and purulence at tunneled site.    - Bedside examination demonstrated hub of catheter stained yellow from CHG dressing without evidence of redness, swelling, irritation or purulent drainage from tract.   - No IR intervention needed at this time.  - d/w primary team, reconsult as needed.    Jc Mak PA-C  IR call back ext. [phone]  Also available on Teams

## 2023-12-13 NOTE — PROGRESS NOTE ADULT - SUBJECTIVE AND OBJECTIVE BOX
[facility] Division of Kidney Diseases & Hypertension  FOLLOW UP NOTE  [phone]--------------------------------------------------------------------------------  Chief Complaint: On HD     24 hour events/subjective:  Pt was seen and examined at the bedside. Pt denies worsening of SOB/ Constipation/ Diarrhea/ Nausea/ Vomiting/ abdominal pain/ chest pain/ tingling/ numbness.      PAST HISTORY  --------------------------------------------------------------------------------  No significant changes to PMH, PSH, FHx, SHx, unless otherwise noted    ALLERGIES & MEDICATIONS  --------------------------------------------------------------------------------  Allergies    No Known Allergies    Intolerances      Standing Inpatient Medications  albuterol    90 MICROgram(s) HFA Inhaler 2 Puff(s) Inhalation every 6 hours  albuterol/ipratropium for Nebulization 3 milliLiter(s) Nebulizer every 6 hours  amLODIPine   Tablet 10 milliGRAM(s) Oral daily  atorvastatin 80 milliGRAM(s) Oral at bedtime  budesonide 160 MICROgram(s)/formoterol 4.5 MICROgram(s) Inhaler 2 Puff(s) Inhalation two times a day  chlorhexidine 2% Cloths 1 Application(s) Topical daily  chlorhexidine 4% Liquid 1 Application(s) Topical <User Schedule>  dextrose 5%. 1000 milliLiter(s) IV Continuous <Continuous>  dextrose 5%. 1000 milliLiter(s) IV Continuous <Continuous>  dextrose 50% Injectable 25 Gram(s) IV Push once  dextrose 50% Injectable 12.5 Gram(s) IV Push once  dextrose 50% Injectable 25 Gram(s) IV Push once  epoetin sheryl (EPOGEN) Injectable 5000 Unit(s) IV Push <User Schedule>  ferrous    sulfate 325 milliGRAM(s) Oral daily  fluticasone propionate 50 MICROgram(s)/spray Nasal Spray 1 Spray(s) Both Nostrils two times a day  glucagon  Injectable 1 milliGRAM(s) IntraMuscular once  hydrALAZINE 25 milliGRAM(s) Oral three times a day  influenza  Vaccine (HIGH DOSE) 0.7 milliLiter(s) IntraMuscular once  insulin lispro (ADMELOG) corrective regimen sliding scale   SubCutaneous three times a day before meals  insulin lispro (ADMELOG) corrective regimen sliding scale   SubCutaneous at bedtime  montelukast 10 milliGRAM(s) Oral daily  mupirocin 2% Nasal 1 Application(s) Both Nostrils every 12 hours  nystatin Powder 1 Application(s) Topical two times a day  polyethylene glycol 3350 17 Gram(s) Oral daily  senna 2 Tablet(s) Oral at bedtime  sodium chloride 0.65% Nasal 1 Spray(s) Both Nostrils daily    PRN Inpatient Medications  acetaminophen     Tablet .. 650 milliGRAM(s) Oral every 6 hours PRN  dextrose Oral Gel 15 Gram(s) Oral once PRN  ondansetron Injectable 4 milliGRAM(s) IV Push once PRN  sodium chloride 0.9% lock flush 10 milliLiter(s) IV Push every 1 hour PRN      REVIEW OF SYSTEMS  --------------------------------------------------------------------------------  per above     VITALS/PHYSICAL EXAM  --------------------------------------------------------------------------------  T(C): 36.8 (12-13-23 @ 11:09), Max: 37.2 (12-12-23 @ 20:54)  HR: 83 (12-13-23 @ 11:09) (67 - 97)  BP: 116/61 (12-13-23 @ 11:09) (116/61 - 142/71)  RR: 18 (12-13-23 @ 11:09) (18 - 18)  SpO2: 97% (12-13-23 @ 11:09) (95% - 97%)  Wt(kg): --        12-12-23 @ 07:01  -  12-13-23 @ 07:00  --------------------------------------------------------  IN: 920 mL / OUT: 2500 mL / NET: -1580 mL    12-13-23 @ 07:01  -  12-13-23 @ 12:07  --------------------------------------------------------  IN: 200 mL / OUT: 100 mL / NET: 100 mL      Physical Exam:  General: no acute distress  Neuro: no focal deficits +forgetful  HEENT: NC/AT, anicteric, no JVD  Pulmonary: breath sounds diminished, on 2L O2  Cardiovascular/Chest: +S1S2,  GI/Abdomen: soft, NT/ND, +bowel sounds  Extremities: No edema  Skin: Warm and dry  Pysch: appropriate mood and affect  Access: right permacath; circumferential erythema around catheter and scabbing,     LABS/STUDIES  --------------------------------------------------------------------------------              9.3    7.53  >-----------<  272      [12-13-23 @ 07:16]              29.6     137  |  95  |  28  ----------------------------<  110      [12-13-23 @ 07:18]  3.8   |  28  |  4.64        Ca     8.8     [12-13-23 @ 07:18]      Mg     1.9     [12-13-23 @ 07:18]      Phos  4.4     [12-13-23 @ 07:18]    TPro  6.8  /  Alb  3.9  /  TBili  0.4  /  DBili  x   /  AST  19  /  ALT  19  /  AlkPhos  66  [12-13-23 @ 07:18]          Creatinine Trend:  SCr 4.64 [12-13 @ 07:18]  SCr 6.46 [12-12 @ 06:02]  SCr 4.93 [12-11 @ 06:22]  SCr 3.09 [12-10 @ 06:18]  SCr 3.62 [12-09 @ 06:10]    Urinalysis - [12-13-23 @ 07:18]      Color  / Appearance  / SG  / pH       Gluc 110 / Ketone   / Bili  / Urobili        Blood  / Protein  / Leuk Est  / Nitrite       RBC  / WBC  / Hyaline  / Gran  / Sq Epi  / Non Sq Epi  / Bacteria            [facility] Division of Kidney Diseases & Hypertension  FOLLOW UP NOTE  [phone]--------------------------------------------------------------------------------  Chief Complaint: On HD     24 hour events/subjective:  Pt was seen and examined at the bedside. Pt denies worsening of SOB/ Constipation/ Diarrhea/ Nausea/ Vomiting/ abdominal pain/ chest pain/ tingling/ numbness.      PAST HISTORY  --------------------------------------------------------------------------------  No significant changes to PMH, PSH, FHx, SHx, unless otherwise noted    ALLERGIES & MEDICATIONS  --------------------------------------------------------------------------------  Allergies    No Known Allergies    Intolerances      Standing Inpatient Medications  albuterol    90 MICROgram(s) HFA Inhaler 2 Puff(s) Inhalation every 6 hours  albuterol/ipratropium for Nebulization 3 milliLiter(s) Nebulizer every 6 hours  amLODIPine   Tablet 10 milliGRAM(s) Oral daily  atorvastatin 80 milliGRAM(s) Oral at bedtime  budesonide 160 MICROgram(s)/formoterol 4.5 MICROgram(s) Inhaler 2 Puff(s) Inhalation two times a day  chlorhexidine 2% Cloths 1 Application(s) Topical daily  chlorhexidine 4% Liquid 1 Application(s) Topical <User Schedule>  dextrose 5%. 1000 milliLiter(s) IV Continuous <Continuous>  dextrose 5%. 1000 milliLiter(s) IV Continuous <Continuous>  dextrose 50% Injectable 25 Gram(s) IV Push once  dextrose 50% Injectable 12.5 Gram(s) IV Push once  dextrose 50% Injectable 25 Gram(s) IV Push once  epoetin sheryl (EPOGEN) Injectable 5000 Unit(s) IV Push <User Schedule>  ferrous    sulfate 325 milliGRAM(s) Oral daily  fluticasone propionate 50 MICROgram(s)/spray Nasal Spray 1 Spray(s) Both Nostrils two times a day  glucagon  Injectable 1 milliGRAM(s) IntraMuscular once  hydrALAZINE 25 milliGRAM(s) Oral three times a day  influenza  Vaccine (HIGH DOSE) 0.7 milliLiter(s) IntraMuscular once  insulin lispro (ADMELOG) corrective regimen sliding scale   SubCutaneous three times a day before meals  insulin lispro (ADMELOG) corrective regimen sliding scale   SubCutaneous at bedtime  montelukast 10 milliGRAM(s) Oral daily  mupirocin 2% Nasal 1 Application(s) Both Nostrils every 12 hours  nystatin Powder 1 Application(s) Topical two times a day  polyethylene glycol 3350 17 Gram(s) Oral daily  senna 2 Tablet(s) Oral at bedtime  sodium chloride 0.65% Nasal 1 Spray(s) Both Nostrils daily    PRN Inpatient Medications  acetaminophen     Tablet .. 650 milliGRAM(s) Oral every 6 hours PRN  dextrose Oral Gel 15 Gram(s) Oral once PRN  ondansetron Injectable 4 milliGRAM(s) IV Push once PRN  sodium chloride 0.9% lock flush 10 milliLiter(s) IV Push every 1 hour PRN      REVIEW OF SYSTEMS  --------------------------------------------------------------------------------  per above     VITALS/PHYSICAL EXAM  --------------------------------------------------------------------------------  T(C): 36.8 (12-13-23 @ 11:09), Max: 37.2 (12-12-23 @ 20:54)  HR: 83 (12-13-23 @ 11:09) (67 - 97)  BP: 116/61 (12-13-23 @ 11:09) (116/61 - 142/71)  RR: 18 (12-13-23 @ 11:09) (18 - 18)  SpO2: 97% (12-13-23 @ 11:09) (95% - 97%)  Wt(kg): --        12-12-23 @ 07:01  -  12-13-23 @ 07:00  --------------------------------------------------------  IN: 920 mL / OUT: 2500 mL / NET: -1580 mL    12-13-23 @ 07:01  -  12-13-23 @ 12:07  --------------------------------------------------------  IN: 200 mL / OUT: 100 mL / NET: 100 mL      Physical Exam:  General: no acute distress  Neuro: no focal deficits +forgetful  HEENT: NC/AT, anicteric, no JVD  Pulmonary: breath sounds diminished, on 2L O2  Cardiovascular/Chest: +S1S2,  GI/Abdomen: soft, NT/ND, +bowel sounds  Extremities: No edema  Skin: Warm and dry  Pysch: appropriate mood and affect  Access: right permacath; circumferential erythema around catheter and scabbing,     LABS/STUDIES  --------------------------------------------------------------------------------              9.3    7.53  >-----------<  272      [12-13-23 @ 07:16]              29.6     137  |  95  |  28  ----------------------------<  110      [12-13-23 @ 07:18]  3.8   |  28  |  4.64        Ca     8.8     [12-13-23 @ 07:18]      Mg     1.9     [12-13-23 @ 07:18]      Phos  4.4     [12-13-23 @ 07:18]    TPro  6.8  /  Alb  3.9  /  TBili  0.4  /  DBili  x   /  AST  19  /  ALT  19  /  AlkPhos  66  [12-13-23 @ 07:18]          Creatinine Trend:  SCr 4.64 [12-13 @ 07:18]  SCr 6.46 [12-12 @ 06:02]  SCr 4.93 [12-11 @ 06:22]  SCr 3.09 [12-10 @ 06:18]  SCr 3.62 [12-09 @ 06:10]    Urinalysis - [12-13-23 @ 07:18]      Color  / Appearance  / SG  / pH       Gluc 110 / Ketone   / Bili  / Urobili        Blood  / Protein  / Leuk Est  / Nitrite       RBC  / WBC  / Hyaline  / Gran  / Sq Epi  / Non Sq Epi  / Bacteria            [facility] Division of Kidney Diseases & Hypertension  FOLLOW UP NOTE  [phone]--------------------------------------------------------------------------------  Chief Complaint: On HD     24 hour events/subjective:  Pt was seen and examined at the bedside. Pt denies worsening of SOB/ Constipation/ Diarrhea/ Nausea/ Vomiting/ abdominal pain/ chest pain/ tingling/ numbness.      PAST HISTORY  --------------------------------------------------------------------------------  No significant changes to PMH, PSH, FHx, SHx, unless otherwise noted    ALLERGIES & MEDICATIONS  --------------------------------------------------------------------------------  Allergies    No Known Allergies    Intolerances      Standing Inpatient Medications  albuterol    90 MICROgram(s) HFA Inhaler 2 Puff(s) Inhalation every 6 hours  albuterol/ipratropium for Nebulization 3 milliLiter(s) Nebulizer every 6 hours  amLODIPine   Tablet 10 milliGRAM(s) Oral daily  atorvastatin 80 milliGRAM(s) Oral at bedtime  budesonide 160 MICROgram(s)/formoterol 4.5 MICROgram(s) Inhaler 2 Puff(s) Inhalation two times a day  chlorhexidine 2% Cloths 1 Application(s) Topical daily  chlorhexidine 4% Liquid 1 Application(s) Topical <User Schedule>  dextrose 5%. 1000 milliLiter(s) IV Continuous <Continuous>  dextrose 5%. 1000 milliLiter(s) IV Continuous <Continuous>  dextrose 50% Injectable 25 Gram(s) IV Push once  dextrose 50% Injectable 12.5 Gram(s) IV Push once  dextrose 50% Injectable 25 Gram(s) IV Push once  epoetin sheryl (EPOGEN) Injectable 5000 Unit(s) IV Push <User Schedule>  ferrous    sulfate 325 milliGRAM(s) Oral daily  fluticasone propionate 50 MICROgram(s)/spray Nasal Spray 1 Spray(s) Both Nostrils two times a day  glucagon  Injectable 1 milliGRAM(s) IntraMuscular once  hydrALAZINE 25 milliGRAM(s) Oral three times a day  influenza  Vaccine (HIGH DOSE) 0.7 milliLiter(s) IntraMuscular once  insulin lispro (ADMELOG) corrective regimen sliding scale   SubCutaneous three times a day before meals  insulin lispro (ADMELOG) corrective regimen sliding scale   SubCutaneous at bedtime  montelukast 10 milliGRAM(s) Oral daily  mupirocin 2% Nasal 1 Application(s) Both Nostrils every 12 hours  nystatin Powder 1 Application(s) Topical two times a day  polyethylene glycol 3350 17 Gram(s) Oral daily  senna 2 Tablet(s) Oral at bedtime  sodium chloride 0.65% Nasal 1 Spray(s) Both Nostrils daily    PRN Inpatient Medications  acetaminophen     Tablet .. 650 milliGRAM(s) Oral every 6 hours PRN  dextrose Oral Gel 15 Gram(s) Oral once PRN  ondansetron Injectable 4 milliGRAM(s) IV Push once PRN  sodium chloride 0.9% lock flush 10 milliLiter(s) IV Push every 1 hour PRN      REVIEW OF SYSTEMS  --------------------------------------------------------------------------------  per above     VITALS/PHYSICAL EXAM  --------------------------------------------------------------------------------  T(C): 36.8 (12-13-23 @ 11:09), Max: 37.2 (12-12-23 @ 20:54)  HR: 83 (12-13-23 @ 11:09) (67 - 97)  BP: 116/61 (12-13-23 @ 11:09) (116/61 - 142/71)  RR: 18 (12-13-23 @ 11:09) (18 - 18)  SpO2: 97% (12-13-23 @ 11:09) (95% - 97%)  Wt(kg): --        12-12-23 @ 07:01  -  12-13-23 @ 07:00  --------------------------------------------------------  IN: 920 mL / OUT: 2500 mL / NET: -1580 mL    12-13-23 @ 07:01  -  12-13-23 @ 12:07  --------------------------------------------------------  IN: 200 mL / OUT: 100 mL / NET: 100 mL      Physical Exam:  General: no acute distress  Neuro: no focal deficits +forgetful  HEENT: NC/AT, anicteric, no JVD  Pulmonary: breath sounds diminished, on 2L O2  Cardiovascular/Chest: +S1S2,  GI/Abdomen: soft, NT/ND, +bowel sounds  Extremities: No edema  Skin: Warm and dry  Pysch: appropriate mood and affect  Access: right permacath; circumferential erythema around catheter and scabbing. mild swelling superior to catheter    LABS/STUDIES  --------------------------------------------------------------------------------              9.3    7.53  >-----------<  272      [12-13-23 @ 07:16]              29.6     137  |  95  |  28  ----------------------------<  110      [12-13-23 @ 07:18]  3.8   |  28  |  4.64        Ca     8.8     [12-13-23 @ 07:18]      Mg     1.9     [12-13-23 @ 07:18]      Phos  4.4     [12-13-23 @ 07:18]    TPro  6.8  /  Alb  3.9  /  TBili  0.4  /  DBili  x   /  AST  19  /  ALT  19  /  AlkPhos  66  [12-13-23 @ 07:18]    Creatinine Trend:  SCr 4.64 [12-13 @ 07:18]  SCr 6.46 [12-12 @ 06:02]  SCr 4.93 [12-11 @ 06:22]  SCr 3.09 [12-10 @ 06:18]  SCr 3.62 [12-09 @ 06:10]    Urinalysis - [12-13-23 @ 07:18]      Color  / Appearance  / SG  / pH       Gluc 110 / Ketone   / Bili  / Urobili        Blood  / Protein  / Leuk Est  / Nitrite       RBC  / WBC  / Hyaline  / Gran  / Sq Epi  / Non Sq Epi  / Bacteria            [facility] Division of Kidney Diseases & Hypertension  FOLLOW UP NOTE  [phone]--------------------------------------------------------------------------------  Chief Complaint: On HD     24 hour events/subjective:  Pt was seen and examined at the bedside. Pt denies worsening of SOB/ Constipation/ Diarrhea/ Nausea/ Vomiting/ abdominal pain/ chest pain/ tingling/ numbness.      PAST HISTORY  --------------------------------------------------------------------------------  No significant changes to PMH, PSH, FHx, SHx, unless otherwise noted    ALLERGIES & MEDICATIONS  --------------------------------------------------------------------------------  Allergies    No Known Allergies    Intolerances      Standing Inpatient Medications  albuterol    90 MICROgram(s) HFA Inhaler 2 Puff(s) Inhalation every 6 hours  albuterol/ipratropium for Nebulization 3 milliLiter(s) Nebulizer every 6 hours  amLODIPine   Tablet 10 milliGRAM(s) Oral daily  atorvastatin 80 milliGRAM(s) Oral at bedtime  budesonide 160 MICROgram(s)/formoterol 4.5 MICROgram(s) Inhaler 2 Puff(s) Inhalation two times a day  chlorhexidine 2% Cloths 1 Application(s) Topical daily  chlorhexidine 4% Liquid 1 Application(s) Topical <User Schedule>  dextrose 5%. 1000 milliLiter(s) IV Continuous <Continuous>  dextrose 5%. 1000 milliLiter(s) IV Continuous <Continuous>  dextrose 50% Injectable 25 Gram(s) IV Push once  dextrose 50% Injectable 12.5 Gram(s) IV Push once  dextrose 50% Injectable 25 Gram(s) IV Push once  epoetin sheryl (EPOGEN) Injectable 5000 Unit(s) IV Push <User Schedule>  ferrous    sulfate 325 milliGRAM(s) Oral daily  fluticasone propionate 50 MICROgram(s)/spray Nasal Spray 1 Spray(s) Both Nostrils two times a day  glucagon  Injectable 1 milliGRAM(s) IntraMuscular once  hydrALAZINE 25 milliGRAM(s) Oral three times a day  influenza  Vaccine (HIGH DOSE) 0.7 milliLiter(s) IntraMuscular once  insulin lispro (ADMELOG) corrective regimen sliding scale   SubCutaneous three times a day before meals  insulin lispro (ADMELOG) corrective regimen sliding scale   SubCutaneous at bedtime  montelukast 10 milliGRAM(s) Oral daily  mupirocin 2% Nasal 1 Application(s) Both Nostrils every 12 hours  nystatin Powder 1 Application(s) Topical two times a day  polyethylene glycol 3350 17 Gram(s) Oral daily  senna 2 Tablet(s) Oral at bedtime  sodium chloride 0.65% Nasal 1 Spray(s) Both Nostrils daily    PRN Inpatient Medications  acetaminophen     Tablet .. 650 milliGRAM(s) Oral every 6 hours PRN  dextrose Oral Gel 15 Gram(s) Oral once PRN  ondansetron Injectable 4 milliGRAM(s) IV Push once PRN  sodium chloride 0.9% lock flush 10 milliLiter(s) IV Push every 1 hour PRN      REVIEW OF SYSTEMS  --------------------------------------------------------------------------------  per above     VITALS/PHYSICAL EXAM  --------------------------------------------------------------------------------  T(C): 36.8 (12-13-23 @ 11:09), Max: 37.2 (12-12-23 @ 20:54)  HR: 83 (12-13-23 @ 11:09) (67 - 97)  BP: 116/61 (12-13-23 @ 11:09) (116/61 - 142/71)  RR: 18 (12-13-23 @ 11:09) (18 - 18)  SpO2: 97% (12-13-23 @ 11:09) (95% - 97%)  Wt(kg): --        12-12-23 @ 07:01  -  12-13-23 @ 07:00  --------------------------------------------------------  IN: 920 mL / OUT: 2500 mL / NET: -1580 mL    12-13-23 @ 07:01  -  12-13-23 @ 12:07  --------------------------------------------------------  IN: 200 mL / OUT: 100 mL / NET: 100 mL      Physical Exam:  General: no acute distress  Neuro: no focal deficits +forgetful  HEENT: NC/AT, anicteric, no JVD  Pulmonary: breath sounds diminished, on 2L O2  Cardiovascular/Chest: +S1S2,  GI/Abdomen: soft, NT/ND, +bowel sounds  Extremities: No edema  Skin: Warm and dry  Pysch: appropriate mood and affect  Access: right permacath; circumferential erythema around catheter and scabbing. mild swelling superior to catheter    LABS/STUDIES  --------------------------------------------------------------------------------              9.3    7.53  >-----------<  272      [12-13-23 @ 07:16]              29.6     137  |  95  |  28  ----------------------------<  110      [12-13-23 @ 07:18]  3.8   |  28  |  4.64        Ca     8.8     [12-13-23 @ 07:18]      Mg     1.9     [12-13-23 @ 07:18]      Phos  4.4     [12-13-23 @ 07:18]    TPro  6.8  /  Alb  3.9  /  TBili  0.4  /  DBili  x   /  AST  19  /  ALT  19  /  AlkPhos  66  [12-13-23 @ 07:18]    Creatinine Trend:  SCr 4.64 [12-13 @ 07:18]  SCr 6.46 [12-12 @ 06:02]  SCr 4.93 [12-11 @ 06:22]  SCr 3.09 [12-10 @ 06:18]  SCr 3.62 [12-09 @ 06:10]    Urinalysis - [12-13-23 @ 07:18]      Color  / Appearance  / SG  / pH       Gluc 110 / Ketone   / Bili  / Urobili        Blood  / Protein  / Leuk Est  / Nitrite       RBC  / WBC  / Hyaline  / Gran  / Sq Epi  / Non Sq Epi  / Bacteria

## 2023-12-14 LAB
ALBUMIN SERPL ELPH-MCNC: 3.8 G/DL — SIGNIFICANT CHANGE UP (ref 3.3–5)
ALBUMIN SERPL ELPH-MCNC: 3.8 G/DL — SIGNIFICANT CHANGE UP (ref 3.3–5)
ALP SERPL-CCNC: 67 U/L — SIGNIFICANT CHANGE UP (ref 40–120)
ALP SERPL-CCNC: 67 U/L — SIGNIFICANT CHANGE UP (ref 40–120)
ALT FLD-CCNC: 20 U/L — SIGNIFICANT CHANGE UP (ref 10–45)
ALT FLD-CCNC: 20 U/L — SIGNIFICANT CHANGE UP (ref 10–45)
ANION GAP SERPL CALC-SCNC: 14 MMOL/L — SIGNIFICANT CHANGE UP (ref 5–17)
ANION GAP SERPL CALC-SCNC: 14 MMOL/L — SIGNIFICANT CHANGE UP (ref 5–17)
AST SERPL-CCNC: 19 U/L — SIGNIFICANT CHANGE UP (ref 10–40)
AST SERPL-CCNC: 19 U/L — SIGNIFICANT CHANGE UP (ref 10–40)
BILIRUB SERPL-MCNC: 0.4 MG/DL — SIGNIFICANT CHANGE UP (ref 0.2–1.2)
BILIRUB SERPL-MCNC: 0.4 MG/DL — SIGNIFICANT CHANGE UP (ref 0.2–1.2)
BUN SERPL-MCNC: 25 MG/DL — HIGH (ref 7–23)
BUN SERPL-MCNC: 25 MG/DL — HIGH (ref 7–23)
CALCIUM SERPL-MCNC: 9 MG/DL — SIGNIFICANT CHANGE UP (ref 8.4–10.5)
CALCIUM SERPL-MCNC: 9 MG/DL — SIGNIFICANT CHANGE UP (ref 8.4–10.5)
CHLORIDE SERPL-SCNC: 94 MMOL/L — LOW (ref 96–108)
CHLORIDE SERPL-SCNC: 94 MMOL/L — LOW (ref 96–108)
CO2 SERPL-SCNC: 26 MMOL/L — SIGNIFICANT CHANGE UP (ref 22–31)
CO2 SERPL-SCNC: 26 MMOL/L — SIGNIFICANT CHANGE UP (ref 22–31)
CREAT SERPL-MCNC: 4.49 MG/DL — HIGH (ref 0.5–1.3)
CREAT SERPL-MCNC: 4.49 MG/DL — HIGH (ref 0.5–1.3)
EGFR: 9 ML/MIN/1.73M2 — LOW
EGFR: 9 ML/MIN/1.73M2 — LOW
GLUCOSE BLDC GLUCOMTR-MCNC: 124 MG/DL — HIGH (ref 70–99)
GLUCOSE BLDC GLUCOMTR-MCNC: 124 MG/DL — HIGH (ref 70–99)
GLUCOSE BLDC GLUCOMTR-MCNC: 131 MG/DL — HIGH (ref 70–99)
GLUCOSE BLDC GLUCOMTR-MCNC: 131 MG/DL — HIGH (ref 70–99)
GLUCOSE BLDC GLUCOMTR-MCNC: 171 MG/DL — HIGH (ref 70–99)
GLUCOSE BLDC GLUCOMTR-MCNC: 171 MG/DL — HIGH (ref 70–99)
GLUCOSE BLDC GLUCOMTR-MCNC: 190 MG/DL — HIGH (ref 70–99)
GLUCOSE BLDC GLUCOMTR-MCNC: 190 MG/DL — HIGH (ref 70–99)
GLUCOSE SERPL-MCNC: 124 MG/DL — HIGH (ref 70–99)
GLUCOSE SERPL-MCNC: 124 MG/DL — HIGH (ref 70–99)
HCT VFR BLD CALC: 30.2 % — LOW (ref 34.5–45)
HCT VFR BLD CALC: 30.2 % — LOW (ref 34.5–45)
HGB BLD-MCNC: 9.5 G/DL — LOW (ref 11.5–15.5)
HGB BLD-MCNC: 9.5 G/DL — LOW (ref 11.5–15.5)
MAGNESIUM SERPL-MCNC: 1.9 MG/DL — SIGNIFICANT CHANGE UP (ref 1.6–2.6)
MAGNESIUM SERPL-MCNC: 1.9 MG/DL — SIGNIFICANT CHANGE UP (ref 1.6–2.6)
MCHC RBC-ENTMCNC: 28.8 PG — SIGNIFICANT CHANGE UP (ref 27–34)
MCHC RBC-ENTMCNC: 28.8 PG — SIGNIFICANT CHANGE UP (ref 27–34)
MCHC RBC-ENTMCNC: 31.5 GM/DL — LOW (ref 32–36)
MCHC RBC-ENTMCNC: 31.5 GM/DL — LOW (ref 32–36)
MCV RBC AUTO: 91.5 FL — SIGNIFICANT CHANGE UP (ref 80–100)
MCV RBC AUTO: 91.5 FL — SIGNIFICANT CHANGE UP (ref 80–100)
NRBC # BLD: 0 /100 WBCS — SIGNIFICANT CHANGE UP (ref 0–0)
NRBC # BLD: 0 /100 WBCS — SIGNIFICANT CHANGE UP (ref 0–0)
PHOSPHATE SERPL-MCNC: 4.5 MG/DL — SIGNIFICANT CHANGE UP (ref 2.5–4.5)
PHOSPHATE SERPL-MCNC: 4.5 MG/DL — SIGNIFICANT CHANGE UP (ref 2.5–4.5)
PLATELET # BLD AUTO: 267 K/UL — SIGNIFICANT CHANGE UP (ref 150–400)
PLATELET # BLD AUTO: 267 K/UL — SIGNIFICANT CHANGE UP (ref 150–400)
POTASSIUM SERPL-MCNC: 4 MMOL/L — SIGNIFICANT CHANGE UP (ref 3.5–5.3)
POTASSIUM SERPL-MCNC: 4 MMOL/L — SIGNIFICANT CHANGE UP (ref 3.5–5.3)
POTASSIUM SERPL-SCNC: 4 MMOL/L — SIGNIFICANT CHANGE UP (ref 3.5–5.3)
POTASSIUM SERPL-SCNC: 4 MMOL/L — SIGNIFICANT CHANGE UP (ref 3.5–5.3)
PROT SERPL-MCNC: 6.7 G/DL — SIGNIFICANT CHANGE UP (ref 6–8.3)
PROT SERPL-MCNC: 6.7 G/DL — SIGNIFICANT CHANGE UP (ref 6–8.3)
RBC # BLD: 3.3 M/UL — LOW (ref 3.8–5.2)
RBC # BLD: 3.3 M/UL — LOW (ref 3.8–5.2)
RBC # FLD: 14.7 % — HIGH (ref 10.3–14.5)
RBC # FLD: 14.7 % — HIGH (ref 10.3–14.5)
SODIUM SERPL-SCNC: 134 MMOL/L — LOW (ref 135–145)
SODIUM SERPL-SCNC: 134 MMOL/L — LOW (ref 135–145)
WBC # BLD: 7.32 K/UL — SIGNIFICANT CHANGE UP (ref 3.8–10.5)
WBC # BLD: 7.32 K/UL — SIGNIFICANT CHANGE UP (ref 3.8–10.5)
WBC # FLD AUTO: 7.32 K/UL — SIGNIFICANT CHANGE UP (ref 3.8–10.5)
WBC # FLD AUTO: 7.32 K/UL — SIGNIFICANT CHANGE UP (ref 3.8–10.5)

## 2023-12-14 PROCEDURE — 99232 SBSQ HOSP IP/OBS MODERATE 35: CPT | Mod: GC

## 2023-12-14 PROCEDURE — 99232 SBSQ HOSP IP/OBS MODERATE 35: CPT

## 2023-12-14 RX ORDER — BACITRACIN AND POLYMYXIN B SULFATE 500; 10000 [USP'U]/G; [USP'U]/G
1 OINTMENT TOPICAL DAILY
Refills: 0 | Status: DISCONTINUED | OUTPATIENT
Start: 2023-12-14 | End: 2023-12-15

## 2023-12-14 RX ADMIN — Medication 3 MILLILITER(S): at 17:18

## 2023-12-14 RX ADMIN — Medication 1 SPRAY(S): at 05:16

## 2023-12-14 RX ADMIN — BACITRACIN AND POLYMYXIN B SULFATE 1 APPLICATION(S): 500; 10000 OINTMENT TOPICAL at 19:15

## 2023-12-14 RX ADMIN — Medication 25 MILLIGRAM(S): at 21:14

## 2023-12-14 RX ADMIN — MUPIROCIN 1 APPLICATION(S): 20 OINTMENT TOPICAL at 17:18

## 2023-12-14 RX ADMIN — POLYETHYLENE GLYCOL 3350 17 GRAM(S): 17 POWDER, FOR SOLUTION ORAL at 12:02

## 2023-12-14 RX ADMIN — Medication 1 SPRAY(S): at 12:08

## 2023-12-14 RX ADMIN — Medication 25 MILLIGRAM(S): at 05:16

## 2023-12-14 RX ADMIN — Medication 1: at 12:04

## 2023-12-14 RX ADMIN — CHLORHEXIDINE GLUCONATE 1 APPLICATION(S): 213 SOLUTION TOPICAL at 05:16

## 2023-12-14 RX ADMIN — Medication 25 MILLIGRAM(S): at 13:11

## 2023-12-14 RX ADMIN — Medication 1 SPRAY(S): at 17:17

## 2023-12-14 RX ADMIN — Medication 1: at 16:43

## 2023-12-14 RX ADMIN — Medication 3 MILLILITER(S): at 05:15

## 2023-12-14 RX ADMIN — NYSTATIN CREAM 1 APPLICATION(S): 100000 CREAM TOPICAL at 17:18

## 2023-12-14 RX ADMIN — AMLODIPINE BESYLATE 10 MILLIGRAM(S): 2.5 TABLET ORAL at 05:15

## 2023-12-14 RX ADMIN — MONTELUKAST 10 MILLIGRAM(S): 4 TABLET, CHEWABLE ORAL at 12:02

## 2023-12-14 RX ADMIN — BUDESONIDE AND FORMOTEROL FUMARATE DIHYDRATE 2 PUFF(S): 160; 4.5 AEROSOL RESPIRATORY (INHALATION) at 08:19

## 2023-12-14 RX ADMIN — BUDESONIDE AND FORMOTEROL FUMARATE DIHYDRATE 2 PUFF(S): 160; 4.5 AEROSOL RESPIRATORY (INHALATION) at 21:15

## 2023-12-14 RX ADMIN — CHLORHEXIDINE GLUCONATE 1 APPLICATION(S): 213 SOLUTION TOPICAL at 12:08

## 2023-12-14 RX ADMIN — Medication 325 MILLIGRAM(S): at 12:01

## 2023-12-14 RX ADMIN — MUPIROCIN 1 APPLICATION(S): 20 OINTMENT TOPICAL at 05:16

## 2023-12-14 RX ADMIN — ATORVASTATIN CALCIUM 80 MILLIGRAM(S): 80 TABLET, FILM COATED ORAL at 21:14

## 2023-12-14 RX ADMIN — NYSTATIN CREAM 1 APPLICATION(S): 100000 CREAM TOPICAL at 05:16

## 2023-12-14 RX ADMIN — Medication 3 MILLILITER(S): at 12:01

## 2023-12-14 NOTE — PROGRESS NOTE ADULT - PROBLEM SELECTOR PLAN 2
- Hb downtrending during admission  - unclear baseline Hb  - likely due to worsening renal function  - ferritin and iron studies wnl

## 2023-12-14 NOTE — PROGRESS NOTE ADULT - PROBLEM SELECTOR PLAN 3
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

## 2023-12-14 NOTE — PROGRESS NOTE ADULT - SUBJECTIVE AND OBJECTIVE BOX
DATE OF SERVICE: 12-14-23 @ 15:33    Patient is a 83y old  Female who presents with a chief complaint of LE swelling, dyspnea (14 Dec 2023 13:06)      INTERVAL HISTORY: Feels ok.     REVIEW OF SYSTEMS:  CONSTITUTIONAL: No weakness  EYES/ENT: No visual changes;  No throat pain   NECK: No pain or stiffness  RESPIRATORY: No cough, wheezing; No shortness of breath  CARDIOVASCULAR: No chest pain or palpitations  GASTROINTESTINAL: No abdominal  pain. No nausea, vomiting, or hematemesis  GENITOURINARY: No dysuria, frequency or hematuria  NEUROLOGICAL: No stroke like symptoms  SKIN: No rashes    TELEMETRY Personally reviewed: SR   	  MEDICATIONS:  amLODIPine   Tablet 10 milliGRAM(s) Oral daily  hydrALAZINE 25 milliGRAM(s) Oral three times a day        PHYSICAL EXAM:  T(C): 36.9 (12-14-23 @ 11:00), Max: 36.9 (12-13-23 @ 16:07)  HR: 89 (12-14-23 @ 13:08) (79 - 90)  BP: 124/64 (12-14-23 @ 13:08) (112/61 - 128/60)  RR: 18 (12-14-23 @ 11:00) (18 - 18)  SpO2: 99% (12-14-23 @ 11:00) (96% - 99%)  Wt(kg): --  I&O's Summary    13 Dec 2023 07:01  -  14 Dec 2023 07:00  --------------------------------------------------------  IN: 200 mL / OUT: 600 mL / NET: -400 mL    14 Dec 2023 07:01  -  14 Dec 2023 15:33  --------------------------------------------------------  IN: 240 mL / OUT: 75 mL / NET: 165 mL          Appearance: In no distress	  HEENT:    PERRL, EOMI	  Cardiovascular:  S1 S2, No JVD  Respiratory: Lungs clear to auscultation	  Gastrointestinal:  Soft, Non-tender, + BS	  Vascularature:  + edema of LE  Psychiatric: Appropriate affect   Neuro: no acute focal deficits                               9.5    7.32  )-----------( 267      ( 14 Dec 2023 06:36 )             30.2     12-14    134<L>  |  94<L>  |  25<H>  ----------------------------<  124<H>  4.0   |  26  |  4.49<H>    Ca    9.0      14 Dec 2023 06:37  Phos  4.5     12-14  Mg     1.9     12-14    TPro  6.7  /  Alb  3.8  /  TBili  0.4  /  DBili  x   /  AST  19  /  ALT  20  /  AlkPhos  67  12-14        Labs personally reviewed      ASSESSMENT/PLAN: 	    83 year old female with HTN, HLD, CAD, T2DM, CKD, remote hx of breast CA s/p mastectomy, and chronic hypoxemic respiratory failure of unknown etiology on home O2 who presents for lower extremity swelling and shortness of breath.      1. Acute diastolic heart failure  - Pt with LE edema and pulmonary congestion  - s/p IV diuresis now DC'd d/t increasing Cr which is now improving. HD intiated.   - Echo shows normal LV systolic function with Bi-atrial enlargement, G2DD and LVH  - EKG shows sinus rhythm    - volume removal with HD    2. ESRD  - Renal on board  - HD as per renal    3. HLD  - Statin therapy    4. HTN - stable  - BP well controlled on Hydral 25mg PO TID and Amlodipine 10mg PO daily    5. Preop Risk Stratification - planned for AVF  - Would ideally defer until post several HD sessions and euvolemic   - Mod risk for low risk AVF, no contraindication to proceed           DAFNE Epperson-NP   Jean Ash DO [facility]  Cardiovascular Medicine  [address], [address]  Available through call or text on [employer] TEAM[employer]  Office: [phone]   DATE OF SERVICE: 12-14-23 @ 15:33    Patient is a 83y old  Female who presents with a chief complaint of LE swelling, dyspnea (14 Dec 2023 13:06)      INTERVAL HISTORY: Feels ok.     REVIEW OF SYSTEMS:  CONSTITUTIONAL: No weakness  EYES/ENT: No visual changes;  No throat pain   NECK: No pain or stiffness  RESPIRATORY: No cough, wheezing; No shortness of breath  CARDIOVASCULAR: No chest pain or palpitations  GASTROINTESTINAL: No abdominal  pain. No nausea, vomiting, or hematemesis  GENITOURINARY: No dysuria, frequency or hematuria  NEUROLOGICAL: No stroke like symptoms  SKIN: No rashes    TELEMETRY Personally reviewed: SR   	  MEDICATIONS:  amLODIPine   Tablet 10 milliGRAM(s) Oral daily  hydrALAZINE 25 milliGRAM(s) Oral three times a day        PHYSICAL EXAM:  T(C): 36.9 (12-14-23 @ 11:00), Max: 36.9 (12-13-23 @ 16:07)  HR: 89 (12-14-23 @ 13:08) (79 - 90)  BP: 124/64 (12-14-23 @ 13:08) (112/61 - 128/60)  RR: 18 (12-14-23 @ 11:00) (18 - 18)  SpO2: 99% (12-14-23 @ 11:00) (96% - 99%)  Wt(kg): --  I&O's Summary    13 Dec 2023 07:01  -  14 Dec 2023 07:00  --------------------------------------------------------  IN: 200 mL / OUT: 600 mL / NET: -400 mL    14 Dec 2023 07:01  -  14 Dec 2023 15:33  --------------------------------------------------------  IN: 240 mL / OUT: 75 mL / NET: 165 mL          Appearance: In no distress	  HEENT:    PERRL, EOMI	  Cardiovascular:  S1 S2, No JVD  Respiratory: Lungs clear to auscultation	  Gastrointestinal:  Soft, Non-tender, + BS	  Vascularature:  + edema of LE  Psychiatric: Appropriate affect   Neuro: no acute focal deficits                               9.5    7.32  )-----------( 267      ( 14 Dec 2023 06:36 )             30.2     12-14    134<L>  |  94<L>  |  25<H>  ----------------------------<  124<H>  4.0   |  26  |  4.49<H>    Ca    9.0      14 Dec 2023 06:37  Phos  4.5     12-14  Mg     1.9     12-14    TPro  6.7  /  Alb  3.8  /  TBili  0.4  /  DBili  x   /  AST  19  /  ALT  20  /  AlkPhos  67  12-14        Labs personally reviewed      ASSESSMENT/PLAN: 	    83 year old female with HTN, HLD, CAD, T2DM, CKD, remote hx of breast CA s/p mastectomy, and chronic hypoxemic respiratory failure of unknown etiology on home O2 who presents for lower extremity swelling and shortness of breath.      1. Acute diastolic heart failure  - Pt with LE edema and pulmonary congestion  - s/p IV diuresis now DC'd d/t increasing Cr which is now improving. HD intiated.   - Echo shows normal LV systolic function with Bi-atrial enlargement, G2DD and LVH  - EKG shows sinus rhythm    - volume removal with HD    2. ESRD  - Renal on board  - HD as per renal    3. HLD  - Statin therapy    4. HTN - stable  - BP well controlled on Hydral 25mg PO TID and Amlodipine 10mg PO daily    5. Preop Risk Stratification - planned for AVF  - Would ideally defer until post several HD sessions and euvolemic   - Mod risk for low risk AVF, no contraindication to proceed           DAFNE Epperson-NP   Jean Ash DO [facility]  Cardiovascular Medicine  [address], [address]  Available through call or text on [employer] TEAM[employer]  Office: [phone]

## 2023-12-14 NOTE — PROGRESS NOTE ADULT - PROBLEM SELECTOR PLAN 5
Patient with hx of HTN, on home amlodipine and telmisertan  - likely worsened in the setting of CAL and ADHF    Plan:  > continue with amlodipine 10mg  > started hydralazine 25mg TID

## 2023-12-14 NOTE — CHART NOTE - NSCHARTNOTEFT_GEN_A_CORE
Patient will require a transport wheelchair due to chronic hypoxic respiratory failure. The beneficiary has a mobility limitation that significantly impairs her ability to participate in one or more MRADLs such as toileting, feeding, dressing, grooming, and bathing in customary locations in the home. The patient’s mobility limitation cannot be sufficiently resolved by the use of an appropriately fitted cane or walker. The patient is unable to ambulated with a walker. Use of a manual wheelchair will significantly improve the beneficiary’s ability to participate in MRADLs and the beneficiary will use it on a regular basis in the home. The beneficiary is able and willing to use the wheelchair in the home. The beneficiary has a caregiver who is available, willing, and able to provide assistance with the wheelchair. The patient is not able to self propel a standard or lightweight wheelchair.

## 2023-12-14 NOTE — PROGRESS NOTE ADULT - PROBLEM SELECTOR PLAN 6
Patient on several oral medications at home including dapaglifozin, saxagliptin  - unclear baseline glycemic control    Plan:  > ISS for now, add basal/bolus as needed  > A1c: 6.1

## 2023-12-14 NOTE — PROGRESS NOTE ADULT - PROBLEM SELECTOR PLAN 1
Patient presenting for worsening LE edema and SOB  - also with several symptoms to suggest likely ADHF  - on exam: extremities warm and well perfused, but volume overloaded, S3 on exam  - proBNP elevated to 1300, but unknown baseline and with likely CAL on CKD  - hsTropT flat at 74, ECG without ST changes, no chest pain  - pt does not carry a diagnosis of HF, however on meds that may suggest she may have HFpEF (MRA, SGLT2i)    Plan:  > TTE--> showing EF 71%, bi-atrial enlargement, LV diastolic dysfunction, and RV enlargement  > hold home spironolactone and dapagliflozin given CAL  > strict I/Os, daily standing weights  > appreciate cards recs  > will need to determine DC med rec based on GFR

## 2023-12-14 NOTE — PROGRESS NOTE ADULT - PROBLEM SELECTOR PLAN 4
Patient with several year history of chronic hypoxemic respiratory failure  - requiring home O2, stable on 2L at baseline  - has not had escalating O2 requirements recently, despite feeling subjectively more dyspneic  - unclear etiology, though patient has had PFTs at her pulmologist's office last year    Plan:  > follow up TTE--> showing EF 71%, bi-atrial enlargement, LV diastolic dysfunction, and RV enlargement  > obtain records from pt's pulmonologists office: Dr. Paulino Gayle ([facility], [phone])  > continue with home montelukast and add on duonebs and symbicort   > now on baseline of 2L O2  > s/p prednisone for possible COPD exacerbation   - f/u repeat CXR and BNP (stable CXR and elevated BNP)  - appreciate pulm recs: started on flonase and saline spray Patient with several year history of chronic hypoxemic respiratory failure  - requiring home O2, stable on 2L at baseline  - has not had escalating O2 requirements recently, despite feeling subjectively more dyspneic  - unclear etiology, though patient has had PFTs at her pulmologist's office last year    Plan:  > follow up TTE--> showing EF 71%, bi-atrial enlargement, LV diastolic dysfunction, and RV enlargement  > obtain records from pt's pulmonologists office: Dr. Paulino Gayle ([facility], [phone])  > continue with home montelukast and add on duonebs and symbicort   > now on baseline of 2L O2  > s/p prednisone for possible COPD exacerbation   - f/u repeat CXR and BNP (stable CXR and elevated BNP)  - appreciate pulm recs: started on flonase and saline spray

## 2023-12-14 NOTE — PROGRESS NOTE ADULT - TIME BILLING
Personal review of data, imaging and discussion with medical team.
Personal review of data, imaging and discussion with medical team.
reviewing tests and imaging, independently obtaining a history, performing a physical examination, discussing the plan with the patient, ordering medications/tests, documenting clinical information, and coordinating care. This excludes any time spent on teaching.
chart reviewing, history taking, physical exam, assessment and documentation
Review of tests, imaging, labs, documents, medical management, coordination of care and counseling.
- Ordering, reviewing, and interpreting labs, testing, and imaging.  - Independently obtaining a review of systems and performing a physical exam  - Reviewing consultant documentation/recommendations in addition to discussing plan of care with consultants.  - Counselling and educating patient and family regarding interpretation of aforementioned items and plan of care.
Review of tests, imaging, labs, documents, medical management, coordination of care and counseling.
Staged Advancement Flap Text: The defect edges were debeveled with a #15 scalpel blade.  Given the location of the defect, shape of the defect and the proximity to free margins a staged advancement flap was deemed most appropriate.  Using a sterile surgical marker, an appropriate advancement flap was drawn incorporating the defect and placing the expected incisions within the relaxed skin tension lines where possible. The area thus outlined was incised deep to adipose tissue with a #15 scalpel blade.  The skin margins were undermined to an appropriate distance in all directions utilizing iris scissors.

## 2023-12-14 NOTE — PROGRESS NOTE ADULT - ASSESSMENT
83 year old female with HTN, HLD, CAD, T2DM, CKD, remote hx of breast CA s/p mastectomy, and chronic hypoxemic respiratory failure of unknown etiology on home O2 who presents for lower extremity swelling and shortness of breath with subsequent CAL superimposed on CKD. Vascular consulted for AVF planning. Pt is RHD.     - protect LUE- no sticks, blood draws, cuffs  - obtain bilateral upper extremity vein mapping (noted)  - document medical/cardiac clearances for AVF 12/14 (noted)  - OR planning for next week, can be as an outpatient Tuesday  - ok to discharge after med/cards clearances with follow up on tuesday as outpatient AVF  - vascular will follow    #[id number] 83 year old female with HTN, HLD, CAD, T2DM, CKD, remote hx of breast CA s/p mastectomy, and chronic hypoxemic respiratory failure of unknown etiology on home O2 who presents for lower extremity swelling and shortness of breath with subsequent CAL superimposed on CKD. Vascular consulted for AVF planning. Pt is RHD.     - protect LUE- no sticks, blood draws, cuffs  - obtain bilateral upper extremity vein mapping (noted)  - document medical/cardiac clearances for AVF 12/14 (noted)  - OR planning for next week, can be as an outpatient Tuesday  - ok to discharge after med/cards clearances with follow up on tuesday as outpatient AVF  - vascular will follow    #[id number]

## 2023-12-14 NOTE — PROGRESS NOTE ADULT - ATTENDING COMMENTS
Patient with CKD. Vascular surgery consulted for HD access creation. History of left mastectomy. Protect RUE. Scheduled for outpatient AVF on 12/19/23. Please document medical optimization/clearance. Will need outpatient HD on 12/18/23.

## 2023-12-14 NOTE — PROGRESS NOTE ADULT - PROBLEM SELECTOR PLAN 8
Dannielle BRITTON at [facility], nursing staff at [facility].    Julieta Mcconnell RN  ....................  12/29/2020   2:39 PM         DVT PPx: heparin subq  Diet: DASH  Code: FULL  Dispo: home with HPT, plan for OP AVF placement Tuesday 12/19  Communication: discussed with  at bedside  Risk stratification for fistula placement   - RCRI of 2 points, Class III Risk -- 10.1% 30-day risk of death, MI, or cardiac arrest  - Pt reports being able to do 3-4 METS activities indicating moderate functional capacity; pt states she is able to walk up stairs, walks, dances, does chores at home  - no contraindication from medical standpoint for AVF placement

## 2023-12-14 NOTE — PROGRESS NOTE ADULT - SUBJECTIVE AND OBJECTIVE BOX
Vascular Surgery Progress Note  Patient is a 83y old  Female who presents with a chief complaint of LE swelling, dyspnea (13 Dec 2023 15:24)      INTERVAL EVENTS: No acute events overnight.  SUBJECTIVE: Patient seen and examined at bedside with surgical team, patient without complaints. Denies fever, chills, CP, SOB nausea, vomiting, abdominal pain.      OBJECTIVE:    Vital Signs Last 24 Hrs  T(C): 36.9 (14 Dec 2023 11:00), Max: 36.9 (13 Dec 2023 16:07)  T(F): 98.5 (14 Dec 2023 11:00), Max: 98.5 (13 Dec 2023 16:07)  HR: 79 (14 Dec 2023 11:00) (77 - 90)  BP: 112/61 (14 Dec 2023 11:00) (112/61 - 135/62)  BP(mean): --  RR: 18 (14 Dec 2023 11:00) (17 - 18)  SpO2: 99% (14 Dec 2023 11:00) (96% - 99%)    Parameters below as of 14 Dec 2023 11:00  Patient On (Oxygen Delivery Method): mask, nonrebreather  O2 Flow (L/min): 2  I&O's Detail    13 Dec 2023 07:01  -  14 Dec 2023 07:00  --------------------------------------------------------  IN:    Oral Fluid: 200 mL  Total IN: 200 mL    OUT:    Other (mL): 500 mL    Voided (mL): 100 mL  Total OUT: 600 mL    Total NET: -400 mL      14 Dec 2023 07:01  -  14 Dec 2023 11:52  --------------------------------------------------------  IN:    Oral Fluid: 120 mL  Total IN: 120 mL    OUT:    Voided (mL): 75 mL  Total OUT: 75 mL    Total NET: 45 mL      MEDICATIONS  (STANDING):  albuterol    90 MICROgram(s) HFA Inhaler 2 Puff(s) Inhalation every 6 hours  albuterol/ipratropium for Nebulization 3 milliLiter(s) Nebulizer every 6 hours  amLODIPine   Tablet 10 milliGRAM(s) Oral daily  atorvastatin 80 milliGRAM(s) Oral at bedtime  bacitracin/polymyxin B Ointment 1 Application(s) Topical once  budesonide 160 MICROgram(s)/formoterol 4.5 MICROgram(s) Inhaler 2 Puff(s) Inhalation two times a day  chlorhexidine 2% Cloths 1 Application(s) Topical daily  chlorhexidine 4% Liquid 1 Application(s) Topical <User Schedule>  dextrose 5%. 1000 milliLiter(s) (50 mL/Hr) IV Continuous <Continuous>  dextrose 5%. 1000 milliLiter(s) (100 mL/Hr) IV Continuous <Continuous>  dextrose 50% Injectable 25 Gram(s) IV Push once  dextrose 50% Injectable 12.5 Gram(s) IV Push once  dextrose 50% Injectable 25 Gram(s) IV Push once  epoetin sheryl (EPOGEN) Injectable 5000 Unit(s) IV Push <User Schedule>  ferrous    sulfate 325 milliGRAM(s) Oral daily  fluticasone propionate 50 MICROgram(s)/spray Nasal Spray 1 Spray(s) Both Nostrils two times a day  glucagon  Injectable 1 milliGRAM(s) IntraMuscular once  hydrALAZINE 25 milliGRAM(s) Oral three times a day  influenza  Vaccine (HIGH DOSE) 0.7 milliLiter(s) IntraMuscular once  insulin lispro (ADMELOG) corrective regimen sliding scale   SubCutaneous at bedtime  insulin lispro (ADMELOG) corrective regimen sliding scale   SubCutaneous three times a day before meals  montelukast 10 milliGRAM(s) Oral daily  mupirocin 2% Nasal 1 Application(s) Both Nostrils every 12 hours  nystatin Powder 1 Application(s) Topical two times a day  polyethylene glycol 3350 17 Gram(s) Oral daily  senna 2 Tablet(s) Oral at bedtime  sodium chloride 0.65% Nasal 1 Spray(s) Both Nostrils daily    MEDICATIONS  (PRN):  acetaminophen     Tablet .. 650 milliGRAM(s) Oral every 6 hours PRN Temp greater or equal to 38C (100.4F), Mild Pain (1 - 3)  dextrose Oral Gel 15 Gram(s) Oral once PRN Blood Glucose LESS THAN 70 milliGRAM(s)/deciliter  ondansetron Injectable 4 milliGRAM(s) IV Push once PRN Nausea and/or Vomiting  sodium chloride 0.9% lock flush 10 milliLiter(s) IV Push every 1 hour PRN Pre/post blood products, medications, blood draw, and to maintain line patency      PHYSICAL EXAM:    General: NAD, resting comfortably  Pulmonary: normal resp effort, CTA-B  Cardiovascular: NSR, no murmurs  Abdominal: soft, ND/NT, no organomegaly  Extremities: WWP +radial/ulnar pulses, arms soft  LUE protected    LABS:                        9.5    7.32  )-----------( 267      ( 14 Dec 2023 06:36 )             30.2     12-14    134<L>  |  94<L>  |  25<H>  ----------------------------<  124<H>  4.0   |  26  |  4.49<H>    Ca    9.0      14 Dec 2023 06:37  Phos  4.5     12-14  Mg     1.9     12-14    TPro  6.7  /  Alb  3.8  /  TBili  0.4  /  DBili  x   /  AST  19  /  ALT  20  /  AlkPhos  67  12-14      LIVER FUNCTIONS - ( 14 Dec 2023 06:37 )  Alb: 3.8 g/dL / Pro: 6.7 g/dL / ALK PHOS: 67 U/L / ALT: 20 U/L / AST: 19 U/L / GGT: x           Urinalysis Basic - ( 14 Dec 2023 06:37 )    Color: x / Appearance: x / SG: x / pH: x  Gluc: 124 mg/dL / Ketone: x  / Bili: x / Urobili: x   Blood: x / Protein: x / Nitrite: x   Leuk Esterase: x / RBC: x / WBC x   Sq Epi: x / Non Sq Epi: x / Bacteria: x          IMAGING:

## 2023-12-14 NOTE — PROGRESS NOTE ADULT - SUBJECTIVE AND OBJECTIVE BOX
Internal Medicine   Latoya Aguirre | PGY-3    OVERNIGHT EVENTS: OLEKSANDR      SUBJECTIVE: Patient was seen and examined at bedside this morning. Denies any nausea/vomiting/diarrhea, headache, shortness of breath, abdominal pain or chest pain/palpitations. Patient responding appropriately to questions and able to make needs known.       MEDICATIONS  (STANDING):  albuterol    90 MICROgram(s) HFA Inhaler 2 Puff(s) Inhalation every 6 hours  albuterol/ipratropium for Nebulization 3 milliLiter(s) Nebulizer every 6 hours  amLODIPine   Tablet 10 milliGRAM(s) Oral daily  atorvastatin 80 milliGRAM(s) Oral at bedtime  bacitracin/polymyxin B Ointment 1 Application(s) Topical once  budesonide 160 MICROgram(s)/formoterol 4.5 MICROgram(s) Inhaler 2 Puff(s) Inhalation two times a day  chlorhexidine 2% Cloths 1 Application(s) Topical daily  chlorhexidine 4% Liquid 1 Application(s) Topical <User Schedule>  dextrose 5%. 1000 milliLiter(s) (100 mL/Hr) IV Continuous <Continuous>  dextrose 5%. 1000 milliLiter(s) (50 mL/Hr) IV Continuous <Continuous>  dextrose 50% Injectable 25 Gram(s) IV Push once  dextrose 50% Injectable 25 Gram(s) IV Push once  dextrose 50% Injectable 12.5 Gram(s) IV Push once  epoetin sheryl (EPOGEN) Injectable 5000 Unit(s) IV Push <User Schedule>  ferrous    sulfate 325 milliGRAM(s) Oral daily  fluticasone propionate 50 MICROgram(s)/spray Nasal Spray 1 Spray(s) Both Nostrils two times a day  glucagon  Injectable 1 milliGRAM(s) IntraMuscular once  hydrALAZINE 25 milliGRAM(s) Oral three times a day  influenza  Vaccine (HIGH DOSE) 0.7 milliLiter(s) IntraMuscular once  insulin lispro (ADMELOG) corrective regimen sliding scale   SubCutaneous at bedtime  insulin lispro (ADMELOG) corrective regimen sliding scale   SubCutaneous three times a day before meals  montelukast 10 milliGRAM(s) Oral daily  mupirocin 2% Nasal 1 Application(s) Both Nostrils every 12 hours  nystatin Powder 1 Application(s) Topical two times a day  polyethylene glycol 3350 17 Gram(s) Oral daily  senna 2 Tablet(s) Oral at bedtime  sodium chloride 0.65% Nasal 1 Spray(s) Both Nostrils daily    MEDICATIONS  (PRN):  acetaminophen     Tablet .. 650 milliGRAM(s) Oral every 6 hours PRN Temp greater or equal to 38C (100.4F), Mild Pain (1 - 3)  dextrose Oral Gel 15 Gram(s) Oral once PRN Blood Glucose LESS THAN 70 milliGRAM(s)/deciliter  ondansetron Injectable 4 milliGRAM(s) IV Push once PRN Nausea and/or Vomiting  sodium chloride 0.9% lock flush 10 milliLiter(s) IV Push every 1 hour PRN Pre/post blood products, medications, blood draw, and to maintain line patency        T(F): 98.5 (12-14-23 @ 11:00), Max: 98.5 (12-13-23 @ 16:07)  HR: 79 (12-14-23 @ 11:00) (79 - 90)  BP: 112/61 (12-14-23 @ 11:00) (112/61 - 135/62)  BP(mean): --  RR: 18 (12-14-23 @ 11:00) (17 - 18)  SpO2: 99% (12-14-23 @ 11:00) (96% - 99%)    PHYSICAL EXAM:     GENERAL: NAD, lying in bed comfortably.  HEAD:  Atraumatic, normocephalic.  EYES: EOMI, PERRLA, conjunctiva and sclera clear, no nystagmus noted.  ENT: Moist mucous membranes.   NECK: Supple. Trachea midline.  CHEST/LUNG: CTAB on anterior auscultation. No rales, rhonchi, wheezing, or rubs. Unlabored respirations.  HEART: RRR, no M/R/G, normal S1/S2.  ABDOMEN: Soft, nontender, nondistended. Normoactive bowel sounds.  EXTREMITIES:  2+ peripheral pulses b/l. No clubbing, cyanosis, or edema.  MSK: No gross deformities noted.   NEURO:  AAOx3, no focal deficits.   SKIN: No rashes or lesions.  PSYCH: Normal mood, affect.    TELEMETRY:    LABS:                        9.5    7.32  )-----------( 267      ( 14 Dec 2023 06:36 )             30.2     12-14    134<L>  |  94<L>  |  25<H>  ----------------------------<  124<H>  4.0   |  26  |  4.49<H>    Ca    9.0      14 Dec 2023 06:37  Phos  4.5     12-14  Mg     1.9     12-14    TPro  6.7  /  Alb  3.8  /  TBili  0.4  /  DBili  x   /  AST  19  /  ALT  20  /  AlkPhos  67  12-14            Creatinine Trend: 4.49<--, 4.64<--, 6.46<--, 4.93<--, 3.09<--, 3.62<--  I&O's Summary    13 Dec 2023 07:01  -  14 Dec 2023 07:00  --------------------------------------------------------  IN: 200 mL / OUT: 600 mL / NET: -400 mL    14 Dec 2023 07:01  -  14 Dec 2023 13:07  --------------------------------------------------------  IN: 120 mL / OUT: 75 mL / NET: 45 mL      BNP    RADIOLOGY & ADDITIONAL STUDIES:

## 2023-12-15 ENCOUNTER — TRANSCRIPTION ENCOUNTER (OUTPATIENT)
Age: 83
End: 2023-12-15

## 2023-12-15 VITALS
OXYGEN SATURATION: 97 % | HEART RATE: 93 BPM | RESPIRATION RATE: 18 BRPM | DIASTOLIC BLOOD PRESSURE: 76 MMHG | TEMPERATURE: 98 F | SYSTOLIC BLOOD PRESSURE: 130 MMHG

## 2023-12-15 LAB
GLUCOSE BLDC GLUCOMTR-MCNC: 120 MG/DL — HIGH (ref 70–99)
GLUCOSE BLDC GLUCOMTR-MCNC: 120 MG/DL — HIGH (ref 70–99)
GLUCOSE BLDC GLUCOMTR-MCNC: 133 MG/DL — HIGH (ref 70–99)
GLUCOSE BLDC GLUCOMTR-MCNC: 133 MG/DL — HIGH (ref 70–99)
GLUCOSE BLDC GLUCOMTR-MCNC: 166 MG/DL — HIGH (ref 70–99)
GLUCOSE BLDC GLUCOMTR-MCNC: 166 MG/DL — HIGH (ref 70–99)

## 2023-12-15 PROCEDURE — 90935 HEMODIALYSIS ONE EVALUATION: CPT | Mod: GC

## 2023-12-15 PROCEDURE — 99239 HOSP IP/OBS DSCHRG MGMT >30: CPT

## 2023-12-15 RX ORDER — BUDESONIDE AND FORMOTEROL FUMARATE DIHYDRATE 160; 4.5 UG/1; UG/1
1 AEROSOL RESPIRATORY (INHALATION)
Qty: 1 | Refills: 0
Start: 2023-12-15 | End: 2024-03-13

## 2023-12-15 RX ORDER — AMLODIPINE BESYLATE 2.5 MG/1
1 TABLET ORAL
Qty: 90 | Refills: 0
Start: 2023-12-15 | End: 2024-03-13

## 2023-12-15 RX ORDER — SITAGLIPTIN 50 MG/1
1 TABLET, FILM COATED ORAL
Qty: 90 | Refills: 0
Start: 2023-12-15 | End: 2024-03-13

## 2023-12-15 RX ORDER — FERROUS SULFATE 325(65) MG
1 TABLET ORAL
Qty: 90 | Refills: 0
Start: 2023-12-15 | End: 2024-03-13

## 2023-12-15 RX ORDER — BUMETANIDE 0.25 MG/ML
1 INJECTION INTRAMUSCULAR; INTRAVENOUS
Qty: 51 | Refills: 0
Start: 2023-12-15 | End: 2024-03-13

## 2023-12-15 RX ORDER — HYDRALAZINE HCL 50 MG
1 TABLET ORAL
Qty: 270 | Refills: 0
Start: 2023-12-15 | End: 2024-03-13

## 2023-12-15 RX ORDER — SPIRONOLACTONE 25 MG/1
1 TABLET, FILM COATED ORAL
Refills: 0 | DISCHARGE

## 2023-12-15 RX ORDER — AMLODIPINE BESYLATE 2.5 MG/1
1 TABLET ORAL
Qty: 90 | Refills: 0 | DISCHARGE
Start: 2023-12-15 | End: 2024-03-13

## 2023-12-15 RX ORDER — BUMETANIDE 0.25 MG/ML
1 INJECTION INTRAMUSCULAR; INTRAVENOUS
Qty: 51 | Refills: 0 | DISCHARGE
Start: 2023-12-15 | End: 2024-03-13

## 2023-12-15 RX ORDER — ALBUTEROL 90 UG/1
2 AEROSOL, METERED ORAL
Qty: 1 | Refills: 0
Start: 2023-12-15 | End: 2024-03-13

## 2023-12-15 RX ORDER — ERYTHROPOIETIN 10000 [IU]/ML
1 INJECTION, SOLUTION INTRAVENOUS; SUBCUTANEOUS
Qty: 0 | Refills: 0 | DISCHARGE
Start: 2023-12-15

## 2023-12-15 RX ORDER — BACITRACIN AND POLYMYXIN B SULFATE 500; 10000 [USP'U]/G; [USP'U]/G
1 OINTMENT TOPICAL
Qty: 0 | Refills: 0 | DISCHARGE
Start: 2023-12-15

## 2023-12-15 RX ORDER — FLUTICASONE PROPIONATE 50 MCG
1 SPRAY, SUSPENSION NASAL
Qty: 1 | Refills: 0
Start: 2023-12-15 | End: 2024-03-13

## 2023-12-15 RX ORDER — SAXAGLIPTIN 5 MG/1
1 TABLET, FILM COATED ORAL
Qty: 90 | Refills: 0
Start: 2023-12-15 | End: 2024-03-13

## 2023-12-15 RX ORDER — LINAGLIPTIN 5 MG/1
1 TABLET, FILM COATED ORAL
Qty: 30 | Refills: 0
Start: 2023-12-15 | End: 2024-01-13

## 2023-12-15 RX ORDER — LINAGLIPTIN 5 MG/1
1 TABLET, FILM COATED ORAL
Qty: 14 | Refills: 0
Start: 2023-12-15 | End: 2023-12-28

## 2023-12-15 RX ORDER — TELMISARTAN 20 MG/1
1 TABLET ORAL
Refills: 0 | DISCHARGE

## 2023-12-15 RX ADMIN — MUPIROCIN 1 APPLICATION(S): 20 OINTMENT TOPICAL at 06:04

## 2023-12-15 RX ADMIN — Medication 3 MILLILITER(S): at 13:06

## 2023-12-15 RX ADMIN — ERYTHROPOIETIN 5000 UNIT(S): 10000 INJECTION, SOLUTION INTRAVENOUS; SUBCUTANEOUS at 10:19

## 2023-12-15 RX ADMIN — Medication 1 SPRAY(S): at 13:05

## 2023-12-15 RX ADMIN — Medication 1 SPRAY(S): at 17:26

## 2023-12-15 RX ADMIN — POLYETHYLENE GLYCOL 3350 17 GRAM(S): 17 POWDER, FOR SOLUTION ORAL at 13:05

## 2023-12-15 RX ADMIN — MONTELUKAST 10 MILLIGRAM(S): 4 TABLET, CHEWABLE ORAL at 13:05

## 2023-12-15 RX ADMIN — Medication 3 MILLILITER(S): at 17:25

## 2023-12-15 RX ADMIN — CHLORHEXIDINE GLUCONATE 1 APPLICATION(S): 213 SOLUTION TOPICAL at 05:38

## 2023-12-15 RX ADMIN — CHLORHEXIDINE GLUCONATE 1 APPLICATION(S): 213 SOLUTION TOPICAL at 13:08

## 2023-12-15 RX ADMIN — Medication 3 MILLILITER(S): at 05:37

## 2023-12-15 RX ADMIN — NYSTATIN CREAM 1 APPLICATION(S): 100000 CREAM TOPICAL at 05:38

## 2023-12-15 RX ADMIN — Medication 325 MILLIGRAM(S): at 13:05

## 2023-12-15 RX ADMIN — Medication 25 MILLIGRAM(S): at 13:05

## 2023-12-15 RX ADMIN — NYSTATIN CREAM 1 APPLICATION(S): 100000 CREAM TOPICAL at 17:26

## 2023-12-15 RX ADMIN — Medication 1: at 16:57

## 2023-12-15 RX ADMIN — Medication 1 SPRAY(S): at 05:37

## 2023-12-15 RX ADMIN — BUDESONIDE AND FORMOTEROL FUMARATE DIHYDRATE 2 PUFF(S): 160; 4.5 AEROSOL RESPIRATORY (INHALATION) at 08:10

## 2023-12-15 NOTE — PROGRESS NOTE ADULT - PROVIDER SPECIALTY LIST ADULT
Cardiology
Nephrology
Nephrology-Cardiorenal
Vascular Surgery
Vascular Surgery
Cardiology
Nephrology-Cardiorenal
Nephrology-Cardiorenal
Vascular Surgery
Cardiology
Internal Medicine
Nephrology-Cardiorenal
Pulmonology
Vascular Surgery
Vascular Surgery
Internal Medicine
Nephrology
Pulmonology
Cardiology
Internal Medicine
Nephrology-Cardiorenal
Cardiology
Nephrology-Cardiorenal
Vascular Surgery
Internal Medicine
Internal Medicine
Nephrology-Cardiorenal
Nephrology
Nephrology-Cardiorenal
Nephrology-Cardiorenal
Internal Medicine
Nephrology
Nephrology-Cardiorenal
Internal Medicine
Internal Medicine
Nephrology-Cardiorenal
Internal Medicine
Nephrology-Cardiorenal
Internal Medicine

## 2023-12-15 NOTE — PROGRESS NOTE ADULT - SUBJECTIVE AND OBJECTIVE BOX
DATE OF SERVICE: 12-15-23 @ 17:46    Patient is a 83y old  Female who presents with a chief complaint of LE swelling, dyspnea (15 Dec 2023 11:29)      INTERVAL HISTORY: Feels ok.     REVIEW OF SYSTEMS:  CONSTITUTIONAL: No weakness  EYES/ENT: No visual changes;  No throat pain   NECK: No pain or stiffness  RESPIRATORY: No cough, wheezing; No shortness of breath  CARDIOVASCULAR: No chest pain or palpitations  GASTROINTESTINAL: No abdominal  pain. No nausea, vomiting, or hematemesis  GENITOURINARY: No dysuria, frequency or hematuria  NEUROLOGICAL: No stroke like symptoms  SKIN: No rashes    TELEMETRY Personally reviewed: SR 80-90  	  MEDICATIONS:  amLODIPine   Tablet 10 milliGRAM(s) Oral daily  hydrALAZINE 25 milliGRAM(s) Oral three times a day        PHYSICAL EXAM:  T(C): 36.7 (12-15-23 @ 13:13), Max: 37 (12-14-23 @ 21:10)  HR: 93 (12-15-23 @ 13:13) (83 - 93)  BP: 130/76 (12-15-23 @ 13:13) (116/62 - 141/68)  RR: 18 (12-15-23 @ 13:13) (18 - 18)  SpO2: 97% (12-15-23 @ 13:13) (93% - 97%)  Wt(kg): --  I&O's Summary    14 Dec 2023 07:01  -  15 Dec 2023 07:00  --------------------------------------------------------  IN: 780 mL / OUT: 325 mL / NET: 455 mL    15 Dec 2023 07:01  -  15 Dec 2023 17:46  --------------------------------------------------------  IN: 0 mL / OUT: 2500 mL / NET: -2500 mL          Appearance: In no distress	  HEENT:    PERRL, EOMI	  Cardiovascular:  S1 S2, No JVD  Respiratory: Lungs clear to auscultation	  Gastrointestinal:  Soft, Non-tender, + BS	  Vascularature:  + edema of LE  Psychiatric: Appropriate affect   Neuro: no acute focal deficits                               9.5    7.32  )-----------( 267      ( 14 Dec 2023 06:36 )             30.2     12-14    134<L>  |  94<L>  |  25<H>  ----------------------------<  124<H>  4.0   |  26  |  4.49<H>    Ca    9.0      14 Dec 2023 06:37  Phos  4.5     12-14  Mg     1.9     12-14    TPro  6.7  /  Alb  3.8  /  TBili  0.4  /  DBili  x   /  AST  19  /  ALT  20  /  AlkPhos  67  12-14        Labs personally reviewed      ASSESSMENT/PLAN: 	    83 year old female with HTN, HLD, CAD, T2DM, CKD, remote hx of breast CA s/p mastectomy, and chronic hypoxemic respiratory failure of unknown etiology on home O2 who presents for lower extremity swelling and shortness of breath.      1. Acute diastolic heart failure  - Pt with LE edema and pulmonary congestion  - s/p IV diuresis now DC'd d/t increasing Cr which is now improving. HD intiated.   - Echo shows normal LV systolic function with Bi-atrial enlargement, G2DD and LVH  - EKG shows sinus rhythm    - volume removal with HD    2. ESRD  - Renal on board  - HD as per renal    3. HLD  - Statin therapy    4. HTN - stable  - BP well controlled on Hydral 25mg PO TID and Amlodipine 10mg PO daily    5. Preop Risk Stratification - planned for AVF  - Would ideally defer until post several HD sessions and euvolemic   - Mod risk for low risk AVF, no contraindication to proceed   - Planned for OP AVF         DAFNE Epperson-MEG Ash DO [facility]  Cardiovascular Medicine  [address], [address]  Available through call or text on [employer]  Office: [phone]   DATE OF SERVICE: 12-15-23 @ 17:46    Patient is a 83y old  Female who presents with a chief complaint of LE swelling, dyspnea (15 Dec 2023 11:29)      INTERVAL HISTORY: Feels ok.     REVIEW OF SYSTEMS:  CONSTITUTIONAL: No weakness  EYES/ENT: No visual changes;  No throat pain   NECK: No pain or stiffness  RESPIRATORY: No cough, wheezing; No shortness of breath  CARDIOVASCULAR: No chest pain or palpitations  GASTROINTESTINAL: No abdominal  pain. No nausea, vomiting, or hematemesis  GENITOURINARY: No dysuria, frequency or hematuria  NEUROLOGICAL: No stroke like symptoms  SKIN: No rashes    TELEMETRY Personally reviewed: SR 80-90  	  MEDICATIONS:  amLODIPine   Tablet 10 milliGRAM(s) Oral daily  hydrALAZINE 25 milliGRAM(s) Oral three times a day        PHYSICAL EXAM:  T(C): 36.7 (12-15-23 @ 13:13), Max: 37 (12-14-23 @ 21:10)  HR: 93 (12-15-23 @ 13:13) (83 - 93)  BP: 130/76 (12-15-23 @ 13:13) (116/62 - 141/68)  RR: 18 (12-15-23 @ 13:13) (18 - 18)  SpO2: 97% (12-15-23 @ 13:13) (93% - 97%)  Wt(kg): --  I&O's Summary    14 Dec 2023 07:01  -  15 Dec 2023 07:00  --------------------------------------------------------  IN: 780 mL / OUT: 325 mL / NET: 455 mL    15 Dec 2023 07:01  -  15 Dec 2023 17:46  --------------------------------------------------------  IN: 0 mL / OUT: 2500 mL / NET: -2500 mL          Appearance: In no distress	  HEENT:    PERRL, EOMI	  Cardiovascular:  S1 S2, No JVD  Respiratory: Lungs clear to auscultation	  Gastrointestinal:  Soft, Non-tender, + BS	  Vascularature:  + edema of LE  Psychiatric: Appropriate affect   Neuro: no acute focal deficits                               9.5    7.32  )-----------( 267      ( 14 Dec 2023 06:36 )             30.2     12-14    134<L>  |  94<L>  |  25<H>  ----------------------------<  124<H>  4.0   |  26  |  4.49<H>    Ca    9.0      14 Dec 2023 06:37  Phos  4.5     12-14  Mg     1.9     12-14    TPro  6.7  /  Alb  3.8  /  TBili  0.4  /  DBili  x   /  AST  19  /  ALT  20  /  AlkPhos  67  12-14        Labs personally reviewed      ASSESSMENT/PLAN: 	    83 year old female with HTN, HLD, CAD, T2DM, CKD, remote hx of breast CA s/p mastectomy, and chronic hypoxemic respiratory failure of unknown etiology on home O2 who presents for lower extremity swelling and shortness of breath.      1. Acute diastolic heart failure  - Pt with LE edema and pulmonary congestion  - s/p IV diuresis now DC'd d/t increasing Cr which is now improving. HD intiated.   - Echo shows normal LV systolic function with Bi-atrial enlargement, G2DD and LVH  - EKG shows sinus rhythm    - volume removal with HD    2. ESRD  - Renal on board  - HD as per renal    3. HLD  - Statin therapy    4. HTN - stable  - BP well controlled on Hydral 25mg PO TID and Amlodipine 10mg PO daily    5. Preop Risk Stratification - planned for AVF  - Would ideally defer until post several HD sessions and euvolemic   - Mod risk for low risk AVF, no contraindication to proceed   - Planned for OP AVF         DAFNE Epperson-MEG Ash DO [facility]  Cardiovascular Medicine  [address], [address]  Available through call or text on [employer] TE[employer]  Office: [phone]

## 2023-12-15 NOTE — PROGRESS NOTE ADULT - NS ATTEND BILL GEN_ALL_CORE
Attending to bill
no

## 2023-12-15 NOTE — DISCHARGE NOTE NURSING/CASE MANAGEMENT/SOCIAL WORK - NSDCPEFALRISK_GEN_ALL_CORE
For information on Fall & Injury Prevention, visit: [email or web address] OR  [email or web address] OR  https://www.cdc.gov/steadi/patient.html For information on Fall & Injury Prevention, visit: [email or web address] OR  [email or web address] OR  https://www.cdc.gov/steadi/patient.html

## 2023-12-15 NOTE — DISCHARGE NOTE NURSING/CASE MANAGEMENT/SOCIAL WORK - PATIENT PORTAL LINK FT
You can access the FollowMyHealth Patient Portal offered by [facility] by registering at the following website: [email or web address]. By joining [facility]s FollowMyHealth portal, you will also be able to view your health information using other applications (apps) compatible with our system. You can access the FollowMyHealth Patient Portal offered by [facility] by registering at the following website: [email or web address]. By joining [facility]’s FollowMyHealth portal, you will also be able to view your health information using other applications (apps) compatible with our system.

## 2023-12-15 NOTE — PROGRESS NOTE ADULT - PROBLEM SELECTOR PROBLEM 4
Chronic hypoxemic respiratory failure
Hypertension
Chronic hypoxemic respiratory failure
Chronic hypoxemic respiratory failure
Hypertension
Chronic hypoxemic respiratory failure

## 2023-12-15 NOTE — DISCHARGE NOTE NURSING/CASE MANAGEMENT/SOCIAL WORK - NSSCCARECORD_GEN_ALL_CORE
Home Care Agency/Durable Medical Equipment Agency/Community [facility]/Durable Medical Equipment Agency/Community [facility]

## 2023-12-15 NOTE — DISCHARGE NOTE NURSING/CASE MANAGEMENT/SOCIAL WORK - NSDCFUADDAPPT_GEN_ALL_CORE_FT
APPTS ARE READY TO BE MADE: [X ] YES    Best Family or Patient Contact (if needed):    Additional Information about above appointments (if needed):    1: Primary Care Doctor; adjustment of diabetes medications  2: Cardiology  3: Nephrology      Other comments or requests:

## 2023-12-15 NOTE — PROGRESS NOTE ADULT - NS ATTEND AMEND GEN_ALL_CORE FT
Patient care and plan discussed and reviewed with Advanced Care Provider. Plan as outlined above edited by me to reflect our discussion.

## 2023-12-15 NOTE — PROGRESS NOTE ADULT - PROBLEM SELECTOR PROBLEM 3
Acute kidney injury superimposed on CKD
Renal bone disease
Renal bone disease
Acute kidney injury superimposed on CKD
Renal bone disease
Acute kidney injury superimposed on CKD
Renal bone disease
Renal bone disease
Acute kidney injury superimposed on CKD
Renal bone disease
Acute kidney injury superimposed on CKD

## 2023-12-15 NOTE — PROGRESS NOTE ADULT - SUBJECTIVE AND OBJECTIVE BOX
[facility] Division of Kidney Diseases & Hypertension  FOLLOW UP NOTE  [phone]--------------------------------------------------------------------------------    Chief Complaint: On HD     24 hour events/subjective:  Pt was seen and examined at the bedside. Pt denies worsening of SOB/ Constipation/ Diarrhea/ Nausea/ Vomiting/ abdominal pain/ chest pain/ tingling/ numbness.  Planned for discharge today    PAST HISTORY  --------------------------------------------------------------------------------  No significant changes to PMH, PSH, FHx, SHx, unless otherwise noted    ALLERGIES & MEDICATIONS  --------------------------------------------------------------------------------  Allergies    No Known Allergies    Intolerances      Standing Inpatient Medications  albuterol    90 MICROgram(s) HFA Inhaler 2 Puff(s) Inhalation every 6 hours  albuterol/ipratropium for Nebulization 3 milliLiter(s) Nebulizer every 6 hours  amLODIPine   Tablet 10 milliGRAM(s) Oral daily  atorvastatin 80 milliGRAM(s) Oral at bedtime  bacitracin/polymyxin B Ointment 1 Application(s) Topical daily  budesonide 160 MICROgram(s)/formoterol 4.5 MICROgram(s) Inhaler 2 Puff(s) Inhalation two times a day  chlorhexidine 2% Cloths 1 Application(s) Topical daily  chlorhexidine 4% Liquid 1 Application(s) Topical <User Schedule>  dextrose 5%. 1000 milliLiter(s) IV Continuous <Continuous>  dextrose 5%. 1000 milliLiter(s) IV Continuous <Continuous>  dextrose 50% Injectable 25 Gram(s) IV Push once  dextrose 50% Injectable 12.5 Gram(s) IV Push once  dextrose 50% Injectable 25 Gram(s) IV Push once  epoetin sheryl (EPOGEN) Injectable 5000 Unit(s) IV Push <User Schedule>  ferrous    sulfate 325 milliGRAM(s) Oral daily  fluticasone propionate 50 MICROgram(s)/spray Nasal Spray 1 Spray(s) Both Nostrils two times a day  glucagon  Injectable 1 milliGRAM(s) IntraMuscular once  hydrALAZINE 25 milliGRAM(s) Oral three times a day  influenza  Vaccine (HIGH DOSE) 0.7 milliLiter(s) IntraMuscular once  insulin lispro (ADMELOG) corrective regimen sliding scale   SubCutaneous three times a day before meals  insulin lispro (ADMELOG) corrective regimen sliding scale   SubCutaneous at bedtime  montelukast 10 milliGRAM(s) Oral daily  mupirocin 2% Nasal 1 Application(s) Both Nostrils every 12 hours  nystatin Powder 1 Application(s) Topical two times a day  polyethylene glycol 3350 17 Gram(s) Oral daily  senna 2 Tablet(s) Oral at bedtime  sodium chloride 0.65% Nasal 1 Spray(s) Both Nostrils daily    PRN Inpatient Medications  acetaminophen     Tablet .. 650 milliGRAM(s) Oral every 6 hours PRN  dextrose Oral Gel 15 Gram(s) Oral once PRN  ondansetron Injectable 4 milliGRAM(s) IV Push once PRN  sodium chloride 0.9% lock flush 10 milliLiter(s) IV Push every 1 hour PRN      REVIEW OF SYSTEMS  --------------------------------------------------------------------------------  per above    VITALS/PHYSICAL EXAM  --------------------------------------------------------------------------------  T(C): 36.8 (12-15-23 @ 09:17), Max: 37 (12-14-23 @ 21:10)  HR: 85 (12-15-23 @ 09:17) (85 - 89)  BP: 121/57 (12-15-23 @ 09:17) (116/62 - 124/64)  RR: 18 (12-15-23 @ 09:17) (18 - 18)  SpO2: 97% (12-15-23 @ 09:17) (93% - 97%)  Wt(kg): --        12-14-23 @ 07:01  -  12-15-23 @ 07:00  --------------------------------------------------------  IN: 780 mL / OUT: 325 mL / NET: 455 mL      Physical Exam:  General: no acute distress  Neuro: no focal deficits +forgetful  HEENT: NC/AT, anicteric, no JVD  Pulmonary: breath sounds diminished, on 2L O2  Cardiovascular/Chest: +S1S2,  GI/Abdomen: soft, NT/ND, +bowel sounds  Extremities: No edema  Skin: Warm and dry  Pysch: appropriate mood and affect  Access: right permacath; circumferential erythema around catheter and scabbing; stable for past few days.    LABS/STUDIES  --------------------------------------------------------------------------------              9.5    7.32  >-----------<  267      [12-14-23 @ 06:36]              30.2     134  |  94  |  25  ----------------------------<  124      [12-14-23 @ 06:37]  4.0   |  26  |  4.49        Ca     9.0     [12-14-23 @ 06:37]      Mg     1.9     [12-14-23 @ 06:37]      Phos  4.5     [12-14-23 @ 06:37]    TPro  6.7  /  Alb  3.8  /  TBili  0.4  /  DBili  x   /  AST  19  /  ALT  20  /  AlkPhos  67  [12-14-23 @ 06:37]          Creatinine Trend:  SCr 4.49 [12-14 @ 06:37]  SCr 4.64 [12-13 @ 07:18]  SCr 6.46 [12-12 @ 06:02]  SCr 4.93 [12-11 @ 06:22]  SCr 3.09 [12-10 @ 06:18]    Urinalysis - [12-14-23 @ 06:37]      Color  / Appearance  / SG  / pH       Gluc 124 / Ketone   / Bili  / Urobili        Blood  / Protein  / Leuk Est  / Nitrite       RBC  / WBC  / Hyaline  / Gran  / Sq Epi  / Non Sq Epi  / Bacteria            [facility] Division of Kidney Diseases & Hypertension  FOLLOW UP NOTE  [phone]--------------------------------------------------------------------------------    Chief Complaint: On HD     24 hour events/subjective:  Pt was seen and examined at the bedside. Pt denies worsening of SOB/ Constipation/ Diarrhea/ Nausea/ Vomiting/ abdominal pain/ chest pain/ tingling/ numbness.  Planned for discharge today    PAST HISTORY  --------------------------------------------------------------------------------  No significant changes to PMH, PSH, FHx, SHx, unless otherwise noted    ALLERGIES & MEDICATIONS  --------------------------------------------------------------------------------  Allergies    No Known Allergies    Intolerances      Standing Inpatient Medications  albuterol    90 MICROgram(s) HFA Inhaler 2 Puff(s) Inhalation every 6 hours  albuterol/ipratropium for Nebulization 3 milliLiter(s) Nebulizer every 6 hours  amLODIPine   Tablet 10 milliGRAM(s) Oral daily  atorvastatin 80 milliGRAM(s) Oral at bedtime  bacitracin/polymyxin B Ointment 1 Application(s) Topical daily  budesonide 160 MICROgram(s)/formoterol 4.5 MICROgram(s) Inhaler 2 Puff(s) Inhalation two times a day  chlorhexidine 2% Cloths 1 Application(s) Topical daily  chlorhexidine 4% Liquid 1 Application(s) Topical <User Schedule>  dextrose 5%. 1000 milliLiter(s) IV Continuous <Continuous>  dextrose 5%. 1000 milliLiter(s) IV Continuous <Continuous>  dextrose 50% Injectable 25 Gram(s) IV Push once  dextrose 50% Injectable 12.5 Gram(s) IV Push once  dextrose 50% Injectable 25 Gram(s) IV Push once  epoetin sheryl (EPOGEN) Injectable 5000 Unit(s) IV Push <User Schedule>  ferrous    sulfate 325 milliGRAM(s) Oral daily  fluticasone propionate 50 MICROgram(s)/spray Nasal Spray 1 Spray(s) Both Nostrils two times a day  glucagon  Injectable 1 milliGRAM(s) IntraMuscular once  hydrALAZINE 25 milliGRAM(s) Oral three times a day  influenza  Vaccine (HIGH DOSE) 0.7 milliLiter(s) IntraMuscular once  insulin lispro (ADMELOG) corrective regimen sliding scale   SubCutaneous three times a day before meals  insulin lispro (ADMELOG) corrective regimen sliding scale   SubCutaneous at bedtime  montelukast 10 milliGRAM(s) Oral daily  mupirocin 2% Nasal 1 Application(s) Both Nostrils every 12 hours  nystatin Powder 1 Application(s) Topical two times a day  polyethylene glycol 3350 17 Gram(s) Oral daily  senna 2 Tablet(s) Oral at bedtime  sodium chloride 0.65% Nasal 1 Spray(s) Both Nostrils daily    PRN Inpatient Medications  acetaminophen     Tablet .. 650 milliGRAM(s) Oral every 6 hours PRN  dextrose Oral Gel 15 Gram(s) Oral once PRN  ondansetron Injectable 4 milliGRAM(s) IV Push once PRN  sodium chloride 0.9% lock flush 10 milliLiter(s) IV Push every 1 hour PRN      REVIEW OF SYSTEMS  --------------------------------------------------------------------------------  per above    VITALS/PHYSICAL EXAM  --------------------------------------------------------------------------------  T(C): 36.8 (12-15-23 @ 09:17), Max: 37 (12-14-23 @ 21:10)  HR: 85 (12-15-23 @ 09:17) (85 - 89)  BP: 121/57 (12-15-23 @ 09:17) (116/62 - 124/64)  RR: 18 (12-15-23 @ 09:17) (18 - 18)  SpO2: 97% (12-15-23 @ 09:17) (93% - 97%)  Wt(kg): --        12-14-23 @ 07:01  -  12-15-23 @ 07:00  --------------------------------------------------------  IN: 780 mL / OUT: 325 mL / NET: 455 mL      Physical Exam:  General: no acute distress  Neuro: no focal deficits +forgetful  HEENT: NC/AT, anicteric, no JVD  Pulmonary: breath sounds diminished, on 2L O2  Cardiovascular/Chest: +S1S2,  GI/Abdomen: soft, NT/ND, +bowel sounds  Extremities: No edema  Skin: Warm and dry  Pysch: appropriate mood and affect  Access: right permacath; circumferential erythema around catheter and scabbing; stable for past few days.    LABS/STUDIES  --------------------------------------------------------------------------------              9.5    7.32  >-----------<  267      [12-14-23 @ 06:36]              30.2     134  |  94  |  25  ----------------------------<  124      [12-14-23 @ 06:37]  4.0   |  26  |  4.49        Ca     9.0     [12-14-23 @ 06:37]      Mg     1.9     [12-14-23 @ 06:37]      Phos  4.5     [12-14-23 @ 06:37]    TPro  6.7  /  Alb  3.8  /  TBili  0.4  /  DBili  x   /  AST  19  /  ALT  20  /  AlkPhos  67  [12-14-23 @ 06:37]          Creatinine Trend:  SCr 4.49 [12-14 @ 06:37]  SCr 4.64 [12-13 @ 07:18]  SCr 6.46 [12-12 @ 06:02]  SCr 4.93 [12-11 @ 06:22]  SCr 3.09 [12-10 @ 06:18]    Urinalysis - [12-14-23 @ 06:37]      Color  / Appearance  / SG  / pH       Gluc 124 / Ketone   / Bili  / Urobili        Blood  / Protein  / Leuk Est  / Nitrite       RBC  / WBC  / Hyaline  / Gran  / Sq Epi  / Non Sq Epi  / Bacteria

## 2023-12-15 NOTE — PROGRESS NOTE ADULT - PROBLEM SELECTOR PROBLEM 1
Acute decompensated heart failure
Acute kidney injury superimposed on CKD
Acute kidney injury superimposed on CKD
Acute decompensated heart failure
Acute kidney injury superimposed on CKD
Acute kidney injury superimposed on CKD
Acute decompensated heart failure
Acute kidney injury superimposed on CKD
Acute decompensated heart failure
Acute decompensated heart failure
Acute kidney injury superimposed on CKD
Acute kidney injury superimposed on CKD
Acute decompensated heart failure
Acute kidney injury superimposed on CKD
Acute kidney injury superimposed on CKD
Acute decompensated heart failure

## 2023-12-15 NOTE — PROGRESS NOTE ADULT - PROBLEM SELECTOR PROBLEM 2
Anemia
Anemia
Chronic hypoxemic respiratory failure
Anemia
Chronic hypoxemic respiratory failure
Anemia

## 2023-12-15 NOTE — PROGRESS NOTE ADULT - PROBLEM SELECTOR PLAN 3
Reviewed PTH, phos calcium and vitamin D levels. Phos acceptable.   Now not on phos binders. Monitor calcium, phos with every BMP.     If you have any questions, please feel free to contact me  Keaton Dye  Nephrology Fellow  [phone]; Prefer [employer] TEAMS  (After 5pm or on weekends please page the on-call fellow). Reviewed PTH, phos calcium and vitamin D levels. Phos acceptable.   Now not on phos binders. Monitor calcium, phos with every BMP.     If you have any questions, please feel free to contact me  Keaton Dye  Nephrology Fellow  [phone]; Prefer [employer] TEAMS  (After 5pm or on weekends please page the on-call fellow).

## 2023-12-15 NOTE — PROGRESS NOTE ADULT - ATTENDING COMMENTS
Patient seen and examined on dialysis. Tolerating HD.     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 Started dialysis in the hospital.   HD today.       # Discharge at the dialysis catheter site - no fever chills, WBC normal. Swab culture no growth to date. For polymixin ointment at the exit site (during dialysis).   Need to continue polymixin for another week (outpatient dialysis)    # Medication monitoring encounter: medication dose reviewed. Please dose medications to CrCl<15    The rest of the recommendations as per fellow's note.    Kerri Leyva MD  Attending Nephrologist  [phone] or via [employer] . Patient seen and examined on dialysis. Tolerating HD.     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 Started dialysis in the hospital.   HD today.       # Discharge at the dialysis catheter site - no fever chills, WBC normal. Swab culture no growth to date. For polymixin ointment at the exit site (during dialysis).   Need to continue polymixin for another week (outpatient dialysis)    # Medication monitoring encounter: medication dose reviewed. Please dose medications to CrCl<15    The rest of the recommendations as per fellow's note.    Kerri Leyva MD  Attending Nephrologist  [phone] or via [employer] . Patient seen and examined on dialysis. Tolerating HD.     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 Started dialysis in the hospital.   HD today.       # Discharge at the dialysis catheter site - no fever chills, WBC normal. Swab culture no growth to date. For polymixin ointment at the exit site (during dialysis).   Need to continue polymixin for another week (outpatient dialysis)    # Medication monitoring encounter: medication dose reviewed. Please dose medications to CrCl<15    The rest of the recommendations as per fellow's note.    For weekend coverage, please call Nephrology Fellow Dr. Keaton Dye and Attending Dr Andrea Drake.      Kerri Leyva MD  Attending Nephrologist  [phone] or via [employer] . Patient seen and examined on dialysis. Tolerating HD.     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 Started dialysis in the hospital.   HD today.       # Discharge at the dialysis catheter site - no fever chills, WBC normal. Swab culture no growth to date. For polymixin ointment at the exit site (during dialysis).   Need to continue polymixin for another week (outpatient dialysis)    # Medication monitoring encounter: medication dose reviewed. Please dose medications to CrCl<15    The rest of the recommendations as per fellow's note.    For weekend coverage, please call Nephrology Fellow Dr. Keaton Dye and Attending Dr Andrea Drake.      Kerri Leyva MD  Attending Nephrologist  [phone] or via [employer] .

## 2023-12-15 NOTE — PROGRESS NOTE ADULT - SUBJECTIVE AND OBJECTIVE BOX
Patient is a 83y old  Female who presents with a chief complaint of LE swelling, dyspnea (14 Dec 2023 15:33)      SUBJECTIVE / OVERNIGHT EVENTS: NAEO. Pt denies chest pain, SOB, N/V, fever/chills, or changes in bowel movements.    MEDICATIONS  (STANDING):  albuterol    90 MICROgram(s) HFA Inhaler 2 Puff(s) Inhalation every 6 hours  albuterol/ipratropium for Nebulization 3 milliLiter(s) Nebulizer every 6 hours  amLODIPine   Tablet 10 milliGRAM(s) Oral daily  atorvastatin 80 milliGRAM(s) Oral at bedtime  bacitracin/polymyxin B Ointment 1 Application(s) Topical daily  budesonide 160 MICROgram(s)/formoterol 4.5 MICROgram(s) Inhaler 2 Puff(s) Inhalation two times a day  chlorhexidine 2% Cloths 1 Application(s) Topical daily  chlorhexidine 4% Liquid 1 Application(s) Topical <User Schedule>  dextrose 5%. 1000 milliLiter(s) (50 mL/Hr) IV Continuous <Continuous>  dextrose 5%. 1000 milliLiter(s) (100 mL/Hr) IV Continuous <Continuous>  dextrose 50% Injectable 25 Gram(s) IV Push once  dextrose 50% Injectable 12.5 Gram(s) IV Push once  dextrose 50% Injectable 25 Gram(s) IV Push once  epoetin sheryl (EPOGEN) Injectable 5000 Unit(s) IV Push <User Schedule>  ferrous    sulfate 325 milliGRAM(s) Oral daily  fluticasone propionate 50 MICROgram(s)/spray Nasal Spray 1 Spray(s) Both Nostrils two times a day  glucagon  Injectable 1 milliGRAM(s) IntraMuscular once  hydrALAZINE 25 milliGRAM(s) Oral three times a day  influenza  Vaccine (HIGH DOSE) 0.7 milliLiter(s) IntraMuscular once  insulin lispro (ADMELOG) corrective regimen sliding scale   SubCutaneous three times a day before meals  insulin lispro (ADMELOG) corrective regimen sliding scale   SubCutaneous at bedtime  montelukast 10 milliGRAM(s) Oral daily  mupirocin 2% Nasal 1 Application(s) Both Nostrils every 12 hours  nystatin Powder 1 Application(s) Topical two times a day  polyethylene glycol 3350 17 Gram(s) Oral daily  senna 2 Tablet(s) Oral at bedtime  sodium chloride 0.65% Nasal 1 Spray(s) Both Nostrils daily    MEDICATIONS  (PRN):  acetaminophen     Tablet .. 650 milliGRAM(s) Oral every 6 hours PRN Temp greater or equal to 38C (100.4F), Mild Pain (1 - 3)  dextrose Oral Gel 15 Gram(s) Oral once PRN Blood Glucose LESS THAN 70 milliGRAM(s)/deciliter  ondansetron Injectable 4 milliGRAM(s) IV Push once PRN Nausea and/or Vomiting  sodium chloride 0.9% lock flush 10 milliLiter(s) IV Push every 1 hour PRN Pre/post blood products, medications, blood draw, and to maintain line patency      CAPILLARY BLOOD GLUCOSE      POCT Blood Glucose.: 131 mg/dL (14 Dec 2023 21:40)  POCT Blood Glucose.: 171 mg/dL (14 Dec 2023 16:25)  POCT Blood Glucose.: 190 mg/dL (14 Dec 2023 11:44)  POCT Blood Glucose.: 124 mg/dL (14 Dec 2023 07:43)    I&O's Summary    13 Dec 2023 07:01  -  14 Dec 2023 07:00  --------------------------------------------------------  IN: 200 mL / OUT: 600 mL / NET: -400 mL    14 Dec 2023 07:01  -  15 Dec 2023 06:47  --------------------------------------------------------  IN: 780 mL / OUT: 325 mL / NET: 455 mL        Vital Signs Last 24 Hrs  T(C): 36.6 (15 Dec 2023 04:37), Max: 37 (14 Dec 2023 21:10)  T(F): 97.8 (15 Dec 2023 04:37), Max: 98.6 (14 Dec 2023 21:10)  HR: 86 (15 Dec 2023 04:37) (79 - 89)  BP: 116/62 (15 Dec 2023 04:37) (112/61 - 124/64)  BP(mean): --  RR: 18 (15 Dec 2023 04:37) (18 - 18)  SpO2: 93% (15 Dec 2023 04:37) (93% - 99%)    Parameters below as of 15 Dec 2023 04:37  Patient On (Oxygen Delivery Method): nasal cannula  O2 Flow (L/min): 2      PHYSICAL EXAM:  GENERAL: NAD, well-developed, well-nourished  HEAD: Atraumatic, Normocephalic  EYES: Conjunctiva and sclera clear  CHEST/LUNG: Clear to auscultation bilaterally; No wheezes or crackles  HEART: Normal S1/S2; Regular rate and rhythm; No murmurs, rubs, or gallops  ABDOMEN: Soft, Nontender, Nondistended; Bowel sounds present  EXTREMITIES: No clubbing, cyanosis, or edema  PSYCH: A&Ox3      LABS:                        9.5    7.32  )-----------( 267      ( 14 Dec 2023 06:36 )             30.2      12-14    134<L>  |  94<L>  |  25<H>  ----------------------------<  124<H>  4.0   |  26  |  4.49<H>    Ca    9.0      14 Dec 2023 06:37  Phos  4.5     12-14  Mg     1.9     12-14    TPro  6.7  /  Alb  3.8  /  TBili  0.4  /  DBili  x   /  AST  19  /  ALT  20  /  AlkPhos  67  12-14          Urinalysis Basic - ( 14 Dec 2023 06:37 )    Color: x / Appearance: x / SG: x / pH: x  Gluc: 124 mg/dL / Ketone: x  / Bili: x / Urobili: x   Blood: x / Protein: x / Nitrite: x   Leuk Esterase: x / RBC: x / WBC x   Sq Epi: x / Non Sq Epi: x / Bacteria: x        RADIOLOGY & ADDITIONAL TESTS:

## 2023-12-15 NOTE — PROGRESS NOTE ADULT - PROBLEM SELECTOR PLAN 1
Given her history its likely to be CAL on CKD 2/2 SAUMYA vs CKD progression. US duplex Kidney - showed renal parenchymal disease. UPCR 5.6g, no RBCs, no bacteria. SCr 4.5 on admission, received IV contrast for CTA on evening of 11/21, and Cr had been stable but increased about 2 days after IV contrast exposure suggesting SAUMYA. Serology work up ongoing- so far negative HIV, Hep B, Hep C, C3, C4, Anti GBM antibody, Anti Ds DNA, JAYLAN, ANCA, Serum free light chains, immunofixation. A1c 6.1%. Negative for PLA2R Ab. BNP 1300 initially. TTE with G2DD with elevated filling pressures and severe LA dilation. Patient was diuresed throughout hospitalization. eGFR persistently ~9, with possible uremic symptoms (asterixes, SOB, fatigue) so iHD started 12/5.    Last iHD 12/13; Will plan for iHD today.  AVF postponed to next Tuesday. Patient planned for discharge today after iHD, going to [facility]. Will send sign out to team there. Monitor BMP and I/O. Will need 7 days of topical abx, polysporin to be applied to catheter site by outpatient iHD unit. Given her history its likely to be CAL on CKD 2/2 SAUMYA vs CKD progression. US duplex Kidney - showed renal parenchymal disease. UPCR 5.6g, no RBCs, no bacteria. SCr 4.5 on admission, received IV contrast for CTA on evening of 11/21, and Cr had been stable but increased about 2 days after IV contrast exposure suggesting SAUMYA. Serology work up ongoing- so far negative HIV, Hep B, Hep C, C3, C4, Anti GBM antibody, Anti Ds DNA, JAYLAN, ANCA, Serum free light chains, immunofixation. A1c 6.1%. Negative for PLA2R Ab. BNP 1300 initially. TTE with G2DD with elevated filling pressures and severe LA dilation. Patient was diuresed throughout hospitalization. eGFR persistently ~9, with possible uremic symptoms (asterixes, SOB, fatigue) so iHD started 12/5.    Last iHD 12/13; Will plan for iHD today.  AVF postponed to next Tuesday. Patient planned for discharge today after iHD, going to [facility]. Will send sign out to team there. Monitor BMP and I/O. Will need 7 days of topical abx, polysporin to be applied to catheter site by outpatient iHD unit.

## 2023-12-15 NOTE — PROGRESS NOTE ADULT - ATTENDING COMMENTS
83 year old female with PMH chronic hypoxic respiratory failure, DM2, CAD, HTN and HLD who presented with dyspnea and lower extremity edema admitted for acute decompensated congestive heart failure. TTE showed LV diastolic dysfunction but normal EF. She was started on IV diuretics however, her creatinine increased to nearly 7 and her GFR was less than 10 so HD was initiated. She is planned for AVF next week as an outpatient for more permanent access. Rest of plan as above. Total time spent discharge planning 41 minutes.

## 2023-12-15 NOTE — CHART NOTE - NSCHARTNOTEFT_GEN_A_CORE
83 year old female with HTN, HLD, CAD, T2DM, CKD, remote hx of breast CA s/p mastectomy, and chronic hypoxemic respiratory failure of unknown etiology on home O2 who presents for lower extremity swelling and shortness of breath with subsequent CAL superimposed on CKD. Vascular consulted for AVF planning. Pt is RHD. Patient is medically optimized for AVF, and will present for outpatient AVF creation 12/19.     - Protect LUE- no sticks, blood draws, cuffs  - Patient should receive dialysis on 12/18  - Ok to discharge with follow up on Tuesday at [facility] as outpatient AVF    #[id number] 83 year old female with HTN, HLD, CAD, T2DM, CKD, remote hx of breast CA s/p mastectomy, and chronic hypoxemic respiratory failure of unknown etiology on home O2 who presents for lower extremity swelling and shortness of breath with subsequent CAL superimposed on CKD. Vascular consulted for AVF planning. Pt is RHD. Patient is medically optimized for AVF, and will present for outpatient AVF creation 12/19.     - Protect LUE- no sticks, blood draws, cuffs  - Patient should receive dialysis on 12/18  - Ok to discharge with follow up on Tuesday at [facility] as outpatient AVF    #[id number]

## 2023-12-15 NOTE — PROGRESS NOTE ADULT - PROBLEM SELECTOR PLAN 4
Patient with several year history of chronic hypoxemic respiratory failure  - requiring home O2, stable on 2L at baseline  - has not had escalating O2 requirements recently, despite feeling subjectively more dyspneic  - unclear etiology, though patient has had PFTs at her pulmologist's office last year    Plan:  > follow up TTE--> showing EF 71%, bi-atrial enlargement, LV diastolic dysfunction, and RV enlargement  > obtain records from pt's pulmonologists office: Dr. Paulino Gayle ([facility], [phone])  > continue with home montelukast and add on duonebs and symbicort   > now on baseline of 2L O2  > s/p prednisone for possible COPD exacerbation   - f/u repeat CXR and BNP (stable CXR and elevated BNP)  - appreciate pulm recs: started on flonase and saline spray Patient with several year history of chronic hypoxemic respiratory failure  - requiring home O2, stable on 2L at baseline  - has not had escalating O2 requirements recently, despite feeling subjectively more dyspneic  - unclear etiology, though patient has had PFTs at her pulmologist's office last year    Plan:  > follow up TTE--> showing EF 71%, bi-atrial enlargement, LV diastolic dysfunction, and RV enlargement  > obtain records from pt's pulmonologists office: Dr. Paulino Gayle ([facility], [phone])  > continue with home montelukast and add on duonebs and symbicort   > now on baseline of 2L O2  > s/p prednisone for possible COPD exacerbation   - f/u repeat CXR and BNP (stable CXR and elevated BNP)  - appreciate pulm recs: started on flonase and saline spray

## 2023-12-15 NOTE — DISCHARGE NOTE NURSING/CASE MANAGEMENT/SOCIAL WORK - FLU SEASON?
[FreeTextEntry1] : 1. cough, overproduction of mucus likely d/t rl\par -diet/ mechanical precautions\par -Prilosec \par -followup with pulmonologist for cough\par 2. Atrophic Rhinitis\par -continue Flonase\par -Atrovent, confirmed no h/o glaucoma \par 3. r cerumen impaction\par -cerumen removed\par -ears felt better\par -avoid Q tips\par RTC in 1 month or sooner as needed\par \par MARIO Hdz was scribe for this note; it was reviewed and modified as necessary by Jonathan Almonte MD\par  Yes...

## 2023-12-15 NOTE — PROGRESS NOTE ADULT - ATTENDING SUPERVISION STATEMENT
Fellow
Resident
Fellow
Fellow
Resident
Fellow
Resident

## 2023-12-15 NOTE — PROGRESS NOTE ADULT - REASON FOR ADMISSION
LE swelling, dyspnea

## 2023-12-15 NOTE — PROGRESS NOTE ADULT - PROBLEM SELECTOR PLAN 8
DVT PPx: heparin subq  Diet: DASH  Code: FULL  Dispo: home with HPT, plan for OP AVF placement Tuesday 12/19  Communication: discussed with  at bedside  Risk stratification for fistula placement   - RCRI of 2 points, Class III Risk -- 10.1% 30-day risk of death, MI, or cardiac arrest  - Pt reports being able to do 3-4 METS activities indicating moderate functional capacity; pt states she is able to walk up stairs, walks, dances, does chores at home  - no contraindication from medical standpoint for AVF placement

## 2023-12-16 ENCOUNTER — TRANSCRIPTION ENCOUNTER (OUTPATIENT)
Age: 83
End: 2023-12-16

## 2023-12-16 LAB
CULTURE RESULTS: NO GROWTH — SIGNIFICANT CHANGE UP
CULTURE RESULTS: NO GROWTH — SIGNIFICANT CHANGE UP
SPECIMEN SOURCE: SIGNIFICANT CHANGE UP
SPECIMEN SOURCE: SIGNIFICANT CHANGE UP

## 2023-12-16 RX ORDER — SAXAGLIPTIN 5 MG/1
1 TABLET, FILM COATED ORAL
Qty: 90 | Refills: 0 | DISCHARGE
Start: 2023-12-16 | End: 2024-03-14

## 2023-12-16 RX ORDER — SAXAGLIPTIN 5 MG/1
1 TABLET, FILM COATED ORAL
Qty: 90 | Refills: 0
Start: 2023-12-16 | End: 2024-03-14

## 2023-12-18 ENCOUNTER — NON-APPOINTMENT (OUTPATIENT)
Age: 83
End: 2023-12-18

## 2023-12-18 ENCOUNTER — TRANSCRIPTION ENCOUNTER (OUTPATIENT)
Age: 83
End: 2023-12-18

## 2023-12-18 RX ORDER — BLOOD SUGAR DIAGNOSTIC
STRIP MISCELLANEOUS DAILY
Qty: 1 | Refills: 0 | Status: ACTIVE | COMMUNITY
Start: 2023-12-18 | End: 1900-01-01

## 2023-12-18 RX ORDER — ISOPROPYL ALCOHOL 0.7 ML/ML
SWAB TOPICAL
Qty: 1 | Refills: 0 | Status: ACTIVE | COMMUNITY
Start: 2023-12-18 | End: 1900-01-01

## 2023-12-18 RX ORDER — LANCETS 33 GAUGE
EACH MISCELLANEOUS
Qty: 1 | Refills: 0 | Status: ACTIVE | COMMUNITY
Start: 2023-12-18 | End: 1900-01-01

## 2023-12-18 RX ORDER — BLOOD-GLUCOSE METER
W/DEVICE KIT MISCELLANEOUS
Qty: 1 | Refills: 0 | Status: ACTIVE | COMMUNITY
Start: 2023-12-18 | End: 1900-01-01

## 2023-12-19 ENCOUNTER — TRANSCRIPTION ENCOUNTER (OUTPATIENT)
Age: 83
End: 2023-12-19

## 2023-12-19 ENCOUNTER — APPOINTMENT (OUTPATIENT)
Dept: VASCULAR SURGERY | Facility: HOSPITAL | Age: 83
End: 2023-12-19

## 2023-12-19 ENCOUNTER — OUTPATIENT (OUTPATIENT)
Dept: INPATIENT UNIT | Facility: HOSPITAL | Age: 83
LOS: 1 days | End: 2023-12-19
Payer: MEDICARE

## 2023-12-19 VITALS
DIASTOLIC BLOOD PRESSURE: 55 MMHG | TEMPERATURE: 98 F | HEART RATE: 85 BPM | OXYGEN SATURATION: 99 % | SYSTOLIC BLOOD PRESSURE: 109 MMHG | RESPIRATION RATE: 18 BRPM

## 2023-12-19 VITALS
WEIGHT: 160.06 LBS | SYSTOLIC BLOOD PRESSURE: 115 MMHG | HEIGHT: 63 IN | RESPIRATION RATE: 18 BRPM | HEART RATE: 84 BPM | OXYGEN SATURATION: 95 % | DIASTOLIC BLOOD PRESSURE: 62 MMHG | TEMPERATURE: 98 F

## 2023-12-19 DIAGNOSIS — N18.6 END STAGE RENAL DISEASE: ICD-10-CM

## 2023-12-19 LAB
ANION GAP SERPL CALC-SCNC: 13 MMOL/L — SIGNIFICANT CHANGE UP (ref 5–17)
ANION GAP SERPL CALC-SCNC: 13 MMOL/L — SIGNIFICANT CHANGE UP (ref 5–17)
APTT BLD: 30.1 SEC — SIGNIFICANT CHANGE UP (ref 24.5–35.6)
APTT BLD: 30.1 SEC — SIGNIFICANT CHANGE UP (ref 24.5–35.6)
BLD GP AB SCN SERPL QL: NEGATIVE — SIGNIFICANT CHANGE UP
BLD GP AB SCN SERPL QL: NEGATIVE — SIGNIFICANT CHANGE UP
BUN SERPL-MCNC: 30 MG/DL — HIGH (ref 7–23)
BUN SERPL-MCNC: 30 MG/DL — HIGH (ref 7–23)
CALCIUM SERPL-MCNC: 9.4 MG/DL — SIGNIFICANT CHANGE UP (ref 8.4–10.5)
CALCIUM SERPL-MCNC: 9.4 MG/DL — SIGNIFICANT CHANGE UP (ref 8.4–10.5)
CHLORIDE SERPL-SCNC: 93 MMOL/L — LOW (ref 96–108)
CHLORIDE SERPL-SCNC: 93 MMOL/L — LOW (ref 96–108)
CO2 SERPL-SCNC: 31 MMOL/L — SIGNIFICANT CHANGE UP (ref 22–31)
CO2 SERPL-SCNC: 31 MMOL/L — SIGNIFICANT CHANGE UP (ref 22–31)
CREAT SERPL-MCNC: 4.47 MG/DL — HIGH (ref 0.5–1.3)
CREAT SERPL-MCNC: 4.47 MG/DL — HIGH (ref 0.5–1.3)
EGFR: 9 ML/MIN/1.73M2 — LOW
EGFR: 9 ML/MIN/1.73M2 — LOW
GLUCOSE BLDC GLUCOMTR-MCNC: 119 MG/DL — HIGH (ref 70–99)
GLUCOSE BLDC GLUCOMTR-MCNC: 119 MG/DL — HIGH (ref 70–99)
GLUCOSE SERPL-MCNC: 128 MG/DL — HIGH (ref 70–99)
GLUCOSE SERPL-MCNC: 128 MG/DL — HIGH (ref 70–99)
HCT VFR BLD CALC: 33.6 % — LOW (ref 34.5–45)
HCT VFR BLD CALC: 33.6 % — LOW (ref 34.5–45)
HGB BLD-MCNC: 10.5 G/DL — LOW (ref 11.5–15.5)
HGB BLD-MCNC: 10.5 G/DL — LOW (ref 11.5–15.5)
INR BLD: 0.99 RATIO — SIGNIFICANT CHANGE UP (ref 0.85–1.18)
INR BLD: 0.99 RATIO — SIGNIFICANT CHANGE UP (ref 0.85–1.18)
MCHC RBC-ENTMCNC: 28.5 PG — SIGNIFICANT CHANGE UP (ref 27–34)
MCHC RBC-ENTMCNC: 28.5 PG — SIGNIFICANT CHANGE UP (ref 27–34)
MCHC RBC-ENTMCNC: 31.3 GM/DL — LOW (ref 32–36)
MCHC RBC-ENTMCNC: 31.3 GM/DL — LOW (ref 32–36)
MCV RBC AUTO: 91.1 FL — SIGNIFICANT CHANGE UP (ref 80–100)
MCV RBC AUTO: 91.1 FL — SIGNIFICANT CHANGE UP (ref 80–100)
NRBC # BLD: 0 /100 WBCS — SIGNIFICANT CHANGE UP (ref 0–0)
NRBC # BLD: 0 /100 WBCS — SIGNIFICANT CHANGE UP (ref 0–0)
PLATELET # BLD AUTO: 247 K/UL — SIGNIFICANT CHANGE UP (ref 150–400)
PLATELET # BLD AUTO: 247 K/UL — SIGNIFICANT CHANGE UP (ref 150–400)
POTASSIUM SERPL-MCNC: 4.2 MMOL/L — SIGNIFICANT CHANGE UP (ref 3.5–5.3)
POTASSIUM SERPL-MCNC: 4.2 MMOL/L — SIGNIFICANT CHANGE UP (ref 3.5–5.3)
POTASSIUM SERPL-SCNC: 4.2 MMOL/L — SIGNIFICANT CHANGE UP (ref 3.5–5.3)
POTASSIUM SERPL-SCNC: 4.2 MMOL/L — SIGNIFICANT CHANGE UP (ref 3.5–5.3)
PROTHROM AB SERPL-ACNC: 10.4 SEC — SIGNIFICANT CHANGE UP (ref 9.5–13)
PROTHROM AB SERPL-ACNC: 10.4 SEC — SIGNIFICANT CHANGE UP (ref 9.5–13)
RBC # BLD: 3.69 M/UL — LOW (ref 3.8–5.2)
RBC # BLD: 3.69 M/UL — LOW (ref 3.8–5.2)
RBC # FLD: 14.7 % — HIGH (ref 10.3–14.5)
RBC # FLD: 14.7 % — HIGH (ref 10.3–14.5)
RH IG SCN BLD-IMP: POSITIVE — SIGNIFICANT CHANGE UP
RH IG SCN BLD-IMP: POSITIVE — SIGNIFICANT CHANGE UP
SODIUM SERPL-SCNC: 137 MMOL/L — SIGNIFICANT CHANGE UP (ref 135–145)
SODIUM SERPL-SCNC: 137 MMOL/L — SIGNIFICANT CHANGE UP (ref 135–145)
WBC # BLD: 7.12 K/UL — SIGNIFICANT CHANGE UP (ref 3.8–10.5)
WBC # BLD: 7.12 K/UL — SIGNIFICANT CHANGE UP (ref 3.8–10.5)
WBC # FLD AUTO: 7.12 K/UL — SIGNIFICANT CHANGE UP (ref 3.8–10.5)
WBC # FLD AUTO: 7.12 K/UL — SIGNIFICANT CHANGE UP (ref 3.8–10.5)

## 2023-12-19 PROCEDURE — 82962 GLUCOSE BLOOD TEST: CPT

## 2023-12-19 PROCEDURE — C1750: CPT

## 2023-12-19 PROCEDURE — 92610 EVALUATE SWALLOWING FUNCTION: CPT

## 2023-12-19 PROCEDURE — 82310 ASSAY OF CALCIUM: CPT

## 2023-12-19 PROCEDURE — 80048 BASIC METABOLIC PNL TOTAL CA: CPT

## 2023-12-19 PROCEDURE — 84100 ASSAY OF PHOSPHORUS: CPT

## 2023-12-19 PROCEDURE — 83036 HEMOGLOBIN GLYCOSYLATED A1C: CPT

## 2023-12-19 PROCEDURE — 93975 VASCULAR STUDY: CPT

## 2023-12-19 PROCEDURE — 36415 COLL VENOUS BLD VENIPUNCTURE: CPT

## 2023-12-19 PROCEDURE — 77001 FLUOROGUIDE FOR VEIN DEVICE: CPT

## 2023-12-19 PROCEDURE — 93306 TTE W/DOPPLER COMPLETE: CPT

## 2023-12-19 PROCEDURE — 87640 STAPH A DNA AMP PROBE: CPT

## 2023-12-19 PROCEDURE — 86900 BLOOD TYPING SEROLOGIC ABO: CPT

## 2023-12-19 PROCEDURE — 85018 HEMOGLOBIN: CPT

## 2023-12-19 PROCEDURE — 76937 US GUIDE VASCULAR ACCESS: CPT

## 2023-12-19 PROCEDURE — 82306 VITAMIN D 25 HYDROXY: CPT

## 2023-12-19 PROCEDURE — 85730 THROMBOPLASTIN TIME PARTIAL: CPT

## 2023-12-19 PROCEDURE — 97530 THERAPEUTIC ACTIVITIES: CPT

## 2023-12-19 PROCEDURE — 94640 AIRWAY INHALATION TREATMENT: CPT

## 2023-12-19 PROCEDURE — 84540 ASSAY OF URINE/UREA-N: CPT

## 2023-12-19 PROCEDURE — 86706 HEP B SURFACE ANTIBODY: CPT

## 2023-12-19 PROCEDURE — 83605 ASSAY OF LACTIC ACID: CPT

## 2023-12-19 PROCEDURE — 99261: CPT

## 2023-12-19 PROCEDURE — 83540 ASSAY OF IRON: CPT

## 2023-12-19 PROCEDURE — 84132 ASSAY OF SERUM POTASSIUM: CPT

## 2023-12-19 PROCEDURE — 83521 IG LIGHT CHAINS FREE EACH: CPT

## 2023-12-19 PROCEDURE — C1752: CPT

## 2023-12-19 PROCEDURE — 85025 COMPLETE CBC W/AUTO DIFF WBC: CPT

## 2023-12-19 PROCEDURE — 86038 ANTINUCLEAR ANTIBODIES: CPT

## 2023-12-19 PROCEDURE — 76770 US EXAM ABDO BACK WALL COMP: CPT

## 2023-12-19 PROCEDURE — 86036 ANCA SCREEN EACH ANTIBODY: CPT

## 2023-12-19 PROCEDURE — 83935 ASSAY OF URINE OSMOLALITY: CPT

## 2023-12-19 PROCEDURE — C1769: CPT

## 2023-12-19 PROCEDURE — 80074 ACUTE HEPATITIS PANEL: CPT

## 2023-12-19 PROCEDURE — C1894: CPT

## 2023-12-19 PROCEDURE — 84133 ASSAY OF URINE POTASSIUM: CPT

## 2023-12-19 PROCEDURE — 84300 ASSAY OF URINE SODIUM: CPT

## 2023-12-19 PROCEDURE — 93308 TTE F-UP OR LMTD: CPT

## 2023-12-19 PROCEDURE — 84156 ASSAY OF PROTEIN URINE: CPT

## 2023-12-19 PROCEDURE — 83735 ASSAY OF MAGNESIUM: CPT

## 2023-12-19 PROCEDURE — 82570 ASSAY OF URINE CREATININE: CPT

## 2023-12-19 PROCEDURE — 85610 PROTHROMBIN TIME: CPT

## 2023-12-19 PROCEDURE — 92611 MOTION FLUOROSCOPY/SWALLOW: CPT

## 2023-12-19 PROCEDURE — 86255 FLUORESCENT ANTIBODY SCREEN: CPT

## 2023-12-19 PROCEDURE — 83970 ASSAY OF PARATHORMONE: CPT

## 2023-12-19 PROCEDURE — 97161 PT EVAL LOW COMPLEX 20 MIN: CPT

## 2023-12-19 PROCEDURE — 71045 X-RAY EXAM CHEST 1 VIEW: CPT

## 2023-12-19 PROCEDURE — P9016: CPT

## 2023-12-19 PROCEDURE — 82803 BLOOD GASES ANY COMBINATION: CPT

## 2023-12-19 PROCEDURE — 80053 COMPREHEN METABOLIC PANEL: CPT

## 2023-12-19 PROCEDURE — 86160 COMPLEMENT ANTIGEN: CPT

## 2023-12-19 PROCEDURE — 36821 AV FUSION DIRECT ANY SITE: CPT | Mod: RT

## 2023-12-19 PROCEDURE — 83550 IRON BINDING TEST: CPT

## 2023-12-19 PROCEDURE — 82652 VIT D 1 25-DIHYDROXY: CPT

## 2023-12-19 PROCEDURE — 87070 CULTURE OTHR SPECIMN AEROBIC: CPT

## 2023-12-19 PROCEDURE — 74230 X-RAY XM SWLNG FUNCJ C+: CPT

## 2023-12-19 PROCEDURE — 84295 ASSAY OF SERUM SODIUM: CPT

## 2023-12-19 PROCEDURE — 83516 IMMUNOASSAY NONANTIBODY: CPT

## 2023-12-19 PROCEDURE — 82435 ASSAY OF BLOOD CHLORIDE: CPT

## 2023-12-19 PROCEDURE — 85014 HEMATOCRIT: CPT

## 2023-12-19 PROCEDURE — 93321 DOPPLER ECHO F-UP/LMTD STD: CPT

## 2023-12-19 PROCEDURE — 86901 BLOOD TYPING SEROLOGIC RH(D): CPT

## 2023-12-19 PROCEDURE — 97116 GAIT TRAINING THERAPY: CPT

## 2023-12-19 PROCEDURE — 82947 ASSAY GLUCOSE BLOOD QUANT: CPT

## 2023-12-19 PROCEDURE — 83880 ASSAY OF NATRIURETIC PEPTIDE: CPT

## 2023-12-19 PROCEDURE — 93970 EXTREMITY STUDY: CPT

## 2023-12-19 PROCEDURE — 87389 HIV-1 AG W/HIV-1&-2 AB AG IA: CPT

## 2023-12-19 PROCEDURE — 0225U NFCT DS DNA&RNA 21 SARSCOV2: CPT

## 2023-12-19 PROCEDURE — 80061 LIPID PANEL: CPT

## 2023-12-19 PROCEDURE — 36558 INSERT TUNNELED CV CATH: CPT

## 2023-12-19 PROCEDURE — 81001 URINALYSIS AUTO W/SCOPE: CPT

## 2023-12-19 PROCEDURE — 86850 RBC ANTIBODY SCREEN: CPT

## 2023-12-19 PROCEDURE — 71275 CT ANGIOGRAPHY CHEST: CPT | Mod: MA

## 2023-12-19 PROCEDURE — 84484 ASSAY OF TROPONIN QUANT: CPT

## 2023-12-19 PROCEDURE — 86923 COMPATIBILITY TEST ELECTRIC: CPT

## 2023-12-19 PROCEDURE — 85027 COMPLETE CBC AUTOMATED: CPT

## 2023-12-19 PROCEDURE — 93005 ELECTROCARDIOGRAM TRACING: CPT

## 2023-12-19 PROCEDURE — 87641 MR-STAPH DNA AMP PROBE: CPT

## 2023-12-19 PROCEDURE — 36430 TRANSFUSION BLD/BLD COMPNT: CPT

## 2023-12-19 PROCEDURE — C1889: CPT

## 2023-12-19 PROCEDURE — 82728 ASSAY OF FERRITIN: CPT

## 2023-12-19 PROCEDURE — 86225 DNA ANTIBODY NATIVE: CPT

## 2023-12-19 PROCEDURE — 99285 EMERGENCY DEPT VISIT HI MDM: CPT

## 2023-12-19 PROCEDURE — 97110 THERAPEUTIC EXERCISES: CPT

## 2023-12-19 PROCEDURE — 82330 ASSAY OF CALCIUM: CPT

## 2023-12-19 PROCEDURE — 36556 INSERT NON-TUNNEL CV CATH: CPT

## 2023-12-19 PROCEDURE — 87635 SARS-COV-2 COVID-19 AMP PRB: CPT

## 2023-12-19 DEVICE — GELFOAM 12 X 7MM: Type: IMPLANTABLE DEVICE | Status: FUNCTIONAL

## 2023-12-19 DEVICE — CLIP APPLIER COVIDIEN SURGICLIP III 9" SM: Type: IMPLANTABLE DEVICE | Status: FUNCTIONAL

## 2023-12-19 DEVICE — CLIP APPLIER COVIDIEN SURGICLIP II 9.75" MEDIUM: Type: IMPLANTABLE DEVICE | Status: FUNCTIONAL

## 2023-12-19 NOTE — BRIEF OPERATIVE NOTE - OPERATION/FINDINGS
JARED Brachiobasilic AVF (1st stage) RUE Brachiobasilic AVF (1st stage)  Palp thrill, palp Radial/Ulnar pulses

## 2023-12-19 NOTE — ASU DISCHARGE PLAN (ADULT/PEDIATRIC) - FOLLOW UP APPOINTMENTS
[facility], [facility], [facility]

## 2023-12-19 NOTE — H&P ADULT - NSHPPHYSICALEXAM_GEN_ALL_CORE
General: NAD, resting comfortably  Pulmonary: normal resp effort, CTA-B  Cardiovascular: NSR, no murmurs  Abdominal: soft, ND/NT, no organomegaly  Extremities: WWP +radial/ulnar pulses, arms soft  LUE protected

## 2023-12-19 NOTE — ASU DISCHARGE PLAN (ADULT/PEDIATRIC) - NS MD DC FALL RISK RISK
For information on Fall & Injury Prevention, visit: [email or web address] OR  [email or web address] OR  https://www.cdc.gov/steadi/patient.html For information on Fall & Injury Prevention, visit: [email or web address]-[email or web address]-mobility OR  [email or web address]-[email or web address]-[email or web address]-avoid-injury OR  https://www.cdc.gov/steadi/patient.html

## 2023-12-19 NOTE — ASU DISCHARGE PLAN (ADULT/PEDIATRIC) - PROVIDER TOKENS
PROVIDER:[TOKEN:[[id number]],FOLLOWUP:[1 month]] PROVIDER:[TOKEN:[[id number]],FOLLOWUP:[1 month]]

## 2023-12-19 NOTE — PATIENT PROFILE ADULT - NSPROPTRIGHTCAREGIVER_GEN_A_NUR
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   After hours call [phone] and ask for the pediatric BMT physician to be paged for you.               Follow-ups after your visit        Your next 10 appointments already scheduled     May 02, 2017  8:30 AM CDT   Return Visit with Shannan Wilkerson MD   Peds Hematology Oncology ([facility])    [address] [id number] [phone]            May 02, 2017   Procedure with Mary Jane Freeman MD   [facility] Sedation Observation ([facility])    [address] [place] [id number] [phone]           The [facility] is located in the [facility]. lt is easily accessible from virtually any point in the [facility] area, via [address]            May 02, 2017 11:00 AM CDT   [facility] Peds Infusion 60 with [facility] PEDS INFUSION CHAIR 9   Peds IV Infusion ([facility])    [address] [id number] [phone]            May 09, 2017  8:30 AM CDT   Return Visit with NONI Andujar CNP   Peds Hematology Oncology ([facility])    [address] [id number] [phone]             May 09, 2017   Procedure with NONI Andujar CNP   [facility] Sedation Observation ([facility])    [address] [facility] [id number] [phone]           The [facility] is located in the [facility]. lt is easily accessible from virtually any point in the [facility] area, via [address]            May 16, 2017  8:30 AM CDT   Return Visit with Shannan Wilkerson MD   Peds Hematology Oncology ([facility])    [facility]  9th Floor  [address] [facility] [address] [phone]            May 16, 2017   Procedure with Mary Jane Freeman MD   [facility] Sedation Observation ([facility])    [address] [facility] [id number] [phone]           The [facility] is located in the [facility]. lt is easily accessible from virtually any point in the [facility] area, via [address]              Future tests that were ordered for you today     Open Future Orders        Priority Expected Expires Ordered    Chromosome Breakage - Skin ([facility] Test): Laboratory Miscellaneous Order Routine  4/27/2018 4/27/2017            Who to contact     Please call your clinic at [phone] to:    Ask questions about your health    Make or cancel appointments    Discuss your medicines    Learn about your test results    Speak to your doctor   If you have compliments or concerns about an experience at your clinic, or if you wish to file a complaint, please contact [facility] Patient Relations at [phone] or email us at [email or web address]         Additional Information About Your Visit        iTrac[employer]art Information     [employer] is an electronic gateway that provides easy, online access to your medical records. With [employer], you can request a clinic appointment, read your test results, renew a prescription or communicate with  your care team.     To sign up for Using[employer]arcot, please contact your [facility] or call [phone] for assistance.           Care EveryWhere ID     This is your Care EveryWhere ID. This could be used by other organizations to access your [place] medical records  [id number]         Blood Pressure from Last 3 Encounters:   04/27/17 117/83   04/25/17 111/77   04/23/17 101/81    Weight from Last 3 Encounters:   04/27/17 27.3 kg (60 lb 3 oz) (98 %)*   04/25/17 26.7 kg (58 lb 13.8 oz) (98 %)*   04/23/17 24.8 kg (54 lb 10.8 oz) (95 %)*     * Growth percentiles are based on [facility] 2-20 Years data.              Today, you had the following     No orders found for display         Today's Medication Changes      Notice     This visit is during an admission. Changes to the med list made in this visit will be reflected in the After Visit Summary of the admission.             Primary Care Provider Office Phone # Fax #    Utpal U MD Fidelia [phone]       [facility] [address] [place] [address]        Thank you!     Thank you for choosing PEDS BLOOD AND MARROW TRANSPLANT  for your care. Our goal is always to provide you with excellent care. Hearing back from our patients is one way we can continue to improve our services. Please take a few minutes to complete the written survey that you may receive in the mail after your visit with us. Thank you!             Your Updated Medication List - Protect others around you: Learn how to safely use, store and throw away your medicines at www.disposemymeds.org.      Notice     This visit is during an admission. Changes to the med list made in this visit will be reflected in the After Visit Summary of the admission.       declines

## 2023-12-19 NOTE — PATIENT PROFILE ADULT - HAVE YOU BEEN EATING POORLY BECAUSE OF A DECREASED APPETITE?
Notify patient--tests normal: Test for invisible blood in the poop.   please repeat the bloodwork to confirm that this is iron deficiency anemia.    No (0)

## 2023-12-19 NOTE — ASU DISCHARGE PLAN (ADULT/PEDIATRIC) - ASU DC SPECIAL INSTRUCTIONSFT
PAIN CONTROL: Please take tylenol for pain control. You may take every 6 hours    WOUND CARE:  Please keep incisions clean and dry. Please do not Scrub or rub incisions. Do not use lotion or powder on incisions. You have steri strip over your incision. Please do not remove these, they will fall off on their own. You may remove the gauze and clear bandaid dressing in 48 hrs.     BATHING: You may shower and/or sponge bathe starting tomorrow. Please do not submerge wound underwater. You may use warm soapy water in the shower and rinse, pat dry.    Hemodialysis: Resume hemodialysis using your Tunneled Catheter in Right Chest Wall.     ACTIVITY: You may return to your usual level of physical activity. If you are taking narcotic pain medication DO NOT drive a car, operate machinery or make important decisions.    DIET: Return to your usual diet.    NOTIFY YOUR SURGEON IF YOU HAVE: any bleeding that does not stop, any pus draining from your wound(s), any fever (over 100.4 F), or if your pain is not controlled on your discharge pain medications, unable to urinate.    Please follow up with your primary care physician in one week regarding your hospitalization, bring copies of your discharge paperwork.    Please follow-up with your surgeon, 1 month following discharge- please call to schedule an appointment. You need to have second stage procedure for your Fistula in order for it to be used.

## 2023-12-19 NOTE — ASU DISCHARGE PLAN (ADULT/PEDIATRIC) - CARE PROVIDER_API CALL
Shu Owen  Vascular Surgery  [address] [place] [address]  Phone: [phone]  Fax: [phone]  Follow Up Time: 1 month   Shu Owen  Vascular Surgery  [address] [place] [address]  Phone: [phone]  Fax: [phone]  Follow Up Time: 1 month

## 2023-12-19 NOTE — H&P ADULT - HISTORY OF PRESENT ILLNESS
83 year old female with HTN, HLD, CAD, T2DM, CKD, remote hx of breast CA s/p mastectomy, and chronic hypoxemic respiratory failure of unknown etiology on home O2 who presents for scheduled AVF with Dr. Owen.

## 2023-12-19 NOTE — H&P ADULT - ASSESSMENT
83 year old female with HTN, HLD, CAD, T2DM, CKD, remote hx of breast CA s/p mastectomy, and chronic hypoxemic respiratory failure of unknown etiology on home O2 who presents for scheduled AVF.     - OR today    Vacular [id number] 83 year old female with HTN, HLD, CAD, T2DM, CKD, remote hx of breast CA s/p mastectomy, and chronic hypoxemic respiratory failure of unknown etiology on home O2 who presents for scheduled AVF.     - OR today    Vacular [id number]

## 2023-12-19 NOTE — PATIENT PROFILE ADULT - FALL HARM RISK - RISK INTERVENTIONS
Assistance OOB with selected safe patient handling equipment/Assistance with ambulation/Communicate Fall Risk and Risk Factors to all staff, patient, and family/Discuss with provider need for PT consult/Monitor gait and stability/Provide patient with walking aids - walker, cane, crutches/Reinforce activity limits and safety measures with patient and family/Visual Cue: Yellow wristband/Bed in lowest position, wheels locked, appropriate side rails in place/Call bell, personal items and telephone in reach/Instruct patient to call for assistance before getting out of bed or chair/Non-slip footwear when patient is out of bed/[place] to call system/Physically safe environment - no spills, clutter or unnecessary equipment/Purposeful Proactive Rounding/Room/bathroom lighting operational, light cord in reach Assistance OOB with selected safe patient handling equipment/Assistance with ambulation/Communicate Fall Risk and Risk Factors to all staff, patient, and family/Discuss with provider need for PT consult/Monitor gait and stability/Provide patient with walking aids - walker, cane, crutches/Reinforce activity limits and safety measures with patient and family/Visual Cue: Yellow wristband/Bed in lowest position, wheels locked, appropriate side rails in place/Call bell, personal items and telephone in reach/Instruct patient to call for assistance before getting out of bed or chair/Non-slip footwear when patient is out of bed/[place] to call system/Physically safe environment - no spills, clutter or unnecessary equipment/Purposeful Proactive Rounding/Room/bathroom lighting operational, light cord in reach

## 2023-12-19 NOTE — PRE-ANESTHESIA EVALUATION ADULT - NSANTHADDINFOFT_GEN_ALL_CORE
Risk/benefits of General Anesthesia vs Regional Anesthesia discussed with patient and son at bedside. Patient in agreement with plan.

## 2023-12-20 ENCOUNTER — APPOINTMENT (OUTPATIENT)
Dept: CARE COORDINATION | Facility: HOME HEALTH | Age: 83
End: 2023-12-20
Payer: MEDICARE

## 2023-12-20 ENCOUNTER — APPOINTMENT (OUTPATIENT)
Dept: INTERNAL MEDICINE | Facility: CLINIC | Age: 83
End: 2023-12-20

## 2023-12-20 ENCOUNTER — TRANSCRIPTION ENCOUNTER (OUTPATIENT)
Age: 83
End: 2023-12-20

## 2023-12-20 DIAGNOSIS — J45.901 UNSPECIFIED ASTHMA WITH (ACUTE) EXACERBATION: ICD-10-CM

## 2023-12-20 DIAGNOSIS — D64.9 ANEMIA, UNSPECIFIED: ICD-10-CM

## 2023-12-20 DIAGNOSIS — Z74.1 NEED FOR ASSISTANCE WITH PERSONAL CARE: ICD-10-CM

## 2023-12-20 PROCEDURE — 99349 HOME/RES VST EST MOD MDM 40: CPT | Mod: 95

## 2023-12-22 PROBLEM — Z74.1 REQUIRES DAILY ASSISTANCE FOR ACTIVITIES OF DAILY LIVING (ADL) AND COMFORT NEEDS: Status: ACTIVE | Noted: 2023-12-22

## 2023-12-22 PROBLEM — J45.901 MODERATE ASTHMA WITH ACUTE EXACERBATION, UNSPECIFIED WHETHER PERSISTENT: Status: ACTIVE | Noted: 2023-12-22

## 2023-12-22 PROBLEM — D64.9 ANEMIA: Status: ACTIVE | Noted: 2023-12-22

## 2023-12-22 RX ORDER — CHLORHEXIDINE GLUCONATE 4 %
325 (65 FE) LIQUID (ML) TOPICAL
Qty: 30 | Refills: 0 | Status: ACTIVE | COMMUNITY
Start: 2023-12-22

## 2023-12-22 RX ORDER — AMLODIPINE BESYLATE 10 MG/1
10 TABLET ORAL
Qty: 30 | Refills: 1 | Status: ACTIVE | COMMUNITY
Start: 2023-12-22

## 2023-12-22 RX ORDER — BUDESONIDE AND FORMOTEROL FUMARATE DIHYDRATE 160; 4.5 UG/1; UG/1
160-4.5 AEROSOL RESPIRATORY (INHALATION) TWICE DAILY
Qty: 1 | Refills: 4 | Status: ACTIVE | COMMUNITY
Start: 2023-12-22

## 2023-12-22 RX ORDER — ROSUVASTATIN CALCIUM 20 MG/1
20 TABLET, FILM COATED ORAL DAILY
Refills: 0 | Status: ACTIVE | COMMUNITY
Start: 2023-12-22

## 2023-12-22 RX ORDER — PIOGLITAZONE HYDROCHLORIDE 30 MG/1
30 TABLET ORAL DAILY
Qty: 1 | Refills: 1 | Status: ACTIVE | COMMUNITY
Start: 2023-12-22

## 2023-12-22 RX ORDER — ALBUTEROL SULFATE 90 UG/1
108 (90 BASE) INHALANT RESPIRATORY (INHALATION)
Qty: 1 | Refills: 0 | Status: ACTIVE | COMMUNITY
Start: 2023-12-22

## 2023-12-22 RX ORDER — HYDRALAZINE HYDROCHLORIDE 25 MG/1
25 TABLET ORAL 3 TIMES DAILY
Refills: 0 | Status: ACTIVE | COMMUNITY
Start: 2023-12-22

## 2023-12-22 RX ORDER — MONTELUKAST 10 MG/1
10 TABLET, FILM COATED ORAL
Qty: 30 | Refills: 1 | Status: ACTIVE | COMMUNITY
Start: 2023-12-22

## 2023-12-22 RX ORDER — SAXAGLIPTIN 2.5 MG/1
2.5 TABLET, FILM COATED ORAL
Qty: 1 | Refills: 0 | Status: ACTIVE | COMMUNITY
Start: 2023-12-22

## 2023-12-22 RX ORDER — FLUTICASONE PROPIONATE 50 UG/1
50 SPRAY, METERED NASAL
Qty: 1 | Refills: 0 | Status: ACTIVE | COMMUNITY
Start: 2023-12-22

## 2023-12-22 RX ORDER — BACITRACIN 500 [IU]/G
500 OINTMENT TOPICAL TWICE DAILY
Refills: 0 | Status: ACTIVE | COMMUNITY
Start: 2023-12-22

## 2023-12-22 NOTE — HISTORY OF PRESENT ILLNESS
[Home] : at home, [unfilled] , at the time of the visit. [Other Location: e.g. Home (Enter Location, City,State)___] : at [unfilled] [Verbal consent obtained from patient] : the patient, [unfilled] [Spouse] : spouse [FreeTextEntry1] : F/u post hospitalization at [facility] from 11/22 - 12/15 for COPD/RUE Brachiobasilic AVF creation.  [de-identified] : Brief Hospital Course: 83 year old female with HTN, HLD, CAD, T2DM, CKD, remote hx of breast CA s/p mastectomy, and chronic hypoxemic respiratory failure of unknown etiology on home O2 who presents for scheduled AVF with Dr. Owen.  Pt had RUE AVF creation in the OR.    This pt is enrolled in the [email or web address] [facility] Discharge Bridge program in collaboration with [facility] follow up program for continuity of care s/p recent hospitalization until pt is seen by a PCP. Home care services RN, PT.   Televisit with pt and her  Yves. NW  RN present during the visit. Pt reports being fatigued. Deferred to  to provide most of the information. O2 in progress via nasal cannula at 2 l/m. O2 sat 96%. Denies any cardiorespiratory distress. Reports appetite decreased. Has trace pedal edema. Has not been able to check blood glucose, does not have a glucometer. Sent to her local pharmacy. Pt has dialysis later today.  transports her to and from. Currently using RT chest wall catheter. Site is clean dry and intact.  New RUE AVF covered with dry clean dressing, no s/s infection noted or reported. Pt and

## 2023-12-22 NOTE — PHYSICAL EXAM
[No Acute Distress] : no acute distress [Normal Sclera/Conjunctiva] : normal sclera/conjunctiva [No Respiratory Distress] : no respiratory distress  [No Accessory Muscle Use] : no accessory muscle use [No Edema] : there was no peripheral edema [Normal] : soft, non-tender, non-distended, no masses palpated, no HSM and normal bowel sounds [de-identified] : RODRIGO

## 2023-12-22 NOTE — REVIEW OF SYSTEMS
[Fatigue] : fatigue [Hearing Loss] : hearing loss [Joint Stiffness] : joint stiffness [Muscle Weakness] : muscle weakness [Negative] : Heme/Lymph [Vision Problems] : no vision problems [Chest Pain] : no chest pain [Palpitations] : no palpitations [Lower Ext Edema] : no lower extremity edema [Shortness Of Breath] : no shortness of breath [Wheezing] : no wheezing [Cough] : no cough [Constipation] : no constipation [Incontinence] : no incontinence [Nocturia] : no nocturia [Joint Pain] : no joint pain [Nail Changes] : no nail changes [Hair Changes] : no hair changes [Skin Rash] : no skin rash [Insomnia] : no insomnia [Anxiety] : no anxiety [FreeTextEntry4] : ralf

## 2023-12-24 ENCOUNTER — INPATIENT (INPATIENT)
Facility: HOSPITAL | Age: 83
LOS: 0 days | Discharge: HOME | DRG: 698 | End: 2023-12-25
Attending: INTERNAL MEDICINE | Admitting: INTERNAL MEDICINE
Payer: COMMERCIAL

## 2023-12-24 VITALS
HEART RATE: 88 BPM | HEIGHT: 63 IN | SYSTOLIC BLOOD PRESSURE: 111 MMHG | OXYGEN SATURATION: 98 % | RESPIRATION RATE: 16 BRPM | TEMPERATURE: 98 F | WEIGHT: 160.06 LBS | DIASTOLIC BLOOD PRESSURE: 71 MMHG

## 2023-12-24 DIAGNOSIS — Z29.9 ENCOUNTER FOR PROPHYLACTIC MEASURES, UNSPECIFIED: ICD-10-CM

## 2023-12-24 DIAGNOSIS — J96.11 CHRONIC RESPIRATORY FAILURE WITH HYPOXIA: ICD-10-CM

## 2023-12-24 DIAGNOSIS — I25.10 ATHEROSCLEROTIC HEART DISEASE OF NATIVE CORONARY ARTERY WITHOUT ANGINA PECTORIS: ICD-10-CM

## 2023-12-24 DIAGNOSIS — I50.30 UNSPECIFIED DIASTOLIC (CONGESTIVE) HEART FAILURE: ICD-10-CM

## 2023-12-24 DIAGNOSIS — N18.6 END STAGE RENAL DISEASE: ICD-10-CM

## 2023-12-24 DIAGNOSIS — I10 ESSENTIAL (PRIMARY) HYPERTENSION: ICD-10-CM

## 2023-12-24 DIAGNOSIS — E11.9 TYPE 2 DIABETES MELLITUS WITHOUT COMPLICATIONS: ICD-10-CM

## 2023-12-24 DIAGNOSIS — T82.41XA BREAKDOWN (MECHANICAL) OF VASCULAR DIALYSIS CATHETER, INITIAL ENCOUNTER: ICD-10-CM

## 2023-12-24 LAB
ALBUMIN SERPL ELPH-MCNC: 4 G/DL — SIGNIFICANT CHANGE UP (ref 3.3–5)
ALBUMIN SERPL ELPH-MCNC: 4 G/DL — SIGNIFICANT CHANGE UP (ref 3.3–5)
ALP SERPL-CCNC: 75 U/L — SIGNIFICANT CHANGE UP (ref 40–120)
ALP SERPL-CCNC: 75 U/L — SIGNIFICANT CHANGE UP (ref 40–120)
ALT FLD-CCNC: <5 U/L — LOW (ref 10–45)
ALT FLD-CCNC: <5 U/L — LOW (ref 10–45)
ANION GAP SERPL CALC-SCNC: 17 MMOL/L — SIGNIFICANT CHANGE UP (ref 5–17)
ANION GAP SERPL CALC-SCNC: 17 MMOL/L — SIGNIFICANT CHANGE UP (ref 5–17)
APTT BLD: 32.8 SEC — SIGNIFICANT CHANGE UP (ref 24.5–35.6)
APTT BLD: 32.8 SEC — SIGNIFICANT CHANGE UP (ref 24.5–35.6)
AST SERPL-CCNC: 15 U/L — SIGNIFICANT CHANGE UP (ref 10–40)
AST SERPL-CCNC: 15 U/L — SIGNIFICANT CHANGE UP (ref 10–40)
BASOPHILS # BLD AUTO: 0.07 K/UL — SIGNIFICANT CHANGE UP (ref 0–0.2)
BASOPHILS # BLD AUTO: 0.07 K/UL — SIGNIFICANT CHANGE UP (ref 0–0.2)
BASOPHILS NFR BLD AUTO: 1.2 % — SIGNIFICANT CHANGE UP (ref 0–2)
BASOPHILS NFR BLD AUTO: 1.2 % — SIGNIFICANT CHANGE UP (ref 0–2)
BILIRUB SERPL-MCNC: 0.4 MG/DL — SIGNIFICANT CHANGE UP (ref 0.2–1.2)
BILIRUB SERPL-MCNC: 0.4 MG/DL — SIGNIFICANT CHANGE UP (ref 0.2–1.2)
BLD GP AB SCN SERPL QL: NEGATIVE — SIGNIFICANT CHANGE UP
BLD GP AB SCN SERPL QL: NEGATIVE — SIGNIFICANT CHANGE UP
BUN SERPL-MCNC: 40 MG/DL — HIGH (ref 7–23)
BUN SERPL-MCNC: 40 MG/DL — HIGH (ref 7–23)
CALCIUM SERPL-MCNC: 9.2 MG/DL — SIGNIFICANT CHANGE UP (ref 8.4–10.5)
CALCIUM SERPL-MCNC: 9.2 MG/DL — SIGNIFICANT CHANGE UP (ref 8.4–10.5)
CHLORIDE SERPL-SCNC: 92 MMOL/L — LOW (ref 96–108)
CHLORIDE SERPL-SCNC: 92 MMOL/L — LOW (ref 96–108)
CO2 SERPL-SCNC: 26 MMOL/L — SIGNIFICANT CHANGE UP (ref 22–31)
CO2 SERPL-SCNC: 26 MMOL/L — SIGNIFICANT CHANGE UP (ref 22–31)
CREAT SERPL-MCNC: 6.32 MG/DL — HIGH (ref 0.5–1.3)
CREAT SERPL-MCNC: 6.32 MG/DL — HIGH (ref 0.5–1.3)
EGFR: 6 ML/MIN/1.73M2 — LOW
EGFR: 6 ML/MIN/1.73M2 — LOW
EOSINOPHIL # BLD AUTO: 0.15 K/UL — SIGNIFICANT CHANGE UP (ref 0–0.5)
EOSINOPHIL # BLD AUTO: 0.15 K/UL — SIGNIFICANT CHANGE UP (ref 0–0.5)
EOSINOPHIL NFR BLD AUTO: 2.5 % — SIGNIFICANT CHANGE UP (ref 0–6)
EOSINOPHIL NFR BLD AUTO: 2.5 % — SIGNIFICANT CHANGE UP (ref 0–6)
GLUCOSE BLDC GLUCOMTR-MCNC: 95 MG/DL — SIGNIFICANT CHANGE UP (ref 70–99)
GLUCOSE BLDC GLUCOMTR-MCNC: 95 MG/DL — SIGNIFICANT CHANGE UP (ref 70–99)
GLUCOSE SERPL-MCNC: 129 MG/DL — HIGH (ref 70–99)
GLUCOSE SERPL-MCNC: 129 MG/DL — HIGH (ref 70–99)
HCT VFR BLD CALC: 32 % — LOW (ref 34.5–45)
HCT VFR BLD CALC: 32 % — LOW (ref 34.5–45)
HGB BLD-MCNC: 9.8 G/DL — LOW (ref 11.5–15.5)
HGB BLD-MCNC: 9.8 G/DL — LOW (ref 11.5–15.5)
IMM GRANULOCYTES NFR BLD AUTO: 0.5 % — SIGNIFICANT CHANGE UP (ref 0–0.9)
IMM GRANULOCYTES NFR BLD AUTO: 0.5 % — SIGNIFICANT CHANGE UP (ref 0–0.9)
INR BLD: 1.02 RATIO — SIGNIFICANT CHANGE UP (ref 0.85–1.18)
INR BLD: 1.02 RATIO — SIGNIFICANT CHANGE UP (ref 0.85–1.18)
LYMPHOCYTES # BLD AUTO: 0.99 K/UL — LOW (ref 1–3.3)
LYMPHOCYTES # BLD AUTO: 0.99 K/UL — LOW (ref 1–3.3)
LYMPHOCYTES # BLD AUTO: 16.5 % — SIGNIFICANT CHANGE UP (ref 13–44)
LYMPHOCYTES # BLD AUTO: 16.5 % — SIGNIFICANT CHANGE UP (ref 13–44)
MAGNESIUM SERPL-MCNC: 2.5 MG/DL — SIGNIFICANT CHANGE UP (ref 1.6–2.6)
MAGNESIUM SERPL-MCNC: 2.5 MG/DL — SIGNIFICANT CHANGE UP (ref 1.6–2.6)
MCHC RBC-ENTMCNC: 27.9 PG — SIGNIFICANT CHANGE UP (ref 27–34)
MCHC RBC-ENTMCNC: 27.9 PG — SIGNIFICANT CHANGE UP (ref 27–34)
MCHC RBC-ENTMCNC: 30.6 GM/DL — LOW (ref 32–36)
MCHC RBC-ENTMCNC: 30.6 GM/DL — LOW (ref 32–36)
MCV RBC AUTO: 91.2 FL — SIGNIFICANT CHANGE UP (ref 80–100)
MCV RBC AUTO: 91.2 FL — SIGNIFICANT CHANGE UP (ref 80–100)
MONOCYTES # BLD AUTO: 0.85 K/UL — SIGNIFICANT CHANGE UP (ref 0–0.9)
MONOCYTES # BLD AUTO: 0.85 K/UL — SIGNIFICANT CHANGE UP (ref 0–0.9)
MONOCYTES NFR BLD AUTO: 14.2 % — HIGH (ref 2–14)
MONOCYTES NFR BLD AUTO: 14.2 % — HIGH (ref 2–14)
NEUTROPHILS # BLD AUTO: 3.91 K/UL — SIGNIFICANT CHANGE UP (ref 1.8–7.4)
NEUTROPHILS # BLD AUTO: 3.91 K/UL — SIGNIFICANT CHANGE UP (ref 1.8–7.4)
NEUTROPHILS NFR BLD AUTO: 65.1 % — SIGNIFICANT CHANGE UP (ref 43–77)
NEUTROPHILS NFR BLD AUTO: 65.1 % — SIGNIFICANT CHANGE UP (ref 43–77)
NRBC # BLD: 0 /100 WBCS — SIGNIFICANT CHANGE UP (ref 0–0)
NRBC # BLD: 0 /100 WBCS — SIGNIFICANT CHANGE UP (ref 0–0)
PHOSPHATE SERPL-MCNC: 5.4 MG/DL — HIGH (ref 2.5–4.5)
PHOSPHATE SERPL-MCNC: 5.4 MG/DL — HIGH (ref 2.5–4.5)
PLATELET # BLD AUTO: 305 K/UL — SIGNIFICANT CHANGE UP (ref 150–400)
PLATELET # BLD AUTO: 305 K/UL — SIGNIFICANT CHANGE UP (ref 150–400)
POTASSIUM SERPL-MCNC: 4.3 MMOL/L — SIGNIFICANT CHANGE UP (ref 3.5–5.3)
POTASSIUM SERPL-MCNC: 4.3 MMOL/L — SIGNIFICANT CHANGE UP (ref 3.5–5.3)
POTASSIUM SERPL-SCNC: 4.3 MMOL/L — SIGNIFICANT CHANGE UP (ref 3.5–5.3)
POTASSIUM SERPL-SCNC: 4.3 MMOL/L — SIGNIFICANT CHANGE UP (ref 3.5–5.3)
PROT SERPL-MCNC: 6.9 G/DL — SIGNIFICANT CHANGE UP (ref 6–8.3)
PROT SERPL-MCNC: 6.9 G/DL — SIGNIFICANT CHANGE UP (ref 6–8.3)
PROTHROM AB SERPL-ACNC: 10.7 SEC — SIGNIFICANT CHANGE UP (ref 9.5–13)
PROTHROM AB SERPL-ACNC: 10.7 SEC — SIGNIFICANT CHANGE UP (ref 9.5–13)
RBC # BLD: 3.51 M/UL — LOW (ref 3.8–5.2)
RBC # BLD: 3.51 M/UL — LOW (ref 3.8–5.2)
RBC # FLD: 14.4 % — SIGNIFICANT CHANGE UP (ref 10.3–14.5)
RBC # FLD: 14.4 % — SIGNIFICANT CHANGE UP (ref 10.3–14.5)
RH IG SCN BLD-IMP: POSITIVE — SIGNIFICANT CHANGE UP
RH IG SCN BLD-IMP: POSITIVE — SIGNIFICANT CHANGE UP
SODIUM SERPL-SCNC: 135 MMOL/L — SIGNIFICANT CHANGE UP (ref 135–145)
SODIUM SERPL-SCNC: 135 MMOL/L — SIGNIFICANT CHANGE UP (ref 135–145)
WBC # BLD: 6 K/UL — SIGNIFICANT CHANGE UP (ref 3.8–10.5)
WBC # BLD: 6 K/UL — SIGNIFICANT CHANGE UP (ref 3.8–10.5)
WBC # FLD AUTO: 6 K/UL — SIGNIFICANT CHANGE UP (ref 3.8–10.5)
WBC # FLD AUTO: 6 K/UL — SIGNIFICANT CHANGE UP (ref 3.8–10.5)

## 2023-12-24 PROCEDURE — 77001 FLUOROGUIDE FOR VEIN DEVICE: CPT | Mod: 26

## 2023-12-24 PROCEDURE — 99223 1ST HOSP IP/OBS HIGH 75: CPT | Mod: GC

## 2023-12-24 PROCEDURE — 36581 REPLACE TUNNELED CV CATH: CPT

## 2023-12-24 PROCEDURE — 93010 ELECTROCARDIOGRAM REPORT: CPT

## 2023-12-24 PROCEDURE — 71045 X-RAY EXAM CHEST 1 VIEW: CPT | Mod: 26

## 2023-12-24 PROCEDURE — 99285 EMERGENCY DEPT VISIT HI MDM: CPT | Mod: GC

## 2023-12-24 RX ORDER — HEPARIN SODIUM 5000 [USP'U]/ML
5000 INJECTION INTRAVENOUS; SUBCUTANEOUS EVERY 12 HOURS
Refills: 0 | Status: DISCONTINUED | OUTPATIENT
Start: 2023-12-24 | End: 2023-12-25

## 2023-12-24 RX ORDER — AMLODIPINE BESYLATE 2.5 MG/1
10 TABLET ORAL DAILY
Refills: 0 | Status: DISCONTINUED | OUTPATIENT
Start: 2023-12-24 | End: 2023-12-25

## 2023-12-24 RX ORDER — ERYTHROPOIETIN 10000 [IU]/ML
5000 INJECTION, SOLUTION INTRAVENOUS; SUBCUTANEOUS
Refills: 0 | Status: DISCONTINUED | OUTPATIENT
Start: 2023-12-24 | End: 2023-12-25

## 2023-12-24 RX ORDER — ATORVASTATIN CALCIUM 80 MG/1
80 TABLET, FILM COATED ORAL AT BEDTIME
Refills: 0 | Status: DISCONTINUED | OUTPATIENT
Start: 2023-12-24 | End: 2023-12-25

## 2023-12-24 RX ORDER — BUMETANIDE 0.25 MG/ML
2 INJECTION INTRAMUSCULAR; INTRAVENOUS
Refills: 0 | Status: DISCONTINUED | OUTPATIENT
Start: 2023-12-24 | End: 2023-12-25

## 2023-12-24 RX ORDER — BUDESONIDE AND FORMOTEROL FUMARATE DIHYDRATE 160; 4.5 UG/1; UG/1
2 AEROSOL RESPIRATORY (INHALATION)
Refills: 0 | Status: DISCONTINUED | OUTPATIENT
Start: 2023-12-24 | End: 2023-12-25

## 2023-12-24 RX ORDER — SODIUM CHLORIDE 9 MG/ML
10 INJECTION INTRAMUSCULAR; INTRAVENOUS; SUBCUTANEOUS
Refills: 0 | Status: DISCONTINUED | OUTPATIENT
Start: 2023-12-24 | End: 2023-12-25

## 2023-12-24 RX ORDER — HYDRALAZINE HCL 50 MG
25 TABLET ORAL THREE TIMES A DAY
Refills: 0 | Status: DISCONTINUED | OUTPATIENT
Start: 2023-12-24 | End: 2023-12-25

## 2023-12-24 RX ORDER — ALBUTEROL 90 UG/1
2 AEROSOL, METERED ORAL EVERY 6 HOURS
Refills: 0 | Status: DISCONTINUED | OUTPATIENT
Start: 2023-12-24 | End: 2023-12-25

## 2023-12-24 RX ORDER — FERROUS SULFATE 325(65) MG
325 TABLET ORAL DAILY
Refills: 0 | Status: DISCONTINUED | OUTPATIENT
Start: 2023-12-24 | End: 2023-12-25

## 2023-12-24 RX ORDER — CHLORHEXIDINE GLUCONATE 213 G/1000ML
1 SOLUTION TOPICAL
Refills: 0 | Status: DISCONTINUED | OUTPATIENT
Start: 2023-12-24 | End: 2023-12-25

## 2023-12-24 NOTE — ED ADULT NURSE REASSESSMENT NOTE - NS ED NURSE REASSESS COMMENT FT1
Report taken from Morgan NOLASCO. Patient found in stretcher breathing spontaneously and unlabored on Room Air. No signs of respiratory distress @ this time. The patient is alert and orientated to self, place, and situation but disorientated to time.  The patient presents with a Left 20 G PIV that is C/D/I and saline locked @ this time. Note Left upper extremity is pink banded @ this time but ED MD Team OK IV placement. Patient also presents with a Pink band to the right upper extremity and newly place Right AV Fistula that is covered with a dressing and C/D/I.  Pt denies chest pain, palpitations, shortness of breath, headache, visual disturbances, numbness/tingling, fever, chills, diaphoresis,  nausea, vomiting, constipation, diarrhea, or urinary symptoms. Safety and comfort measures provided, bed locked and in lowest position, side rails up for safety. VS to order and Awaiting transport to IR.

## 2023-12-24 NOTE — CONSULT NOTE ADULT - ATTENDING COMMENTS
caitlin jordan  nephrology attending   please contact me on TEAMS   Office- [phone] caitlin jordan  nephrology attending   please contact me on TEAMS   Office- [phone]

## 2023-12-24 NOTE — PRE PROCEDURE NOTE - PRE PROCEDURE EVALUATION
Vascular & Interventional Radiology Pre-Procedure Note    Procedure Name: tunneled HD catheter exchange    HPI: 83y Female with R tunneled HD catheter noted to be cut at the end presents for tunneled HD catheter exchange.     Allergies: No Known Allergies      Medications:         Data:  Vital Signs Last 24 Hrs  T(C): 36.7 (24 Dec 2023 15:12), Max: 36.8 (24 Dec 2023 13:07)  T(F): 98.1 (24 Dec 2023 15:12), Max: 98.3 (24 Dec 2023 13:35)  HR: 88 (24 Dec 2023 15:12) (88 - 88)  BP: 121/60 (24 Dec 2023 15:12) (111/71 - 132/54)  BP(mean): 81 (24 Dec 2023 15:12) (81 - 81)  RR: 18 (24 Dec 2023 15:12) (16 - 18)  SpO2: 93% (24 Dec 2023 15:12) (93% - 98%)    Parameters below as of 24 Dec 2023 15:12  Patient On (Oxygen Delivery Method): room air        LABS:                        9.8    6.00  )-----------( 305      ( 24 Dec 2023 14:22 )             32.0     12-24    135  |  92<L>  |  40<H>  ----------------------------<  129<H>  4.3   |  26  |  6.32<H>    Ca    9.2      24 Dec 2023 14:22  Phos  5.4     12-24  Mg     2.5     12-24    TPro  6.9  /  Alb  4.0  /  TBili  0.4  /  DBili  x   /  AST  15  /  ALT  <5<L>  /  AlkPhos  75  12-24    PT/INR - ( 24 Dec 2023 14:22 )   PT: 10.7 sec;   INR: 1.02 ratio         PTT - ( 24 Dec 2023 14:22 )  PTT:32.8 sec    Plan:   -83y Female presents for tunneled HD catheter exchange  -Risks/Benefits/alternatives explained with the patient's HCP and witnessed informed consent obtained.

## 2023-12-24 NOTE — CONSULT NOTE ADULT - ASSESSMENT
83 year old female with HTN, HLD, CAD, T2DM, CKD, remote hx of breast CA s/p mastectomy, and chronic hypoxemic respiratory failure of unknown etiology on home O2 s/p right AVF with Dr. Owen (not mature for use), here for missed HD secondary to permacath not working.   Patient gets dialysis at [facility] center and schedule is MWF, last dialysis was yesterday Friday as per her schedule. Nephrology consulted for ESRD management.  83 year old female with HTN, HLD, CAD, T2DM, CKD, remote hx of breast CA s/p mastectomy, and chronic hypoxemic respiratory failure of unknown etiology on home O2 s/p right AVF with Dr. Owen (not mature for use), here for missed HD secondary to permacath not working.   Patient gets dialysis at [facility] HD center and schedule is MWF, last dialysis was yesterday Friday as per her schedule. Nephrology consulted for ESRD management.

## 2023-12-24 NOTE — H&P ADULT - HISTORY OF PRESENT ILLNESS
83F with HTN, HLD, CAD, T2DM, ESRD on HD MWF, remote hx of breast ca s/p mastectomy, chronic hypoxemic respiratory failure of unclear etiology on home O2, presenting after          83F with HTN, HLD, CAD, T2DM, ESRD on HD MWF, dementia, remote hx of breast ca s/p mastectomy, chronic hypoxemic respiratory failure of unclear etiology on home O2, presenting to ED from HD center after having difficult access permcath. There are reports from pt's  that the patient cut the end of her permacath port distals to the clamps. This was unwitnessed and it is unclear why and how the patient did this. Because of the [place] holiday hours, patient is not able to get HD tomorrow so was getting it today a day earlier.     On arrival to ED, VSS. IR consulted and exchanged permacath. Admitted to medicine for further workup.          83F with HTN, HLD, CAD, T2DM, ESRD on HD MWF, dementia, remote hx of breast ca s/p mastectomy, chronic hypoxemic respiratory failure of unclear etiology on home O2, presenting to ED from HD center after having difficult access permacath. There are reports from pt's  that the patient cut the end of her permacath port distal to the clamps with scissors. This was unwitnessed and it is unclear why and how the patient did this. Because of the Christmas holiday hours, patient is not able to get HD tomorrow, so she was admitted for HD today.    On interview, patient does not recall anything that happened. When asked if she cutt off her permacath tips she says "i'm not sure, I might have but I can't remember". At this time she is reporting no symptoms. She feels comfortable.      On arrival to ED, NATALIYA HERNANDEZ consulted and exchanged permacath. Admitted to medicine for further workup.          83F with HTN, HLD, CAD, T2DM, ESRD on HD MWF (via RIJ permacath, has RUE maturing AVF), dementia, remote hx of breast ca s/p mastectomy, chronic hypoxemic respiratory failure of unclear etiology on home O2, presenting to ED from HD center after having difficult access permacath. There are reports from pt's  that the patient cut the end of her permacath port distal to the clamps with scissors. This was unwitnessed and it is unclear why and how the patient did this. Because of the Christmas holiday hours, patient is not able to get HD tomorrow, so she was admitted for HD today.    On interview, patient does not recall anything that happened. When asked if she cutt off her permacath tips she says "i'm not sure, I might have but I can't remember". At this time she is reporting no symptoms. She feels comfortable.      On arrival to ED, VSS. IR consulted and exchanged permacath. Admitted to medicine for further workup.

## 2023-12-24 NOTE — ED CLERICAL - NS ED CARE COORDINATION INFORMATION
This patient is enrolled in the Heart Failure STARS readmission reduction initiative and has active care navigation. This patient can be followed up by the care navigation team within 24 hours.  To arrange close follow-up or to obtain additional clinical information, please call the contact number above.     For patients followed by the NS cardiology heart failure team, please call the on call the HF office ([phone]) to speak with the HF NP during general business hours, otherwise call the cardiomyopathy attending ([phone]) for ALL PATIENTS PRIOR to decision for admission or observation.      Consider CDU for management of CHF exacerbations per guidelines This patient is enrolled in the Heart Failure STARS readmission reduction initiative and has active care navigation. This patient can be followed up by the care navigation team within 24 hours.  To arrange close follow-up or to obtain additional clinical information, please call the contact number above.     For patients followed by the NS cardiology heart failure team, please call the on call the HF office ([phone]) to speak with the HF NP during general business hours, otherwise call the cardiomyopathy attending ([phone]) for ALL PATIENTS PRIOR to decision for admission or observation.      Consider CDU for management of CHF exacerbations per guidelines

## 2023-12-24 NOTE — H&P ADULT - ASSESSMENT
83F with HTN, HLD, CAD, T2DM, ESRD on HD MWF, remote hx of breast ca s/p mastectomy, chronic hypoxemic respiratory failure of unclear etiology on home O2, presenting after            83F with HTN, HLD, CAD, T2DM, ESRD on HD MWF, HFpEF dementia, remote hx of breast ca s/p mastectomy, chronic hypoxemic respiratory failure of unclear etiology on home O2, presenting after cutting off end of permacath, now s/p IR permacath exchange on 12/24.

## 2023-12-24 NOTE — H&P ADULT - PROBLEM/PLAN-5
Pt arrives from home via ALS after a fall. Pt states she was in the bathroom and was standing over the sink when she started to feel dizzy. She fell onto her L side and now presents with L sided hip pain. Pt thinks she may have hit her head and confirms she is on blood thinners. Pt also has R arm pain but experiences this chronically due to recent rotator cuff surgery. Pt also is complaining of a HA, denies being currently dizzy now. Pt takes 1 tramadol daily to control her shoulder pain, and states she think she took a half today. EMS also states she has not been eating or drinking normally for the last month, however patient denies and states it has only been since yesterday. Additionally, EMS states that patient has been falling frequently for the last month but patient denies.\ Pt arrives A/Ox3-4. Pt lives along and ambulates normally with a walker.    DISPLAY PLAN FREE TEXT

## 2023-12-24 NOTE — ED ADULT NURSE REASSESSMENT NOTE - NS ED NURSE REASSESS COMMENT FT1
Patient returned from IR with newly placed Permicath to the Right Chest. Dressing is C/D/I @ this time.

## 2023-12-24 NOTE — H&P ADULT - NSHPLABSRESULTS_GEN_ALL_CORE
LABS:                        9.8    6.00  )-----------( 305      ( 24 Dec 2023 14:22 )             32.0     12-24    135  |  92<L>  |  40<H>  ----------------------------<  129<H>  4.3   |  26  |  6.32<H>    Ca    9.2      24 Dec 2023 14:22  Phos  5.4     12-24  Mg     2.5     12-24    TPro  6.9  /  Alb  4.0  /  TBili  0.4  /  DBili  x   /  AST  15  /  ALT  <5<L>  /  AlkPhos  75  12-24    PT/INR - ( 24 Dec 2023 14:22 )   PT: 10.7 sec;   INR: 1.02 ratio         PTT - ( 24 Dec 2023 14:22 )  PTT:32.8 sec      Urinalysis Basic - ( 24 Dec 2023 14:22 )    Color: x / Appearance: x / SG: x / pH: x  Gluc: 129 mg/dL / Ketone: x  / Bili: x / Urobili: x   Blood: x / Protein: x / Nitrite: x   Leuk Esterase: x / RBC: x / WBC x   Sq Epi: x / Non Sq Epi: x / Bacteria: x

## 2023-12-24 NOTE — H&P ADULT - PROBLEM SELECTOR PLAN 1
Cut off end of HD catheter. Now s/p IR exchange of permacath 12/24  HD MWF    - HD 12/24 on Sunday as HD center closed for [place], resume HD afterwards at normal schedule  - c/w home EPO Cut off end of HD catheter. Now s/p IR exchange of permacath 12/24  HD MWF    - HD 12/24 on Sunday as HD center closed for [place], resume HD afterwards at normal schedule  - c/w home EPO HD catheter malfunction poss 2/2 pt cutting it? Now s/p IR exchange of permacath 12/24  HD MWF    - HD 12/24 on Sunday as HD center closed for [place], resume HD afterwards at normal schedule  - c/w home EPO  - f/u renal recs

## 2023-12-24 NOTE — ED ADULT NURSE REASSESSMENT NOTE - NS ED NURSE REASSESS COMMENT FT1
Patient  @ bedside made aware that Hemodialysis RN given report and we are just awaiting transport. Per hiusband @ bedside if transport is not here in 30min for the patient that he will take the patient out the ED AMA. MD Uribe made aware and @ patient bedside to address concerns.

## 2023-12-24 NOTE — CONSULT NOTE ADULT - PROBLEM SELECTOR RECOMMENDATION 9
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  Schedule is MWF, last dialysis was yesterday Friday as per her schedule. HD was schedule for today (Sunday -12/24 as tomorrow -Monday is a holiday).   IR to replace PC. Will plan for dialysis after that. Consent obtained and given to HD unit.    Anemia: Goal Hgb 10-11. Continue TANG.     MBD: Monitor serum phosphorus, goal: 3.5-5.5. Pt currently not on a a binder.     *THIS NOTE IS NOT FINALIZED UNTIL SIGNED BY THE ATTENDING*  Quang Kaba  Nephrology Fellow  Feel free to contact me on TEAMS  After 4 pm please contact the on-call Fellow.

## 2023-12-24 NOTE — ED ADULT NURSE REASSESSMENT NOTE - NS ED NURSE REASSESS COMMENT FT1
provider-RN order noted, LAC 20G IV placed, IR for R chest wall permacath placement pending,  present

## 2023-12-24 NOTE — ED PROVIDER NOTE - PHYSICAL EXAMINATION
Constitutional: VS reviewed. Alert and orientedx2, well appearing, no apparent distress  HEENT: Atraumatic, EOMI  CV: RRR, R chest wall permacath with no distal claves  Lungs: Clear and equal bilaterally, no wheezes, rales or crackles  Abdomen: Soft, nondistended, nontender  MSK: No deformities  Skin: Warm and dry. As visualized no rashes, lesions, bruising or erythema  Neuro: Appears nonfocal  Lymph: No pitting edema in extremities.

## 2023-12-24 NOTE — CONSULT NOTE ADULT - SUBJECTIVE AND OBJECTIVE BOX
[facility] DIVISION OF KIDNEY DISEASES AND HYPERTENSION -- [phone]  -- INITIAL CONSULT NOTE  --------------------------------------------------------------------------------  HPI:  83 year old female with HTN, HLD, CAD, T2DM, CKD, remote hx of breast CA s/p mastectomy, and chronic hypoxemic respiratory failure of unknown etiology on home O2 s/p right AVF with Dr. Owen (not mature for use), here for missed HD secondary to permacath not working. Patient is unable to provide any history. She denies any chest pain, shortness of breath, abdominal pain.  She still makes urine.   Patient gets dialysis at [facility] HD center and schedule is MWF, last dialysis was yesterday Friday as per her schedule. Nephrology consulted for ESRD management.         PAST HISTORY  --------------------------------------------------------------------------------  PAST MEDICAL & SURGICAL HISTORY:  CA - Breast Cancer  1996 s/p lumpectomy, chemo, and radiation      CAD (Coronary Artery Disease)      Diabetes      Dyslipidemia      HTN (Hypertension)      Asthma      H/O: Obesity      H/O: Osteoarthritis      S/P Breast Lumpectomy        FAMILY HISTORY:    PAST SOCIAL HISTORY:    ALLERGIES & MEDICATIONS  --------------------------------------------------------------------------------  Allergies    No Known Allergies    Intolerances      Standing Inpatient Medications    PRN Inpatient Medications      REVIEW OF SYSTEMS  --------------------------------------------------------------------------------  Unable to perform      VITALS/PHYSICAL EXAM  --------------------------------------------------------------------------------  T(C): 36.7 (12-24-23 @ 15:12), Max: 36.8 (12-24-23 @ 13:07)  HR: 88 (12-24-23 @ 15:12) (88 - 88)  BP: 121/60 (12-24-23 @ 15:12) (111/71 - 132/54)  RR: 18 (12-24-23 @ 15:12) (16 - 18)  SpO2: 93% (12-24-23 @ 15:12) (93% - 98%)  Wt(kg): --  Height (cm): 160 (12-24-23 @ 13:07)  Weight (kg): 72.6 (12-24-23 @ 13:07)  BMI (kg/m2): 28.4 (12-24-23 @ 13:07)  BSA (m2): 1.76 (12-24-23 @ 13:07)        Physical Exam:  	Gen: NAD  	HEENT: Anicteric  	Pulm: CTA B/L  	CV: S1S2+  	Abd: Soft, +BS    	Ext: No LE edema B/L  	Neuro: Awake  	Skin: Warm and dry  	Dialysis access: R-PC     LABS/STUDIES  --------------------------------------------------------------------------------              9.8    6.00  >-----------<  305      [12-24-23 @ 14:22]              32.0     135  |  92  |  40  ----------------------------<  129      [12-24-23 @ 14:22]  4.3   |  26  |  6.32        Ca     9.2     [12-24-23 @ 14:22]      Mg     2.5     [12-24-23 @ 14:22]      Phos  5.4     [12-24-23 @ 14:22]    TPro  6.9  /  Alb  4.0  /  TBili  0.4  /  DBili  x   /  AST  15  /  ALT  <5  /  AlkPhos  75  [12-24-23 @ 14:22]    PT/INR: PT 10.7 , INR 1.02       [12-24-23 @ 14:22]  PTT: 32.8       [12-24-23 @ 14:22]      Creatinine Trend:  SCr 6.32 [12-24 @ 14:22]  SCr 4.47 [12-19 @ 07:44]  SCr 4.49 [12-14 @ 06:37]  SCr 4.64 [12-13 @ 07:18]  SCr 6.46 [12-12 @ 06:02]    Urinalysis - [12-24-23 @ 14:22]      Color  / Appearance  / SG  / pH       Gluc 129 / Ketone   / Bili  / Urobili        Blood  / Protein  / Leuk Est  / Nitrite       RBC  / WBC  / Hyaline  / Gran  / Sq Epi  / Non Sq Epi  / Bacteria       HBsAb <3.0      [11-23-23 @ 06:25]  HBsAg Nonreact      [11-23-23 @ 06:25]  HCV 0.08, Nonreact      [11-23-23 @ 06:25]  HIV Nonreact      [11-23-23 @ 06:25]    JAYLAN: titer Negative, pattern --      [11-23-23 @ 06:25]  dsDNA <12      [11-23-23 @ 06:25]  C3 Complement 126      [11-23-23 @ 06:25]  C4 Complement 26      [11-23-23 @ 06:25]  ANCA: cANCA Negative, pANCA Negative, atypical ANCA Negative      [11-23-23 @ 06:25]  anti-GBM <0.2      [11-23-23 @ 06:25]  PLA2R: RAVI <1.8, IFA --      [11-23-23 @ 06:25]  Free Light Chains: kappa 13.06, lambda 7.08, ratio = 1.84      [11-23 @ 06:25]    Tacrolimus  Cyclosporine  Sirolimus  Mycophenolate  BK PCR  CMV PCR  Parvo PCR  EBV PCR [facility] DIVISION OF KIDNEY DISEASES AND HYPERTENSION -- [phone]  -- INITIAL CONSULT NOTE  --------------------------------------------------------------------------------  HPI:  83 year old female with HTN, HLD, CAD, T2DM, CKD, remote hx of breast CA s/p mastectomy, and chronic hypoxemic respiratory failure of unknown etiology on home O2 s/p right AVF with Dr. Owen (not mature for use), here for missed HD secondary to permacath not working. Patient is unable to provide any history. She denies any chest pain, shortness of breath, abdominal pain.  She still makes urine.   Patient gets dialysis at [facility] HD center and schedule is MWF, last dialysis was yesterday Friday as per her schedule. Nephrology consulted for ESRD management.         PAST HISTORY  --------------------------------------------------------------------------------  PAST MEDICAL & SURGICAL HISTORY:  CA - Breast Cancer  1996 s/p lumpectomy, chemo, and radiation      CAD (Coronary Artery Disease)      Diabetes      Dyslipidemia      HTN (Hypertension)      Asthma      H/O: Obesity      H/O: Osteoarthritis      S/P Breast Lumpectomy        FAMILY HISTORY:    PAST SOCIAL HISTORY:    ALLERGIES & MEDICATIONS  --------------------------------------------------------------------------------  Allergies    No Known Allergies    Intolerances      Standing Inpatient Medications    PRN Inpatient Medications      REVIEW OF SYSTEMS  --------------------------------------------------------------------------------  Unable to perform      VITALS/PHYSICAL EXAM  --------------------------------------------------------------------------------  T(C): 36.7 (12-24-23 @ 15:12), Max: 36.8 (12-24-23 @ 13:07)  HR: 88 (12-24-23 @ 15:12) (88 - 88)  BP: 121/60 (12-24-23 @ 15:12) (111/71 - 132/54)  RR: 18 (12-24-23 @ 15:12) (16 - 18)  SpO2: 93% (12-24-23 @ 15:12) (93% - 98%)  Wt(kg): --  Height (cm): 160 (12-24-23 @ 13:07)  Weight (kg): 72.6 (12-24-23 @ 13:07)  BMI (kg/m2): 28.4 (12-24-23 @ 13:07)  BSA (m2): 1.76 (12-24-23 @ 13:07)        Physical Exam:  	Gen: NAD  	HEENT: Anicteric  	Pulm: CTA B/L  	CV: S1S2+  	Abd: Soft, +BS    	Ext: No LE edema B/L  	Neuro: Awake  	Skin: Warm and dry  	Dialysis access: R-PC     LABS/STUDIES  --------------------------------------------------------------------------------              9.8    6.00  >-----------<  305      [12-24-23 @ 14:22]              32.0     135  |  92  |  40  ----------------------------<  129      [12-24-23 @ 14:22]  4.3   |  26  |  6.32        Ca     9.2     [12-24-23 @ 14:22]      Mg     2.5     [12-24-23 @ 14:22]      Phos  5.4     [12-24-23 @ 14:22]    TPro  6.9  /  Alb  4.0  /  TBili  0.4  /  DBili  x   /  AST  15  /  ALT  <5  /  AlkPhos  75  [12-24-23 @ 14:22]    PT/INR: PT 10.7 , INR 1.02       [12-24-23 @ 14:22]  PTT: 32.8       [12-24-23 @ 14:22]      Creatinine Trend:  SCr 6.32 [12-24 @ 14:22]  SCr 4.47 [12-19 @ 07:44]  SCr 4.49 [12-14 @ 06:37]  SCr 4.64 [12-13 @ 07:18]  SCr 6.46 [12-12 @ 06:02]    Urinalysis - [12-24-23 @ 14:22]      Color  / Appearance  / SG  / pH       Gluc 129 / Ketone   / Bili  / Urobili        Blood  / Protein  / Leuk Est  / Nitrite       RBC  / WBC  / Hyaline  / Gran  / Sq Epi  / Non Sq Epi  / Bacteria       HBsAb <3.0      [11-23-23 @ 06:25]  HBsAg Nonreact      [11-23-23 @ 06:25]  HCV 0.08, Nonreact      [11-23-23 @ 06:25]  HIV Nonreact      [11-23-23 @ 06:25]    JAYLAN: titer Negative, pattern --      [11-23-23 @ 06:25]  dsDNA <12      [11-23-23 @ 06:25]  C3 Complement 126      [11-23-23 @ 06:25]  C4 Complement 26      [11-23-23 @ 06:25]  ANCA: cANCA Negative, pANCA Negative, atypical ANCA Negative      [11-23-23 @ 06:25]  anti-GBM <0.2      [11-23-23 @ 06:25]  PLA2R: RAVI <1.8, IFA --      [11-23-23 @ 06:25]  Free Light Chains: kappa 13.06, lambda 7.08, ratio = 1.84      [11-23 @ 06:25]    Tacrolimus  Cyclosporine  Sirolimus  Mycophenolate  BK PCR  CMV PCR  Parvo PCR  EBV PCR

## 2023-12-24 NOTE — ED PROVIDER NOTE - ATTENDING CONTRIBUTION TO CARE
83 year old female with HTN, HLD, CAD, T2DM, CKD, remote hx of breast CA s/p mastectomy, and chronic hypoxemic respiratory failure of unknown etiology on home O2 s/p right AVF with Dr. Owen (not mature for use), here for missed HD secondary to permacath not working. Patient is unable to provide any history.  She states she does not know when she is here and does not remember.  She denies any chest pain, shortness of breath, abdominal pain.  She states she still makes urine.  Patient's  is at bedside.      VS noted  Gen. no acute distress, Non toxic   HEENT: EOMI, mmm  Lungs: CTAB/L no C/ W /R   CVS: RRR    chest: Right chest permacath in chest however ends appear as if they have been cut off  Abd; Soft non tender, non distended   Ext: no edema  Skin: no rash  Neuro:  awake, alert, non focal, clear speech  a/p:  nonfunctioning permacath–it appears as if the permacath was cut and cannot be used.  Plan for labs for potential HD.  EKG reviewed and there are no peaked T waves at this time.  IR was consulted and will replace permacath.  Disposition is per need for emergent dialysis, whether it can be set up today outpatient versus inpatient admission.  - Kiara AMES

## 2023-12-24 NOTE — ED ADULT NURSE REASSESSMENT NOTE - NS ED NURSE REASSESS COMMENT FT1
Report given to Mes[facility] RN. Awaiting transport to Dialysis. Consent Type: Consent 1 (Standard)

## 2023-12-24 NOTE — PROCEDURE NOTE - PROCEDURE FINDINGS AND DETAILS
technically successful fluoroscopic guided R IJ tunneled HD catheter exchange. Tip in SVC. Ok to use catheter.

## 2023-12-24 NOTE — ED PROVIDER NOTE - NSTIMEPROVIDERCAREINITIATE_GEN_ER
Ventricular Rate : 83   Atrial Rate : 83   P-R Interval : 156   QRS Duration : 78   Q-T Interval : 386   QTC Calculation(Bezet) : 453   P Axis : 38   R Axis : -1   T Axis : 17   Diagnosis : Normal sinus rhythm~Normal ECG~No previous ECGs available~Confirmed by RENU CYR MASOOD ([id number]) on 11/14/2017 10:48:05 PM     
24-Dec-2023 13:24

## 2023-12-24 NOTE — H&P ADULT - PROBLEM SELECTOR PLAN 2
Several year hx of chronic respiratory failure  On 2L home O2 at baseline  Unclear etiology of hypoxia    - at baseline oxygen requirements  - c/w home Symbicort   - f/u pulm as outpatient

## 2023-12-24 NOTE — H&P ADULT - NSHPPHYSICALEXAM_GEN_ALL_CORE
LOS:     VITALS:   T(C): 36.7 (12-24-23 @ 18:00), Max: 36.8 (12-24-23 @ 13:07)  HR: 92 (12-24-23 @ 18:00) (86 - 92)  BP: 114/54 (12-24-23 @ 18:00) (111/71 - 132/54)  RR: 18 (12-24-23 @ 18:00) (16 - 19)  SpO2: 92% (12-24-23 @ 18:00) (92% - 98%)    GENERAL: NAD, lying in bed comfortably  HEAD:  Atraumatic, Normocephalic  EYES: EOMI, PERRLA, conjunctiva and sclera clear  ENT: Moist mucous membranes  NECK: Supple, No JVD  CHEST/LUNG: Clear to auscultation bilaterally; No rales, rhonchi, wheezing, or rubs. Unlabored respirations  HEART: Regular rate and rhythm; No murmurs, rubs, or gallops  ABDOMEN: BSx4; Soft, nontender, nondistended  EXTREMITIES:  2+ Peripheral Pulses, brisk capillary refill. No clubbing, cyanosis, or edema  NERVOUS SYSTEM:  A&Ox3, no focal deficits   SKIN: No rashes or lesions LOS:     VITALS:   T(C): 36.7 (12-24-23 @ 18:00), Max: 36.8 (12-24-23 @ 13:07)  HR: 92 (12-24-23 @ 18:00) (86 - 92)  BP: 114/54 (12-24-23 @ 18:00) (111/71 - 132/54)  RR: 18 (12-24-23 @ 18:00) (16 - 19)  SpO2: 92% (12-24-23 @ 18:00) (92% - 98%)    GENERAL: NAD, lying in bed comfortably  HEAD:  Atraumatic, Normocephalic  EYES: EOMI, PERRLA, conjunctiva and sclera clear  ENT: Moist mucous membranes  NECK: Supple, No JVD  CHEST/LUNG: Clear to auscultation bilaterally; No rales, rhonchi, wheezing, or rubs. Unlabored respirations  HEART: Regular rate and rhythm; No murmurs, rubs, or gallops  ABDOMEN: BSx4; Soft, nontender, nondistended  EXTREMITIES:  2+ Peripheral Pulses, brisk capillary refill. No clubbing, cyanosis, or edema  NERVOUS SYSTEM:  A&Ox2   SKIN: lower extremity discoloration consistent with chronic venous insufficiency

## 2023-12-24 NOTE — H&P ADULT - NSHPREVIEWOFSYSTEMS_GEN_ALL_CORE
REVIEW OF SYSTEMS      General:	  No fever, chills, or night sweats    Skin/Breast:  No rash or erythema  	  Ophthalmologic:  No double or blurry vision  	  ENMT:	  No sore throat    Respiratory and Thorax:  No cough, no shortness of breath, no wheezing  	  Cardiovascular:	  No chest pain, no palpitations    Gastrointestinal:	  No N/V/D/C, bloody or black stools    Genitourinary:	  no burning with urination, no increased frequency of urination    Musculoskeletal:	  No joint pain    Neurological:	  No headache

## 2023-12-24 NOTE — H&P ADULT - PROBLEM SELECTOR PLAN 3
CONCLUSIONS:  11/23 TTE with normal LVSF, EF 55-60%. Mod enlarged RV ventricular size. Mild TR  Recent admission in November in Nov for ADHF, started on bumex    - c/w home bumex on non-HD  - c/w home amlodipine and hydral

## 2023-12-24 NOTE — H&P ADULT - ATTENDING COMMENTS
Pt was seen and examined during key portion of E/M service. Case discussed with resident. H&P reviewed and edited where appropriate. Other than the following, I agree with the above history, exam, assessment, and plan.  83F with HTN, HLD, CAD, T2DM, ESRD on HD MWF, HFpEF dementia, remote hx of breast ca s/p mastectomy, chronic hypoxemic respiratory failure of unclear etiology on home O2, pw dialysis permacath malfunction, now s/p IR permacath exchange on 12/24.   dispo planning. PT eval. cont home O2 by NC. Pt was seen and examined during key portion of E/M service. Case discussed with resident. H&P reviewed and edited where appropriate. Other than the following, I agree with the above history, exam, assessment, and plan.  83F with HTN, HLD, CAD, T2DM, ESRD on HD MWF (via RIJ permacath, has RUE maturing AVF), HFpEF dementia, remote hx of breast ca s/p mastectomy, chronic hypoxemic respiratory failure of unclear etiology on home O2, pw dialysis permacath malfunction, now s/p IR permacath exchange on 12/24.   dispo planning. PT eval. cont home O2 by NC.

## 2023-12-24 NOTE — ED PROVIDER NOTE - OBJECTIVE STATEMENT
84 y/o F with PMHx of ESRD on HD MWF, dementia, HTN, HLD, DM presents to the ED from HD center for issues accessing permacath today. Pt's last HD session was 12/22.  Pt alledgedly cut the end of her permacath ports distal to clamps so the facility was unable to access the port. Pt unsure why she is in the ED. Pt's  at bedside and unsure how or when pt did this. Pt adamantly denies cutting port. Denies fevers, chills, HAs, CP, SO, abd pain, n/v/d/c, dysuria, hematuria.

## 2023-12-24 NOTE — ED PROVIDER NOTE - PROGRESS NOTE DETAILS
Isabel Uribe PGY2: IR consulted and will bring pt to IR suite to replace perma cath. Spoke with nephrology fellow who recommends admission for HD at pt will not be able to get HD until 12/26. Pt endorsed to hospitalist.

## 2023-12-24 NOTE — ED PROVIDER NOTE - CLINICAL SUMMARY MEDICAL DECISION MAKING FREE TEXT BOX
84 y/o F with PMHx of ESRD on HD MWF, dementia, HTN, HLD, DM presents to the ED from HD center for issues accessing permacath today. Pt alledgedly cut the end of her permacath ports distal to clamps so the facility was unable to access the port. R chest wall permacath with no distal claves. Plan for IR consult for replacement of perma-cath. Plan for labs and EKG. Dispo likely admission for HD. 82 y/o F with PMHx of ESRD on HD MWF, dementia, HTN, HLD, DM presents to the ED from HD center for issues accessing permacath today. Pt alledgedly cut the end of her permacath ports distal to clamps so the facility was unable to access the port. R chest wall permacath with no distal claves. Plan for IR consult for replacement of perma-cath. Plan for labs and EKG. Dispo likely admission for HD.

## 2023-12-24 NOTE — ED ADULT NURSE NOTE - OBJECTIVE STATEMENT
83 yr old female by EMS from dialysis center sent to ED for R chest wall permacath malfunction, the tips of double lumen appears to be cut from the ends, currently clamped, insertion site appears clean/intact, pt admits to declining memory x 3 yrs, A&Ox2 (person, place), does not recall trauma/dislodgement, otherwise no c/o, maex4, vitals stable,  present

## 2023-12-25 ENCOUNTER — TRANSCRIPTION ENCOUNTER (OUTPATIENT)
Age: 83
End: 2023-12-25

## 2023-12-25 VITALS — OXYGEN SATURATION: 93 % | DIASTOLIC BLOOD PRESSURE: 54 MMHG | SYSTOLIC BLOOD PRESSURE: 104 MMHG | HEART RATE: 93 BPM

## 2023-12-25 LAB
ALBUMIN SERPL ELPH-MCNC: 4.1 G/DL — SIGNIFICANT CHANGE UP (ref 3.3–5)
ALBUMIN SERPL ELPH-MCNC: 4.1 G/DL — SIGNIFICANT CHANGE UP (ref 3.3–5)
ALP SERPL-CCNC: 78 U/L — SIGNIFICANT CHANGE UP (ref 40–120)
ALP SERPL-CCNC: 78 U/L — SIGNIFICANT CHANGE UP (ref 40–120)
ALT FLD-CCNC: <5 U/L — LOW (ref 10–45)
ALT FLD-CCNC: <5 U/L — LOW (ref 10–45)
ANION GAP SERPL CALC-SCNC: 14 MMOL/L — SIGNIFICANT CHANGE UP (ref 5–17)
ANION GAP SERPL CALC-SCNC: 14 MMOL/L — SIGNIFICANT CHANGE UP (ref 5–17)
AST SERPL-CCNC: 19 U/L — SIGNIFICANT CHANGE UP (ref 10–40)
AST SERPL-CCNC: 19 U/L — SIGNIFICANT CHANGE UP (ref 10–40)
BILIRUB SERPL-MCNC: 0.4 MG/DL — SIGNIFICANT CHANGE UP (ref 0.2–1.2)
BILIRUB SERPL-MCNC: 0.4 MG/DL — SIGNIFICANT CHANGE UP (ref 0.2–1.2)
BUN SERPL-MCNC: 17 MG/DL — SIGNIFICANT CHANGE UP (ref 7–23)
BUN SERPL-MCNC: 17 MG/DL — SIGNIFICANT CHANGE UP (ref 7–23)
CALCIUM SERPL-MCNC: 9.2 MG/DL — SIGNIFICANT CHANGE UP (ref 8.4–10.5)
CALCIUM SERPL-MCNC: 9.2 MG/DL — SIGNIFICANT CHANGE UP (ref 8.4–10.5)
CHLORIDE SERPL-SCNC: 94 MMOL/L — LOW (ref 96–108)
CHLORIDE SERPL-SCNC: 94 MMOL/L — LOW (ref 96–108)
CO2 SERPL-SCNC: 29 MMOL/L — SIGNIFICANT CHANGE UP (ref 22–31)
CO2 SERPL-SCNC: 29 MMOL/L — SIGNIFICANT CHANGE UP (ref 22–31)
CREAT SERPL-MCNC: 4.1 MG/DL — HIGH (ref 0.5–1.3)
CREAT SERPL-MCNC: 4.1 MG/DL — HIGH (ref 0.5–1.3)
EGFR: 10 ML/MIN/1.73M2 — LOW
EGFR: 10 ML/MIN/1.73M2 — LOW
GLUCOSE BLDC GLUCOMTR-MCNC: 106 MG/DL — HIGH (ref 70–99)
GLUCOSE BLDC GLUCOMTR-MCNC: 106 MG/DL — HIGH (ref 70–99)
GLUCOSE BLDC GLUCOMTR-MCNC: 170 MG/DL — HIGH (ref 70–99)
GLUCOSE BLDC GLUCOMTR-MCNC: 170 MG/DL — HIGH (ref 70–99)
GLUCOSE SERPL-MCNC: 184 MG/DL — HIGH (ref 70–99)
GLUCOSE SERPL-MCNC: 184 MG/DL — HIGH (ref 70–99)
HCT VFR BLD CALC: 35.1 % — SIGNIFICANT CHANGE UP (ref 34.5–45)
HCT VFR BLD CALC: 35.1 % — SIGNIFICANT CHANGE UP (ref 34.5–45)
HGB BLD-MCNC: 10.9 G/DL — LOW (ref 11.5–15.5)
HGB BLD-MCNC: 10.9 G/DL — LOW (ref 11.5–15.5)
MAGNESIUM SERPL-MCNC: 2.3 MG/DL — SIGNIFICANT CHANGE UP (ref 1.6–2.6)
MAGNESIUM SERPL-MCNC: 2.3 MG/DL — SIGNIFICANT CHANGE UP (ref 1.6–2.6)
MCHC RBC-ENTMCNC: 28.2 PG — SIGNIFICANT CHANGE UP (ref 27–34)
MCHC RBC-ENTMCNC: 28.2 PG — SIGNIFICANT CHANGE UP (ref 27–34)
MCHC RBC-ENTMCNC: 31.1 GM/DL — LOW (ref 32–36)
MCHC RBC-ENTMCNC: 31.1 GM/DL — LOW (ref 32–36)
MCV RBC AUTO: 90.7 FL — SIGNIFICANT CHANGE UP (ref 80–100)
MCV RBC AUTO: 90.7 FL — SIGNIFICANT CHANGE UP (ref 80–100)
NRBC # BLD: 0 /100 WBCS — SIGNIFICANT CHANGE UP (ref 0–0)
NRBC # BLD: 0 /100 WBCS — SIGNIFICANT CHANGE UP (ref 0–0)
PHOSPHATE SERPL-MCNC: 4.3 MG/DL — SIGNIFICANT CHANGE UP (ref 2.5–4.5)
PHOSPHATE SERPL-MCNC: 4.3 MG/DL — SIGNIFICANT CHANGE UP (ref 2.5–4.5)
PLATELET # BLD AUTO: 327 K/UL — SIGNIFICANT CHANGE UP (ref 150–400)
PLATELET # BLD AUTO: 327 K/UL — SIGNIFICANT CHANGE UP (ref 150–400)
POTASSIUM SERPL-MCNC: 3.7 MMOL/L — SIGNIFICANT CHANGE UP (ref 3.5–5.3)
POTASSIUM SERPL-MCNC: 3.7 MMOL/L — SIGNIFICANT CHANGE UP (ref 3.5–5.3)
POTASSIUM SERPL-SCNC: 3.7 MMOL/L — SIGNIFICANT CHANGE UP (ref 3.5–5.3)
POTASSIUM SERPL-SCNC: 3.7 MMOL/L — SIGNIFICANT CHANGE UP (ref 3.5–5.3)
PROT SERPL-MCNC: 7.4 G/DL — SIGNIFICANT CHANGE UP (ref 6–8.3)
PROT SERPL-MCNC: 7.4 G/DL — SIGNIFICANT CHANGE UP (ref 6–8.3)
RBC # BLD: 3.87 M/UL — SIGNIFICANT CHANGE UP (ref 3.8–5.2)
RBC # BLD: 3.87 M/UL — SIGNIFICANT CHANGE UP (ref 3.8–5.2)
RBC # FLD: 14.2 % — SIGNIFICANT CHANGE UP (ref 10.3–14.5)
RBC # FLD: 14.2 % — SIGNIFICANT CHANGE UP (ref 10.3–14.5)
SODIUM SERPL-SCNC: 137 MMOL/L — SIGNIFICANT CHANGE UP (ref 135–145)
SODIUM SERPL-SCNC: 137 MMOL/L — SIGNIFICANT CHANGE UP (ref 135–145)
WBC # BLD: 5.85 K/UL — SIGNIFICANT CHANGE UP (ref 3.8–10.5)
WBC # BLD: 5.85 K/UL — SIGNIFICANT CHANGE UP (ref 3.8–10.5)
WBC # FLD AUTO: 5.85 K/UL — SIGNIFICANT CHANGE UP (ref 3.8–10.5)
WBC # FLD AUTO: 5.85 K/UL — SIGNIFICANT CHANGE UP (ref 3.8–10.5)

## 2023-12-25 PROCEDURE — 97161 PT EVAL LOW COMPLEX 20 MIN: CPT

## 2023-12-25 PROCEDURE — 90935 HEMODIALYSIS ONE EVALUATION: CPT

## 2023-12-25 PROCEDURE — 86900 BLOOD TYPING SEROLOGIC ABO: CPT

## 2023-12-25 PROCEDURE — 84100 ASSAY OF PHOSPHORUS: CPT

## 2023-12-25 PROCEDURE — 99261: CPT

## 2023-12-25 PROCEDURE — 82962 GLUCOSE BLOOD TEST: CPT

## 2023-12-25 PROCEDURE — 99223 1ST HOSP IP/OBS HIGH 75: CPT | Mod: GC

## 2023-12-25 PROCEDURE — 94640 AIRWAY INHALATION TREATMENT: CPT

## 2023-12-25 PROCEDURE — 77001 FLUOROGUIDE FOR VEIN DEVICE: CPT

## 2023-12-25 PROCEDURE — 36581 REPLACE TUNNELED CV CATH: CPT

## 2023-12-25 PROCEDURE — 71045 X-RAY EXAM CHEST 1 VIEW: CPT

## 2023-12-25 PROCEDURE — C1769: CPT

## 2023-12-25 PROCEDURE — 85610 PROTHROMBIN TIME: CPT

## 2023-12-25 PROCEDURE — 80053 COMPREHEN METABOLIC PANEL: CPT

## 2023-12-25 PROCEDURE — 86850 RBC ANTIBODY SCREEN: CPT

## 2023-12-25 PROCEDURE — C1750: CPT

## 2023-12-25 PROCEDURE — 85027 COMPLETE CBC AUTOMATED: CPT

## 2023-12-25 PROCEDURE — 83735 ASSAY OF MAGNESIUM: CPT

## 2023-12-25 PROCEDURE — 93005 ELECTROCARDIOGRAM TRACING: CPT

## 2023-12-25 PROCEDURE — 85025 COMPLETE CBC W/AUTO DIFF WBC: CPT

## 2023-12-25 PROCEDURE — 99239 HOSP IP/OBS DSCHRG MGMT >30: CPT

## 2023-12-25 PROCEDURE — 85730 THROMBOPLASTIN TIME PARTIAL: CPT

## 2023-12-25 PROCEDURE — 36415 COLL VENOUS BLD VENIPUNCTURE: CPT

## 2023-12-25 PROCEDURE — 99285 EMERGENCY DEPT VISIT HI MDM: CPT | Mod: 25

## 2023-12-25 PROCEDURE — 86901 BLOOD TYPING SEROLOGIC RH(D): CPT

## 2023-12-25 RX ORDER — DEXTROSE 50 % IN WATER 50 %
25 SYRINGE (ML) INTRAVENOUS ONCE
Refills: 0 | Status: DISCONTINUED | OUTPATIENT
Start: 2023-12-25 | End: 2023-12-25

## 2023-12-25 RX ORDER — SODIUM CHLORIDE 9 MG/ML
1000 INJECTION, SOLUTION INTRAVENOUS
Refills: 0 | Status: DISCONTINUED | OUTPATIENT
Start: 2023-12-25 | End: 2023-12-25

## 2023-12-25 RX ORDER — INSULIN LISPRO 100/ML
VIAL (ML) SUBCUTANEOUS AT BEDTIME
Refills: 0 | Status: DISCONTINUED | OUTPATIENT
Start: 2023-12-25 | End: 2023-12-25

## 2023-12-25 RX ORDER — HEPARIN SODIUM 5000 [USP'U]/ML
5000 INJECTION INTRAVENOUS; SUBCUTANEOUS EVERY 8 HOURS
Refills: 0 | Status: DISCONTINUED | OUTPATIENT
Start: 2023-12-25 | End: 2023-12-25

## 2023-12-25 RX ORDER — INSULIN LISPRO 100/ML
VIAL (ML) SUBCUTANEOUS
Refills: 0 | Status: DISCONTINUED | OUTPATIENT
Start: 2023-12-25 | End: 2023-12-25

## 2023-12-25 RX ADMIN — BUMETANIDE 2 MILLIGRAM(S): 0.25 INJECTION INTRAMUSCULAR; INTRAVENOUS at 08:38

## 2023-12-25 RX ADMIN — CHLORHEXIDINE GLUCONATE 1 APPLICATION(S): 213 SOLUTION TOPICAL at 07:21

## 2023-12-25 RX ADMIN — AMLODIPINE BESYLATE 10 MILLIGRAM(S): 2.5 TABLET ORAL at 05:47

## 2023-12-25 RX ADMIN — Medication 25 MILLIGRAM(S): at 05:47

## 2023-12-25 RX ADMIN — HEPARIN SODIUM 5000 UNIT(S): 5000 INJECTION INTRAVENOUS; SUBCUTANEOUS at 07:20

## 2023-12-25 RX ADMIN — BUDESONIDE AND FORMOTEROL FUMARATE DIHYDRATE 2 PUFF(S): 160; 4.5 AEROSOL RESPIRATORY (INHALATION) at 05:47

## 2023-12-25 NOTE — DISCHARGE NOTE PROVIDER - NSDCFUSCHEDAPPT_GEN_ALL_CORE_FT
Arnold Medina  [facility]  HEARTFAIL [address]  Scheduled Appointment: 12/28/2023    [facility]  INTMED  [facility]   Scheduled Appointment: 01/11/2024    Leida Luis  [facility]  CARDIOLOGY [address]  Scheduled Appointment: 01/16/2024     Arnold Medina  [facility]  HEARTFAIL [address]  Scheduled Appointment: 12/28/2023    [facility]  INTMED  [facility]   Scheduled Appointment: 01/11/2024    Leida Luis  [facility]  CARDIOLOGY [address]  Scheduled Appointment: 01/16/2024

## 2023-12-25 NOTE — PATIENT PROFILE ADULT - FALL HARM RISK - DEVICES
"    Nate North  73 year old  MRN:[id number]  PCP: Juan Wu  No ref. provider found    No Known Allergies    Current Outpatient Medications   Medication Sig Dispense Refill     lisinopril (ZESTRIL) 10 MG tablet Take 10 mg by mouth daily        sertraline (ZOLOFT) 50 MG tablet Take 50 mg by mouth daily     Nate North is a 73 year old male who is being evaluated via a billable video visit.      The patient has been notified of following:     \"This video visit will be conducted via a call between you and your physician/provider. We have found that certain health care needs can be provided without the need for an in-person physical exam.  This service lets us provide the care you need with a video conversation.  If a prescription is necessary we can send it directly to your pharmacy.  If lab work is needed we can place an order for that and you can then stop by our lab to have the test done at a later time.    Video visits are billed at different rates depending on your insurance coverage.  Please reach out to your insurance provider with any questions.    If during the course of the call the physician/provider feels a video visit is not appropriate, you will not be charged for this service.\"    Patient has given verbal consent for Video visit? {YES  How would you like to obtain your AVS? Mail a copy  If you are dropped from the video visit, the video invite should be resent to: [phone]  Will anyone else be joining your video visit? No        Video-Visit Details    Type of service:  Video Visit    Video Start Time:10:10AM  Video End Time: 10:36 AM    Originating Location (pt. Location): Home    Distant Location (provider location):  [facility] NEUROLOGY [place]     Platform used for Video Visit: Mayra Montenegro MD      CHIEF COMPLAINT: Follow-up of neuropsych testing    HISTORY SINCE LAST VISIT: I saw Mr. Guy in a video visit today.  We went over the neuropsychometric " "testing that had been done on 8/10/2020.  There was no evidence to suggest a dementia.  His premorbid intellectual functioning was described as being in the above average to superior range.  His testing demonstrated similar levels of function at this time.  His retention of verbal memory was 73- 100% with delayed recall.  There were concerns for his alcohol use and also an adjustment disorder.  He had been started on sertraline, 50 mg daily by Dr. Wu 4 weeks ago.  He does believe it has helped him.  He feels he is more \"rational \"in terms of dealing with stressful situations.  Because he put at the hair on the back of his neck does not seem to rise up during those times.  He has cut back his alcohol consumption approximately 25%.  He has decreased the amount of alcohol in his drinks to approximately two thirds of what it was before in addition to the decreased amount of drinking.  He has been sleeping better.  He reports he did have a sleep study done 2 to 3 years ago that did not show apnea.  He had been having shoulder pain which has been improved with physical therapy such that he might have 1 time a night waking up.  He will typically go to bathroom if that is the case.  There is no loss of vision or double vision.  No speech or swallowing problems.  No acting out dreams that is been noted.      PAST MEDICAL HISTORY:   Problem Noted Date   Hypertension 04/24/2019   Overview:     Dx April 2019  Several values high  Lisinopril, May 2019       Actinic keratitis 04/27/2016   Overview:     Dx April 2016  Left forehead     Degenerative cervical disc     Overview:     Per records-don't have MRI report (not sure if done)  Left arm numbness-couple x per week       BPH (benign prostatic hyperplasia)     Overview:     Frequent urination, some urgency, loss of stream no nocturia       Prediabetes     Overview:     fasting 107, 100   Medical History     Medical History Date Comments   Anxiety   Work related. Minor " attacks lasting 10-15 seconds. Substantial improvement since long-term. Sense of disconnectedness            FAMILY HISTORY:  Family History   Problem Relation Age of Onset     Cancer Mother        SOCIAL HISTORY:  Social History     Socioeconomic History     Marital status:      Spouse name: Not on file     Number of children: Not on file     Years of education: Not on file     Highest education level: Not on file   Occupational History     Not on file   Social Needs     Financial resource strain: Not on file     Food insecurity     Worry: Not on file     Inability: Not on file     Transportation needs     Medical: Not on file     Non-medical: Not on file   Tobacco Use     Smoking status: Current Some Day Smoker     Types: Cigars     Smokeless tobacco: Never Used     Tobacco comment: 1 cigar per year   Substance and Sexual Activity     Alcohol use: Yes     Comment: Daily     Drug use: Never     Sexual activity: Not on file   Lifestyle     Physical activity     Days per week: Not on file     Minutes per session: Not on file     Stress: Not on file   Relationships     Social connections     Talks on phone: Not on file     Gets together: Not on file     Attends Jewish service: Not on file     Active member of club or organization: Not on file     Attends meetings of clubs or organizations: Not on file     Relationship status: Not on file     Intimate partner violence     Fear of current or ex partner: Not on file     Emotionally abused: Not on file     Physically abused: Not on file     Forced sexual activity: Not on file   Other Topics Concern     Parent/sibling w/ CABG, MI or angioplasty before 65F 55M? Not Asked   Social History Narrative     Not on file       REVIEW OF SYSTEMS:    Constitutional: No fever, chills, weight loss/weight gain  Eyes: No loss of vision or double vision  Respiratory: No shortness of breath.  Genitourinary: 1x/ night nocturia    Neurologic: See above.  Psychiatric: Feels better  with being on sertraline  Hematologic/Lymphatic/Immunologic:           PHYSICAL EXAMINATION:    General appearance: No acute distress.  Appropriately attired and groomed      NEUROLOGIC EXAMINATION:    Oriented x3.  Speech is fluent.  Normal naming and repetition.  Can tell me the name of the president and the governor.  Can spell world forward and backwards.  He register 3 of 3 objects.  Remembered 3/3 objects at 7 minutes time.    Cranial Nerves: Extraocular movements are full.  Visual fields are full.  Face symmetric.  Tongue midline    Motor: No drift of upper extremities    Co-ordination: Normal finger-nose-finger.  Normal finger tapping.      Gait: Normal        IMPRESSION: 1.  Memory difficulties.  We discussed its good news and neuropsychometric testing showed no evidence of a dementia process.  We discussed trying to continue decreasing his amount of alcohol can provide benefit, but he does not think he be able to stop drinking altogether.  We discussed in general its thought that for older individuals, max of 1 drink a day is best.  He will continue with sertraline which has had a positive benefit for him.  He and his wife had gone through counseling before, but he mentioned this had been discontinued as they appear to be doing well.  We discussed if he were to run into problems doing individual psychotherapy could be of benefit.  We discussed we can see him back on an as-needed basis if there were to be problems that were to arise.            Ger Montenegro MD   [facility]

## 2023-12-25 NOTE — DISCHARGE NOTE PROVIDER - CARE PROVIDER_API CALL
Dario Sol  Internal Medicine  [address] [place] [address]  Phone: [phone]  Fax: [phone]  Follow Up Time: 1 week    Callie Dash  Nephrology  [address] [place] [address], [address]  [place] [address]  Phone: [phone]  Fax: [phone]  Follow Up Time: 1 week   Dario Sol  Internal Medicine  [address] [place] [address]  Phone: [phone]  Fax: [phone]  Follow Up Time: 1 week    Callie Dash  Nephrology  [address] [place] [address], [address]  [place] [address]  Phone: [phone]  Fax: [phone]  Follow Up Time: 1 week

## 2023-12-25 NOTE — DISCHARGE NOTE NURSING/CASE MANAGEMENT/SOCIAL WORK - PATIENT PORTAL LINK FT
You can access the FollowMyHealth Patient Portal offered by [facility] by registering at the following website: [email or web address]. By joining [facility]’s FollowMyHealth portal, you will also be able to view your health information using other applications (apps) compatible with our system. You can access the FollowMyHealth Patient Portal offered by [facility] by registering at the following website: [email or web address]. By joining [facility]’s FollowMyHealth portal, you will also be able to view your health information using other applications (apps) compatible with our system.

## 2023-12-25 NOTE — DISCHARGE NOTE PROVIDER - NSDCCPCAREPLAN_GEN_ALL_CORE_FT
PRINCIPAL DISCHARGE DIAGNOSIS  Diagnosis: Hemodialysis catheter malfunction  Assessment and Plan of Treatment: You presented with dialysis permacath malfunction, now s/p IR permacath exchange on 12/24. HD 12/24 on Sunday as HD center closed for Christmas, resume HD afterwards at normal schedule

## 2023-12-25 NOTE — DISCHARGE NOTE PROVIDER - ATTENDING DISCHARGE PHYSICAL EXAMINATION:
Pt seen and examined - doing well, no acute complaints, no SOB or cough, no abd pain, tolerated HD well last night     PHYSICAL EXAM:  GENERAL: NAD, well-developed  HEAD:  Atraumatic, normocephalic  EYES: EOMI, conjunctiva and sclera clear  NECK: Supple, no JVD  CHEST/LUNG: Clear to auscultation bilaterally; no wheezing or rales; RUE perm cath with mild tenderness at site   HEART: Regular rate and rhythm; no murmurs, no LE edema   ABDOMEN: Soft, nontender, nondistended; bowel sounds present  EXTREMITIES:  2+ Peripheral Pulses, no clubbing, cyanosis  PSYCH: AAOx3, calm affect, not anxious  SKIN: No rashes or lesions    Pt stable for discharge home  completed HD session after permcath replacement yesterday evening  Next HD scheduled for 12/27  d/w renal, ok for d/c home

## 2023-12-25 NOTE — PATIENT PROFILE ADULT - FALL HARM RISK - RISK INTERVENTIONS
Assistance OOB with selected safe patient handling equipment/Assistance with ambulation/Communicate Fall Risk and Risk Factors to all staff, patient, and family/Discuss with provider need for PT consult/Monitor gait and stability/Provide patient with walking aids - walker, cane, crutches/Reinforce activity limits and safety measures with patient and family/Visual Cue: Yellow wristband/Bed in lowest position, wheels locked, appropriate side rails in place/Call bell, personal items and telephone in reach/Instruct patient to call for assistance before getting out of bed or chair/Non-slip footwear when patient is out of bed/[place] to call system/Physically safe environment - no spills, clutter or unnecessary equipment/Purposeful Proactive Rounding/Room/bathroom lighting operational, light cord in reach Assistance OOB with selected safe patient handling equipment/Assistance with ambulation/Communicate Fall Risk and Risk Factors to all staff, patient, and family/Discuss with provider need for PT consult/Monitor gait and stability/Provide patient with walking aids - walker, cane, crutches/Reinforce activity limits and safety measures with patient and family/Visual Cue: Yellow wristband/Bed in lowest position, wheels locked, appropriate side rails in place/Call bell, personal items and telephone in reach/Instruct patient to call for assistance before getting out of bed or chair/Non-slip footwear when patient is out of bed/[place] to call system/Physically safe environment - no spills, clutter or unnecessary equipment/Purposeful Proactive Rounding/Room/bathroom lighting operational, light cord in reach

## 2023-12-25 NOTE — DISCHARGE NOTE PROVIDER - PROVIDER TOKENS
Problem: Patient Care Overview (Adult)  Goal: Plan of Care Review  Outcome: Ongoing (interventions implemented as appropriate)    01/16/17 1348   Coping/Psychosocial Response Interventions   Plan Of Care Reviewed With patient   Patient Care Overview   Progress improving   Outcome Evaluation   Outcome Summary/Follow up Plan Pt ambulated 60 feet with Vince, limited by pain and knee buckling. Pt still requires cues for sequencing. Will continue to progress mobility as able.          Problem: Inpatient Physical Therapy  Goal: Bed Mobility Goal LTG- PT  Outcome: Ongoing (interventions implemented as appropriate)    01/15/17 0937 01/16/17 1348   Bed Mobility PT LTG   Bed Mobility PT LTG, Date Established 01/15/17 --    Bed Mobility PT LTG, Time to Achieve 2 wks --    Bed Mobility PT LTG, Activity Type supine to sit/sit to supine --    Bed Mobility PT LTG, [place] Level supervision required --    Bed Mobility PT LTG, Date Goal Reviewed --  01/16/17   Bed Mobility PT LTG, Outcome --  goal ongoing       Goal: Transfer Training Goal 1 LTG- PT  Outcome: Ongoing (interventions implemented as appropriate)    01/15/17 0937 01/16/17 1348   Transfer Training PT LTG   Transfer Training PT LTG, Date Established 01/15/17 --    Transfer Training PT LTG, Time to Achieve 2 wks --    Transfer Training PT LTG, Activity Type sit to stand/stand to sit --    Transfer Training PT LTG, [place] Level set up required;supervision required --    Transfer Training PT LTG, Assist Device walker, rolling --    Transfer Training PT LTG, Date Goal Reviewed --  01/16/17   Transfer Training PT LTG, Outcome --  goal ongoing       Goal: Gait Training Goal LTG- PT  Outcome: Ongoing (interventions implemented as appropriate)    01/15/17 0937 01/16/17 1348   Gait Training PT LTG   Gait Training Goal PT LTG, Date Established 01/15/17 --    Gait Training Goal PT LTG, Time to Achieve 2 wks --    Gait Training Goal PT LTG, [place] Level contact guard  assist --    Gait Training Goal PT LTG, Assist Device walker, rolling --    Gait Training Goal PT LTG, Distance to Achieve 350 --    Gait Training Goal PT LTG, Date Goal Reviewed --  01/16/17   Gait Training Goal PT LTG, Outcome --  goal ongoing       Goal: Stair Training Goal LTG- PT  Outcome: Unable to achieve outcome(s) by discharge Date Met:  01/16/17    01/15/17 0937 01/16/17 1348   Stair Training PT LTG   Stair Training Goal PT LTG, Date Established 01/15/17 --    Stair Training Goal PT LTG, Time to Achieve 2 wks --    Stair Training Goal PT LTG, Number of Steps 1 --    Stair Training Goal PT LTG, [place] Level contact guard assist --    Stair Training Goal PT LTG, Assist Device walker, rolling --    Stair Training Goal PT LTG, Date Goal Reviewed --  01/16/17   Stair Training Goal PT LTG, Outcome --  goal not met   Stair Training Goal PT LTG, Reason Goal Not Met --  (No need to perform, DC to [facility])            PROVIDER:[TOKEN:[[id number]],FOLLOWUP:[1 week]],PROVIDER:[TOKEN:[[id number]],FOLLOWUP:[1 week]] PROVIDER:[TOKEN:[[id number]],FOLLOWUP:[1 week]],PROVIDER:[TOKEN:[[id number]],FOLLOWUP:[1 week]]

## 2023-12-25 NOTE — DISCHARGE NOTE PROVIDER - NSDCMRMEDTOKEN_GEN_ALL_CORE_FT
albuterol 90 mcg/inh inhalation aerosol: 2 puff(s) inhaled every 6 hours  amLODIPine 10 mg oral tablet: 1 tab(s) orally once a day  budesonide-formoterol 160 mcg-4.5 mcg/inh inhalation aerosol: 1 spray(s) inhaled 2 times a day  bumetanide 2 mg oral tablet: 1 tab(s) orally Tuesday, Thursday, Saturday, Sunday Take once a day on non-dialysis days  epoetin sheryl: 1 injectable 3 times a week give at dialysis, MWF  ferrous sulfate 325 mg (65 mg elemental iron) oral tablet: 1 tab(s) orally once a day  fluticasone 50 mcg/inh nasal spray: 1 spray(s) nasal 2 times a day  hydrALAZINE 25 mg oral tablet: 1 tab(s) orally 3 times a day  Onglyza 2.5 mg oral tablet: 1 tab(s) orally once a day On HD (hemodialysis) days, give after HD (hemodialysis)  pioglitazone 30 mg oral tablet: 1 tab(s) orally once a day  Rolling Walker: 1 Rolling walker  rosuvastatin 20 mg oral tablet: 1 tab(s) orally once a day (at bedtime)  Wheelchair: 1 Wheelchair

## 2023-12-25 NOTE — DISCHARGE NOTE PROVIDER - HOSPITAL COURSE
HPI:  83F with HTN, HLD, CAD, T2DM, ESRD on HD MWF (via RIJ permacath, has RUE maturing AVF), dementia, remote hx of breast ca s/p mastectomy, chronic hypoxemic respiratory failure of unclear etiology on home O2, presenting to ED from HD center after having difficult access permacath. There are reports from pt's  that the patient cut the end of her permacath port distal to the clamps with scissors. This was unwitnessed and it is unclear why and how the patient did this. Because of the Christmas holiday hours, patient is not able to get HD tomorrow, so she was admitted for HD today.    On interview, patient does not recall anything that happened. When asked if she cutt off her permacath tips she says "i'm not sure, I might have but I can't remember". At this time she is reporting no symptoms. She feels comfortable.      On arrival to ED, VSS. IR consulted and exchanged permacath. Admitted to medicine for further workup.          (24 Dec 2023 19:53)    Hospital Course:  83F with HTN, HLD, CAD, T2DM, ESRD on HD MWF (via RIJ permacath, has RUE maturing AVF), HFpEF dementia, remote hx of breast ca s/p mastectomy, chronic hypoxemic respiratory failure of unclear etiology on home O2, pw dialysis permacath malfunction, now s/p IR permacath exchange on 12/24. HD 12/24 on Sunday as HD center closed for Christmas, resume HD afterwards at normal schedule  Patient is medically cleared and stable for discharge home with home PT, discussed with .     Important Medication Changes and Reason:  no changes    Active or Pending Issues Requiring Follow-up:  nephro follow up     Advanced Directives:   [ x] Full code  [ ] DNR  [ ] Hospice    Discharge Diagnoses:  permacath exchange

## 2023-12-25 NOTE — DISCHARGE NOTE NURSING/CASE MANAGEMENT/SOCIAL WORK - NSDCPEFALRISK_GEN_ALL_CORE
For information on Fall & Injury Prevention, visit: [email or web address] OR  [email or web address] OR  https://www.cdc.gov/steadi/patient.html For information on Fall & Injury Prevention, visit: [email or web address] OR  [email or web address]/[email or web address]-tips-[email or web address]-injury OR  https://www.cdc.gov/steadi/patient.html

## 2023-12-25 NOTE — DISCHARGE NOTE PROVIDER - CARE PROVIDERS DIRECT ADDRESSES
,DirectAddress_Unknown,[email or web address] ,DirectAddress_Unknown,[email or web address]

## 2023-12-25 NOTE — PHYSICAL THERAPY INITIAL EVALUATION ADULT - ACTIVE RANGE OF MOTION EXAMINATION, REHAB EVAL
except bilateral shoulder flex ~100-110deg/bilateral upper extremity Active ROM was WFL (within functional limits)/bilateral  lower extremity Active ROM was WFL (within functional limits)

## 2023-12-25 NOTE — PHYSICAL THERAPY INITIAL EVALUATION ADULT - PERTINENT HX OF CURRENT PROBLEM, REHAB EVAL
PMHx: HTN, HLD, CAD, T2DM, ESRD on HD MWF (via RIJ permacath, has RUE maturing AVF), dementia, remote hx of breast ca s/p mastectomy, chronic hypoxemic respiratory failure of unclear etiology on home O2. presenting to ED from HD center after having difficult access permacath. There are reports from pt's  that the pt cut the end of her permacath port distal to the clamps with scissors. This was unwitnessed and it is unclear why and how the patient did this. Because of the Christmas holiday hours, patient is not able to get HD tomorrow, so she was admitted for HD today. On interview, patient does not recall anything that happened. When asked if she cutt off her permacath tips she says "i'm not sure, I might have but I can't remember". s/p IR permacath exchange on 12/24. CXR: Patchy bilateral interstitial infiltrates/areas of atelectasis, more confluent in the retrocardiac region, Mild pulmonary venous congestion, improved. PMHx: HTN, HLD, CAD, T2DM, ESRD on HD MWF (via RIJ permacath, has RUE maturing AVF), dementia, remote hx of breast ca s/p mastectomy, chronic hypoxemic respiratory failure of unclear etiology on home O2. presenting to ED from HD center after having difficult access permacath. There are reports from pt's  that the pt cut the end of her permacath port distal to the clamps with scissors. This was unwitnessed and it is unclear why and how the patient did this. Because of the Christmas holiday hours, patient is not able to get HD tomorrow, so she was admitted for HD today. On interview, patient does not recall anything that happened. When asked if she cut off her permacath tips she says "i'm not sure, I might have but I can't remember". s/p IR permacath exchange on 12/24. CXR: Patchy bilateral interstitial infiltrates/areas of atelectasis, more confluent in the retrocardiac region, Mild pulmonary venous congestion, improved.

## 2023-12-26 ENCOUNTER — APPOINTMENT (OUTPATIENT)
Dept: CARE COORDINATION | Facility: HOME HEALTH | Age: 83
End: 2023-12-26
Payer: MEDICARE

## 2023-12-26 VITALS
SYSTOLIC BLOOD PRESSURE: 120 MMHG | WEIGHT: 160 LBS | TEMPERATURE: 97.5 F | BODY MASS INDEX: 27.31 KG/M2 | HEIGHT: 64 IN | DIASTOLIC BLOOD PRESSURE: 60 MMHG | RESPIRATION RATE: 16 BRPM | OXYGEN SATURATION: 97 %

## 2023-12-26 DIAGNOSIS — I50.9 HEART FAILURE, UNSPECIFIED: ICD-10-CM

## 2023-12-26 PROCEDURE — 99349 HOME/RES VST EST MOD MDM 40: CPT

## 2023-12-26 RX ORDER — BUMETANIDE 2 MG/1
2 TABLET ORAL EVERY OTHER DAY
Refills: 0 | Status: ACTIVE | COMMUNITY
Start: 2023-12-22

## 2023-12-26 NOTE — HEALTH RISK ASSESSMENT
[No] : In the past 12 months have you used drugs other than those required for medical reasons? No [No falls in past year] : Patient reported no falls in the past year [Little interest or pleasure doing things] : 1) Little interest or pleasure doing things [Feeling down, depressed, or hopeless] : 2) Feeling down, depressed, or hopeless [0] : 2) Feeling down, depressed, or hopeless: Not at all (0) [PHQ-2 Negative - No further assessment needed] : PHQ-2 Negative - No further assessment needed [None] : None [With Significant Other] : lives with significant other [# of Members in Household ___] :  household currently consist of [unfilled] member(s) [Retired] : retired [] :  [# Of Children ___] : has [unfilled] children [Independent] : feeding [Some assistance needed] : using telephone [Full assistance needed] : managing finances [With Patient/Caregiver] : , with patient/caregiver [Reviewed no changes] : Reviewed, no changes [Designated Healthcare Proxy] : Designated healthcare proxy [Name: ___] : Health Care Proxy's Name: [unfilled]  [Aggressive treatment] : aggressive treatment [I will adhere to the patient's wishes.] : I will adhere to the patient's wishes. [Time Spent: ___ minutes] : Time Spent: [unfilled] minutes [Former] : Former [> 15 Years] : > 15 Years [[id number]] : 0 [FreeTextEntry1] :  will meet with dietician for diet recommendations related to ESRD [de-identified] : son stays at times [FreeTextEntry8] :  walks behind her  [AdvancecareDate] : 12/23 [FreeTextEntry4] : reviewed DNR vs full code along with ACP.  and son state they are meeting with  next week for living willl and advanced directives, patient will be DNR. offer to complete MOLST declined at this time.  State they will complete all documents in  office.  advised w/o MOLST he will be asked about her ACP and will be full code. In agreement

## 2023-12-26 NOTE — COUNSELING
[Fall prevention counseling provided] : Fall prevention counseling provided [Adequate lighting] : Adequate lighting [No throw rugs] : No throw rugs [Use proper foot wear] : Use proper foot wear [Use recommended devices] : Use recommended devices [FreeTextEntry1] : walker, wheelchair

## 2023-12-26 NOTE — REVIEW OF SYSTEMS
[Hearing Loss] : hearing loss [Chest Pain] : no chest pain [Palpitations] : no palpitations [Lower Ext Edema] : lower extremity edema [Shortness Of Breath] : no shortness of breath [Wheezing] : no wheezing [Cough] : cough [Abdominal Pain] : no abdominal pain [Nausea] : no nausea [Constipation] : no constipation [Diarrhea] : diarrhea [Vomiting] : no vomiting [Melena] : no melena [Dysuria] : no dysuria [Incontinence] : no incontinence [Hematuria] : no hematuria [Joint Pain] : no joint pain [Joint Stiffness] : no joint stiffness [Joint Swelling] : no joint swelling [Muscle Weakness] : muscle weakness [Muscle Pain] : no muscle pain [Back Pain] : no back pain [Memory Loss] : memory loss [Negative] : Heme/Lymph

## 2023-12-26 NOTE — PLAN
[FreeTextEntry1] : follow up with PCP follow up with CARD follow up with NEPH keep dialysis as scheduled follow up with dietician for renal diet.

## 2023-12-26 NOTE — HISTORY OF PRESENT ILLNESS
[Post-hospitalization from ___ Hospital] : Post-hospitalization from [unfilled] Hospital [Admitted on: ___] : The patient was admitted on [unfilled] [Discharged on ___] : discharged on [unfilled] [Discharge Summary] : discharge summary [Discharge Med List] : discharge medication list [Other: ____] : [unfilled] [Med Reconciliation] : medication reconciliation has been completed [Patient Contacted By: ____] : and contacted by [unfilled] [FreeTextEntry2] : Patient is an 83 year old female with HTN, HLD, CAD, T2DM, CKD, remote hx of breast CA s/p mastectomy, and chronic hypoxemic respiratory failure of unknown etiology on home O2 who presents for lower extremity swelling and shortness of breath. Notably, the patient has had chronic hypoxemic respiratory failure for over 1 year, and had PFTs at her pulmonologist's office, but does not know if she was ever formally diagnosed with COPD or asthma. The patient has been on home oxygen via nasal canula (baseline 2L) for over a year and has been prescribed montelukast, and has had a persistent dry cough for several years.  In the hospital, the patient was treated with GDMT as was recommended by Cardiology and Nephrology.  Pt was found to have CAL on CKD and so pt's home medications of spironolactone and telmisartan were held; pt's home amlodipine was continued and pt was started on hydralazine. She was started on Lasix and given bumex for diuresis with minimal improvement in lower extremity edema and respiratory status. Per cardiology/nephrology recs, pt was started on dialysis after Shiley placement by IR. She experienced improvement in her lower extremity edema and returned to her baseline respiratory status. Vascular surgery was consulted for AVF placement on the RUE which is scheduled for 12/19 as OP. During hospital course, pt was noted by nephrology to have swelling around dialysis catheter site. IR evaluated pt at bedside and did not appreciate redness, swelling, irritation or purulent drainage from tract. Per IR no intervention at this time. Pt was started on antibiotic ointment for the site per nephrology recs and will need to continue this for a total of seven days at her HD center with close monitoring. Per nephrology recs, pt will be discharged with bumex 2mg on non-HD days. Patient to receive HD prior to d/c and again as outpatient prior to OP procedure.  For her suspected COPD, pt was also started on symbicort, duoneb, and 5-day course of prednisone to help with her respiratory status.  During hospital course, pt received 1U pRBCs for a hemoglobin <7. Pt was found to have iron deficiency anemia and was started on iron 325mg and epogen with dialysis.  At the time of discharge, the patient was stable and deemed appropriate for discharge. Pt was seen by PT and was recommended discharge with home PT. Pt will need to f/u with primary care provider for management of diabetes and hypertension as pt can no longer take Farxiga, spironolactone, and telmisartan. Pt will need to f/u with nephrology and cardiology within 1-2 weeks for in-pt f/u. Discharge Dx: #Acute Decompensated Heart Failure #End stage renal disease #Iron deficiency anemia #Hypertension #Hyperlipidemia #Coronary artery disease #DM2 New Medications: Ferrous sulfate Symbicort Hydralazine Bumex on non-HD days Changes to Medications: D/C spironolactone, telmisartan, farxiga Dose of onglyza 5mg decreased to 2.5mg daily; on HD days will give post HD Pt was readmitted from 12/24/23 through 12/25/23 for difficulty to access permacath for dialysis 83F with HTN, HLD, CAD, T2DM, ESRD on HD MWF (via RIJ permacath, has RUE maturing AVF), dementia, remote hx of breast ca s/p mastectomy, chronic hypoxemic respiratory failure of unclear etiology on home O2, presenting to ED from HD center after having difficult access permacath. There are reports from pt's  that the patient cut the end of her permacath port distal to the clamps with scissors. This was unwitnessed and it is unclear why and how the patient did this. Because of the [place] holiday hours, patient is not able to get HD tomorrow, so she was admitted for HD today. On interview, patient does not recall anything that happened. When asked if she cutt off her permacath tips she says "i'm not sure, I might have but I can't remember". At this time she is reporting no symptoms. She feels comfortable.   On arrival to ED, VSS. IR consulted and exchanged permacath. Admitted to medicine for further workup.  Hospital Course: 83F with HTN, HLD, CAD, T2DM, ESRD on HD MWF (via RIJ permacath, has RUE maturing AVF), HFpEF dementia, remote hx of breast ca s/p mastectomy, chronic hypoxemic respiratory failure of unclear etiology on home O2, pw dialysis permacath malfunction, now s/p IR permacath exchange on 12/24. HD 12/24 on Sunday as HD center closed for Beth, resume HD afterwards at normal schedule Patient is medically cleared and stable for discharge home with home PT, discussed with .  Important Medication Changes and Reason: no changes Active or Pending Issues Requiring Follow-up: nephro follow up  Advanced Directives:  [ x] Full code  [ ] DNR  [ ] Hospice Discharge Diagnoses: permacath exchange 84 y/o female seen today for TCM initial visit,  Yves and son Yves Peters. present for visit. Pt is awake, alert oriented to self and location. She appears to be in no acute distress and has no complaints. She is pleasant and cooperative, has dementia and responds with "I don't know or I don't remember"  provides majority of information. Pt takes her medication as prescribed, provided by , he also assist her with care including bathing and dressing. She is able to keep her MD appts and go to dialysis M [facility] F at [facility]. She was seen by PCP Sweta on 12/22/23, no changes to POC. Has appointment with CARD on 1/16/24 and renal follow up next week. [facility] SOC 12/16/23 and patient had SW visit. TCM explained to  and son and yellow card left in the home.

## 2023-12-26 NOTE — PHYSICAL EXAM
[No Acute Distress] : no acute distress [Well Nourished] : well nourished [Well Developed] : well developed [No Respiratory Distress] : no respiratory distress  [Clear to Auscultation] : lungs were clear to auscultation bilaterally [No Accessory Muscle Use] : no accessory muscle use [Normal Rate] : normal rate  [Regular Rhythm] : with a regular rhythm [No Extremity Clubbing/Cyanosis] : no extremity clubbing/cyanosis [No CVA Tenderness] : no CVA  tenderness [No Spinal Tenderness] : no spinal tenderness [Normal Mood] : the mood was normal [de-identified] : BLE edema with right greater than left, RAVF immature with + bruit, no s/s of infection. permacath to right sc area, dressing intact  [de-identified] : left mastectomy [de-identified] : dry skin to lower extremities, shins [de-identified] : confused memory loss

## 2023-12-28 ENCOUNTER — TRANSCRIPTION ENCOUNTER (OUTPATIENT)
Age: 83
End: 2023-12-28

## 2024-01-11 ENCOUNTER — APPOINTMENT (OUTPATIENT)
Dept: HEART FAILURE | Facility: CLINIC | Age: 84
End: 2024-01-11

## 2024-01-11 ENCOUNTER — APPOINTMENT (OUTPATIENT)
Dept: INTERNAL MEDICINE | Facility: CLINIC | Age: 84
End: 2024-01-11

## 2024-01-11 NOTE — REASON FOR VISIT
[FreeTextEntry1] : Patient STEFANIE KEENAN MRN [id number] Hospital Visit [id number] [facility] - Attending Physician Marizol Mackey  Status Complete      Hospital Course:  Discharge Date 25-Dec-2023  Admission Date 24-Dec-2023 16:34  Reason for Admission difficulty access permacath for HD  Hospital Course   HPI:  83F with HTN, HLD, CAD, T2DM, ESRD on HD MWF (via RIJ permacath, has RUE  maturing AVF), dementia, remote hx of breast ca s/p mastectomy, chronic  hypoxemic respiratory failure of unclear etiology on home O2, presenting to ED  from HD center after having difficult access permacath. There are reports from  pt's  that the patient cut the end of her permacath port distal to the  clamps with scissors. This was unwitnessed and it is unclear why and how the  patient did this. Because of the Christmas holiday hours, patient is not able  to get HD tomorrow, so she was admitted for HD today.    On interview, patient does not recall anything that happened. When asked if she  cutt off her permacath tips she says "i'm not sure, I might have but I can't  remember". At this time she is reporting no symptoms. She feels comfortable.    On arrival to ED, VSS. IR consulted and exchanged permacath. Admitted to  medicine for further workup.         (24 Dec 2023 19:53)    Hospital Course:  83F with HTN, HLD, CAD, T2DM, ESRD on HD MWF (via RIJ permacath, has RUE  maturing AVF), HFpEF dementia, remote hx of breast ca s/p mastectomy, chronic  hypoxemic respiratory failure of unclear etiology on home O2, pw dialysis  permacath malfunction, now s/p IR permacath exchange on 12/24. HD 12/24 on  Sunday as HD center closed for Christmas, resume HD afterwards at normal  schedule  Patient is medically cleared and stable for discharge home with home PT,  discussed with .    Important Medication Changes and Reason:  no changes    Active or Pending Issues Requiring Follow-up:  nephro follow up    Advanced Directives:  [ x] Full code  [ ] DNR  [ ] Hospice    Discharge Diagnoses:  permacath exchange        Med Reconciliation:  Override IMPROVE-DD recommendations due to: IMPROVE-DD Application Not  Available  Recommended Post-Discharge VTE Prophylaxis IMPROVE-DD Application Not Available  Medication Reconciliation Status Admission Reconciliation is Completed  Discharge Reconciliation is Completed  Discharge Medications albuterol 90 mcg/inh inhalation aerosol: 2 puff(s)  inhaled every 6 hours  amLODIPine 10 mg oral tablet: 1 tab(s) orally once a day  budesonide-formoterol 160 mcg-4.5 mcg/inh inhalation aerosol: 1 spray(s)  inhaled 2 times a day  bumetanide 2 mg oral tablet: 1 tab(s) orally Tuesday, Thursday, Saturday,  Sunday Take once a day on non-dialysis days  epoetin sheryl: 1 injectable 3 times a week give at dialysis, MWF  ferrous sulfate 325 mg (65 mg elemental iron) oral tablet: 1 tab(s) orally once  a day  fluticasone 50 mcg/inh nasal spray: 1 spray(s) nasal 2 times a day  hydrALAZINE 25 mg oral tablet: 1 tab(s) orally 3 times a day  Onglyza 2.5 mg oral tablet: 1 tab(s) orally once a day On HD (hemodialysis)  days, give after HD (hemodialysis)  pioglitazone 30 mg oral tablet: 1 tab(s) orally once a day  Rolling Walker: 1 Rolling walker  rosuvastatin 20 mg oral tablet: 1 tab(s) orally once a day (at bedtime)  Wheelchair: 1 Wheelchair  ,  ,  Care Plan/Procedures:  Discharge Diagnoses, Assessment and Plan of Treatment PRINCIPAL DISCHARGE  DIAGNOSIS  Diagnosis: Hemodialysis catheter malfunction  Assessment and Plan of Treatment: You presented with dialysis permacath  malfunction, now s/p IR permacath exchange on 12/24. HD 12/24 on Sunday as HD  center closed for Christmas, resume HD afterwards at normal schedule  Goal(s) To get better and follow your care plan as instructed.  Follow Up:  Care Providers for Follow up (PCP/Outpatient Provider) Dario Sol  Internal Medicine  [address] [place] [address]  Phone: [phone]  Fax: [phone]  Follow Up Time: 1 week    Callie Dash  Nephrology  [address] [place] [address], [address]  [place] [address]  Phone: [phone]  Fax: [phone]  Follow Up Time: 1 week  Additional Provider Info (For SysAdmin Use Only)   PROVIDER:[TOKEN:[[id number]],FOLLOWUP:[1  week]],PROVIDER:[TOKEN:[[id number]],FOLLOWUP:[1 week]]  Care Providers Direct Addresses (For SYSAdmin Use Only)   ,DirectAddress_Unknown,[email or web address]  NPI number (For SysAdmin Use Only) : [[id number]]  Patient's Scheduled Appointments Arnold Medina  [facility]  HEARTFAIL [address]  Scheduled Appointment: 12/28/2023    [facility]  INTMED  [facility]  Scheduled Appointment: 01/11/2024    Leida Luis  [facility]  CARDIOLOGY [address]  Scheduled Appointment: 01/16/2024  Discharge Diet Low Sodium Diet, Consistent Carbohydrate Diabetic Diets, Renal  Diets (for dialysis)  Activity No heavy lifting/straining  Quality Measures:  Patient Condition Stable  Hospice Patient No  Core Measure Site No  Does the patient have a principal diagnosis of ischemic stroke, hemorrhagic  stroke, or TIA? No  Does the patient have a principal diagnosis of Acute Myocardial Infarction?              No  Has the patient had a Percutaneous Coronary Intervention? No  Did the Patient Present With or was Treated for Malnutrition During This  Admission No  Home Health:  Discharged with Home Health Care Services? Yes  Face-To-Face Contact As certified below, I, or a nurse practitioner or  physician assistant working with me, had a face-to-face encounter that meets  the physician face-to-face encounter requirements.  Need for Skilled Services Rehabilitation services  Teaching and training  Based on the above findings, the following intermittent skilled services are  medically necessary home health services: Nursing  Physical therapy  Home Bound Status Ataxic gait  Patient Needs Assistance to Leave Residence...  Attending Certification My signature below certifies that the above stated  patient is homebound and upon completion of the Face-To-Face encounter, has the  need for intermittent skilled nursing, physical therapy and/or speech or  occupational therapy services in their home for their current diagnosis as  outlined in their initial plan of care. These services will continue to be  monitored by myself or another physician.  Encounter Date 25-Dec-2023  Document Complete:  Care Provider Seen in [facility]  Physician Section Complete This document is complete and the patient is ready  for discharge.  For questions about your prescriptions, please call: [phone]  Is this contact telephone number correct? Yes  Attending Attestation Statement I have personally seen and examined the  patient. I have collaborated with and supervised the  . on the discharge service for the patient. I have reviewed and made amendments  to the documentation where necessary.  Attending Discharge Physical Examination: Pt seen and examined - doing well, no  acute complaints, no SOB or cough, no abd pain, tolerated HD well last night    PHYSICAL EXAM:  GENERAL: NAD, well-developed  HEAD:  Atraumatic, normocephalic  EYES: EOMI, conjunctiva and sclera clear  NECK: Supple, no JVD  CHEST/LUNG: Clear to auscultation bilaterally; no wheezing or rales; RUE perm  cath with mild tenderness at site  HEART: Regular rate and rhythm; no murmurs, no LE edema  ABDOMEN: Soft, nontender, nondistended; bowel sounds present  EXTREMITIES:  2+ Peripheral Pulses, no clubbing, cyanosis  PSYCH: AAOx3, calm affect, not anxious  SKIN: No rashes or lesions    Pt stable for discharge home  completed HD session after permcath replacement yesterday evening  Next HD scheduled for 12/27  d/w renal, ok for d/c home  Time Spent: I personally spent  ? 38

## 2024-01-11 NOTE — HISTORY OF PRESENT ILLNESS
[FreeTextEntry1] : 83F with HTN, HLD, CAD, T2DM, ESRD on HD MWF (via RIJ permacath, has RUE  maturing AVF), dementia, remote hx of breast ca s/p mastectomy, chronic  hypoxemic respiratory failure of unclear etiology on home O2, presenting to ED  from HD center after having difficult access permacath. There are reports from  pt's  that the patient cut the end of her permacath port distal to the  clamps with scissors. This was unwitnessed and it is unclear why and how the  patient did this. Because of the Christmas holiday hours, patient is not able  to get HD tomorrow, so she was admitted for HD today.    On interview, patient does not recall anything that happened. When asked if she  cutt off her permacath tips she says "i'm not sure, I might have but I can't  remember". At this time she is reporting no symptoms. She feels comfortable.    On arrival to ED, VSS. IR consulted and exchanged permacath. Admitted to

## 2024-01-12 ENCOUNTER — APPOINTMENT (OUTPATIENT)
Dept: CARDIOLOGY | Facility: CLINIC | Age: 84
End: 2024-01-12

## 2024-01-16 ENCOUNTER — APPOINTMENT (OUTPATIENT)
Dept: CARDIOLOGY | Facility: CLINIC | Age: 84
End: 2024-01-16

## 2024-01-16 DIAGNOSIS — J96.11 CHRONIC RESPIRATORY FAILURE WITH HYPOXIA: ICD-10-CM

## 2024-01-16 DIAGNOSIS — I77.0 ARTERIOVENOUS FISTULA, ACQUIRED: ICD-10-CM

## 2024-01-16 DIAGNOSIS — Z87.898 PERSONAL HISTORY OF OTHER SPECIFIED CONDITIONS: ICD-10-CM

## 2024-01-16 DIAGNOSIS — Z99.2 DEPENDENCE ON RENAL DIALYSIS: ICD-10-CM

## 2024-01-16 DIAGNOSIS — Z99.81 CHRONIC RESPIRATORY FAILURE WITH HYPOXIA: ICD-10-CM

## 2024-01-16 DIAGNOSIS — T82.41XA: ICD-10-CM

## 2024-01-16 DIAGNOSIS — I25.10 ATHEROSCLEROTIC HEART DISEASE OF NATIVE CORONARY ARTERY W/OUT ANGINA PECTORIS: ICD-10-CM

## 2024-01-16 DIAGNOSIS — F03.90 UNSPECIFIED DEMENTIA W/OUT BEHAVIORAL DISTURBANCE: ICD-10-CM

## 2024-01-19 RX ADMIN — FENTANYL CITRATE 25 MICROGRAM(S): 50 INJECTION INTRAVENOUS at 21:39

## 2024-01-22 ENCOUNTER — APPOINTMENT (OUTPATIENT)
Dept: VASCULAR SURGERY | Facility: CLINIC | Age: 84
End: 2024-01-22
Payer: MEDICARE

## 2024-01-22 VITALS
HEIGHT: 64 IN | DIASTOLIC BLOOD PRESSURE: 76 MMHG | SYSTOLIC BLOOD PRESSURE: 153 MMHG | WEIGHT: 160 LBS | HEART RATE: 85 BPM | TEMPERATURE: 97.6 F | BODY MASS INDEX: 27.31 KG/M2

## 2024-01-22 PROCEDURE — 99024 POSTOP FOLLOW-UP VISIT: CPT

## 2024-01-22 PROCEDURE — 93990 DOPPLER FLOW TESTING: CPT

## 2024-01-22 NOTE — ASSESSMENT
[FreeTextEntry1] : In summary, Mrs. Cr presents s/p stage 1 right BVT. A fistula duplex showed patent fistula with vein diameter >6mm except at proximal vein, with good volume flow. We will schedule her for second stage or surgery.

## 2024-01-22 NOTE — HISTORY OF PRESENT ILLNESS
[FreeTextEntry1] : I just had the pleasure of following up with Mrs. Cr s/p stage 1 right BVT, performed on 12/19/23. Her incision is healing well and she denies any erythema, induration, fluctuance or drainage. She denies any skin changes or sensorimotor deficits in the right upper extremity. She is being dialyzed via right chest catheter (Mon/Wed/Fri). She present for surveillance duplex.

## 2024-01-22 NOTE — PHYSICAL EXAM
[Normal Breath Sounds] : Normal breath sounds [Normal Heart Sounds] : normal heart sounds [2+] : right 2+ [de-identified] : NAD [FreeTextEntry1] : RUE incision is clean/dry/intact. Palpable thrill. Palpable brachial and radial pulse.

## 2024-01-24 ENCOUNTER — APPOINTMENT (OUTPATIENT)
Dept: CARDIOLOGY | Facility: CLINIC | Age: 84
End: 2024-01-24
Payer: MEDICARE

## 2024-01-24 VITALS
OXYGEN SATURATION: 95 % | SYSTOLIC BLOOD PRESSURE: 113 MMHG | HEIGHT: 64 IN | BODY MASS INDEX: 27.31 KG/M2 | DIASTOLIC BLOOD PRESSURE: 63 MMHG | WEIGHT: 160 LBS | HEART RATE: 83 BPM

## 2024-01-24 DIAGNOSIS — E78.5 HYPERLIPIDEMIA, UNSPECIFIED: ICD-10-CM

## 2024-01-24 DIAGNOSIS — I10 ESSENTIAL (PRIMARY) HYPERTENSION: ICD-10-CM

## 2024-01-24 PROCEDURE — 93000 ELECTROCARDIOGRAM COMPLETE: CPT

## 2024-01-24 PROCEDURE — 99205 OFFICE O/P NEW HI 60 MIN: CPT

## 2024-01-24 RX ORDER — LINAGLIPTIN 5 MG/1
5 TABLET, FILM COATED ORAL
Qty: 90 | Refills: 0 | Status: ACTIVE | COMMUNITY
Start: 2024-01-17

## 2024-01-25 ENCOUNTER — OUTPATIENT (OUTPATIENT)
Dept: OUTPATIENT SERVICES | Facility: HOSPITAL | Age: 84
LOS: 1 days | End: 2024-01-25

## 2024-01-25 VITALS
HEIGHT: 59.5 IN | DIASTOLIC BLOOD PRESSURE: 61 MMHG | TEMPERATURE: 98 F | OXYGEN SATURATION: 95 % | SYSTOLIC BLOOD PRESSURE: 141 MMHG | HEART RATE: 78 BPM | WEIGHT: 160.06 LBS | RESPIRATION RATE: 16 BRPM

## 2024-01-25 DIAGNOSIS — Z95.828 PRESENCE OF OTHER VASCULAR IMPLANTS AND GRAFTS: Chronic | ICD-10-CM

## 2024-01-25 DIAGNOSIS — N18.6 END STAGE RENAL DISEASE: ICD-10-CM

## 2024-01-25 DIAGNOSIS — I10 ESSENTIAL (PRIMARY) HYPERTENSION: ICD-10-CM

## 2024-01-25 DIAGNOSIS — Z99.2 DEPENDENCE ON RENAL DIALYSIS: Chronic | ICD-10-CM

## 2024-01-25 DIAGNOSIS — Z90.10 ACQUIRED ABSENCE OF UNSPECIFIED BREAST AND NIPPLE: Chronic | ICD-10-CM

## 2024-01-25 DIAGNOSIS — I50.30 UNSPECIFIED DIASTOLIC (CONGESTIVE) HEART FAILURE: ICD-10-CM

## 2024-01-25 DIAGNOSIS — J44.89 OTHER SPECIFIED CHRONIC OBSTRUCTIVE PULMONARY DISEASE: ICD-10-CM

## 2024-01-25 DIAGNOSIS — E11.9 TYPE 2 DIABETES MELLITUS WITHOUT COMPLICATIONS: ICD-10-CM

## 2024-01-25 DIAGNOSIS — Z90.89 ACQUIRED ABSENCE OF OTHER ORGANS: Chronic | ICD-10-CM

## 2024-01-25 LAB
ANION GAP SERPL CALC-SCNC: 12 MMOL/L — SIGNIFICANT CHANGE UP (ref 7–14)
BUN SERPL-MCNC: 13 MG/DL — SIGNIFICANT CHANGE UP (ref 7–23)
CALCIUM SERPL-MCNC: 9.1 MG/DL — SIGNIFICANT CHANGE UP (ref 8.4–10.5)
CHLORIDE SERPL-SCNC: 98 MMOL/L — SIGNIFICANT CHANGE UP (ref 98–107)
CO2 SERPL-SCNC: 29 MMOL/L — SIGNIFICANT CHANGE UP (ref 22–31)
CREAT SERPL-MCNC: 3.11 MG/DL — HIGH (ref 0.5–1.3)
EGFR: 14 ML/MIN/1.73M2 — LOW
GLUCOSE SERPL-MCNC: 109 MG/DL — HIGH (ref 70–99)
HCT VFR BLD CALC: 30 % — LOW (ref 34.5–45)
HGB BLD-MCNC: 9.2 G/DL — LOW (ref 11.5–15.5)
MCHC RBC-ENTMCNC: 27.8 PG — SIGNIFICANT CHANGE UP (ref 27–34)
MCHC RBC-ENTMCNC: 30.7 GM/DL — LOW (ref 32–36)
MCV RBC AUTO: 90.6 FL — SIGNIFICANT CHANGE UP (ref 80–100)
NRBC # BLD: 0 /100 WBCS — SIGNIFICANT CHANGE UP (ref 0–0)
NRBC # FLD: 0 K/UL — SIGNIFICANT CHANGE UP (ref 0–0)
PLATELET # BLD AUTO: 265 K/UL — SIGNIFICANT CHANGE UP (ref 150–400)
POTASSIUM SERPL-MCNC: 3.4 MMOL/L — LOW (ref 3.5–5.3)
POTASSIUM SERPL-SCNC: 3.4 MMOL/L — LOW (ref 3.5–5.3)
RBC # BLD: 3.31 M/UL — LOW (ref 3.8–5.2)
RBC # FLD: 14.8 % — HIGH (ref 10.3–14.5)
SODIUM SERPL-SCNC: 139 MMOL/L — SIGNIFICANT CHANGE UP (ref 135–145)
WBC # BLD: 6.78 K/UL — SIGNIFICANT CHANGE UP (ref 3.8–10.5)
WBC # FLD AUTO: 6.78 K/UL — SIGNIFICANT CHANGE UP (ref 3.8–10.5)

## 2024-01-25 RX ORDER — SAXAGLIPTIN 5 MG/1
1 TABLET, FILM COATED ORAL
Refills: 0 | DISCHARGE

## 2024-01-25 RX ORDER — PIOGLITAZONE HYDROCHLORIDE 15 MG/1
1 TABLET ORAL
Refills: 0 | DISCHARGE

## 2024-01-25 RX ORDER — SODIUM CHLORIDE 9 MG/ML
1000 INJECTION INTRAMUSCULAR; INTRAVENOUS; SUBCUTANEOUS
Refills: 0 | Status: DISCONTINUED | OUTPATIENT
Start: 2024-01-31 | End: 2024-02-01

## 2024-01-25 RX ORDER — SODIUM CHLORIDE 9 MG/ML
3 INJECTION INTRAMUSCULAR; INTRAVENOUS; SUBCUTANEOUS EVERY 8 HOURS
Refills: 0 | Status: DISCONTINUED | OUTPATIENT
Start: 2024-01-31 | End: 2024-02-01

## 2024-01-25 RX ORDER — MONTELUKAST 4 MG/1
1 TABLET, CHEWABLE ORAL
Refills: 0 | DISCHARGE

## 2024-01-25 RX ORDER — DAPAGLIFLOZIN 10 MG/1
1 TABLET, FILM COATED ORAL
Refills: 0 | DISCHARGE

## 2024-01-25 RX ORDER — AMLODIPINE BESYLATE 2.5 MG/1
1 TABLET ORAL
Refills: 0 | DISCHARGE

## 2024-01-25 NOTE — H&P PST ADULT - GAIT/BALANCE
pt walks slowly with one person assistance pt walks slowly with one person assistance. uses wheelchair for long distances

## 2024-01-25 NOTE — H&P PST ADULT - NEGATIVE ENMT SYMPTOMS
no ear pain/no tinnitus/no vertigo/no sinus symptoms/no nasal congestion/no nasal discharge/no nasal obstruction/no post-nasal discharge/no gum bleeding/no dry mouth/no throat pain/no dysphagia

## 2024-01-25 NOTE — H&P PST ADULT - NEGATIVE GENERAL SYMPTOMS
no fever/no chills/no sweating/no weight loss/no weight gain/no polyphagia/no polyuria/no polydipsia/no fatigue

## 2024-01-25 NOTE — H&P PST ADULT - RESPIRATORY AND THORAX COMMENTS
+ asthma/ COPD. On inhalers daily.  denies recent exacerbation. As per , pt not on home O2 and her sats 93-97% on RA. + asthma/ COPD. On inhalers daily.  denies recent exacerbation. As per , pt not use home O2 and her sats are 93-97% on RA.

## 2024-01-25 NOTE — H&P PST ADULT - ASSESSMENT
82 y/o female with ESRD on HD M-W-F presents to PST preop for right stage 2 basilic vein transposition. Pt s/p stage 1 basilic vein transposition on 12/19/23. Pt presents preop for 2nd stage procedure.     *Pt was admitted in November 2023 for increased SOB and swelling to bilateral LE. She was subsequently diagnosed with CAL on CKD and started on hemodialysis during her hospital stay.      **Pt presented to the ED on 12/24/23 after cutting the end of her permacath. Her port was unable to be assessed. New Permacath placed and pt discharged the next day. 84 y/o female with ESRD on HD M-W-F presents to PST preop for right stage 2 basilic vein transposition. Pt s/p stage 1 basilic vein transposition on 12/19/23. Pt presents preop for 2nd stage procedure.     *Pt was admitted in November 2023 for increased SOB and swelling to bilateral LE. She was subsequently diagnosed with CAL on CKD and started on hemodialysis during her hospital stay.  She was also admitted for  acute decompensated HF.     **Pt presented to the ED on 12/24/23 after cutting the end of her permacath. Her port was unable to be assessed. New Permacath placed and pt discharged the next day.

## 2024-01-25 NOTE — H&P PST ADULT - HISTORY OF PRESENT ILLNESS
82 y/o female with ESRD on HD M-W-F presents to PST preop for right stage 2 basilic vein transposition. Pt s/p stage 1 basilic vein transposition on 12/19/23. Pt presents preop for 2nd stage procedure.     * Pt was admitted in November 2023 for increased SOB and swelling to bilateral LE. She was subsequently diagnosed with CAL on CKD and started on hemodialysis during her hospital stay.      **Pt presented to the ED on 12/24/23 after cutting the end of her permacath. Her port was unable to be assessed. New Permacath placed and pt discharged the next day. 84 y/o female with ESRD on HD M-W-F presents to PST preop for right stage 2 basilic vein transposition. Pt s/p stage 1 basilic vein transposition on 12/19/23. Pt presents preop for 2nd stage procedure.     *Pt was admitted in November 2023 for increased SOB and swelling to bilateral LE. She was subsequently diagnosed with CAL on CKD and started on hemodialysis during her hospital stay.      **Pt presented to the ED on 12/24/23 after cutting the end of her permacath. Her port was unable to be assessed. New Permacath placed and pt discharged the next day. 84 y/o female with ESRD on HD M-W-F presents to PST preop for right stage 2 basilic vein transposition. Pt s/p stage 1 basilic vein transposition on 12/19/23. Pt presents preop for 2nd stage procedure.     *Pt was admitted in November 2023 for increased SOB and swelling to bilateral LE. She was subsequently diagnosed with CAL on CKD and started on hemodialysis during her hospital stay.  She was also admitted for  acute decompensated HF.     **Pt presented to the ED on 12/24/23 after cutting the end of her permacath. Her port was unable to be assessed. New Permacath placed and pt discharged the next day.

## 2024-01-25 NOTE — H&P PST ADULT - FUNCTIONAL STATUS
Activity: walks around in home, moderate assist with dressing, can wash distances   Symptoms: None   Mets: 3.29 using DASI calculator Activity: walks around in home, moderate assist with dressing, can wash dishes   Symptoms: None   Mets: 3.29 using DASI calculator Activity: walks around short distances in home, moderate assist with dressing, can wash dishes   Symptoms: None   Mets: 3.29 using DASI calculator

## 2024-01-25 NOTE — H&P PST ADULT - MUSCULOSKELETAL
details… ROM intact/no joint warmth/no calf tenderness/strength 5/5 bilateral upper extremities/no chest wall tenderness/back exam

## 2024-01-25 NOTE — H&P PST ADULT - NSICDXPASTSURGICALHX_GEN_ALL_CORE_FT
PAST SURGICAL HISTORY:  History of unilateral modified radical mastectomy     Patent hemodialysis access site     S/P Breast Lumpectomy     S/P dialysis catheter insertion

## 2024-01-25 NOTE — H&P PST ADULT - SKIN/BREAST COMMENTS
On 6/30/2021, Gela LEMONS, CT scribed the services personally performed by Dr. Marilin Persaud D.C.    Provider wore level 3 procedure mask during entirety of session due to COVID-19 precautions. Patient was compliant with mask usage.      Date of injury: 06/14/2021  Initial treatment date: 06/28/2021  Previous Treatment Date: 06/29/2021-Dr. Khoury   Today's Treatment date:06/30/21     Chief Complaint   Patient presents with   • Back Pain   • Office Visit       Visit count (for above DOI): 3  Visit count for year: 3  Relevant insurance information (i.e. visits allowed per year): 26 visits/year  Date of signed consent: 06/28/2021  DISABILITY INDEX AND DATE:06/28/2021 Oswestry (50%)  RELEVANT IMAGING:None      SUBJECTIVE:  Maria Fernanda Hill returns for continued chiropractic care for lumbar pain .  Patient states:Pain is not radiating into her left leg for the past two days. Lower lumbar going across is sore, constant, varies in intensity. Sitting to long, walking sometimes, but walking sometimes also loosens it up, can increase her pain. She has been icing, yesterday and has not been able to do today yet. Sleeping has been hard, hard to shift and move at night time. Maria Fernanda has been taking ibuprofen in the mornings when needed.       The patient denies any adverse events following last treatment or any other new/changing symptoms.    Patient Rates Current Pain Level (within last 24 hours) at 4 /10, with 10 being the worst.    OBJECTIVE FINDINGS:    (Thoracic spine) Thoracic spine facet joint function is within normal limits except for T6/T7 that exhibited limited passive range of motion and segmental restriction and tenderness upon palpation; The following muscles were examined for normal flexibility and tone; bilateral thoracolumbar paraspinals. These muscles were within normal limits except for bilateral thoracolumbar paraspinals that exhibited limited flexibility and abnormal resting tone.    (Lumbar and  Pelvic spine) Lumbar spine facet joint function is within normal limits except for L4/L5 and L5/S1 that exhibited limited passive range of motion and segmental restriction and tenderness on palpation; sacroiliac joint function is within normal limits except for left sacroiliac joint that exhibited limited passive range of motion and joint restriction; The following muscles were examined for flexibility and tone at rest; bilateral lumbosacral paraspinals and hip muscles.  These muscles were found to be within normal limits except for right lumbar paraspinals, left lumbar paraspinals and left quadratus lumborum that exhibited limited flexibility and were hypertonic at rest.    ASSESSMENT:   1. Discogenic low back pain    2. Sacroiliac joint somatic dysfunction    3. Lumbar region somatic dysfunction    4. Segmental dysfunction of thoracic region          ACTION:  Following reassessment of joint function and soft tissue tonicity, Maria Fernanda was treated with right lateral flexion, left lateral flexion and lumbar axial distraction  manipulation utilizing Mercado Protocol 2 to stretch lumbar spine paraspinal muscles, to increase intersegmental joint function, and to decrease intradiscal pressure and neural tension of her lumbar spine.  Maria Fernanda was also treated with Pin and Stretch to the above listed muscles noted as taut in objective findings to increase circulation, improve flexibility and decrease strain to associated spinal structures.    Noriega drop with wedges spinal mobilization was applied to the Left Sacroiliac Joint .  Activator spinal mobilization was applied to the lumbar (L4-L5) spine.  Diversified spinal mobilization was applied to the thoracic spine.    Doctor applied K-taping to patient's lower lumbar region.       PLAN:  It was recommended that Maria Fernanda Hill continue performance of the prescribed home exercise program. Ice as needed for any residual soreness post-treatment and call our office with any  problems, questions, or worsening of symptoms.       Goals of Care: Goal of care is to improve muscular and skeletal function per Disability Index and provide symptom relief.      Follow up with Dr. Khoury tomorrow     Keep icing and make sure to keep doing exercises.     The documentation recorded by FIGUEROA Ray accurately and completely reflects the service(s), I personally performed and the decisions made by me, Marilin Persaud D.C.           h/o left breast CA s/p mastectomy

## 2024-01-25 NOTE — H&P PST ADULT - PROBLEM SELECTOR PLAN 4
Pt will use Budesonide inhaler and Albuterol rescue inhaler as needed AM of surgery pt will hold Pioglitazone and Tradjenta AM of surgery   accu check to be assessed on admission

## 2024-01-25 NOTE — H&P PST ADULT - PROBLEM SELECTOR PLAN 3
pt will hold Pioglitazone and Tradjenta AM of surgery   accu check to be assessed on admission pt will take blood pressure meds Amlodipine and Hydralazine AM of surgery as prescribed

## 2024-01-25 NOTE — H&P PST ADULT - PROBLEM SELECTOR PLAN 2
pt will take blood pressure meds Amlodipine and Hydralazine AM of surgery as prescribed Last cardiac visit note requested.

## 2024-01-25 NOTE — H&P PST ADULT - OTHER CARE PROVIDERS
Cardiologist Dr. Arvin Mccarthy           Nephrologist Dr. Dash                    Pulmonologist Dr. Paulino Gayle Cardiologist Dr. Arvin Mccarthy          Nephrologist Dr. Dash                Pulmonologist Dr. Paulino Gayle Cardiologist Dr. Arvin Mccarthy         Nephrologist Dr. Dash             Pulmonologist Dr. Paulino Gayle

## 2024-01-25 NOTE — H&P PST ADULT - PROBLEM SELECTOR PLAN 1
preop for right stage 2 basilic vein transposition on 1/31/24  preop instructions given, pt verbalized understanding  cbc, bmp pending  Cardiac evaluation requested- 84 y/o female recently admitted for acute decompensated heart failure with a METS <4. preop for right stage 2 basilic vein transposition on 1/31/24  preop instructions given, pt verbalized understanding  cbc, bmp pending  hga1c from 11/22/23 6.1%  Cardiac evaluation requested- 82 y/o female recently admitted for acute decompensated heart failure with a METS <4. preop for right stage 2 basilic vein transposition on 1/31/24  preop instructions given, pt verbalized understanding  cbc, bmp pending  hga1c from 11/22/23 6.1%  Last cardiac visit note requested preop for right stage 2 basilic vein transposition on 1/31/24  preop instructions given, pt verbalized understanding  cbc, bmp pending  hga1c from 11/22/23 6.1%

## 2024-01-25 NOTE — H&P PST ADULT - NSICDXPASTMEDICALHX_GEN_ALL_CORE_FT
PAST MEDICAL HISTORY:  (HFpEF) heart failure with preserved ejection fraction     Asthma     AVF (arteriovenous fistula)     CA - Breast Cancer 1996 s/p lumpectomy, chemo, and radiation    CAD (Coronary Artery Disease)     Chronic hypoxemic respiratory failure     COPD (chronic obstructive pulmonary disease)     Dyslipidemia     ESRD (end stage renal disease)     H/O: Obesity     H/O: Osteoarthritis     HTN (Hypertension)     Iron deficiency anemia     Senile dementia     Type 2 diabetes mellitus

## 2024-01-30 ENCOUNTER — TRANSCRIPTION ENCOUNTER (OUTPATIENT)
Age: 84
End: 2024-01-30

## 2024-01-30 PROBLEM — I10 HTN (HYPERTENSION): Status: ACTIVE | Noted: 2023-12-22

## 2024-01-30 PROBLEM — E78.5 HYPERLIPIDEMIA, UNSPECIFIED HYPERLIPIDEMIA TYPE: Status: ACTIVE | Noted: 2023-12-22

## 2024-01-30 NOTE — ASU PATIENT PROFILE, ADULT - BRADEN SCORE (IF 18 OR LESS ACTIVATE SKIN INJURY RISK INCREASED GUIDELINE), MLM
Per patient request. Blood pressure cuff switched to right wrist.  B/P 170/103.  Awaiting MD Pires to speak with MD Pritchett. 23

## 2024-01-30 NOTE — DISCUSSION/SUMMARY
[FreeTextEntry1] : HTN- controlled  HLD- stable on statin  CAD- no sx and normal LV function on ECHO. Pt is at low risk for cardiac complications of noncardiac surgery  Followup in 2-3 mths  Time spent reviewing history, MD notes, hospital records, exam, EKG, pt discussion and note writing [EKG obtained to assist in diagnosis and management of assessed problem(s)] : EKG obtained to assist in diagnosis and management of assessed problem(s)

## 2024-01-30 NOTE — HISTORY OF PRESENT ILLNESS
[FreeTextEntry1] : 83 year old female with h/o DM, HTN, HLD and renal failure for preop clearance.  Pt denies CP, SOB or H/A and recent ECHO with nornmal LV function.

## 2024-01-31 ENCOUNTER — APPOINTMENT (OUTPATIENT)
Dept: VASCULAR SURGERY | Facility: HOSPITAL | Age: 84
End: 2024-01-31

## 2024-01-31 ENCOUNTER — INPATIENT (INPATIENT)
Facility: HOSPITAL | Age: 84
LOS: 0 days | Discharge: HOME | End: 2024-02-01
Attending: SURGERY | Admitting: SURGERY

## 2024-01-31 VITALS
RESPIRATION RATE: 16 BRPM | TEMPERATURE: 99 F | HEART RATE: 82 BPM | SYSTOLIC BLOOD PRESSURE: 114 MMHG | DIASTOLIC BLOOD PRESSURE: 67 MMHG | WEIGHT: 160.06 LBS | HEIGHT: 59.5 IN | OXYGEN SATURATION: 100 %

## 2024-01-31 DIAGNOSIS — N18.6 END STAGE RENAL DISEASE: ICD-10-CM

## 2024-01-31 DIAGNOSIS — Z99.2 DEPENDENCE ON RENAL DIALYSIS: Chronic | ICD-10-CM

## 2024-01-31 DIAGNOSIS — Z90.10 ACQUIRED ABSENCE OF UNSPECIFIED BREAST AND NIPPLE: Chronic | ICD-10-CM

## 2024-01-31 DIAGNOSIS — Z95.828 PRESENCE OF OTHER VASCULAR IMPLANTS AND GRAFTS: Chronic | ICD-10-CM

## 2024-01-31 LAB
GLUCOSE BLDC GLUCOMTR-MCNC: 101 MG/DL — HIGH (ref 70–99)
GLUCOSE BLDV-MCNC: 124 MG/DL — HIGH (ref 70–99)
POTASSIUM BLDV-SCNC: 3.4 MMOL/L — LOW (ref 3.5–5.1)
RH IG SCN BLD-IMP: POSITIVE — SIGNIFICANT CHANGE UP

## 2024-01-31 DEVICE — CLIP APPLIER COVIDIEN SURGICLIP 11.5" MEDIUM: Type: IMPLANTABLE DEVICE | Site: RIGHT | Status: FUNCTIONAL

## 2024-01-31 DEVICE — CLIP APPLIER COVIDIEN SURGICLIP III 9" SM: Type: IMPLANTABLE DEVICE | Site: RIGHT | Status: FUNCTIONAL

## 2024-01-31 DEVICE — ARISTA 3GR: Type: IMPLANTABLE DEVICE | Site: RIGHT | Status: FUNCTIONAL

## 2024-01-31 DEVICE — SURGIFOAM PAD 8CM X 12.5CM X 2MM (100C): Type: IMPLANTABLE DEVICE | Site: RIGHT | Status: FUNCTIONAL

## 2024-01-31 DEVICE — SURGICEL POWDER 3 GRAMS: Type: IMPLANTABLE DEVICE | Site: RIGHT | Status: FUNCTIONAL

## 2024-01-31 RX ORDER — OXYCODONE HYDROCHLORIDE 5 MG/1
1 TABLET ORAL
Qty: 16 | Refills: 0
Start: 2024-01-31 | End: 2024-02-03

## 2024-01-31 RX ORDER — FENTANYL CITRATE 50 UG/ML
25 INJECTION INTRAVENOUS
Refills: 0 | Status: DISCONTINUED | OUTPATIENT
Start: 2024-01-31 | End: 2024-02-01

## 2024-01-31 RX ORDER — ACETAMINOPHEN 500 MG
975 TABLET ORAL EVERY 6 HOURS
Refills: 0 | Status: DISCONTINUED | OUTPATIENT
Start: 2024-01-31 | End: 2024-02-01

## 2024-01-31 RX ORDER — ONDANSETRON 8 MG/1
4 TABLET, FILM COATED ORAL ONCE
Refills: 0 | Status: DISCONTINUED | OUTPATIENT
Start: 2024-01-31 | End: 2024-02-01

## 2024-01-31 RX ORDER — ALBUTEROL 90 UG/1
2 AEROSOL, METERED ORAL EVERY 6 HOURS
Refills: 0 | Status: DISCONTINUED | OUTPATIENT
Start: 2024-01-31 | End: 2024-02-01

## 2024-01-31 RX ADMIN — SODIUM CHLORIDE 3 MILLILITER(S): 9 INJECTION INTRAMUSCULAR; INTRAVENOUS; SUBCUTANEOUS at 22:52

## 2024-01-31 RX ADMIN — FENTANYL CITRATE 25 MICROGRAM(S): 50 INJECTION INTRAVENOUS at 19:00

## 2024-01-31 RX ADMIN — ALBUTEROL 2 PUFF(S): 90 AEROSOL, METERED ORAL at 23:46

## 2024-01-31 NOTE — ASU DISCHARGE PLAN (ADULT/PEDIATRIC) - ASU DC SPECIAL INSTRUCTIONSFT
Pain management and post-op instructions about your surgery:    Extra strength Tylenol 500mg 2 tabs every 6 hours  Oxycodone 5mg every 6 hours as needed for breakthrough pain  Take outer dressing off in 2 days  You may shower. Do not scrub incision. Pat arm dry. Leave the white steri strips in place, they will fall off on their own in approximately 5-7 days.     Follow up with Dr. Owen in 1 month.  You may call the office to schedule an appointment.

## 2024-01-31 NOTE — BRIEF OPERATIVE NOTE - OPERATION/FINDINGS
Basilic vein mobilized from AC to axilla. Transected proximal to AV anastomosis. Tunneled in subq tissue, vein re-anastomosed to vein with 6-0 prolene.

## 2024-01-31 NOTE — ASU DISCHARGE PLAN (ADULT/PEDIATRIC) - NS MD DC FALL RISK RISK
For information on Fall & Injury Prevention, visit: [email or web address] OR  [email or web address] OR  https://www.cdc.gov/steadi/patient.html

## 2024-01-31 NOTE — ASU DISCHARGE PLAN (ADULT/PEDIATRIC) - CARE PROVIDER_API CALL
Shu Owen  Vascular Surgery  [address] [place] [address]  Phone: [phone]  Fax: [phone]  Follow Up Time:

## 2024-02-01 ENCOUNTER — TRANSCRIPTION ENCOUNTER (OUTPATIENT)
Age: 84
End: 2024-02-01

## 2024-02-01 VITALS
TEMPERATURE: 99 F | HEART RATE: 89 BPM | SYSTOLIC BLOOD PRESSURE: 109 MMHG | DIASTOLIC BLOOD PRESSURE: 54 MMHG | RESPIRATION RATE: 18 BRPM | OXYGEN SATURATION: 97 %

## 2024-02-01 LAB
GLUCOSE BLDC GLUCOMTR-MCNC: 144 MG/DL — HIGH (ref 70–99)
GLUCOSE BLDC GLUCOMTR-MCNC: 158 MG/DL — HIGH (ref 70–99)

## 2024-02-01 RX ORDER — HEPARIN SODIUM 5000 [USP'U]/ML
5000 INJECTION INTRAVENOUS; SUBCUTANEOUS EVERY 8 HOURS
Refills: 0 | Status: DISCONTINUED | OUTPATIENT
Start: 2024-02-01 | End: 2024-02-01

## 2024-02-01 RX ORDER — INSULIN LISPRO 100/ML
VIAL (ML) SUBCUTANEOUS
Refills: 0 | Status: DISCONTINUED | OUTPATIENT
Start: 2024-02-01 | End: 2024-02-01

## 2024-02-01 RX ORDER — AMLODIPINE BESYLATE 2.5 MG/1
10 TABLET ORAL DAILY
Refills: 0 | Status: DISCONTINUED | OUTPATIENT
Start: 2024-02-01 | End: 2024-02-01

## 2024-02-01 RX ORDER — ACETAMINOPHEN 500 MG
3 TABLET ORAL
Qty: 0 | Refills: 0 | DISCHARGE
Start: 2024-02-01

## 2024-02-01 RX ORDER — GLUCAGON INJECTION, SOLUTION 0.5 MG/.1ML
1 INJECTION, SOLUTION SUBCUTANEOUS ONCE
Refills: 0 | Status: DISCONTINUED | OUTPATIENT
Start: 2024-02-01 | End: 2024-02-01

## 2024-02-01 RX ORDER — DEXTROSE 50 % IN WATER 50 %
15 SYRINGE (ML) INTRAVENOUS ONCE
Refills: 0 | Status: DISCONTINUED | OUTPATIENT
Start: 2024-02-01 | End: 2024-02-01

## 2024-02-01 RX ORDER — INSULIN LISPRO 100/ML
VIAL (ML) SUBCUTANEOUS AT BEDTIME
Refills: 0 | Status: DISCONTINUED | OUTPATIENT
Start: 2024-02-01 | End: 2024-02-01

## 2024-02-01 RX ORDER — DEXTROSE 50 % IN WATER 50 %
12.5 SYRINGE (ML) INTRAVENOUS ONCE
Refills: 0 | Status: DISCONTINUED | OUTPATIENT
Start: 2024-02-01 | End: 2024-02-01

## 2024-02-01 RX ORDER — SODIUM CHLORIDE 9 MG/ML
1000 INJECTION, SOLUTION INTRAVENOUS
Refills: 0 | Status: DISCONTINUED | OUTPATIENT
Start: 2024-02-01 | End: 2024-02-01

## 2024-02-01 RX ORDER — DEXTROSE 50 % IN WATER 50 %
25 SYRINGE (ML) INTRAVENOUS ONCE
Refills: 0 | Status: DISCONTINUED | OUTPATIENT
Start: 2024-02-01 | End: 2024-02-01

## 2024-02-01 RX ORDER — HYDRALAZINE HCL 50 MG
25 TABLET ORAL THREE TIMES A DAY
Refills: 0 | Status: DISCONTINUED | OUTPATIENT
Start: 2024-02-01 | End: 2024-02-01

## 2024-02-01 RX ORDER — BUDESONIDE AND FORMOTEROL FUMARATE DIHYDRATE 160; 4.5 UG/1; UG/1
2 AEROSOL RESPIRATORY (INHALATION)
Refills: 0 | Status: DISCONTINUED | OUTPATIENT
Start: 2024-02-01 | End: 2024-02-01

## 2024-02-01 RX ADMIN — Medication 975 MILLIGRAM(S): at 01:55

## 2024-02-01 RX ADMIN — SODIUM CHLORIDE 3 MILLILITER(S): 9 INJECTION INTRAMUSCULAR; INTRAVENOUS; SUBCUTANEOUS at 06:09

## 2024-02-01 RX ADMIN — AMLODIPINE BESYLATE 10 MILLIGRAM(S): 2.5 TABLET ORAL at 09:11

## 2024-02-01 RX ADMIN — BUDESONIDE AND FORMOTEROL FUMARATE DIHYDRATE 2 PUFF(S): 160; 4.5 AEROSOL RESPIRATORY (INHALATION) at 08:57

## 2024-02-01 RX ADMIN — Medication 975 MILLIGRAM(S): at 01:26

## 2024-02-01 RX ADMIN — Medication 975 MILLIGRAM(S): at 05:19

## 2024-02-01 RX ADMIN — Medication 2: at 08:55

## 2024-02-01 NOTE — DISCHARGE NOTE PROVIDER - NSDCFUSCHEDAPPT_GEN_ALL_CORE_FT
[facility]  CARDIOLOGY [address]  Scheduled Appointment: 03/25/2024

## 2024-02-01 NOTE — DISCHARGE NOTE NURSING/CASE MANAGEMENT/SOCIAL WORK - PATIENT PORTAL LINK FT
You can access the FollowMyHealth Patient Portal offered by [facility] by registering at the following website: [email or web address]. By joining [employer]’s FollowMyHealth portal, you will also be able to view your health information using other applications (apps) compatible with our system.

## 2024-02-01 NOTE — DISCHARGE NOTE PROVIDER - NSDCCPCAREPLAN_GEN_ALL_CORE_FT
PRINCIPAL DISCHARGE DIAGNOSIS  Diagnosis: End-stage renal disease (ESRD)  Assessment and Plan of Treatment:

## 2024-02-01 NOTE — PATIENT PROFILE ADULT - FUNCTIONAL ASSESSMENT - DAILY ACTIVITY 1.
3 = A little assistance
I have personally seen and examined this patient.  I have fully participated in the care of this patient. I have reviewed all pertinent clinical information, including history, physical exam, plan and the Resident’s note and agree except as noted.

## 2024-02-01 NOTE — PROGRESS NOTE ADULT - ASSESSMENT
84 y/o female with PMHx of DM, COPD, Asthma, CAD, acute decompensated HF, Breast ca 1996 s/p lumpectomy, chemo, and radiation, ESRD on HD MWF via permacath presents to PST preop for right stage 2 basilic vein transposition. Pt s/p stage 1 basilic vein transposition on 12/19/23. Now s/p stage 2 of basilic vein transposition.    Plan:  - Regular diet  - pain control  - c/w home meds  - DVT ppx: sqh  - Discharge home    C team surgery  [id number]

## 2024-02-01 NOTE — PATIENT PROFILE ADULT - FALL HARM RISK - RISK INTERVENTIONS

## 2024-02-01 NOTE — DISCHARGE NOTE PROVIDER - CARE PROVIDER_API CALL
Shu Owen  Vascular Surgery  [address] [place] [address]  Phone: [phone]  Fax: [phone]  Follow Up Time: 1 week

## 2024-02-01 NOTE — PROGRESS NOTE ADULT - SUBJECTIVE AND OBJECTIVE BOX
TEAM SURGERY PROGRESS NOTE    POST OPERATIVE DAY #: 1 s/p stage 2 basilic vein transposition    SUBJECTIVE: Patient seen and examined at bedside on AM rounds, patient without complaints. Denies pain.     Vital Signs Last 24 Hrs  T(C): 36.7 (01 Feb 2024 05:00), Max: 37.1 (01 Feb 2024 00:00)  T(F): 98.1 (01 Feb 2024 05:00), Max: 98.8 (01 Feb 2024 00:00)  HR: 81 (01 Feb 2024 05:00) (65 - 90)  BP: 110/42 (01 Feb 2024 05:00) (109/96 - 160/58)  BP(mean): 62 (01 Feb 2024 01:00) (62 - 101)  RR: 18 (01 Feb 2024 05:00) (13 - 23)  SpO2: 99% (01 Feb 2024 05:00) (90% - 100%)    Parameters below as of 01 Feb 2024 05:00  Patient On (Oxygen Delivery Method): nasal cannula  O2 Flow (L/min): 2    I&O's Detail    31 Jan 2024 07:01  -  01 Feb 2024 07:00  --------------------------------------------------------  IN:    Oral Fluid: 250 mL  Total IN: 250 mL    OUT:    Voided (mL): 100 mL  Total OUT: 100 mL    Total NET: 150 mL        MEDICATIONS  (STANDING):  acetaminophen     Tablet .. 975 milliGRAM(s) Oral every 6 hours  amLODIPine   Tablet 10 milliGRAM(s) Oral daily  budesonide 160 MICROgram(s)/formoterol 4.5 MICROgram(s) Inhaler 2 Puff(s) Inhalation two times a day  dextrose 5%. 1000 milliLiter(s) (50 mL/Hr) IV Continuous <Continuous>  dextrose 5%. 1000 milliLiter(s) (100 mL/Hr) IV Continuous <Continuous>  dextrose 50% Injectable 25 Gram(s) IV Push once  dextrose 50% Injectable 25 Gram(s) IV Push once  dextrose 50% Injectable 12.5 Gram(s) IV Push once  glucagon  Injectable 1 milliGRAM(s) IntraMuscular once  heparin   Injectable 5000 Unit(s) SubCutaneous every 8 hours  hydrALAZINE 25 milliGRAM(s) Oral three times a day  insulin lispro (ADMELOG) corrective regimen sliding scale   SubCutaneous at bedtime  insulin lispro (ADMELOG) corrective regimen sliding scale   SubCutaneous three times a day before meals  sodium chloride 0.9% lock flush 3 milliLiter(s) IV Push every 8 hours    MEDICATIONS  (PRN):  albuterol    90 MICROgram(s) HFA Inhaler 2 Puff(s) Inhalation every 6 hours PRN Shortness of Breath and/or Wheezing  dextrose Oral Gel 15 Gram(s) Oral once PRN Blood Glucose LESS THAN 70 milliGRAM(s)/deciliter      Physical Exam  General: A&Ox3, NAD  Respiratory: Equal bilateral expansion, unlabored respiratory effort  Cardiovascular: Regular rate & rhythm  Musculoskeletal: Right avf dressing c/d/i. +thrill. palpable radial pulse    LABS:              ABO Interpretation: O (01-31-24 @ 11:47)  ABO Interpretation: O (01-31-24 @ 11:22)      Patient is a 83y Female

## 2024-02-01 NOTE — DISCHARGE NOTE NURSING/CASE MANAGEMENT/SOCIAL WORK - NSDCPEFALRISK_GEN_ALL_CORE
For information on Fall & Injury Prevention, visit: [email or web address] OR  [email or web address] OR  https://www.cdc.gov/steadi/patient.html

## 2024-02-01 NOTE — DISCHARGE NOTE PROVIDER - HOSPITAL COURSE
82 y/o female with PMHx of DM, COPD, Asthma, CAD, acute decompensated HF, Breast ca 1996 s/p lumpectomy, chemo, and radiation, ESRD on HD MWF via permacath presents to [facility] preop for right stage 2 basilic vein transposition. Pt s/p stage 1 basilic vein transposition on 12/19/23. She underwent stage 2 of basilic vein transposition with Dr. Owen on 1/31/24. Patient tolerated operation well and there were no post-operative complications identified. Patient remained hemodynamically stable in the PACU and transferred to the surgical floor. Diet was restarted and advanced as tolerated. At this time, patient is currently ambulating, voiding, tolerating a regular diet. Pain well controlled. Patient has been deemed stable for discharge home with follow up as an outpatient.

## 2024-02-01 NOTE — DISCHARGE NOTE PROVIDER - NSDCFUADDINST_GEN_ALL_CORE_FT
WOUND CARE:  Keep wound clean and dry. Do not scrub or rub incisions. Do not use lotion or powder on incisions.  BATHING: Please do not submerge wound underwater. You may shower and/or sponge bathe.  ACTIVITY: No heavy lifting or straining. Otherwise, you may return to your usual level of physical activity. If you are taking narcotic pain medication (such as Percocet) DO NOT drive a car, operate machinery or make important decisions.  DIET: Return to your usual diet.  NOTIFY YOUR SURGEON IF: You have any bleeding that does not stop, any pus draining from your wound(s), any fever (over 100.4 F) or chills, persistent nausea/vomiting, persistent diarrhea, or if your pain is not controlled on your discharge pain medications.  FOLLOW-UP: Please follow up with your primary care physician in one week regarding your hospitalization   Please schedule a follow up appointment with Dr. Owen in 1-2 weeks.  Please follow up with your nephrologist regarding your hospitalization. WOUND CARE:  Keep wound clean and dry. Do not scrub or rub incisions. Do not use lotion or powder on incisions.  BATHING: Please do not submerge wound underwater. You may shower and/or sponge bathe.  ACTIVITY: No heavy lifting or straining. Otherwise, you may return to your usual level of physical activity. If you are taking narcotic pain medication (such as Percocet) DO NOT drive a car, operate machinery or make important decisions.  DIET: Return to your usual diet.  NOTIFY YOUR SURGEON IF: You have any bleeding that does not stop, any pus draining from your wound(s), any fever (over 100.4 F) or chills, persistent nausea/vomiting, persistent diarrhea, or if your pain is not controlled on your discharge pain medications.  FOLLOW-UP: Please follow up with your primary care physician in one week regarding your hospitalization.  Please follow up with your cardiologist, Dr. Mccarthy. You have an appointment scheduled for 2/12/24 at 8:10AM.    Please schedule a follow up appointment with Dr. Owen in 1-2 weeks.  Please follow up with your nephrologist regarding your hospitalization.

## 2024-02-02 ENCOUNTER — TRANSCRIPTION ENCOUNTER (OUTPATIENT)
Age: 84
End: 2024-02-02

## 2024-02-05 ENCOUNTER — TRANSCRIPTION ENCOUNTER (OUTPATIENT)
Age: 84
End: 2024-02-05

## 2024-02-07 PROBLEM — F03.90 UNSPECIFIED DEMENTIA, UNSPECIFIED SEVERITY, WITHOUT BEHAVIORAL DISTURBANCE, PSYCHOTIC DISTURBANCE, MOOD DISTURBANCE, AND ANXIETY: Chronic | Status: ACTIVE | Noted: 2024-01-25

## 2024-02-07 PROBLEM — J96.11 CHRONIC RESPIRATORY FAILURE WITH HYPOXIA: Chronic | Status: ACTIVE | Noted: 2024-01-25

## 2024-02-07 PROBLEM — E11.9 TYPE 2 DIABETES MELLITUS WITHOUT COMPLICATIONS: Chronic | Status: ACTIVE | Noted: 2024-01-25

## 2024-02-07 PROBLEM — N18.6 END STAGE RENAL DISEASE: Chronic | Status: ACTIVE | Noted: 2024-01-25

## 2024-02-07 PROBLEM — D50.9 IRON DEFICIENCY ANEMIA, UNSPECIFIED: Chronic | Status: ACTIVE | Noted: 2024-01-25

## 2024-02-07 PROBLEM — I77.0 ARTERIOVENOUS FISTULA, ACQUIRED: Chronic | Status: ACTIVE | Noted: 2024-01-25

## 2024-02-07 PROBLEM — I50.30 UNSPECIFIED DIASTOLIC (CONGESTIVE) HEART FAILURE: Chronic | Status: ACTIVE | Noted: 2024-01-25

## 2024-02-07 PROBLEM — J44.9 CHRONIC OBSTRUCTIVE PULMONARY DISEASE, UNSPECIFIED: Chronic | Status: ACTIVE | Noted: 2024-01-25

## 2024-02-08 ENCOUNTER — APPOINTMENT (OUTPATIENT)
Age: 84
End: 2024-02-08
Payer: MEDICARE

## 2024-02-08 VITALS
SYSTOLIC BLOOD PRESSURE: 140 MMHG | HEART RATE: 86 BPM | TEMPERATURE: 97.3 F | DIASTOLIC BLOOD PRESSURE: 62 MMHG | OXYGEN SATURATION: 95 % | RESPIRATION RATE: 16 BRPM

## 2024-02-08 DIAGNOSIS — N18.6 END STAGE RENAL DISEASE: ICD-10-CM

## 2024-02-08 DIAGNOSIS — I50.30 UNSPECIFIED DIASTOLIC (CONGESTIVE) HEART FAILURE: ICD-10-CM

## 2024-02-08 DIAGNOSIS — Z99.2 END STAGE RENAL DISEASE: ICD-10-CM

## 2024-02-08 DIAGNOSIS — J44.9 CHRONIC OBSTRUCTIVE PULMONARY DISEASE, UNSPECIFIED: ICD-10-CM

## 2024-02-08 DIAGNOSIS — E11.65 TYPE 2 DIABETES MELLITUS WITH HYPERGLYCEMIA: ICD-10-CM

## 2024-02-08 PROCEDURE — 99349 HOME/RES VST EST MOD MDM 40: CPT

## 2024-02-08 NOTE — HISTORY OF PRESENT ILLNESS
[Post-hospitalization from ___ Hospital] : Post-hospitalization from [unfilled] Hospital [Admitted on: ___] : The patient was admitted on [unfilled] [Discharged on ___] : discharged on [unfilled] [FreeTextEntry3] : F/u post hospital discharge STAR Heart Failure  [FreeTextEntry2] : Ms. Mcdonough is a 83 year old female with PMHx of DM, COPD, Asthma, CAD, acute decompensated HF, Breast ca 1996 s/p lumpectomy, chemo, and radiation, ESRD on HD MWF via permacath, s/p stage 1 basilic vein transposition on 12/19/23 and underwent scheduled stage 2 of basilic vein transposition with Dr. Owen on 1/31/24. Stable and discharged home with follow up outpatient.  Upon arrival, Ms. Mcdonough is observed seated, appears pleasant and in nad, spouse Yves present. Patient is alert and oriented to name and location, has dementia and forgetful at times, pleasant and cooperative, spouse provided majority of history, manages medications, transportation and assists with ADLs.  Since discharge, initially with weakness and unsteadiness, today notes much improvement and almost back at baseline. Appetite is good and sleeping well. Site healing nicely, no s/s of infection or redness. Has chronic dry cough and COPD, takes Flonase, Symbicort and albuterol with relief. Compliant with dialysis M-W-F and takes bumex 2mg on non dialysis days. Monitoring weights at dialysis, today 156.5lbs and usually between 155-160. Has B/L LE edema R>L, chronic per spouse. Sleeps with 1-2 pillows, unchanged. Has home oxygen, using prn and monitors home pulse ox. Declined need for home care services and ambulating in the home independently, uses walker and wheelchair for long distances. Lives with son and spouse Yves.

## 2024-02-08 NOTE — PLAN
[FreeTextEntry1] : -Follow up with Vascular Dr. Owen -Follow up with cardiologist Dr. Mccarthy 2/12  -Follow up with Pulmonary Dr. Paulino Gayle, appt scheduled for 2/22 12PM -Follow up with PCP Dr. Sol -Continue Dialysis M-W-F  Educated patient and family extensively on fall and safety precautions, aspiration precautions, good skin care, heart healthy diet, medication compliance and importance to notify MD/ return to ED for any change in mental status or worsening s/s as reviewed. Reinforced provider follow up. 24/7 TCM contact provided and encouraged to call with any questions or concerns.

## 2024-02-08 NOTE — HEALTH RISK ASSESSMENT
[No] : No [No falls in past year] : Patient reported no falls in the past year [Assistive Device] : Patient uses an assistive device [de-identified] : walker, wheelchair [Low Salt Diet] : low salt [General Adherence] : general adherence [Change in mental status noted] : No change in mental status noted [With Family] : lives with family [Retired] : retired [] :  [With Patient/Caregiver] : , with patient/caregiver [I will adhere to the patient's wishes.] : I will adhere to the patient's wishes. [AdvancecareDate] : 2/8/2024 [FreeTextEntry4] : Goals of care discussed, stated would not want resuscitation and is in touch with  to complete paperwork for living will. SANDEEP discussed, declined to fill at this time.

## 2024-02-08 NOTE — REVIEW OF SYSTEMS
[Fever] : no fever [Chills] : no chills [Fatigue] : fatigue [Chest Pain] : no chest pain [Lower Ext Edema] : lower extremity edema [Orthopnea] : no orthopnea [Wheezing] : no wheezing [Cough] : cough [Dyspnea on Exertion] : dyspnea on exertion [Muscle Weakness] : muscle weakness [Negative] : Genitourinary [de-identified] : Right AVF site, Permacath right chest

## 2024-02-08 NOTE — PHYSICAL EXAM
[No Acute Distress] : no acute distress [Well-Appearing] : well-appearing [Normal Sclera/Conjunctiva] : normal sclera/conjunctiva [PERRL] : pupils equal round and reactive to light [Normal Outer Ear/Nose] : the outer ears and nose were normal in appearance [Normal Oropharynx] : the oropharynx was normal [No Respiratory Distress] : no respiratory distress  [No Accessory Muscle Use] : no accessory muscle use [Normal Rate] : normal rate  [Regular Rhythm] : with a regular rhythm [No Joint Swelling] : no joint swelling [de-identified] : +1 B/L LE Edema R>L [de-identified] : RUE AVF site and permacath site c,d,i. no s/s of infection, oozing or redness [de-identified] : Alert and oriented to name and location

## 2024-02-12 ENCOUNTER — APPOINTMENT (OUTPATIENT)
Dept: CARDIOLOGY | Facility: CLINIC | Age: 84
End: 2024-02-12

## 2024-02-13 ENCOUNTER — TRANSCRIPTION ENCOUNTER (OUTPATIENT)
Age: 84
End: 2024-02-13

## 2024-02-21 ENCOUNTER — TRANSCRIPTION ENCOUNTER (OUTPATIENT)
Age: 84
End: 2024-02-21

## 2024-02-26 ENCOUNTER — APPOINTMENT (OUTPATIENT)
Dept: VASCULAR SURGERY | Facility: CLINIC | Age: 84
End: 2024-02-26
Payer: MEDICARE

## 2024-02-26 VITALS
TEMPERATURE: 97.9 F | WEIGHT: 160 LBS | HEIGHT: 60 IN | BODY MASS INDEX: 31.41 KG/M2 | SYSTOLIC BLOOD PRESSURE: 114 MMHG | HEART RATE: 80 BPM | DIASTOLIC BLOOD PRESSURE: 54 MMHG

## 2024-02-26 PROCEDURE — 99024 POSTOP FOLLOW-UP VISIT: CPT

## 2024-02-26 PROCEDURE — 93990 DOPPLER FLOW TESTING: CPT

## 2024-02-26 NOTE — ASSESSMENT
[FreeTextEntry1] : In summary, Mrs. Cr presents s/p stage 2 right BVT. A fistula duplex showed patent fistula with vein diameter >6mm with good volume flow. There is a proximal hematoma at the anastomosis.   Fistula may now be used for HD (Rx provided to patient to provide to HD center). If used successfully three times, I will remove her PC.

## 2024-02-26 NOTE — HISTORY OF PRESENT ILLNESS
[FreeTextEntry1] : I just had the pleasure of following up with Mrs. Cr s/p stage 1 right BVT, performed on 12/19/23. Her incision is healing well and she denies any erythema, induration, fluctuance or drainage. She denies any skin changes or sensorimotor deficits in the right upper extremity. She is being dialyzed via right chest catheter (Mon/Wed/Fri). She present for surveillance duplex.  [de-identified] : 2/26/24: Mrs. Yunior Cr presents in follow up s/p second stage right BVT. Surgery was performed on 1/31/24. She reports that the wound is healing well and denies any erythema, induration, fluctuance or drainage. She is receiving HD via right chest PC M/W/F. She is otherwise without complaints.

## 2024-02-26 NOTE — PHYSICAL EXAM
[Normal Breath Sounds] : Normal breath sounds [Normal Heart Sounds] : normal heart sounds [2+] : right 2+ [de-identified] : NAD [FreeTextEntry1] : RUE incision is clean/dry/intact. Palpable thrill. Palpable brachial and radial pulse.

## 2024-03-06 RX ORDER — LIDOCAINE AND PRILOCAINE 25; 25 MG/G; MG/G
2.5-2.5 CREAM TOPICAL
Qty: 6 | Refills: 3 | Status: ACTIVE | COMMUNITY
Start: 2024-03-04 | End: 1900-01-01

## 2024-03-25 ENCOUNTER — APPOINTMENT (OUTPATIENT)
Dept: VASCULAR SURGERY | Facility: CLINIC | Age: 84
End: 2024-03-25
Payer: MEDICARE

## 2024-03-25 VITALS
HEART RATE: 82 BPM | HEIGHT: 60 IN | WEIGHT: 160 LBS | TEMPERATURE: 97.7 F | DIASTOLIC BLOOD PRESSURE: 56 MMHG | BODY MASS INDEX: 31.41 KG/M2 | SYSTOLIC BLOOD PRESSURE: 125 MMHG

## 2024-03-25 PROCEDURE — 36589 REMOVAL TUNNELED CV CATH: CPT | Mod: 58

## 2024-03-26 NOTE — PROCEDURE
[FreeTextEntry1] : Removal of tunneled right IJ HD catheter with subcutaneous cuff (placed by IR) [FreeTextEntry2] : Mature RUE AVF [FreeTextEntry3] : Informed consent was obtained. Right chest prepped/draped in routine sterile fashion. 10cc 1% Lidocaine injected at access site and along catheter. Cuff freed of soft tissue with blunt dissection. Catheter removed in its entirety.  Pressure held for hemostasis.  Skin closed with 4-0 Monocryl. Steri strips and dressing applied. Patient tolerated well without complications.

## 2024-04-10 DIAGNOSIS — E83.39 OTHER DISORDERS OF PHOSPHORUS METABOLISM: ICD-10-CM

## 2024-05-01 RX ORDER — SEVELAMER CARBONATE 800 MG/1
800 TABLET, FILM COATED ORAL
Qty: 540 | Refills: 3 | Status: ACTIVE | COMMUNITY
Start: 2024-04-10 | End: 1900-01-01

## 2024-05-11 ENCOUNTER — INPATIENT (INPATIENT)
Facility: HOSPITAL | Age: 84
LOS: 5 days | Discharge: HOME HEALTH | DRG: 179 | End: 2024-05-17
Attending: INTERNAL MEDICINE | Admitting: INTERNAL MEDICINE
Payer: MEDICARE

## 2024-05-11 VITALS
TEMPERATURE: 98 F | DIASTOLIC BLOOD PRESSURE: 49 MMHG | SYSTOLIC BLOOD PRESSURE: 91 MMHG | WEIGHT: 160.06 LBS | RESPIRATION RATE: 20 BRPM | HEART RATE: 88 BPM | HEIGHT: 59 IN | OXYGEN SATURATION: 95 %

## 2024-05-11 DIAGNOSIS — E11.9 TYPE 2 DIABETES MELLITUS WITHOUT COMPLICATIONS: ICD-10-CM

## 2024-05-11 DIAGNOSIS — R09.89 OTHER SPECIFIED SYMPTOMS AND SIGNS INVOLVING THE CIRCULATORY AND RESPIRATORY SYSTEMS: ICD-10-CM

## 2024-05-11 DIAGNOSIS — N18.6 END STAGE RENAL DISEASE: ICD-10-CM

## 2024-05-11 DIAGNOSIS — Z95.828 PRESENCE OF OTHER VASCULAR IMPLANTS AND GRAFTS: Chronic | ICD-10-CM

## 2024-05-11 DIAGNOSIS — Z99.2 DEPENDENCE ON RENAL DIALYSIS: Chronic | ICD-10-CM

## 2024-05-11 DIAGNOSIS — J96.11 CHRONIC RESPIRATORY FAILURE WITH HYPOXIA: ICD-10-CM

## 2024-05-11 DIAGNOSIS — I10 ESSENTIAL (PRIMARY) HYPERTENSION: ICD-10-CM

## 2024-05-11 DIAGNOSIS — Z90.10 ACQUIRED ABSENCE OF UNSPECIFIED BREAST AND NIPPLE: Chronic | ICD-10-CM

## 2024-05-11 DIAGNOSIS — Z29.9 ENCOUNTER FOR PROPHYLACTIC MEASURES, UNSPECIFIED: ICD-10-CM

## 2024-05-11 DIAGNOSIS — I50.32 CHRONIC DIASTOLIC (CONGESTIVE) HEART FAILURE: ICD-10-CM

## 2024-05-11 DIAGNOSIS — F03.90 UNSPECIFIED DEMENTIA, UNSPECIFIED SEVERITY, WITHOUT BEHAVIORAL DISTURBANCE, PSYCHOTIC DISTURBANCE, MOOD DISTURBANCE, AND ANXIETY: ICD-10-CM

## 2024-05-11 DIAGNOSIS — Z79.899 OTHER LONG TERM (CURRENT) DRUG THERAPY: ICD-10-CM

## 2024-05-11 DIAGNOSIS — U07.1 COVID-19: ICD-10-CM

## 2024-05-11 LAB
ALBUMIN SERPL ELPH-MCNC: 4 G/DL — SIGNIFICANT CHANGE UP (ref 3.3–5)
ALP SERPL-CCNC: 60 U/L — SIGNIFICANT CHANGE UP (ref 40–120)
ALT FLD-CCNC: 9 U/L — LOW (ref 10–45)
ANION GAP SERPL CALC-SCNC: 17 MMOL/L — SIGNIFICANT CHANGE UP (ref 5–17)
APTT BLD: 34.7 SEC — SIGNIFICANT CHANGE UP (ref 24.5–35.6)
AST SERPL-CCNC: 18 U/L — SIGNIFICANT CHANGE UP (ref 10–40)
BASE EXCESS BLDV CALC-SCNC: 8.1 MMOL/L — HIGH (ref -2–3)
BASOPHILS # BLD AUTO: 0.04 K/UL — SIGNIFICANT CHANGE UP (ref 0–0.2)
BASOPHILS NFR BLD AUTO: 0.5 % — SIGNIFICANT CHANGE UP (ref 0–2)
BILIRUB SERPL-MCNC: 0.4 MG/DL — SIGNIFICANT CHANGE UP (ref 0.2–1.2)
BUN SERPL-MCNC: 26 MG/DL — HIGH (ref 7–23)
CA-I SERPL-SCNC: 1.13 MMOL/L — LOW (ref 1.15–1.33)
CALCIUM SERPL-MCNC: 9.5 MG/DL — SIGNIFICANT CHANGE UP (ref 8.4–10.5)
CHLORIDE BLDV-SCNC: 96 MMOL/L — SIGNIFICANT CHANGE UP (ref 96–108)
CHLORIDE SERPL-SCNC: 94 MMOL/L — LOW (ref 96–108)
CK MB CFR SERPL CALC: <1 NG/ML — SIGNIFICANT CHANGE UP (ref 0–3.8)
CK SERPL-CCNC: 40 U/L — SIGNIFICANT CHANGE UP (ref 25–170)
CO2 BLDV-SCNC: 36 MMOL/L — HIGH (ref 22–26)
CO2 SERPL-SCNC: 28 MMOL/L — SIGNIFICANT CHANGE UP (ref 22–31)
CREAT SERPL-MCNC: 4.81 MG/DL — HIGH (ref 0.5–1.3)
EGFR: 9 ML/MIN/1.73M2 — LOW
EOSINOPHIL # BLD AUTO: 0.02 K/UL — SIGNIFICANT CHANGE UP (ref 0–0.5)
EOSINOPHIL NFR BLD AUTO: 0.3 % — SIGNIFICANT CHANGE UP (ref 0–6)
FLUAV AG NPH QL: SIGNIFICANT CHANGE UP
FLUBV AG NPH QL: SIGNIFICANT CHANGE UP
GAS PNL BLDV: 135 MMOL/L — LOW (ref 136–145)
GAS PNL BLDV: SIGNIFICANT CHANGE UP
GLUCOSE BLDC GLUCOMTR-MCNC: 148 MG/DL — HIGH (ref 70–99)
GLUCOSE BLDV-MCNC: 96 MG/DL — SIGNIFICANT CHANGE UP (ref 70–99)
GLUCOSE SERPL-MCNC: 97 MG/DL — SIGNIFICANT CHANGE UP (ref 70–99)
HCO3 BLDV-SCNC: 34 MMOL/L — HIGH (ref 22–29)
HCT VFR BLD CALC: 38.1 % — SIGNIFICANT CHANGE UP (ref 34.5–45)
HCT VFR BLDA CALC: 38 % — SIGNIFICANT CHANGE UP (ref 34.5–46.5)
HGB BLD CALC-MCNC: 12.5 G/DL — SIGNIFICANT CHANGE UP (ref 11.7–16.1)
HGB BLD-MCNC: 11.9 G/DL — SIGNIFICANT CHANGE UP (ref 11.5–15.5)
IMM GRANULOCYTES NFR BLD AUTO: 0.5 % — SIGNIFICANT CHANGE UP (ref 0–0.9)
INR BLD: 1.06 RATIO — SIGNIFICANT CHANGE UP (ref 0.85–1.18)
LACTATE BLDV-MCNC: 1.3 MMOL/L — SIGNIFICANT CHANGE UP (ref 0.5–2)
LYMPHOCYTES # BLD AUTO: 0.59 K/UL — LOW (ref 1–3.3)
LYMPHOCYTES # BLD AUTO: 7.7 % — LOW (ref 13–44)
MCHC RBC-ENTMCNC: 28.8 PG — SIGNIFICANT CHANGE UP (ref 27–34)
MCHC RBC-ENTMCNC: 31.2 GM/DL — LOW (ref 32–36)
MCV RBC AUTO: 92.3 FL — SIGNIFICANT CHANGE UP (ref 80–100)
MONOCYTES # BLD AUTO: 1.26 K/UL — HIGH (ref 0–0.9)
MONOCYTES NFR BLD AUTO: 16.4 % — HIGH (ref 2–14)
NEUTROPHILS # BLD AUTO: 5.72 K/UL — SIGNIFICANT CHANGE UP (ref 1.8–7.4)
NEUTROPHILS NFR BLD AUTO: 74.6 % — SIGNIFICANT CHANGE UP (ref 43–77)
NRBC # BLD: 0 /100 WBCS — SIGNIFICANT CHANGE UP (ref 0–0)
NT-PROBNP SERPL-SCNC: 4163 PG/ML — HIGH (ref 0–300)
PCO2 BLDV: 51 MMHG — HIGH (ref 39–42)
PH BLDV: 7.43 — SIGNIFICANT CHANGE UP (ref 7.32–7.43)
PLATELET # BLD AUTO: 228 K/UL — SIGNIFICANT CHANGE UP (ref 150–400)
PO2 BLDV: 40 MMHG — SIGNIFICANT CHANGE UP (ref 25–45)
POTASSIUM BLDV-SCNC: 3.6 MMOL/L — SIGNIFICANT CHANGE UP (ref 3.5–5.1)
POTASSIUM SERPL-MCNC: 3.7 MMOL/L — SIGNIFICANT CHANGE UP (ref 3.5–5.3)
POTASSIUM SERPL-SCNC: 3.7 MMOL/L — SIGNIFICANT CHANGE UP (ref 3.5–5.3)
PROCALCITONIN SERPL-MCNC: 0.71 NG/ML — HIGH (ref 0.02–0.1)
PROT SERPL-MCNC: 7.6 G/DL — SIGNIFICANT CHANGE UP (ref 6–8.3)
PROTHROM AB SERPL-ACNC: 11.6 SEC — SIGNIFICANT CHANGE UP (ref 9.5–13)
RBC # BLD: 4.13 M/UL — SIGNIFICANT CHANGE UP (ref 3.8–5.2)
RBC # FLD: 17.8 % — HIGH (ref 10.3–14.5)
RSV RNA NPH QL NAA+NON-PROBE: SIGNIFICANT CHANGE UP
SAO2 % BLDV: 69.6 % — SIGNIFICANT CHANGE UP (ref 67–88)
SARS-COV-2 RNA SPEC QL NAA+PROBE: DETECTED
SODIUM SERPL-SCNC: 139 MMOL/L — SIGNIFICANT CHANGE UP (ref 135–145)
TROPONIN T, HIGH SENSITIVITY RESULT: 94 NG/L — HIGH (ref 0–51)
TROPONIN T, HIGH SENSITIVITY RESULT: 98 NG/L — HIGH (ref 0–51)
WBC # BLD: 7.67 K/UL — SIGNIFICANT CHANGE UP (ref 3.8–10.5)
WBC # FLD AUTO: 7.67 K/UL — SIGNIFICANT CHANGE UP (ref 3.8–10.5)

## 2024-05-11 PROCEDURE — 93308 TTE F-UP OR LMTD: CPT | Mod: 26

## 2024-05-11 PROCEDURE — 99285 EMERGENCY DEPT VISIT HI MDM: CPT | Mod: FS

## 2024-05-11 PROCEDURE — 93010 ELECTROCARDIOGRAM REPORT: CPT

## 2024-05-11 PROCEDURE — 99223 1ST HOSP IP/OBS HIGH 75: CPT

## 2024-05-11 PROCEDURE — 71045 X-RAY EXAM CHEST 1 VIEW: CPT | Mod: 26

## 2024-05-11 RX ORDER — DEXTROSE 50 % IN WATER 50 %
12.5 SYRINGE (ML) INTRAVENOUS ONCE
Refills: 0 | Status: DISCONTINUED | OUTPATIENT
Start: 2024-05-11 | End: 2024-05-17

## 2024-05-11 RX ORDER — ROSUVASTATIN CALCIUM 5 MG/1
1 TABLET ORAL
Refills: 0 | DISCHARGE

## 2024-05-11 RX ORDER — BUDESONIDE, MICRONIZED 100 %
0.5 POWDER (GRAM) MISCELLANEOUS ONCE
Refills: 0 | Status: COMPLETED | OUTPATIENT
Start: 2024-05-11 | End: 2024-05-11

## 2024-05-11 RX ORDER — FERROUS SULFATE 325(65) MG
1 TABLET ORAL
Refills: 0 | DISCHARGE

## 2024-05-11 RX ORDER — MONTELUKAST 4 MG/1
10 TABLET, CHEWABLE ORAL DAILY
Refills: 0 | Status: DISCONTINUED | OUTPATIENT
Start: 2024-05-11 | End: 2024-05-17

## 2024-05-11 RX ORDER — REMDESIVIR 5 MG/ML
INJECTION INTRAVENOUS
Refills: 0 | Status: COMPLETED | OUTPATIENT
Start: 2024-05-12 | End: 2024-05-16

## 2024-05-11 RX ORDER — CEFEPIME 1 G/1
1000 INJECTION, POWDER, FOR SOLUTION INTRAMUSCULAR; INTRAVENOUS ONCE
Refills: 0 | Status: COMPLETED | OUTPATIENT
Start: 2024-05-11 | End: 2024-05-11

## 2024-05-11 RX ORDER — ATORVASTATIN CALCIUM 80 MG/1
80 TABLET, FILM COATED ORAL AT BEDTIME
Refills: 0 | Status: DISCONTINUED | OUTPATIENT
Start: 2024-05-11 | End: 2024-05-17

## 2024-05-11 RX ORDER — DEXAMETHASONE 0.5 MG/5ML
6 ELIXIR ORAL ONCE
Refills: 0 | Status: COMPLETED | OUTPATIENT
Start: 2024-05-11 | End: 2024-05-11

## 2024-05-11 RX ORDER — CEFTRIAXONE 500 MG/1
1000 INJECTION, POWDER, FOR SOLUTION INTRAMUSCULAR; INTRAVENOUS EVERY 24 HOURS
Refills: 0 | Status: DISCONTINUED | OUTPATIENT
Start: 2024-05-11 | End: 2024-05-13

## 2024-05-11 RX ORDER — MONTELUKAST 4 MG/1
1 TABLET, CHEWABLE ORAL
Refills: 0 | DISCHARGE

## 2024-05-11 RX ORDER — SODIUM CHLORIDE 9 MG/ML
1000 INJECTION, SOLUTION INTRAVENOUS
Refills: 0 | Status: DISCONTINUED | OUTPATIENT
Start: 2024-05-11 | End: 2024-05-17

## 2024-05-11 RX ORDER — BUDESONIDE AND FORMOTEROL FUMARATE DIHYDRATE 160; 4.5 UG/1; UG/1
2 AEROSOL RESPIRATORY (INHALATION)
Refills: 0 | Status: DISCONTINUED | OUTPATIENT
Start: 2024-05-11 | End: 2024-05-17

## 2024-05-11 RX ORDER — REMDESIVIR 5 MG/ML
200 INJECTION INTRAVENOUS EVERY 24 HOURS
Refills: 0 | Status: COMPLETED | OUTPATIENT
Start: 2024-05-12 | End: 2024-05-12

## 2024-05-11 RX ORDER — ALBUTEROL 90 UG/1
2 AEROSOL, METERED ORAL EVERY 6 HOURS
Refills: 0 | Status: DISCONTINUED | OUTPATIENT
Start: 2024-05-11 | End: 2024-05-17

## 2024-05-11 RX ORDER — DEXAMETHASONE 0.5 MG/5ML
6 ELIXIR ORAL DAILY
Refills: 0 | Status: DISCONTINUED | OUTPATIENT
Start: 2024-05-12 | End: 2024-05-17

## 2024-05-11 RX ORDER — HEPARIN SODIUM 5000 [USP'U]/ML
7500 INJECTION INTRAVENOUS; SUBCUTANEOUS EVERY 8 HOURS
Refills: 0 | Status: DISCONTINUED | OUTPATIENT
Start: 2024-05-11 | End: 2024-05-17

## 2024-05-11 RX ORDER — INSULIN LISPRO 100/ML
VIAL (ML) SUBCUTANEOUS EVERY 6 HOURS
Refills: 0 | Status: DISCONTINUED | OUTPATIENT
Start: 2024-05-11 | End: 2024-05-12

## 2024-05-11 RX ORDER — DEXTROSE 50 % IN WATER 50 %
25 SYRINGE (ML) INTRAVENOUS ONCE
Refills: 0 | Status: DISCONTINUED | OUTPATIENT
Start: 2024-05-11 | End: 2024-05-17

## 2024-05-11 RX ORDER — REMDESIVIR 5 MG/ML
100 INJECTION INTRAVENOUS EVERY 24 HOURS
Refills: 0 | Status: COMPLETED | OUTPATIENT
Start: 2024-05-13 | End: 2024-05-16

## 2024-05-11 RX ORDER — ACETAMINOPHEN 500 MG
975 TABLET ORAL ONCE
Refills: 0 | Status: COMPLETED | OUTPATIENT
Start: 2024-05-11 | End: 2024-05-11

## 2024-05-11 RX ORDER — LINAGLIPTIN 5 MG/1
1 TABLET, FILM COATED ORAL
Refills: 0 | DISCHARGE

## 2024-05-11 RX ORDER — DEXTROSE 50 % IN WATER 50 %
15 SYRINGE (ML) INTRAVENOUS ONCE
Refills: 0 | Status: DISCONTINUED | OUTPATIENT
Start: 2024-05-11 | End: 2024-05-17

## 2024-05-11 RX ORDER — GLUCAGON INJECTION, SOLUTION 0.5 MG/.1ML
1 INJECTION, SOLUTION SUBCUTANEOUS ONCE
Refills: 0 | Status: DISCONTINUED | OUTPATIENT
Start: 2024-05-11 | End: 2024-05-17

## 2024-05-11 RX ORDER — PIOGLITAZONE HYDROCHLORIDE 15 MG/1
1 TABLET ORAL
Refills: 0 | DISCHARGE

## 2024-05-11 RX ORDER — ACETAMINOPHEN 500 MG
650 TABLET ORAL EVERY 6 HOURS
Refills: 0 | Status: DISCONTINUED | OUTPATIENT
Start: 2024-05-11 | End: 2024-05-17

## 2024-05-11 RX ORDER — DEXTROSE 10 % IN WATER 10 %
125 INTRAVENOUS SOLUTION INTRAVENOUS ONCE
Refills: 0 | Status: DISCONTINUED | OUTPATIENT
Start: 2024-05-11 | End: 2024-05-17

## 2024-05-11 RX ADMIN — Medication 6 MILLIGRAM(S): at 19:22

## 2024-05-11 RX ADMIN — CEFEPIME 100 MILLIGRAM(S): 1 INJECTION, POWDER, FOR SOLUTION INTRAMUSCULAR; INTRAVENOUS at 18:20

## 2024-05-11 RX ADMIN — Medication 975 MILLIGRAM(S): at 19:20

## 2024-05-11 RX ADMIN — Medication 0.5 MILLIGRAM(S): at 19:24

## 2024-05-11 NOTE — ED PROVIDER NOTE - OBJECTIVE STATEMENT
Attending Bibiana Ryan: 83-year-old female with multiple medical issues including history of end-stage renal disease on hemodialysis, COPD, heart failure with preserved ejection fraction presenting with concern for generalized weakness and not feeling well.  Patient went to dialysis yesterday and had full course of dialysis.  Today started with a nonproductive cough and feeling generalized weakness.  Was febrile with a low-grade temp of 100.3 earlier today and given Tylenol at approximately 1:00.  No sick contacts.  Patient does make urine.  Denies any sore throat or neck pain.  No black or bloody stools.  Patient states she does make some urine.

## 2024-05-11 NOTE — ED ADULT NURSE NOTE - NSFALLHARMRISKINTERV_ED_ALL_ED

## 2024-05-11 NOTE — H&P ADULT - PROBLEM SELECTOR PLAN 8
- hsq re vte ppx as above  - precautions as above  - NPO pending formal S&S given somnolence and unable to clarify home diet w/ family  - dispo pending course, PT eval  - provide full data to pt on eventual d/c for PCP/specialist f/u of abn findings - LLE tenderness to palpation on exam, obtain duplex venous LE r/o VTE  - hsq re vte ppx as above  - precautions as above  - NPO pending formal S&S given somnolence and unable to clarify home diet w/ family  - dispo pending course, PT eval  - provide full data to pt on eventual d/c for PCP/specialist f/u of abn findings - LLE tenderness to palpation on exam, obtain duplex venous LE r/o VTE  - hsq re vte ppx as above  - precautions as above  - NPO pending formal S&S  - dispo pending course, PT eval  - provide full data to pt on eventual d/c for PCP/specialist f/u of abn findings

## 2024-05-11 NOTE — ED PROVIDER NOTE - PHYSICAL EXAMINATION
Attending Bibiana Ryan: Gen: NAD, heent: atrauamtic, mmm, op pink, uvula midline, neck; nttp, no nuchal rigidity, chest: nttp, no crepitus, cv: rrr, _murmur lungs: scattered wheeze and rhonchi abd: soft, nontender, nondistended, no peritoneal signs, , no guarding, ext: wwp, mild RLE edema, skin: no rash, neuro: awake and alert, following commands, speech clear, sensation and strength intact, no focal deficits

## 2024-05-11 NOTE — ED ADULT NURSE NOTE - OBJECTIVE STATEMENT
83y F BIBEMS,  bedside and early onset dementia A&O x2-3 at baseline. Presenting with general malaise and a nonproductive cough. As of this morning pt difficult ambulating; pt normally able to ambulate and take care of ADLs independently. At 1am pt son found her on the floor coming home; pt unable to confirm or deny fall, head strike or LOC, but was awake and alert on arrival. Pt noted she had a temp of 100.2 F and gave 400mg Tylenol at approx. 3:15pm before EMS arrived. Pt had no sick contacts Denies chest pain.   Pink band noted to right arm for Dialysis access MWF and states she does make urine.   Pt hypoxc to 88% O2 on arrivial and 3L O2 placed

## 2024-05-11 NOTE — ED ADULT NURSE REASSESSMENT NOTE - NS ED NURSE REASSESS COMMENT FT1
Report received from GAURAV Mondragon. Pt received A&Ox4, vitals retaken and documented. Pt given medications as per MD order, pt resting comfortably in bed. Bed locked and in lowest position, side rails raised, call bell within reach. Currently pending medicine bed.

## 2024-05-11 NOTE — H&P ADULT - PROBLEM SELECTOR PLAN 5
euvolemic-appearing on exam  - monitor vol status last HD yesterday per ED note  w/o lyte disturbance necessitating urgent HD, euvolemic-appearing on exam  - monitor BMP, dose per HD, avoid nephrotoxins, monitor vol status  - nephro c/s in AM to schedule HD

## 2024-05-11 NOTE — H&P ADULT - NSHPOUTPATIENTPROVIDERS_GEN_ALL_CORE
PCP: Dario Sol  Nephro: Callie Dash  Cards: Leida uLis/Carol, Arnold/Arvin Mccarthy  Vascular surg: Shu Owen

## 2024-05-11 NOTE — H&P ADULT - PROBLEM SELECTOR PLAN 4
last HD yesterday per ED note  w/o lyte disturbance necessitating urgent HD, euvolemic-appearing on exam  - monitor BMP, dose per HD, avoid nephrotoxins, monitor vol status  - nephro c/s in AM to schedule HD - hold home meds (including bumex) given sepsis, consider restart when appropriate  - monitor BP

## 2024-05-11 NOTE — H&P ADULT - PROBLEM SELECTOR PLAN 7
pt somnolent unable to participate in interview, therefore unable to obtain list of meds, and family not picking up (x 4 contacts in chart), no EMS record with list of meds  - will dose per previous d/c  note 2/2024 though will need formal med rec in AM with family/PCP (Dr. Sol who is PCP will take over mgt in AM) - LLE tenderness to palpation on exam, obtain duplex venous LE r/o VTE  - hsq re vte ppx as above  - precautions as above  - NPO pending formal S&S  - dispo pending course, PT eval  - provide full data to pt on eventual d/c for PCP/specialist f/u of abn findings AOx2, at bl per   - was found on floor per RN note, unclear if fall, no mention in ED note and unable to reach family for collateral, no EMS note in physical chart. Update: able to reach  as above, will obtain CTH NC to r/o injury/bleed given somnolence and neurochecks q4h  - frequent reorientation  - fall/aspiration precautions  - PT c/s

## 2024-05-11 NOTE — H&P ADULT - PROBLEM SELECTOR PLAN 6
AOx2-3 bl per RN note, currently AOx2   - was found on floor per RN note, unclear if fall, no mention in ED note and unable to reach family for collateral, no EMS note in physical chart. Will obtain CTH NC to r/o injury/bleed given somnolence and neurochecks q4h  - frequent reorientation  - fall/aspiration precautions  - PT c/s AOx2, at bl per   - was found on floor per RN note, unclear if fall, no mention in ED note and unable to reach family for collateral, no EMS note in physical chart. Update: able to reach  as above, will obtain CTH NC to r/o injury/bleed given somnolence and neurochecks q4h  - frequent reorientation  - fall/aspiration precautions  - PT c/s euvolemic-appearing on exam  - monitor vol status

## 2024-05-11 NOTE — H&P ADULT - ASSESSMENT
83F PMH HTN, HLD, T2DM, CAD, HFpEF, ESRD on HD MWF (via RIJ permacath, has RUE maturing AVF), dementia, remote hx of breast ca s/p mastectomy, chronic hypoxemic respiratory failure of unclear etiology on home O2 a/w sepsis 2/2 COVID-19 infection +/- superimposed bacterial PNA 83F former smoker PMH HTN, HLD, T2DM, CAD, HFpEF, ESRD on HD MWF (via RIJ permacath, has RUE maturing AVF), dementia, remote hx of breast ca s/p mastectomy, chronic hypoxemic respiratory failure of unclear etiology on home O2 prn a/w sepsis 2/2 COVID-19 infection +/- superimposed bacterial PNA 83F former smoker PMH HTN, HLD, T2DM, CAD, HFpEF, ESRD on HD MWF, dementia, remote hx of breast ca s/p mastectomy, chronic hypoxemic respiratory failure of unclear etiology on home O2 prn a/w sepsis 2/2 COVID-19 infection +/- superimposed bacterial PNA

## 2024-05-11 NOTE — H&P ADULT - HISTORY OF PRESENT ILLNESS
83F PMH HTN, HLD, T2DM, CAD, HFpEF, ESRD on HD MWF (via RIJ permacath, has RUE maturing AVF), dementia, remote hx of breast ca s/p mastectomy, chronic hypoxemic respiratory failure of unclear etiology on home O2 p/w weakness, cough    VS: febrile tmax 38.1C, BP 90s-110s/40s-50s, RR 20-22, SpO2% 95-96 on 2-3L NC  Labs: procal 0.71; HST 98 -> 94, proBNP 4163; BUN/SCr 26/4.81 (GFR 9); VBG 7.43/51/40/34   Micro: COVID-19+  Imaging:  - POCUS ED TTE: scattered b lines b/l, E to A reversal cannot exclude diastolic dysfn  Received: tylenol 975mg PO, cefepime 1g IV, decadron 6mg IV, pulmicort 0.5mg inhaled    83F PMH HTN, HLD, T2DM, CAD, HFpEF, ESRD on HD MWF (via RIJ permacath, has RUE maturing AVF), dementia, remote hx of breast ca s/p mastectomy, chronic hypoxemic respiratory failure of unclear etiology on home O2 p/w weakness, cough (per ED provider note, no EMS note in physical chart). Pt AOx2 (self, location) otherwise somnolent, falling asleep frequently unable to participate meaningfully in interview, though states that she is feeling well, denies complaints.     VS: febrile tmax 38.1C, BP 90s-110s/40s-50s, RR 20-22, SpO2% 95-96 on 2-3L NC  Labs: procal 0.71; HST 98 -> 94, proBNP 4163; BUN/SCr 26/4.81 (GFR 9); VBG 7.43/51/40/34   Micro: COVID-19+  Imaging:  - POCUS ED TTE: scattered b lines b/l, E to A reversal cannot exclude diastolic dysfn  Received: tylenol 975mg PO, cefepime 1g IV, decadron 6mg IV, pulmicort 0.5mg inhaled    83F PMH HTN, HLD, T2DM, CAD, HFpEF, ESRD on HD MWF (via RIJ permacath, has RUE maturing AVF), dementia, remote hx of breast ca s/p mastectomy, chronic hypoxemic respiratory failure of unclear etiology on home O2 p/w weakness, cough (per ED provider note, no EMS note in physical chart). Pt AOx2 (self, location) otherwise somnolent, falling asleep frequently unable to participate meaningfully in interview, though states that she is feeling well, denies complaints.     Unable to reach family for collateral despite multiple attempts at 4 contact numbers listed in chart. No EMS record in physical chart. No mention in ED provider note, however RN note relaying bl AOx2-3, was found on floor by son coming home and pt was unable at that time to confirm or deny fall but was awake and alert on arrival.     VS: febrile tmax 38.1C, BP 90s-110s/40s-50s, RR 20-22, SpO2% 95-96 on 2-3L NC  Labs: procal 0.71; HST 98 -> 94, proBNP 4163; BUN/SCr 26/4.81 (GFR 9); VBG 7.43/51/40/34   Micro: COVID-19+  Imaging:  - POCUS ED TTE: scattered b lines b/l, E to A reversal cannot exclude diastolic dysfn  Received: tylenol 975mg PO, cefepime 1g IV, decadron 6mg IV, pulmicort 0.5mg inhaled    83F PMH HTN, HLD, T2DM, CAD, HFpEF, ESRD on HD MWF (via RIJ permacath, has RUE maturing AVF), dementia, remote hx of breast ca s/p mastectomy, chronic hypoxemic respiratory failure of unclear etiology on home O2 p/w weakness, cough (per ED provider note, no EMS note in physical chart). Pt AOx2 (self, location) otherwise somnolent, falling asleep frequently unable to participate meaningfully in interview, though states that she is feeling well, denies complaints.     Unable to reach family for collateral despite multiple attempts at 4 contact numbers listed in chart. No EMS record in physical chart. No mention in ED provider note, however RN note relaying bl AOx2-3, was found on floor by son coming home and pt was unable at that time to confirm or deny fall but was awake and alert on arrival.  No mention of injury in RN note or ED HPI/exam.     VS: febrile tmax 38.1C, BP 90s-110s/40s-50s, RR 20-22, SpO2% 95-96 on 2-3L NC  Labs: procal 0.71; HST 98 -> 94, proBNP 4163; BUN/SCr 26/4.81 (GFR 9); VBG 7.43/51/40/34   Micro: COVID-19+  Imaging:  - POCUS ED TTE: scattered b lines b/l, E to A reversal cannot exclude diastolic dysfn  Received: tylenol 975mg PO, cefepime 1g IV, decadron 6mg IV, pulmicort 0.5mg inhaled    83F former smoker, PMH HTN, HLD, T2DM, CAD, HFpEF, ESRD on HD MWF (via RIJ permacath, has RUE maturing AVF), dementia, remote hx of breast ca s/p mastectomy, chronic hypoxemic respiratory failure of unclear etiology on home O2 prn p/w weakness, cough (per ED provider note, no EMS note in physical chart). Pt AOx2 (self, location) otherwise somnolent, falling asleep frequently unable to participate meaningfully in interview, though states that she is feeling well, denies complaints.     Able to reach  who relays ED presentation due to dry cough and weakness (usually ambulatory in apartment w/o assistive device, today unable to rise from chair independently), otherwise w/o noticed complaints. Additionally when son arrived home 5/10 PM he found pt on floor adjacent to recliner, believed that she slid out and was unable to rise independently, however denies any noticed bruise/injury and pt did not subsequently complain of injury. He states that pt is at bl mentation.     VS: febrile tmax 38.1C, BP 90s-110s/40s-50s, RR 20-22, SpO2% 95-96 on 2-3L NC  Labs: procal 0.71; HST 98 -> 94, proBNP 4163; BUN/SCr 26/4.81 (GFR 9); VBG 7.43/51/40/34   Micro: COVID-19+  Imaging:  - POCUS ED TTE: scattered b lines b/l, E to A reversal cannot exclude diastolic dysfn  Received: tylenol 975mg PO, cefepime 1g IV, decadron 6mg IV, pulmicort 0.5mg inhaled     Med rec performed by pharmacy team with .   83F former smoker, PMH HTN, HLD, T2DM, CAD, HFpEF, ESRD on HD MWF, dementia, remote hx of breast ca s/p mastectomy, chronic hypoxemic respiratory failure of unclear etiology on home O2 prn p/w weakness, cough (per ED provider note, no EMS note in physical chart). Pt AOx2 (self, location) otherwise somnolent, falling asleep frequently unable to participate meaningfully in interview, though states that she is feeling well, denies complaints.     Able to reach  who relays ED presentation due to dry cough and weakness (usually ambulatory in apartment w/o assistive device, today unable to rise from chair independently), otherwise w/o noticed complaints. Additionally when son arrived home 5/10 PM he found pt on floor adjacent to recliner, believed that she slid out and was unable to rise independently, however denies any noticed bruise/injury and pt did not subsequently complain of injury. He states that pt is at bl mentation.     VS: febrile tmax 38.1C, BP 90s-110s/40s-50s, RR 20-22, SpO2% 95-96 on 2-3L NC  Labs: procal 0.71; HST 98 -> 94, proBNP 4163; BUN/SCr 26/4.81 (GFR 9); VBG 7.43/51/40/34   Micro: COVID-19+  Imaging:  - POCUS ED TTE: scattered b lines b/l, E to A reversal cannot exclude diastolic dysfn  Received: tylenol 975mg PO, cefepime 1g IV, decadron 6mg IV, pulmicort 0.5mg inhaled     Med rec performed by pharmacy team with .

## 2024-05-11 NOTE — ED PROVIDER NOTE - ATTENDING APP SHARED VISIT CONTRIBUTION OF CARE
Attending MD Bibiana Ryan:  I personally made/approved the management plan and take responsibility for the patient management.  Physician assistant note reviewed and agree on plan of care and except where noted.  See HPI, PE, and MDM for details.

## 2024-05-11 NOTE — H&P ADULT - PROBLEM SELECTOR PLAN 1
SIRS met (febrile, tachypneic)   COVID-19+  s/p cefepime in ED  no chest imaging performed in ED  - on O2, will c/w decadron  - remdesivir given comorbidities, monitor CMP  - non-ICU care, d-dimer unknown, ESRD, BMI > 30, VTE ppx per protocol with UFH 7500u SQ q8h/q12h  - procal elevated, will dose abx empirically for superimposed PNA w/ CTX, azithro f/u Cx/MRSA/legionella  - VS q4h on , monitor fever/WBC curve  - monitor vol status re IVF needs given ESRD on HD SIRS met (febrile, tachypneic)   COVID-19+  s/p cefepime in ED  no chest imaging performed in ED  - on O2, will c/w decadron  - remdesivir given comorbidities (d/w pharmacy, ok despite ESRD), monitor CMP  - non-ICU care, d-dimer unknown, ESRD, BMI > 30, VTE ppx per protocol with UFH 7500u SQ q8h/q12h  - procal elevated, will dose abx empirically for superimposed PNA w/ CTX, azithro f/u Cx/MRSA/legionella  - VS q4h on , monitor fever/WBC curve  - monitor vol status re IVF needs given ESRD on HD SIRS met (febrile, tachypneic)   COVID-19+  s/p cefepime in ED  no chest imaging performed in ED  - on O2, will c/w decadron  - remdesivir given comorbidities (d/w pharmacy, ok despite ESRD), monitor CMP  - non-ICU care, d-dimer unknown, ESRD, BMI > 30, VTE ppx per protocol with UFH 7500u SQ q8h/q12h  - procal elevated, will dose abx empirically for superimposed PNA w/ CTX, will hold azithro given QTc elevation, f/u Cx/MRSA/legionella/strep  - VS q4h on , monitor fever/WBC curve  - monitor vol status re IVF needs given ESRD on HD

## 2024-05-11 NOTE — H&P ADULT - PROBLEM SELECTOR PLAN 2
hypercapnic on VBG, on O2  hx unclear chronic respiratory dz  - tx of COVID/PNA as above  - wean O2 as tolerates on   - repeat gas in AM hypercapnic on VBG, on O2  hx unclear chronic respiratory dz  on home O2 prn  - tx of COVID/PNA as above  - wean O2 as tolerates on   - repeat gas in AM

## 2024-05-11 NOTE — ED PROVIDER NOTE - CLINICAL SUMMARY MEDICAL DECISION MAKING FREE TEXT BOX
Attending Bibiana Ryan: 82 yo female with multiple medical issues preenting with concern for cough and fever. upon arrival pt hypoxic requiring supplemental oxygen. pocus performed showing scattered anterior b lines with thickening and irregular pleural, and ivc with respiratory variation. with fever concern for viral iinfection vs possible pna. less likely PE with pulmonary findings on pocus. will obtain labs, cxr, viral panel. given multiple medical issues will cover with abx. pt will need admission to the hospital

## 2024-05-11 NOTE — H&P ADULT - TIME BILLING
- Ordering, reviewing, and interpreting labs, testing, and imaging.  - Independently obtaining a review of systems and performing a physical exam  - Attempts at counselling and educating patient/family regarding interpretation of aforementioned items and plan of care.

## 2024-05-11 NOTE — H&P ADULT - NSHPPHYSICALEXAM_GEN_ALL_CORE
PHYSICAL EXAM:  GENERAL: Obese-appearing, NAD, well-groomed, well-developed, somnolent  HEAD: Atraumatic, Normocephalic  EYES: PERRL, conjunctiva and sclera clear  ENMT: Visualized MMM, otherwise difficult to assess oropharynx given lack of participation  NECK: Supple w/o JVD  NERVOUS SYSTEM: Alert & Oriented X2 (self, location), Somnolent;  strength intact b/l UE, moves toes lower extremities, sensation intact throughout.  CHEST/LUNG: Clear to auscultation bilaterally; No rales, rhonchi, wheezing, or rubs  HEART: Regular rate and rhythm; No murmurs, rubs, or gallops  ABDOMEN: Soft, Nontender, Nondistended; Bowel sounds present. No guarding, rebound tenderness, or rigidity.  EXTREMITIES: 2+ Peripheral Pulses, No edema/warmth/erythema to palpation b/l LE. +tenderness to palpation LLE. +RUE AVF thrill palpated and bruit auscultated

## 2024-05-12 DIAGNOSIS — D64.9 ANEMIA, UNSPECIFIED: ICD-10-CM

## 2024-05-12 DIAGNOSIS — E83.39 OTHER DISORDERS OF PHOSPHORUS METABOLISM: ICD-10-CM

## 2024-05-12 LAB
ALBUMIN SERPL ELPH-MCNC: 3.6 G/DL — SIGNIFICANT CHANGE UP (ref 3.3–5)
ALBUMIN SERPL ELPH-MCNC: 3.7 G/DL — SIGNIFICANT CHANGE UP (ref 3.3–5)
ALP SERPL-CCNC: 54 U/L — SIGNIFICANT CHANGE UP (ref 40–120)
ALP SERPL-CCNC: 57 U/L — SIGNIFICANT CHANGE UP (ref 40–120)
ALT FLD-CCNC: 10 U/L — SIGNIFICANT CHANGE UP (ref 10–45)
ALT FLD-CCNC: 11 U/L — SIGNIFICANT CHANGE UP (ref 10–45)
ANION GAP SERPL CALC-SCNC: 17 MMOL/L — SIGNIFICANT CHANGE UP (ref 5–17)
AST SERPL-CCNC: 16 U/L — SIGNIFICANT CHANGE UP (ref 10–40)
AST SERPL-CCNC: 17 U/L — SIGNIFICANT CHANGE UP (ref 10–40)
BASE EXCESS BLDV CALC-SCNC: 6.3 MMOL/L — HIGH (ref -2–3)
BASE EXCESS BLDV CALC-SCNC: 9.4 MMOL/L — HIGH (ref -2–3)
BASOPHILS # BLD AUTO: 0.03 K/UL — SIGNIFICANT CHANGE UP (ref 0–0.2)
BASOPHILS NFR BLD AUTO: 0.4 % — SIGNIFICANT CHANGE UP (ref 0–2)
BILIRUB DIRECT SERPL-MCNC: 0.1 MG/DL — SIGNIFICANT CHANGE UP (ref 0–0.3)
BILIRUB INDIRECT FLD-MCNC: 0.3 MG/DL — SIGNIFICANT CHANGE UP (ref 0.2–1)
BILIRUB SERPL-MCNC: 0.3 MG/DL — SIGNIFICANT CHANGE UP (ref 0.2–1.2)
BILIRUB SERPL-MCNC: 0.4 MG/DL — SIGNIFICANT CHANGE UP (ref 0.2–1.2)
BUN SERPL-MCNC: 37 MG/DL — HIGH (ref 7–23)
CA-I SERPL-SCNC: 1.14 MMOL/L — LOW (ref 1.15–1.33)
CA-I SERPL-SCNC: 1.15 MMOL/L — SIGNIFICANT CHANGE UP (ref 1.15–1.33)
CALCIUM SERPL-MCNC: 9 MG/DL — SIGNIFICANT CHANGE UP (ref 8.4–10.5)
CHLORIDE BLDV-SCNC: 96 MMOL/L — SIGNIFICANT CHANGE UP (ref 96–108)
CHLORIDE BLDV-SCNC: 98 MMOL/L — SIGNIFICANT CHANGE UP (ref 96–108)
CHLORIDE SERPL-SCNC: 95 MMOL/L — LOW (ref 96–108)
CO2 BLDV-SCNC: 35 MMOL/L — HIGH (ref 22–26)
CO2 BLDV-SCNC: 37 MMOL/L — HIGH (ref 22–26)
CO2 SERPL-SCNC: 27 MMOL/L — SIGNIFICANT CHANGE UP (ref 22–31)
CREAT SERPL-MCNC: 5.14 MG/DL — HIGH (ref 0.5–1.3)
CREAT SERPL-MCNC: 5.55 MG/DL — HIGH (ref 0.5–1.3)
CRP SERPL-MCNC: 54 MG/L — HIGH (ref 0–4)
D DIMER BLD IA.RAPID-MCNC: 549 NG/ML DDU — HIGH
EGFR: 7 ML/MIN/1.73M2 — LOW
EGFR: 8 ML/MIN/1.73M2 — LOW
EOSINOPHIL # BLD AUTO: 0 K/UL — SIGNIFICANT CHANGE UP (ref 0–0.5)
EOSINOPHIL NFR BLD AUTO: 0 % — SIGNIFICANT CHANGE UP (ref 0–6)
FERRITIN SERPL-MCNC: 493 NG/ML — HIGH (ref 13–330)
GAS PNL BLDV: 134 MMOL/L — LOW (ref 136–145)
GAS PNL BLDV: 135 MMOL/L — LOW (ref 136–145)
GAS PNL BLDV: SIGNIFICANT CHANGE UP
GLUCOSE BLDC GLUCOMTR-MCNC: 155 MG/DL — HIGH (ref 70–99)
GLUCOSE BLDC GLUCOMTR-MCNC: 161 MG/DL — HIGH (ref 70–99)
GLUCOSE BLDC GLUCOMTR-MCNC: 171 MG/DL — HIGH (ref 70–99)
GLUCOSE BLDC GLUCOMTR-MCNC: 180 MG/DL — HIGH (ref 70–99)
GLUCOSE BLDC GLUCOMTR-MCNC: 193 MG/DL — HIGH (ref 70–99)
GLUCOSE BLDV-MCNC: 158 MG/DL — HIGH (ref 70–99)
GLUCOSE BLDV-MCNC: 182 MG/DL — HIGH (ref 70–99)
GLUCOSE SERPL-MCNC: 166 MG/DL — HIGH (ref 70–99)
HCO3 BLDV-SCNC: 33 MMOL/L — HIGH (ref 22–29)
HCO3 BLDV-SCNC: 36 MMOL/L — HIGH (ref 22–29)
HCT VFR BLD CALC: 38.7 % — SIGNIFICANT CHANGE UP (ref 34.5–45)
HCT VFR BLDA CALC: 38 % — SIGNIFICANT CHANGE UP (ref 34.5–46.5)
HCT VFR BLDA CALC: 40 % — SIGNIFICANT CHANGE UP (ref 34.5–46.5)
HGB BLD CALC-MCNC: 12.5 G/DL — SIGNIFICANT CHANGE UP (ref 11.7–16.1)
HGB BLD CALC-MCNC: 13.3 G/DL — SIGNIFICANT CHANGE UP (ref 11.7–16.1)
HGB BLD-MCNC: 12.3 G/DL — SIGNIFICANT CHANGE UP (ref 11.5–15.5)
IMM GRANULOCYTES NFR BLD AUTO: 0.8 % — SIGNIFICANT CHANGE UP (ref 0–0.9)
INR BLD: 1.02 RATIO — SIGNIFICANT CHANGE UP (ref 0.85–1.18)
LACTATE BLDV-MCNC: 1.1 MMOL/L — SIGNIFICANT CHANGE UP (ref 0.5–2)
LACTATE BLDV-MCNC: 1.4 MMOL/L — SIGNIFICANT CHANGE UP (ref 0.5–2)
LDH SERPL L TO P-CCNC: 169 U/L — SIGNIFICANT CHANGE UP (ref 50–242)
LYMPHOCYTES # BLD AUTO: 0.54 K/UL — LOW (ref 1–3.3)
LYMPHOCYTES # BLD AUTO: 7.2 % — LOW (ref 13–44)
MAGNESIUM SERPL-MCNC: 2.6 MG/DL — SIGNIFICANT CHANGE UP (ref 1.6–2.6)
MCHC RBC-ENTMCNC: 29.3 PG — SIGNIFICANT CHANGE UP (ref 27–34)
MCHC RBC-ENTMCNC: 31.8 GM/DL — LOW (ref 32–36)
MCV RBC AUTO: 92.1 FL — SIGNIFICANT CHANGE UP (ref 80–100)
MONOCYTES # BLD AUTO: 0.28 K/UL — SIGNIFICANT CHANGE UP (ref 0–0.9)
MONOCYTES NFR BLD AUTO: 3.7 % — SIGNIFICANT CHANGE UP (ref 2–14)
MRSA PCR RESULT.: SIGNIFICANT CHANGE UP
NEUTROPHILS # BLD AUTO: 6.6 K/UL — SIGNIFICANT CHANGE UP (ref 1.8–7.4)
NEUTROPHILS NFR BLD AUTO: 87.9 % — HIGH (ref 43–77)
NRBC # BLD: 0 /100 WBCS — SIGNIFICANT CHANGE UP (ref 0–0)
PCO2 BLDV: 55 MMHG — HIGH (ref 39–42)
PCO2 BLDV: 56 MMHG — HIGH (ref 39–42)
PH BLDV: 7.38 — SIGNIFICANT CHANGE UP (ref 7.32–7.43)
PH BLDV: 7.42 — SIGNIFICANT CHANGE UP (ref 7.32–7.43)
PHOSPHATE SERPL-MCNC: 6.7 MG/DL — HIGH (ref 2.5–4.5)
PLATELET # BLD AUTO: 223 K/UL — SIGNIFICANT CHANGE UP (ref 150–400)
PO2 BLDV: 52 MMHG — HIGH (ref 25–45)
PO2 BLDV: 56 MMHG — HIGH (ref 25–45)
POTASSIUM BLDV-SCNC: 4 MMOL/L — SIGNIFICANT CHANGE UP (ref 3.5–5.1)
POTASSIUM BLDV-SCNC: 4.4 MMOL/L — SIGNIFICANT CHANGE UP (ref 3.5–5.1)
POTASSIUM SERPL-MCNC: 4.3 MMOL/L — SIGNIFICANT CHANGE UP (ref 3.5–5.3)
POTASSIUM SERPL-SCNC: 4.3 MMOL/L — SIGNIFICANT CHANGE UP (ref 3.5–5.3)
PROT SERPL-MCNC: 7.1 G/DL — SIGNIFICANT CHANGE UP (ref 6–8.3)
PROT SERPL-MCNC: 7.3 G/DL — SIGNIFICANT CHANGE UP (ref 6–8.3)
PROTHROM AB SERPL-ACNC: 11.2 SEC — SIGNIFICANT CHANGE UP (ref 9.5–13)
RBC # BLD: 4.2 M/UL — SIGNIFICANT CHANGE UP (ref 3.8–5.2)
RBC # FLD: 17.6 % — HIGH (ref 10.3–14.5)
S AUREUS DNA NOSE QL NAA+PROBE: SIGNIFICANT CHANGE UP
SAO2 % BLDV: 79.8 % — SIGNIFICANT CHANGE UP (ref 67–88)
SAO2 % BLDV: 87.6 % — SIGNIFICANT CHANGE UP (ref 67–88)
SODIUM SERPL-SCNC: 139 MMOL/L — SIGNIFICANT CHANGE UP (ref 135–145)
WBC # BLD: 7.51 K/UL — SIGNIFICANT CHANGE UP (ref 3.8–10.5)
WBC # FLD AUTO: 7.51 K/UL — SIGNIFICANT CHANGE UP (ref 3.8–10.5)

## 2024-05-12 PROCEDURE — 93970 EXTREMITY STUDY: CPT | Mod: 26

## 2024-05-12 PROCEDURE — 70450 CT HEAD/BRAIN W/O DYE: CPT | Mod: 26

## 2024-05-12 PROCEDURE — 99222 1ST HOSP IP/OBS MODERATE 55: CPT

## 2024-05-12 RX ORDER — MIDODRINE HYDROCHLORIDE 2.5 MG/1
10 TABLET ORAL ONCE
Refills: 0 | Status: COMPLETED | OUTPATIENT
Start: 2024-05-13 | End: 2024-05-13

## 2024-05-12 RX ORDER — INSULIN LISPRO 100/ML
VIAL (ML) SUBCUTANEOUS AT BEDTIME
Refills: 0 | Status: DISCONTINUED | OUTPATIENT
Start: 2024-05-12 | End: 2024-05-17

## 2024-05-12 RX ORDER — INSULIN LISPRO 100/ML
VIAL (ML) SUBCUTANEOUS
Refills: 0 | Status: DISCONTINUED | OUTPATIENT
Start: 2024-05-12 | End: 2024-05-17

## 2024-05-12 RX ADMIN — MONTELUKAST 10 MILLIGRAM(S): 4 TABLET, CHEWABLE ORAL at 12:20

## 2024-05-12 RX ADMIN — BUDESONIDE AND FORMOTEROL FUMARATE DIHYDRATE 2 PUFF(S): 160; 4.5 AEROSOL RESPIRATORY (INHALATION) at 17:49

## 2024-05-12 RX ADMIN — CEFTRIAXONE 100 MILLIGRAM(S): 500 INJECTION, POWDER, FOR SOLUTION INTRAMUSCULAR; INTRAVENOUS at 05:38

## 2024-05-12 RX ADMIN — HEPARIN SODIUM 7500 UNIT(S): 5000 INJECTION INTRAVENOUS; SUBCUTANEOUS at 21:29

## 2024-05-12 RX ADMIN — BUDESONIDE AND FORMOTEROL FUMARATE DIHYDRATE 2 PUFF(S): 160; 4.5 AEROSOL RESPIRATORY (INHALATION) at 05:38

## 2024-05-12 RX ADMIN — HEPARIN SODIUM 7500 UNIT(S): 5000 INJECTION INTRAVENOUS; SUBCUTANEOUS at 05:37

## 2024-05-12 RX ADMIN — HEPARIN SODIUM 7500 UNIT(S): 5000 INJECTION INTRAVENOUS; SUBCUTANEOUS at 12:20

## 2024-05-12 RX ADMIN — ATORVASTATIN CALCIUM 80 MILLIGRAM(S): 80 TABLET, FILM COATED ORAL at 21:29

## 2024-05-12 RX ADMIN — Medication 1: at 17:50

## 2024-05-12 RX ADMIN — Medication 1: at 13:57

## 2024-05-12 RX ADMIN — Medication 6 MILLIGRAM(S): at 05:38

## 2024-05-12 RX ADMIN — REMDESIVIR 200 MILLIGRAM(S): 5 INJECTION INTRAVENOUS at 08:50

## 2024-05-12 RX ADMIN — Medication 1: at 08:49

## 2024-05-12 NOTE — CONSULT NOTE ADULT - ATTENDING COMMENTS
pt seen this morning 5/13 at approx 10am   at bedside  #ESRD On HD MWF  plan HD today  #a/w covid  pna  on abx  on remdesivir  comfortable  feeling better  #anemia in ckd  hb at goal  hold vida  monitor hb

## 2024-05-12 NOTE — CONSULT NOTE ADULT - SUBJECTIVE AND OBJECTIVE BOX
Patient is a 83y old  Female who presents with a chief complaint of Sepsis 2/2 COVID-19 infection (12 May 2024 15:46)    HPI:  83-year-old female with a past medical history significant for tobacco use, hypertension, hyperlipidemia, diabetes, CAD, heart failure, ESRD on HD, dementia, chronic hypoxemic respiratory failure on home oxygen as needed who is admitted to the hospital due to weakness and cough.    Upon admission, patient somewhat disoriented and lethargic.  Denied any complaints upon admission.  Family reported that patient has had dry cough and weakness.  Patient was found by son on the evening of admission on the floor next to the recliner.  Patient was believed to have slid out of the chair and unable to rise as usual.    In the ED, patient is febrile with a Tmax of 30.1, otherwise hemodynamically stable, requiring 2 to 3 L nasal cannula.  Initial labs show no leukocytosis, creatinine elevated in setting of ESRD, Pro-Anthony 0.71, COVID-19 testing is positive.  MRSA nares PCR is negative.  Chest x-ray from admission shows patchy bilateral interstitial infiltrates.  CT head shows no acute intracranial hemorrhage.  Lower extremity Doppler shows no evidence for DVT.    Patient mated for further management started on remdesivir and dexamethasone.  Patient also started on ceftriaxone.  ID consulted for further evaluation.      prior hospital charts reviewed [  ]  primary team notes reviewed [  ]  other consultant notes reviewed [  ]    PAST MEDICAL & SURGICAL HISTORY:  CA - Breast Cancer  1996 s/p lumpectomy, chemo, and radiation      CAD (Coronary Artery Disease)      Dyslipidemia      HTN (Hypertension)      Asthma      H/O: Obesity      H/O: Osteoarthritis      Type 2 diabetes mellitus      ESRD (end stage renal disease)      (HFpEF) heart failure with preserved ejection fraction      AVF (arteriovenous fistula)      COPD (chronic obstructive pulmonary disease)      Senile dementia      Chronic hypoxemic respiratory failure      Iron deficiency anemia      S/P Breast Lumpectomy      S/P dialysis catheter insertion      Patent hemodialysis access site      History of unilateral modified radical mastectomy          Allergies  No Known Allergies    ANTIMICROBIALS (past 90 days)  MEDICATIONS  (STANDING):  cefepime   IVPB   100 mL/Hr IV Intermittent (05-11-24 @ 18:20)    cefTRIAXone   IVPB   100 mL/Hr IV Intermittent (05-12-24 @ 05:38)    remdesivir  IVPB   200 mL/Hr IV Intermittent (05-12-24 @ 08:50)        cefTRIAXone   IVPB 1000 every 24 hours  remdesivir  IVPB      MEDICATIONS  (STANDING):  acetaminophen     Tablet .. 650 every 6 hours PRN  albuterol    90 MICROgram(s) HFA Inhaler 2 every 6 hours PRN  atorvastatin 80 at bedtime  budesonide 160 MICROgram(s)/formoterol 4.5 MICROgram(s) Inhaler 2 two times a day  dexAMETHasone     Tablet 6 daily  dextrose 50% Injectable 25 once  dextrose 50% Injectable 12.5 once  dextrose Oral Gel 15 once PRN  glucagon  Injectable 1 once  heparin   Injectable 7500 every 8 hours  insulin lispro (ADMELOG) corrective regimen sliding scale  every 6 hours  montelukast 10 daily    SOCIAL HISTORY:       FAMILY HISTORY:  FH: breast cancer (Mother)      REVIEW OF SYSTEMS  [  ] ROS unobtainable because:    [  ] All other systems negative except as noted below:	    Constitutional:  [ ] fever [ ] chills  [ ] weight loss  [ ] weakness  Skin:  [ ] rash [ ] phlebitis	  Eyes: [ ] icterus [ ] pain  [ ] discharge	  ENMT: [ ] sore throat  [ ] thrush [ ] ulcers [ ] exudates  Respiratory: [ ] dyspnea [ ] hemoptysis [ ] cough [ ] sputum	  Cardiovascular:  [ ] chest pain [ ] palpitations [ ] edema	  Gastrointestinal:  [ ] nausea [ ] vomiting [ ] diarrhea [ ] constipation [ ] pain	  Genitourinary:  [ ] dysuria [ ] frequency [ ] hematuria [ ] discharge [ ] flank pain  [ ] incontinence  Musculoskeletal:  [ ] myalgias [ ] arthralgias [ ] arthritis  [ ] back pain  Neurological:  [ ] headache [ ] seizures  [ ] confusion/altered mental status  Psychiatric:  [ ] anxiety [ ] depression	  Hematology/Lymphatics:  [ ] lymphadenopathy  Endocrine:  [ ] adrenal [ ] thyroid  Allergic/Immunologic:	 [ ] transplant [ ] seasonal    Vital Signs Last 24 Hrs  T(F): 97.6 (05-12-24 @ 05:47), Max: 100.5 (05-11-24 @ 17:37)  Vital Signs Last 24 Hrs  HR: 74 (05-12-24 @ 15:19) (71 - 88)  BP: 120/55 (05-12-24 @ 15:19) (91/49 - 139/56)  RR: 20 (05-12-24 @ 05:47)  SpO2: 93% (05-12-24 @ 15:19) (93% - 99%)  Wt(kg): --    PHYSICAL EXAM:  Constitutional: non-toxic, no distress  HEAD/EYES: anicteric, no conjunctival injection  ENT:  supple, no thrush  Cardiovascular:   normal S1, S2, no murmur, no edema  Respiratory:  clear BS bilaterally, no wheezes, no rales  GI:  soft, non-tender, normal bowel sounds  :  no musa, no CVA tenderness  Musculoskeletal:  no synovitis, normal ROM  Neurologic: awake and alert, normal strength, no focal findings  Skin:  no rash, no erythema, no phlebitis  Heme/Onc: no lymphadenopathy   Psychiatric:  awake, alert, appropriate mood                            12.3   7.51  )-----------( 223      ( 12 May 2024 06:15 )             38.7   05-12    139  |  95<L>  |  37<H>  ----------------------------<  166<H>  4.3   |  27  |  5.55<H>    Ca    9.0      12 May 2024 06:15  Phos  6.7     05-12  Mg     2.6     05-12    TPro  7.1  /  Alb  3.6  /  TBili  0.3  /  DBili  x   /  AST  16  /  ALT  10  /  AlkPhos  54  05-12    Urinalysis Basic - ( 12 May 2024 06:15 )    Color: x / Appearance: x / SG: x / pH: x  Gluc: 166 mg/dL / Ketone: x  / Bili: x / Urobili: x   Blood: x / Protein: x / Nitrite: x   Leuk Esterase: x / RBC: x / WBC x   Sq Epi: x / Non Sq Epi: x / Bacteria: x    MICROBIOLOGY:              RADIOLOGY:  imaging below personally reviewed and agree with findings Patient is a 83y old  Female who presents with a chief complaint of Sepsis 2/2 COVID-19 infection (12 May 2024 15:46)    HPI:  83-year-old female with a past medical history significant for tobacco use, hypertension, hyperlipidemia, diabetes, CAD, heart failure, ESRD on HD, dementia, chronic hypoxemic respiratory failure on home oxygen as needed who is admitted to the hospital due to weakness and cough.    Upon admission, patient somewhat disoriented and lethargic.  Denied any complaints upon admission.  Family reported that patient has had dry cough and weakness.  Patient was found by son on the evening of admission on the floor next to the recliner.  Patient was believed to have slid out of the chair and unable to rise as usual.    In the ED, patient is febrile with a Tmax of 30.1, otherwise hemodynamically stable, requiring 2 to 3 L nasal cannula.  Initial labs show no leukocytosis, creatinine elevated in setting of ESRD, Pro-Anthony 0.71, COVID-19 testing is positive.  MRSA nares PCR is negative.  Chest x-ray from admission shows patchy bilateral interstitial infiltrates.  CT head shows no acute intracranial hemorrhage.  Lower extremity Doppler shows no evidence for DVT.    Patient mated for further management started on remdesivir and dexamethasone.  Patient also started on ceftriaxone.  ID consulted for further evaluation.      prior hospital charts reviewed [x  ]  primary team notes reviewed [x  ]  other consultant notes reviewed [x  ]    PAST MEDICAL & SURGICAL HISTORY:  CA - Breast Cancer  1996 s/p lumpectomy, chemo, and radiation      CAD (Coronary Artery Disease)      Dyslipidemia      HTN (Hypertension)      Asthma      H/O: Obesity      H/O: Osteoarthritis      Type 2 diabetes mellitus      ESRD (end stage renal disease)      (HFpEF) heart failure with preserved ejection fraction      AVF (arteriovenous fistula)      COPD (chronic obstructive pulmonary disease)      Senile dementia      Chronic hypoxemic respiratory failure      Iron deficiency anemia      S/P Breast Lumpectomy      S/P dialysis catheter insertion      Patent hemodialysis access site      History of unilateral modified radical mastectomy          Allergies  No Known Allergies    ANTIMICROBIALS (past 90 days)  MEDICATIONS  (STANDING):  cefepime   IVPB   100 mL/Hr IV Intermittent (05-11-24 @ 18:20)    cefTRIAXone   IVPB   100 mL/Hr IV Intermittent (05-12-24 @ 05:38)    remdesivir  IVPB   200 mL/Hr IV Intermittent (05-12-24 @ 08:50)        cefTRIAXone   IVPB 1000 every 24 hours  remdesivir  IVPB      MEDICATIONS  (STANDING):  acetaminophen     Tablet .. 650 every 6 hours PRN  albuterol    90 MICROgram(s) HFA Inhaler 2 every 6 hours PRN  atorvastatin 80 at bedtime  budesonide 160 MICROgram(s)/formoterol 4.5 MICROgram(s) Inhaler 2 two times a day  dexAMETHasone     Tablet 6 daily  dextrose 50% Injectable 25 once  dextrose 50% Injectable 12.5 once  dextrose Oral Gel 15 once PRN  glucagon  Injectable 1 once  heparin   Injectable 7500 every 8 hours  insulin lispro (ADMELOG) corrective regimen sliding scale  every 6 hours  montelukast 10 daily    SOCIAL HISTORY:       FAMILY HISTORY:  FH: breast cancer (Mother)      REVIEW OF SYSTEMS  [  ] ROS unobtainable because:    [ x ] All other systems negative except as noted below:	    Constitutional:  [ ] fever [ ] chills  [ ] weight loss  [ ] weakness  Skin:  [ ] rash [ ] phlebitis	  Eyes: [ ] icterus [ ] pain  [ ] discharge	  ENMT: [ ] sore throat  [ ] thrush [ ] ulcers [ ] exudates  Respiratory: [x ] dyspnea [ ] hemoptysis [ ] cough [ ] sputum	  Cardiovascular:  [ ] chest pain [ ] palpitations [ ] edema	  Gastrointestinal:  [ ] nausea [ ] vomiting [ ] diarrhea [ ] constipation [ ] pain	  Genitourinary:  [ ] dysuria [ ] frequency [ ] hematuria [ ] discharge [ ] flank pain  [ ] incontinence  Musculoskeletal:  [ ] myalgias [ ] arthralgias [ ] arthritis  [ ] back pain  Neurological:  [ ] headache [ ] seizures  [ ] confusion/altered mental status  Psychiatric:  [ ] anxiety [ ] depression	  Hematology/Lymphatics:  [ ] lymphadenopathy  Endocrine:  [ ] adrenal [ ] thyroid  Allergic/Immunologic:	 [ ] transplant [ ] seasonal    Vital Signs Last 24 Hrs  T(F): 97.6 (05-12-24 @ 05:47), Max: 100.5 (05-11-24 @ 17:37)  Vital Signs Last 24 Hrs  HR: 74 (05-12-24 @ 15:19) (71 - 88)  BP: 120/55 (05-12-24 @ 15:19) (91/49 - 139/56)  RR: 20 (05-12-24 @ 05:47)  SpO2: 93% (05-12-24 @ 15:19) (93% - 99%)  Wt(kg): --    PHYSICAL EXAM:  Constitutional: non-toxic, no distress, NC in place  HEAD/EYES: anicteric, no conjunctival injection  ENT:  supple, no thrush  Cardiovascular:   normal S1, S2, no murmur, no edema  Respiratory:  clear BS bilaterally, no wheezes, no rales  GI:  soft, non-tender, normal bowel sounds  :  no musa, no CVA tenderness  Musculoskeletal:  no synovitis, normal ROM  Neurologic: awake and alert, normal strength, no focal findings  Skin:  no rash, no erythema, no phlebitis  Heme/Onc: no lymphadenopathy   Psychiatric:  awake, alert, appropriate mood                            12.3   7.51  )-----------( 223      ( 12 May 2024 06:15 )             38.7   05-12    139  |  95<L>  |  37<H>  ----------------------------<  166<H>  4.3   |  27  |  5.55<H>    Ca    9.0      12 May 2024 06:15  Phos  6.7     05-12  Mg     2.6     05-12    TPro  7.1  /  Alb  3.6  /  TBili  0.3  /  DBili  x   /  AST  16  /  ALT  10  /  AlkPhos  54  05-12    Urinalysis Basic - ( 12 May 2024 06:15 )    Color: x / Appearance: x / SG: x / pH: x  Gluc: 166 mg/dL / Ketone: x  / Bili: x / Urobili: x   Blood: x / Protein: x / Nitrite: x   Leuk Esterase: x / RBC: x / WBC x   Sq Epi: x / Non Sq Epi: x / Bacteria: x    MICROBIOLOGY:              RADIOLOGY:  imaging below personally reviewed and agree with findings

## 2024-05-12 NOTE — PHYSICAL THERAPY INITIAL EVALUATION ADULT - PERTINENT HX OF CURRENT PROBLEM, REHAB EVAL
PMHx: former smoker, HTN, HLD, T2DM, CAD, HFpEF, ESRD on HD MWF, dementia, remote hx of breast ca s/p mastectomy, chronic hypoxemic respiratory failure of unclear etiology on home O2 prn. p/w weakness, cough (per ED provider note, no EMS note in physical chart). Pt AOx2 (self, location) otherwise somnolent, falling asleep frequently unable to participate meaningfully in interview, though states that she is feeling well, denies complaints. Able to reach  who relays ED presentation due to dry cough & weakness (usually ambulatory in apartment w/o assistive device, today unable to rise from chair independently), otherwise w/o noticed complaints. Additionally when son arrived home 5/10 PM he found pt on floor adjacent to recliner, believed that she slid out and was unable to rise independently, however denies any noticed bruise/injury and pt did not subsequently complain of injury. +febrile,  +sepsis due to COVID-19 infection +/- superimposed bacterial PNA    will obtain CTH NC to r/o injury/bleed given somnolence and neurochecks q4h  LLE tenderness to palpation on exam, obtain duplex venous LE r/o VTE    TTE: No Pericardial Effusion. Preserved left ventricular function PMHx: former smoker, HTN, HLD, T2DM, CAD, HFpEF, ESRD on HD MWF, dementia, remote hx of breast ca s/p mastectomy, chronic hypoxemic respiratory failure of unclear etiology on home O2 prn. p/w weakness, cough (per ED provider note, no EMS note in physical chart). Pt AOx2 (self, location) otherwise somnolent, falling asleep frequently unable to participate meaningfully in interview, though states that she is feeling well, denies complaints. Able to reach  who relays ED presentation due to dry cough & weakness (usually ambulatory in apartment w/o assistive device, today unable to rise from chair independently), otherwise w/o noticed complaints. Additionally when son arrived home 5/10 PM he found pt on floor adjacent to recliner, believed that she slid out and was unable to rise independently, however denies any noticed bruise/injury and pt did not subsequently complain of injury. +febrile,  +COVID, +sepsis due to COVID-19 infection +/- superimposed bacterial PNA    will obtain CTH NC to r/o injury/bleed given somnolence and neurochecks q4h  LLE tenderness to palpation on exam, obtain duplex venous LE r/o VTE    TTE: No Pericardial Effusion. Preserved left ventricular function PMHx: former smoker, HTN, HLD, T2DM, CAD, HFpEF, ESRD on HD MWF, dementia, remote hx of breast ca s/p mastectomy, chronic hypoxemic respiratory failure of unclear etiology on home O2 prn. p/w weakness, cough (per ED provider note, no EMS note in physical chart). Pt AOx2 (self, location) otherwise somnolent, falling asleep frequently unable to participate meaningfully in interview, though states that she is feeling well, denies complaints. Able to reach  who relays ED presentation due to dry cough & weakness (usually ambulatory in apartment w/o assistive device, today unable to rise from chair independently), otherwise w/o noticed complaints. Additionally when son arrived home 5/10 PM he found pt on floor adjacent to recliner, believed that she slid out and was unable to rise independently, however denies any noticed bruise/injury and pt did not subsequently complain of injury. +febrile,  +COVID, +sepsis due to COVID-19 infection +/- superimposed bacterial PNA. CTH neg. dopplers neg    TTE: No Pericardial Effusion. Preserved left ventricular function

## 2024-05-12 NOTE — CONSULT NOTE ADULT - PROBLEM SELECTOR RECOMMENDATION 9
ESRD on HD MWF (Dr. Dash at [facility]) via RUE-AVF. Last HD outpatient on Friday 5/10 as per schedule. Dry weight 71 kg. Currently 72.6 kg. Labs and volume status acceptable. Will order HD for Monday 5/13 as per schedule with 2L UF. Midodrine PRN prior to HD to help with UF. Dialysis consent obtained and placed in patient’s chart.

## 2024-05-12 NOTE — PHYSICAL THERAPY INITIAL EVALUATION ADULT - ADDITIONAL COMMENTS
lives with  in apartment, no steps to enter, elevator inside, right hand dominant, wears reading glasses lives with  in apartment, no steps to enter, elevator inside, right hand dominant, wears reading glasses    +bilateral hand tremors noted

## 2024-05-12 NOTE — CONSULT NOTE ADULT - PROBLEM SELECTOR RECOMMENDATION 3
Phos 6.7 (5/12),  (5/8 -outpatient), Ca 9.   Resume patient’s outpatient Sevelamer 800 mg TID with meals.        Quang Kaba  Nephrology Fellow  Feel free to contact me on TEAMS  After 4 pm please contact the on-call Fellow.

## 2024-05-12 NOTE — CONSULT NOTE ADULT - SUBJECTIVE AND OBJECTIVE BOX
[facility] DIVISION OF KIDNEY DISEASES AND HYPERTENSION -- [phone]  -- INITIAL CONSULT NOTE  --------------------------------------------------------------------------------  HPI:  82 yo female with PMH of ESRD on HD MWF (Dr. Dash at [facility]), HTN, T2DM, CAD, HFpEF, dementia, remote hx of breast ca s/p mastectomy, chronic hypoxemic respiratory failure on home O2 prn p/w weakness, cough. Pt is a poor historian.     She states she doesn't feel well due to persistent cough. Found to be COVID+.     Nephrology consulted for ESRD management.         PAST HISTORY  --------------------------------------------------------------------------------  PAST MEDICAL & SURGICAL HISTORY:  CA - Breast Cancer  1996 s/p lumpectomy, chemo, and radiation      CAD (Coronary Artery Disease)      Dyslipidemia      HTN (Hypertension)      Asthma      H/O: Obesity      H/O: Osteoarthritis      Type 2 diabetes mellitus      ESRD (end stage renal disease)      (HFpEF) heart failure with preserved ejection fraction      AVF (arteriovenous fistula)      COPD (chronic obstructive pulmonary disease)      Senile dementia      Chronic hypoxemic respiratory failure      Iron deficiency anemia      S/P Breast Lumpectomy      S/P dialysis catheter insertion      Patent hemodialysis access site      History of unilateral modified radical mastectomy        FAMILY HISTORY:  FH: breast cancer (Mother)      PAST SOCIAL HISTORY:    ALLERGIES & MEDICATIONS  --------------------------------------------------------------------------------  Allergies    No Known Allergies    Intolerances      Standing Inpatient Medications  atorvastatin 80 milliGRAM(s) Oral at bedtime  budesonide 160 MICROgram(s)/formoterol 4.5 MICROgram(s) Inhaler 2 Puff(s) Inhalation two times a day  cefTRIAXone   IVPB 1000 milliGRAM(s) IV Intermittent every 24 hours  dexAMETHasone     Tablet 6 milliGRAM(s) Oral daily  dextrose 10% Bolus 125 milliLiter(s) IV Bolus once  dextrose 5%. 1000 milliLiter(s) IV Continuous <Continuous>  dextrose 5%. 1000 milliLiter(s) IV Continuous <Continuous>  dextrose 50% Injectable 25 Gram(s) IV Push once  dextrose 50% Injectable 12.5 Gram(s) IV Push once  glucagon  Injectable 1 milliGRAM(s) IntraMuscular once  heparin   Injectable 7500 Unit(s) SubCutaneous every 8 hours  insulin lispro (ADMELOG) corrective regimen sliding scale   SubCutaneous every 6 hours  montelukast 10 milliGRAM(s) Oral daily  remdesivir  IVPB   IV Intermittent     PRN Inpatient Medications  acetaminophen     Tablet .. 650 milliGRAM(s) Oral every 6 hours PRN  albuterol    90 MICROgram(s) HFA Inhaler 2 Puff(s) Inhalation every 6 hours PRN  dextrose Oral Gel 15 Gram(s) Oral once PRN      REVIEW OF SYSTEMS  --------------------------------------------------------------------------------  Gen: No fevers/chills  Head/Eyes/Ears: No HA  Respiratory: No dyspnea, +cough  CV: No chest pain  GI: No abdominal pain, diarrhea      VITALS/PHYSICAL EXAM  --------------------------------------------------------------------------------  T(C): 36.4 (05-12-24 @ 05:47), Max: 36.8 (05-11-24 @ 21:30)  HR: 74 (05-12-24 @ 15:19) (71 - 83)  BP: 120/55 (05-12-24 @ 15:19) (110/53 - 139/56)  RR: 20 (05-12-24 @ 05:47) (18 - 22)  SpO2: 93% (05-12-24 @ 15:19) (93% - 99%)  Wt(kg): --  Height (cm): 149.9 (05-11-24 @ 17:14)  Weight (kg): 72.6 (05-11-24 @ 17:14)  BMI (kg/m2): 32.3 (05-11-24 @ 17:14)  BSA (m2): 1.68 (05-11-24 @ 17:14)      05-11-24 @ 07:01  -  05-12-24 @ 07:00  --------------------------------------------------------  IN: 0 mL / OUT: 0 mL / NET: 0 mL        Physical Exam:  	Gen: NAD  	HEENT: Anicteric  	Pulm: Scattered crackles   	CV: S1S2+  	Abd: Soft, +BS    	Ext: No LE edema B/L  	Neuro: Awake  	Skin: Warm and dry  	Dialysis access: RUE-AVF, +thrill    LABS/STUDIES  --------------------------------------------------------------------------------              12.3   7.51  >-----------<  223      [05-12-24 @ 06:15]              38.7     139  |  95  |  37  ----------------------------<  166      [05-12-24 @ 06:15]  4.3   |  27  |  5.55        Ca     9.0     [05-12-24 @ 06:15]      Mg     2.6     [05-12-24 @ 06:15]      Phos  6.7     [05-12-24 @ 06:15]    TPro  7.1  /  Alb  3.6  /  TBili  0.3  /  DBili  x   /  AST  16  /  ALT  10  /  AlkPhos  54  [05-12-24 @ 06:15]    PT/INR: PT 11.2 , INR 1.02       [05-12-24 @ 00:23]  PTT: 34.7       [05-11-24 @ 17:59]    CK 40      [05-11-24 @ 17:59]        [05-12-24 @ 06:15]    Creatinine Trend:  SCr 5.55 [05-12 @ 06:15]  SCr 5.14 [05-12 @ 00:23]  SCr 4.81 [05-11 @ 17:59]    Urinalysis - [05-12-24 @ 06:15]      Color  / Appearance  / SG  / pH       Gluc 166 / Ketone   / Bili  / Urobili        Blood  / Protein  / Leuk Est  / Nitrite       RBC  / WBC  / Hyaline  / Gran  / Sq Epi  / Non Sq Epi  / Bacteria       HBsAb <3.0      [11-23-23 @ 06:25]  HBsAg Nonreact      [11-23-23 @ 06:25]  HCV 0.08, Nonreact      [11-23-23 @ 06:25]  HIV Nonreact      [11-23-23 @ 06:25]    JAYLAN: titer Negative, pattern --      [11-23-23 @ 06:25]  dsDNA <12      [11-23-23 @ 06:25]  C3 Complement 126      [11-23-23 @ 06:25]  C4 Complement 26      [11-23-23 @ 06:25]  ANCA: cANCA Negative, pANCA Negative, atypical ANCA Negative      [11-23-23 @ 06:25]  anti-GBM <0.2      [11-23-23 @ 06:25]  PLA2R: RAVI <1.8, IFA --      [11-23-23 @ 06:25]  Free Light Chains: kappa 13.06, lambda 7.08, ratio = 1.84      [11-23 @ 06:25]    Tacrolimus  Cyclosporine  Sirolimus  Mycophenolate  BK PCR  CMV PCR  Parvo PCR  EBV PCR

## 2024-05-13 LAB
A1C WITH ESTIMATED AVERAGE GLUCOSE RESULT: 5.7 % — HIGH (ref 4–5.6)
ALBUMIN SERPL ELPH-MCNC: 3.4 G/DL — SIGNIFICANT CHANGE UP (ref 3.3–5)
ALP SERPL-CCNC: 49 U/L — SIGNIFICANT CHANGE UP (ref 40–120)
ALT FLD-CCNC: 10 U/L — SIGNIFICANT CHANGE UP (ref 10–45)
ANION GAP SERPL CALC-SCNC: 21 MMOL/L — HIGH (ref 5–17)
AST SERPL-CCNC: 14 U/L — SIGNIFICANT CHANGE UP (ref 10–40)
BASOPHILS # BLD AUTO: 0.05 K/UL — SIGNIFICANT CHANGE UP (ref 0–0.2)
BASOPHILS NFR BLD AUTO: 0.7 % — SIGNIFICANT CHANGE UP (ref 0–2)
BILIRUB SERPL-MCNC: 0.2 MG/DL — SIGNIFICANT CHANGE UP (ref 0.2–1.2)
BUN SERPL-MCNC: 65 MG/DL — HIGH (ref 7–23)
CALCIUM SERPL-MCNC: 8.4 MG/DL — SIGNIFICANT CHANGE UP (ref 8.4–10.5)
CHLORIDE SERPL-SCNC: 92 MMOL/L — LOW (ref 96–108)
CO2 SERPL-SCNC: 24 MMOL/L — SIGNIFICANT CHANGE UP (ref 22–31)
CREAT SERPL-MCNC: 6.82 MG/DL — HIGH (ref 0.5–1.3)
EGFR: 6 ML/MIN/1.73M2 — LOW
EOSINOPHIL # BLD AUTO: 0.03 K/UL — SIGNIFICANT CHANGE UP (ref 0–0.5)
EOSINOPHIL NFR BLD AUTO: 0.4 % — SIGNIFICANT CHANGE UP (ref 0–6)
ESTIMATED AVERAGE GLUCOSE: 117 MG/DL — HIGH (ref 68–114)
GLUCOSE BLDC GLUCOMTR-MCNC: 168 MG/DL — HIGH (ref 70–99)
GLUCOSE BLDC GLUCOMTR-MCNC: 175 MG/DL — HIGH (ref 70–99)
GLUCOSE BLDC GLUCOMTR-MCNC: 186 MG/DL — HIGH (ref 70–99)
GLUCOSE BLDC GLUCOMTR-MCNC: 216 MG/DL — HIGH (ref 70–99)
GLUCOSE SERPL-MCNC: 125 MG/DL — HIGH (ref 70–99)
HCT VFR BLD CALC: 33.8 % — LOW (ref 34.5–45)
HGB BLD-MCNC: 11 G/DL — LOW (ref 11.5–15.5)
IMM GRANULOCYTES NFR BLD AUTO: 0.4 % — SIGNIFICANT CHANGE UP (ref 0–0.9)
INR BLD: 1.04 RATIO — SIGNIFICANT CHANGE UP (ref 0.85–1.18)
LYMPHOCYTES # BLD AUTO: 1.13 K/UL — SIGNIFICANT CHANGE UP (ref 1–3.3)
LYMPHOCYTES # BLD AUTO: 14.8 % — SIGNIFICANT CHANGE UP (ref 13–44)
MCHC RBC-ENTMCNC: 28.9 PG — SIGNIFICANT CHANGE UP (ref 27–34)
MCHC RBC-ENTMCNC: 32.5 GM/DL — SIGNIFICANT CHANGE UP (ref 32–36)
MCV RBC AUTO: 88.7 FL — SIGNIFICANT CHANGE UP (ref 80–100)
MONOCYTES # BLD AUTO: 0.92 K/UL — HIGH (ref 0–0.9)
MONOCYTES NFR BLD AUTO: 12.1 % — SIGNIFICANT CHANGE UP (ref 2–14)
NEUTROPHILS # BLD AUTO: 5.46 K/UL — SIGNIFICANT CHANGE UP (ref 1.8–7.4)
NEUTROPHILS NFR BLD AUTO: 71.6 % — SIGNIFICANT CHANGE UP (ref 43–77)
NRBC # BLD: 0 /100 WBCS — SIGNIFICANT CHANGE UP (ref 0–0)
PLATELET # BLD AUTO: 247 K/UL — SIGNIFICANT CHANGE UP (ref 150–400)
POTASSIUM SERPL-MCNC: 4.1 MMOL/L — SIGNIFICANT CHANGE UP (ref 3.5–5.3)
POTASSIUM SERPL-SCNC: 4.1 MMOL/L — SIGNIFICANT CHANGE UP (ref 3.5–5.3)
PROT SERPL-MCNC: 6.7 G/DL — SIGNIFICANT CHANGE UP (ref 6–8.3)
PROTHROM AB SERPL-ACNC: 10.9 SEC — SIGNIFICANT CHANGE UP (ref 9.5–13)
RBC # BLD: 3.81 M/UL — SIGNIFICANT CHANGE UP (ref 3.8–5.2)
RBC # FLD: 17.3 % — HIGH (ref 10.3–14.5)
SODIUM SERPL-SCNC: 137 MMOL/L — SIGNIFICANT CHANGE UP (ref 135–145)
WBC # BLD: 7.62 K/UL — SIGNIFICANT CHANGE UP (ref 3.8–10.5)
WBC # FLD AUTO: 7.62 K/UL — SIGNIFICANT CHANGE UP (ref 3.8–10.5)

## 2024-05-13 PROCEDURE — 99223 1ST HOSP IP/OBS HIGH 75: CPT | Mod: GC

## 2024-05-13 RX ORDER — CHLORHEXIDINE GLUCONATE 213 G/1000ML
1 SOLUTION TOPICAL
Refills: 0 | Status: DISCONTINUED | OUTPATIENT
Start: 2024-05-13 | End: 2024-05-17

## 2024-05-13 RX ADMIN — MIDODRINE HYDROCHLORIDE 10 MILLIGRAM(S): 2.5 TABLET ORAL at 15:17

## 2024-05-13 RX ADMIN — Medication 1: at 12:34

## 2024-05-13 RX ADMIN — HEPARIN SODIUM 7500 UNIT(S): 5000 INJECTION INTRAVENOUS; SUBCUTANEOUS at 13:02

## 2024-05-13 RX ADMIN — CEFTRIAXONE 100 MILLIGRAM(S): 500 INJECTION, POWDER, FOR SOLUTION INTRAMUSCULAR; INTRAVENOUS at 05:05

## 2024-05-13 RX ADMIN — MONTELUKAST 10 MILLIGRAM(S): 4 TABLET, CHEWABLE ORAL at 12:35

## 2024-05-13 RX ADMIN — REMDESIVIR 200 MILLIGRAM(S): 5 INJECTION INTRAVENOUS at 12:34

## 2024-05-13 RX ADMIN — Medication 1: at 17:50

## 2024-05-13 RX ADMIN — HEPARIN SODIUM 7500 UNIT(S): 5000 INJECTION INTRAVENOUS; SUBCUTANEOUS at 22:03

## 2024-05-13 RX ADMIN — BUDESONIDE AND FORMOTEROL FUMARATE DIHYDRATE 2 PUFF(S): 160; 4.5 AEROSOL RESPIRATORY (INHALATION) at 17:50

## 2024-05-13 RX ADMIN — Medication 1: at 09:17

## 2024-05-13 RX ADMIN — BUDESONIDE AND FORMOTEROL FUMARATE DIHYDRATE 2 PUFF(S): 160; 4.5 AEROSOL RESPIRATORY (INHALATION) at 05:06

## 2024-05-13 RX ADMIN — Medication 6 MILLIGRAM(S): at 05:05

## 2024-05-13 RX ADMIN — HEPARIN SODIUM 7500 UNIT(S): 5000 INJECTION INTRAVENOUS; SUBCUTANEOUS at 05:05

## 2024-05-13 RX ADMIN — ATORVASTATIN CALCIUM 80 MILLIGRAM(S): 80 TABLET, FILM COATED ORAL at 22:03

## 2024-05-13 NOTE — SWALLOW BEDSIDE ASSESSMENT ADULT - ASR SWALLOW ASPIRATION MONITOR
Monitor for s/s aspiration/laryngeal penetration. If noted:  D/C p.o. intake, provide non-oral nutrition/hydration/meds, and contact this service @ [phone]/change of breathing pattern/cough/gurgly voice/fever/pneumonia/throat clearing/upper respiratory infection

## 2024-05-13 NOTE — SWALLOW BEDSIDE ASSESSMENT ADULT - ASR SWALLOW RECOMMEND DIAG
would defer at this time; if there is further concern for aspiration, MD may consider MBS for additional assessment

## 2024-05-13 NOTE — CHART NOTE - NSCHARTNOTEFT_GEN_A_CORE
pt seen this morning 5/13 at approx 10am  see 5/12 H and P, fellows note  agree with plan     at bedside  #ESRD On HD MWF  plan HD today  #a/w covid  pna  on abx  on remdesivir  comfortable  feeling better  #anemia in ckd  hb at goal  hold vida  monitor hb .

## 2024-05-13 NOTE — SWALLOW BEDSIDE ASSESSMENT ADULT - SWALLOW EVAL: DIAGNOSIS
84 yo F admitted COVID+; placed on soft bite sized texture by MD; SLP consulted for formal bedside swallow evaluation. Patient presents with overtly functional oropharyngeal swallow. Adequate oral management and no overt signs of laryngeal penetration/aspiration across all consistencies trialed. Cannot rule out silent aspiration on bedside exam. If there is further concern for aspiration, MBS may be completed.

## 2024-05-13 NOTE — SWALLOW BEDSIDE ASSESSMENT ADULT - SLP PERTINENT HISTORY OF CURRENT PROBLEM
83F former smoker PMH HTN, HLD, T2DM, CAD, HFpEF, ESRD on HD MWF, dementia, remote hx of breast ca s/p mastectomy, chronic hypoxemic respiratory failure of unclear etiology on home O2 prn a/w sepsis 2/2 COVID-19 infection +/- superimposed bacterial PNA

## 2024-05-14 LAB
ALBUMIN SERPL ELPH-MCNC: 3.7 G/DL — SIGNIFICANT CHANGE UP (ref 3.3–5)
ALP SERPL-CCNC: 50 U/L — SIGNIFICANT CHANGE UP (ref 40–120)
ALT FLD-CCNC: 9 U/L — LOW (ref 10–45)
ANION GAP SERPL CALC-SCNC: 18 MMOL/L — HIGH (ref 5–17)
AST SERPL-CCNC: 21 U/L — SIGNIFICANT CHANGE UP (ref 10–40)
BASOPHILS # BLD AUTO: 0.04 K/UL — SIGNIFICANT CHANGE UP (ref 0–0.2)
BASOPHILS NFR BLD AUTO: 0.6 % — SIGNIFICANT CHANGE UP (ref 0–2)
BILIRUB SERPL-MCNC: 0.2 MG/DL — SIGNIFICANT CHANGE UP (ref 0.2–1.2)
BUN SERPL-MCNC: 49 MG/DL — HIGH (ref 7–23)
CALCIUM SERPL-MCNC: 8.6 MG/DL — SIGNIFICANT CHANGE UP (ref 8.4–10.5)
CHLORIDE SERPL-SCNC: 98 MMOL/L — SIGNIFICANT CHANGE UP (ref 96–108)
CO2 SERPL-SCNC: 24 MMOL/L — SIGNIFICANT CHANGE UP (ref 22–31)
CREAT SERPL-MCNC: 5.23 MG/DL — HIGH (ref 0.5–1.3)
EGFR: 8 ML/MIN/1.73M2 — LOW
EOSINOPHIL # BLD AUTO: 0.01 K/UL — SIGNIFICANT CHANGE UP (ref 0–0.5)
EOSINOPHIL NFR BLD AUTO: 0.2 % — SIGNIFICANT CHANGE UP (ref 0–6)
GLUCOSE BLDC GLUCOMTR-MCNC: 131 MG/DL — HIGH (ref 70–99)
GLUCOSE BLDC GLUCOMTR-MCNC: 151 MG/DL — HIGH (ref 70–99)
GLUCOSE BLDC GLUCOMTR-MCNC: 152 MG/DL — HIGH (ref 70–99)
GLUCOSE BLDC GLUCOMTR-MCNC: 225 MG/DL — HIGH (ref 70–99)
GLUCOSE SERPL-MCNC: 130 MG/DL — HIGH (ref 70–99)
HCT VFR BLD CALC: 35.1 % — SIGNIFICANT CHANGE UP (ref 34.5–45)
HGB BLD-MCNC: 11.4 G/DL — LOW (ref 11.5–15.5)
IMM GRANULOCYTES NFR BLD AUTO: 0.3 % — SIGNIFICANT CHANGE UP (ref 0–0.9)
LYMPHOCYTES # BLD AUTO: 1.25 K/UL — SIGNIFICANT CHANGE UP (ref 1–3.3)
LYMPHOCYTES # BLD AUTO: 19 % — SIGNIFICANT CHANGE UP (ref 13–44)
MCHC RBC-ENTMCNC: 29.2 PG — SIGNIFICANT CHANGE UP (ref 27–34)
MCHC RBC-ENTMCNC: 32.5 GM/DL — SIGNIFICANT CHANGE UP (ref 32–36)
MCV RBC AUTO: 90 FL — SIGNIFICANT CHANGE UP (ref 80–100)
MONOCYTES # BLD AUTO: 0.88 K/UL — SIGNIFICANT CHANGE UP (ref 0–0.9)
MONOCYTES NFR BLD AUTO: 13.4 % — SIGNIFICANT CHANGE UP (ref 2–14)
NEUTROPHILS # BLD AUTO: 4.38 K/UL — SIGNIFICANT CHANGE UP (ref 1.8–7.4)
NEUTROPHILS NFR BLD AUTO: 66.5 % — SIGNIFICANT CHANGE UP (ref 43–77)
NRBC # BLD: 0 /100 WBCS — SIGNIFICANT CHANGE UP (ref 0–0)
PLATELET # BLD AUTO: 269 K/UL — SIGNIFICANT CHANGE UP (ref 150–400)
POTASSIUM SERPL-MCNC: 4.2 MMOL/L — SIGNIFICANT CHANGE UP (ref 3.5–5.3)
POTASSIUM SERPL-SCNC: 4.2 MMOL/L — SIGNIFICANT CHANGE UP (ref 3.5–5.3)
PROT SERPL-MCNC: 7 G/DL — SIGNIFICANT CHANGE UP (ref 6–8.3)
RBC # BLD: 3.9 M/UL — SIGNIFICANT CHANGE UP (ref 3.8–5.2)
RBC # FLD: 17.8 % — HIGH (ref 10.3–14.5)
SODIUM SERPL-SCNC: 140 MMOL/L — SIGNIFICANT CHANGE UP (ref 135–145)
WBC # BLD: 6.58 K/UL — SIGNIFICANT CHANGE UP (ref 3.8–10.5)
WBC # FLD AUTO: 6.58 K/UL — SIGNIFICANT CHANGE UP (ref 3.8–10.5)

## 2024-05-14 RX ORDER — LANOLIN ALCOHOL/MO/W.PET/CERES
3 CREAM (GRAM) TOPICAL ONCE
Refills: 0 | Status: COMPLETED | OUTPATIENT
Start: 2024-05-14 | End: 2024-05-14

## 2024-05-14 RX ADMIN — HEPARIN SODIUM 7500 UNIT(S): 5000 INJECTION INTRAVENOUS; SUBCUTANEOUS at 05:02

## 2024-05-14 RX ADMIN — Medication 650 MILLIGRAM(S): at 22:34

## 2024-05-14 RX ADMIN — Medication 2: at 12:15

## 2024-05-14 RX ADMIN — Medication 3 MILLIGRAM(S): at 22:34

## 2024-05-14 RX ADMIN — BUDESONIDE AND FORMOTEROL FUMARATE DIHYDRATE 2 PUFF(S): 160; 4.5 AEROSOL RESPIRATORY (INHALATION) at 05:01

## 2024-05-14 RX ADMIN — MONTELUKAST 10 MILLIGRAM(S): 4 TABLET, CHEWABLE ORAL at 11:47

## 2024-05-14 RX ADMIN — Medication 6 MILLIGRAM(S): at 05:02

## 2024-05-14 RX ADMIN — BUDESONIDE AND FORMOTEROL FUMARATE DIHYDRATE 2 PUFF(S): 160; 4.5 AEROSOL RESPIRATORY (INHALATION) at 17:34

## 2024-05-14 RX ADMIN — HEPARIN SODIUM 7500 UNIT(S): 5000 INJECTION INTRAVENOUS; SUBCUTANEOUS at 21:02

## 2024-05-14 RX ADMIN — ATORVASTATIN CALCIUM 80 MILLIGRAM(S): 80 TABLET, FILM COATED ORAL at 21:01

## 2024-05-14 RX ADMIN — Medication 1: at 17:33

## 2024-05-14 RX ADMIN — HEPARIN SODIUM 7500 UNIT(S): 5000 INJECTION INTRAVENOUS; SUBCUTANEOUS at 13:30

## 2024-05-14 RX ADMIN — CHLORHEXIDINE GLUCONATE 1 APPLICATION(S): 213 SOLUTION TOPICAL at 05:29

## 2024-05-14 RX ADMIN — REMDESIVIR 200 MILLIGRAM(S): 5 INJECTION INTRAVENOUS at 11:46

## 2024-05-14 RX ADMIN — Medication 650 MILLIGRAM(S): at 23:10

## 2024-05-14 NOTE — PATIENT PROFILE ADULT - HAVE YOU EXPERIENCED VIOLENCE OR A TRAUMATIC EVENT?
allopurinol 100 mg oral tablet: 3 tab(s) orally once a day  amLODIPine 5 mg oral tablet: 1 tab(s) orally once a day  cephalexin 500 mg oral capsule: 1 cap(s) orally 3 times a day  Eliquis 5 mg oral tablet: 2 tab(s) orally 2 times a day  enalapril 20 mg oral tablet: 1 tab(s) orally once a day  silodosin 8 mg oral capsule: 1 cap(s) orally once a day  Tylenol 8 HR Arthritis Pain 650 mg oral tablet, extended release: 2 tab(s) orally every 8 hours   no

## 2024-05-14 NOTE — PATIENT PROFILE ADULT - NSPROMEDSADMININFO_GEN_A_NUR
· Presents with generalized weakness, fatigue, and weight loss  · CT a/P: " 2 5 x 2 2 x 2 2 cm mass in the left lower lobe concerning for lung cancer  Lytic lesions within the left 6th rib and pelvis bones compatible with osseous metastasis "  · Longstanding smoking history  · Will ask pulm to evaluate  · Heme-Onc following       · patient will need lifelong anticoagulation d/t stage IV malignancy-> will need to transition to Eliquis or fondaparinux  · IR tissue biopsy performed - 11/8- positive for metastatic lung adenocarcinoma  · Palliative care following  · Guardian requesting treatment with chemo and possible immunotherapy    F/u with h/o and palliative outpt no concerns

## 2024-05-14 NOTE — PATIENT PROFILE ADULT - FALL HARM RISK - HARM RISK INTERVENTIONS
Assistance with ambulation/Assistance OOB with selected safe patient handling equipment/Communicate Risk of Fall with Harm to all staff/Discuss with provider need for PT consult/Monitor for mental status changes/Monitor gait and stability/Move patient closer to nurses' station/Reinforce activity limits and safety measures with patient and family/Reorient to person, place and time as needed/Tailored Fall Risk Interventions/Toileting schedule using arm’s reach rule for commode and bathroom/Use of alarms - bed, chair and/or voice tab/Visual Cue: Yellow wristband and red socks/Bed in lowest position, wheels locked, appropriate side rails in place/Call bell, personal items and telephone in reach/Instruct patient to call for assistance before getting out of bed or chair/Non-slip footwear when patient is out of bed/Wirt to call system/Physically safe environment - no spills, clutter or unnecessary equipment/Purposeful Proactive Rounding/Room/bathroom lighting operational, light cord in reach

## 2024-05-15 LAB
ALBUMIN SERPL ELPH-MCNC: 3.3 G/DL — SIGNIFICANT CHANGE UP (ref 3.3–5)
ALBUMIN SERPL ELPH-MCNC: 3.9 G/DL — SIGNIFICANT CHANGE UP (ref 3.3–5)
ALP SERPL-CCNC: 47 U/L — SIGNIFICANT CHANGE UP (ref 40–120)
ALP SERPL-CCNC: 54 U/L — SIGNIFICANT CHANGE UP (ref 40–120)
ALT FLD-CCNC: 12 U/L — SIGNIFICANT CHANGE UP (ref 10–45)
ALT FLD-CCNC: 16 U/L — SIGNIFICANT CHANGE UP (ref 10–45)
ANION GAP SERPL CALC-SCNC: 19 MMOL/L — HIGH (ref 5–17)
ANION GAP SERPL CALC-SCNC: 20 MMOL/L — HIGH (ref 5–17)
AST SERPL-CCNC: 18 U/L — SIGNIFICANT CHANGE UP (ref 10–40)
AST SERPL-CCNC: 22 U/L — SIGNIFICANT CHANGE UP (ref 10–40)
BILIRUB SERPL-MCNC: 0.2 MG/DL — SIGNIFICANT CHANGE UP (ref 0.2–1.2)
BILIRUB SERPL-MCNC: 0.2 MG/DL — SIGNIFICANT CHANGE UP (ref 0.2–1.2)
BUN SERPL-MCNC: 76 MG/DL — HIGH (ref 7–23)
BUN SERPL-MCNC: 77 MG/DL — HIGH (ref 7–23)
CALCIUM SERPL-MCNC: 8 MG/DL — LOW (ref 8.4–10.5)
CALCIUM SERPL-MCNC: 8.5 MG/DL — SIGNIFICANT CHANGE UP (ref 8.4–10.5)
CHLORIDE SERPL-SCNC: 91 MMOL/L — LOW (ref 96–108)
CHLORIDE SERPL-SCNC: 95 MMOL/L — LOW (ref 96–108)
CO2 SERPL-SCNC: 22 MMOL/L — SIGNIFICANT CHANGE UP (ref 22–31)
CO2 SERPL-SCNC: 23 MMOL/L — SIGNIFICANT CHANGE UP (ref 22–31)
CREAT SERPL-MCNC: 6.1 MG/DL — HIGH (ref 0.5–1.3)
CREAT SERPL-MCNC: 6.74 MG/DL — HIGH (ref 0.5–1.3)
EGFR: 6 ML/MIN/1.73M2 — LOW
EGFR: 6 ML/MIN/1.73M2 — LOW
GLUCOSE BLDC GLUCOMTR-MCNC: 143 MG/DL — HIGH (ref 70–99)
GLUCOSE BLDC GLUCOMTR-MCNC: 159 MG/DL — HIGH (ref 70–99)
GLUCOSE BLDC GLUCOMTR-MCNC: 219 MG/DL — HIGH (ref 70–99)
GLUCOSE BLDC GLUCOMTR-MCNC: 229 MG/DL — HIGH (ref 70–99)
GLUCOSE SERPL-MCNC: 116 MG/DL — HIGH (ref 70–99)
GLUCOSE SERPL-MCNC: 258 MG/DL — HIGH (ref 70–99)
HCT VFR BLD CALC: 39.2 % — SIGNIFICANT CHANGE UP (ref 34.5–45)
HGB BLD-MCNC: 12.6 G/DL — SIGNIFICANT CHANGE UP (ref 11.5–15.5)
INR BLD: 1.02 RATIO — SIGNIFICANT CHANGE UP (ref 0.85–1.18)
LACTATE SERPL-SCNC: 1.6 MMOL/L — SIGNIFICANT CHANGE UP (ref 0.5–2)
MCHC RBC-ENTMCNC: 28.9 PG — SIGNIFICANT CHANGE UP (ref 27–34)
MCHC RBC-ENTMCNC: 32.1 GM/DL — SIGNIFICANT CHANGE UP (ref 32–36)
MCV RBC AUTO: 89.9 FL — SIGNIFICANT CHANGE UP (ref 80–100)
NRBC # BLD: 0 /100 WBCS — SIGNIFICANT CHANGE UP (ref 0–0)
PLATELET # BLD AUTO: 293 K/UL — SIGNIFICANT CHANGE UP (ref 150–400)
POTASSIUM SERPL-MCNC: 4.6 MMOL/L — SIGNIFICANT CHANGE UP (ref 3.5–5.3)
POTASSIUM SERPL-MCNC: 4.8 MMOL/L — SIGNIFICANT CHANGE UP (ref 3.5–5.3)
POTASSIUM SERPL-SCNC: 4.6 MMOL/L — SIGNIFICANT CHANGE UP (ref 3.5–5.3)
POTASSIUM SERPL-SCNC: 4.8 MMOL/L — SIGNIFICANT CHANGE UP (ref 3.5–5.3)
PROT SERPL-MCNC: 6.7 G/DL — SIGNIFICANT CHANGE UP (ref 6–8.3)
PROT SERPL-MCNC: 7.3 G/DL — SIGNIFICANT CHANGE UP (ref 6–8.3)
PROTHROM AB SERPL-ACNC: 10.7 SEC — SIGNIFICANT CHANGE UP (ref 9.5–13)
RBC # BLD: 4.36 M/UL — SIGNIFICANT CHANGE UP (ref 3.8–5.2)
RBC # FLD: 17.8 % — HIGH (ref 10.3–14.5)
SODIUM SERPL-SCNC: 134 MMOL/L — LOW (ref 135–145)
SODIUM SERPL-SCNC: 136 MMOL/L — SIGNIFICANT CHANGE UP (ref 135–145)
WBC # BLD: 4.55 K/UL — SIGNIFICANT CHANGE UP (ref 3.8–10.5)
WBC # FLD AUTO: 4.55 K/UL — SIGNIFICANT CHANGE UP (ref 3.8–10.5)

## 2024-05-15 PROCEDURE — 70450 CT HEAD/BRAIN W/O DYE: CPT | Mod: 26

## 2024-05-15 PROCEDURE — 99232 SBSQ HOSP IP/OBS MODERATE 35: CPT

## 2024-05-15 RX ORDER — LANOLIN ALCOHOL/MO/W.PET/CERES
3 CREAM (GRAM) TOPICAL AT BEDTIME
Refills: 0 | Status: DISCONTINUED | OUTPATIENT
Start: 2024-05-15 | End: 2024-05-17

## 2024-05-15 RX ADMIN — REMDESIVIR 200 MILLIGRAM(S): 5 INJECTION INTRAVENOUS at 18:10

## 2024-05-15 RX ADMIN — Medication 2: at 12:37

## 2024-05-15 RX ADMIN — BUDESONIDE AND FORMOTEROL FUMARATE DIHYDRATE 2 PUFF(S): 160; 4.5 AEROSOL RESPIRATORY (INHALATION) at 05:22

## 2024-05-15 RX ADMIN — HEPARIN SODIUM 7500 UNIT(S): 5000 INJECTION INTRAVENOUS; SUBCUTANEOUS at 17:26

## 2024-05-15 RX ADMIN — HEPARIN SODIUM 7500 UNIT(S): 5000 INJECTION INTRAVENOUS; SUBCUTANEOUS at 05:21

## 2024-05-15 RX ADMIN — Medication 6 MILLIGRAM(S): at 05:22

## 2024-05-15 RX ADMIN — BUDESONIDE AND FORMOTEROL FUMARATE DIHYDRATE 2 PUFF(S): 160; 4.5 AEROSOL RESPIRATORY (INHALATION) at 18:10

## 2024-05-15 RX ADMIN — ATORVASTATIN CALCIUM 80 MILLIGRAM(S): 80 TABLET, FILM COATED ORAL at 21:12

## 2024-05-15 RX ADMIN — CHLORHEXIDINE GLUCONATE 1 APPLICATION(S): 213 SOLUTION TOPICAL at 05:22

## 2024-05-15 RX ADMIN — HEPARIN SODIUM 7500 UNIT(S): 5000 INJECTION INTRAVENOUS; SUBCUTANEOUS at 21:13

## 2024-05-15 RX ADMIN — Medication 3 MILLIGRAM(S): at 21:13

## 2024-05-15 RX ADMIN — MONTELUKAST 10 MILLIGRAM(S): 4 TABLET, CHEWABLE ORAL at 12:37

## 2024-05-15 RX ADMIN — Medication 1: at 17:25

## 2024-05-15 NOTE — CONSULT NOTE ADULT - SUBJECTIVE AND OBJECTIVE BOX
Neurology Consult    Reason for Consult: Patient is a 83y old  Female who presents with a chief complaint of Sepsis 2/2 COVID-19 infection (15 May 2024 09:46)      HPI:  83F former smoker, PMH HTN, HLD, T2DM, CAD, HFpEF, ESRD on HD MWF, dementia, remote hx of breast ca s/p mastectomy, chronic hypoxemic respiratory failure of unclear etiology on home O2 prn p/w weakness, cough (per ED provider note, no EMS note in physical chart). Pt AOx2 (self, location) otherwise somnolent, falling asleep frequently unable to participate meaningfully in interview, though states that she is feeling well, denies complaints.     Able to reach  who relays ED presentation due to dry cough and weakness (usually ambulatory in apartment w/o assistive device, today unable to rise from chair independently), otherwise w/o noticed complaints. Additionally when son arrived home 5/10 PM he found pt on floor adjacent to recliner, believed that she slid out and was unable to rise independently, however denies any noticed bruise/injury and pt did not subsequently complain of injury. He states that pt is at bl mentation.     VS: febrile tmax 38.1C, BP 90s-110s/40s-50s, RR 20-22, SpO2% 95-96 on 2-3L NC  Labs: procal 0.71; HST 98 -> 94, proBNP 4163; BUN/SCr 26/4.81 (GFR 9); VBG 7.43/51/40/34   Micro: COVID-19+  Imaging:  - POCUS ED TTE: scattered b lines b/l, E to A reversal cannot exclude diastolic dysfn  Received: tylenol 975mg PO, cefepime 1g IV, decadron 6mg IV, pulmicort 0.5mg inhaled           PAST MEDICAL & SURGICAL HISTORY:  CA - Breast Cancer  1996 s/p lumpectomy, chemo, and radiation      CAD (Coronary Artery Disease)      Dyslipidemia      HTN (Hypertension)      Asthma      H/O: Obesity      H/O: Osteoarthritis      Type 2 diabetes mellitus      ESRD (end stage renal disease)      (HFpEF) heart failure with preserved ejection fraction      AVF (arteriovenous fistula)      COPD (chronic obstructive pulmonary disease)      Senile dementia      Chronic hypoxemic respiratory failure      Iron deficiency anemia      S/P Breast Lumpectomy      S/P dialysis catheter insertion      Patent hemodialysis access site      History of unilateral modified radical mastectomy          Allergies: Allergies    No Known Allergies    Intolerances        Social History: Denies toxic habits including tobacco, ETOH or illicit drugs.    Family History: FAMILY HISTORY:  FH: breast cancer (Mother)    . No family history of strokes    Medications: MEDICATIONS  (STANDING):  atorvastatin 80 milliGRAM(s) Oral at bedtime  budesonide 160 MICROgram(s)/formoterol 4.5 MICROgram(s) Inhaler 2 Puff(s) Inhalation two times a day  chlorhexidine 2% Cloths 1 Application(s) Topical <User Schedule>  dexAMETHasone     Tablet 6 milliGRAM(s) Oral daily  dextrose 10% Bolus 125 milliLiter(s) IV Bolus once  dextrose 5%. 1000 milliLiter(s) (100 mL/Hr) IV Continuous <Continuous>  dextrose 5%. 1000 milliLiter(s) (50 mL/Hr) IV Continuous <Continuous>  dextrose 50% Injectable 25 Gram(s) IV Push once  dextrose 50% Injectable 12.5 Gram(s) IV Push once  glucagon  Injectable 1 milliGRAM(s) IntraMuscular once  heparin   Injectable 7500 Unit(s) SubCutaneous every 8 hours  insulin lispro (ADMELOG) corrective regimen sliding scale   SubCutaneous three times a day before meals  insulin lispro (ADMELOG) corrective regimen sliding scale   SubCutaneous at bedtime  montelukast 10 milliGRAM(s) Oral daily  remdesivir  IVPB   IV Intermittent   remdesivir  IVPB 100 milliGRAM(s) IV Intermittent every 24 hours    MEDICATIONS  (PRN):  acetaminophen     Tablet .. 650 milliGRAM(s) Oral every 6 hours PRN Temp greater or equal to 38C (100.4F), Mild Pain (1 - 3)  albuterol    90 MICROgram(s) HFA Inhaler 2 Puff(s) Inhalation every 6 hours PRN Shortness of Breath and/or Wheezing  dextrose Oral Gel 15 Gram(s) Oral once PRN Blood Glucose LESS THAN 70 milliGRAM(s)/deciliter      Review of Systems: limited given mental status   CONSTITUTIONAL:  No weight loss, fever, chills, weakness or fatigue.  HEENT:  Eyes:  No visual loss, blurred vision, double vision or yellow sclera. Ears, Nose, Throat:  No hearing loss, sneezing, congestion, runny nose or sore throat.  SKIN:  No rash or itching.  CARDIOVASCULAR:  No chest pain, chest pressure or chest discomfort. No palpitations or edema.  RESPIRATORY:  No shortness of breath, cough or sputum.  GASTROINTESTINAL:  No anorexia, nausea, vomiting or diarrhea. No abdominal pain or blood.  GENITOURINARY:  No burning on urination or incontinence   NEUROLOGICAL:  No headache, dizziness, syncope, paralysis, ataxia, numbness or tingling in the extremities. No change in bowel or bladder control. no limb weakness. no vision changes.   MUSCULOSKELETAL:  No muscle, back pain, joint pain or stiffness.  HEMATOLOGIC:  No anemia, bleeding or bruising.  LYMPHATICS:  No enlarged nodes. No history of splenectomy.  PSYCHIATRIC:  No history of depression or anxiety.  ENDOCRINOLOGIC:  No reports of sweating, cold or heat intolerance. No polyuria or polydipsia.      Vitals:  Vital Signs Last 24 Hrs  T(C): 36.4 (15 May 2024 05:03), Max: 36.8 (14 May 2024 11:40)  T(F): 97.6 (15 May 2024 05:03), Max: 98.2 (14 May 2024 11:40)  HR: 66 (15 May 2024 05:03) (66 - 75)  BP: 120/65 (15 May 2024 05:03) (120/65 - 148/68)  BP(mean): --  RR: 18 (15 May 2024 05:03) (18 - 19)  SpO2: 96% (15 May 2024 05:03) (92% - 96%)    Parameters below as of 15 May 2024 05:03  Patient On (Oxygen Delivery Method): room air        General Exam:   General Appearance: Appropriately dressed and in no acute distress       Head: Normocephalic, atraumatic and no dysmorphic features  Ear, Nose, and Throat: Moist mucous membranes  CVS: S1S2+  Resp: No SOB, no wheeze or rhonchi  GI: soft NT/ND  Extremities: No edema or cyanosis  Skin: No bruises or rashes     Neurological Exam:  Mental Status: Awake, alert and oriented x 1-2  Able to follow simple verbal commands. Able to name and repeat. fluent speech. No obvious aphasia or dysarthria noted.   Cranial Nerves: PERRL, EOMI, VFFC, sensation V1-V3 intact,  no obvious facial asymmetry, equal elevation of palate, scm/trap 5/5, tongue is midline on protrusion. no obvious papilledema on fundoscopic exam. hearing is grossly intact.   Motor: Normal bulk, tone and strength throughout. RAMIRES at least 4/5 but genrealized weakness   Sensation: Intact to light touch and pinprick throughout. no right/left confusion. no extinction to tactile on DSS.   Reflexes: 1+ throughout at biceps, brachioradialis, triceps, patellars and ankles bilaterally and equal. No clonus. R toe and L toe were both downgoing.  Coordination: No dysmetria on FNF    Gait:deferred     Data/Labs/Imaging which I personally reviewed.     Labs:     CBC Full  -  ( 15 May 2024 10:04 )  WBC Count : 4.55 K/uL  RBC Count : 4.36 M/uL  Hemoglobin : 12.6 g/dL  Hematocrit : 39.2 %  Platelet Count - Automated : 293 K/uL  Mean Cell Volume : 89.9 fl  Mean Cell Hemoglobin : 28.9 pg  Mean Cell Hemoglobin Concentration : 32.1 gm/dL  Auto Neutrophil # : x  Auto Lymphocyte # : x  Auto Monocyte # : x  Auto Eosinophil # : x  Auto Basophil # : x  Auto Neutrophil % : x  Auto Lymphocyte % : x  Auto Monocyte % : x  Auto Eosinophil % : x  Auto Basophil % : x    05-15    134<L>  |  91<L>  |  77<H>  ----------------------------<  258<H>  4.8   |  23  |  6.74<H>    Ca    8.5      15 May 2024 10:04    TPro  7.3  /  Alb  3.9  /  TBili  0.2  /  DBili  x   /  AST  22  /  ALT  16  /  AlkPhos  54  05-15    LIVER FUNCTIONS - ( 15 May 2024 10:04 )  Alb: 3.9 g/dL / Pro: 7.3 g/dL / ALK PHOS: 54 U/L / ALT: 16 U/L / AST: 22 U/L / GGT: x           PT/INR - ( 15 May 2024 05:40 )   PT: 10.7 sec;   INR: 1.02 ratio           Urinalysis Basic - ( 15 May 2024 10:04 )    Color: x / Appearance: x / SG: x / pH: x  Gluc: 258 mg/dL / Ketone: x  / Bili: x / Urobili: x   Blood: x / Protein: x / Nitrite: x   Leuk Esterase: x / RBC: x / WBC x   Sq Epi: x / Non Sq Epi: x / Bacteria: x    < from: CT Head No Cont (05.12.24 @ 12:59) >    ACC: [id number] EXAM:  CT BRAIN   ORDERED BY:  ANGELIA RYAN     PROCEDURE DATE:  05/12/2024          INTERPRETATION:  CLINICAL INFORMATION: Found on floor.    COMPARISON: None.    PROCEDURE:  Noncontrast CT of the Head was performed from the skullbase to the   vertex. Coronal and Sagittal reformats were obtained.    FINDINGS:    There is no acute intracranial hemorrhage, mass effect, midline shift,   extra-axial collection, hydrocephalus, or evidence of acute vascular   territorial infarction. Mild to moderate patchy hypodensities within the   periventricular and subcortical white matter, although nonspecific,   likely reflect chronic microvascular disease. Cerebral volume loss   contributes to prominence of the ventricles and sulci.    Mild paranasal sinus mucosal thickening. Mastoid air cells are clear.   Intraorbital contents are unremarkable. Calvarium is intact.    IMPRESSION:    No acute intracranial hemorrhage, mass effect, or evidence of acute   vascular territorial infarction. If clinical symptoms persist or worsen,   more sensitive evaluation with brain MRI may be obtained, if no   contraindications exist.    --- End of Report ---            JAKI ESPOSITO MD; Attending Radiologist  This document has been electronically signed. May 12 2024  1:09PM    < end of copied text >

## 2024-05-15 NOTE — CONSULT NOTE ADULT - ASSESSMENT
83F former smoker with HTN, HLD, T2DM, CAD, HFpEF, ESRD on HD MWF, dementia, remote hx of breast ca s/p mastectomy, chronic hypoxemic respiratory failure of unclear etiology on home O2 prn p/w weakness, cough  and AMS.   + COVID-19+  POCUS ED TTE: scattered b lines b/l, E to A reversal cannot exclude diastolic dysfn   CTH 5/12 no acute findings   dimer 549  A1c 5.7     Impression:   1) AMS likely toxic metabolic from infection/covid on top of underlying dementia   2) dementia     - airborne precautions for COVID   - B12, RPR, TSH if not arleady checked   - getting dexamethasone and remdesivir   - can hold off further brain imaging for now.  no focal findings.  can consider MRI brain and EEG if not near baseline after treatment of infection but low yield    - PT/OT   - delirium precautions  - frequent re orientation   - check FS, glucose control <180  - GI/DVT ppx  - Counseling on diet, exercise, and medication adherence was done  - Counseling on smoking cessation and alcohol consumption offered when appropriate.  - Pain assessed and judicious use of narcotics when appropriate was discussed.    - Stroke education given when appropriate.  - Importance of fall prevention discussed.   - Differential diagnosis and plan of care discussed with patient and/or family and primary team  - Thank you for allowing me to participate in the care of this patient. Call with questions.     Dick Benoit MD  Vascular Neurology  Office: [phone]    
83-year-old female with a past medical history significant for tobacco use, hypertension, hyperlipidemia, diabetes, CAD, heart failure, ESRD on HD, dementia, chronic hypoxemic respiratory failure on home oxygen as needed who is admitted to the hospital due to weakness and cough.      #Acute on chronic hypoxic respiratory failure  #SARS-CoV-2/COVID-19 infection    Recommendations  Continue remdesivir for a total course of 5 days given increased oxygen requirements  Continue dexamethasone for total of 10 days due to increased oxygen requirements  If patient returns to baseline respiratory status, medications are not a barrier to discharge -can complete dexamethasone course at home and discontinue remdesivir  Would discontinue ceftriaxone, no evidence for superimposed pneumonia  Follow pending blood cultures  Follow fever curve and WBC count    Neftali Maxwell MD  Division of Infectious Diseases     
82 yo female with PMH of ESRD on HD (TIW) a/w COVID+. On Remdesivir and Dexamethasone.

## 2024-05-16 ENCOUNTER — TRANSCRIPTION ENCOUNTER (OUTPATIENT)
Age: 84
End: 2024-05-16

## 2024-05-16 LAB
ALBUMIN SERPL ELPH-MCNC: 3.4 G/DL — SIGNIFICANT CHANGE UP (ref 3.3–5)
ALP SERPL-CCNC: 46 U/L — SIGNIFICANT CHANGE UP (ref 40–120)
ALT FLD-CCNC: 13 U/L — SIGNIFICANT CHANGE UP (ref 10–45)
ANION GAP SERPL CALC-SCNC: 18 MMOL/L — HIGH (ref 5–17)
AST SERPL-CCNC: 19 U/L — SIGNIFICANT CHANGE UP (ref 10–40)
BILIRUB DIRECT SERPL-MCNC: <0.1 MG/DL — SIGNIFICANT CHANGE UP (ref 0–0.3)
BILIRUB INDIRECT FLD-MCNC: >0.1 MG/DL — LOW (ref 0.2–1)
BILIRUB SERPL-MCNC: 0.2 MG/DL — SIGNIFICANT CHANGE UP (ref 0.2–1.2)
BUN SERPL-MCNC: 45 MG/DL — HIGH (ref 7–23)
CALCIUM SERPL-MCNC: 8.3 MG/DL — LOW (ref 8.4–10.5)
CHLORIDE SERPL-SCNC: 97 MMOL/L — SIGNIFICANT CHANGE UP (ref 96–108)
CO2 SERPL-SCNC: 25 MMOL/L — SIGNIFICANT CHANGE UP (ref 22–31)
CREAT SERPL-MCNC: 4.42 MG/DL — HIGH (ref 0.5–1.3)
CULTURE RESULTS: SIGNIFICANT CHANGE UP
CULTURE RESULTS: SIGNIFICANT CHANGE UP
EGFR: 10 ML/MIN/1.73M2 — LOW
GLUCOSE BLDC GLUCOMTR-MCNC: 152 MG/DL — HIGH (ref 70–99)
GLUCOSE BLDC GLUCOMTR-MCNC: 202 MG/DL — HIGH (ref 70–99)
GLUCOSE BLDC GLUCOMTR-MCNC: 215 MG/DL — HIGH (ref 70–99)
GLUCOSE BLDC GLUCOMTR-MCNC: 249 MG/DL — HIGH (ref 70–99)
GLUCOSE SERPL-MCNC: 121 MG/DL — HIGH (ref 70–99)
INR BLD: 1.05 RATIO — SIGNIFICANT CHANGE UP (ref 0.85–1.18)
POTASSIUM SERPL-MCNC: 4 MMOL/L — SIGNIFICANT CHANGE UP (ref 3.5–5.3)
POTASSIUM SERPL-SCNC: 4 MMOL/L — SIGNIFICANT CHANGE UP (ref 3.5–5.3)
PROT SERPL-MCNC: 6.8 G/DL — SIGNIFICANT CHANGE UP (ref 6–8.3)
PROTHROM AB SERPL-ACNC: 11.5 SEC — SIGNIFICANT CHANGE UP (ref 9.5–13)
SODIUM SERPL-SCNC: 140 MMOL/L — SIGNIFICANT CHANGE UP (ref 135–145)
SPECIMEN SOURCE: SIGNIFICANT CHANGE UP
SPECIMEN SOURCE: SIGNIFICANT CHANGE UP

## 2024-05-16 RX ORDER — ATORVASTATIN CALCIUM 80 MG/1
1 TABLET, FILM COATED ORAL
Qty: 30 | Refills: 0
Start: 2024-05-16 | End: 2024-06-14

## 2024-05-16 RX ORDER — DEXAMETHASONE 0.5 MG/5ML
1 ELIXIR ORAL
Qty: 4 | Refills: 0
Start: 2024-05-16 | End: 2024-05-19

## 2024-05-16 RX ORDER — RIVAROXABAN 15 MG-20MG
1 KIT ORAL
Qty: 30 | Refills: 0
Start: 2024-05-16 | End: 2024-06-14

## 2024-05-16 RX ORDER — BUMETANIDE 0.25 MG/ML
1 INJECTION INTRAMUSCULAR; INTRAVENOUS
Qty: 17 | Refills: 0
Start: 2024-05-16 | End: 2024-06-14

## 2024-05-16 RX ADMIN — HEPARIN SODIUM 7500 UNIT(S): 5000 INJECTION INTRAVENOUS; SUBCUTANEOUS at 12:07

## 2024-05-16 RX ADMIN — ATORVASTATIN CALCIUM 80 MILLIGRAM(S): 80 TABLET, FILM COATED ORAL at 21:46

## 2024-05-16 RX ADMIN — Medication 6 MILLIGRAM(S): at 05:17

## 2024-05-16 RX ADMIN — HEPARIN SODIUM 7500 UNIT(S): 5000 INJECTION INTRAVENOUS; SUBCUTANEOUS at 21:46

## 2024-05-16 RX ADMIN — BUDESONIDE AND FORMOTEROL FUMARATE DIHYDRATE 2 PUFF(S): 160; 4.5 AEROSOL RESPIRATORY (INHALATION) at 17:15

## 2024-05-16 RX ADMIN — Medication 3 MILLIGRAM(S): at 21:46

## 2024-05-16 RX ADMIN — Medication 2: at 17:15

## 2024-05-16 RX ADMIN — Medication 1: at 08:24

## 2024-05-16 RX ADMIN — BUDESONIDE AND FORMOTEROL FUMARATE DIHYDRATE 2 PUFF(S): 160; 4.5 AEROSOL RESPIRATORY (INHALATION) at 05:17

## 2024-05-16 RX ADMIN — REMDESIVIR 200 MILLIGRAM(S): 5 INJECTION INTRAVENOUS at 13:11

## 2024-05-16 RX ADMIN — HEPARIN SODIUM 7500 UNIT(S): 5000 INJECTION INTRAVENOUS; SUBCUTANEOUS at 05:17

## 2024-05-16 RX ADMIN — MONTELUKAST 10 MILLIGRAM(S): 4 TABLET, CHEWABLE ORAL at 12:07

## 2024-05-16 RX ADMIN — Medication 2: at 12:07

## 2024-05-16 RX ADMIN — CHLORHEXIDINE GLUCONATE 1 APPLICATION(S): 213 SOLUTION TOPICAL at 07:52

## 2024-05-16 NOTE — DISCHARGE NOTE PROVIDER - NSDCMRMEDTOKEN_GEN_ALL_CORE_FT
albuterol 90 mcg/inh inhalation aerosol: 2 puff(s) inhaled every 6 hours  atorvastatin 80 mg oral tablet: 1 tab(s) orally once a day (at bedtime)  budesonide-formoterol 160 mcg-4.5 mcg/inh inhalation aerosol: 1 spray(s) inhaled 2 times a day  bumetanide 2 mg oral tablet: 1 tab(s) orally Tuesday, Thursday, Saturday, Sunday Take once a day on non-dialysis days in the mornings  dexAMETHasone 6 mg oral tablet: 1 tab(s) orally once a day  montelukast 10 mg oral tablet: 1 tab(s) orally once a day  Onglyza 2.5 mg oral tablet: 1 tab(s) orally once a day (in the evening) On HD (hemodialysis) days, give after HD (hemodialysis)  pioglitazone 30 mg oral tablet: 1 tab(s) orally once a day (in the morning)  Tradjenta 5 mg oral tablet: 1 tab(s) orally once a day (in the morning)   albuterol 90 mcg/inh inhalation aerosol: 2 puff(s) inhaled every 6 hours  atorvastatin 80 mg oral tablet: 1 tab(s) orally once a day (at bedtime)  budesonide-formoterol 160 mcg-4.5 mcg/inh inhalation aerosol: 1 spray(s) inhaled 2 times a day  bumetanide 2 mg oral tablet: 1 tab(s) orally Tuesday, Thursday, Saturday, Sunday Take once a day on non-dialysis days in the mornings  dexAMETHasone 6 mg oral tablet: 1 tab(s) orally once a day  montelukast 10 mg oral tablet: 1 tab(s) orally once a day  Onglyza 2.5 mg oral tablet: 1 tab(s) orally once a day (in the evening) On HD (hemodialysis) days, give after HD (hemodialysis)  pioglitazone 30 mg oral tablet: 1 tab(s) orally once a day (in the morning)  Tradjenta 5 mg oral tablet: 1 tab(s) orally once a day (in the morning)  Xarelto 10 mg oral tablet: 1 tab(s) orally once a day

## 2024-05-16 NOTE — DISCHARGE NOTE PROVIDER - CARE PROVIDER_API CALL
Dario Sol  Internal Medicine  [address] [place] [address]  Phone: [phone]  Fax: [phone]  Follow Up Time: 2 weeks    Dick Benoit  Neurology  [address] [facility] [address], [address]  [place] [address]  Phone: [phone]  Fax: [phone]  Follow Up Time: 2 weeks

## 2024-05-16 NOTE — DISCHARGE NOTE PROVIDER - HOSPITAL COURSE
83F former smoker PMH HTN, HLD, T2DM, CAD, HFpEF, ESRD on HD MWF, dementia, remote hx of breast ca s/p mastectomy, chronic hypoxemic respiratory failure of unclear etiology on home O2 prn a/w sepsis 2/2 COVID-19 infection +/- superimposed bacterial PNA    Sepsis due to COVID-19.   - SIRS met (febrile, tachypneic)     COVID-19+  - s/p cefepime in ED  - on O2, will c/w decadron  - remdesivir given comorbidities (d/w pharmacy, ok despite ESRD), monitor CMP  - non-ICU care, d-dimer unknown, ESRD, BMI > 30, VTE ppx per protocol with UFH 7500u SQ q8h/q12h  - procal elevated,  - discontinue CTX  - monitor vol status re IVF needs given ESRD on HD  - ID follow    Chronic hypoxemic respiratory failure.   - hypercapnic on VBG, on O2  - hx unclear chronic respiratory dz  - on home O2 prn  - tx of COVID   - wean O2 as tolerates on   - Pulmonary follow    Type 2 diabetes mellitus.   - RODRICK, monitor FS.    Chronic hypertension.   - hold home meds (including bumex) given sepsis, consider restart when appropriate  - monitor BP.    ESRD on dialysis.   - last HD yesterday per ED note  - w/o lyte disturbance necessitating urgent HD, euvolemic-appearing on exam  - monitor BMP, dose per HD, avoid nephrotoxins, monitor vol status  - nephrology evaluation     Chronic heart failure with preserved ejection fraction (HFpEF).   - euvolemic-appearing on exam  - monitor vol status.    Dementia.   - AOx2, at bl per   - was found on floor per RN note, unclear if fall, no mention in ED note and unable to reach family for collateral, no EMS note in physical chart. Update: able to reach  as above, will obtain CTH NC to r/o injury/bleed given somnolence and neurochecks q4h  - frequent reorientation  - fall/aspiration precautions  - PT c/s.    Confusion resolved  - CT head noted  Note copied forward

## 2024-05-16 NOTE — DISCHARGE NOTE PROVIDER - NSDCCPCAREPLAN_GEN_ALL_CORE_FT
PRINCIPAL DISCHARGE DIAGNOSIS  Diagnosis: COVID-19  Assessment and Plan of Treatment: You tested positive for COVID 19.  You no longer require hospitalization.  Please restrict activities outside of your home except for getting medical care.  Do not go to work, school, or public areas.  Avoid using public transportation, ride-sharing, or taxis.  Separate yourself from other people and animals in your home.  Call ahead before visiting your doctor.  Wear a facemask when you are around other people. Cover your cough and sneezes.  Clean your hands often.  Avoid sharing personal household items.  Clean all frequently touched surfaces daily.      SECONDARY DISCHARGE DIAGNOSES  Diagnosis: Type 2 diabetes mellitus  Assessment and Plan of Treatment: HgA1C this admission.  Make sure you get your HgA1c checked every three months.  If you take oral diabetes medications, check your blood glucose two times a day.  If you take insulin, check your blood glucose before meals and at bedtime.  It's important not to skip any meals.  Keep a log of your blood glucose results and always take it with you to your doctor appointments.  Keep a list of your current medications including injectables and over the counter medications and bring this medication list with you to all your doctor appointments.  If you have not seen your ophthalmologist this year call for appointment.  Check your feet daily for redness, sores, or openings. Do not self treat. If no improvement in two days call your primary care physician for an appointment.  Low blood sugar (hypoglycemia) is a blood sugar below 70mg/dl. Check your blood sugar if you feel signs/symptoms of hypoglycemia. If your blood sugar is below 70 take 15 grams of carbohydrates (ex 4 oz of apple juice, 3-4 glucose tablets, or 4-6 oz of regular soda) wait 15 minutes and repeat blood sugar to make sure it comes up above 70.  If your blood sugar is above 70 and you are due for a meal, have a meal.  If you are not due for a meal have a snack.  This snack helps keeps your blood sugar at a safe range.    Diagnosis: Chronic heart failure with preserved ejection fraction (HFpEF)  Assessment and Plan of Treatment: Weigh yourself daily.  If you gain 3lbs in 3 days, or 5lbs in a week call your Health Care Provider.  Do not eat or drink foods containing more than 2000mg of salt (sodium) in your diet every day.  Call your Health Care Provider if you have any swelling or increased swelling in your feet, ankles, and/or stomach.  Take all of your medication as directed.  If you become dizzy call your Health Care Provider.

## 2024-05-16 NOTE — DISCHARGE NOTE PROVIDER - PROVIDER TOKENS
PROVIDER:[TOKEN:[[id number]],FOLLOWUP:[2 weeks]],PROVIDER:[TOKEN:[[id number]],FOLLOWUP:[2 weeks]]

## 2024-05-16 NOTE — DISCHARGE NOTE NURSING/CASE MANAGEMENT/SOCIAL WORK - PATIENT PORTAL LINK FT
You can access the FollowMyHealth Patient Portal offered by [facility] by registering at the following website: [email or web address]. By joining [facility]’s FollowMyHealth portal, you will also be able to view your health information using other applications (apps) compatible with our system.

## 2024-05-17 VITALS — OXYGEN SATURATION: 95 %

## 2024-05-17 DIAGNOSIS — N18.6 END STAGE RENAL DISEASE: ICD-10-CM

## 2024-05-17 LAB
ALBUMIN SERPL ELPH-MCNC: 3.5 G/DL — SIGNIFICANT CHANGE UP (ref 3.3–5)
ALP SERPL-CCNC: 49 U/L — SIGNIFICANT CHANGE UP (ref 40–120)
ALT FLD-CCNC: 15 U/L — SIGNIFICANT CHANGE UP (ref 10–45)
AST SERPL-CCNC: 22 U/L — SIGNIFICANT CHANGE UP (ref 10–40)
BILIRUB DIRECT SERPL-MCNC: <0.1 MG/DL — SIGNIFICANT CHANGE UP (ref 0–0.3)
BILIRUB INDIRECT FLD-MCNC: >0.1 MG/DL — LOW (ref 0.2–1)
BILIRUB SERPL-MCNC: 0.2 MG/DL — SIGNIFICANT CHANGE UP (ref 0.2–1.2)
CREAT SERPL-MCNC: 5.85 MG/DL — HIGH (ref 0.5–1.3)
EGFR: 7 ML/MIN/1.73M2 — LOW
GLUCOSE BLDC GLUCOMTR-MCNC: 147 MG/DL — HIGH (ref 70–99)
GLUCOSE BLDC GLUCOMTR-MCNC: 272 MG/DL — HIGH (ref 70–99)
INR BLD: 1.01 RATIO — SIGNIFICANT CHANGE UP (ref 0.85–1.18)
PROT SERPL-MCNC: 6.7 G/DL — SIGNIFICANT CHANGE UP (ref 6–8.3)
PROTHROM AB SERPL-ACNC: 10.6 SEC — SIGNIFICANT CHANGE UP (ref 9.5–13)

## 2024-05-17 PROCEDURE — 82947 ASSAY GLUCOSE BLOOD QUANT: CPT

## 2024-05-17 PROCEDURE — 84484 ASSAY OF TROPONIN QUANT: CPT

## 2024-05-17 PROCEDURE — 82728 ASSAY OF FERRITIN: CPT

## 2024-05-17 PROCEDURE — 83735 ASSAY OF MAGNESIUM: CPT

## 2024-05-17 PROCEDURE — 82550 ASSAY OF CK (CPK): CPT

## 2024-05-17 PROCEDURE — 94640 AIRWAY INHALATION TREATMENT: CPT

## 2024-05-17 PROCEDURE — 85379 FIBRIN DEGRADATION QUANT: CPT

## 2024-05-17 PROCEDURE — 87637 SARSCOV2&INF A&B&RSV AMP PRB: CPT

## 2024-05-17 PROCEDURE — 84145 PROCALCITONIN (PCT): CPT

## 2024-05-17 PROCEDURE — 82962 GLUCOSE BLOOD TEST: CPT

## 2024-05-17 PROCEDURE — 87040 BLOOD CULTURE FOR BACTERIA: CPT

## 2024-05-17 PROCEDURE — 80048 BASIC METABOLIC PNL TOTAL CA: CPT

## 2024-05-17 PROCEDURE — 82803 BLOOD GASES ANY COMBINATION: CPT

## 2024-05-17 PROCEDURE — 99232 SBSQ HOSP IP/OBS MODERATE 35: CPT | Mod: GC

## 2024-05-17 PROCEDURE — 71045 X-RAY EXAM CHEST 1 VIEW: CPT

## 2024-05-17 PROCEDURE — 85027 COMPLETE CBC AUTOMATED: CPT

## 2024-05-17 PROCEDURE — 85610 PROTHROMBIN TIME: CPT

## 2024-05-17 PROCEDURE — 85730 THROMBOPLASTIN TIME PARTIAL: CPT

## 2024-05-17 PROCEDURE — 96375 TX/PRO/DX INJ NEW DRUG ADDON: CPT

## 2024-05-17 PROCEDURE — 96374 THER/PROPH/DIAG INJ IV PUSH: CPT

## 2024-05-17 PROCEDURE — 82330 ASSAY OF CALCIUM: CPT

## 2024-05-17 PROCEDURE — 93005 ELECTROCARDIOGRAM TRACING: CPT

## 2024-05-17 PROCEDURE — 93970 EXTREMITY STUDY: CPT

## 2024-05-17 PROCEDURE — 85025 COMPLETE CBC W/AUTO DIFF WBC: CPT

## 2024-05-17 PROCEDURE — 83615 LACTATE (LD) (LDH) ENZYME: CPT

## 2024-05-17 PROCEDURE — 83605 ASSAY OF LACTIC ACID: CPT

## 2024-05-17 PROCEDURE — 80076 HEPATIC FUNCTION PANEL: CPT

## 2024-05-17 PROCEDURE — 82565 ASSAY OF CREATININE: CPT

## 2024-05-17 PROCEDURE — 99285 EMERGENCY DEPT VISIT HI MDM: CPT | Mod: 25

## 2024-05-17 PROCEDURE — 87640 STAPH A DNA AMP PROBE: CPT

## 2024-05-17 PROCEDURE — 97161 PT EVAL LOW COMPLEX 20 MIN: CPT

## 2024-05-17 PROCEDURE — 85018 HEMOGLOBIN: CPT

## 2024-05-17 PROCEDURE — 83880 ASSAY OF NATRIURETIC PEPTIDE: CPT

## 2024-05-17 PROCEDURE — 82553 CREATINE MB FRACTION: CPT

## 2024-05-17 PROCEDURE — 85014 HEMATOCRIT: CPT

## 2024-05-17 PROCEDURE — 70450 CT HEAD/BRAIN W/O DYE: CPT | Mod: MC

## 2024-05-17 PROCEDURE — 86140 C-REACTIVE PROTEIN: CPT

## 2024-05-17 PROCEDURE — 93308 TTE F-UP OR LMTD: CPT

## 2024-05-17 PROCEDURE — 87641 MR-STAPH DNA AMP PROBE: CPT

## 2024-05-17 PROCEDURE — 84132 ASSAY OF SERUM POTASSIUM: CPT

## 2024-05-17 PROCEDURE — 84100 ASSAY OF PHOSPHORUS: CPT

## 2024-05-17 PROCEDURE — 36415 COLL VENOUS BLD VENIPUNCTURE: CPT

## 2024-05-17 PROCEDURE — 84295 ASSAY OF SERUM SODIUM: CPT

## 2024-05-17 PROCEDURE — 83036 HEMOGLOBIN GLYCOSYLATED A1C: CPT

## 2024-05-17 PROCEDURE — 80053 COMPREHEN METABOLIC PANEL: CPT

## 2024-05-17 PROCEDURE — 99261: CPT

## 2024-05-17 PROCEDURE — 82435 ASSAY OF BLOOD CHLORIDE: CPT

## 2024-05-17 PROCEDURE — 92610 EVALUATE SWALLOWING FUNCTION: CPT

## 2024-05-17 RX ADMIN — Medication 3: at 12:23

## 2024-05-17 RX ADMIN — Medication 6 MILLIGRAM(S): at 05:31

## 2024-05-17 RX ADMIN — HEPARIN SODIUM 7500 UNIT(S): 5000 INJECTION INTRAVENOUS; SUBCUTANEOUS at 12:23

## 2024-05-17 RX ADMIN — BUDESONIDE AND FORMOTEROL FUMARATE DIHYDRATE 2 PUFF(S): 160; 4.5 AEROSOL RESPIRATORY (INHALATION) at 05:31

## 2024-05-17 RX ADMIN — CHLORHEXIDINE GLUCONATE 1 APPLICATION(S): 213 SOLUTION TOPICAL at 08:11

## 2024-05-17 RX ADMIN — MONTELUKAST 10 MILLIGRAM(S): 4 TABLET, CHEWABLE ORAL at 12:24

## 2024-05-17 RX ADMIN — HEPARIN SODIUM 7500 UNIT(S): 5000 INJECTION INTRAVENOUS; SUBCUTANEOUS at 05:31

## 2024-05-17 NOTE — PROGRESS NOTE ADULT - ATTENDING COMMENTS
I wrote above
I have seen this patient with the fellow and agree with their assessment and plan. I was physically present for significant portions of the evaluation and management (E/M) service provided.  I agree with the above history, physical, and plan which I have reviewed and edited where appropriate.    For weekend coverage, call  Dr. Valentino Montano( fellow) or Dr Maria Del Carmen Araujo( attending)    Rodri Hargrove MD  Office   Contact me directly via [employer] Teams     (After 5 pm or on weekends please page the on-call fellow/attending, can check [email or web address] for schedule. Login is donan mckeon, schedule under [facility] medicine, psych, derm)

## 2024-05-17 NOTE — PROGRESS NOTE ADULT - SUBJECTIVE AND OBJECTIVE BOX
Neurology      S; patient seen. doing okay       Medications: MEDICATIONS  (STANDING):  atorvastatin 80 milliGRAM(s) Oral at bedtime  budesonide 160 MICROgram(s)/formoterol 4.5 MICROgram(s) Inhaler 2 Puff(s) Inhalation two times a day  chlorhexidine 2% Cloths 1 Application(s) Topical <User Schedule>  dexAMETHasone     Tablet 6 milliGRAM(s) Oral daily  dextrose 10% Bolus 125 milliLiter(s) IV Bolus once  dextrose 5%. 1000 milliLiter(s) (50 mL/Hr) IV Continuous <Continuous>  dextrose 5%. 1000 milliLiter(s) (100 mL/Hr) IV Continuous <Continuous>  dextrose 50% Injectable 25 Gram(s) IV Push once  dextrose 50% Injectable 12.5 Gram(s) IV Push once  glucagon  Injectable 1 milliGRAM(s) IntraMuscular once  heparin   Injectable 7500 Unit(s) SubCutaneous every 8 hours  insulin lispro (ADMELOG) corrective regimen sliding scale   SubCutaneous at bedtime  insulin lispro (ADMELOG) corrective regimen sliding scale   SubCutaneous three times a day before meals  montelukast 10 milliGRAM(s) Oral daily    MEDICATIONS  (PRN):  acetaminophen     Tablet .. 650 milliGRAM(s) Oral every 6 hours PRN Temp greater or equal to 38C (100.4F), Mild Pain (1 - 3)  albuterol    90 MICROgram(s) HFA Inhaler 2 Puff(s) Inhalation every 6 hours PRN Shortness of Breath and/or Wheezing  dextrose Oral Gel 15 Gram(s) Oral once PRN Blood Glucose LESS THAN 70 milliGRAM(s)/deciliter  melatonin 3 milliGRAM(s) Oral at bedtime PRN Insomnia       Vitals:  Vital Signs Last 24 Hrs  T(C): 36.3 (17 May 2024 16:30), Max: 36.6 (17 May 2024 05:05)  T(F): 97.3 (17 May 2024 16:30), Max: 97.9 (17 May 2024 11:41)  HR: 77 (17 May 2024 16:30) (58 - 86)  BP: 145/70 (17 May 2024 16:30) (116/57 - 145/70)  BP(mean): --  RR: 20 (17 May 2024 16:30) (18 - 20)  SpO2: 95% (17 May 2024 17:19) (94% - 97%)    Parameters below as of 17 May 2024 17:19  Patient On (Oxygen Delivery Method): room air            General Exam:   General Appearance: Appropriately dressed and in no acute distress       Head: Normocephalic, atraumatic and no dysmorphic features  Ear, Nose, and Throat: Moist mucous membranes  CVS: S1S2+  Resp: No SOB, no wheeze or rhonchi  GI: soft NT/ND  Extremities: No edema or cyanosis  Skin: No bruises or rashes     Neurological Exam:  Mental Status: Awake, alert and oriented x 1-2  Able to follow simple verbal commands. Able to name and repeat. fluent speech. No obvious aphasia or dysarthria noted.   Cranial Nerves: PERRL, EOMI, VFFC, sensation V1-V3 intact,  no obvious facial asymmetry, equal elevation of palate, scm/trap 5/5, tongue is midline on protrusion. no obvious papilledema on fundoscopic exam. hearing is grossly intact.   Motor: Normal bulk, tone and strength throughout. RAMIRES at least 4/5 but genrealized weakness   Sensation: Intact to light touch and pinprick throughout. no right/left confusion. no extinction to tactile on DSS.   Reflexes: 1+ throughout at biceps, brachioradialis, triceps, patellars and ankles bilaterally and equal. No clonus. R toe and L toe were both downgoing.  Coordination: No dysmetria on FNF    Gait:deferred     Data/Labs/Imaging which I personally reviewed.     Labs:       LABS:      05-17    x   |  x   |  x   ----------------------------<  x   x    |  x   |  5.85<H>    Ca    8.3<L>      16 May 2024 06:13    TPro  6.7  /  Alb  3.5  /  TBili  0.2  /  DBili  <0.1  /  AST  22  /  ALT  15  /  AlkPhos  49  05-17    LIVER FUNCTIONS - ( 17 May 2024 05:46 )  Alb: 3.5 g/dL / Pro: 6.7 g/dL / ALK PHOS: 49 U/L / ALT: 15 U/L / AST: 22 U/L / GGT: x           PT/INR - ( 17 May 2024 05:46 )   PT: 10.6 sec;   INR: 1.01 ratio           Urinalysis Basic - ( 16 May 2024 06:13 )    Color: x / Appearance: x / SG: x / pH: x  Gluc: 121 mg/dL / Ketone: x  / Bili: x / Urobili: x   Blood: x / Protein: x / Nitrite: x   Leuk Esterase: x / RBC: x / WBC x   Sq Epi: x / Non Sq Epi: x / Bacteria: x        < from: CT Head No Cont (05.12.24 @ 12:59) >    ACC: [id number] EXAM:  CT BRAIN   ORDERED BY:  ANGELIA RYAN     PROCEDURE DATE:  05/12/2024          INTERPRETATION:  CLINICAL INFORMATION: Found on floor.    COMPARISON: None.    PROCEDURE:  Noncontrast CT of the Head was performed from the skullbase to the   vertex. Coronal and Sagittal reformats were obtained.    FINDINGS:    There is no acute intracranial hemorrhage, mass effect, midline shift,   extra-axial collection, hydrocephalus, or evidence of acute vascular   territorial infarction. Mild to moderate patchy hypodensities within the   periventricular and subcortical white matter, although nonspecific,   likely reflect chronic microvascular disease. Cerebral volume loss   contributes to prominence of the ventricles and sulci.    Mild paranasal sinus mucosal thickening. Mastoid air cells are clear.   Intraorbital contents are unremarkable. Calvarium is intact.    IMPRESSION:    No acute intracranial hemorrhage, mass effect, or evidence of acute   vascular territorial infarction. If clinical symptoms persist or worsen,   more sensitive evaluation with brain MRI may be obtained, if no   contraindications exist.    --- End of Report ---            JAKI ESPOSITO MD; Attending Radiologist  This document has been electronically signed. May 12 2024  1:09PM    < end of copied text >        
Patient is a 83y old  Female who presents with a chief complaint of Sepsis 2/2 COVID-19 infection (15 May 2024 11:19)      SUBJECTIVE / OVERNIGHT EVENTS: Confused in am, but better now. HD   Review of Systems  chest pain no  palpitations no  sob no  nausea no  headache no    MEDICATIONS  (STANDING):  atorvastatin 80 milliGRAM(s) Oral at bedtime  budesonide 160 MICROgram(s)/formoterol 4.5 MICROgram(s) Inhaler 2 Puff(s) Inhalation two times a day  chlorhexidine 2% Cloths 1 Application(s) Topical <User Schedule>  dexAMETHasone     Tablet 6 milliGRAM(s) Oral daily  dextrose 10% Bolus 125 milliLiter(s) IV Bolus once  dextrose 5%. 1000 milliLiter(s) (50 mL/Hr) IV Continuous <Continuous>  dextrose 5%. 1000 milliLiter(s) (100 mL/Hr) IV Continuous <Continuous>  dextrose 50% Injectable 25 Gram(s) IV Push once  dextrose 50% Injectable 12.5 Gram(s) IV Push once  glucagon  Injectable 1 milliGRAM(s) IntraMuscular once  heparin   Injectable 7500 Unit(s) SubCutaneous every 8 hours  insulin lispro (ADMELOG) corrective regimen sliding scale   SubCutaneous three times a day before meals  insulin lispro (ADMELOG) corrective regimen sliding scale   SubCutaneous at bedtime  montelukast 10 milliGRAM(s) Oral daily  remdesivir  IVPB 100 milliGRAM(s) IV Intermittent every 24 hours  remdesivir  IVPB   IV Intermittent     MEDICATIONS  (PRN):  acetaminophen     Tablet .. 650 milliGRAM(s) Oral every 6 hours PRN Temp greater or equal to 38C (100.4F), Mild Pain (1 - 3)  albuterol    90 MICROgram(s) HFA Inhaler 2 Puff(s) Inhalation every 6 hours PRN Shortness of Breath and/or Wheezing  dextrose Oral Gel 15 Gram(s) Oral once PRN Blood Glucose LESS THAN 70 milliGRAM(s)/deciliter      Vital Signs Last 24 Hrs  T(C): 36.2 (15 May 2024 17:10), Max: 36.6 (15 May 2024 12:59)  T(F): 97.2 (15 May 2024 17:10), Max: 97.9 (15 May 2024 12:59)  HR: 73 (15 May 2024 17:10) (66 - 75)  BP: 125/58 (15 May 2024 17:10) (109/50 - 148/68)  BP(mean): --  RR: 17 (15 May 2024 17:10) (17 - 18)  SpO2: 97% (15 May 2024 17:10) (95% - 97%)    Parameters below as of 15 May 2024 17:10  Patient On (Oxygen Delivery Method): room air        PHYSICAL EXAM:  GENERAL: NAD  HEAD:  Atraumatic, Normocephalic  EYES: EOMI, PERRLA, conjunctiva and sclera clear  NECK: Supple, No JVD  CHEST/LUNG: Clear to auscultation bilaterally; No wheeze  HEART: Regular rate and rhythm; No murmurs, rubs, or gallops  ABDOMEN: Soft, Nontender, Nondistended; Bowel sounds present  EXTREMITIES:  2+ Peripheral Pulses, No clubbing, cyanosis, or edema  PSYCH: AAOx2  NEUROLOGY: non-focal  SKIN: No rashes or lesions    LABS:                        12.6   4.55  )-----------( 293      ( 15 May 2024 10:04 )             39.2     05-15    134<L>  |  91<L>  |  77<H>  ----------------------------<  258<H>  4.8   |  23  |  6.74<H>    Ca    8.5      15 May 2024 10:04    TPro  7.3  /  Alb  3.9  /  TBili  0.2  /  DBili  x   /  AST  22  /  ALT  16  /  AlkPhos  54  05-15    PT/INR - ( 15 May 2024 05:40 )   PT: 10.7 sec;   INR: 1.02 ratio               Urinalysis Basic - ( 15 May 2024 10:04 )    Color: x / Appearance: x / SG: x / pH: x  Gluc: 258 mg/dL / Ketone: x  / Bili: x / Urobili: x   Blood: x / Protein: x / Nitrite: x   Leuk Esterase: x / RBC: x / WBC x   Sq Epi: x / Non Sq Epi: x / Bacteria: x          RADIOLOGY & ADDITIONAL TESTS:    Imaging Personally Reviewed:  < from: CT Head No Cont (05.15.24 @ 18:09) >  IMPRESSION:    No evidence of acute intracranial hemorrhage, midlineshift or CT   evidence of acute territorial infarct.    If the patient's symptoms persist, consider short interval follow-up head   CT or brain MRI if there are no MRI contraindications.    < end of copied text >    Consultant(s) Notes Reviewed:      Care Discussed with Consultants/Other Providers:  
Patient is a 83y old  Female who presents with a chief complaint of Sepsis 2/2 COVID-19 infection (17 May 2024 13:14)      SUBJECTIVE / OVERNIGHT EVENTS: Comfortable without new complaints. HD in progress.  Review of Systems  chest pain no  palpitations no  sob no  nausea no  headache no    MEDICATIONS  (STANDING):  atorvastatin 80 milliGRAM(s) Oral at bedtime  budesonide 160 MICROgram(s)/formoterol 4.5 MICROgram(s) Inhaler 2 Puff(s) Inhalation two times a day  chlorhexidine 2% Cloths 1 Application(s) Topical <User Schedule>  dexAMETHasone     Tablet 6 milliGRAM(s) Oral daily  dextrose 10% Bolus 125 milliLiter(s) IV Bolus once  dextrose 5%. 1000 milliLiter(s) (50 mL/Hr) IV Continuous <Continuous>  dextrose 5%. 1000 milliLiter(s) (100 mL/Hr) IV Continuous <Continuous>  dextrose 50% Injectable 25 Gram(s) IV Push once  dextrose 50% Injectable 12.5 Gram(s) IV Push once  glucagon  Injectable 1 milliGRAM(s) IntraMuscular once  heparin   Injectable 7500 Unit(s) SubCutaneous every 8 hours  insulin lispro (ADMELOG) corrective regimen sliding scale   SubCutaneous three times a day before meals  insulin lispro (ADMELOG) corrective regimen sliding scale   SubCutaneous at bedtime  montelukast 10 milliGRAM(s) Oral daily    MEDICATIONS  (PRN):  acetaminophen     Tablet .. 650 milliGRAM(s) Oral every 6 hours PRN Temp greater or equal to 38C (100.4F), Mild Pain (1 - 3)  albuterol    90 MICROgram(s) HFA Inhaler 2 Puff(s) Inhalation every 6 hours PRN Shortness of Breath and/or Wheezing  dextrose Oral Gel 15 Gram(s) Oral once PRN Blood Glucose LESS THAN 70 milliGRAM(s)/deciliter  melatonin 3 milliGRAM(s) Oral at bedtime PRN Insomnia      Vital Signs Last 24 Hrs  T(C): 36.3 (17 May 2024 16:30), Max: 36.8 (16 May 2024 21:30)  T(F): 97.3 (17 May 2024 16:30), Max: 98.2 (16 May 2024 21:30)  HR: 77 (17 May 2024 16:30) (58 - 86)  BP: 145/70 (17 May 2024 16:30) (116/57 - 145/70)  BP(mean): --  RR: 20 (17 May 2024 16:30) (18 - 20)  SpO2: 95% (17 May 2024 17:19) (93% - 97%)    Parameters below as of 17 May 2024 17:19  Patient On (Oxygen Delivery Method): room air        PHYSICAL EXAM:  GENERAL: NAD   HEAD:  Atraumatic, Normocephalic  EYES: EOMI, PERRLA, conjunctiva and sclera clear  NECK: Supple, No JVD  CHEST/LUNG: Clear to auscultation bilaterally; No wheeze  HEART: Regular rate and rhythm; No murmurs, rubs, or gallops  ABDOMEN: Soft, Nontender, Nondistended; Bowel sounds present  EXTREMITIES:  2+ Peripheral Pulses, No clubbing, cyanosis, or edema  PSYCH: AAOx2  NEUROLOGY: non-focal  SKIN: No rashes or lesions    LABS:    05-17    x   |  x   |  x   ----------------------------<  x   x    |  x   |  5.85<H>    Ca    8.3<L>      16 May 2024 06:13    TPro  6.7  /  Alb  3.5  /  TBili  0.2  /  DBili  <0.1  /  AST  22  /  ALT  15  /  AlkPhos  49  05-17    PT/INR - ( 17 May 2024 05:46 )   PT: 10.6 sec;   INR: 1.01 ratio               Urinalysis Basic - ( 16 May 2024 06:13 )    Color: x / Appearance: x / SG: x / pH: x  Gluc: 121 mg/dL / Ketone: x  / Bili: x / Urobili: x   Blood: x / Protein: x / Nitrite: x   Leuk Esterase: x / RBC: x / WBC x   Sq Epi: x / Non Sq Epi: x / Bacteria: x          RADIOLOGY & ADDITIONAL TESTS:    Imaging Personally Reviewed:    Consultant(s) Notes Reviewed:      Care Discussed with Consultants/Other Providers:  
[facility] Division of Kidney Diseases & Hypertension  FOLLOW UP NOTE  [phone]--------------------------------------------------------------------------------    Chief Complaint: ESRD/HD management    24 hour events/subjective: Pt. was seen and evaluated at bedside this morning. Reports feeling well. No headaches, chest pain, SOB, and LE edema.      PAST HISTORY  --------------------------------------------------------------------------------  No significant changes to PMH, PSH, FHx, SHx, unless otherwise noted    ALLERGIES & MEDICATIONS  --------------------------------------------------------------------------------  Allergies    No Known Allergies    Intolerances      Standing Inpatient Medications  atorvastatin 80 milliGRAM(s) Oral at bedtime  budesonide 160 MICROgram(s)/formoterol 4.5 MICROgram(s) Inhaler 2 Puff(s) Inhalation two times a day  chlorhexidine 2% Cloths 1 Application(s) Topical <User Schedule>  dexAMETHasone     Tablet 6 milliGRAM(s) Oral daily  dextrose 10% Bolus 125 milliLiter(s) IV Bolus once  dextrose 5%. 1000 milliLiter(s) IV Continuous <Continuous>  dextrose 5%. 1000 milliLiter(s) IV Continuous <Continuous>  dextrose 50% Injectable 25 Gram(s) IV Push once  dextrose 50% Injectable 12.5 Gram(s) IV Push once  glucagon  Injectable 1 milliGRAM(s) IntraMuscular once  heparin   Injectable 7500 Unit(s) SubCutaneous every 8 hours  insulin lispro (ADMELOG) corrective regimen sliding scale   SubCutaneous three times a day before meals  insulin lispro (ADMELOG) corrective regimen sliding scale   SubCutaneous at bedtime  montelukast 10 milliGRAM(s) Oral daily    PRN Inpatient Medications  acetaminophen     Tablet .. 650 milliGRAM(s) Oral every 6 hours PRN  albuterol    90 MICROgram(s) HFA Inhaler 2 Puff(s) Inhalation every 6 hours PRN  dextrose Oral Gel 15 Gram(s) Oral once PRN  melatonin 3 milliGRAM(s) Oral at bedtime PRN      REVIEW OF SYSTEMS  --------------------------------------------------------------------------------  See HPI    VITALS/PHYSICAL EXAM  --------------------------------------------------------------------------------  T(C): 36.6 (05-17-24 @ 11:41), Max: 36.8 (05-16-24 @ 21:30)  HR: 75 (05-17-24 @ 11:41) (58 - 75)  BP: 116/57 (05-17-24 @ 11:41) (116/57 - 130/58)  RR: 18 (05-17-24 @ 11:41) (18 - 18)  SpO2: 95% (05-17-24 @ 11:41) (93% - 95%)  Wt(kg): --    05-16-24 @ 07:01  -  05-17-24 @ 07:00  --------------------------------------------------------  IN: 822 mL / OUT: 0 mL / NET: 822 mL    05-17-24 @ 07:01  -  05-17-24 @ 13:14  --------------------------------------------------------  IN: 240 mL / OUT: 0 mL / NET: 240 mL    Physical Exam:  Gen: NAD  	HEENT: Anicteric  	Pulm: Scattered crackles   	CV: S1S2+  	Abd: Soft, +BS    	Ext: No LE edema B/L  	Neuro: Awake  	Skin: Warm and dry  	Dialysis access: RUE-AVF, +thrill    LABS/STUDIES  --------------------------------------------------------------------------------    x   |  x   |  x   ----------------------------<  x       [05-17-24 @ 05:46]  x    |  x   |  5.85        Ca     8.3     [05-16-24 @ 06:13]    TPro  6.7  /  Alb  3.5  /  TBili  0.2  /  DBili  <0.1  /  AST  22  /  ALT  15  /  AlkPhos  49  [05-17-24 @ 05:46]    PT/INR: PT 10.6 , INR 1.01       [05-17-24 @ 05:46]    Creatinine Trend:  SCr 5.85 [05-17 @ 05:46]  SCr 4.42 [05-16 @ 06:13]  SCr 6.74 [05-15 @ 10:04]  SCr 6.10 [05-15 @ 05:40]  SCr 5.23 [05-14 @ 05:20]    Ferritin 493      [05-12-24 @ 00:23]
Patient is a 83y old  Female who presents with a chief complaint of Sepsis 2/2 COVID-19 infection (12 May 2024 18:08)      SUBJECTIVE / OVERNIGHT EVENTS: Comfortable without new complaints.   Review of Systems  chest pain no  palpitations no  sob improving   nausea no  headache no    MEDICATIONS  (STANDING):  atorvastatin 80 milliGRAM(s) Oral at bedtime  budesonide 160 MICROgram(s)/formoterol 4.5 MICROgram(s) Inhaler 2 Puff(s) Inhalation two times a day  chlorhexidine 2% Cloths 1 Application(s) Topical <User Schedule>  dexAMETHasone     Tablet 6 milliGRAM(s) Oral daily  dextrose 10% Bolus 125 milliLiter(s) IV Bolus once  dextrose 5%. 1000 milliLiter(s) (100 mL/Hr) IV Continuous <Continuous>  dextrose 5%. 1000 milliLiter(s) (50 mL/Hr) IV Continuous <Continuous>  dextrose 50% Injectable 25 Gram(s) IV Push once  dextrose 50% Injectable 12.5 Gram(s) IV Push once  glucagon  Injectable 1 milliGRAM(s) IntraMuscular once  heparin   Injectable 7500 Unit(s) SubCutaneous every 8 hours  insulin lispro (ADMELOG) corrective regimen sliding scale   SubCutaneous three times a day before meals  insulin lispro (ADMELOG) corrective regimen sliding scale   SubCutaneous at bedtime  montelukast 10 milliGRAM(s) Oral daily  remdesivir  IVPB   IV Intermittent   remdesivir  IVPB 100 milliGRAM(s) IV Intermittent every 24 hours    MEDICATIONS  (PRN):  acetaminophen     Tablet .. 650 milliGRAM(s) Oral every 6 hours PRN Temp greater or equal to 38C (100.4F), Mild Pain (1 - 3)  albuterol    90 MICROgram(s) HFA Inhaler 2 Puff(s) Inhalation every 6 hours PRN Shortness of Breath and/or Wheezing  dextrose Oral Gel 15 Gram(s) Oral once PRN Blood Glucose LESS THAN 70 milliGRAM(s)/deciliter      Vital Signs Last 24 Hrs  T(C): 36.4 (13 May 2024 18:50), Max: 36.9 (13 May 2024 00:10)  T(F): 97.5 (13 May 2024 18:50), Max: 98.5 (13 May 2024 00:10)  HR: 65 (13 May 2024 18:50) (63 - 80)  BP: 136/61 (13 May 2024 18:50) (107/56 - 136/61)  BP(mean): 73 (12 May 2024 21:12) (73 - 73)  RR: 18 (13 May 2024 18:50) (18 - 18)  SpO2: 96% (13 May 2024 18:50) (91% - 99%)    Parameters below as of 13 May 2024 18:50  Patient On (Oxygen Delivery Method): nasal cannula  O2 Flow (L/min): 2      PHYSICAL EXAM:  GENERAL: NAD   HEAD:  Atraumatic, Normocephalic  EYES: EOMI, PERRLA, conjunctiva and sclera clear  NECK: Supple, No JVD  CHEST/LUNG: few crackles  to auscultation bilaterally; No wheeze  HEART: Regular rate and rhythm; No murmurs, rubs, or gallops  ABDOMEN: Soft, Nontender, Nondistended; Bowel sounds present  EXTREMITIES:  2+ Peripheral Pulses, No clubbing, cyanosis, or edema  PSYCH: AAOx2  NEUROLOGY: non-focal  SKIN: No rashes or lesions    LABS:                        11.0   7.62  )-----------( 247      ( 13 May 2024 06:35 )             33.8     05-13    137  |  92<L>  |  65<H>  ----------------------------<  125<H>  4.1   |  24  |  6.82<H>    Ca    8.4      13 May 2024 06:35  Phos  6.7     05-12  Mg     2.6     05-12    TPro  6.7  /  Alb  3.4  /  TBili  0.2  /  DBili  x   /  AST  14  /  ALT  10  /  AlkPhos  49  05-13    PT/INR - ( 13 May 2024 06:35 )   PT: 10.9 sec;   INR: 1.04 ratio               Urinalysis Basic - ( 13 May 2024 06:35 )    Color: x / Appearance: x / SG: x / pH: x  Gluc: 125 mg/dL / Ketone: x  / Bili: x / Urobili: x   Blood: x / Protein: x / Nitrite: x   Leuk Esterase: x / RBC: x / WBC x   Sq Epi: x / Non Sq Epi: x / Bacteria: x        Culture - Blood (collected 11 May 2024 17:52)  Source: .Blood Blood-Peripheral  Preliminary Report (12 May 2024 22:02):    No growth at 24 hours    Culture - Blood (collected 11 May 2024 17:44)  Source: .Blood Blood-Peripheral  Preliminary Report (12 May 2024 22:02):    No growth at 24 hours        RADIOLOGY & ADDITIONAL TESTS:    Imaging Personally Reviewed:    Consultant(s) Notes Reviewed:      Care Discussed with Consultants/Other Providers:  
Patient is a 83y old  Female who presents with a chief complaint of Sepsis 2/2 COVID-19 infection (16 May 2024 13:38)      SUBJECTIVE / OVERNIGHT EVENTS: Comfortable without new complaints.  Review of Systems  chest pain no  palpitations no  sob improving   nausea no  headache no    MEDICATIONS  (STANDING):  atorvastatin 80 milliGRAM(s) Oral at bedtime  budesonide 160 MICROgram(s)/formoterol 4.5 MICROgram(s) Inhaler 2 Puff(s) Inhalation two times a day  chlorhexidine 2% Cloths 1 Application(s) Topical <User Schedule>  dexAMETHasone     Tablet 6 milliGRAM(s) Oral daily  dextrose 10% Bolus 125 milliLiter(s) IV Bolus once  dextrose 5%. 1000 milliLiter(s) (100 mL/Hr) IV Continuous <Continuous>  dextrose 5%. 1000 milliLiter(s) (50 mL/Hr) IV Continuous <Continuous>  dextrose 50% Injectable 25 Gram(s) IV Push once  dextrose 50% Injectable 12.5 Gram(s) IV Push once  glucagon  Injectable 1 milliGRAM(s) IntraMuscular once  heparin   Injectable 7500 Unit(s) SubCutaneous every 8 hours  insulin lispro (ADMELOG) corrective regimen sliding scale   SubCutaneous three times a day before meals  insulin lispro (ADMELOG) corrective regimen sliding scale   SubCutaneous at bedtime  montelukast 10 milliGRAM(s) Oral daily    MEDICATIONS  (PRN):  acetaminophen     Tablet .. 650 milliGRAM(s) Oral every 6 hours PRN Temp greater or equal to 38C (100.4F), Mild Pain (1 - 3)  albuterol    90 MICROgram(s) HFA Inhaler 2 Puff(s) Inhalation every 6 hours PRN Shortness of Breath and/or Wheezing  dextrose Oral Gel 15 Gram(s) Oral once PRN Blood Glucose LESS THAN 70 milliGRAM(s)/deciliter  melatonin 3 milliGRAM(s) Oral at bedtime PRN Insomnia      Vital Signs Last 24 Hrs  T(C): 36.7 (16 May 2024 12:50), Max: 37.1 (16 May 2024 05:05)  T(F): 98 (16 May 2024 12:50), Max: 98.8 (16 May 2024 05:05)  HR: 79 (16 May 2024 12:50) (61 - 79)  BP: 110/62 (16 May 2024 12:50) (110/62 - 122/62)  BP(mean): --  RR: 18 (16 May 2024 12:50) (18 - 18)  SpO2: 94% (16 May 2024 12:50) (93% - 96%)    Parameters below as of 16 May 2024 12:50  Patient On (Oxygen Delivery Method): room air        PHYSICAL EXAM:  GENERAL: NAD  HEAD:  Atraumatic, Normocephalic  EYES: EOMI, PERRLA, conjunctiva and sclera clear  NECK: Supple, No JVD  CHEST/LUNG: few crackles to auscultation bilaterally; No wheeze  HEART: Regular rate and rhythm; No murmurs, rubs, or gallops  ABDOMEN: Soft, Nontender, Nondistended; Bowel sounds present  EXTREMITIES:  2+ Peripheral Pulses, No clubbing, cyanosis, or edema  PSYCH: AAOx2  NEUROLOGY: non-focal  SKIN: No rashes or lesions    LABS:                        12.6   4.55  )-----------( 293      ( 15 May 2024 10:04 )             39.2     05-16    140  |  97  |  45<H>  ----------------------------<  121<H>  4.0   |  25  |  4.42<H>    Ca    8.3<L>      16 May 2024 06:13    TPro  6.8  /  Alb  3.4  /  TBili  0.2  /  DBili  <0.1  /  AST  19  /  ALT  13  /  AlkPhos  46  05-16    PT/INR - ( 16 May 2024 06:13 )   PT: 11.5 sec;   INR: 1.05 ratio               Urinalysis Basic - ( 16 May 2024 06:13 )    Color: x / Appearance: x / SG: x / pH: x  Gluc: 121 mg/dL / Ketone: x  / Bili: x / Urobili: x   Blood: x / Protein: x / Nitrite: x   Leuk Esterase: x / RBC: x / WBC x   Sq Epi: x / Non Sq Epi: x / Bacteria: x          RADIOLOGY & ADDITIONAL TESTS:    Imaging Personally Reviewed:    Consultant(s) Notes Reviewed:      Care Discussed with Consultants/Other Providers:  
Patient is a 83y old  Female who presents with a chief complaint of Sepsis 2/2 COVID-19 infection (13 May 2024 20:22)      SUBJECTIVE / OVERNIGHT EVENTS: Comfortable without new complaints.   Review of Systems  chest pain no  palpitations no  sob improving   nausea no  headache no    MEDICATIONS  (STANDING):  atorvastatin 80 milliGRAM(s) Oral at bedtime  budesonide 160 MICROgram(s)/formoterol 4.5 MICROgram(s) Inhaler 2 Puff(s) Inhalation two times a day  chlorhexidine 2% Cloths 1 Application(s) Topical <User Schedule>  dexAMETHasone     Tablet 6 milliGRAM(s) Oral daily  dextrose 10% Bolus 125 milliLiter(s) IV Bolus once  dextrose 5%. 1000 milliLiter(s) (50 mL/Hr) IV Continuous <Continuous>  dextrose 5%. 1000 milliLiter(s) (100 mL/Hr) IV Continuous <Continuous>  dextrose 50% Injectable 25 Gram(s) IV Push once  dextrose 50% Injectable 12.5 Gram(s) IV Push once  glucagon  Injectable 1 milliGRAM(s) IntraMuscular once  heparin   Injectable 7500 Unit(s) SubCutaneous every 8 hours  insulin lispro (ADMELOG) corrective regimen sliding scale   SubCutaneous at bedtime  insulin lispro (ADMELOG) corrective regimen sliding scale   SubCutaneous three times a day before meals  montelukast 10 milliGRAM(s) Oral daily  remdesivir  IVPB   IV Intermittent   remdesivir  IVPB 100 milliGRAM(s) IV Intermittent every 24 hours    MEDICATIONS  (PRN):  acetaminophen     Tablet .. 650 milliGRAM(s) Oral every 6 hours PRN Temp greater or equal to 38C (100.4F), Mild Pain (1 - 3)  albuterol    90 MICROgram(s) HFA Inhaler 2 Puff(s) Inhalation every 6 hours PRN Shortness of Breath and/or Wheezing  dextrose Oral Gel 15 Gram(s) Oral once PRN Blood Glucose LESS THAN 70 milliGRAM(s)/deciliter      Vital Signs Last 24 Hrs  T(C): 36.8 (14 May 2024 11:40), Max: 36.8 (14 May 2024 11:40)  T(F): 98.2 (14 May 2024 11:40), Max: 98.2 (14 May 2024 11:40)  HR: 69 (14 May 2024 11:40) (63 - 70)  BP: 131/62 (14 May 2024 11:40) (120/59 - 136/61)  BP(mean): --  RR: 19 (14 May 2024 11:40) (18 - 19)  SpO2: 92% (14 May 2024 11:40) (92% - 96%)    Parameters below as of 14 May 2024 11:40  Patient On (Oxygen Delivery Method): nasal cannula  O2 Flow (L/min): 2      PHYSICAL EXAM:  GENERAL: NAD, well-developed  HEAD:  Atraumatic, Normocephalic  EYES: EOMI, PERRLA, conjunctiva and sclera clear  NECK: Supple, No JVD  CHEST/LUNG: few crackles to auscultation bilaterally; No wheeze  HEART: Regular rate and rhythm; No murmurs, rubs, or gallops  ABDOMEN: Soft, Nontender, Nondistended; Bowel sounds present  EXTREMITIES:  2+ Peripheral Pulses, No clubbing, cyanosis, or edema  PSYCH: AAOx3  NEUROLOGY: non-focal  SKIN: No rashes or lesions    LABS:                        11.4   6.58  )-----------( 269      ( 14 May 2024 05:21 )             35.1     05-14    140  |  98  |  49<H>  ----------------------------<  130<H>  4.2   |  24  |  5.23<H>    Ca    8.6      14 May 2024 05:20    TPro  7.0  /  Alb  3.7  /  TBili  0.2  /  DBili  x   /  AST  21  /  ALT  9<L>  /  AlkPhos  50  05-14    PT/INR - ( 13 May 2024 06:35 )   PT: 10.9 sec;   INR: 1.04 ratio               Urinalysis Basic - ( 14 May 2024 05:20 )    Color: x / Appearance: x / SG: x / pH: x  Gluc: 130 mg/dL / Ketone: x  / Bili: x / Urobili: x   Blood: x / Protein: x / Nitrite: x   Leuk Esterase: x / RBC: x / WBC x   Sq Epi: x / Non Sq Epi: x / Bacteria: x        Culture - Blood (collected 11 May 2024 17:52)  Source: .Blood Blood-Peripheral  Preliminary Report (13 May 2024 22:02):    No growth at 48 Hours    Culture - Blood (collected 11 May 2024 17:44)  Source: .Blood Blood-Peripheral  Preliminary Report (13 May 2024 22:02):    No growth at 48 Hours        RADIOLOGY & ADDITIONAL TESTS:    Imaging Personally Reviewed:    Consultant(s) Notes Reviewed:      Care Discussed with Consultants/Other Providers:  
[facility] DIVISION OF KIDNEY DISEASES AND HYPERTENSION -- FOLLOW UP NOTE  --------------------------------------------------------------------------------  Chief Complaint:/subjective:  confused  thinks shes in a hotel      24 hour events:  no acute events       PAST HISTORY  --------------------------------------------------------------------------------  No significant changes to PMH, PSH, FHx, SHx, unless otherwise noted    ALLERGIES & MEDICATIONS  --------------------------------------------------------------------------------  Allergies    No Known Allergies    Intolerances      Standing Inpatient Medications  atorvastatin 80 milliGRAM(s) Oral at bedtime  budesonide 160 MICROgram(s)/formoterol 4.5 MICROgram(s) Inhaler 2 Puff(s) Inhalation two times a day  chlorhexidine 2% Cloths 1 Application(s) Topical <User Schedule>  dexAMETHasone     Tablet 6 milliGRAM(s) Oral daily  dextrose 10% Bolus 125 milliLiter(s) IV Bolus once  dextrose 5%. 1000 milliLiter(s) IV Continuous <Continuous>  dextrose 5%. 1000 milliLiter(s) IV Continuous <Continuous>  dextrose 50% Injectable 25 Gram(s) IV Push once  dextrose 50% Injectable 12.5 Gram(s) IV Push once  glucagon  Injectable 1 milliGRAM(s) IntraMuscular once  heparin   Injectable 7500 Unit(s) SubCutaneous every 8 hours  insulin lispro (ADMELOG) corrective regimen sliding scale   SubCutaneous at bedtime  insulin lispro (ADMELOG) corrective regimen sliding scale   SubCutaneous three times a day before meals  montelukast 10 milliGRAM(s) Oral daily  remdesivir  IVPB   IV Intermittent   remdesivir  IVPB 100 milliGRAM(s) IV Intermittent every 24 hours    PRN Inpatient Medications  acetaminophen     Tablet .. 650 milliGRAM(s) Oral every 6 hours PRN  albuterol    90 MICROgram(s) HFA Inhaler 2 Puff(s) Inhalation every 6 hours PRN  dextrose Oral Gel 15 Gram(s) Oral once PRN      REVIEW OF SYSTEMS  --------------------------------------------------------------------------------  Gen: No weight changes, fatigue, fevers/chills, weakness  Skin: No rashes  Head/Eyes/Ears/Mouth: No headache;   Respiratory: No dyspnea, cough  CV: No chest pain, PND, orthopnea  GI: No abdominal pain, diarrhea, constipation, nausea, vomiting  : No increased frequency, dysuria, hematuria, nocturia  MSK: No joint pain/swelling; no back pain; no edema  Neuro: No dizziness/lightheadedness, weakness  Heme: No easy bruising or bleeding  Psych: No significant nervousness, anxiety, stress, depression    All other systems were reviewed and are negative, except as noted.    VITALS/PHYSICAL EXAM  --------------------------------------------------------------------------------  T(C): 36.4 (05-15-24 @ 05:03), Max: 36.8 (05-14-24 @ 11:40)  HR: 66 (05-15-24 @ 05:03) (66 - 75)  BP: 120/65 (05-15-24 @ 05:03) (120/65 - 148/68)  RR: 18 (05-15-24 @ 05:03) (18 - 19)  SpO2: 96% (05-15-24 @ 05:03) (92% - 96%)  Wt(kg): --  Adult Advanced Hemodynamics Last 24 Hrs  ABP: --  ABP(mean): --  CVP(mm Hg): --  CO: --  CI: --  PA: --  PA(mean): --  PCWP: --  SVR: --  SVRI: --        05-14-24 @ 07:01  -  05-15-24 @ 07:00  --------------------------------------------------------  IN: 480 mL / OUT: 0 mL / NET: 480 mL      Physical Exam:  	Gen: NAD,   	HEENT: , no jvp  	Pulm: CTA B/L  	CV: RRR, S1S2; no rub  	Back:   no sacral edema  	Abd: +BS, soft,    	: No suprapubic tenderness  	Ext: no edema  	Neuro: confused  	Skin: Warm,    	Vascular access:avf R+B+T    LABS/STUDIES  --------------------------------------------------------------------------------              11.4   6.58  >-----------<  269      [05-14-24 @ 05:21]              35.1     Hemoglobin: 11.4 g/dL (05-14-24 @ 05:21)    Platelet Count - Automated: 269 K/uL (05-14-24 @ 05:21)    136  |  95  |  76  ----------------------------<  116      [05-15-24 @ 05:40]  4.6   |  22  |  6.10        Ca     8.0     [05-15-24 @ 05:40]    TPro  6.7  /  Alb  3.3  /  TBili  0.2  /  DBili  x   /  AST  18  /  ALT  12  /  AlkPhos  47  [05-15-24 @ 05:40]    PT/INR: PT 10.7 , INR 1.02       [05-15-24 @ 05:40]      Creatinine Trend:  SCr 6.10 [05-15 @ 05:40]  SCr 5.23 [05-14 @ 05:20]  SCr 6.82 [05-13 @ 06:35]  SCr 5.55 [05-12 @ 06:15]  SCr 5.14 [05-12 @ 00:23]    Urinalysis - [05-15-24 @ 05:40]      Color  / Appearance  / SG  / pH       Gluc 116 / Ketone   / Bili  / Urobili        Blood  / Protein  / Leuk Est  / Nitrite       RBC  / WBC  / Hyaline  / Gran  / Sq Epi  / Non Sq Epi  / Bacteria       Iron 68, TIBC 261, %sat 26      [11-25-23 @ 06:14]  Ferritin 493      [05-12-24 @ 00:23]  PTH -- (Ca 8.6)      [11-25-23 @ 06:14]   73  Vitamin D (25OH) 22.5      [11-25-23 @ 06:14]  Lipid: chol 116, TG 79, HDL 39, LDL --      [11-23-23 @ 06:25]      
Patient is a 83y old  Female who presents with a chief complaint of Sepsis 2/2 COVID-19 infection (11 May 2024 21:11)      SUBJECTIVE / OVERNIGHT EVENTS: Comfortable without new complaints.   Review of Systems  chest pain no  palpitations no  sob improving   nausea no  headache no    MEDICATIONS  (STANDING):  atorvastatin 80 milliGRAM(s) Oral at bedtime  budesonide 160 MICROgram(s)/formoterol 4.5 MICROgram(s) Inhaler 2 Puff(s) Inhalation two times a day  cefTRIAXone   IVPB 1000 milliGRAM(s) IV Intermittent every 24 hours  dexAMETHasone     Tablet 6 milliGRAM(s) Oral daily  dextrose 10% Bolus 125 milliLiter(s) IV Bolus once  dextrose 5%. 1000 milliLiter(s) (100 mL/Hr) IV Continuous <Continuous>  dextrose 5%. 1000 milliLiter(s) (50 mL/Hr) IV Continuous <Continuous>  dextrose 50% Injectable 25 Gram(s) IV Push once  dextrose 50% Injectable 12.5 Gram(s) IV Push once  glucagon  Injectable 1 milliGRAM(s) IntraMuscular once  heparin   Injectable 7500 Unit(s) SubCutaneous every 8 hours  insulin lispro (ADMELOG) corrective regimen sliding scale   SubCutaneous every 6 hours  montelukast 10 milliGRAM(s) Oral daily  remdesivir  IVPB   IV Intermittent     MEDICATIONS  (PRN):  acetaminophen     Tablet .. 650 milliGRAM(s) Oral every 6 hours PRN Temp greater or equal to 38C (100.4F), Mild Pain (1 - 3)  albuterol    90 MICROgram(s) HFA Inhaler 2 Puff(s) Inhalation every 6 hours PRN Shortness of Breath and/or Wheezing  dextrose Oral Gel 15 Gram(s) Oral once PRN Blood Glucose LESS THAN 70 milliGRAM(s)/deciliter      Vital Signs Last 24 Hrs  T(C): 36.4 (12 May 2024 05:47), Max: 38.1 (11 May 2024 17:37)  T(F): 97.6 (12 May 2024 05:47), Max: 100.5 (11 May 2024 17:37)  HR: 74 (12 May 2024 15:19) (71 - 88)  BP: 120/55 (12 May 2024 15:19) (91/49 - 139/56)  BP(mean): 73 (11 May 2024 19:15) (73 - 73)  RR: 20 (12 May 2024 05:47) (18 - 22)  SpO2: 93% (12 May 2024 15:19) (93% - 99%)    Parameters below as of 12 May 2024 15:19  Patient On (Oxygen Delivery Method): nasal cannula  O2 Flow (L/min): 3      PHYSICAL EXAM:  GENERAL: NAD  HEAD:  Atraumatic, Normocephalic  EYES: EOMI, PERRLA, conjunctiva and sclera clear  NECK: Supple, No JVD  CHEST/LUNG: few crackles to auscultation bilaterally; No wheeze  HEART: Regular rate and rhythm; No murmurs, rubs, or gallops  ABDOMEN: Soft, Nontender, Nondistended; Bowel sounds present  EXTREMITIES:  2+ bipedal edema  PSYCH: AAOx2  NEUROLOGY: non-focal  SKIN: No rashes or lesions    LABS:                        12.3   7.51  )-----------( 223      ( 12 May 2024 06:15 )             38.7     05-12    139  |  95<L>  |  37<H>  ----------------------------<  166<H>  4.3   |  27  |  5.55<H>    Ca    9.0      12 May 2024 06:15  Phos  6.7     05-12  Mg     2.6     05-12    TPro  7.1  /  Alb  3.6  /  TBili  0.3  /  DBili  x   /  AST  16  /  ALT  10  /  AlkPhos  54  05-12    PT/INR - ( 12 May 2024 00:23 )   PT: 11.2 sec;   INR: 1.02 ratio         PTT - ( 11 May 2024 17:59 )  PTT:34.7 sec  CARDIAC MARKERS ( 11 May 2024 17:59 )  x     / x     / 40 U/L / x     / <1.0 ng/mL      Urinalysis Basic - ( 12 May 2024 06:15 )    Color: x / Appearance: x / SG: x / pH: x  Gluc: 166 mg/dL / Ketone: x  / Bili: x / Urobili: x   Blood: x / Protein: x / Nitrite: x   Leuk Esterase: x / RBC: x / WBC x   Sq Epi: x / Non Sq Epi: x / Bacteria: x          RADIOLOGY & ADDITIONAL TESTS:    Imaging Personally Reviewed:    Consultant(s) Notes Reviewed:      Care Discussed with Consultants/Other Providers:

## 2024-05-17 NOTE — PROGRESS NOTE ADULT - ASSESSMENT
{\rtf1\cmmhgt96311\ansi\npmybmy6310\ftnbj\uc1\deff0  {\fonttbl{\f0 \fnil Segoe UI;}{\f1 \fnil \fcharset0 Segoe UI;}{\f2 \fnil Times [employer] [place];}}  {\colortbl ;\ost949\wnwja398\demp528 ;\red0\green0\blue0 ;\red0\green0\gqdf515 ;\red0\green0\blue0 ;}  {\stylesheet{\f0\fs20 Normal;}{\cs1 Default Paragraph Font;}{\cs2\f0\fs16 Line Number;}{\cs3\f2\fs24\ul\cf3 Hyperlink;}}  {\*\revtbl{Unknown;}}  \gmffop29858\nngaqw65746\twats2421\xxyzu2299\rxrsb9159\hgilf9802\imhcohn849\dgjbpyc540\nogrowautofit\hzywkn817\formshade\nofeaturethrottle1\dntblnsbdb\fet4\aendnotes\aftnnrlc\pgbrdrhead\pgbrdrfoot  \sectd\cwovqs00655\mdbgns28143\guttersxn0\tfubligo4306\yyuhwzev0225\killfzzd0600\nxcicmop8517\zxscplh233\ijwvfac123\sbkpage\pgncont\pgndec  \plain\plain\f0\fs24\ql\plain\f0\fs24\plain\f0\fs20\cnkt9063\hich\f0\dbch\f0\loch\f0\fs20 83F former smoker PMH HTN, HLD, T2DM, CAD, HFpEF, ESRD on HD MWF, dementia, remote hx of breast ca s/p mastectomy, chronic hypoxemic respiratory failure of unclear   etiology on home O2 prn a/w sepsis 2/2 COVID-19 infection +/- superimposed bacterial PNA\par  \par  {\*\bkmkstart [id number]}{\*\bkmkend os98332420575}{\*\bkmkstart ez04653528672}{\*\bkmkend pv10155737608}Sepsis due to COVID-19. \par  - {\*\bkmkstart vq15927053675}{\*\bkmkend wj84668927140}{\*\bkmkstart vz55182122406}{\*\bkmkend fk13169577879}SIRS met (febrile, tachypneic) \par  \par  COVID-19+\par  - s/p cefepime in ED\par  - on O2, will c/w decadron\par  - remdesivir given comorbidities (d/w pharmacy, ok despite ESRD), monitor CMP\par  - non-ICU care, d-dimer unknown, ESRD, BMI > 30, VTE ppx per protocol with UFH 7500u SQ q8h/q12h\par  - procal elevated, will dose abx empirically for superimposed PNA w/ CTX, will hold azithro given QTc elevation, f/u Cx/MRSA/legionella/strep\par  - VS q4h on , monitor fever/WBC curve\par  - monitor vol status re IVF needs given ESRD on HD.\plain\f1\fs20\sznq3736\hich\f1\dbch\f1\loch\f1\cf2\fs20\strike\plain\f0\fs20\dqwb5997\hich\f0\dbch\f0\loch\f0\fs20 - ID evaluation called\par  \par  {\*\bkmkstart bs34854714771}{\*\bkmkend dq67358598467}{\*\bkmkstart sz77845725120}{\*\bkmkend gn23222506427}Chronic hypoxemic respiratory failure. \par  - {\*\bkmkstart iu45907110753}{\*\bkmkend zi51130885540}{\*\bkmkstart or89597381235}{\*\bkmkend wq71836081765}hypercapnic on VBG, on O2\par  - hx unclear chronic respiratory dz\par  - on home O2 prn\par  - tx of COVID/PNA as above\par  - wean O2 as tolerates on \par  - Pulmonary evaluation \par  \par  {\*\bkmkstart na32084177322}{\*\bkmkend cy93677926682}{\*\bkmkstart qe18840396359}{\*\bkmkend tj92452294990}Type 2 diabetes mellitus. \par  - {\*\bkmkstart jr88022265884}{\*\bkmkend dp61222509338}{\*\bkmkstart nj50761726671}{\*\bkmkend zx54617225498}RODRICK, monitor FS.\par  \par  {\*\bkmkstart xg37927062927}{\*\bkmkend en67813350779}{\*\bkmkstart xh40780854292}{\*\bkmkend tc20646541530}Chronic hypertension. \par  - {\*\bkmkstart ju72729396251}{\*\bkmkend uv54335872501}{\*\bkmkstart nv23108280248}{\*\bkmkend gz03980568557}hold home meds (including bumex) given sepsis, consider restart when appropriate\par  - monitor BP.\par  \par  {\*\bkmkstart xr89151222672}{\*\bkmkend sw44325672816}{\*\bkmkstart az18821589229}{\*\bkmkend aa31215563575}ESRD on dialysis. \par  - {\*\bkmkstart gv74291101450}{\*\bkmkend fz28011137197}{\*\bkmkstart ko34269737032}{\*\bkmkend ie44972111939}last HD yesterday per ED note\par  - w/o lyte disturbance necessitating urgent HD, euvolemic-appearing on exam\par  - monitor BMP, dose per HD, avoid nephrotoxins, monitor vol status\par  - nephrology evaluation \par  \par  {\*\bkmkstart ap65026778257}{\*\bkmkend cw31536400711}{\*\bkmkstart rr07717607240}{\*\bkmkend dc47949683105}Chronic heart failure with preserved ejection fraction (HFpEF). \par  - {\*\bkmkstart ox32953661268}{\*\bkmkend iu45253163211}{\*\bkmkstart bn31008329376}{\*\bkmkend vz54706070126}euvolemic-appearing on exam\par  - monitor vol status.\par  \par  {\*\bkmkstart xn71723708699}{\*\bkmkend ha87825207540}{\*\bkmkstart kc93987217893}{\*\bkmkend tp44896380011}Dementia. \par  - {\*\bkmkstart ak22327120685}{\*\bkmkend vl20464432840}{\*\bkmkstart rk16352812390}{\*\bkmkend pu63636286015}AOx2, at bl per \par  - was found on floor per RN note, unclear if fall, no mention in ED note and unable to reach family for collateral, no EMS note in physical chart. Update: able to reach  as above, will obtain CTH NC to r/o injury/bleed given somnolence and neurochecks   q4h\par  - frequent reorientation\par  - fall/aspiration precautions\par  - PT c/s.\par  \par  {\*\bkmkstart rr03172903002}{\*\bkmkend rk98062706922}{\*\bkmkstart ud70244225312}{\*\bkmkend yf00632809592}Need for prophylactic measure. \par  - {\*\bkmkstart qz36856213099}{\*\bkmkend wc62993430364}{\*\bkmkstart bc48898453517}{\*\bkmkend ll49015936278}hsq re vte ppx as above\par  - precautions as above\par  \par  Dario Sol MD phone [phone] \par  }  
83F former smoker PMH HTN, HLD, T2DM, CAD, HFpEF, ESRD on HD MWF, dementia, remote hx of breast ca s/p mastectomy, chronic hypoxemic respiratory failure of unclear etiology on home O2 prn a/w sepsis 2/2 COVID-19 infection +/- superimposed bacterial PNA    Sepsis due to COVID-19.   - SIRS met (febrile, tachypneic)     COVID-19+  - s/p cefepime in ED  - on O2, will c/w decadron  - remdesivir given comorbidities (d/w pharmacy, ok despite ESRD), monitor CMP  - non-ICU care, d-dimer unknown, ESRD, BMI > 30, VTE ppx per protocol with UFH 7500u SQ q8h/q12h  - procal elevated,  - discontinue CTX  - monitor vol status re IVF needs given ESRD on HD  - ID follow    Chronic hypoxemic respiratory failure.   - hypercapnic on VBG, on O2  - hx unclear chronic respiratory dz  - on home O2 prn  - tx of COVID/PNA as above  - wean O2 as tolerates on   - Pulmonary evaluation     Type 2 diabetes mellitus.   - RODRICK, monitor FS.    Chronic hypertension.   - hold home meds (including bumex) given sepsis, consider restart when appropriate  - monitor BP.    ESRD on dialysis.   - last HD yesterday per ED note  - w/o lyte disturbance necessitating urgent HD, euvolemic-appearing on exam  - monitor BMP, dose per HD, avoid nephrotoxins, monitor vol status  - nephrology evaluation     Chronic heart failure with preserved ejection fraction (HFpEF).   - euvolemic-appearing on exam  - monitor vol status.    Dementia.   - AOx2, at bl per   - was found on floor per RN note, unclear if fall, no mention in ED note and unable to reach family for collateral, no EMS note in physical chart. Update: able to reach  as above, will obtain CTH NC to r/o injury/bleed given somnolence and neurochecks q4h  - frequent reorientation  - fall/aspiration precautions  - PT c/s.    Need for prophylactic measure.   - hsq re vte ppx as above  - precautions as above    d/w patient and ACP     Dario Sol MD phone [phone] 
83F former smoker PMH HTN, HLD, T2DM, CAD, HFpEF, ESRD on HD MWF, dementia, remote hx of breast ca s/p mastectomy, chronic hypoxemic respiratory failure of unclear etiology on home O2 prn a/w sepsis 2/2 COVID-19 infection +/- superimposed bacterial PNA    Sepsis due to COVID-19.   - SIRS met (febrile, tachypneic)     COVID-19+  - s/p cefepime in ED  - on O2, will c/w decadron  - remdesivir given comorbidities (d/w pharmacy, ok despite ESRD), monitor CMP  - non-ICU care, d-dimer unknown, ESRD, BMI > 30, VTE ppx per protocol with UFH 7500u SQ q8h/q12h  - procal elevated,  - discontinue CTX  - monitor vol status re IVF needs given ESRD on HD  - ID follow    Chronic hypoxemic respiratory failure.   - hypercapnic on VBG, on O2  - hx unclear chronic respiratory dz  - on home O2 prn  - tx of COVID   - wean O2 as tolerates on   - Pulmonary follow    Type 2 diabetes mellitus.   - RODRICK, monitor FS.    Chronic hypertension.   - hold home meds (including bumex) given sepsis, consider restart when appropriate  - monitor BP.    ESRD on dialysis.   - last HD yesterday per ED note  - w/o lyte disturbance necessitating urgent HD, euvolemic-appearing on exam  - monitor BMP, dose per HD, avoid nephrotoxins, monitor vol status  - nephrology evaluation     Chronic heart failure with preserved ejection fraction (HFpEF).   - euvolemic-appearing on exam  - monitor vol status.    Dementia.   - AOx2, at bl per   - was found on floor per RN note, unclear if fall, no mention in ED note and unable to reach family for collateral, no EMS note in physical chart. Update: able to reach  as above, will obtain CTH NC to r/o injury/bleed given somnolence and neurochecks q4h  - frequent reorientation  - fall/aspiration precautions  - PT c/s.    Confusion resolved/ Encephalopathy toxic metabolic   - CT head noted    Need for prophylactic measure.   - hsq re vte ppx as above  - precautions as above  - Xarelto 10 mg po daily for 30 days    d/w patient and ACP     DC home. Follow with PMD/ Nephrology/ Cardiology in 3-4 days. QA     Dario Sol MD phone [phone] 
83F former smoker with HTN, HLD, T2DM, CAD, HFpEF, ESRD on HD MWF, dementia, remote hx of breast ca s/p mastectomy, chronic hypoxemic respiratory failure of unclear etiology on home O2 prn p/w weakness, cough  and AMS.   + COVID-19+  POCUS ED TTE: scattered b lines b/l, E to A reversal cannot exclude diastolic dysfn   CTH 5/12 no acute findings   dimer 549  A1c 5.7     Impression:   1) AMS likely toxic metabolic from infection/covid on top of underlying dementia   2) dementia     - airborne precautions for COVID   - B12, RPR, TSH if not arleady checked   - getting dexamethasone and remdesivir   - can hold off further brain imaging for now.  no focal findings.  can consider MRI brain and EEG if not near baseline after treatment of infection but low yield    - PT/OT   - delirium precautions  - frequent re orientation   - check FS, glucose control <180  - GI/DVT ppx     Dick Benoit MD  Vascular Neurology  Office: [phone]    
83F former smoker PMH HTN, HLD, T2DM, CAD, HFpEF, ESRD on HD MWF, dementia, remote hx of breast ca s/p mastectomy, chronic hypoxemic respiratory failure of unclear etiology on home O2 prn a/w sepsis 2/2 COVID-19 infection +/- superimposed bacterial PNA    Sepsis due to COVID-19.   - SIRS met (febrile, tachypneic)     COVID-19+  - s/p cefepime in ED  - on O2, will c/w decadron  - remdesivir given comorbidities (d/w pharmacy, ok despite ESRD), monitor CMP  - non-ICU care, d-dimer unknown, ESRD, BMI > 30, VTE ppx per protocol with UFH 7500u SQ q8h/q12h  - procal elevated,  - discontinue CTX  - ESRD on HD  - ID follow    Chronic hypoxemic respiratory failure.   - hypercapnic on VBG, on O2  - hx unclear chronic respiratory dz  - on home O2 prn  - tx of COVID   - wean O2 as tolerates on   - Pulmonary follow    Type 2 diabetes mellitus.   - RODRICK, monitor FS.    Chronic hypertension.   - hold home meds (including bumex) given sepsis, consider restart when appropriate  - monitor BP.    ESRD on dialysis.   - last HD yesterday per ED note  - w/o lyte disturbance necessitating urgent HD, euvolemic-appearing on exam  - monitor BMP, dose per HD, avoid nephrotoxins, monitor vol status  - nephrology evaluation     Chronic heart failure with preserved ejection fraction (HFpEF).   - euvolemic-appearing on exam  - monitor vol status.    Dementia.   - AOx2, at bl per   - was found on floor per RN note, unclear if fall, no mention in ED note and unable to reach family for collateral, no EMS note in physical chart. Update: able to reach  as above, will obtain CTH NC to r/o injury/bleed given somnolence and neurochecks q4h  - frequent reorientation  - fall/aspiration precautions  - PT c/s.    Confusion resolved/ Encephalopathy toxic metabolic   - CT head noted    Need for prophylactic measure.   - hsq re vte ppx as above  - precautions as above  - Xarelto 10 mg po daily for 30 days    d/w patient and ACP     DC home. Follow with PMD/ Nephrology/ Cardiology in 3-4 days. QA     Dario Sol MD phone [phone] 
83F former smoker PMH HTN, HLD, T2DM, CAD, HFpEF, ESRD on HD MWF, dementia, remote hx of breast ca s/p mastectomy, chronic hypoxemic respiratory failure of unclear etiology on home O2 prn a/w sepsis 2/2 COVID-19 infection +/- superimposed bacterial PNA    Sepsis due to COVID-19.   - SIRS met (febrile, tachypneic)     COVID-19+  - s/p cefepime in ED  - on O2, will c/w decadron  - remdesivir given comorbidities (d/w pharmacy, ok despite ESRD), monitor CMP  - non-ICU care, d-dimer unknown, ESRD, BMI > 30, VTE ppx per protocol with UFH 7500u SQ q8h/q12h  - procal elevated,  - discontinue CTX  - monitor vol status re IVF needs given ESRD on HD  - ID follow    Chronic hypoxemic respiratory failure.   - hypercapnic on VBG, on O2  - hx unclear chronic respiratory dz  - on home O2 prn  - tx of COVID   - wean O2 as tolerates on   - Pulmonary follow    Type 2 diabetes mellitus.   - RODRICK, monitor FS.    Chronic hypertension.   - hold home meds (including bumex) given sepsis, consider restart when appropriate  - monitor BP.    ESRD on dialysis.   - last HD yesterday per ED note  - w/o lyte disturbance necessitating urgent HD, euvolemic-appearing on exam  - monitor BMP, dose per HD, avoid nephrotoxins, monitor vol status  - nephrology evaluation     Chronic heart failure with preserved ejection fraction (HFpEF).   - euvolemic-appearing on exam  - monitor vol status.    Dementia.   - AOx2, at bl per   - was found on floor per RN note, unclear if fall, no mention in ED note and unable to reach family for collateral, no EMS note in physical chart. Update: able to reach  as above, will obtain CTH NC to r/o injury/bleed given somnolence and neurochecks q4h  - frequent reorientation  - fall/aspiration precautions  - PT c/s.    Confusion resolved  - CT head noted    Need for prophylactic measure.   - hsq re vte ppx as above  - precautions as above    d/w patient and ACP     DCP home.     Dario Sol MD phone [phone] 
83F former smoker PMH HTN, HLD, T2DM, CAD, HFpEF, ESRD on HD MWF, dementia, remote hx of breast ca s/p mastectomy, chronic hypoxemic respiratory failure of unclear etiology on home O2 prn a/w sepsis 2/2 COVID-19 infection +/- superimposed bacterial PNA    Sepsis due to COVID-19.   - SIRS met (febrile, tachypneic)     COVID-19+  - s/p cefepime in ED  - on O2, will c/w decadron  - remdesivir given comorbidities (d/w pharmacy, ok despite ESRD), monitor CMP  - non-ICU care, d-dimer unknown, ESRD, BMI > 30, VTE ppx per protocol with UFH 7500u SQ q8h/q12h  - procal elevated,  - discontinue CTX  - monitor vol status re IVF needs given ESRD on HD  - ID follow    Chronic hypoxemic respiratory failure.   - hypercapnic on VBG, on O2  - hx unclear chronic respiratory dz  - on home O2 prn  - tx of COVID/PNA as above  - wean O2 as tolerates on   - Pulmonary evaluation     Type 2 diabetes mellitus.   - RODRICK, monitor FS.    Chronic hypertension.   - hold home meds (including bumex) given sepsis, consider restart when appropriate  - monitor BP.    ESRD on dialysis.   - last HD yesterday per ED note  - w/o lyte disturbance necessitating urgent HD, euvolemic-appearing on exam  - monitor BMP, dose per HD, avoid nephrotoxins, monitor vol status  - nephrology evaluation     Chronic heart failure with preserved ejection fraction (HFpEF).   - euvolemic-appearing on exam  - monitor vol status.    Dementia.   - AOx2, at bl per   - was found on floor per RN note, unclear if fall, no mention in ED note and unable to reach family for collateral, no EMS note in physical chart. Update: able to reach  as above, will obtain [facility] NC to r/o injury/bleed given somnolence and neurochecks q4h  - frequent reorientation  - fall/aspiration precautions  - PT c/s.    Need for prophylactic measure.   - hsq re vte ppx as above  - precautions as above    d/w patient,  (over phone) and ACP     Dario Sol MD phone [phone] 
  pt seen this morning     a/w COVID/PNA  #ESRD On HD MWF  plan HD today  #a/w covid  pna  on remdesivir  comfortable  #anemia in ckd  hb at goal  hold vida  monitor hb   #pt confused  d/w primary med team to assess     
Pt. with ESRD on HD

## 2024-05-17 NOTE — PROGRESS NOTE ADULT - PROBLEM SELECTOR PLAN 1
Pt. with ESRD on HD MWF. Last HD treatment was on 5/15, was well tolerated. Pt. clinically stable. Labs reviewed. Hgb (12.6) was above goal on 5/15. Plan for maintenance HD treatment today. Check serum phosphorus. Monitor labs and vitals. Avoid nephrotoxins. Dose medications to ESRD/HD.    If you have any questions, please feel free to contact me  Song Lopes  Nephrology Fellow  [phone] / [employer] Teams(Preferred)  (After 5pm or on weekends please page the on-call fellow) Pt. with ESRD on HD MWF. Last HD treatment was on 5/15, was well tolerated.     Pt. clinically stable. Labs reviewed. Hgb (12.6) was above goal on 5/15. Plan for maintenance HD treatment today. Check serum phosphorus. Monitor labs and vitals. Avoid nephrotoxins. Dose medications to ESRD/HD.    If you have any questions, please feel free to contact me  Song Lopes  Nephrology Fellow  [phone] / [employer] Teams(Preferred)  (After 5pm or on weekends please page the on-call fellow)

## 2024-05-17 NOTE — PROGRESS NOTE ADULT - REASON FOR ADMISSION
Sepsis 2/2 COVID-19 infection

## 2024-05-17 NOTE — PROGRESS NOTE ADULT - PROVIDER SPECIALTY LIST ADULT
Internal Medicine
Nephrology
Internal Medicine
Neurology
Nephrology

## 2024-05-25 ENCOUNTER — NON-APPOINTMENT (OUTPATIENT)
Age: 84
End: 2024-05-25

## 2024-09-27 NOTE — DISCHARGE NOTE NURSING/CASE MANAGEMENT/SOCIAL WORK - INFLUENZA IMMUNIZATION DATE (APPROXIMATE):
[FreeTextEntry1] : Paroxysmal A. flutter. now in NSR. Would keep on the same dose of BB. Due to near-syncope - repeat event monitor. Worry about progression of AS, given symptoms - schedule echo  CAD, s/p PCI of LAD c/w Eliquis. C/w ASA. (Off Plavix.) Normal LV function - CM resolved. Hospital records, cath reviewed. pharmacological stress test c/w mild anterior defect. C/w medical management for now. If worsening symptoms, will consider cath. Hold off for now, if needs AS eval will need cath prior to TAVR. Aortic stenosis, GABY 0.8, but gradient and 2D imaging c/w moderate stenosis on last echo. Repeat. Pulmonary HTN, likely secondary to CHF and pulmonary disease. Consider pulmonary evaluation, home sleep study. ET over 4 METs.   Discussed with the patient.   c/w Metoprolol, increased the dose of ACE-I last visit, given the retinal changes c/w HTN, elevated BP. C/w BID, monitor BP. C/w other medications. Lipid control BP control c/w Crestor - labs noted - markedly improved LDL  F/u with Dr. Francis.  CAD - if stable - c/w medical therapy.  F/u in 3 months or if any symptoms.   18-Dec-2023

## 2024-12-30 ENCOUNTER — RESULT REVIEW (OUTPATIENT)
Age: 84
End: 2024-12-30

## 2025-01-03 ENCOUNTER — TRANSCRIPTION ENCOUNTER (OUTPATIENT)
Age: 85
End: 2025-01-03

## 2025-01-10 ENCOUNTER — APPOINTMENT (OUTPATIENT)
Dept: CARDIOLOGY | Facility: CLINIC | Age: 85
End: 2025-01-10

## 2025-01-10 ENCOUNTER — NON-APPOINTMENT (OUTPATIENT)
Age: 85
End: 2025-01-10

## 2025-01-10 VITALS
BODY MASS INDEX: 25.39 KG/M2 | WEIGHT: 130 LBS | OXYGEN SATURATION: 95 % | SYSTOLIC BLOOD PRESSURE: 112 MMHG | HEART RATE: 100 BPM | DIASTOLIC BLOOD PRESSURE: 65 MMHG

## 2025-01-10 DIAGNOSIS — Z99.2 END STAGE RENAL DISEASE: ICD-10-CM

## 2025-01-10 DIAGNOSIS — R00.0 TACHYCARDIA, UNSPECIFIED: ICD-10-CM

## 2025-01-10 DIAGNOSIS — E11.65 TYPE 2 DIABETES MELLITUS WITH HYPERGLYCEMIA: ICD-10-CM

## 2025-01-10 DIAGNOSIS — F03.90 UNSPECIFIED DEMENTIA W/OUT BEHAVIORAL DISTURBANCE: ICD-10-CM

## 2025-01-10 DIAGNOSIS — I25.10 ATHEROSCLEROTIC HEART DISEASE OF NATIVE CORONARY ARTERY W/OUT ANGINA PECTORIS: ICD-10-CM

## 2025-01-10 DIAGNOSIS — E78.5 HYPERLIPIDEMIA, UNSPECIFIED: ICD-10-CM

## 2025-01-10 DIAGNOSIS — J44.9 CHRONIC OBSTRUCTIVE PULMONARY DISEASE, UNSPECIFIED: ICD-10-CM

## 2025-01-10 DIAGNOSIS — N18.6 END STAGE RENAL DISEASE: ICD-10-CM

## 2025-01-10 DIAGNOSIS — I77.0 ARTERIOVENOUS FISTULA, ACQUIRED: ICD-10-CM

## 2025-01-10 DIAGNOSIS — J45.901 UNSPECIFIED ASTHMA WITH (ACUTE) EXACERBATION: ICD-10-CM

## 2025-01-10 DIAGNOSIS — D64.9 ANEMIA, UNSPECIFIED: ICD-10-CM

## 2025-01-10 DIAGNOSIS — I10 ESSENTIAL (PRIMARY) HYPERTENSION: ICD-10-CM

## 2025-01-10 DIAGNOSIS — I50.9 HEART FAILURE, UNSPECIFIED: ICD-10-CM

## 2025-01-10 DIAGNOSIS — Z99.2 DEPENDENCE ON RENAL DIALYSIS: ICD-10-CM

## 2025-01-10 PROCEDURE — G2211 COMPLEX E/M VISIT ADD ON: CPT

## 2025-01-10 PROCEDURE — 99214 OFFICE O/P EST MOD 30 MIN: CPT

## 2025-01-10 PROCEDURE — 93000 ELECTROCARDIOGRAM COMPLETE: CPT

## 2025-01-10 RX ORDER — METOPROLOL SUCCINATE 25 MG/1
25 TABLET, EXTENDED RELEASE ORAL
Qty: 90 | Refills: 2 | Status: ACTIVE | COMMUNITY
Start: 2025-01-10 | End: 1900-01-01

## 2025-02-12 NOTE — PRE-OP CHECKLIST - TEMPERATURE IN CELSIUS (DEGREES C)
Medication: lisdexamfetamine (Vyvanse) 30 MG capsule   Last office visit date: 11/12/2024  Medication Refill Protocol Failed.  Failed criteria: controlled substance. Sent to clinician to review.    
37

## 2025-03-07 NOTE — PROGRESS NOTE ADULT - PROBLEM SELECTOR PLAN 5
Patient did have an audiology exam in the past.  He does have hearing loss from being in the service in the past.          Patient on several oral medications at home including dapaglifozin, saxagliptin  - unclear baseline glycemic control    Plan:  > ISS for now, add basal/bolus as needed  > f/u A1c

## 2025-05-08 NOTE — DISCHARGE NOTE PROVIDER - NSDCMRMEDTOKEN_GEN_ALL_CORE_FT
Statement Selected acetaminophen 325 mg oral tablet: 3 tab(s) orally every 6 hours  albuterol 90 mcg/inh inhalation aerosol: 2 puff(s) inhaled every 6 hours  amLODIPine 10 mg oral tablet: 1 tab(s) orally once a day (in the morning)  budesonide-formoterol 160 mcg-4.5 mcg/inh inhalation aerosol: 1 spray(s) inhaled 2 times a day  bumetanide 2 mg oral tablet: 1 tab(s) orally Tuesday, Thursday, Saturday, Sunday Take once a day on non-dialysis days in the mornings  epoetin sheryl: 1 injectable 3 times a week give at dialysis, MWF  ferrous sulfate 325 mg (65 mg elemental iron) oral tablet: 1 tab(s) orally once a day  fluticasone 50 mcg/inh nasal spray: 1 spray(s) nasal 2 times a day  hydrALAZINE 25 mg oral tablet: 1 tab(s) orally 3 times a day  Onglyza 2.5 mg oral tablet: 1 tab(s) orally once a day (in the evening) On HD (hemodialysis) days, give after HD (hemodialysis)  oxyCODONE 5 mg oral tablet: 1 tab(s) orally every 6 hours as needed for  severe pain MDD: 4 tabs  pioglitazone 30 mg oral tablet: 1 tab(s) orally once a day (in the morning)  rosuvastatin 20 mg oral tablet: 1 tab(s) orally once a day (at bedtime)  Tradjenta 5 mg oral tablet: 1 tab(s) orally once a day (in the morning)

## 2025-06-05 NOTE — PATIENT PROFILE ADULT - NSPROPOAURINARYCATHETER_GEN_A_NUR
Sleep Study Documentation    Pre-Sleep Study       Sleep testing procedure explained to patient:YES    Patient napped prior to study:NO    Caffeine:Dayshift worker after 12PM.  Caffeine use:YES- coffee  6 to 18 ounces, soda  12 to 26 ounces, and energy drinks  1 serving    Alcohol:Dayshift workers after 5PM: Alcohol use:NO    Typical day for patient:YES         Study Documentation    Sleep Study Indications: witnessed apneas excessive daytime sleepiness  hypertension    Montage used: Standard    Transcutaneous CO2 used: NO    Sleep Study Type:     INSPIRE    Treatment: Mode of Therapy:INSPIRE    Optimal INSPIRE amplitude: Last amplitude was 1.3 V patient continue having REM related respiratory events.      Oxygen Data  Supplemental O2 used: No      EKG/EEG/Abnormal Behaviors   EKG abnormalities: no None    EEG abnormalities: no    Were abnormal behaviors in sleep observed:NO  No    Snoring    Character:  Soft    Frequency: Rare        Is Total Sleep Study Recording Time < 2 hours: N/A    Is Total Sleep Study Recording Time > 2 hours but study is incomplete: N/A    Is Total Sleep Study Recording Time 6 hours or more but sleep was not obtained: NO        Post-Sleep Study    Medication used at bedtime or during sleep study:YES prescription sleep aid    Patient reports time it took to fall asleep:20 to 30 minutes    Patient reports waking up during study:Denied    Patient reports sleeping 6 to 8 hours without dreaming.    Does the Patient feel this is a typical night of sleep:better than usual    Patient rated sleepiness: Not sleepy or tired                     no

## 2025-06-11 NOTE — H&P ADULT - PROBLEM/PLAN-7
Physical Therapy    Missed Treatment Session - cancel note - 0916 - 06/11/2025    Patient Name:  Vaibhav Vargas   MRN:  [id number]      -patient not seen at this time secondary to nurse hold  -notified (0916) by PETTY Whitfield-patient on nursing hold 2/2 HR in 150's and no cardioversion yesterday  -will follow-up as patient is appropriate/available/agreeable to participate and as therapists' schedule allows.   DISPLAY PLAN FREE TEXT

## (undated) DEVICE — MEDICATION LABELS W MARKER

## (undated) DEVICE — FEEDING TUBE NG ARGYLE PVC 8FR 107CM ENFIT

## (undated) DEVICE — SUT PROLENE 6-0 18" BV-1

## (undated) DEVICE — POSITIONER FOAM EGG CRATE ULNAR 2PCS (PINK)

## (undated) DEVICE — VENODYNE/SCD SLEEVE CALF MEDIUM

## (undated) DEVICE — GLV 7.5 PROTEXIS (CREAM) MICRO

## (undated) DEVICE — SUT SILK 2-0 30" TIES

## (undated) DEVICE — GLV 8 PROTEXIS (WHITE)

## (undated) DEVICE — DRSG CURITY GAUZE SPONGE 4 X 4" 12-PLY

## (undated) DEVICE — SOL IRR POUR H2O 250ML

## (undated) DEVICE — GEL AQUSNC PACKET 20GR

## (undated) DEVICE — SUT SILK 4-0 18" G-3

## (undated) DEVICE — PREP CHLORAPREP HI-LITE ORANGE 26ML

## (undated) DEVICE — SUT PROLENE 6-0 4-18" BV-1

## (undated) DEVICE — SOL IRR POUR NS 0.9% 500ML

## (undated) DEVICE — GLV 7 PROTEXIS (WHITE)

## (undated) DEVICE — SYR LUER LOK 5CC

## (undated) DEVICE — CLAMP BULLDOG MIDI STRAIGHT (YELLOW) DISP

## (undated) DEVICE — SUT BIOSYN 4-0 18" P-12

## (undated) DEVICE — WARMING BLANKET LOWER ADULT

## (undated) DEVICE — DRAIN PENROSE .5" X 18" LATEX

## (undated) DEVICE — SUCTION YANKAUER NO CONTROL VENT

## (undated) DEVICE — STAPLER SKIN MULTI DIRECTION W35

## (undated) DEVICE — DRAPE ISOLATION BAG 20X20"

## (undated) DEVICE — GLV 7.5 PROTEXIS (WHITE)

## (undated) DEVICE — GLV 6.5 PROTEXIS (WHITE)

## (undated) DEVICE — DRSG TEGADERM 4X11

## (undated) DEVICE — BLADE SCALPEL SAFETYLOCK #15

## (undated) DEVICE — DRAPE HAND 77" X 146"

## (undated) DEVICE — POSITIONER STRAP ARMBOARD VELCRO TS-30

## (undated) DEVICE — SOL INJ NS 0.9% 500ML 1-PORT

## (undated) DEVICE — SUT MONOCRYL 4-0 27" PS-2 UNDYED

## (undated) DEVICE — SUT SILK 3-0 18" TIES

## (undated) DEVICE — VESSEL LOOP MINI-BLUE 0.075" X 16"

## (undated) DEVICE — SUT SILK 0 18" CT-1 (POP-OFF)

## (undated) DEVICE — BLADE SCALPEL SAFETYLOCK #11

## (undated) DEVICE — DRSG STERISTRIPS 0.5 X 4"

## (undated) DEVICE — VENODYNE/SCD SLEEVE CALF LARGE

## (undated) DEVICE — BLADE SCALPEL SAFETYLOCK #10

## (undated) DEVICE — PACK AV FISTULA

## (undated) DEVICE — STAPLER SKIN VISI-STAT 35 WIDE

## (undated) DEVICE — FEEDING TUBE NG ARGYLE PVC 8FR 41CM ENFIT

## (undated) DEVICE — DRAPE TOWEL BLUE 17" X 24"

## (undated) DEVICE — DRSG TEGADERM 6"X8"

## (undated) DEVICE — GLV 8.5 PROTEXIS (WHITE)

## (undated) DEVICE — DRSG OPSITE 13.75 X 4"

## (undated) DEVICE — VESSEL LOOP MAXI-BLUE 0.120" X 16"

## (undated) DEVICE — SPECIMEN CONTAINER 100ML

## (undated) DEVICE — SUT SILK 2-0 18" TIES

## (undated) DEVICE — DRAPE LIGHT HANDLE COVER (BLUE)

## (undated) DEVICE — SUT POLYSORB 3-0 30" V-20 UNDYED

## (undated) DEVICE — SUT VICRYL 3-0 27" SH UNDYED

## (undated) DEVICE — SUT PROLENE 6-0 30" C-1

## (undated) DEVICE — ELCTR GROUNDING PAD ADULT COVIDIEN

## (undated) DEVICE — DRAPE INSTRUMENT POUCH 6.75" X 11"

## (undated) DEVICE — GOWN TRIMAX LG

## (undated) DEVICE — SUT SILK 4-0 17-18"

## (undated) DEVICE — DISSECTOR ENDO PEANUT 5MM